# Patient Record
Sex: FEMALE | Race: WHITE | NOT HISPANIC OR LATINO | Employment: FULL TIME | URBAN - METROPOLITAN AREA
[De-identification: names, ages, dates, MRNs, and addresses within clinical notes are randomized per-mention and may not be internally consistent; named-entity substitution may affect disease eponyms.]

---

## 2017-01-29 ENCOUNTER — COMMUNICATION - HEALTHEAST (OUTPATIENT)
Dept: SCHEDULING | Facility: CLINIC | Age: 32
End: 2017-01-29

## 2017-03-17 ENCOUNTER — COMMUNICATION - HEALTHEAST (OUTPATIENT)
Dept: SCHEDULING | Facility: CLINIC | Age: 32
End: 2017-03-17

## 2017-04-27 ENCOUNTER — RECORDS - HEALTHEAST (OUTPATIENT)
Dept: ADMINISTRATIVE | Facility: OTHER | Age: 32
End: 2017-04-27

## 2018-05-13 ENCOUNTER — COMMUNICATION - HEALTHEAST (OUTPATIENT)
Dept: SCHEDULING | Facility: CLINIC | Age: 33
End: 2018-05-13

## 2018-05-13 ENCOUNTER — RECORDS - HEALTHEAST (OUTPATIENT)
Dept: ADMINISTRATIVE | Facility: OTHER | Age: 33
End: 2018-05-13

## 2018-12-04 ENCOUNTER — RECORDS - HEALTHEAST (OUTPATIENT)
Dept: ADMINISTRATIVE | Facility: OTHER | Age: 33
End: 2018-12-04

## 2020-02-26 ENCOUNTER — RECORDS - HEALTHEAST (OUTPATIENT)
Dept: ADMINISTRATIVE | Facility: OTHER | Age: 35
End: 2020-02-26

## 2020-09-11 ENCOUNTER — COMMUNICATION - HEALTHEAST (OUTPATIENT)
Dept: SCHEDULING | Facility: CLINIC | Age: 35
End: 2020-09-11

## 2020-12-14 ENCOUNTER — COMMUNICATION - HEALTHEAST (OUTPATIENT)
Dept: SCHEDULING | Facility: CLINIC | Age: 35
End: 2020-12-14

## 2021-05-02 ENCOUNTER — HOSPITAL ENCOUNTER (EMERGENCY)
Dept: EMERGENCY MEDICINE | Facility: CLINIC | Age: 36
Discharge: HOME | End: 2021-05-02
Attending: EMERGENCY MEDICINE
Payer: COMMERCIAL

## 2021-05-02 DIAGNOSIS — N39.0 URINARY TRACT INFECTION WITH HEMATURIA, SITE UNSPECIFIED: ICD-10-CM

## 2021-05-02 DIAGNOSIS — R10.9 FLANK PAIN: ICD-10-CM

## 2021-05-02 DIAGNOSIS — R31.9 URINARY TRACT INFECTION WITH HEMATURIA, SITE UNSPECIFIED: ICD-10-CM

## 2021-05-02 LAB
ANION GAP SERPL CALCULATED.3IONS-SCNC: 12 MMOL/L (ref 5–18)
BASOPHILS # BLD AUTO: 0.1 THOU/UL (ref 0–0.2)
BASOPHILS NFR BLD AUTO: 1 % (ref 0–2)
BUN SERPL-MCNC: 11 MG/DL (ref 8–22)
C REACTIVE PROTEIN LHE: 0.3 MG/DL (ref 0–0.8)
CALCIUM SERPL-MCNC: 10.8 MG/DL (ref 8.5–10.5)
CHLORIDE BLD-SCNC: 106 MMOL/L (ref 98–107)
CO2 SERPL-SCNC: 24 MMOL/L (ref 22–31)
CREAT SERPL-MCNC: 0.88 MG/DL (ref 0.6–1.1)
EOSINOPHIL # BLD AUTO: 0.1 THOU/UL (ref 0–0.4)
EOSINOPHIL NFR BLD AUTO: 1 % (ref 0–6)
ERYTHROCYTE [DISTWIDTH] IN BLOOD BY AUTOMATED COUNT: 14.1 % (ref 11–14.5)
GFR SERPL CREATININE-BSD FRML MDRD: >60 ML/MIN/1.73M2
GLUCOSE BLD-MCNC: 101 MG/DL (ref 70–125)
HCG UR QL: NEGATIVE
HCT VFR BLD AUTO: 43.5 % (ref 35–47)
HGB BLD-MCNC: 14.5 G/DL (ref 12–16)
IMM GRANULOCYTES # BLD: 0 THOU/UL
IMM GRANULOCYTES NFR BLD: 0 %
LYMPHOCYTES # BLD AUTO: 2.2 THOU/UL (ref 0.8–4.4)
LYMPHOCYTES NFR BLD AUTO: 24 % (ref 20–40)
MCH RBC QN AUTO: 30.7 PG (ref 27–34)
MCHC RBC AUTO-ENTMCNC: 33.3 G/DL (ref 32–36)
MCV RBC AUTO: 92 FL (ref 80–100)
MONOCYTES # BLD AUTO: 0.6 THOU/UL (ref 0–0.9)
MONOCYTES NFR BLD AUTO: 7 % (ref 2–10)
NEUTROPHILS # BLD AUTO: 6.1 THOU/UL (ref 2–7.7)
NEUTROPHILS NFR BLD AUTO: 67 % (ref 50–70)
PLATELET # BLD AUTO: 184 THOU/UL (ref 140–440)
PMV BLD AUTO: 10.1 FL (ref 8.5–12.5)
POTASSIUM BLD-SCNC: 3.7 MMOL/L (ref 3.5–5)
RBC # BLD AUTO: 4.73 MILL/UL (ref 3.8–5.4)
SODIUM SERPL-SCNC: 142 MMOL/L (ref 136–145)
WBC: 9.1 THOU/UL (ref 4–11)

## 2021-05-02 ASSESSMENT — MIFFLIN-ST. JEOR: SCORE: 1468.59

## 2021-05-05 LAB — BACTERIA SPEC CULT: ABNORMAL

## 2021-05-30 ENCOUNTER — HEALTH MAINTENANCE LETTER (OUTPATIENT)
Age: 36
End: 2021-05-30

## 2021-05-30 VITALS — BODY MASS INDEX: 30.73 KG/M2 | BODY MASS INDEX: 31.25 KG/M2 | HEIGHT: 60 IN | WEIGHT: 160 LBS

## 2021-06-02 ENCOUNTER — RECORDS - HEALTHEAST (OUTPATIENT)
Dept: ADMINISTRATIVE | Facility: CLINIC | Age: 36
End: 2021-06-02

## 2021-06-02 VITALS — BODY MASS INDEX: 35.15 KG/M2 | WEIGHT: 180 LBS

## 2021-06-05 ENCOUNTER — HOSPITAL ENCOUNTER (EMERGENCY)
Dept: EMERGENCY MEDICINE | Facility: CLINIC | Age: 36
Discharge: HOME | End: 2021-06-05
Attending: FAMILY MEDICINE
Payer: COMMERCIAL

## 2021-06-05 VITALS — WEIGHT: 185 LBS | BODY MASS INDEX: 34.93 KG/M2 | HEIGHT: 61 IN

## 2021-06-05 DIAGNOSIS — N23 RENAL COLIC ON RIGHT SIDE: ICD-10-CM

## 2021-06-05 DIAGNOSIS — N12 PYELONEPHRITIS: ICD-10-CM

## 2021-06-05 DIAGNOSIS — Z93.6 NEPHROSTOMY STATUS (H): ICD-10-CM

## 2021-06-05 LAB
ALBUMIN UR-MCNC: ABNORMAL G/DL
ALBUMIN UR-MCNC: ABNORMAL G/DL
AMORPH CRY #/AREA URNS HPF: ABNORMAL /[HPF]
ANION GAP SERPL CALCULATED.3IONS-SCNC: 9 MMOL/L (ref 5–18)
APPEARANCE UR: ABNORMAL
APPEARANCE UR: ABNORMAL
BACTERIA #/AREA URNS HPF: ABNORMAL /[HPF]
BACTERIA #/AREA URNS HPF: ABNORMAL /[HPF]
BILIRUB UR QL STRIP: NEGATIVE
BILIRUB UR QL STRIP: NEGATIVE
BUN SERPL-MCNC: 14 MG/DL (ref 8–22)
C REACTIVE PROTEIN LHE: 0.3 MG/DL (ref 0–0.8)
CALCIUM SERPL-MCNC: 10 MG/DL (ref 8.5–10.5)
CHLORIDE BLD-SCNC: 106 MMOL/L (ref 98–107)
CO2 SERPL-SCNC: 20 MMOL/L (ref 22–31)
COLOR UR AUTO: ABNORMAL
COLOR UR AUTO: ABNORMAL
CREAT SERPL-MCNC: 1.03 MG/DL (ref 0.6–1.1)
ERYTHROCYTE [DISTWIDTH] IN BLOOD BY AUTOMATED COUNT: 13.7 % (ref 11–14.5)
GFR SERPL CREATININE-BSD FRML MDRD: >60 ML/MIN/1.73M2
GLUCOSE BLD-MCNC: 94 MG/DL (ref 70–125)
GLUCOSE UR STRIP-MCNC: NEGATIVE MG/DL
GLUCOSE UR STRIP-MCNC: NEGATIVE MG/DL
HCG UR QL: NEGATIVE
HCT VFR BLD AUTO: 40.1 % (ref 35–47)
HGB BLD-MCNC: 13.2 G/DL (ref 12–16)
HGB UR QL STRIP: ABNORMAL
HGB UR QL STRIP: ABNORMAL
HYALINE CASTS: 4 LPF
HYALINE CASTS: 4 LPF
KETONES UR STRIP-MCNC: NEGATIVE MG/DL
KETONES UR STRIP-MCNC: NEGATIVE MG/DL
LEUKOCYTE ESTERASE UR QL STRIP: ABNORMAL
LEUKOCYTE ESTERASE UR QL STRIP: ABNORMAL
MCH RBC QN AUTO: 30.5 PG (ref 27–34)
MCHC RBC AUTO-ENTMCNC: 32.9 G/DL (ref 32–36)
MCV RBC AUTO: 93 FL (ref 80–100)
MUCOUS THREADS #/AREA URNS LPF: PRESENT LPF
MUCOUS THREADS #/AREA URNS LPF: PRESENT LPF
NITRATE UR QL: POSITIVE
NITRATE UR QL: POSITIVE
PH UR STRIP: 7 [PH] (ref 5–8)
PH UR STRIP: 7 [PH] (ref 5–8)
PLATELET # BLD AUTO: 190 THOU/UL (ref 140–440)
PMV BLD AUTO: 10.3 FL (ref 8.5–12.5)
POTASSIUM BLD-SCNC: 4 MMOL/L (ref 3.5–5)
RBC # BLD AUTO: 4.33 MILL/UL (ref 3.8–5.4)
RBC URINE: 11 HPF
RBC URINE: 23 HPF
SODIUM SERPL-SCNC: 135 MMOL/L (ref 136–145)
SP GR UR STRIP: 1 (ref 1–1.03)
SP GR UR STRIP: 1.01 (ref 1–1.03)
SQUAMOUS EPITHELIAL: <1 /HPF
SQUAMOUS EPITHELIAL: <1 /HPF
UROBILINOGEN UR STRIP-ACNC: ABNORMAL
UROBILINOGEN UR STRIP-ACNC: ABNORMAL
WBC CLUMPS #/AREA URNS HPF: PRESENT /[HPF]
WBC URINE: 163 HPF
WBC URINE: 27 HPF
WBC: 9.1 THOU/UL (ref 4–11)
YEAST #/AREA URNS HPF: ABNORMAL HPF

## 2021-06-05 ASSESSMENT — MIFFLIN-ST. JEOR: SCORE: 1437.97

## 2021-06-07 LAB
BACTERIA SPEC CULT: ABNORMAL

## 2021-06-09 ENCOUNTER — HOSPITAL ENCOUNTER (EMERGENCY)
Dept: EMERGENCY MEDICINE | Facility: HOSPITAL | Age: 36
Discharge: HOME | End: 2021-06-10
Attending: FAMILY MEDICINE
Payer: COMMERCIAL

## 2021-06-09 DIAGNOSIS — N12 PYELONEPHRITIS: ICD-10-CM

## 2021-06-09 LAB
ALBUMIN UR-MCNC: ABNORMAL G/DL
APPEARANCE UR: CLEAR
BACTERIA #/AREA URNS HPF: ABNORMAL /[HPF]
BASOPHILS # BLD AUTO: 0.1 THOU/UL (ref 0–0.2)
BASOPHILS NFR BLD AUTO: 1 % (ref 0–2)
BILIRUB UR QL STRIP: NEGATIVE
COLOR UR AUTO: ABNORMAL
EOSINOPHIL # BLD AUTO: 0.1 THOU/UL (ref 0–0.4)
EOSINOPHIL NFR BLD AUTO: 1 % (ref 0–6)
ERYTHROCYTE [DISTWIDTH] IN BLOOD BY AUTOMATED COUNT: 13.7 % (ref 11–14.5)
GLUCOSE UR STRIP-MCNC: NEGATIVE MG/DL
HCT VFR BLD AUTO: 42.5 % (ref 35–47)
HGB BLD-MCNC: 14.4 G/DL (ref 12–16)
HGB UR QL STRIP: ABNORMAL
IMM GRANULOCYTES # BLD: 0.1 THOU/UL
IMM GRANULOCYTES NFR BLD: 1 %
KETONES UR STRIP-MCNC: NEGATIVE MG/DL
LEUKOCYTE ESTERASE UR QL STRIP: ABNORMAL
LYMPHOCYTES # BLD AUTO: 2.5 THOU/UL (ref 0.8–4.4)
LYMPHOCYTES NFR BLD AUTO: 26 % (ref 20–40)
MCH RBC QN AUTO: 31.1 PG (ref 27–34)
MCHC RBC AUTO-ENTMCNC: 33.9 G/DL (ref 32–36)
MCV RBC AUTO: 92 FL (ref 80–100)
MONOCYTES # BLD AUTO: 0.6 THOU/UL (ref 0–0.9)
MONOCYTES NFR BLD AUTO: 6 % (ref 2–10)
NEUTROPHILS # BLD AUTO: 6.4 THOU/UL (ref 2–7.7)
NEUTROPHILS NFR BLD AUTO: 66 % (ref 50–70)
NITRATE UR QL: NEGATIVE
PH UR STRIP: 7 [PH] (ref 5–8)
PLATELET # BLD AUTO: 179 THOU/UL (ref 140–440)
PMV BLD AUTO: 10.4 FL (ref 8.5–12.5)
RBC # BLD AUTO: 4.63 MILL/UL (ref 3.8–5.4)
RBC URINE: 4 HPF
SP GR UR STRIP: 1.01 (ref 1–1.03)
SQUAMOUS EPITHELIAL: <1 /HPF
UROBILINOGEN UR STRIP-ACNC: ABNORMAL
WBC URINE: 8 HPF
WBC: 9.7 THOU/UL (ref 4–11)

## 2021-06-09 ASSESSMENT — MIFFLIN-ST. JEOR: SCORE: 1461.44

## 2021-06-10 LAB
ALBUMIN UR-MCNC: ABNORMAL G/DL
ANION GAP SERPL CALCULATED.3IONS-SCNC: 10 MMOL/L (ref 5–18)
APPEARANCE UR: ABNORMAL
BACTERIA #/AREA URNS HPF: ABNORMAL /[HPF]
BILIRUB UR QL STRIP: NEGATIVE
BUN SERPL-MCNC: 13 MG/DL (ref 8–22)
C REACTIVE PROTEIN LHE: 0.3 MG/DL (ref 0–0.8)
CALCIUM SERPL-MCNC: 9.8 MG/DL (ref 8.5–10.5)
CHLORIDE BLD-SCNC: 107 MMOL/L (ref 98–107)
CO2 SERPL-SCNC: 20 MMOL/L (ref 22–31)
COLOR UR AUTO: ABNORMAL
CREAT SERPL-MCNC: 0.91 MG/DL (ref 0.6–1.1)
GFR SERPL CREATININE-BSD FRML MDRD: >60 ML/MIN/1.73M2
GLUCOSE BLD-MCNC: 97 MG/DL (ref 70–125)
GLUCOSE UR STRIP-MCNC: NEGATIVE MG/DL
HGB UR QL STRIP: ABNORMAL
KETONES UR STRIP-MCNC: NEGATIVE MG/DL
LEUKOCYTE ESTERASE UR QL STRIP: ABNORMAL
MUCOUS THREADS #/AREA URNS LPF: PRESENT LPF
NITRATE UR QL: NEGATIVE
PH UR STRIP: 7 [PH] (ref 5–8)
POTASSIUM BLD-SCNC: 3.6 MMOL/L (ref 3.5–5)
RBC URINE: 35 HPF
SODIUM SERPL-SCNC: 137 MMOL/L (ref 136–145)
SP GR UR STRIP: 1.01 (ref 1–1.03)
SQUAMOUS EPITHELIAL: 1 /HPF
UROBILINOGEN UR STRIP-ACNC: ABNORMAL
WBC URINE: 20 HPF

## 2021-06-13 LAB
BACTERIA SPEC CULT: ABNORMAL

## 2021-06-13 NOTE — TELEPHONE ENCOUNTER
Coronavirus (COVID-19) Notification    Reason for call  Notify of POSITIVE  COVID-19 lab result, assess symptoms,  review [facility] recommendations    Lab Result   Lab test for 2019-nCoV rRt-PCR or SARS-COV-2 PCR  Oropharyngeal AND/OR nasopharyngeal swabs were POSITIVE for 2019-nCoV RNA [OR] SARS-COV-2 RNA (COVID-19) RNA     We have been unable to reach Patient by phone at this time to notify of their Positive COVID-19 result.  Left voicemail message requesting a call back to [phone] [facility] for results.        POSITIVE COVID-19 Letter sent.    Lizeth St RN

## 2021-06-13 NOTE — TELEPHONE ENCOUNTER
"Coronavirus (COVID-19) Notification    Caller Name (Patient, parent, daughter/son, grandparent, etc)  Patient: Jacqueline Pappas     Reason for call  Notify of Positive Coronavirus (COVID-19) lab results, assess symptoms,  review  [facility] recommendations    Lab Result    Lab test:  2019-nCoV rRt-PCR or SARS-CoV-2 PCR    Oropharyngeal AND/OR nasopharyngeal swabs is POSITIVE for 2019-nCoV RNA/SARS-COV-2 PCR (COVID-19 virus)    RN Recommendations/Instructions per [facility] Coronavirus COVID-19 recommendations    Brief introduction script  Introduce self and then review script:  \"I am calling on behalf of [employer].  We were notified that your Coronavirus test (COVID-19) for was POSITIVE for the virus.  I have some information to relay to you but first I wanted to mention that the [place] Dept of Health will be contacting you shortly [it's possible MD already called Patient] to talk to you more about how you are feeling and other people you have had contact with who might now also have the virus.  Also,  [employer] [facility] is Partnering with the [facility] for Covid-19 research, you may be contacted directly by research staff.\"    Assessment (Inquire about Patient's current symptoms)   Assessment   Current Symptoms at time of phone call: (if no symptoms, document No symptoms] None   Symptom onset (if applicable) N/A     If at time of call, Patients symptoms hare worsened, the Patient should contact 911 or have someone drive them to Emergency Dept promptly:      If Patient calling 911, inform 911 personal that you have tested positive for the Coronavirus (COVID-19).  Place mask on and await 911 to arrive.    If Emergency Dept, If possible, please have another adult drive you to the Emergency Dept but you need to wear mask when in contact with other people.        Monoclonal Antibody Administration    You may be eligible to receive a new treatment with a monoclonal antibody for preventing " "hospitalization in patients at high risk for complications from COVID-19.   This medication is still experimental and available on a limited basis; it is given through an IV and must be given at an infusion center. Please note that not all people who are eligible will receive the medication since it is in limited supply.     Are you interested in being considered for this medication?  No.  Does the patient fit the criteria: No    If patient qualifies based on above criteria:  \"We will contact you if you are selected to receive the medication in the next 1-2 days.   This is time sensitive and if you are not selected in the next 1-2 days, you will not receive the medication.  If you do not receive a call to schedule, you have not been selected.\"    Review information with Patient    Your result was positive. This means you have COVID-19 (coronavirus).  We have sent you a letter that reviews the information that I'll be reviewing with you now.    How can I protect others?    If you have symptoms: stay home and away from others (self-isolate) until:    You've had no fever--and no medicine that reduces fever--for 1 full day (24 hours). And      Your other symptoms have gotten better. For example, your cough or breathing has improved. And     At least 10 days have passed since your symptoms started. (If you ve been told by a doctor that you have a weak immune system, wait 20 days.)     If you don't have symptoms: Stay home and away from others (self-isolate) until at least 10 days have passed since your first positive COVID-19 test. (Date test collected).    During this time:    Stay in your own room, including for meals. Use your own bathroom if you can.    Stay away from others in your home. No hugging, kissing or shaking hands. No visitors.     Don't go to work, school or anywhere else.     Clean  high touch  surfaces often (doorknobs, counters, handles, etc.). Use a household cleaning spray or wipes. You'll find a " full list on the EPA website at www.epa.gov/pesticide-registration/list-n-disinfectants-use-against-sars-cov-2.     Cover your mouth and nose with a mask, tissue or other face covering to avoid spreading germs.    Wash your hands and face often with soap and water.    Caregivers in these groups are at risk for severe illness due to COVID-19:  o People 65 years and older  o People who live in a nursing home or long-term care facility  o People with chronic disease (lung, heart, cancer, diabetes, kidney, liver, immunologic)  o People who have a weakened immune system, including those who:  - Are in cancer treatment  - Take medicine that weakens the immune system, such as corticosteroids  - Had a bone marrow or organ transplant  - Have an immune deficiency  - Have poorly controlled HIV or AIDS  - Are obese (body mass index of 40 or higher)  - Smoke regularly    Caregivers should wear gloves while washing dishes, handling laundry and cleaning bedrooms and bathrooms.    Wash and dry laundry with special caution. Don't shake dirty laundry, and use the warmest water setting you can.    If you have a weakened immune system, ask your doctor about other actions you should take.    For more tips, go to www.cdc.gov/coronavirus/2019-ncov/downloads/10Things.pdf.    You should not go back to work until you meet the guidelines above for ending your home isolation. You don't need to be retested for COVID-19 before going back to work--studies show that you won't spread the virus if it's been at least 10 days since your symptoms started (or 20 days, if you have a weak immune system).    Employers: This document serves as formal notice of your employee's medical guidelines for going back to work. They must meet the above guidelines before going back to work in person.    How can I take care of myself?    1. Get lots of rest. Drink extra fluids (unless a doctor has told you not to).    2. Take Tylenol (acetaminophen) for fever or pain.  If you have liver or kidney problems, ask your family doctor if it's okay to take Tylenol.     Take either:     650 mg (two 325 mg pills) every 4 to 6 hours, or     1,000 mg (two 500 mg pills) every 8 hours as needed.     Note: Don't take more than 3,000 mg in one day. Acetaminophen is found in many medicines (both prescribed and over-the-counter medicines). Read all labels to be sure you don't take too much.    For children, check the Tylenol bottle for the right dose (based on their age or weight).    3. If you have other health problems (like cancer, heart failure, an organ transplant or severe kidney disease): Call your specialty clinic if you don't feel better in the next 2 days.    4. Know when to call 911: Emergency warning signs include:    Trouble breathing or shortness of breath    Pain or pressure in the chest that doesn't go away    Feeling confused like you haven't felt before, or not being able to wake up    Bluish-colored lips or face    5. Sign up for [employer]. We know it's scary to hear that you have COVID-19. We want to track your symptoms to make sure you're okay over the next 2 weeks. Please look for an email from [employer]--this is a free, online program that we'll use to keep in touch. To sign up, follow the link in the email. Learn more at [email or web address].    Where can I get more information?    [facility]: [email or web address]    Coronavirus Basics: www.health.[facility].[place]/diseases/coronavirus/basics.html    What to Do If You're Sick: www.cdc.gov/coronavirus/2019-ncov/about/steps-when-sick.html    Ending Home Isolation: www.cdc.gov/coronavirus/2019-ncov/hcp/disposition-in-home-patients.html     Caring for Someone with COVID-19: www.cdc.gov/coronavirus/2019-ncov/if-you-are-sick/care-for-someone.html     [facility] clinical trials (COVID-19 research studies): [email or web address]     A Positive COVID-19 letter will be sent  via [employer] or the Mail.  (Exception, no letters sent to Presurgerical/Preprocedure Patients)    Porfirio Morales RN

## 2021-06-14 ENCOUNTER — HOSPITAL ENCOUNTER (EMERGENCY)
Dept: EMERGENCY MEDICINE | Facility: HOSPITAL | Age: 36
Discharge: HOME | End: 2021-06-14
Attending: EMERGENCY MEDICINE
Payer: COMMERCIAL

## 2021-06-14 DIAGNOSIS — J00 ACUTE NASOPHARYNGITIS: ICD-10-CM

## 2021-06-14 ASSESSMENT — MIFFLIN-ST. JEOR: SCORE: 1460.65

## 2021-06-16 ENCOUNTER — HOSPITAL ENCOUNTER (EMERGENCY)
Dept: EMERGENCY MEDICINE | Facility: CLINIC | Age: 36
Discharge: HOME | End: 2021-06-16
Payer: COMMERCIAL

## 2021-06-16 DIAGNOSIS — Z93.6 NEPHROSTOMY STATUS (H): ICD-10-CM

## 2021-06-16 DIAGNOSIS — J06.9 VIRAL URI WITH COUGH: ICD-10-CM

## 2021-06-16 DIAGNOSIS — N12 PYELONEPHRITIS: ICD-10-CM

## 2021-06-16 LAB
ALBUMIN UR-MCNC: ABNORMAL G/DL
ALBUMIN UR-MCNC: NEGATIVE G/DL
ANION GAP SERPL CALCULATED.3IONS-SCNC: 9 MMOL/L (ref 5–18)
APPEARANCE UR: ABNORMAL
APPEARANCE UR: CLEAR
BACTERIA #/AREA URNS HPF: ABNORMAL /[HPF]
BACTERIA #/AREA URNS HPF: ABNORMAL /[HPF]
BASOPHILS # BLD AUTO: 0 THOU/UL (ref 0–0.2)
BASOPHILS NFR BLD AUTO: 0 % (ref 0–2)
BILIRUB UR QL STRIP: NEGATIVE
BILIRUB UR QL STRIP: NEGATIVE
BUN SERPL-MCNC: 8 MG/DL (ref 8–22)
CALCIUM SERPL-MCNC: 9.5 MG/DL (ref 8.5–10.5)
CHLORIDE BLD-SCNC: 105 MMOL/L (ref 98–107)
CO2 SERPL-SCNC: 22 MMOL/L (ref 22–31)
COLOR UR AUTO: ABNORMAL
COLOR UR AUTO: ABNORMAL
CREAT SERPL-MCNC: 0.84 MG/DL (ref 0.6–1.1)
EOSINOPHIL # BLD AUTO: 0.1 THOU/UL (ref 0–0.4)
EOSINOPHIL NFR BLD AUTO: 1 % (ref 0–6)
ERYTHROCYTE [DISTWIDTH] IN BLOOD BY AUTOMATED COUNT: 13.5 % (ref 11–14.5)
GFR SERPL CREATININE-BSD FRML MDRD: >60 ML/MIN/1.73M2
GLUCOSE BLD-MCNC: 113 MG/DL (ref 70–125)
GLUCOSE UR STRIP-MCNC: NEGATIVE MG/DL
GLUCOSE UR STRIP-MCNC: NEGATIVE MG/DL
HCT VFR BLD AUTO: 42.7 % (ref 35–47)
HGB BLD-MCNC: 14.2 G/DL (ref 12–16)
HGB UR QL STRIP: ABNORMAL
HGB UR QL STRIP: ABNORMAL
IMM GRANULOCYTES # BLD: 0 THOU/UL
IMM GRANULOCYTES NFR BLD: 0 %
KETONES UR STRIP-MCNC: NEGATIVE MG/DL
KETONES UR STRIP-MCNC: NEGATIVE MG/DL
LACTATE SERPL-SCNC: 1.1 MMOL/L (ref 0.7–2)
LEUKOCYTE ESTERASE UR QL STRIP: ABNORMAL
LEUKOCYTE ESTERASE UR QL STRIP: NEGATIVE
LYMPHOCYTES # BLD AUTO: 1.4 THOU/UL (ref 0.8–4.4)
LYMPHOCYTES NFR BLD AUTO: 15 % (ref 20–40)
MCH RBC QN AUTO: 30.8 PG (ref 27–34)
MCHC RBC AUTO-ENTMCNC: 33.3 G/DL (ref 32–36)
MCV RBC AUTO: 93 FL (ref 80–100)
MONOCYTES # BLD AUTO: 0.7 THOU/UL (ref 0–0.9)
MONOCYTES NFR BLD AUTO: 7 % (ref 2–10)
MUCOUS THREADS #/AREA URNS LPF: PRESENT LPF
NEUTROPHILS # BLD AUTO: 7.6 THOU/UL (ref 2–7.7)
NEUTROPHILS NFR BLD AUTO: 76 % (ref 50–70)
NITRATE UR QL: NEGATIVE
NITRATE UR QL: POSITIVE
PH UR STRIP: 7 [PH] (ref 5–8)
PH UR STRIP: 7.5 [PH] (ref 5–8)
PLATELET # BLD AUTO: 168 THOU/UL (ref 140–440)
PMV BLD AUTO: 10.5 FL (ref 8.5–12.5)
POC PREG URINE (HCG) HE - HISTORICAL: NEGATIVE
POCT KIT EXPIRATION DATE HE - HISTORICAL: NORMAL
POCT KIT LOT NUMBER HE - HISTORICAL: NORMAL
POCT NEGATIVE CONTROL HE - HISTORICAL: NORMAL
POCT POSITIVE CONTROL HE - HISTORICAL: NORMAL
POTASSIUM BLD-SCNC: 4.1 MMOL/L (ref 3.5–5)
RBC # BLD AUTO: 4.61 MILL/UL (ref 3.8–5.4)
RBC URINE: 2 HPF
RBC URINE: 20 HPF
SARS-COV-2 PCR RESULT-HE - HISTORICAL: NEGATIVE
SODIUM SERPL-SCNC: 136 MMOL/L (ref 136–145)
SP GR UR STRIP: 1.01 (ref 1–1.03)
SP GR UR STRIP: 1.01 (ref 1–1.03)
SQUAMOUS EPITHELIAL: 1 /HPF
SQUAMOUS EPITHELIAL: 1 /HPF
UROBILINOGEN UR STRIP-ACNC: ABNORMAL
UROBILINOGEN UR STRIP-ACNC: ABNORMAL
WBC URINE: 1 HPF
WBC URINE: 22 HPF
WBC: 9.9 THOU/UL (ref 4–11)

## 2021-06-16 ASSESSMENT — MIFFLIN-ST. JEOR: SCORE: 1460.65

## 2021-06-17 ENCOUNTER — COMMUNICATION - HEALTHEAST (OUTPATIENT)
Dept: SCHEDULING | Facility: CLINIC | Age: 36
End: 2021-06-17

## 2021-06-17 LAB — BACTERIA SPEC CULT: NORMAL

## 2021-06-17 NOTE — ED PROVIDER NOTES
EMERGENCY DEPARTMENT ENCOUNTER      NAME: Jacqueline Pappas  AGE: 36 y.o. female  YOB: 1985  MRN: [id number]  EVALUATION DATE & TIME: 2021  3:10 AM    PCP: Pao Montague MD    ED PROVIDER: Jolene Pastrana M.D.      Chief Complaint   Patient presents with     Flank Pain     rt     Nausea         FINAL IMPRESSION:  1. Urinary tract infection with hematuria, site unspecified    2. Flank pain          ED COURSE & MEDICAL DECISION MAKIN y.o. female presents to the Emergency Department for evaluation of right sided flank pain concerning for pyelonephritis in the setting of uti.  She has no evidence of urolithiasis or blockage of her nephrostomy tube.    4:10 AM I met with the patient, obtained history, performed an initial exam, and discussed options and plan for diagnostics and treatment here in the ED.  I wore the following PPE during patient encounter: N95 mask and eye protection. Morphine 4 mg IV x 2 administered for pain.  Levaquin 500 mg po administered for initiation of antibiotic therapy for treatment of uti.  5:08 AM I rechecked on the patient and her pain is down to a 4/10.  We talked about a CT scan which patient is comfortable with given her history.     Pertinent Labs & Imaging studies reviewed. (See chart for details)        At the conclusion of the encounter I discussed the results of all of the tests and the disposition. The questions were answered. The patient or family acknowledged understanding and was agreeable with the care plan.   MEDICATIONS GIVEN IN THE EMERGENCY:  Medications   morphine injection 4 mg (4 mg Intravenous Given 21 0438)   levoFLOXacin tablet 500 mg (LEVAQUIN) (500 mg Oral Given 21 0438)   morphine injection 4 mg (4 mg Intravenous Given 21 0522)       NEW PRESCRIPTIONS STARTED AT TODAY'S ER VISIT  Discharge Medication List as of 2021  6:14 AM      START taking these medications    Details   ciprofloxacin HCl (CIPRO) 500 MG tablet  Take 1 tablet (500 mg total) by mouth 2 (two) times a day for 7 days., Starting Sun 5/2/2021, Until Sun 5/9/2021, Print         CONTINUE these medications which have NOT CHANGED    Details   acetaminophen (TYLENOL) 500 MG tablet Take 1,000 mg by mouth every 6 (six) hours as needed for pain. , Until Discontinued, Historical Med      albuterol (PROAIR HFA;PROVENTIL HFA;VENTOLIN HFA) 90 mcg/actuation inhaler Inhale 2 puffs every 6 (six) hours as needed for wheezing or shortness of breath., Historical Med      dicyclomine (BENTYL) 20 mg tablet Take 1 tablet (20 mg total) by mouth 2 (two) times a day., Starting Wed 11/18/2020, Print      !! HYDROcodone-acetaminophen 5-325 mg per tablet Take 1 tablet by mouth every 6 (six) hours as needed for pain., Starting Sun 8/23/2020, Print      !! HYDROcodone-acetaminophen 5-325 mg per tablet Take 1-2 tablets by mouth every 4 (four) hours as needed for pain., Starting Thu 11/12/2020, Print      ondansetron (ZOFRAN ODT) 8 MG disintegrating tablet Take 1 tablet (8 mg total) by mouth every 8 (eight) hours as needed., Starting Thu 9/10/2020, Print      !! oxyCODONE (ROXICODONE) 5 MG immediate release tablet Take 1 tablet (5 mg total) by mouth every 6 (six) hours as needed for pain., Starting Thu 9/24/2020, Print      !! oxyCODONE (ROXICODONE) 5 MG immediate release tablet Take 1 tablet (5 mg total) by mouth every 6 (six) hours as needed for pain., Starting Fri 10/16/2020, Print      !! oxyCODONE (ROXICODONE) 5 MG immediate release tablet Take 1 tablet (5 mg total) by mouth every 6 (six) hours as needed for pain., Starting Wed 11/18/2020, Print      sertraline (ZOLOFT) 100 MG tablet Take 150 mg by mouth bedtime., Until Discontinued, Historical Med       !! - Potential duplicate medications found. Please discuss with provider.             =================================================================    [facility]    Patient information was obtained from: The Patient    Use of Intrepreter:  N/A     Jacqueline Pappas is a 36 y.o. female with a medical history of kidney stones, UTI's, congenital absence of left kidney, pyelonephritis, and depression, who presents to the ED via walk in for evaluation of flank pain and nausea.    The patient reports right flank pain for the past eleven hours.  She reports thi sis 8/10 in severity and consistent to her chronic flank pain. She reports a nephrostomy in her right kidney due to renal failure.  She reports associated nausea without emesis.  She has a history of similar symptoms frequently which she follows with her PCP at [facility] and [facility].  She normally takes Tylenol for pain relief.  She denies any chance at pregnancy.  No diarrhea, constipation, or other complaints at this time.  She is not currently on her menstrual cycle.  No current antibiotic use.     Social Hx: The patient is a smoker. No alcohol use.    Per chart review, the patient is frequently seen in the ED for flank pain and pyelonephritis.  She was last seen in this ED for flank pain and pyelonephritis on 3/25/21.  She was seen at the [facility] on 21 for vaginal pain and diagnosed with yeast vaginitis.  She was started on Fluconazole.       REVIEW OF SYSTEMS   Review of Systems   Gastrointestinal: Positive for nausea. Negative for constipation, diarrhea and vomiting.   Genitourinary: Positive for flank pain (Right).   All other systems reviewed and are negative.     PAST MEDICAL HISTORY:  Past Medical History:   Diagnosis Date     Acute renal failure (H)      Anemia      Congenital absence of left kidney      Depression      Hydronephrosis      Kidney stone     at age 29     Pyelonephritis      Smoker      Ureteral stricture      UTI (urinary tract infection)      Wrist fracture     Left       PAST SURGICAL HISTORY:  Past Surgical History:   Procedure Laterality Date      SECTION      x1     Cystoscopy, ureteroscopy, laser Right     11/02/2014 x2 with ureteral stent insertion      IR NEPHROSTOMY TUBE CHANGE RIGHT  12/12/2018     IR NEPHROSTOMY TUBE CHANGE RIGHT  6/11/2020     KIDNEY STONE SURGERY Right 05/2016     Mole removed      nasal     NEPHROSTOMY W/ INTRODUCTION OF CATHETER Right      WISDOM TOOTH EXTRACTION         CURRENT MEDICATIONS:    No current facility-administered medications on file prior to encounter.      Current Outpatient Medications on File Prior to Encounter   Medication Sig     acetaminophen (TYLENOL) 500 MG tablet Take 1,000 mg by mouth every 6 (six) hours as needed for pain.      albuterol (PROAIR HFA;PROVENTIL HFA;VENTOLIN HFA) 90 mcg/actuation inhaler Inhale 2 puffs every 6 (six) hours as needed for wheezing or shortness of breath.     dicyclomine (BENTYL) 20 mg tablet Take 1 tablet (20 mg total) by mouth 2 (two) times a day.     HYDROcodone-acetaminophen 5-325 mg per tablet Take 1 tablet by mouth every 6 (six) hours as needed for pain.     HYDROcodone-acetaminophen 5-325 mg per tablet Take 1-2 tablets by mouth every 4 (four) hours as needed for pain.     ondansetron (ZOFRAN ODT) 8 MG disintegrating tablet Take 1 tablet (8 mg total) by mouth every 8 (eight) hours as needed.     oxyCODONE (ROXICODONE) 5 MG immediate release tablet Take 1 tablet (5 mg total) by mouth every 6 (six) hours as needed for pain.     oxyCODONE (ROXICODONE) 5 MG immediate release tablet Take 1 tablet (5 mg total) by mouth every 6 (six) hours as needed for pain.     oxyCODONE (ROXICODONE) 5 MG immediate release tablet Take 1 tablet (5 mg total) by mouth every 6 (six) hours as needed for pain.     sertraline (ZOLOFT) 100 MG tablet Take 150 mg by mouth bedtime.       ALLERGIES:  Allergies   Allergen Reactions     Vancomycin Hives and Rash     Zosyn [Piperacillin-Tazobactam] Itching and Rash     Tolerated 12/11/18     Penicillins Rash     Tolerating augmentin 5/8  Tolerated ampicillin and amoxicillin August 2018  Occurred as a small child  Tolerating augmentin 5/8  Tolerated ampicillin and  amoxicillin 2018  As a child per mother; mother unsure if has tolerated another PCN since. Tolerated ampicillin 18  Tolerating augmentin 5/8  Tolerated ampicillin and amoxicillin 2018  Occurred as a small child  Tolerating augmentin 5/8  Tolerated ampicillin and amoxicillin 2018       Desogestrel-Ethinyl Estradiol Swelling and Angioedema     Swelling of hands and feet per pt.  Swelling of hands and feet per pt.  Swelling of hands and feet per pt.       Sulfa (Sulfonamide Antibiotics) Rash       FAMILY HISTORY:  Family History   Problem Relation Age of Onset     Heart disease Maternal Uncle         heart failure     Coronary artery disease Unknown      Heart disease Maternal Grandmother         pacemaker     Gout Paternal Grandfather      Prostate cancer Maternal Aunt         breast     Heart disease Maternal Aunt         pacemaker     Heart disease Maternal Aunt         pacemaker     Heart disease Maternal Aunt         pacemaker       SOCIAL HISTORY:   Social History     Socioeconomic History     Marital status: Single     Spouse name: Not on file     Number of children: Not on file     Years of education: Not on file     Highest education level: Not on file   Occupational History     Occupation: PCA     Employer: [employer]   Social Needs     Financial resource strain: Not on file     Food insecurity     Worry: Not on file     Inability: Not on file     Transportation needs     Medical: Not on file     Non-medical: Not on file   Tobacco Use     Smoking status: Current Every Day Smoker     Packs/day: 0.50     Years: 10.00     Pack years: 5.00     Last attempt to quit: 2016     Years since quittin.6     Smokeless tobacco: Former User     Quit date: 2016     Tobacco comment: has information from last admission.   Substance and Sexual Activity     Alcohol use: Yes     Comment: occ     Drug use: No     Sexual activity: Not on file   Lifestyle     Physical activity     Days  "per week: Not on file     Minutes per session: Not on file     Stress: Not on file   Relationships     Social connections     Talks on phone: Not on file     Gets together: Not on file     Attends Advent service: Not on file     Active member of club or organization: Not on file     Attends meetings of clubs or organizations: Not on file     Relationship status: Not on file     Intimate partner violence     Fear of current or ex partner: Not on file     Emotionally abused: Not on file     Physically abused: Not on file     Forced sexual activity: Not on file   Other Topics Concern     Not on file   Social History Narrative     Not on file       VITALS:  Patient Vitals for the past 24 hrs:   BP Temp Temp src Pulse Resp SpO2 Height Weight   05/02/21 0613 124/78 -- -- 71 16 98 % -- --   05/02/21 0315 -- -- -- 82 -- 98 % -- --   05/02/21 0312 131/80 97.7  F (36.5  C) Oral 79 20 97 % 5' 0.5\" (1.537 m) 185 lb (83.9 kg)       PHYSICAL EXAM    Constitutional: Well developed, Well nourished, mild distress   HEENT: Normocephalic, Atraumatic, Bilateral external ears normal, Oropharynx normal, mucous membranes moist, Nose normal. Neck-  Normal range of motion, No tenderness, Supple, No stridor.   Eyes: PERRL, EOMI, Conjunctiva normal, No discharge.   Respiratory: Normal breath sounds, No respiratory distress, No wheezing, Speaks full sentences easily. No cough.  Cardiovascular: Normal heart rate, Regular rhythm,No murmurs, No rubs, No gallops. Chest wall nontender.  GI: No excessive obesity. Bowel sounds normal, Soft, No tenderness, No masses, No flank tenderness. No rebound or guarding.   Musculoskeletal: 2+ DP pulses. No edema.  Good range of motion in all major joints. No tenderness to palpation or major deformities noted.   Integument: Warm, Dry, No erythema, No rash. No petechiae.  Neurologic: Alert , Normal motor function,  No focal deficits noted. Normal gait.  Psychiatric: Affect normal, Judgment normal, Mood normal. " Cooperative.      LAB:  All pertinent labs reviewed and interpreted.  Results for orders placed or performed during the hospital encounter of 05/02/21   Urinalysis-UC if Indicated   Result Value Ref Range    Color, UA Light Yellow Light Yellow, Yellow    Clarity, UA Slightly Cloudy (!) Clear    Glucose, UA Negative Negative    Protein,  mg/dL (!) Negative    Bilirubin, UA Negative Negative    Urobilinogen, UA <2.0 mg/dL <2.0 mg/dL    pH, UA 7.5 5.0 - 8.0    Blood, UA 0.2 mg/dL (!) Negative    Ketones, UA Negative Negative    Nitrite, UA Positive (!) Negative    Leukocytes,  Duglas/uL (!) Negative    Specific Gravity, UA 1.009 1.001 - 1.030    RBC, UA 22 (H) <=2 hpf    WBC UA 19 (H) <=5 hpf    Bacteria, UA Few (!) None Seen    Squamous Epithel, UA 2 <=5 /HPF    Mucus, UA Present (!) None Seen lpf    Triplephos Bryanna, UA Few (!) None Seen    Hyaline Casts, UA 3 <=5 lpf   Pregnancy (Beta-hCG, Qual), Urine   Result Value Ref Range    Pregnancy Test, Urine Negative Negative   Basic metabolic panel   Result Value Ref Range    Sodium 142 136 - 145 mmol/L    Potassium 3.7 3.5 - 5.0 mmol/L    Chloride 106 98 - 107 mmol/L    CO2 24 22 - 31 mmol/L    Anion Gap, Calculation 12 5 - 18 mmol/L    Glucose 101 70 - 125 mg/dL    Calcium 10.8 (H) 8.5 - 10.5 mg/dL    BUN 11 8 - 22 mg/dL    Creatinine 0.88 0.60 - 1.10 mg/dL    GFR MDRD Af Amer >60 >60 mL/min/1.73m2    GFR MDRD Non Af Amer >60 >60 mL/min/1.73m2   C-Reactive Protein   Result Value Ref Range    CRP 0.3 0.0 - 0.8 mg/dL   HM1 (CBC with Diff)   Result Value Ref Range    WBC 9.1 4.0 - 11.0 thou/uL    RBC 4.73 3.80 - 5.40 mill/uL    Hemoglobin 14.5 12.0 - 16.0 g/dL    Hematocrit 43.5 35.0 - 47.0 %    MCV 92 80 - 100 fL    MCH 30.7 27.0 - 34.0 pg    MCHC 33.3 32.0 - 36.0 g/dL    RDW 14.1 11.0 - 14.5 %    Platelets 184 140 - 440 thou/uL    MPV 10.1 8.5 - 12.5 fL    Neutrophils % 67 50 - 70 %    Lymphocytes % 24 20 - 40 %    Monocytes % 7 2 - 10 %    Eosinophils % 1 0 - 6  %    Basophils % 1 0 - 2 %    Immature Granulocyte % 0 <=0 %    Neutrophils Absolute 6.1 2.0 - 7.7 thou/uL    Lymphocytes Absolute 2.2 0.8 - 4.4 thou/uL    Monocytes Absolute 0.6 0.0 - 0.9 thou/uL    Eosinophils Absolute 0.1 0.0 - 0.4 thou/uL    Basophils Absolute 0.1 0.0 - 0.2 thou/uL    Immature Granulocyte Absolute 0.0 <=0.0 thou/uL       RADIOLOGY:  Reviewed all pertinent imaging. Please see official radiology report.  Ct Abdomen Pelvis Without Oral Without Iv Contrast    Result Date: 5/2/2021  EXAM: CT ABDOMEN AND PELVIS WITHOUT CONTRAST LOCATION: [facility] DATE/TIME: 05/02/2021, 5:39 AM INDICATION: Right flank pain with UTI. Congenital absence of the left kidney. History of a right-sided nephrostomy tube due to right renal failure. COMPARISON: Multiple prior studies with most recent from 03/25/2021. TECHNIQUE: CT scan of the abdomen and pelvis was performed without oral or IV contrast. Multiplanar reformats were obtained. Dose reduction techniques were used. CONTRAST: None. FINDINGS: LOWER CHEST: Mild interlobular septal thickening in both lower lungs with scattered areas of mosaic attenuation which can be seen with air trapping or edema. No pleural fluid. Calcified granuloma left lung base. HEPATOBILIARY: Normal. PANCREAS: Normal. SPLEEN: Unchanged splenomegaly. ADRENAL GLANDS: Normal. KIDNEY/BLADDER: Stable positioning of a percutaneous a nephrostomy tube within the right lower renal pole. Stable appearance to the right kidney which contains scattered well-circumscribed right renal cysts. Nonobstructing 3 mm calculus right upper renal  pole nonobstructing 4 mm and 5 mm calculus in the right lower renal pole and right mid kidney. No significant hydronephrosis up. No ureteral dilatation or calculi. Bladder unremarkable. Severe left native renal atrophy. BOWEL: Large amount stool in the colon. Appendix unremarkable. No small bowel dilatation or inflammatory change allowing for the  noncontrast study. LYMPH NODES: No lymphadenopathy. VASCULATURE: Unremarkable. PELVIC ORGANS: Unremarkable. No free fluid. MUSCULOSKELETAL: Normal.     1.  No significant change in the appearance and position of the right percutaneous nephrostomy tube. Nonobstructing intrarenal calculi right kidney with cystic region in the right kidney remaining stable. 2.  Stable splenomegaly. 3.  Mild interlobular septal thickening in both lower lungs with scattered areas of mosaic attenuation. These findings can be seen with edema or air-trapping.       I,Cj Hyde, am serving as a scribe to document services personally performed by Dr. Pastrana based on my observation and the provider's statements to me. I, Jolene Pastrana MD attest that Cj Hyde is acting in a scribe capacity, has observed my performance of the services and has documented them in accordance with my direction.    Jolene Pastrana M.D.  Emergency Medicine  [facility] EMERGENCY ROOM  [address] [address]  Dept: [phone]  Loc: [phone]            Jolene Pastrana MD  05/02/21 3761

## 2021-06-17 NOTE — ED TRIAGE NOTES
Pt reports rt side flank pain with nausea that started at 1700 5/1. Hx of CKD d/t having right kidney only. Pt states nephrostomy tube in place. Took 1000mg tylenol last at 2300.  Pt states she always has stones in (rt) kidney, flare ups ~ 1 month.

## 2021-06-25 NOTE — ED TRIAGE NOTES
Pt reports strong smelling urine and pain/pressure with urination x 4 days. Today right kidney started hurting. Pt with chronic kidney failure, born with only right kidney and has a nephrostomy tube.

## 2021-06-25 NOTE — ED NOTES
Pt states she does not want to be admitted unless she can stay at [facility]. Dr Olson notified.

## 2021-06-25 NOTE — ED TRIAGE NOTES
Arrives to ED with c/o cough, fever, chest congestion and nasal congestion that began Sunday. Reports yellow sputum. +fever, 100.1F at home. Pt also reports R flank pain x1 week. Is currently on antibiotics for kidney infection. Pt has nephrostomy.

## 2021-06-25 NOTE — ED TRIAGE NOTES
Pt with right sided flank pain, pt states has hx of kidney stones and has only one kidney.  Some nausea - pt states was started on abx on Saturday and today threw up within 10 minutes of taking it.

## 2021-06-25 NOTE — ED NOTES
Patient discharging home with AVS. Will continue to take her antibiotic as prescribed and will follow up with her urologist tomorrow in regards to her nephrostomy tube. All questions answered. Friend picked up from the ER lobby.

## 2021-06-26 NOTE — CONSULTS
Care Management Initial Consult    General Information:  Patient's communication limitations: none  Level of Orientation: A & O x 3     Advance Care Planning: Patient does not have advance directive        Living Environment:   People in home/Living arrangements: Children  Current residence:  Private residence      Family/Social Support:  Care provided by/ Primary Caregiver: Self immediate family  Provides care for someone: Yes(8 yr old son)  Description of Support System: Family members, Friends/neighbors     Lifestyle & Psychosocial Needs:        Socioeconomic History     Marital status: Single     Spouse name: Not on file     Number of children: Not on file     Years of education: Not on file     Highest education level: Not on file   Occupational History     Occupation: PCA     Employer: [employer]     Tobacco Use     Smoking status: Current Every Day Smoker     Packs/day: 0.50     Years: 10.00     Pack years: 5.00     Last attempt to quit: 2016     Years since quittin.7     Smokeless tobacco: Former User     Quit date: 2016     Tobacco comment: has information from last admission.   Substance and Sexual Activity     Alcohol use: Yes     Comment: occ     Drug use: No       Functional Status:  Prior to admission ADL limits: No      Current Resources:   Skilled Home Care Services:    Community Resources: None  Equipment currently used at home: none  Supplies currently used at home: None    Employment:  Employment Status:  No No    Financial/Environmental Concerns/Barriers to Discharge:        Values/Beliefs:  Spiritual, Cultural Beliefs, Moravian Practices, Values that affect care:                Additional Information:  AIDET completed. Pt lives in a private residence with her young son. She is independent with all ADL's, has no services and no DME used. Anticipate pt will DC back home without needs.  Family will transport upon DC. Informed that CM will follow progression of care.   Anastasia  MEI Gómez RNCM      Abbreviation Code:  [facility] home care (HEHC), Home care (HC), Patient (Pt), Transitional Care Unit (TCU), Skilled Nursing Facility (SNF), Assisted Living (AL), Independent Living (IL), Physical Therapy (PT), Occupational Therapy (OT)          Anastasia Gómez RN

## 2021-06-26 NOTE — ED PROVIDER NOTES
EMERGENCY DEPARTMENT ENCOUNTER      NAME: Jacqueline Pappas  AGE: 36 y.o. female  YOB: 1985  MRN: [id number]  EVALUATION DATE & TIME: 6/16/2021  7:12 PM    PCP: Pao Montague MD    ED PROVIDER: Jacqueline Reddy PA-C      Chief Complaint   Patient presents with     Cough     Fever     Nasal Congestion     Flank Pain         FINAL IMPRESSION:  1. Nephrostomy status (H)    2. Solitary kidney    3. Pyelonephritis    4. Viral URI with cough          MEDICAL DECISION MAKING:    Pertinent Labs & Imaging studies reviewed. (See chart for details)  36 y.o. female with a pertinent history of congential solitary kidney, nephrolithiasis, chronic nephrostomy tube and recurrent UTIs presents to the Emergency Department for evaluation of cough x 4 days. Initially was improving however today woke with fever, body aches, sore throat, worsening cough, headache. She reports coughing so hard she vomits. She is currently taking levaquin and doxycycline for UTI. Reports right flank pain however is unsure if secondary to UTI or being sore from coughing.     Vitals reviewed and notable for borderline tachycardia on arrival, resolved with IVF. Temp 100.4 F. She is non-toxic appearing and in no acute distress. On exam, she does have frequent cough though lungs are CTAB. Posterior pharynx mildly erythematous, no tonsillar edema or exudates. TMs normal bilaterally. Abdomen is soft and non-tender. She does have mild right flank tenderness. Nephrostomy tube in place with straw colored urine. Differential diagnosis includes but not limited to COVID-19, influenza or other viral URI, bronchitis, pneumonia, strep throat.    Tylenol, zofran and IVF given. CXR is unremarkable. COVID-19 negative. No leukocytosis or lactic acidosis. Renal function is WNL. No electrolyte derangements. Interestingly, clean catch urine sample is without evidence of infection however urine sample off of nephrostomy tube with positive nitrites and leukocytes  present. She had recent CT imaging 10 days ago with non-obstructing renal calculi, I see no indication to repeat imaging today. She is not septic and work up is overall very reassuring. Given solitary kidney I did offer her admission as she likely needs to have nephrostomy tube exchanged however patient prefers to discharge home. She reports her urologist at [facility] is very responsive and would likely be able to see her tomorrow and arrange to have nephrostomy tube replaced. She is on levaquin and doxycycline currently which would also treat for respiratory infection however suspect likely viral URI. Vitals have improved and she feels discharge is most appropriate which I think is reasonable. We discussed return precautions and patient was discharged home in stable condition.     0 minutes of critical care time     ED COURSE  7:27 PM I met with the patient, obtained history, performed an initial exam, and discussed options and plan for diagnostics and treatment here in the ED.  9:47 PM Rechecked and updated patient. Discussed admission. Patient requests to be discharged to home. I discussed the plan for discharge with the patient, and patient is agreeable. We discussed supportive cares at home and reasons for return to the ER including new or worsening symptoms - all questions and concerns addressed. Patient to be discharged by RN.    At the conclusion of the encounter I discussed the results of all of the tests and the disposition. The questions were answered. The patient acknowledged understanding and was agreeable with the care plan.     MEDICATIONS GIVEN IN THE EMERGENCY:  Medications   acetaminophen tablet 1,000 mg (TYLENOL) (1,000 mg Oral Given 6/16/21 2012)   sodium chloride 0.9% 1,000 mL (0 mL Intravenous Stopped 6/16/21 2210)   ondansetron injection 4 mg (ZOFRAN) (4 mg Intravenous Given 6/16/21 2028)       NEW PRESCRIPTIONS STARTED AT TODAY'S ER VISIT  Discharge Medication List as of 6/16/2021 10:11 PM       CONTINUE these medications which have NOT CHANGED    Details   acetaminophen (TYLENOL) 500 MG tablet Take 1,000 mg by mouth every 6 (six) hours as needed for pain. , Until Discontinued, Historical Med      doxycycline (VIBRAMYCIN) 100 MG capsule Take 1 capsule (100 mg total) by mouth 2 (two) times a day for 10 days., Starting Sun 6/13/2021, Until Wed 6/23/2021, Normal      levoFLOXacin (LEVAQUIN) 750 MG tablet Take 1 tablet (750 mg total) by mouth daily for 14 days., Starting Sat 6/5/2021, Until Sat 6/19/2021, Normal      sertraline (ZOLOFT) 100 MG tablet Take 150 mg by mouth bedtime., Until Discontinued, Historical Med                =================================================================    HPI    Patient information was obtained from: Patient     Use of Interpretor: N/A       Jacqueline Pappas is a 36 y.o. female with a pertinent history of congential solitary kidney, nephrolithiasis, chronic nephrostomy tube and recurrent UTIs who presents to this ED by walk in for evaluation of cough.     Per chart review, the patient presented to [facility] ED on 06/14/2021 for acute nasopharyngitis. At that time patient was on Levaquin for a chronic UTI (diagnosed on 6/9/2021). Patient was recommended over-the-counter medications, and instruction to return to the emergency department with worsening or persistent symptoms.    Seen in ED on 06/05 for right flank pain. CT with nonobstructing renal stones. No hydroureteronephrosis. UA with positive nitrites. Discussed with her urologist at [facility] who recommended admission however patient refused. Recommended nephrostomy tube be changed after 2 weeks of antibiotics. Patient was discharged on levaquin. Urine culture grew out staphylococcus and diphtheroids. Was switched to doxycycline based on sensitivities.   Returned to ED on 06/09 with flank pain. UA appeared improved. WBC normal.     On Sunday (4 days ago) patient began to experience a productive cough. Symptoms seemed  improved on Monday and Tuesday. Today, patient woke up with fever and myalgias. She states that she coughs so hard, she vomits. She additionally complains of right flank pain, but is unsure if it's due to her kidney infection or from coughing. She notes suprapubic pressure, but denies abdominal pain. Patient reports normal output from nephrostomy tube. Reports she is taking both levaquin and doxycycline for most recent UTI. She follows with urology at [place]. Patient is currently on her menstrual cycle. Patient reports diarrhea, sore throat, and headache. Patient denies hematuria, chest pain, shortness of breath or any additional complaints at this time. She reports she had COVID in Dec 2020.    REVIEW OF SYSTEMS   Review of Systems   Constitutional: Positive for fever.   HENT: Positive for sore throat.    Respiratory: Positive for cough. Negative for shortness of breath.    Cardiovascular: Negative for chest pain.   Gastrointestinal: Positive for diarrhea and vomiting. Negative for abdominal pain.   Genitourinary: Positive for flank pain (right). Negative for dysuria, frequency and hematuria.   Musculoskeletal: Positive for myalgias.   Neurological: Positive for headaches.   All other systems reviewed and are negative.       PAST MEDICAL HISTORY:  Past Medical History:   Diagnosis Date     Acute renal failure (H)      Anemia      Congenital absence of left kidney      Depression      Hydronephrosis      Kidney stone     at age 29     Pyelonephritis      Smoker      Ureteral stricture      UTI (urinary tract infection)      Wrist fracture     Left       PAST SURGICAL HISTORY:  Past Surgical History:   Procedure Laterality Date      SECTION      x1     Cystoscopy, ureteroscopy, laser Right     11/02/2014 x2 with ureteral stent insertion     IR NEPHROSTOMY TUBE CHANGE RIGHT  2018     IR NEPHROSTOMY TUBE CHANGE RIGHT  2020     KIDNEY STONE SURGERY Right 2016     Mole removed      nasal      NEPHROSTOMY W/ INTRODUCTION OF CATHETER Right      WISDOM TOOTH EXTRACTION             CURRENT MEDICATIONS:    No current facility-administered medications on file prior to encounter.      Current Outpatient Medications on File Prior to Encounter   Medication Sig     acetaminophen (TYLENOL) 500 MG tablet Take 1,000 mg by mouth every 6 (six) hours as needed for pain.      doxycycline (VIBRAMYCIN) 100 MG capsule Take 1 capsule (100 mg total) by mouth 2 (two) times a day for 10 days.     levoFLOXacin (LEVAQUIN) 750 MG tablet Take 1 tablet (750 mg total) by mouth daily for 14 days.     sertraline (ZOLOFT) 100 MG tablet Take 150 mg by mouth bedtime.     [DISCONTINUED] albuterol (PROAIR HFA;PROVENTIL HFA;VENTOLIN HFA) 90 mcg/actuation inhaler Inhale 2 puffs every 6 (six) hours as needed for wheezing or shortness of breath.     [DISCONTINUED] dicyclomine (BENTYL) 20 mg tablet Take 1 tablet (20 mg total) by mouth 2 (two) times a day.     [DISCONTINUED] HYDROcodone-acetaminophen 5-325 mg per tablet Take 1 tablet by mouth every 6 (six) hours as needed for pain.     [DISCONTINUED] ondansetron (ZOFRAN-ODT) 4 MG disintegrating tablet Take 1 tablet (4 mg total) by mouth every 6 (six) hours as needed for nausea.       ALLERGIES:  Allergies   Allergen Reactions     Vancomycin Hives and Rash     Zosyn [Piperacillin-Tazobactam] Itching and Rash     Tolerated 12/11/18     Penicillins Rash     Tolerating augmentin 5/8  Tolerated ampicillin and amoxicillin August 2018  Occurred as a small child  Tolerating augmentin 5/8  Tolerated ampicillin and amoxicillin August 2018  As a child per mother; mother unsure if has tolerated another PCN since. Tolerated ampicillin 9/20/16  Tolerating augmentin 5/8  Tolerated ampicillin and amoxicillin August 2018  Occurred as a small child  Tolerating augmentin 5/8  Tolerated ampicillin and amoxicillin August 2018       Desogestrel-Ethinyl Estradiol Swelling and Angioedema     Swelling of hands and feet  per pt.  Swelling of hands and feet per pt.  Swelling of hands and feet per pt.       Sulfa (Sulfonamide Antibiotics) Rash       FAMILY HISTORY:  Family History   Problem Relation Age of Onset     Heart disease Maternal Uncle         heart failure     Coronary artery disease Unknown      Heart disease Maternal Grandmother         pacemaker     Gout Paternal Grandfather      Prostate cancer Maternal Aunt         breast     Heart disease Maternal Aunt         pacemaker     Heart disease Maternal Aunt         pacemaker     Heart disease Maternal Aunt         pacemaker       SOCIAL HISTORY:   Social History     Socioeconomic History     Marital status: Single     Spouse name: None     Number of children: None     Years of education: None     Highest education level: None   Occupational History     Occupation: PCA     Employer: [employer]   Social Needs     Financial resource strain: None     Food insecurity     Worry: None     Inability: None     Transportation needs     Medical: None     Non-medical: None   Tobacco Use     Smoking status: Current Every Day Smoker     Packs/day: 0.50     Years: 10.00     Pack years: 5.00     Last attempt to quit: 2016     Years since quittin.7     Smokeless tobacco: Former User     Quit date: 2016     Tobacco comment: has information from last admission.   Substance and Sexual Activity     Alcohol use: Yes     Comment: occ     Drug use: No     Sexual activity: None   Lifestyle     Physical activity     Days per week: None     Minutes per session: None     Stress: None   Relationships     Social connections     Talks on phone: None     Gets together: None     Attends [employer] service: None     Active member of club or organization: None     Attends meetings of clubs or organizations: None     Relationship status: None     Intimate partner violence     Fear of current or ex partner: None     Emotionally abused: None     Physically abused: None     Forced sexual  activity: None   Other Topics Concern     None   Social History Narrative     None       VITALS:  Patient Vitals for the past 24 hrs:   BP Temp Temp src Pulse Resp SpO2 Height Weight   06/16/21 2200 105/53 98.9  F (37.2  C) Oral 87 19 93 % -- --   06/16/21 2153 -- 99  F (37.2  C) -- -- -- -- -- --   06/16/21 2145 116/58 -- -- 87 -- 93 % -- --   06/16/21 2130 109/57 -- -- 88 18 92 % -- --   06/16/21 2115 111/61 -- -- 93 -- 94 % -- --   06/16/21 2045 97/58 -- -- 99 -- 91 % -- --   06/16/21 2030 91/52 -- -- 100 18 92 % -- --   06/16/21 2015 119/80 -- -- 100 -- 97 % -- --   06/16/21 1909 120/82 100.4  F (38  C) Oral (!) 102 16 97 % 5' (1.524 m) 185 lb (83.9 kg)       PHYSICAL EXAM    Constitutional: Well developed, Well nourished, NAD, non-toxic appearing.  HENT: Normocephalic, Atraumatic, Bilateral external ears normal, TMs normal bilaterally. posterior pharynx slightly erythematous. No tonsillar edema or exudates. Uvula midline, no dysphonia. mucous membranes moist, Nasal congestion noted.  Neck- Normal range of motion, No tenderness, Supple, No stridor. No nuchal rigidity.  Eyes: Conjunctiva normal, No discharge.   Respiratory: Normal breath sounds, No respiratory distress, No wheezing, Speaks full sentences easily. Frequent wet sounding cough during exam.  Cardiovascular: borderline tachycardic. Regular rhythm, No murmurs, No rubs, No gallops. Chest wall nontender.  GI: Soft, No tenderness, No masses. Mild right flank tenderness. Nephrostomy tube in place with straw colored urine. No rebound or guarding.  Musculoskeletal: 2+ DP pulses. No edema. No cyanosis, No clubbing. Good range of motion in all major joints. No tenderness to palpation or major deformities noted. No tenderness of the CTLS spine.  Integument: Warm, Dry, No erythema, No rash. No petechiae.  Neurologic: Alert & oriented x 3, Normal motor function, Normal sensory function, No focal deficits noted. Normal gait.  Psychiatric: Affect normal, Judgment  normal, Mood normal. Cooperative.    LAB:  All pertinent labs reviewed and interpreted.  Results for orders placed or performed during the hospital encounter of 06/16/21   Symptomatic SARS-CoV-2 (COVID-19)-PCR    Specimen: Respiratory   Result Value Ref Range    SARS-CoV-2 PCR Result Negative Negative, Invalid   Basic Metabolic Panel   Result Value Ref Range    Sodium 136 136 - 145 mmol/L    Potassium 4.1 3.5 - 5.0 mmol/L    Chloride 105 98 - 107 mmol/L    CO2 22 22 - 31 mmol/L    Anion Gap, Calculation 9 5 - 18 mmol/L    Glucose 113 70 - 125 mg/dL    Calcium 9.5 8.5 - 10.5 mg/dL    BUN 8 8 - 22 mg/dL    Creatinine 0.84 0.60 - 1.10 mg/dL    GFR MDRD Af Amer >60 >60 mL/min/1.73m2    GFR MDRD Non Af Amer >60 >60 mL/min/1.73m2   Urinalysis-UC if Indicated   Result Value Ref Range    Color, UA Light Yellow Light Yellow, Yellow    Clarity, UA Slightly Cloudy (!) Clear    Glucose, UA Negative Negative    Protein,  mg/dL (!) Negative    Bilirubin, UA Negative Negative    Urobilinogen, UA <2.0 mg/dL <2.0 mg/dL    pH, UA 7.5 5.0 - 8.0    Blood, UA 0.1 mg/dL (!) Negative    Ketones, UA Negative Negative    Nitrite, UA Positive (!) Negative    Leukocytes,  Duglas/uL (!) Negative    Specific Gravity, UA 1.006 1.001 - 1.030    RBC, UA 20 (H) <=2 hpf    WBC UA 22 (H) <=5 hpf    Bacteria, UA Few (!) None Seen    Squamous Epithel, UA 1 <=5 /HPF    Mucus, UA Present (!) None Seen lpf   Lactic Acid   Result Value Ref Range    Lactic Acid 1.1 0.7 - 2.0 mmol/L   HM1 (CBC with Diff)   Result Value Ref Range    WBC 9.9 4.0 - 11.0 thou/uL    RBC 4.61 3.80 - 5.40 mill/uL    Hemoglobin 14.2 12.0 - 16.0 g/dL    Hematocrit 42.7 35.0 - 47.0 %    MCV 93 80 - 100 fL    MCH 30.8 27.0 - 34.0 pg    MCHC 33.3 32.0 - 36.0 g/dL    RDW 13.5 11.0 - 14.5 %    Platelets 168 140 - 440 thou/uL    MPV 10.5 8.5 - 12.5 fL    Neutrophils % 76 (H) 50 - 70 %    Lymphocytes % 15 (L) 20 - 40 %    Monocytes % 7 2 - 10 %    Eosinophils % 1 0 - 6 %     Basophils % 0 0 - 2 %    Immature Granulocyte % 0 <=0 %    Neutrophils Absolute 7.6 2.0 - 7.7 thou/uL    Lymphocytes Absolute 1.4 0.8 - 4.4 thou/uL    Monocytes Absolute 0.7 0.0 - 0.9 thou/uL    Eosinophils Absolute 0.1 0.0 - 0.4 thou/uL    Basophils Absolute 0.0 0.0 - 0.2 thou/uL    Immature Granulocyte Absolute 0.0 <=0.0 thou/uL   Urinalysis-UC if Indicated   Result Value Ref Range    Color, UA Light Yellow Light Yellow, Yellow    Clarity, UA Clear Clear    Glucose, UA Negative Negative    Protein, UA Negative Negative    Bilirubin, UA Negative Negative    Urobilinogen, UA <2.0 mg/dL <2.0 mg/dL    pH, UA 7.0 5.0 - 8.0    Blood, UA 0.5 mg/dL (!) Negative    Ketones, UA Negative Negative    Nitrite, UA Negative Negative    Leukocytes, UA Negative Negative    Specific Gravity, UA 1.008 1.001 - 1.030    RBC, UA 2 <=2 hpf    WBC UA 1 <=5 hpf    Bacteria, UA Few (!) None Seen    Squamous Epithel, UA 1 <=5 /HPF   POCT pregnancy, urine   Result Value Ref Range    POC Preg, Urine Negative Negative    POCT Kit Lot Number [id number]     POCT Kit Expiration Date 2022-12-31     Pos Control Valid Control Valid Control    Neg Control Valid Control Valid Control       RADIOLOGY:  Reviewed all pertinent imaging. Please see official radiology report.  Xr Chest 1 View Portable    Result Date: 6/16/2021  EXAM: XR CHEST 1 VIEW PORTABLE LOCATION: [facility] DATE/TIME: 6/16/2021 8:56 PM INDICATION: cough, fever COMPARISON: 12/13/2020     Negative chest with no significant change. Nothing for pneumonia.        I, Haider Aviles , am serving as a scribe to document services personally performed by Jacqueline Reddy PA-C based on my observation and the provider's statements to me. I, Jacqueline Reddy PA-C attest that Haider Aviles is acting in a scribe capacity, has observed my performance of the services and has documented them in accordance with my direction.    Jacqueline Reddy PA-C  Emergency Medicine  [facility]      Barry, Jacqueline Mcintyre PA-C  06/17/21 1906

## 2021-06-26 NOTE — PROGRESS NOTES
Pharmacy Note - Admission Medication History    Pertinent Provider Information: none.     ______________________________________________________________________    Prior To Admission (PTA) med list completed and updated in EMR.       PTA Med List   Medication Sig Note Last Dose     acetaminophen (TYLENOL) 500 MG tablet Take 1,000 mg by mouth every 6 (six) hours as needed for pain.   6/16/2021 at Unknown time     doxycycline (VIBRAMYCIN) 100 MG capsule Take 1 capsule (100 mg total) by mouth 2 (two) times a day for 10 days.  6/16/2021 at x1     levoFLOXacin (LEVAQUIN) 750 MG tablet Take 1 tablet (750 mg total) by mouth daily for 14 days. 6/16/2021: Takes at bedtime 6/15/2021 at Unknown time     sertraline (ZOLOFT) 100 MG tablet Take 150 mg by mouth bedtime.  6/15/2021 at Unknown time       Information source(s): [facility]  Method of interview communication: in-person    Summary of Changes to PTA Med List  New: none  Discontinued: albuterol inhaler, bentyl, norco, zofran  Changed: none    Patient was asked about OTC/herbal products specifically.  PTA med list reflects this.    In the past week, patient estimated taking medication this percent of the time:  greater than 90%.    Allergies were reviewed, assessed, and updated with the patient.      Patient does not use any multi-dose medications prior to admission.    The information provided in this note is only as accurate as the sources available at the time of the update(s).    Thank you for the opportunity to participate in the care of this patient.    Darcie Terry, PharmD  6/16/2021 9:23 PM

## 2021-06-26 NOTE — ED PROVIDER NOTES
EMERGENCY DEPARTMENT ENCOUNTER      NAME: Jacqueline Pappas  AGE: 36 y.o. female  YOB: 1985  MRN: [id number]  EVALUATION DATE & TIME: 2021 11:28 PM    ED PROVIDER: Ramana Olson M.D.    FINAL IMPRESSION:  1. Pyelonephritis        ED COURSE & MEDICAL DECISION MAKIN:34 PM Patient seen and examined.  Prior history and records reviewed.  Patient with unilateral kidney, has obstructing stone as well as nephrostomy tube, and is seen for flank pain.  Was seen flank pain a couple days ago, was diagnosed with urinary tract infection at that time.  Encouraged to be admitted to the hospital but patient declined.  She has been doing well until today when she vomited after taking her antibiotic, also is having continued flank pain.  On exam, appears fairly comfortable, no tachycardia, no fever, no hypotension.  Urinalysis from clean-catch sample improved from prior, nephrostomy sample will be done as well.  Patient is given Zofran and Dilaudid in the emergency department.  If labs are reassuring, patient can be discharged with continued outpatient antibiotics and Zofran.  12:06 AM urinalysis from nephrostomy tube is improved with few bacteria, decreased number white blood cells, nitrite no negative.  Similar improvement from clean-catch urine.  White blood cell count is normal, remaining labs are pending.  Patient will be given her dose of Levaquin intravenously.  12:45 AM Creatinine is stable, patient remains vitally stable.  Continue Levaquin and contact [facility] for nephrostomy tube change after antibiotics are done.    At the conclusion of the encounter I discussed the results of all of the tests and the disposition. The questions were answered. The patient or family acknowledged understanding and was agreeable with the care plan.     PROCEDURES:   Procedures      MEDICATIONS GIVEN IN THE EMERGENCY:  Medications   levoFLOXacin 750 mg/150 mL IVPB 750 mg (LEVAQUIN) (750 mg Intravenous New Bag  6/10/21 0037)   ondansetron disintegrating tablet 4 mg (ZOFRAN-ODT) (4 mg Oral Given 6/9/21 5055)   HYDROmorphone tablet 1 mg (DILAUDID) (1 mg Oral Given 6/10/21 0008)       NEW PRESCRIPTIONS STARTED AT TODAY'S ER VISIT  Current Discharge Medication List      CONTINUE these medications which have NOT CHANGED    Details   acetaminophen (TYLENOL) 500 MG tablet Take 1,000 mg by mouth every 6 (six) hours as needed for pain.       albuterol (PROAIR HFA;PROVENTIL HFA;VENTOLIN HFA) 90 mcg/actuation inhaler Inhale 2 puffs every 6 (six) hours as needed for wheezing or shortness of breath.      dicyclomine (BENTYL) 20 mg tablet Take 1 tablet (20 mg total) by mouth 2 (two) times a day.  Qty: 20 tablet, Refills: 0    Associated Diagnoses: Diarrhea, unspecified type      levoFLOXacin (LEVAQUIN) 750 MG tablet Take 1 tablet (750 mg total) by mouth daily for 14 days.  Qty: 14 tablet, Refills: 0    Associated Diagnoses: Pyelonephritis      sertraline (ZOLOFT) 100 MG tablet Take 150 mg by mouth bedtime.         STOP taking these medications       HYDROcodone-acetaminophen 5-325 mg per tablet Comments:   Reason for Stopping:         HYDROcodone-acetaminophen 5-325 mg per tablet Comments:   Reason for Stopping:         ondansetron (ZOFRAN ODT) 8 MG disintegrating tablet Comments:   Reason for Stopping:         oxyCODONE (ROXICODONE) 5 MG immediate release tablet Comments:   Reason for Stopping:         oxyCODONE (ROXICODONE) 5 MG immediate release tablet Comments:   Reason for Stopping:         oxyCODONE (ROXICODONE) 5 MG immediate release tablet Comments:   Reason for Stopping:                PCP: Pao Montague MD  =================================================================    Chief Complaint   Patient presents with     Flank Pain       HPI      Jacqueline Pappas is a 36 y.o. female with a pertinent medical history of kidney stone, morbid obesity, nephrostomy complication, and UTI who presents for evaluation of right sided  flank pain.    Per chart review, patient was seen on 21 for evaluation of right sided flank pain. Patient was recommended admission but declined. CT of abdomen and pelvis without contrast showed unchanged position of left percutaneous nephrostomy tube, unchanged non obstructing renal stones, no hydroureteronephrosis, stable simple appearing renal cysts, atrophic left kidney, and normal bowel. She was discharged with prescription for levaquin.    Patient reports she is experiencing the same right sided flank pain as last time, and nausea and vomiting with taking her medications. She has been taking the antibiotics for a couple days, but today was unable to keep them down. Her nephrostomy tube has been draining and she has been able to urinate. She denies any diarrhea and has no other complaints at this time.    REVIEW OF SYSTEMS   Review of Systems   Gastrointestinal: Positive for nausea and vomiting (with medication only). Negative for diarrhea.   Genitourinary: Positive for flank pain (right sided). Negative for difficulty urinating.      See HPI.  All other systems reviewed and negative.    PAST MEDICAL HISTORY:  Past Medical History:   Diagnosis Date     Acute renal failure (H)      Anemia      Congenital absence of left kidney      Depression      Hydronephrosis      Kidney stone      Pyelonephritis      Smoker      Ureteral stricture      UTI (urinary tract infection)      Wrist fracture        PAST SURGICAL HISTORY:  Past Surgical History:   Procedure Laterality Date      SECTION      x1     Cystoscopy, ureteroscopy, laser Right     11/02/2014 x2 with ureteral stent insertion     IR NEPHROSTOMY TUBE CHANGE RIGHT  2018     IR NEPHROSTOMY TUBE CHANGE RIGHT  2020     KIDNEY STONE SURGERY Right 2016     Mole removed      nasal     NEPHROSTOMY W/ INTRODUCTION OF CATHETER Right      WISDOM TOOTH EXTRACTION         CURRENT MEDICATIONS:    No current facility-administered medications on file  prior to encounter.      Current Outpatient Medications on File Prior to Encounter   Medication Sig     acetaminophen (TYLENOL) 500 MG tablet Take 1,000 mg by mouth every 6 (six) hours as needed for pain.      albuterol (PROAIR HFA;PROVENTIL HFA;VENTOLIN HFA) 90 mcg/actuation inhaler Inhale 2 puffs every 6 (six) hours as needed for wheezing or shortness of breath.     dicyclomine (BENTYL) 20 mg tablet Take 1 tablet (20 mg total) by mouth 2 (two) times a day.     levoFLOXacin (LEVAQUIN) 750 MG tablet Take 1 tablet (750 mg total) by mouth daily for 14 days.     sertraline (ZOLOFT) 100 MG tablet Take 150 mg by mouth bedtime.     [DISCONTINUED] HYDROcodone-acetaminophen 5-325 mg per tablet Take 1 tablet by mouth every 6 (six) hours as needed for pain.     [DISCONTINUED] HYDROcodone-acetaminophen 5-325 mg per tablet Take 1-2 tablets by mouth every 4 (four) hours as needed for pain.     [DISCONTINUED] ondansetron (ZOFRAN ODT) 8 MG disintegrating tablet Take 1 tablet (8 mg total) by mouth every 8 (eight) hours as needed.     [DISCONTINUED] oxyCODONE (ROXICODONE) 5 MG immediate release tablet Take 1 tablet (5 mg total) by mouth every 6 (six) hours as needed for pain.     [DISCONTINUED] oxyCODONE (ROXICODONE) 5 MG immediate release tablet Take 1 tablet (5 mg total) by mouth every 6 (six) hours as needed for pain.     [DISCONTINUED] oxyCODONE (ROXICODONE) 5 MG immediate release tablet Take 1 tablet (5 mg total) by mouth every 6 (six) hours as needed for pain.       ALLERGIES:  Allergies   Allergen Reactions     Vancomycin Hives and Rash     Zosyn [Piperacillin-Tazobactam] Itching and Rash     Tolerated 12/11/18     Penicillins Rash     Tolerating augmentin 5/8  Tolerated ampicillin and amoxicillin August 2018  Occurred as a small child  Tolerating augmentin 5/8  Tolerated ampicillin and amoxicillin August 2018  As a child per mother; mother unsure if has tolerated another PCN since. Tolerated ampicillin 9/20/16  Tolerating  augmentin 5/8  Tolerated ampicillin and amoxicillin 2018  Occurred as a small child  Tolerating augmentin 5/8  Tolerated ampicillin and amoxicillin 2018       Desogestrel-Ethinyl Estradiol Swelling and Angioedema     Swelling of hands and feet per pt.  Swelling of hands and feet per pt.  Swelling of hands and feet per pt.       Sulfa (Sulfonamide Antibiotics) Rash       FAMILY HISTORY:  Family History   Problem Relation Age of Onset     Heart disease Maternal Uncle         heart failure     Coronary artery disease Unknown      Heart disease Maternal Grandmother         pacemaker     Gout Paternal Grandfather      Prostate cancer Maternal Aunt         breast     Heart disease Maternal Aunt         pacemaker     Heart disease Maternal Aunt         pacemaker     Heart disease Maternal Aunt         pacemaker       SOCIAL HISTORY:   Social History     Socioeconomic History     Marital status: Single     Spouse name: None     Number of children: None     Years of education: None     Highest education level: None   Occupational History     Occupation: PCA     Employer: [employer]   Social Needs     Financial resource strain: None     Food insecurity     Worry: None     Inability: None     Transportation needs     Medical: None     Non-medical: None   Tobacco Use     Smoking status: Current Every Day Smoker     Packs/day: 0.50     Years: 10.00     Pack years: 5.00     Last attempt to quit: 2016     Years since quittin.7     Smokeless tobacco: Former User     Quit date: 2016     Tobacco comment: has information from last admission.   Substance and Sexual Activity     Alcohol use: Yes     Comment: occ     Drug use: No     Sexual activity: None   Lifestyle     Physical activity     Days per week: None     Minutes per session: None     Stress: None   Relationships     Social connections     Talks on phone: None     Gets together: None     Attends [employer] service: None     Active member of  "club or organization: None     Attends meetings of clubs or organizations: None     Relationship status: None     Intimate partner violence     Fear of current or ex partner: None     Emotionally abused: None     Physically abused: None     Forced sexual activity: None   Other Topics Concern     None   Social History Narrative     None       VITALS:  Patient Vitals for the past 24 hrs:   BP Temp Pulse Resp SpO2 Height Weight   06/09/21 2113 132/81 98.3  F (36.8  C) 79 16 99 % 5' 0.05\" (1.525 m) 185 lb (83.9 kg)       PHYSICAL EXAM    Physical Exam   Constitutional: She is oriented to person, place, and time. She appears well-developed and well-nourished.   HENT:   Head: Normocephalic and atraumatic.   Nose: Nose normal.   Mouth/Throat: Oropharynx is clear and moist.   Eyes: Pupils are equal, round, and reactive to light. Conjunctivae and EOM are normal.   Neck: Normal range of motion. Neck supple.   Cardiovascular: Normal rate, regular rhythm and normal heart sounds.   Pulmonary/Chest: Effort normal and breath sounds normal.   Abdominal: Soft. Bowel sounds are normal. There is no abdominal tenderness. There is no rebound and no guarding.   Musculoskeletal: Normal range of motion.   Lymphadenopathy:     She has no cervical adenopathy.   Neurological: She is alert and oriented to person, place, and time. Coordination normal.   No gross focal neurologic deficits.  Cranial nerves III-XII intact.   Skin: Skin is warm and dry.   Psychiatric: She has a normal mood and affect. Her behavior is normal.   Nursing note and vitals reviewed.       LAB:  All pertinent labs reviewed and interpreted.  Results for orders placed or performed during the hospital encounter of 06/09/21   Basic Metabolic Panel   Result Value Ref Range    Sodium 137 136 - 145 mmol/L    Potassium 3.6 3.5 - 5.0 mmol/L    Chloride 107 98 - 107 mmol/L    CO2 20 (L) 22 - 31 mmol/L    Anion Gap, Calculation 10 5 - 18 mmol/L    Glucose 97 70 - 125 mg/dL    " Calcium 9.8 8.5 - 10.5 mg/dL    BUN 13 8 - 22 mg/dL    Creatinine 0.91 0.60 - 1.10 mg/dL    GFR MDRD Af Amer >60 >60 mL/min/1.73m2    GFR MDRD Non Af Amer >60 >60 mL/min/1.73m2   Urinalysis-UC if Indicated   Result Value Ref Range    Color, UA Light Yellow Light Yellow, Yellow    Clarity, UA Clear Clear    Glucose, UA Negative Negative    Protein, UA 70 mg/dL (!) Negative    Bilirubin, UA Negative Negative    Urobilinogen, UA <2.0 mg/dL <2.0 mg/dL    pH, UA 7.0 5.0 - 8.0    Blood, UA 0.03 mg/dL (!) Negative    Ketones, UA Negative Negative    Nitrite, UA Negative Negative    Leukocytes,  Duglas/uL (!) Negative    Specific Gravity, UA 1.011 1.001 - 1.030    RBC, UA 4 (H) <=2 hpf    WBC UA 8 (H) <=5 hpf    Bacteria, UA None Seen None Seen    Squamous Epithel, UA <1 <=5 /HPF   C-Reactive Protein   Result Value Ref Range    CRP 0.3 0.0 - 0.8 mg/dL   HM1 (CBC with Diff)   Result Value Ref Range    WBC 9.7 4.0 - 11.0 thou/uL    RBC 4.63 3.80 - 5.40 mill/uL    Hemoglobin 14.4 12.0 - 16.0 g/dL    Hematocrit 42.5 35.0 - 47.0 %    MCV 92 80 - 100 fL    MCH 31.1 27.0 - 34.0 pg    MCHC 33.9 32.0 - 36.0 g/dL    RDW 13.7 11.0 - 14.5 %    Platelets 179 140 - 440 thou/uL    MPV 10.4 8.5 - 12.5 fL    Neutrophils % 66 50 - 70 %    Lymphocytes % 26 20 - 40 %    Monocytes % 6 2 - 10 %    Eosinophils % 1 0 - 6 %    Basophils % 1 0 - 2 %    Immature Granulocyte % 1 (H) <=0 %    Neutrophils Absolute 6.4 2.0 - 7.7 thou/uL    Lymphocytes Absolute 2.5 0.8 - 4.4 thou/uL    Monocytes Absolute 0.6 0.0 - 0.9 thou/uL    Eosinophils Absolute 0.1 0.0 - 0.4 thou/uL    Basophils Absolute 0.1 0.0 - 0.2 thou/uL    Immature Granulocyte Absolute 0.1 (H) <=0.0 thou/uL   Urinalysis-UC if Indicated   Result Value Ref Range    Color, UA Light Yellow Light Yellow, Yellow    Clarity, UA Slightly Cloudy (!) Clear    Glucose, UA Negative Negative    Protein,  mg/dL (!) Negative    Bilirubin, UA Negative Negative    Urobilinogen, UA <2.0 mg/dL <2.0 mg/dL     pH, UA 7.0 5.0 - 8.0    Blood, UA 0.1 mg/dL (!) Negative    Ketones, UA Negative Negative    Nitrite, UA Negative Negative    Leukocytes,  Duglas/uL (!) Negative    Specific Gravity, UA 1.012 1.001 - 1.030    RBC, UA 35 (H) <=2 hpf    WBC UA 20 (H) <=5 hpf    Bacteria, UA Few (!) None Seen    Squamous Epithel, UA 1 <=5 /HPF    Mucus, UA Present (!) None Seen lpf       I, Clement Vides, am serving as a scribe to document services personally performed by Dr. Ramana Olson based on my observation and the provider's statements to me. I, Ramana Olson MD attest that Clement Vides is acting in a scribe capacity, has observed my performance of the services and has documented them in accordance with my direction.    Ramana Olson M.D.  Emergency Medicine  [facility] EMERGENCY DEPARTMENT  [address]  [facility] [address]  Dept: [phone]  Loc: [phone]     Ramana Olson MD  06/10/21 0046

## 2021-06-26 NOTE — ED PROVIDER NOTES
EMERGENCY DEPARTMENT ENCOUNTER      NAME: Jacqueline Pappas  AGE: 36 y.o. female  YOB: 1985  MRN: [id number]  EVALUATION DATE & TIME: 2021 12:36 AM    ED PROVIDER: Ramana Olson M.D.    FINAL IMPRESSION:  1. Pyelonephritis    2. Renal colic on right side    3. Solitary kidney    4. Nephrostomy status (H)        ED COURSE & MEDICAL DECISION MAKIN:37 AM Patient seen and examined.  Prior history and records reviewed. PPE used includes surgical mask, gloves.  Differential diagnosis includes but not limited to pyelonephritis, ureteral stone, aortic dissection, aortic aneurysm, musculoskeletal pain, disc herniation.  Patient with unilateral kidney, known stones, nephrostomy tube, presents with right flank pain as well as some lower urinary tract symptoms.  On exam, right CVA tenderness, tube is draining appropriately.  Labs and CT scan are ordered.  1:31 AM Per RN, patient looks uncomfortable.  Dilaudid IV is ordered 2:33 AM Patient requesting more pain medications, I am agreeable to this.  3:06 AM I called [facility] neurology for consult. They will call back.  3:37 AM Discussed with [facility] urology.  Recommends admission for IV antibiotics pending cultures.  Nephrostomy tube should be changed out at the end of 2-week course of antibiotics which can be done as an outpatient.  3:55 AM I went to update patient.  4:01 AM Patient does not want to be admitted if she cannot stay at [facility].  4:31 AM Patient now declining admission here too. She would like to go home with pills instead.  Discussed risks of outpatient therapy including ineffective therapy, kidney damage, loss of kidney with need for permanent dialysis or transplant, sepsis.  Patient understands risks and would still like to be discharged with oral antibiotics.  Encouraged to turn to the emergency department if she develops fever, increased pain, or other concerns.    At the conclusion of the encounter I discussed the results  of all of the tests and the disposition. The questions were answered. The patient or family acknowledged understanding and was agreeable with the care plan.     PROCEDURES:   Procedures      MEDICATIONS GIVEN IN THE EMERGENCY:  Medications   levoFLOXacin tablet 750 mg (LEVAQUIN) (750 mg Oral Given 6/5/21 0136)   HYDROmorphone injection 0.5 mg (DILAUDID) (0.5 mg Intravenous Given 6/5/21 0137)   HYDROmorphone injection 0.5 mg (DILAUDID) (0.5 mg Intravenous Given 6/5/21 0241)       NEW PRESCRIPTIONS STARTED AT TODAY'S ER VISIT  Current Discharge Medication List      START taking these medications    Details   levoFLOXacin (LEVAQUIN) 750 MG tablet Take 1 tablet (750 mg total) by mouth daily for 14 days.  Qty: 14 tablet, Refills: 0    Associated Diagnoses: Pyelonephritis         CONTINUE these medications which have NOT CHANGED    Details   acetaminophen (TYLENOL) 500 MG tablet Take 1,000 mg by mouth every 6 (six) hours as needed for pain.       albuterol (PROAIR HFA;PROVENTIL HFA;VENTOLIN HFA) 90 mcg/actuation inhaler Inhale 2 puffs every 6 (six) hours as needed for wheezing or shortness of breath.      dicyclomine (BENTYL) 20 mg tablet Take 1 tablet (20 mg total) by mouth 2 (two) times a day.  Qty: 20 tablet, Refills: 0    Associated Diagnoses: Diarrhea, unspecified type      !! HYDROcodone-acetaminophen 5-325 mg per tablet Take 1 tablet by mouth every 6 (six) hours as needed for pain.  Qty: 10 tablet, Refills: 0    Associated Diagnoses: Back pain      !! HYDROcodone-acetaminophen 5-325 mg per tablet Take 1-2 tablets by mouth every 4 (four) hours as needed for pain.  Qty: 10 tablet, Refills: 0    Associated Diagnoses: Complicated UTI (urinary tract infection)      ondansetron (ZOFRAN ODT) 8 MG disintegrating tablet Take 1 tablet (8 mg total) by mouth every 8 (eight) hours as needed.  Qty: 12 tablet, Refills: 0    Associated Diagnoses: Urinary tract infection      !! oxyCODONE (ROXICODONE) 5 MG immediate release tablet  Take 1 tablet (5 mg total) by mouth every 6 (six) hours as needed for pain.  Qty: 6 tablet, Refills: 0    Associated Diagnoses: Right flank pain      !! oxyCODONE (ROXICODONE) 5 MG immediate release tablet Take 1 tablet (5 mg total) by mouth every 6 (six) hours as needed for pain.  Qty: 12 tablet, Refills: 0    Associated Diagnoses: Flank pain      !! oxyCODONE (ROXICODONE) 5 MG immediate release tablet Take 1 tablet (5 mg total) by mouth every 6 (six) hours as needed for pain.  Qty: 10 tablet, Refills: 0    Associated Diagnoses: Abdominal pain      sertraline (ZOLOFT) 100 MG tablet Take 150 mg by mouth bedtime.       !! - Potential duplicate medications found. Please discuss with provider.           PCP: Pao Montague MD  =================================================================    Chief Complaint   Patient presents with     Flank Pain       HPI  Jacqueline Pappas is a 36 y.o. female with a history of congenital single kidney, congenital renal agenesis and dysgenesis, nephrolithiasis, UTI, ureteral stricture, GT,  who presents to the ED for evaluation of flank pain.     For the past -5 days patient has noted to have an odor to her urine and from her nephrotomy tube output, as well as bladder pressure when urinating. Her tube has been draining cloudy urine too. This morning she developed right sided flank pain, where her only kidney is located. No pain medications taken. States that she has stones in her kidney that are too big for surgery, especially since her kidney is damaged. Other symptoms include couple of days of diarrhea that is normal when she has UTI's and having a dry mouth. Patient denies nausea, vomit, fever, or any other complaints.     Patient mostly follows at the [facility] for her nephrostomy tube and with Dr. Yanez at [facility], though has not seen homicidal ideation recently. She is not on prophylactic antibiotics. Denies chance of pregnancy. Patient regularly takes Zoloft. Denies  history of diabetes mellitus and asthma.     REVIEW OF SYSTEMS   Review of Systems   Constitutional: Negative for fever.   HENT:        Positive for having dry mouth.   Gastrointestinal: Positive for diarrhea. Negative for nausea and vomiting.   Genitourinary: Positive for flank pain.        Positive for odor and cloudiness to urine and tube output, pressure when urinating.      See HPI.  All other systems reviewed and negative.    PAST MEDICAL HISTORY:  Past Medical History:   Diagnosis Date     Acute renal failure (H)      Anemia      Congenital absence of left kidney      Depression      Hydronephrosis      Kidney stone      Pyelonephritis      Smoker      Ureteral stricture      UTI (urinary tract infection)      Wrist fracture        PAST SURGICAL HISTORY:  Past Surgical History:   Procedure Laterality Date      SECTION      x1     Cystoscopy, ureteroscopy, laser Right     11/02/2014 x2 with ureteral stent insertion     IR NEPHROSTOMY TUBE CHANGE RIGHT  2018     IR NEPHROSTOMY TUBE CHANGE RIGHT  2020     KIDNEY STONE SURGERY Right 2016     Mole removed      nasal     NEPHROSTOMY W/ INTRODUCTION OF CATHETER Right      WISDOM TOOTH EXTRACTION         CURRENT MEDICATIONS:    No current facility-administered medications on file prior to encounter.      Current Outpatient Medications on File Prior to Encounter   Medication Sig     acetaminophen (TYLENOL) 500 MG tablet Take 1,000 mg by mouth every 6 (six) hours as needed for pain.      albuterol (PROAIR HFA;PROVENTIL HFA;VENTOLIN HFA) 90 mcg/actuation inhaler Inhale 2 puffs every 6 (six) hours as needed for wheezing or shortness of breath.     dicyclomine (BENTYL) 20 mg tablet Take 1 tablet (20 mg total) by mouth 2 (two) times a day.     HYDROcodone-acetaminophen 5-325 mg per tablet Take 1 tablet by mouth every 6 (six) hours as needed for pain.     HYDROcodone-acetaminophen 5-325 mg per tablet Take 1-2 tablets by mouth every 4 (four) hours as  needed for pain.     ondansetron (ZOFRAN ODT) 8 MG disintegrating tablet Take 1 tablet (8 mg total) by mouth every 8 (eight) hours as needed.     oxyCODONE (ROXICODONE) 5 MG immediate release tablet Take 1 tablet (5 mg total) by mouth every 6 (six) hours as needed for pain.     oxyCODONE (ROXICODONE) 5 MG immediate release tablet Take 1 tablet (5 mg total) by mouth every 6 (six) hours as needed for pain.     oxyCODONE (ROXICODONE) 5 MG immediate release tablet Take 1 tablet (5 mg total) by mouth every 6 (six) hours as needed for pain.     sertraline (ZOLOFT) 100 MG tablet Take 150 mg by mouth bedtime.       ALLERGIES:  Allergies   Allergen Reactions     Vancomycin Hives and Rash     Zosyn [Piperacillin-Tazobactam] Itching and Rash     Tolerated 12/11/18     Penicillins Rash     Tolerating augmentin 5/8  Tolerated ampicillin and amoxicillin August 2018  Occurred as a small child  Tolerating augmentin 5/8  Tolerated ampicillin and amoxicillin August 2018  As a child per mother; mother unsure if has tolerated another PCN since. Tolerated ampicillin 9/20/16  Tolerating augmentin 5/8  Tolerated ampicillin and amoxicillin August 2018  Occurred as a small child  Tolerating augmentin 5/8  Tolerated ampicillin and amoxicillin August 2018       Desogestrel-Ethinyl Estradiol Swelling and Angioedema     Swelling of hands and feet per pt.  Swelling of hands and feet per pt.  Swelling of hands and feet per pt.       Sulfa (Sulfonamide Antibiotics) Rash       FAMILY HISTORY:  Family History   Problem Relation Age of Onset     Heart disease Maternal Uncle         heart failure     Coronary artery disease Unknown      Heart disease Maternal Grandmother         pacemaker     Gout Paternal Grandfather      Prostate cancer Maternal Aunt         breast     Heart disease Maternal Aunt         pacemaker     Heart disease Maternal Aunt         pacemaker     Heart disease Maternal Aunt         pacemaker       SOCIAL HISTORY:   Social  History     Socioeconomic History     Marital status: Single     Spouse name: Not on file     Number of children: Not on file     Years of education: Not on file     Highest education level: Not on file   Occupational History     Occupation: PCA     Employer: [employer]   Social Needs     Financial resource strain: Not on file     Food insecurity     Worry: Not on file     Inability: Not on file     Transportation needs     Medical: Not on file     Non-medical: Not on file   Tobacco Use     Smoking status: Current Every Day Smoker     Packs/day: 0.50     Years: 10.00     Pack years: 5.00     Last attempt to quit: 2016     Years since quittin.7     Smokeless tobacco: Former User     Quit date: 2016     Tobacco comment: has information from last admission.   Substance and Sexual Activity     Alcohol use: Yes     Comment: occ     Drug use: No     Sexual activity: Not on file   Lifestyle     Physical activity     Days per week: Not on file     Minutes per session: Not on file     Stress: Not on file   Relationships     Social connections     Talks on phone: Not on file     Gets together: Not on file     Attends [employer] service: Not on file     Active member of club or organization: Not on file     Attends meetings of clubs or organizations: Not on file     Relationship status: Not on file     Intimate partner violence     Fear of current or ex partner: Not on file     Emotionally abused: Not on file     Physically abused: Not on file     Forced sexual activity: Not on file   Other Topics Concern     Not on file   Social History Narrative     Not on file       VITALS:  Patient Vitals for the past 24 hrs:   BP Temp Temp src Pulse Resp SpO2 Height Weight   21 0239 106/57 -- -- 68 -- 97 % -- --   21 0136 107/53 -- -- 66 -- 98 % -- --   21 0032 119/87 98.3  F (36.8  C) Oral 72 16 98 % 5' (1.524 m) 180 lb (81.6 kg)       PHYSICAL EXAM    Physical Exam   Constitutional: She is oriented  to person, place, and time. She appears well-developed and well-nourished.   HENT:   Head: Normocephalic and atraumatic.   Nose: Nose normal.   Mouth/Throat: Oropharynx is clear and moist.   Eyes: Pupils are equal, round, and reactive to light. Conjunctivae and EOM are normal.   Neck: Normal range of motion. Neck supple.   Cardiovascular: Normal rate, regular rhythm and normal heart sounds.   Pulmonary/Chest: Effort normal and breath sounds normal.   Abdominal: Soft. Bowel sounds are normal. There is no abdominal tenderness. There is CVA tenderness (right). There is no rebound and no guarding.   Nephrostomy tube present on the right   Musculoskeletal: Normal range of motion.   Lymphadenopathy:     She has no cervical adenopathy.   Neurological: She is alert and oriented to person, place, and time. Coordination normal.   No gross focal neurologic deficits.  Cranial nerves III-XII intact.   Skin: Skin is warm and dry.   Psychiatric: She has a normal mood and affect. Her behavior is normal.   Nursing note and vitals reviewed.    LAB:  All pertinent labs reviewed and interpreted.  Results for orders placed or performed during the hospital encounter of 06/05/21   Basic metabolic panel   Result Value Ref Range    Sodium 135 (L) 136 - 145 mmol/L    Potassium 4.0 3.5 - 5.0 mmol/L    Chloride 106 98 - 107 mmol/L    CO2 20 (L) 22 - 31 mmol/L    Anion Gap, Calculation 9 5 - 18 mmol/L    Glucose 94 70 - 125 mg/dL    Calcium 10.0 8.5 - 10.5 mg/dL    BUN 14 8 - 22 mg/dL    Creatinine 1.03 0.60 - 1.10 mg/dL    GFR MDRD Af Amer >60 >60 mL/min/1.73m2    GFR MDRD Non Af Amer >60 >60 mL/min/1.73m2   C-Reactive Protein   Result Value Ref Range    CRP 0.3 0.0 - 0.8 mg/dL   CBC   Result Value Ref Range    WBC 9.1 4.0 - 11.0 thou/uL    RBC 4.33 3.80 - 5.40 mill/uL    Hemoglobin 13.2 12.0 - 16.0 g/dL    Hematocrit 40.1 35.0 - 47.0 %    MCV 93 80 - 100 fL    MCH 30.5 27.0 - 34.0 pg    MCHC 32.9 32.0 - 36.0 g/dL    RDW 13.7 11.0 - 14.5 %     Platelets 190 140 - 440 thou/uL    MPV 10.3 8.5 - 12.5 fL   Urinalysis-UC if Indicated   Result Value Ref Range    Color, UA Light Yellow Light Yellow, Yellow    Clarity, UA Slightly Cloudy (!) Clear    Glucose, UA Negative Negative    Protein,  mg/dL (!) Negative    Bilirubin, UA Negative Negative    Urobilinogen, UA <2.0 mg/dL <2.0 mg/dL    pH, UA 7.0 5.0 - 8.0    Blood, UA 0.2 mg/dL (!) Negative    Ketones, UA Negative Negative    Nitrite, UA Positive (!) Negative    Leukocytes,  Duglas/uL (!) Negative    Specific Gravity, UA 1.004 1.001 - 1.030    RBC, UA 23 (H) <=2 hpf    WBC  (H) <=5 hpf    WBC Clumps Present (!) Negative    Bacteria, UA Many (!) None Seen    Squamous Epithel, UA <1 <=5 /HPF    Mucus, UA Present (!) None Seen lpf    Hyaline Casts, UA 4 <=5 lpf   Urinalysis-UC if Indicated   Result Value Ref Range    Color, UA Light Yellow Light Yellow, Yellow    Clarity, UA Slightly Cloudy (!) Clear    Glucose, UA Negative Negative    Protein,  mg/dL (!) Negative    Bilirubin, UA Negative Negative    Urobilinogen, UA <2.0 mg/dL <2.0 mg/dL    pH, UA 7.0 5.0 - 8.0    Blood, UA 0.2 mg/dL (!) Negative    Ketones, UA Negative Negative    Nitrite, UA Positive (!) Negative    Leukocytes,  Duglas/uL (!) Negative    Specific Gravity, UA 1.006 1.001 - 1.030    RBC, UA 11 (H) <=2 hpf    WBC UA 27 (H) <=5 hpf    Bacteria, UA Many (!) None Seen    Squamous Epithel, UA <1 <=5 /HPF    Mucus, UA Present (!) None Seen lpf    Amorphous, UA Few (!) None Seen    Hyaline Casts, UA 4 <=5 lpf    Yeast, UA Few (!) None Seen hpf   UPT (lab test)   Result Value Ref Range    Pregnancy Test, Urine Negative Negative       RADIOLOGY:  Reviewed all pertinent imaging. Please see official radiology report.  Ct Abdomen Pelvis Without Oral Without Iv Contrast    Result Date: 6/5/2021  EXAM: CT ABDOMEN AND PELVIS WITHOUT CONTRAST LOCATION: [facility] DATE/TIME: 06/05/2021, 2:20 AM INDICATION:  Right flank pain. COMPARISON: 05/02/2021 TECHNIQUE: CT scan of the abdomen and pelvis was performed without oral or IV contrast. Multiplanar reformats were obtained. Dose reduction techniques were used. CONTRAST: None. FINDINGS: LOWER CHEST: Normal. HEPATOBILIARY: Normal. PANCREAS: Normal. SPLEEN: Mildly enlarged. ADRENAL GLANDS: Normal. KIDNEY/BLADDER: Right-sided nephrostomy tube. Right-sided nonobstructing renal stones. Small renal cysts. No ureteral stone. Severely atrophic left kidney. BOWEL: Normal bowel. No obstruction, colitis, or diverticulitis. No free air or fluid. LYMPH NODES: Normal. VASCULATURE: Unremarkable. PELVIC ORGANS: Normal. MUSCULOSKELETAL: Normal.     1.  Unchanged position of left percutaneous nephrostomy tube. Unchanged nonobstructing renal stones. No hydroureteronephrosis. 2.  Stable simple appearing renal cysts. 3.  Atrophic left kidney. 4.  Normal bowel.       I, Sonya Dos Santos, am serving as a scribe to document services personally performed by Dr. Ramana Olson based on my observation and the provider's statements to me. I, Ramana Olson MD attest that Sonya Dos Santos is acting in a scribe capacity, has observed my performance of the services and has documented them in accordance with my direction.    Ramana Olson M.D.  Emergency Medicine  [facility] EMERGENCY ROOM  [address] [address]  Dept: [phone]  Loc: [phone]     Ramana Olson MD  06/05/21 0439

## 2021-06-26 NOTE — ED PROVIDER NOTES
EMERGENCY DEPARTMENT ENCOUNTER      IMPRESSION  Viral upper respiratory infection, nasal congestion      MEDICAL DECISION MAKING    Patient evaluated for upper respiratory symptoms.  She reports nasal congestion cough and mild sore throat.  No symptoms of systemic illness or pneumonia    Exam her vital signs are normal.  She is in no distress.  ENT exam is normal.  Pulmonary exam normal    History and physical consistent with viral upper respiratory infection.  Patient does take antibiotics for her chronic urinary infection.  She is stable for discharge home.  Recommended over-the-counter medications        CHIEF COMPLAINT    Chief Complaint   Patient presents with     Sinusitis       HPI    Jacqueline Pappas is a 36 y.o. female who presents for a possible sinus infection. Patient states that she woke up with sinus congestion, frontal headache, sore throat, facial pressure, and post nasal drainage yesterday morning. She has been taking Dayquil but no other medications. Patient states she had COVID-19 in  (6 months ago) and has not been vaccinated for COVID-19. Denies fever or any other concerns.      Per chart review, patient has presented to various emergency departments for flank pain 8 times and diagnosed with pyelonephritis with subsequent antibiotic treatment. Most recent visit was on 21 (5 days ago) and she was started on Levaquin.     PAST MEDICAL HISTORY    Past Surgical History:   Procedure Laterality Date      SECTION      x1     Cystoscopy, ureteroscopy, laser Right     11/02/2014 x2 with ureteral stent insertion     IR NEPHROSTOMY TUBE CHANGE RIGHT  2018     IR NEPHROSTOMY TUBE CHANGE RIGHT  2020     KIDNEY STONE SURGERY Right 2016     Mole removed      nasal     NEPHROSTOMY W/ INTRODUCTION OF CATHETER Right      WISDOM TOOTH EXTRACTION         Past Medical History:   Diagnosis Date     Acute renal failure (H)      Anemia      Congenital absence of left kidney       Depression      Hydronephrosis      Kidney stone      Pyelonephritis      Smoker      Ureteral stricture      UTI (urinary tract infection)      Wrist fracture        CURRENT MEDICATIONS    Patient's Medications   New Prescriptions    No medications on file   Previous Medications    ACETAMINOPHEN (TYLENOL) 500 MG TABLET    Take 1,000 mg by mouth every 6 (six) hours as needed for pain.     ALBUTEROL (PROAIR HFA;PROVENTIL HFA;VENTOLIN HFA) 90 MCG/ACTUATION INHALER    Inhale 2 puffs every 6 (six) hours as needed for wheezing or shortness of breath.    DICYCLOMINE (BENTYL) 20 MG TABLET    Take 1 tablet (20 mg total) by mouth 2 (two) times a day.    DOXYCYCLINE (VIBRAMYCIN) 100 MG CAPSULE    Take 1 capsule (100 mg total) by mouth 2 (two) times a day for 10 days.    HYDROCODONE-ACETAMINOPHEN 5-325 MG PER TABLET    Take 1 tablet by mouth every 6 (six) hours as needed for pain.    LEVOFLOXACIN (LEVAQUIN) 750 MG TABLET    Take 1 tablet (750 mg total) by mouth daily for 14 days.    ONDANSETRON (ZOFRAN-ODT) 4 MG DISINTEGRATING TABLET    Take 1 tablet (4 mg total) by mouth every 6 (six) hours as needed for nausea.    SERTRALINE (ZOLOFT) 100 MG TABLET    Take 150 mg by mouth bedtime.   Modified Medications    No medications on file   Discontinued Medications    No medications on file       ALLERGIES    Allergies   Allergen Reactions     Vancomycin Hives and Rash     Zosyn [Piperacillin-Tazobactam] Itching and Rash     Tolerated 12/11/18     Penicillins Rash     Tolerating augmentin 5/8  Tolerated ampicillin and amoxicillin August 2018  Occurred as a small child  Tolerating augmentin 5/8  Tolerated ampicillin and amoxicillin August 2018  As a child per mother; mother unsure if has tolerated another PCN since. Tolerated ampicillin 9/20/16  Tolerating augmentin 5/8  Tolerated ampicillin and amoxicillin August 2018  Occurred as a small child  Tolerating augmentin 5/8  Tolerated ampicillin and amoxicillin August 2018        Desogestrel-Ethinyl Estradiol Swelling and Angioedema     Swelling of hands and feet per pt.  Swelling of hands and feet per pt.  Swelling of hands and feet per pt.       Sulfa (Sulfonamide Antibiotics) Rash       FAMILY HISTORY    Family History   Problem Relation Age of Onset     Heart disease Maternal Uncle         heart failure     Coronary artery disease Unknown      Heart disease Maternal Grandmother         pacemaker     Gout Paternal Grandfather      Prostate cancer Maternal Aunt         breast     Heart disease Maternal Aunt         pacemaker     Heart disease Maternal Aunt         pacemaker     Heart disease Maternal Aunt         pacemaker       SOCIAL HISTORY    Social History     Socioeconomic History     Marital status: Single     Spouse name: Not on file     Number of children: Not on file     Years of education: Not on file     Highest education level: Not on file   Occupational History     Occupation: PCA     Employer: [employer]   Social Needs     Financial resource strain: Not on file     Food insecurity     Worry: Not on file     Inability: Not on file     Transportation needs     Medical: Not on file     Non-medical: Not on file   Tobacco Use     Smoking status: Current Every Day Smoker     Packs/day: 0.50     Years: 10.00     Pack years: 5.00     Last attempt to quit: 2016     Years since quittin.7     Smokeless tobacco: Former User     Quit date: 2016     Tobacco comment: has information from last admission.   Substance and Sexual Activity     Alcohol use: Yes     Comment: occ     Drug use: No     Sexual activity: Not on file   Lifestyle     Physical activity     Days per week: Not on file     Minutes per session: Not on file     Stress: Not on file   Relationships     Social connections     Talks on phone: Not on file     Gets together: Not on file     Attends Bahai service: Not on file     Active member of club or organization: Not on file     Attends meetings of  clubs or organizations: Not on file     Relationship status: Not on file     Intimate partner violence     Fear of current or ex partner: Not on file     Emotionally abused: Not on file     Physically abused: Not on file     Forced sexual activity: Not on file   Other Topics Concern     Not on file   Social History Narrative     Not on file       REVIEW OF SYSTEMS    Constitutional:  No reported fever, chills, weight loss, weakness, loss of appetite,   Eyes:  No reported pain, diplopia, vision loss,   HENT:  No reported ear pain, dysphagia. Positive for sinus congestion, facial pressure, sore throat, post nasal drainage  Respiratory: No reported SOB, wheeze, cough,  hemoptysis  Cardiovascular:  No reported CP, RIVERO, palpitations, syncope  GI:  No reported  abdominal pain, nausea, vomiting, dark or bloody stools, diarrhea   Musculoskeletal:  No reported  new muscle/joint pain, swelling or loss of function.   Skin:  No reported rash   Neurologic:  No reported focal weakness,  sensory changes, vertigo, seizure. Positive for headache  Lymphatic:  No reported swollen glands     All other systems negative unless noted in HPI.    PHYSICAL EXAM    VITAL SIGNS: /65 (Patient Position: Sitting)   Pulse 72   Temp 98  F (36.7  C) (Oral)   Resp 18   Ht 5' (1.524 m)   Wt 185 lb (83.9 kg)   LMP 05/22/2021 (Approximate)   SpO2 97%   BMI 36.13 kg/m    General Appearance: Alert, cooperative, normal speech and facial symmetry,  appears stated age, she does not appear ill  Head:  Normocephalic, without obvious abnormality, atraumatic  Eyes:   conjunctiva/corneas clear,   ENT:  Lips, mucosa, and tongue normal; teeth and gums normal, no pharyngeal inflammation, no dysphonia or difficulty swallowing, membranes are moist without pallor  Neck:  Supple, symmetrical, trachea midline, no adenopathy;        thyroid:  No enlargement/tenderness/nodules; no stridor,  no soft tissue swelling or midline CSP tenderness  Cardio:  Regular  rate and rhythm, S1 and S2 normal, no murmur,  2+ pulses symmetric in all extremities  Pulm:  Clear to auscultation bilaterally, respirations unlabored,   Skin:  no rashes or lesions on exposed skin  Neuro:  Awake, alert, responsive to voice, follows         ED COURSE:  4:25 PM The patient was interviewed and examined.  PPE worn. History in the chart was reviewed.  4:37 PM We discussed plans for discharge including supportive cares, symptomatic treatment, outpatient follow up, and reasons to return to the emergency department.      FINAL DIAGNOSIS:   1. Acute nasopharyngitis          At the conclusion of the encounter I discussed  the results of all of the tests and the disposition with the patient .   All questions were answered.  The patient  acknowledged understanding and was involved in the decision making regarding the overall care plan.     I discussed with the patient  the utility, limitations and findings of the exam/interventions/studies done during this visit as well as the list of differential diagnosis and indications to return to the emergency department.  Additional verbal discharge instructions were provided.     I, Myah Bridges, am serving as a scribe to document services personally performed by Ramana Neil MD, based on my observations and the provider's statements to me.  I, Ramana Neil MD, attest that Myah Bridges is acting in a scribe capacity, has observed my performance of the services and has documented them in accordance with my direction.        Ramana Neil M.D.   Emergency Medicine  [facility] EMERGENCY DEPARTMENT  [address]  [facility] [address]  Dept: [phone]  Loc: [phone]         Ramana Neil MD  06/14/21 2550

## 2021-07-06 VITALS — HEIGHT: 60 IN | WEIGHT: 180 LBS | BODY MASS INDEX: 35.34 KG/M2

## 2021-07-06 VITALS — HEIGHT: 60 IN | BODY MASS INDEX: 36.32 KG/M2 | WEIGHT: 185 LBS

## 2021-07-06 VITALS — WEIGHT: 185 LBS | BODY MASS INDEX: 36.32 KG/M2 | HEIGHT: 60 IN

## 2021-07-06 VITALS — WEIGHT: 185 LBS | HEIGHT: 60 IN | BODY MASS INDEX: 36.32 KG/M2

## 2021-09-14 ENCOUNTER — HOSPITAL ENCOUNTER (EMERGENCY)
Facility: CLINIC | Age: 36
Discharge: HOME | End: 2021-09-14
Attending: EMERGENCY MEDICINE | Admitting: EMERGENCY MEDICINE
Payer: COMMERCIAL

## 2021-09-14 VITALS
HEART RATE: 65 BPM | TEMPERATURE: 97.7 F | DIASTOLIC BLOOD PRESSURE: 109 MMHG | RESPIRATION RATE: 18 BRPM | BODY MASS INDEX: 35.15 KG/M2 | WEIGHT: 180 LBS | OXYGEN SATURATION: 100 % | SYSTOLIC BLOOD PRESSURE: 158 MMHG

## 2021-09-14 DIAGNOSIS — N10 ACUTE PYELONEPHRITIS: ICD-10-CM

## 2021-09-14 LAB
ALBUMIN UR-MCNC: 30 MG/DL
AMORPH CRY #/AREA URNS HPF: ABNORMAL /HPF
APPEARANCE UR: ABNORMAL
BILIRUB UR QL STRIP: NEGATIVE
COLOR UR AUTO: ABNORMAL
GLUCOSE UR STRIP-MCNC: NEGATIVE MG/DL
HCG UR QL: NEGATIVE
HGB UR QL STRIP: ABNORMAL
INTERNAL QC OK POCT: NORMAL
KETONES UR STRIP-MCNC: NEGATIVE MG/DL
LEUKOCYTE ESTERASE UR QL STRIP: ABNORMAL
MUCOUS THREADS #/AREA URNS LPF: PRESENT /LPF
NITRATE UR QL: NEGATIVE
PH UR STRIP: 7 [PH] (ref 5–7)
RBC URINE: 26 /HPF
SP GR UR STRIP: 1.01 (ref 1–1.03)
SQUAMOUS EPITHELIAL: <1 /HPF
UROBILINOGEN UR STRIP-MCNC: <2 MG/DL
WBC URINE: 10 /HPF

## 2021-09-14 PROCEDURE — 250N000013 HC RX MED GY IP 250 OP 250 PS 637: Performed by: EMERGENCY MEDICINE

## 2021-09-14 PROCEDURE — 81001 URINALYSIS AUTO W/SCOPE: CPT | Performed by: EMERGENCY MEDICINE

## 2021-09-14 PROCEDURE — 81025 URINE PREGNANCY TEST: CPT | Performed by: EMERGENCY MEDICINE

## 2021-09-14 PROCEDURE — 99284 EMERGENCY DEPT VISIT MOD MDM: CPT

## 2021-09-14 PROCEDURE — 87086 URINE CULTURE/COLONY COUNT: CPT | Performed by: EMERGENCY MEDICINE

## 2021-09-14 RX ORDER — CEPHALEXIN 500 MG/1
500 CAPSULE ORAL 3 TIMES DAILY
Qty: 30 CAPSULE | Refills: 0 | Status: SHIPPED | OUTPATIENT
Start: 2021-09-14 | End: 2021-09-24

## 2021-09-14 RX ORDER — OXYCODONE HYDROCHLORIDE 5 MG/1
5 TABLET ORAL EVERY 6 HOURS PRN
Qty: 6 TABLET | Refills: 0 | Status: SHIPPED | OUTPATIENT
Start: 2021-09-14 | End: 2021-10-07

## 2021-09-14 RX ORDER — OXYCODONE AND ACETAMINOPHEN 5; 325 MG/1; MG/1
1 TABLET ORAL ONCE
Status: COMPLETED | OUTPATIENT
Start: 2021-09-14 | End: 2021-09-14

## 2021-09-14 RX ADMIN — OXYCODONE HYDROCHLORIDE AND ACETAMINOPHEN 1 TABLET: 5; 325 TABLET ORAL at 01:32

## 2021-09-14 ASSESSMENT — ENCOUNTER SYMPTOMS
ABDOMINAL PAIN: 0
FREQUENCY: 1
SHORTNESS OF BREATH: 0
FEVER: 0
FLANK PAIN: 1
LIGHT-HEADEDNESS: 0

## 2021-09-14 NOTE — ED TRIAGE NOTES
R side flank pain began Sunday. Was able to control pain with tylenol, tonight became worse. Last tylenol at 2100. Denies nausea, vomiting. 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 Reports seeing blood in nephrostomy tube. Has had kidney stones. +urinary frequency and odorous urine

## 2021-09-14 NOTE — ED PROVIDER NOTES
EMERGENCY DEPARTMENT ENCOUNTER       ED Course & Medical Decision Making     1:03 PM I evaluated the patient to gather history and perform initial exam. ED course and treatment plan was discussed. PPE worn: surgical mask, gloves.      I did see and examined the patient.  She has a long history of pyelonephritis and she has had kidney stones in the past as well.  I did discuss the risks and benefits of performing any CT imaging with her during this visit.  She elects to forego CT imaging and I do feel that this is reasonable.  We checked a urine that does show signs consistent with infection and plan is to treat with antibiotics and pain medication.  She agrees with this plan.  She is stable.  No signs of sepsis.  Plan is to discharge with some Keflex and Percocet.  She will follow-up with her regular physician and her urologist      At the close of our clinical encounter, we did discuss follow up plans as well as discharge and return instructions. I did answer all questions and we are in agreement.      Prior to making a final disposition on this patient the results of patient's tests and other diagnostic studies were discussed with the patient. All questions were answered. Patient expressed understanding of the plan and was amenable to it.    Medications   oxyCODONE-acetaminophen (PERCOCET) 5-325 MG per tablet 1 tablet (1 tablet Oral Given 9/14/21 0132)       Final Impression     1. Acute pyelonephritis            Chief Complaint     Chief Complaint   Patient presents with     Flank Pain       R side flank pain began Sunday. Was able to control pain with tylenol, tonight became worse. Last tylenol at 2100. Denies nausea, vomiting. 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 Reports seeing blood in nephrostomy tube. Has had kidney stones.       HPI       Jacquelinedori Pappas is a 36 year old female who presents for evaluation of right sided flank pain.    Per chart review:  6/9/21: Patient was seen at [facility] for right sided flank pain. She was given Zofran and Dilaudid with improvement and discharged.    Patient reports developing urine frequency and odor on 9/10 and right sided flank pain on . She had taken Tylenol with improvement but tonight had worsened which prompted her to come to ED. She says it does feel similiar to her pyelonephritis. She does get her nephrostomy tube changed every 3 months and her next appointment at [facility] is on 10/21. She notes she is able to urinate well. She does have a history of kidney stones.     She refused CAT scan at this time but is requesting for pain medications. No other concerns or complaints at this time.       I, Deepti Rodolfo am serving as a scribe to document services personally performed by Twan Miller M.D. based on my observation and the provider's statements to me. ITwan M.D attest that Deepti Reynolds is acting in a scribe capacity, has observed my performance of the services and has documented them in accordance with my direction.    Past Medical History     Past Medical History:   Diagnosis Date     Acute renal failure (H)      Anemia      Anemia      Calculus of kidney      Congenital absence of left kidney      Congenital absence of one kidney      Depression      Depression      Hydronephrosis      Kidney stone      Pyelonephritis      Pyelonephritis      Smoker      Ureteral stricture      UTI (urinary tract infection)      Wrist fracture      Past Surgical History:   Procedure Laterality Date     C REMOVAL OF KIDNEY STONE        SECTION        SECTION      x1     COMBINED CYSTOSCOPY, RETROGRADES, URETEROSCOPY, LASER HOLMIUM LITHOTRIPSY URETER(S), INSERT STENT Right 2016    Procedure: COMBINED CYSTOSCOPY, RETROGRADES, URETEROSCOPY, LASER HOLMIUM LITHOTRIPSY URETER(S), INSERT STENT;  Surgeon: Florentino Awad MD;  Location:  OR     CYSTOSCOPY, URETEROSCOPY, COMBINED Right 2016    Procedure: COMBINED  CYSTOSCOPY, URETEROSCOPY;  Surgeon: Florentino Awad MD;  Location: [facility] OR     IR NEPHROLITHOTOMY  12/11/2014     IR NEPHROSTOGRAM EXISITING ACCESS  10/18/2018     IR NEPHROSTOMY TUBE CHANGE RIGHT  12/12/2018     IR NEPHROSTOMY TUBE CHANGE RIGHT  6/11/2020     IR NEPHROSTOMY TUBE CHANGE RIGHT  12/12/2018     IR NEPHROSTOMY TUBE CHANGE RIGHT  6/11/2020     IR NEPHROSTOMY TUBE PLACEMENT RIGHT  7/24/2016     IR NEPHROSTOMY TUBE PLACEMENT RIGHT  9/14/2017     KIDNEY STONE SURGERY Right 05/2016     LASER HOLMIUM LITHOTRIPSY URETER(S), INSERT STENT, COMBINED Left 11/1/2016    Procedure: COMBINED CYSTOSCOPY, URETEROSCOPY, LASER HOLMIUM LITHOTRIPSY URETER(S), INSERT STENT;  Surgeon: Florentino Awad MD;  Location: [facility] OR     LASER HOLMIUM NEPHROLITHOTOMY VIA PERCUTANEOUS NEPHROSTOMY Right 9/14/2016    Procedure: LASER HOLMIUM NEPHROLITHOTOMY VIA PERCUTANEOUS NEPHROSTOMY;  Surgeon: Florentino Awad MD;  Location: [facility] OR     NEPHROSTOMY W/ INTRODUCTION OF CATHETER Right      OTHER SURGICAL HISTORY      Mole removednasal     OTHER SURGICAL HISTORY Right     Cystoscopy, ureteroscopy, laser11/02/2014 x2 with ureteral stent insertion     PERCUTANEOUS NEPHROSTOMY  11/1/2016    Procedure: PERCUTANEOUS NEPHROSTOMY;  Surgeon: Florentino Awad MD;  Location: [facility] OR     WISDOM TOOTH EXTRACTION       Family History   Problem Relation Age of Onset     Anesthesia Reaction No family hx of      Malignant Hyperthermia No family hx of      Heart Disease Maternal Uncle         heart failure     Coronary Artery Disease Other      Heart Disease Maternal Grandmother         pacemaker     Gout Paternal Grandfather      Prostate Cancer Maternal Aunt         breast     Heart Disease Maternal Aunt         pacemaker     Heart Disease Maternal Aunt         pacemaker     Heart Disease Maternal Aunt         pacemaker      Social History     Tobacco Use     Smoking status: Current Every Day Smoker     Packs/day: 0.50     Years: 10.00      Pack years: 5.00     Last attempt to quit: 2016     Years since quittin.9     Smokeless tobacco: Former User     Quit date: 2016     Tobacco comment: has information from last admission.   Substance Use Topics     Alcohol use: Yes     Alcohol/week: 0.0 standard drinks     Comment: Alcoholic Drinks/day: occ     Drug use: No       Relevant past medical, surgical, family and social history as documented above, has been reviewed and discussed with patient. No changes or additions, unless otherwise noted in the HPI.    Current Medications     cephALEXin (KEFLEX) 500 MG capsule  oxyCODONE (ROXICODONE) 5 MG tablet  amoxicillin-clavulanate (AUGMENTIN) 875-125 MG per tablet  lactobacillus rhamnosus, GG, (CULTURELL) capsule  LORazepam (ATIVAN) 1 MG tablet  oxybutynin (DITROPAN) 5 MG tablet  oxybutynin (DITROPAN) 5 MG tablet  SERTRALINE HCL PO  tamsulosin (FLOMAX) 0.4 MG 24 hr capsule  voriconazole (VFEND) 200 MG tablet        Allergies     Allergies   Allergen Reactions     Vancomycin      Penicillins Rash     As a child per mother; mother unsure if has tolerated another PCN since. Tolerated ampicillin 18     Sulfa Drugs Rash       Review of Systems     Review of Systems   Constitutional: Negative for fever.   Respiratory: Negative for shortness of breath.    Cardiovascular: Negative for chest pain.   Gastrointestinal: Negative for abdominal pain.   Genitourinary: Positive for flank pain (right sided) and frequency.        Positive for urine odor.   Neurological: Negative for light-headedness.   All other systems reviewed and are negative.       Remainder of systems reviewed, unless noted in HPI all others negative.    Physical Exam     BP (!) 158/109   Pulse 65   Temp 97.7  F (36.5  C) (Oral)   Resp 18   Wt 81.6 kg (180 lb)   SpO2 100%   BMI 35.15 kg/m      Physical Exam  Constitutional:       General: She is not in acute distress.     Appearance: She is not diaphoretic.   HENT:      Head: Atraumatic.       Mouth/Throat:      Pharynx: No oropharyngeal exudate.   Eyes:      General: No scleral icterus.     Pupils: Pupils are equal, round, and reactive to light.   Cardiovascular:      Heart sounds: Normal heart sounds.   Pulmonary:      Effort: No respiratory distress.      Breath sounds: Normal breath sounds.   Abdominal:      General: Bowel sounds are normal.      Palpations: Abdomen is soft.      Tenderness: There is no abdominal tenderness.   Genitourinary:     Comments: Right-sided percutaneous nephrostomy tube in place, no signs of infection at the insertion site  Musculoskeletal:         General: No tenderness.   Skin:     General: Skin is warm.      Findings: No rash.             Labs & Imaging         Labs Ordered and Resulted from Time of ED Arrival Up to the Time of Departure from the ED   ROUTINE UA WITH MICROSCOPIC REFLEX TO CULTURE - Abnormal; Notable for the following components:       Result Value    Color Urine Light Orange (*)     Appearance Urine Cloudy (*)     Blood Urine >1.0 mg/dL (*)     Protein Albumin Urine 30  (*)     Leukocyte Esterase Urine 250 Duglas/uL (*)     Mucus Urine Present (*)     Amorphous Crystals Urine Few (*)     RBC Urine 26 (*)     WBC Urine 10 (*)     All other components within normal limits    Narrative:     Urine Culture ordered based on laboratory criteria   HCG QUALITATIVE URINE POCT - Normal   URINE CULTURE         Results for orders placed or performed during the hospital encounter of 09/14/21   UA with Microscopic reflex to Culture    Specimen: Urine, Clean Catch   Result Value Ref Range    Color Urine Light Orange (A) Colorless, Straw, Light Yellow, Yellow    Appearance Urine Cloudy (A) Clear    Glucose Urine Negative Negative mg/dL    Bilirubin Urine Negative Negative    Ketones Urine Negative Negative mg/dL    Specific Gravity Urine 1.006 1.001 - 1.030    Blood Urine >1.0 mg/dL (A) Negative    pH Urine 7.0 5.0 - 7.0    Protein Albumin Urine 30  (A) Negative mg/dL     Urobilinogen Urine <2.0 <2.0 mg/dL    Nitrite Urine Negative Negative    Leukocyte Esterase Urine 250 Duglas/uL (A) Negative    Mucus Urine Present (A) None Seen /LPF    Amorphous Crystals Urine Few (A) None Seen /HPF    RBC Urine 26 (H) <=2 /HPF    WBC Urine 10 (H) <=5 /HPF    Squamous Epithelials Urine <1 <=1 /HPF   HCG qualitative urine POCT   Result Value Ref Range    HCG Qual Urine Negative Negative    Internal QC Check POCT Valid Valid              Twan Miller MD  09/14/21 0216

## 2021-09-16 LAB
BACTERIA UR CULT: ABNORMAL
BACTERIA UR CULT: ABNORMAL

## 2021-09-19 ENCOUNTER — HEALTH MAINTENANCE LETTER (OUTPATIENT)
Age: 36
End: 2021-09-19

## 2021-10-06 ENCOUNTER — HOSPITAL ENCOUNTER (EMERGENCY)
Facility: HOSPITAL | Age: 36
Discharge: HOME | End: 2021-10-07
Attending: EMERGENCY MEDICINE | Admitting: EMERGENCY MEDICINE
Payer: COMMERCIAL

## 2021-10-06 DIAGNOSIS — N12 PYELONEPHRITIS: ICD-10-CM

## 2021-10-06 DIAGNOSIS — R10.9 RIGHT FLANK PAIN: ICD-10-CM

## 2021-10-06 LAB
ALBUMIN UR-MCNC: 200 MG/DL
AMORPH CRY #/AREA URNS HPF: ABNORMAL /HPF
APPEARANCE UR: ABNORMAL
BILIRUB UR QL STRIP: NEGATIVE
COLOR UR AUTO: ABNORMAL
GLUCOSE UR STRIP-MCNC: NEGATIVE MG/DL
HGB UR QL STRIP: ABNORMAL
KETONES UR STRIP-MCNC: NEGATIVE MG/DL
LEUKOCYTE ESTERASE UR QL STRIP: ABNORMAL
MUCOUS THREADS #/AREA URNS LPF: PRESENT /LPF
NITRATE UR QL: NEGATIVE
PH UR STRIP: 7 [PH] (ref 5–7)
RBC URINE: 13 /HPF
SP GR UR STRIP: 1.01 (ref 1–1.03)
UROBILINOGEN UR STRIP-MCNC: <2 MG/DL
WBC CLUMPS #/AREA URNS HPF: PRESENT /HPF
WBC URINE: 13 /HPF

## 2021-10-06 PROCEDURE — 99284 EMERGENCY DEPT VISIT MOD MDM: CPT | Mod: 25

## 2021-10-06 PROCEDURE — 81001 URINALYSIS AUTO W/SCOPE: CPT | Performed by: EMERGENCY MEDICINE

## 2021-10-06 PROCEDURE — 81025 URINE PREGNANCY TEST: CPT | Performed by: EMERGENCY MEDICINE

## 2021-10-06 PROCEDURE — 87086 URINE CULTURE/COLONY COUNT: CPT | Performed by: EMERGENCY MEDICINE

## 2021-10-06 RX ORDER — OXYCODONE AND ACETAMINOPHEN 5; 325 MG/1; MG/1
1 TABLET ORAL ONCE
Status: COMPLETED | OUTPATIENT
Start: 2021-10-07 | End: 2021-10-07

## 2021-10-06 ASSESSMENT — MIFFLIN-ST. JEOR: SCORE: 1437.97

## 2021-10-07 ENCOUNTER — APPOINTMENT (OUTPATIENT)
Dept: CT IMAGING | Facility: HOSPITAL | Age: 36
End: 2021-10-07
Attending: EMERGENCY MEDICINE
Payer: COMMERCIAL

## 2021-10-07 VITALS
DIASTOLIC BLOOD PRESSURE: 68 MMHG | WEIGHT: 180 LBS | HEIGHT: 60 IN | TEMPERATURE: 97.6 F | SYSTOLIC BLOOD PRESSURE: 122 MMHG | RESPIRATION RATE: 22 BRPM | OXYGEN SATURATION: 98 % | BODY MASS INDEX: 35.34 KG/M2 | HEART RATE: 78 BPM

## 2021-10-07 LAB
ANION GAP SERPL CALCULATED.3IONS-SCNC: 11 MMOL/L (ref 5–18)
BASOPHILS # BLD AUTO: 0 10E3/UL (ref 0–0.2)
BASOPHILS NFR BLD AUTO: 0 %
BUN SERPL-MCNC: 15 MG/DL (ref 8–22)
C REACTIVE PROTEIN LHE: 0.3 MG/DL (ref 0–0.8)
CALCIUM SERPL-MCNC: 10.5 MG/DL (ref 8.5–10.5)
CHLORIDE BLD-SCNC: 105 MMOL/L (ref 98–107)
CO2 SERPL-SCNC: 20 MMOL/L (ref 22–31)
CREAT SERPL-MCNC: 0.87 MG/DL (ref 0.6–1.1)
EOSINOPHIL # BLD AUTO: 0.2 10E3/UL (ref 0–0.7)
EOSINOPHIL NFR BLD AUTO: 2 %
ERYTHROCYTE [DISTWIDTH] IN BLOOD BY AUTOMATED COUNT: 13.6 % (ref 10–15)
GFR SERPL CREATININE-BSD FRML MDRD: 87 ML/MIN/1.73M2
GLUCOSE BLD-MCNC: 99 MG/DL (ref 70–125)
HCG UR QL: NEGATIVE
HCT VFR BLD AUTO: 41.6 % (ref 35–47)
HGB BLD-MCNC: 14 G/DL (ref 11.7–15.7)
IMM GRANULOCYTES # BLD: 0 10E3/UL
IMM GRANULOCYTES NFR BLD: 0 %
LYMPHOCYTES # BLD AUTO: 2.7 10E3/UL (ref 0.8–5.3)
LYMPHOCYTES NFR BLD AUTO: 29 %
MCH RBC QN AUTO: 30.8 PG (ref 26.5–33)
MCHC RBC AUTO-ENTMCNC: 33.7 G/DL (ref 31.5–36.5)
MCV RBC AUTO: 92 FL (ref 78–100)
MONOCYTES # BLD AUTO: 0.6 10E3/UL (ref 0–1.3)
MONOCYTES NFR BLD AUTO: 6 %
NEUTROPHILS # BLD AUTO: 5.8 10E3/UL (ref 1.6–8.3)
NEUTROPHILS NFR BLD AUTO: 63 %
NRBC # BLD AUTO: 0 10E3/UL
NRBC BLD AUTO-RTO: 0 /100
PLATELET # BLD AUTO: 180 10E3/UL (ref 150–450)
POTASSIUM BLD-SCNC: 3.7 MMOL/L (ref 3.5–5)
RBC # BLD AUTO: 4.54 10E6/UL (ref 3.8–5.2)
SODIUM SERPL-SCNC: 136 MMOL/L (ref 136–145)
WBC # BLD AUTO: 9.2 10E3/UL (ref 4–11)

## 2021-10-07 PROCEDURE — 36415 COLL VENOUS BLD VENIPUNCTURE: CPT | Performed by: EMERGENCY MEDICINE

## 2021-10-07 PROCEDURE — 74176 CT ABD & PELVIS W/O CONTRAST: CPT

## 2021-10-07 PROCEDURE — 85025 COMPLETE CBC W/AUTO DIFF WBC: CPT | Performed by: EMERGENCY MEDICINE

## 2021-10-07 PROCEDURE — 86141 C-REACTIVE PROTEIN HS: CPT | Performed by: EMERGENCY MEDICINE

## 2021-10-07 PROCEDURE — 82374 ASSAY BLOOD CARBON DIOXIDE: CPT | Performed by: EMERGENCY MEDICINE

## 2021-10-07 PROCEDURE — 250N000013 HC RX MED GY IP 250 OP 250 PS 637: Performed by: EMERGENCY MEDICINE

## 2021-10-07 RX ORDER — CEFDINIR 300 MG/1
300 CAPSULE ORAL 2 TIMES DAILY
Qty: 10 CAPSULE | Refills: 0 | Status: SHIPPED | OUTPATIENT
Start: 2021-10-07 | End: 2021-10-12

## 2021-10-07 RX ORDER — OXYCODONE AND ACETAMINOPHEN 5; 325 MG/1; MG/1
1 TABLET ORAL ONCE
Status: COMPLETED | OUTPATIENT
Start: 2021-10-07 | End: 2021-10-07

## 2021-10-07 RX ORDER — OXYCODONE HYDROCHLORIDE 5 MG/1
5 TABLET ORAL EVERY 6 HOURS PRN
Qty: 6 TABLET | Refills: 0 | Status: SHIPPED | OUTPATIENT
Start: 2021-10-07 | End: 2021-11-13

## 2021-10-07 RX ADMIN — OXYCODONE HYDROCHLORIDE AND ACETAMINOPHEN 1 TABLET: 5; 325 TABLET ORAL at 01:04

## 2021-10-07 RX ADMIN — OXYCODONE HYDROCHLORIDE AND ACETAMINOPHEN 1 TABLET: 5; 325 TABLET ORAL at 02:06

## 2021-10-07 NOTE — DISCHARGE INSTRUCTIONS
You were seen today in the [facility] emergency department for right-sided flank pain.  Your urine testing here is suggestive of an infection.  We are going to start you on a few days of antibiotics to try and clear this up.  We will call you based on your culture results in a couple of days if there is any needed change in your medication regimen.  We agree that following up with urology at this point would be the next best step in your evaluation and we would also like you to notify your primary doctor about this visit.  You can continue Tylenol at home and use oxycodone as needed for breakthrough severe pain but try to avoid this is much as possible and restricted from primarily nighttime use.

## 2021-10-07 NOTE — ED PROVIDER NOTES
EMERGENCY DEPARTMENT ENCOUNTER      NAME: Jacqueline Pappas  AGE: 36 year old female  YOB: 1985  MRN: [id number]  EVALUATION DATE & TIME: 10/6/2021 11:39 PM    PCP: Pao Montague    ED PROVIDER: Sridhar Morley M.D.      Chief Complaint   Patient presents with     Flank Pain       FINAL IMPRESSION:  1. Right flank pain    2. Pyelonephritis        ED COURSE & MEDICAL DECISION MAKIN year old female presents to the Emergency Department for evaluation of right flank pain.  History of solitary kidney, chronic obstruction and nephrostomy tube, pyelonephritis, and kidney stones.  Presents with smoldering right flank pain.  She does have some CVA tenderness on exam.  She is afebrile and vitally stable, well-appearing and nontoxic when she arrives to emergency department.  Labs include normal white blood cell count and CRP.  Renal function is stable at baseline.  Urine analysis with some pyuria and hematuria.  Given continued pain since her visit a few weeks ago, did obtain CT abdomen pelvis which was negative for ureterolithiasis.  Nephrostomy tube appears to be working.  Last few urine cultures have had fairly sensitive organisms or contaminant.  Patient will be started on 7 days of cefdinir pending her urine culture result.  Was given Percocet x2 here in the emergency department and very limited refill of oxycodone provided for breakthrough pain at home.  Patient left the emergency department in stable condition to follow-up as needed with primary care and discuss her symptoms with her [facility] team.    11:51 PM I met with the patient, obtained history, performed an initial exam, and discussed options and plan for diagnostics and treatment here in the ED.   At the conclusion of the encounter I discussed the results of all of the tests and the disposition. The questions were answered. The patient or family acknowledged understanding and was agreeable with the care plan.       MEDICATIONS GIVEN  IN THE EMERGENCY:  Medications   oxyCODONE-acetaminophen (PERCOCET) 5-325 MG per tablet 1 tablet (1 tablet Oral Given 10/7/21 0104)   oxyCODONE-acetaminophen (PERCOCET) 5-325 MG per tablet 1 tablet (1 tablet Oral Given 10/7/21 0206)       NEW PRESCRIPTIONS STARTED AT [facility]'S ER VISIT  Discharge Medication List as of 10/7/2021  2:18 AM      START taking these medications    Details   cefdinir (OMNICEF) 300 MG capsule Take 1 capsule (300 mg) by mouth 2 times daily for 5 days, Disp-10 capsule, R-0, Local Print                =================================================================    HPI    Patient information was obtained from: Patient    Use of : N/A       Jacqueline Pappas is a 36 year old female with a pertinent history of congenital absence of left kidney, kidney stones, pyelonephritis, and s/p right nephrostomy placement, who presents to this ED via walk in for evaluation of right flank pain.    The patient reports right flank pain for the past ~4 days.  She repots the pain is consistent with previous UTI's, and she was treated for a UTI about three weeks ago. She reports her symptoms got better while on the antibiotics, but are now worse.  She has a right nephrostomy tube which she has noticed the urine appears cloudy recently.  She does produce her own urine as well.  She tried Tylenol with minimal pain relief.  She also reports nausea and vomiting.  She denies any urinary symptoms, fevers, abdominal pain, or other complaints at this time. Of note, patient denies any chance at pregnancy as she is not currently having sexual intercourse.     Per chart review, the patient was seen at  ED on 9/14/21 for similar symptoms.  She was found to have acute pyelonephritis for her UA and started on Felex and Percocet. She was seen at Urgent Care on 10/2/21 for vaginal itching.  She did note having a new sexual partner but notes using a condom. She did not want to be tested for GC/CHL. She was diagnosed  with BV and started on Flagyl.        REVIEW OF SYSTEMS   All systems reviewed and negative except as noted in HPI.    PAST MEDICAL HISTORY:  Past Medical History:   Diagnosis Date     Acute renal failure (H)      Anemia      Anemia      Calculus of kidney      Congenital absence of left kidney      Congenital absence of one kidney      Depression      Depression      Hydronephrosis      Kidney stone     at age 29     Pyelonephritis      Pyelonephritis      Smoker      Ureteral stricture      UTI (urinary tract infection)      Wrist fracture     Left       PAST SURGICAL HISTORY:  Past Surgical History:   Procedure Laterality Date     C REMOVAL OF KIDNEY STONE        SECTION        SECTION      x1     COMBINED CYSTOSCOPY, RETROGRADES, URETEROSCOPY, LASER HOLMIUM LITHOTRIPSY URETER(S), INSERT STENT Right 2016    Procedure: COMBINED CYSTOSCOPY, RETROGRADES, URETEROSCOPY, LASER HOLMIUM LITHOTRIPSY URETER(S), INSERT STENT;  Surgeon: Florentino Awad MD;  [facility]: [facility]     CYSTOSCOPY, URETEROSCOPY, COMBINED Right 2016    Procedure: COMBINED CYSTOSCOPY, URETEROSCOPY;  Surgeon: Florentino Awad MD;  Location: [facility] OR     IR NEPHROLITHOTOMY  2014     IR NEPHROSTOGRAM EXISITING ACCESS  10/18/2018     IR NEPHROSTOMY TUBE CHANGE RIGHT  2018     IR NEPHROSTOMY TUBE CHANGE RIGHT  2020     IR NEPHROSTOMY TUBE CHANGE RIGHT  2018     IR NEPHROSTOMY TUBE CHANGE RIGHT  2020     IR NEPHROSTOMY TUBE PLACEMENT RIGHT  2016     IR NEPHROSTOMY TUBE PLACEMENT RIGHT  2017     KIDNEY STONE SURGERY Right 2016     LASER HOLMIUM LITHOTRIPSY URETER(S), INSERT STENT, COMBINED Left 2016    Procedure: COMBINED CYSTOSCOPY, URETEROSCOPY, LASER HOLMIUM LITHOTRIPSY URETER(S), INSERT STENT;  Surgeon: Florentino Awad MD;  Location: [facility] OR     LASER HOLMIUM NEPHROLITHOTOMY VIA PERCUTANEOUS NEPHROSTOMY Right 2016    Procedure: LASER HOLMIUM NEPHROLITHOTOMY VIA  PERCUTANEOUS NEPHROSTOMY;  Surgeon: Florentino Awad MD;  Location: [facility] OR     NEPHROSTOMY W/ INTRODUCTION OF CATHETER Right      OTHER SURGICAL HISTORY      Mole removednasal     OTHER SURGICAL HISTORY Right     Cystoscopy, ureteroscopy, laser11/02/2014 x2 with ureteral stent insertion     PERCUTANEOUS NEPHROSTOMY  11/1/2016    Procedure: PERCUTANEOUS NEPHROSTOMY;  Surgeon: Florentino Awad MD;  Location: [facility] OR     WISDOM TOOTH EXTRACTION             CURRENT MEDICATIONS:    No current facility-administered medications for this encounter.     Current Outpatient Medications   Medication     cefdinir (OMNICEF) 300 MG capsule     oxyCODONE (ROXICODONE) 5 MG tablet     amoxicillin-clavulanate (AUGMENTIN) 875-125 MG per tablet     lactobacillus rhamnosus, GG, (CULTURELL) capsule     LORazepam (ATIVAN) 1 MG tablet     oxybutynin (DITROPAN) 5 MG tablet     oxybutynin (DITROPAN) 5 MG tablet     SERTRALINE HCL PO     tamsulosin (FLOMAX) 0.4 MG 24 hr capsule     voriconazole (VFEND) 200 MG tablet         ALLERGIES:  Allergies   Allergen Reactions     Vancomycin      Desogestrel-Ethinyl Estradiol Angioedema and Swelling     Swelling of hands and feet per pt.  Swelling of hands and feet per pt.  Swelling of hands and feet per pt.  Swelling of hands and feet per pt.  Swelling of hands and feet per pt.  Swelling of hands and feet per pt.  Swelling of hands and feet per pt.  Swelling of hands and feet per pt.       Penicillins Rash     As a child per mother; mother unsure if has tolerated another PCN since. Tolerated ampicillin 9/20/16     Sulfa Drugs Rash       FAMILY HISTORY:  Family History   Problem Relation Age of Onset     Anesthesia Reaction No family hx of      Malignant Hyperthermia No family hx of      Heart Disease Maternal Uncle         heart failure     Coronary Artery Disease Other      Heart Disease Maternal Grandmother         pacemaker     Gout Paternal Grandfather      Prostate Cancer Maternal Aunt          breast     Heart Disease Maternal Aunt         pacemaker     Heart Disease Maternal Aunt         pacemaker     Heart Disease Maternal Aunt         pacemaker       SOCIAL HISTORY:   Social History     Socioeconomic History     Marital status: Single     Spouse name: Not on file     Number of children: Not on file     Years of education: Not on file     Highest education level: Not on file   Occupational History     Not on file   Tobacco Use     Smoking status: Current Every Day Smoker     Packs/day: 0.50     Years: 10.00     Pack years: 5.00     Last attempt to quit: 2016     Years since quittin.0     Smokeless tobacco: Former User     Quit date: 2016     Tobacco comment: has information from last admission.   Substance and Sexual Activity     Alcohol use: Yes     Alcohol/week: 0.0 standard drinks     Comment: Alcoholic Drinks/day: occ     Drug use: No     Sexual activity: Not on file   Other Topics Concern     Not on file   Social History Narrative     Not on file     Social Determinants of Health     Financial Resource Strain:      Difficulty of Paying Living Expenses:    Food Insecurity:      Worried About Running Out of Food in the Last Year:      Ran Out of Food in the Last Year:    Transportation Needs:      Lack of Transportation (Medical):      Lack of Transportation (Non-Medical):    Physical Activity:      Days of Exercise per Week:      Minutes of Exercise per Session:    Stress:      Feeling of Stress :    Social Connections:      Frequency of Communication with Friends and Family:      Frequency of Social Gatherings with Friends and Family:      Attends [employer] Services:      Active Member of Clubs or Organizations:      Attends Club or Organization Meetings:      Marital Status:    Intimate Partner Violence:      Fear of Current or Ex-Partner:      Emotionally Abused:      Physically Abused:      Sexually Abused:        VITALS:  /68   Pulse 78   Temp 97.6  F (36.4  C) (Temporal)    Resp 22   Ht 1.524 m (5')   Wt 81.6 kg (180 lb)   LMP 09/13/2021 (Approximate)   SpO2 98%   BMI 35.15 kg/m      PHYSICAL EXAM    Constitutional: Well developed, Well nourished, NAD.  HENT: Normocephalic, Atraumatic. Neck Supple.  Eyes: EOMI, Conjunctiva normal.  Respiratory: Breathing comfortably on room air. Speaks full sentences easily. Lungs clear to ascultation.  Cardiovascular: Normal heart rate, Regular rhythm. No peripheral edema.  Abdomen: Soft, nontender. R CVA tenderness.  Musculoskeletal: Good range of motion in all major joints. No major deformities noted.  Integument: Warm, Dry.  Neurologic: Alert & awake, Normal motor function, Normal sensory function, No focal deficits noted.   Psychiatric: Cooperative. Affect appropriate.     LAB:  All pertinent labs reviewed and interpreted.  Labs Ordered and Resulted from Time of ED Arrival Up to the Time of Departure from the ED   ROUTINE UA WITH MICROSCOPIC REFLEX TO CULTURE - Abnormal; Notable for the following components:       Result Value    Appearance Urine Turbid (*)     Blood Urine 0.2 mg/dL (*)     Protein Albumin Urine 200  (*)     Leukocyte Esterase Urine 500 Duglas/uL (*)     WBC Clumps Urine Present (*)     Mucus Urine Present (*)     Amorphous Crystals Urine Few (*)     RBC Urine 13 (*)     WBC Urine 13 (*)     All other components within normal limits    Narrative:     Urine Culture ordered based on laboratory criteria   BASIC METABOLIC PANEL - Abnormal; Notable for the following components:    Carbon Dioxide (CO2) 20 (*)     All other components within normal limits   CRP INFLAMMATION - Normal   HCG QUALITATIVE URINE - Normal   CBC WITH PLATELETS AND DIFFERENTIAL   URINE CULTURE   CBC WITH PLATELETS & DIFFERENTIAL    Narrative:     The following orders were created for panel order CBC with platelets + differential.  Procedure                               Abnormality         Status                     ---------                                -----------         ------                     CBC with platelets and d...[[id number]]                      Final result                 Please view results for these tests on the individual orders.       RADIOLOGY:  Reviewed all pertinent imaging. Please see official radiology report.  CT Abdomen Pelvis w/o Contrast   Final Result   IMPRESSION:    1.  Abnormal right kidney with cystic change, presumed dilated calyces, nonobstructive nephrolithiasis, and lower pole scarring unchanged from prior. Nephrostomy catheter unchanged. Severely atrophic left kidney also stable.   2.  Right adnexal hypodense lesion. If further evaluation is warranted, ultrasound recommended.   3.  Evidence for constipation without bowel obstruction.   4.  Remainder stable.             I, Cj Hyde, am serving as a scribe to document services personally performed by Dr. Sridhar Morley, based on my observation and the provider's statements to me. I, Sridhar Morley MD attest that Cj Hyde is acting in a scribe capacity, has observed my performance of the services and has documented them in accordance with my direction.    Sridhar Morely M.D.  Emergency Medicine  [facility] EMERGENCY DEPARTMENT  [facility] [facility] [address]  [phone]  Dept: [phone]      Sridhar Morley MD  10/07/21 3349

## 2021-10-07 NOTE — ED TRIAGE NOTES
Pt reports having flank pain since Sunday.  Pt has hx of kidney stones as well as having only 1 kidney.  Pt reports she is due to have her nephrostomy tube exchanged in 2 weeks.  Pt now nauseated as well.  Pt reports she had one emesis on the way to hospital.

## 2021-10-08 ENCOUNTER — PATIENT OUTREACH (OUTPATIENT)
Dept: CARE COORDINATION | Facility: CLINIC | Age: 36
End: 2021-10-08

## 2021-10-08 DIAGNOSIS — Z71.89 OTHER SPECIFIED COUNSELING: ICD-10-CM

## 2021-10-08 NOTE — PROGRESS NOTES
Clinic Care Coordination Contact  [facility]/Voicemail       Clinical Data: Care Coordinator Outreach  Outreach attempted x 1.  Left message on patient's voicemail with call back information and requested return call.  Plan: Care Coordinator will try to reach patient again in 1-2 business days.    BAHMAN Oliveira  [phone]  [facility]

## 2021-10-09 NOTE — PROGRESS NOTES
Clinic Care Coordination Contact  [facility]/Voicemail       Clinical Data: Care Coordinator Outreach  Outreach attempted x 2.  Left message on patient's voicemail with call back information and requested return call.  Plan: Care Coordinator will do no further outreaches at this time.      BAHMAN Plata  [phone]  [facility]

## 2021-10-10 LAB — BACTERIA UR CULT: NORMAL

## 2021-11-13 ENCOUNTER — APPOINTMENT (OUTPATIENT)
Dept: CT IMAGING | Facility: CLINIC | Age: 36
End: 2021-11-13
Attending: EMERGENCY MEDICINE
Payer: COMMERCIAL

## 2021-11-13 ENCOUNTER — HOSPITAL ENCOUNTER (EMERGENCY)
Facility: CLINIC | Age: 36
Discharge: HOME | End: 2021-11-13
Attending: EMERGENCY MEDICINE | Admitting: EMERGENCY MEDICINE
Payer: COMMERCIAL

## 2021-11-13 VITALS
SYSTOLIC BLOOD PRESSURE: 139 MMHG | RESPIRATION RATE: 18 BRPM | WEIGHT: 185 LBS | TEMPERATURE: 97.8 F | DIASTOLIC BLOOD PRESSURE: 92 MMHG | OXYGEN SATURATION: 99 % | BODY MASS INDEX: 36.13 KG/M2 | HEART RATE: 82 BPM

## 2021-11-13 DIAGNOSIS — N10 ACUTE PYELONEPHRITIS: ICD-10-CM

## 2021-11-13 LAB
ALBUMIN UR-MCNC: 300 MG/DL
AMORPH CRY #/AREA URNS HPF: ABNORMAL /HPF
ANION GAP SERPL CALCULATED.3IONS-SCNC: 11 MMOL/L (ref 5–18)
APPEARANCE UR: ABNORMAL
BACTERIA #/AREA URNS HPF: ABNORMAL /HPF
BASOPHILS # BLD AUTO: 0 10E3/UL (ref 0–0.2)
BASOPHILS NFR BLD AUTO: 0 %
BILIRUB UR QL STRIP: NEGATIVE
BUN SERPL-MCNC: 10 MG/DL (ref 8–22)
C REACTIVE PROTEIN LHE: 0.9 MG/DL (ref 0–0.8)
CALCIUM SERPL-MCNC: 10.3 MG/DL (ref 8.5–10.5)
CHLORIDE BLD-SCNC: 106 MMOL/L (ref 98–107)
CO2 SERPL-SCNC: 21 MMOL/L (ref 22–31)
COLOR UR AUTO: YELLOW
CREAT SERPL-MCNC: 0.82 MG/DL (ref 0.6–1.1)
EOSINOPHIL # BLD AUTO: 0.2 10E3/UL (ref 0–0.7)
EOSINOPHIL NFR BLD AUTO: 2 %
ERYTHROCYTE [DISTWIDTH] IN BLOOD BY AUTOMATED COUNT: 14.2 % (ref 10–15)
GFR SERPL CREATININE-BSD FRML MDRD: >90 ML/MIN/1.73M2
GLUCOSE BLD-MCNC: 69 MG/DL (ref 70–125)
GLUCOSE UR STRIP-MCNC: NEGATIVE MG/DL
HCT VFR BLD AUTO: 37.8 % (ref 35–47)
HGB BLD-MCNC: 12.7 G/DL (ref 11.7–15.7)
HGB UR QL STRIP: ABNORMAL
IMM GRANULOCYTES # BLD: 0.1 10E3/UL
IMM GRANULOCYTES NFR BLD: 1 %
KETONES UR STRIP-MCNC: NEGATIVE MG/DL
LEUKOCYTE ESTERASE UR QL STRIP: ABNORMAL
LYMPHOCYTES # BLD AUTO: 2.3 10E3/UL (ref 0.8–5.3)
LYMPHOCYTES NFR BLD AUTO: 22 %
MCH RBC QN AUTO: 30.5 PG (ref 26.5–33)
MCHC RBC AUTO-ENTMCNC: 33.6 G/DL (ref 31.5–36.5)
MCV RBC AUTO: 91 FL (ref 78–100)
MONOCYTES # BLD AUTO: 0.7 10E3/UL (ref 0–1.3)
MONOCYTES NFR BLD AUTO: 7 %
MUCOUS THREADS #/AREA URNS LPF: PRESENT /LPF
NEUTROPHILS # BLD AUTO: 7.5 10E3/UL (ref 1.6–8.3)
NEUTROPHILS NFR BLD AUTO: 68 %
NITRATE UR QL: NEGATIVE
NRBC # BLD AUTO: 0 10E3/UL
NRBC BLD AUTO-RTO: 0 /100
PH UR STRIP: 6.5 [PH] (ref 5–7)
PLATELET # BLD AUTO: 206 10E3/UL (ref 150–450)
POTASSIUM BLD-SCNC: 3.6 MMOL/L (ref 3.5–5)
RBC # BLD AUTO: 4.17 10E6/UL (ref 3.8–5.2)
RBC URINE: >182 /HPF
SODIUM SERPL-SCNC: 138 MMOL/L (ref 136–145)
SP GR UR STRIP: 1.02 (ref 1–1.03)
SQUAMOUS EPITHELIAL: 13 /HPF
UROBILINOGEN UR STRIP-MCNC: <2 MG/DL
WBC # BLD AUTO: 10.7 10E3/UL (ref 4–11)
WBC CLUMPS #/AREA URNS HPF: PRESENT /HPF
WBC URINE: 57 /HPF

## 2021-11-13 PROCEDURE — 99285 EMERGENCY DEPT VISIT HI MDM: CPT | Mod: 25

## 2021-11-13 PROCEDURE — 96361 HYDRATE IV INFUSION ADD-ON: CPT

## 2021-11-13 PROCEDURE — 250N000011 HC RX IP 250 OP 636: Performed by: EMERGENCY MEDICINE

## 2021-11-13 PROCEDURE — 85025 COMPLETE CBC W/AUTO DIFF WBC: CPT | Performed by: EMERGENCY MEDICINE

## 2021-11-13 PROCEDURE — 86141 C-REACTIVE PROTEIN HS: CPT | Performed by: EMERGENCY MEDICINE

## 2021-11-13 PROCEDURE — 96375 TX/PRO/DX INJ NEW DRUG ADDON: CPT

## 2021-11-13 PROCEDURE — 87086 URINE CULTURE/COLONY COUNT: CPT | Performed by: EMERGENCY MEDICINE

## 2021-11-13 PROCEDURE — 80048 BASIC METABOLIC PNL TOTAL CA: CPT | Performed by: EMERGENCY MEDICINE

## 2021-11-13 PROCEDURE — 96376 TX/PRO/DX INJ SAME DRUG ADON: CPT

## 2021-11-13 PROCEDURE — 96365 THER/PROPH/DIAG IV INF INIT: CPT

## 2021-11-13 PROCEDURE — 258N000003 HC RX IP 258 OP 636: Performed by: EMERGENCY MEDICINE

## 2021-11-13 PROCEDURE — 74176 CT ABD & PELVIS W/O CONTRAST: CPT

## 2021-11-13 PROCEDURE — 81001 URINALYSIS AUTO W/SCOPE: CPT | Performed by: EMERGENCY MEDICINE

## 2021-11-13 PROCEDURE — 36415 COLL VENOUS BLD VENIPUNCTURE: CPT | Performed by: EMERGENCY MEDICINE

## 2021-11-13 RX ORDER — ONDANSETRON 2 MG/ML
4 INJECTION INTRAMUSCULAR; INTRAVENOUS ONCE
Status: COMPLETED | OUTPATIENT
Start: 2021-11-13 | End: 2021-11-13

## 2021-11-13 RX ORDER — ONDANSETRON 4 MG/1
4 TABLET, ORALLY DISINTEGRATING ORAL EVERY 6 HOURS PRN
Qty: 10 TABLET | Refills: 0 | Status: SHIPPED | OUTPATIENT
Start: 2021-11-13 | End: 2021-11-16

## 2021-11-13 RX ORDER — OXYCODONE HYDROCHLORIDE 5 MG/1
5 TABLET ORAL EVERY 6 HOURS PRN
Qty: 12 TABLET | Refills: 0 | Status: SHIPPED | OUTPATIENT
Start: 2021-11-13 | End: 2022-02-27

## 2021-11-13 RX ORDER — CEFTRIAXONE 1 G/1
1 INJECTION, POWDER, FOR SOLUTION INTRAMUSCULAR; INTRAVENOUS ONCE
Status: COMPLETED | OUTPATIENT
Start: 2021-11-13 | End: 2021-11-13

## 2021-11-13 RX ORDER — CEFDINIR 300 MG/1
300 CAPSULE ORAL 2 TIMES DAILY
Qty: 14 CAPSULE | Refills: 0 | Status: SHIPPED | OUTPATIENT
Start: 2021-11-13 | End: 2021-11-20

## 2021-11-13 RX ORDER — SODIUM CHLORIDE 9 MG/ML
INJECTION, SOLUTION INTRAVENOUS CONTINUOUS
Status: DISCONTINUED | OUTPATIENT
Start: 2021-11-13 | End: 2021-11-14 | Stop reason: HOSPADM

## 2021-11-13 RX ADMIN — HYDROMORPHONE HYDROCHLORIDE 1 MG: 1 INJECTION, SOLUTION INTRAMUSCULAR; INTRAVENOUS; SUBCUTANEOUS at 20:05

## 2021-11-13 RX ADMIN — ONDANSETRON 4 MG: 2 INJECTION INTRAMUSCULAR; INTRAVENOUS at 20:05

## 2021-11-13 RX ADMIN — HYDROMORPHONE HYDROCHLORIDE 1 MG: 1 INJECTION, SOLUTION INTRAMUSCULAR; INTRAVENOUS; SUBCUTANEOUS at 21:12

## 2021-11-13 RX ADMIN — SODIUM CHLORIDE 1000 ML: 9 INJECTION, SOLUTION INTRAVENOUS at 20:05

## 2021-11-13 RX ADMIN — CEFTRIAXONE 1 G: 1 INJECTION, POWDER, FOR SOLUTION INTRAMUSCULAR; INTRAVENOUS at 21:47

## 2021-11-13 ASSESSMENT — ENCOUNTER SYMPTOMS
FREQUENCY: 0
FLANK PAIN: 1
DYSURIA: 1

## 2021-11-14 NOTE — ED TRIAGE NOTES
Pt presents to the ED with c/o right flank pain since this morning. 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 Pt hx of kidney stones in past. Pressure with urination.

## 2021-11-14 NOTE — ED PROVIDER NOTES
"Emergency Department Encounter      NAME: Jacqueline Pappas  AGE: 36 year old female  YOB: 1985  MRN: [id number]  EVALUATION DATE & TIME: 2021  7:12 PM    PCP: Pao Montague    ED PROVIDER: Rojelio Obregon M.D.      Chief Complaint   Patient presents with     Flank Pain         FINAL IMPRESSION:  1. Acute pyelonephritis        MEDICAL DECISION MAKIN:18 PM I met with the patient, obtained history, performed an initial exam, and discussed options and plan for diagnostics and treatment here in the ED. PPE worn throughout all patient encounters includes: surgical mask, gloves.   9:27 PM I rechecked and updated the patient. Discussed the lab and imaging results.    Pertinent Labs & Imaging studies reviewed. (See chart for details)       This patient is a 36-year-old female with a solitary functioning right kidney who has a history of kidney stones and a chronic right percutaneous nephrostomy tube.  The patient is followed by a urologist at [place].  She says that she gets the percutaneous nephrostomy tubes changed every 3 months and was just there a month ago to get the tube changed.  Around 5 AM today she woke up with right-sided flank pain.  She has been using Tylenol for this but still has 7 out of 10 right flank pain.  She described the pain as sharp and nonradiating.  It is worse with moving and better with applying pressure to the flank.  Her urine she said is a stronger odor and the urine the percutaneous nephrostomy tube bag appears more cloudy than normal.  She describes having \"pressure where the urine comes out \".  On exam she has right-sided flank pain and CVA tenderness.  The urine did appear cloudy and the percutaneous nephrostomy tube bag as well as having some sediment.  A CT scan was done as well as lab work.  The urine appeared to be infected.  The CT showed nonobstructing kidney stones in the right kidney.  The left one was atrophic.  There were nonobstructing kidney stone " seen in the right kidney.  The percutaneous nephrostomy tube appeared to be in position.  The lab work otherwise was fairly unremarkable.  The patient was given a dose of Rocephin in the ER.  I discussed with her what she typically does when she gets these kidney infections and she says that she typically goes home with oral antibiotic.  I discussed with her the fact that she has a solitary kidney as well as kidney stones and that if she gets any worse at all she needs to return to get admitted.  Otherwise she appeared stable and nontoxic.     The importance of close follow up was discussed. We reviewed warning signs and symptoms, and I instructed Ms. Pappas to return to the emergency department immediately if she develops any new or worsening symptoms. I provided additional verbal discharge instructions. Ms. Pappas expressed understanding and agreement with this plan of care, her questions were answered, and she was discharged in stable condition.     MEDICATIONS GIVEN IN THE EMERGENCY:  Medications   0.9% sodium chloride BOLUS (1,000 mLs Intravenous New Bag 11/13/21 2005)     Followed by   sodium chloride 0.9% infusion (has no administration in time range)   cefTRIAXone (ROCEPHIN) 1 g vial to attach to  mL bag for ADULTS or NS 50 mL bag for PEDS (1 g Intravenous New Bag 11/13/21 2147)   HYDROmorphone (DILAUDID) injection 1 mg (1 mg Intravenous Given 11/13/21 2005)   ondansetron (ZOFRAN) injection 4 mg (4 mg Intravenous Given 11/13/21 2005)   HYDROmorphone (DILAUDID) injection 1 mg (1 mg Intravenous Given 11/13/21 2112)       NEW PRESCRIPTIONS STARTED AT TODAY'S ER VISIT:  New Prescriptions    CEFDINIR (OMNICEF) 300 MG CAPSULE    Take 1 capsule (300 mg) by mouth 2 times daily for 7 days    ONDANSETRON (ZOFRAN ODT) 4 MG ODT TAB    Take 1 tablet (4 mg) by mouth every 6 hours as needed for nausea    OXYCODONE (ROXICODONE) 5 MG TABLET    Take 1 tablet (5 mg) by mouth every 6 hours as needed for pain           =================================================================    HPI    Patient information was obtained from: Patient    Use of : N/A       Jacqueline Pappas is a 36 year old female with a past medical history of solitary right kidney, percutaneous nephrostomy tube in place, kidney stones, depression, who presents for evaluation of flank pain.    Patient reports a sudden onset of right flank pain that began when she woke up this morning at 0500 (~14 hours ago). She notes initally it was tolerable, but the pain gradually increased throughout the day. Patient was taking 1000 mg Tylenol every 4 hours, but notes it only helped for ~1 hour. She describes the pain as sharp at 7/10, it does not radiate anywhere. Her pain is improved when she applies pressure to the area, and worsens with movement. Patient notes increased odor in her urine, and pressure with urination. Her urine appears more cloudy in her nephrostomy bag. She reports a history of kidney stones and notes there are kidney stones in her right kidney, but her doctor does not want to do surgery to remove them due to prior kidney damage. 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 Patient has had a percutaneous nephrostomy tube for 5 years and has it replaced every 3 months at [facility], last change was 1 month ago. Denies increased urinary frequency, change in urine output, leg pain or swelling, or any additional symptoms at this time.       REVIEW OF SYSTEMS   Review of Systems   Cardiovascular: Negative for leg swelling.   Genitourinary: Positive for dysuria (pressure) and flank pain. Negative for decreased urine volume and frequency.        Positive for increased urine odor, urine cloudy.   Musculoskeletal:        Negative for leg pain.   All other systems reviewed and are negative.       PAST MEDICAL HISTORY:  Past Medical History:   Diagnosis  Date     Acute renal failure (H)      Anemia      Anemia      Calculus of kidney      Congenital absence of left kidney      Congenital absence of one kidney      Depression      Depression      Hydronephrosis      Kidney stone     at age 29     Pyelonephritis      Pyelonephritis      Smoker      Ureteral stricture      UTI (urinary tract infection)      Wrist fracture     Left       PAST SURGICAL HISTORY:  Past Surgical History:   Procedure Laterality Date     C REMOVAL OF KIDNEY STONE        SECTION        SECTION      x1     COMBINED CYSTOSCOPY, RETROGRADES, URETEROSCOPY, LASER HOLMIUM LITHOTRIPSY URETER(S), INSERT STENT Right 2016    Procedure: COMBINED CYSTOSCOPY, RETROGRADES, URETEROSCOPY, LASER HOLMIUM LITHOTRIPSY URETER(S), INSERT STENT;  Surgeon: Florentino Awad MD;  Location: [facility] OR     CYSTOSCOPY, URETEROSCOPY, COMBINED Right 2016    Procedure: COMBINED CYSTOSCOPY, URETEROSCOPY;  Surgeon: Florentino Awad MD;  Location: [facility] OR     IR NEPHROLITHOTOMY  2014     IR NEPHROSTOGRAM EXISITING ACCESS  10/18/2018     IR NEPHROSTOMY TUBE CHANGE RIGHT  2018     IR NEPHROSTOMY TUBE CHANGE RIGHT  2020     IR NEPHROSTOMY TUBE CHANGE RIGHT  2018     IR NEPHROSTOMY TUBE CHANGE RIGHT  2020     IR NEPHROSTOMY TUBE PLACEMENT RIGHT  2016     IR NEPHROSTOMY TUBE PLACEMENT RIGHT  2017     KIDNEY STONE SURGERY Right 2016     LASER HOLMIUM LITHOTRIPSY URETER(S), INSERT STENT, COMBINED Left 2016    Procedure: COMBINED CYSTOSCOPY, URETEROSCOPY, LASER HOLMIUM LITHOTRIPSY URETER(S), INSERT STENT;  Surgeon: Florentino Awad MD;  Location: [facility] OR     LASER HOLMIUM NEPHROLITHOTOMY VIA PERCUTANEOUS NEPHROSTOMY Right 2016    Procedure: LASER HOLMIUM NEPHROLITHOTOMY VIA PERCUTANEOUS NEPHROSTOMY;  Surgeon: Florentino Awad MD;  Location: [facility] OR     NEPHROSTOMY W/ INTRODUCTION OF CATHETER Right      OTHER SURGICAL HISTORY      Mole removednasal      OTHER SURGICAL HISTORY Right     Cystoscopy, ureteroscopy, laser11/02/2014 x2 with ureteral stent insertion     PERCUTANEOUS NEPHROSTOMY  11/1/2016    Procedure: PERCUTANEOUS NEPHROSTOMY;  Surgeon: Florentino Awad MD;  Location: [facility] OR     WISDOM TOOTH EXTRACTION         CURRENT MEDICATIONS:      Current Facility-Administered Medications:      cefTRIAXone (ROCEPHIN) 1 g vial to attach to  mL bag for ADULTS or NS 50 mL bag for PEDS, 1 g, Intravenous, Once, Rojelio Obregon MD, 1 g at 11/13/21 2147     [COMPLETED] 0.9% sodium chloride BOLUS, 1,000 mL, Intravenous, Once, Last Rate: 1,000 mL/hr at 11/13/21 2005, 1,000 mL at 11/13/21 2005 **FOLLOWED BY** sodium chloride 0.9% infusion, , Intravenous, Continuous, Rojelio Obregon MD    Current Outpatient Medications:      cefdinir (OMNICEF) 300 MG capsule, Take 1 capsule (300 mg) by mouth 2 times daily for 7 days, Disp: 14 capsule, Rfl: 0     ondansetron (ZOFRAN ODT) 4 MG ODT tab, Take 1 tablet (4 mg) by mouth every 6 hours as needed for nausea, Disp: 10 tablet, Rfl: 0     oxyCODONE (ROXICODONE) 5 MG tablet, Take 1 tablet (5 mg) by mouth every 6 hours as needed for pain, Disp: 12 tablet, Rfl: 0     amoxicillin-clavulanate (AUGMENTIN) 875-125 MG per tablet, Take 1 tablet by mouth 2 times daily Until PNTs have been removed, Disp: 60 tablet, Rfl: 1     lactobacillus rhamnosus, GG, (CULTURELL) capsule, Take 1 capsule by mouth 2 times daily While you are taking antibiotics, Disp: 90 capsule, Rfl: 1     LORazepam (ATIVAN) 1 MG tablet, Take 0.5 tablets (0.5 mg) by mouth At Bedtime & 0.5 tab if you wake in the middle of the night, Disp: 20 tablet, Rfl: 0     oxybutynin (DITROPAN) 5 MG tablet, Take 1 tablet (5 mg) by mouth 3 times daily as needed for bladder spasms, Disp: 45 tablet, Rfl: 1     oxybutynin (DITROPAN) 5 MG tablet, Take 1 tablet (5 mg) by mouth 3 times daily as needed for bladder spasms, Disp: 30 tablet, Rfl: 0     SERTRALINE HCL PO, Take 150 mg by  mouth At Bedtime , Disp: , Rfl:      tamsulosin (FLOMAX) 0.4 MG 24 hr capsule, Take 1 capsule (0.4 mg) by mouth daily, Disp: 30 capsule, Rfl: 1     voriconazole (VFEND) 200 MG tablet, Take 1 tablet (200 mg) by mouth 2 times daily, Disp: 10 tablet, Rfl: 0    ALLERGIES:  Allergies   Allergen Reactions     Vancomycin      Desogestrel-Ethinyl Estradiol Angioedema and Swelling     Swelling of hands and feet per pt.  Swelling of hands and feet per pt.  Swelling of hands and feet per pt.  Swelling of hands and feet per pt.  Swelling of hands and feet per pt.  Swelling of hands and feet per pt.  Swelling of hands and feet per pt.  Swelling of hands and feet per pt.       Penicillins Rash     As a child per mother; mother unsure if has tolerated another PCN since. Tolerated ampicillin 18     Sulfa Drugs Rash       FAMILY HISTORY:  Family History   Problem Relation Age of Onset     Anesthesia Reaction No family hx of      Malignant Hyperthermia No family hx of      Heart Disease Maternal Uncle         heart failure     Coronary Artery Disease Other      Heart Disease Maternal Grandmother         pacemaker     Gout Paternal Grandfather      Prostate Cancer Maternal Aunt         breast     Heart Disease Maternal Aunt         pacemaker     Heart Disease Maternal Aunt         pacemaker     Heart Disease Maternal Aunt         pacemaker       SOCIAL HISTORY:   Social History     Socioeconomic History     Marital status: Single     Spouse name: None     Number of children: None     Years of education: None     Highest education level: None   Occupational History     None   Tobacco Use     Smoking status: Current Every Day Smoker     Packs/day: 0.50     Years: 10.00     Pack years: 5.00     Last attempt to quit: 2016     Years since quittin.1     Smokeless tobacco: Former User     Quit date: 2016     Tobacco comment: has information from last admission.   Substance and Sexual Activity     Alcohol use: Yes      Alcohol/week: 0.0 standard drinks     Comment: Alcoholic Drinks/day: occ     Drug use: No     Sexual activity: None   Other Topics Concern     None   Social History Narrative     None     Social Determinants of Health     Financial Resource Strain: Not on file   Food Insecurity: Not on file   Transportation Needs: Not on file   Physical Activity: Not on file   Stress: Not on file   Social Connections: Not on file   Intimate Partner Violence: Not on file   Housing Stability: Not on file       PHYSICAL EXAM:    Vitals: BP (!) 139/92 (BP Location: Right arm)   Pulse 82   Temp 97.8  F (36.6  C) (Oral)   Resp 18   Wt 83.9 kg (185 lb)   SpO2 99%   BMI 36.13 kg/m     Constitutional: Well developed, well nourished. Comfortable appearing.  HENT: Normocephalic, atraumatic, mucous membranes moist, nose normal. Neck- Supple, gross ROM intact.   Eyes: Pupils mid-range, sclera white, no discharge  Respiratory: Clear to auscultation bilaterally, no respiratory distress, no wheezing, speaks full sentences easily.  Cardiovascular: Normal heart rate, regular rhythm, no murmurs. No lower extremity edema, 2+ DP pulses.   GI: Soft, no tenderness to deep palpation in all quadrants, no masses.  Musculoskeletal: Moving all 4 extremities intentionally and without pain. No obvious deformity.  Back: Percutaneous nephrostomy tube in the right flank without sign of infection, draining cloudy yellow urine with sediment.  Skin: Warm, dry, no rash.  Neurologic: Alert & oriented x 3, speech clear, moving all extremities spontaneously   Psychiatric: Affect normal, cooperative.     LAB:  All pertinent labs reviewed and interpreted.  Labs Ordered and Resulted from Time of ED Arrival to Time of ED Departure   CRP INFLAMMATION - Abnormal       Result Value    CRP 0.9 (*)    BASIC METABOLIC PANEL - Abnormal    Sodium 138      Potassium 3.6      Chloride 106      Carbon Dioxide (CO2) 21 (*)     Anion Gap 11      Urea Nitrogen 10      Creatinine 0.82       Calcium 10.3      Glucose 69 (*)     GFR Estimate >90     ROUTINE UA WITH MICROSCOPIC REFLEX TO CULTURE - Abnormal    Color Urine Yellow      Appearance Urine Turbid (*)     Glucose Urine Negative      Bilirubin Urine Negative      Ketones Urine Negative      Specific Gravity Urine 1.022      Blood Urine 0.5 mg/dL (*)     pH Urine 6.5      Protein Albumin Urine 300  (*)     Urobilinogen Urine <2.0      Nitrite Urine Negative      Leukocyte Esterase Urine 500 Duglas/uL (*)     Bacteria Urine Moderate (*)     WBC Clumps Urine Present (*)     Mucus Urine Present (*)     Amorphous Crystals Urine Few (*)     RBC Urine >182 (*)     WBC Urine 57 (*)     Squamous Epithelials Urine 13 (*)    CBC WITH PLATELETS AND DIFFERENTIAL - Abnormal    WBC Count 10.7      RBC Count 4.17      Hemoglobin 12.7      Hematocrit 37.8      MCV 91      MCH 30.5      MCHC 33.6      RDW 14.2      Platelet Count 206      % Neutrophils 68      % Lymphocytes 22      % Monocytes 7      % Eosinophils 2      % Basophils 0      % Immature Granulocytes 1      NRBCs per 100 WBC 0      Absolute Neutrophils 7.5      Absolute Lymphocytes 2.3      Absolute Monocytes 0.7      Absolute Eosinophils 0.2      Absolute Basophils 0.0      Absolute Immature Granulocytes 0.1 (*)     Absolute NRBCs 0.0     URINE CULTURE       RADIOLOGY:  CT Abdomen Pelvis w/o Contrast   Final Result   IMPRESSION:    1.  No definite change abnormal right kidney with cystic lesions, presumed dilated calyces, and nonobstructive nephrolithiasis incompletely evaluated without IV contrast. Severely atrophic left kidney as on prior.   2.  Similar sized hypodense right adnexal lesion incompletely evaluated. If indicated, ultrasound would be helpful for further evaluation.   3.  Remainder stable.                 IKrista, am serving as a scribe to document services personally performed by Dr. Rojelio Obregon based on my observation and the provider's statements to me. Rojelio LORENZO  ESEQUIEL Obregon. attest that Krista Larsen is acting in a scribe capacity, has observed my performance of the services and has documented them in accordance with my direction.      Rojelio Obregon M.D.  Emergency Medicine  [facility] EMERGENCY ROOM  [address] [address]  [phone]  Dept: [phone]     Rojelio Obregon MD  11/13/21 9261

## 2021-11-15 LAB — BACTERIA UR CULT: NORMAL

## 2022-01-05 ENCOUNTER — APPOINTMENT (OUTPATIENT)
Dept: CT IMAGING | Facility: CLINIC | Age: 37
End: 2022-01-05
Attending: EMERGENCY MEDICINE
Payer: COMMERCIAL

## 2022-01-05 ENCOUNTER — HOSPITAL ENCOUNTER (EMERGENCY)
Facility: CLINIC | Age: 37
Discharge: HOME | End: 2022-01-05
Attending: EMERGENCY MEDICINE | Admitting: EMERGENCY MEDICINE
Payer: COMMERCIAL

## 2022-01-05 VITALS
HEART RATE: 68 BPM | TEMPERATURE: 98.6 F | WEIGHT: 185 LBS | BODY MASS INDEX: 36.13 KG/M2 | OXYGEN SATURATION: 98 % | SYSTOLIC BLOOD PRESSURE: 97 MMHG | RESPIRATION RATE: 14 BRPM | DIASTOLIC BLOOD PRESSURE: 58 MMHG

## 2022-01-05 DIAGNOSIS — N12 PYELONEPHRITIS: ICD-10-CM

## 2022-01-05 DIAGNOSIS — Z93.6 NEPHROSTOMY STATUS (H): ICD-10-CM

## 2022-01-05 LAB
ALBUMIN UR-MCNC: 300 MG/DL
AMORPH CRY #/AREA URNS HPF: ABNORMAL /HPF
ANION GAP SERPL CALCULATED.3IONS-SCNC: 10 MMOL/L (ref 5–18)
APPEARANCE UR: ABNORMAL
BACTERIA #/AREA URNS HPF: ABNORMAL /HPF
BASOPHILS # BLD AUTO: 0 10E3/UL (ref 0–0.2)
BASOPHILS NFR BLD AUTO: 0 %
BILIRUB UR QL STRIP: NEGATIVE
BUN SERPL-MCNC: 13 MG/DL (ref 8–22)
C REACTIVE PROTEIN LHE: 0.8 MG/DL (ref 0–0.8)
CALCIUM SERPL-MCNC: 10.3 MG/DL (ref 8.5–10.5)
CHLORIDE BLD-SCNC: 102 MMOL/L (ref 98–107)
CO2 SERPL-SCNC: 23 MMOL/L (ref 22–31)
COLOR UR AUTO: YELLOW
CREAT SERPL-MCNC: 0.94 MG/DL (ref 0.6–1.1)
EOSINOPHIL # BLD AUTO: 0.1 10E3/UL (ref 0–0.7)
EOSINOPHIL NFR BLD AUTO: 2 %
ERYTHROCYTE [DISTWIDTH] IN BLOOD BY AUTOMATED COUNT: 13.5 % (ref 10–15)
GFR SERPL CREATININE-BSD FRML MDRD: 81 ML/MIN/1.73M2
GLUCOSE BLD-MCNC: 91 MG/DL (ref 70–125)
GLUCOSE UR STRIP-MCNC: NEGATIVE MG/DL
HCG UR QL: NEGATIVE
HCT VFR BLD AUTO: 41.2 % (ref 35–47)
HGB BLD-MCNC: 13.7 G/DL (ref 11.7–15.7)
HGB UR QL STRIP: ABNORMAL
IMM GRANULOCYTES # BLD: 0 10E3/UL
IMM GRANULOCYTES NFR BLD: 0 %
INTERNAL QC OK POCT: NORMAL
KETONES UR STRIP-MCNC: NEGATIVE MG/DL
LEUKOCYTE ESTERASE UR QL STRIP: ABNORMAL
LYMPHOCYTES # BLD AUTO: 1.8 10E3/UL (ref 0.8–5.3)
LYMPHOCYTES NFR BLD AUTO: 22 %
MCH RBC QN AUTO: 30.2 PG (ref 26.5–33)
MCHC RBC AUTO-ENTMCNC: 33.3 G/DL (ref 31.5–36.5)
MCV RBC AUTO: 91 FL (ref 78–100)
MONOCYTES # BLD AUTO: 0.6 10E3/UL (ref 0–1.3)
MONOCYTES NFR BLD AUTO: 8 %
MUCOUS THREADS #/AREA URNS LPF: PRESENT /LPF
NEUTROPHILS # BLD AUTO: 5.5 10E3/UL (ref 1.6–8.3)
NEUTROPHILS NFR BLD AUTO: 68 %
NITRATE UR QL: POSITIVE
NRBC # BLD AUTO: 0 10E3/UL
NRBC BLD AUTO-RTO: 0 /100
PH UR STRIP: 7 [PH] (ref 5–7)
PLATELET # BLD AUTO: 223 10E3/UL (ref 150–450)
POCT KIT EXPIRATION DATE: NORMAL
POCT KIT LOT NUMBER: NORMAL
POTASSIUM BLD-SCNC: 4 MMOL/L (ref 3.5–5)
RBC # BLD AUTO: 4.54 10E6/UL (ref 3.8–5.2)
RBC URINE: 162 /HPF
SODIUM SERPL-SCNC: 135 MMOL/L (ref 136–145)
SP GR UR STRIP: 1.01 (ref 1–1.03)
UROBILINOGEN UR STRIP-MCNC: <2 MG/DL
WBC # BLD AUTO: 8.1 10E3/UL (ref 4–11)
WBC CLUMPS #/AREA URNS HPF: PRESENT /HPF
WBC URINE: >182 /HPF

## 2022-01-05 PROCEDURE — 81001 URINALYSIS AUTO W/SCOPE: CPT | Performed by: EMERGENCY MEDICINE

## 2022-01-05 PROCEDURE — 80048 BASIC METABOLIC PNL TOTAL CA: CPT | Performed by: EMERGENCY MEDICINE

## 2022-01-05 PROCEDURE — 87086 URINE CULTURE/COLONY COUNT: CPT | Performed by: EMERGENCY MEDICINE

## 2022-01-05 PROCEDURE — 250N000011 HC RX IP 250 OP 636: Performed by: EMERGENCY MEDICINE

## 2022-01-05 PROCEDURE — 81025 URINE PREGNANCY TEST: CPT | Performed by: EMERGENCY MEDICINE

## 2022-01-05 PROCEDURE — 74177 CT ABD & PELVIS W/CONTRAST: CPT

## 2022-01-05 PROCEDURE — 258N000003 HC RX IP 258 OP 636: Performed by: EMERGENCY MEDICINE

## 2022-01-05 PROCEDURE — 96361 HYDRATE IV INFUSION ADD-ON: CPT

## 2022-01-05 PROCEDURE — 96365 THER/PROPH/DIAG IV INF INIT: CPT | Mod: 59

## 2022-01-05 PROCEDURE — 36415 COLL VENOUS BLD VENIPUNCTURE: CPT | Performed by: EMERGENCY MEDICINE

## 2022-01-05 PROCEDURE — 99285 EMERGENCY DEPT VISIT HI MDM: CPT | Mod: 25

## 2022-01-05 PROCEDURE — 96375 TX/PRO/DX INJ NEW DRUG ADDON: CPT

## 2022-01-05 PROCEDURE — 85025 COMPLETE CBC W/AUTO DIFF WBC: CPT | Performed by: EMERGENCY MEDICINE

## 2022-01-05 PROCEDURE — 86141 C-REACTIVE PROTEIN HS: CPT | Performed by: EMERGENCY MEDICINE

## 2022-01-05 RX ORDER — CEFTRIAXONE 1 G/1
1 INJECTION, POWDER, FOR SOLUTION INTRAMUSCULAR; INTRAVENOUS ONCE
Status: COMPLETED | OUTPATIENT
Start: 2022-01-05 | End: 2022-01-05

## 2022-01-05 RX ORDER — IOPAMIDOL 755 MG/ML
100 INJECTION, SOLUTION INTRAVASCULAR ONCE
Status: COMPLETED | OUTPATIENT
Start: 2022-01-05 | End: 2022-01-05

## 2022-01-05 RX ORDER — MORPHINE SULFATE 4 MG/ML
4 INJECTION, SOLUTION INTRAMUSCULAR; INTRAVENOUS ONCE
Status: COMPLETED | OUTPATIENT
Start: 2022-01-05 | End: 2022-01-05

## 2022-01-05 RX ORDER — CEPHALEXIN 500 MG/1
500 CAPSULE ORAL 4 TIMES DAILY
Qty: 28 CAPSULE | Refills: 0 | Status: SHIPPED | OUTPATIENT
Start: 2022-01-05 | End: 2022-01-12

## 2022-01-05 RX ADMIN — MORPHINE SULFATE 4 MG: 4 INJECTION, SOLUTION INTRAMUSCULAR; INTRAVENOUS at 07:00

## 2022-01-05 RX ADMIN — CEFTRIAXONE 1 G: 1 INJECTION, POWDER, FOR SOLUTION INTRAMUSCULAR; INTRAVENOUS at 07:00

## 2022-01-05 RX ADMIN — IOPAMIDOL 100 ML: 755 INJECTION, SOLUTION INTRAVENOUS at 07:19

## 2022-01-05 RX ADMIN — SODIUM CHLORIDE 1000 ML: 9 INJECTION, SOLUTION INTRAVENOUS at 07:44

## 2022-01-05 ASSESSMENT — ENCOUNTER SYMPTOMS
DYSURIA: 1
HEMATURIA: 0
ABDOMINAL PAIN: 1
CHILLS: 0
FLANK PAIN: 1
SORE THROAT: 0
FEVER: 0

## 2022-01-05 NOTE — DISCHARGE INSTRUCTIONS
Read and follow the discharge instructions.    Take antibiotics as instructed.  We will give you call if you need to change antibiotics.    Call to see if you have the tube exchange sooner than what is scheduled.    Call your primary care doctor to make a follow-up appointment for recheck this week    Return if you have abdominal pain vomiting diarrhea fever or any other concerns.

## 2022-01-05 NOTE — ED PROVIDER NOTES
EMERGENCY DEPARTMENT ENCOUNTER      NAME: Jacqueline Pappas  AGE: 36 year old female  YOB: 1985  MRN: [id number]  EVALUATION DATE & TIME: 2022  5:36 AM    PCP: Pao Montague    ED PROVIDER: Mami Segovia M.D.      CHIEF COMPLAINT     Chief Complaint   Patient presents with     Flank Pain         FINAL IMPRESSION:     1. Pyelonephritis    2. Nephrostomy status (H)          MEDICAL DECISION MAKING:       Pertinent Labs & Imaging studies reviewed. (See chart for details)    36 year old female presents to the Emergency Department for evaluation of back pain    ED Course as of 22 1017      0622 36-year-old female presents complaining of right-sided flank pain.  Started a few days ago.  She has a nephrostomy tube for the last 5 years to get it changed every 3 months.  Lexiscan shows in 2021.   0622 Denies any systemic symptoms.  No fevers chills chest pain shortness of breath some right-sided abdominal pain previous history of .  Does complain of some dysuria no vaginal discharge no leg swelling or rashes.   0622 Patient is not vaccinated against Covid.   0622 Differential diagnoses include but not limited to pyelonephritis obstructed tube appendicitis lower lobe pneumonia among others.   0623 Urine collected from the 2 shows positive nitrates plus leukocyte esterase many bacteria white blood cell clumps.   0623 Patient is afebrile.  Normal white blood cell count.  Mild elevation of the CRP.   0832 CT reveals no evidence of abscess.  Nephrostomy tube to be in appropriate place.  Patient had remained afebrile no vomiting has been sleeping.  Appears comfortable.  She feels comfortable going home.   0832 Reviewed previous cultures.  On  she has mixed organisms.   0832 On 10/6 she has mixed organisms.   0832 On  she had strep multisensitive.  With patient Dr. Awad who is her urologist.   1015 I spoke to the urologist on call.  He says he we could we  could exchange her to.  I called IR unfortunately do not have any openings for today.  Patient is otherwise is already scheduled to have her nephrostomy tube exchange she has been for the last 5 years.  Recommended she call to see if he could be a change sooner.   1016 I reviewed the previous urine cultures nothing recent we'll do Keflex.  She had tolerated this medication before pending culture.  Patient given strict discharge instructions and return precautions discharged ambulatory in stable condition.   1016 Call impression and decision making 36-year-old female presents complaining of right-sided flank pain.  She had a nephrostomy tube intermittently for the last 5 years.  Urine from the nephrostomy tube very concerning for infection.  The last 3 urine has had revealed the same concern.  Urine cultures for the last 2 times have been negative.  Back in 9/2020 she grew up strep.  Was discharged on Keflex which apparently was sensitive.  Given Rocephin here.  No evidence of clinical bacteremia septicemia very reliable has been here every month for the last 2 months.  She feels comfortable being discharged will discharge with Keflex with her to have her tube exchange sooner if possible return for any concerns.       Vital Signs: reviewe  EKG: none  Imaging: ct no abscess tube in place  Home Meds: reviewed  ED meds/abx: morphine rocephin  Fluids: 1 l ns    Labs  K 4  Cr 0.94  Wbc 8.1  Hgb 13.7  Platelets 223  upt negative  ua +leuk + nitrites +wbc bacteria      Review of Previous Records  11/13/2021 (3 hours)  [facility] Emergency Room      This patient is a 36-year-old female with a solitary functioning right kidney who has a history of kidney stones and a chronic right percutaneous nephrostomy tube.  The patient is followed by a urologist at [facility].  She says that she gets the percutaneous nephrostomy tubes changed every 3 months and was just there a month ago to get the tube changed.   "Around 5 AM today she woke up with right-sided flank pain.  She has been using Tylenol for this but still has 7 out of 10 right flank pain.  She described the pain as sharp and nonradiating.  It is worse with moving and better with applying pressure to the flank.  Her urine she said is a stronger odor and the urine the percutaneous nephrostomy tube bag appears more cloudy than normal.  She describes having \"pressure where the urine comes out \".  On exam she has right-sided flank pain and CVA tenderness.  The urine did appear cloudy and the percutaneous nephrostomy tube bag as well as having some sediment.  A CT scan was done as well as lab work.  The urine appeared to be infected.  The CT showed nonobstructing kidney stones in the right kidney.  The left one was atrophic.  There were nonobstructing kidney stone seen in the right kidney.  The percutaneous nephrostomy tube appeared to be in position.  The lab work otherwise was fairly unremarkable.  The patient was given a dose of Rocephin in the ER.  I discussed with her what she typically does when she gets these kidney infections and she says that she typically goes home with oral antibiotic.  I discussed with her the fact that she has a solitary kidney as well as kidney stones and that if she gets any worse at all she needs to return to get admitted.  Otherwise she appeared stable and nontoxic.      Consults  Dr. Peterson, Urology    ED COURSE     6:16 AM I met with the patient, obtained history, performed an initial exam, and discussed options and plan for diagnostics and treatment here in the ED. PPE worn including N95 mask, surgical gloves, face shield.    8:28 AM I rechecked and updated the patient on results.    8:30 AM I paged Dr. Awad from [facility].    8:57 AM I spoke with Dr. Hein from [facility]. I rechecked and updated the patient.    9:06 AM I paged IR.     9:19 AM I spoke with IR.    9:49 AM Patient has tolerated Keflex in the past.    At " the conclusion of the encounter I discussed the results of all of the tests and the disposition. The questions were answered. The patient acknowledged understanding and was agreeable with the care plan.           MEDICATIONS GIVEN IN THE EMERGENCY:     Medications   morphine (PF) injection 4 mg (4 mg Intravenous Given 1/5/22 0700)   cefTRIAXone (ROCEPHIN) 1 g vial to attach to  mL bag for ADULTS or NS 50 mL bag for PEDS (0 g Intravenous Stopped 1/5/22 0732)   0.9% sodium chloride BOLUS (0 mLs Intravenous Stopped 1/5/22 0947)   iopamidol (ISOVUE-370) solution 100 mL (100 mLs Intravenous Given 1/5/22 0719)       NEW PRESCRIPTIONS STARTED AT TODAY'S ER VISIT     Discharge Medication List as of 1/5/2022  9:50 AM      START taking these medications    Details   cephALEXin (KEFLEX) 500 MG capsule Take 1 capsule (500 mg) by mouth 4 times daily for 7 days, Disp-28 capsule, R-0, Local Print                =================================================================    HPI     Patient information was obtained from: Patient    Use of : N/A        Jacqueline Pappas is a 36 year old female with a pertinent medical history of congenital absence of one kidney, kidney stones, s/p kidney stone removal, pyelonephritis, s/p nephrostomy tube placement who presents by private vehicle for the evaluation of flank pain. Patient reports a sudden onset of right sided flank pain which has persisted since Tuesday afternoon. Patient is still producing urine and reports associated pain with urination, right sided abdominal pain. Denies associated blood in her urine, vaginal discharge. Denies chance of STI.    Patient notes she had a nephrostomy tube which was initially in place 5 years ago and is changed every 3 months. She states the tube was initially placed to prevent renal failure in her only kidney given she was born with only one kidney. Patient's nephrostomy tube was last replaced in October at the [facility]. She  states she did not have any issues with her nephrostomy tube afterwards.    Patient is not vaccinated against COVID-19, but denies any recent sick contacts that she is aware of. She otherwise denies fever, chills, sore throat.      REVIEW OF SYSTEMS   Review of Systems   Constitutional: Negative for chills and fever.   HENT: Negative for sore throat.    Gastrointestinal: Positive for abdominal pain (right sided).   Genitourinary: Positive for dysuria and flank pain (right sided). Negative for hematuria and vaginal discharge.        PAST MEDICAL HISTORY:     Past Medical History:   Diagnosis Date     Acute renal failure (H)      Anemia      Anemia      Calculus of kidney      Congenital absence of left kidney      Congenital absence of one kidney      Depression      Depression      Hydronephrosis      Kidney stone     at age 29     Pyelonephritis      Pyelonephritis      Smoker      Ureteral stricture      UTI (urinary tract infection)      Wrist fracture     Left       PAST SURGICAL HISTORY:     Past Surgical History:   Procedure Laterality Date      SECTION        SECTION      x1     COMBINED CYSTOSCOPY, RETROGRADES, URETEROSCOPY, LASER HOLMIUM LITHOTRIPSY URETER(S), INSERT STENT Right 2016    Procedure: COMBINED CYSTOSCOPY, RETROGRADES, URETEROSCOPY, LASER HOLMIUM LITHOTRIPSY URETER(S), INSERT STENT;  Surgeon: Florentino Awad MD;  Location: [facility] OR     CYSTOSCOPY, URETEROSCOPY, COMBINED Right 2016    Procedure: COMBINED CYSTOSCOPY, URETEROSCOPY;  Surgeon: Florentino Awad MD;  Location: [facility] OR     IR NEPHROLITHOTOMY  2014     IR NEPHROSTOGRAM EXISITING ACCESS  10/18/2018     IR NEPHROSTOMY TUBE CHANGE RIGHT  2018     IR NEPHROSTOMY TUBE CHANGE RIGHT  2020     IR NEPHROSTOMY TUBE CHANGE RIGHT  2018     IR NEPHROSTOMY TUBE CHANGE RIGHT  2020     IR NEPHROSTOMY TUBE PLACEMENT RIGHT  2016     IR NEPHROSTOMY TUBE PLACEMENT RIGHT  2017     KIDNEY  STONE SURGERY Right 05/2016     LASER HOLMIUM LITHOTRIPSY URETER(S), INSERT STENT, COMBINED Left 11/1/2016    Procedure: COMBINED CYSTOSCOPY, URETEROSCOPY, LASER HOLMIUM LITHOTRIPSY URETER(S), INSERT STENT;  Surgeon: Florentino Awad MD;  Location: [facility] OR     LASER HOLMIUM NEPHROLITHOTOMY VIA PERCUTANEOUS NEPHROSTOMY Right 9/14/2016    Procedure: LASER HOLMIUM NEPHROLITHOTOMY VIA PERCUTANEOUS NEPHROSTOMY;  Surgeon: Florentino Awad MD;  Location: [facility] OR     NEPHROSTOMY W/ INTRODUCTION OF CATHETER Right      OTHER SURGICAL HISTORY      Mole removednasal     OTHER SURGICAL HISTORY Right     Cystoscopy, ureteroscopy, laser11/02/2014 x2 with ureteral stent insertion     PERCUTANEOUS NEPHROSTOMY  11/1/2016    Procedure: PERCUTANEOUS NEPHROSTOMY;  Surgeon: Florentino Awad MD;  Location: [facility] OR     WISDOM TOOTH EXTRACTION       ZZC REMOVAL OF KIDNEY STONE           CURRENT MEDICATIONS:   cephALEXin (KEFLEX) 500 MG capsule  amoxicillin-clavulanate (AUGMENTIN) 875-125 MG per tablet  lactobacillus rhamnosus, GG, (CULTURELL) capsule  LORazepam (ATIVAN) 1 MG tablet  oxybutynin (DITROPAN) 5 MG tablet  oxybutynin (DITROPAN) 5 MG tablet  oxyCODONE (ROXICODONE) 5 MG tablet  SERTRALINE HCL PO  tamsulosin (FLOMAX) 0.4 MG 24 hr capsule  voriconazole (VFEND) 200 MG tablet         ALLERGIES:     Allergies   Allergen Reactions     Vancomycin      Desogestrel-Ethinyl Estradiol Angioedema and Swelling     Swelling of hands and feet per pt.  Swelling of hands and feet per pt.  Swelling of hands and feet per pt.  Swelling of hands and feet per pt.  Swelling of hands and feet per pt.  Swelling of hands and feet per pt.  Swelling of hands and feet per pt.  Swelling of hands and feet per pt.       Penicillins Rash     As a child per mother; mother unsure if has tolerated another PCN since. Tolerated ampicillin 9/20/16     Sulfa Drugs Rash       FAMILY HISTORY:     Family History   Problem Relation Age of Onset     Anesthesia  Reaction No family hx of      Malignant Hyperthermia No family hx of      Heart Disease Maternal Uncle         heart failure     Coronary Artery Disease Other      Heart Disease Maternal Grandmother         pacemaker     Gout Paternal Grandfather      Prostate Cancer Maternal Aunt         breast     Heart Disease Maternal Aunt         pacemaker     Heart Disease Maternal Aunt         pacemaker     Heart Disease Maternal Aunt         pacemaker       SOCIAL HISTORY:     Social History     Socioeconomic History     Marital status: Single     Spouse name: Not on file     Number of children: Not on file     Years of education: Not on file     Highest education level: Not on file   Occupational History     Not on file   Tobacco Use     Smoking status: Current Every Day Smoker     Packs/day: 0.50     Years: 10.00     Pack years: 5.00     Last attempt to quit: 2016     Years since quittin.2     Smokeless tobacco: Former User     Quit date: 2016     Tobacco comment: has information from last admission.   Substance and Sexual Activity     Alcohol use: Yes     Alcohol/week: 0.0 standard drinks     Comment: Alcoholic Drinks/day: occ     Drug use: No     Sexual activity: Not on file   Other Topics Concern     Not on file   Social History Narrative     Not on file     Social Determinants of Health     Financial Resource Strain: Not on file   Food Insecurity: Not on file   Transportation Needs: Not on file   Physical Activity: Not on file   Stress: Not on file   Social Connections: Not on file   Intimate Partner Violence: Not on file   Housing Stability: Not on file       VITALS:   BP 97/58   Pulse 68   Temp 98.6  F (37  C) (Oral)   Resp 14   Wt 83.9 kg (185 lb)   SpO2 98%   BMI 36.13 kg/m      PHYSICAL EXAM     Physical Exam  Vitals and nursing note reviewed.   Constitutional:       Appearance: Normal appearance. She is normal weight.   Musculoskeletal:        Arms:    Skin:     General: Skin is warm.       Capillary Refill: Capillary refill takes less than 2 seconds.   Neurological:      General: No focal deficit present.      Mental Status: She is alert and oriented to person, place, and time.         Physical Exam   Constitutional: Nontoxic.  In no distress cooperative and pleasant with exam.    Head: Atraumatic.     Nose: Nose normal.     Mouth/Throat: Oropharynx is clear and moist.     Eyes: EOM are normal. Pupils are equal, round, and reactive to light.     Ears: External ears normal.    Neck: Normal range of motion. Neck supple.     Cardiovascular: Normal rate, regular rhythm and normal heart sounds.      Pulmonary/Chest: Normal effort  and breath sounds normal.     Abdominal: Soft. Bowel sounds are normal.    Musculoskeletal: Normal range of motion.  Right flank tenderness palpation.  This is at the site of the nephrostomy tube there is no erythema no edema.    Neurological: No neurological deficits.    Lymphatics: No edema.    Skin: Skin is warm and dry.     Psychiatric: Normal mood and affect. Behavior is normal.       LAB:     All pertinent labs reviewed and interpreted.  Labs Ordered and Resulted from Time of ED Arrival to Time of ED Departure   ROUTINE UA WITH MICROSCOPIC REFLEX TO CULTURE - Abnormal       Result Value    Color Urine Yellow      Appearance Urine Cloudy (*)     Glucose Urine Negative      Bilirubin Urine Negative      Ketones Urine Negative      Specific Gravity Urine 1.010      Blood Urine 0.5 mg/dL (*)     pH Urine 7.0      Protein Albumin Urine 300  (*)     Urobilinogen Urine <2.0      Nitrite Urine Positive (*)     Leukocyte Esterase Urine 500 Duglas/uL (*)     Bacteria Urine Many (*)     WBC Clumps Urine Present (*)     Mucus Urine Present (*)     Amorphous Crystals Urine Few (*)     RBC Urine 162 (*)     WBC Urine >182 (*)    BASIC METABOLIC PANEL - Abnormal    Sodium 135 (*)     Potassium 4.0      Chloride 102      Carbon Dioxide (CO2) 23      Anion Gap 10      Urea Nitrogen 13       Creatinine 0.94      Calcium 10.3      Glucose 91      GFR Estimate 81     CRP INFLAMMATION - Abnormal    CRP 0.8 (*)    HCG QUALITATIVE URINE POCT - Normal    HCG Qual Urine Negative      Internal QC Check POCT Valid      POCT Kit Lot Number [id number]      POCT Kit Expiration Date 05/31/2023     CBC WITH PLATELETS AND DIFFERENTIAL    WBC Count 8.1      RBC Count 4.54      Hemoglobin 13.7      Hematocrit 41.2      MCV 91      MCH 30.2      MCHC 33.3      RDW 13.5      Platelet Count 223      % Neutrophils 68      % Lymphocytes 22      % Monocytes 8      % Eosinophils 2      % Basophils 0      % Immature Granulocytes 0      NRBCs per 100 WBC 0      Absolute Neutrophils 5.5      Absolute Lymphocytes 1.8      Absolute Monocytes 0.6      Absolute Eosinophils 0.1      Absolute Basophils 0.0      Absolute Immature Granulocytes 0.0      Absolute NRBCs 0.0     URINE CULTURE        RADIOLOGY:     Reviewed all pertinent imaging. Please see official radiology report.  CT Abdomen Pelvis w Contrast   Final Result   IMPRESSION:       1.  Atrophic left kidney with compensatory hypertrophy of the right kidney. Unchanged position of the right percutaneous nephrostomy. Multiple unchanged right renal cysts and nonobstructing calculi. No findings to suggest infection/inflammation of the    right kidney.   2.  Mild sigmoid diverticulosis but no diverticulitis.           EKG:       I have independently reviewed and interpreted the EKG(s) documented above.      PROCEDURES:     Procedures      I, Parker Tolliver, am serving as a scribe to document services personally performed by Dr. Segovia based on my observation and the provider's statements to me. I, Mami Segovia MD attest that Parker Tolliver is acting in a scribe capacity, has observed my performance of the services and has documented them in accordance with my direction.    Mami Segovia M.D.  Emergency Medicine  [facility]  [facility] EMERGENCY ROOM  [facility] [facility] [address]  [phone]  Dept: [phone]     Mami Segovia MD  01/05/22 1018

## 2022-01-05 NOTE — ED TRIAGE NOTES
Pt arrives with right flank pain that started Tuesday morning. Hx of only one kidney with a nephrostomy tube to reduce risk of kidney failure. Also long hx of pyelonephritis.

## 2022-01-07 LAB
BACTERIA UR CULT: ABNORMAL

## 2022-01-14 ENCOUNTER — HOSPITAL ENCOUNTER (EMERGENCY)
Facility: CLINIC | Age: 37
Discharge: HOME | End: 2022-01-14
Attending: EMERGENCY MEDICINE | Admitting: EMERGENCY MEDICINE
Payer: COMMERCIAL

## 2022-01-14 VITALS
RESPIRATION RATE: 16 BRPM | OXYGEN SATURATION: 99 % | WEIGHT: 185 LBS | DIASTOLIC BLOOD PRESSURE: 82 MMHG | HEART RATE: 81 BPM | SYSTOLIC BLOOD PRESSURE: 129 MMHG | HEIGHT: 68 IN | BODY MASS INDEX: 28.04 KG/M2 | TEMPERATURE: 97.3 F

## 2022-01-14 DIAGNOSIS — N12 PYELONEPHRITIS: ICD-10-CM

## 2022-01-14 LAB
ALBUMIN SERPL-MCNC: 3.7 G/DL (ref 3.5–5)
ALBUMIN UR-MCNC: 200 MG/DL
ALP SERPL-CCNC: 84 U/L (ref 45–120)
ALT SERPL W P-5'-P-CCNC: 14 U/L (ref 0–45)
AMORPH CRY #/AREA URNS HPF: ABNORMAL /HPF
ANION GAP SERPL CALCULATED.3IONS-SCNC: 10 MMOL/L (ref 5–18)
APPEARANCE UR: ABNORMAL
AST SERPL W P-5'-P-CCNC: 16 U/L (ref 0–40)
BACTERIA #/AREA URNS HPF: ABNORMAL /HPF
BASOPHILS # BLD AUTO: 0 10E3/UL (ref 0–0.2)
BASOPHILS NFR BLD AUTO: 1 %
BILIRUB SERPL-MCNC: 0.5 MG/DL (ref 0–1)
BILIRUB UR QL STRIP: NEGATIVE
BUN SERPL-MCNC: 13 MG/DL (ref 8–22)
CALCIUM SERPL-MCNC: 10.5 MG/DL (ref 8.5–10.5)
CHLORIDE BLD-SCNC: 106 MMOL/L (ref 98–107)
CO2 SERPL-SCNC: 19 MMOL/L (ref 22–31)
COLOR UR AUTO: ABNORMAL
CREAT SERPL-MCNC: 0.94 MG/DL (ref 0.6–1.1)
EOSINOPHIL # BLD AUTO: 0.2 10E3/UL (ref 0–0.7)
EOSINOPHIL NFR BLD AUTO: 2 %
ERYTHROCYTE [DISTWIDTH] IN BLOOD BY AUTOMATED COUNT: 13.8 % (ref 10–15)
FLUAV RNA SPEC QL NAA+PROBE: NEGATIVE
FLUBV RNA RESP QL NAA+PROBE: NEGATIVE
GFR SERPL CREATININE-BSD FRML MDRD: 81 ML/MIN/1.73M2
GLUCOSE BLD-MCNC: 85 MG/DL (ref 70–125)
GLUCOSE UR STRIP-MCNC: NEGATIVE MG/DL
HCT VFR BLD AUTO: 40.4 % (ref 35–47)
HGB BLD-MCNC: 13.5 G/DL (ref 11.7–15.7)
HGB UR QL STRIP: ABNORMAL
IMM GRANULOCYTES # BLD: 0.1 10E3/UL
IMM GRANULOCYTES NFR BLD: 1 %
KETONES UR STRIP-MCNC: NEGATIVE MG/DL
LEUKOCYTE ESTERASE UR QL STRIP: ABNORMAL
LIPASE SERPL-CCNC: 32 U/L (ref 0–52)
LYMPHOCYTES # BLD AUTO: 2.2 10E3/UL (ref 0.8–5.3)
LYMPHOCYTES NFR BLD AUTO: 25 %
MCH RBC QN AUTO: 31.4 PG (ref 26.5–33)
MCHC RBC AUTO-ENTMCNC: 33.4 G/DL (ref 31.5–36.5)
MCV RBC AUTO: 94 FL (ref 78–100)
MONOCYTES # BLD AUTO: 0.7 10E3/UL (ref 0–1.3)
MONOCYTES NFR BLD AUTO: 8 %
NEUTROPHILS # BLD AUTO: 5.7 10E3/UL (ref 1.6–8.3)
NEUTROPHILS NFR BLD AUTO: 63 %
NITRATE UR QL: POSITIVE
NRBC # BLD AUTO: 0 10E3/UL
NRBC BLD AUTO-RTO: 0 /100
PH UR STRIP: 6 [PH] (ref 5–7)
PLATELET # BLD AUTO: 197 10E3/UL (ref 150–450)
POTASSIUM BLD-SCNC: 4 MMOL/L (ref 3.5–5)
PROT SERPL-MCNC: 7.3 G/DL (ref 6–8)
RBC # BLD AUTO: 4.3 10E6/UL (ref 3.8–5.2)
RBC URINE: 79 /HPF
SARS-COV-2 RNA RESP QL NAA+PROBE: NEGATIVE
SODIUM SERPL-SCNC: 135 MMOL/L (ref 136–145)
SP GR UR STRIP: 1.01 (ref 1–1.03)
SQUAMOUS EPITHELIAL: <1 /HPF
UROBILINOGEN UR STRIP-MCNC: <2 MG/DL
WBC # BLD AUTO: 8.8 10E3/UL (ref 4–11)
WBC CLUMPS #/AREA URNS HPF: PRESENT /HPF
WBC URINE: >182 /HPF

## 2022-01-14 PROCEDURE — 96365 THER/PROPH/DIAG IV INF INIT: CPT

## 2022-01-14 PROCEDURE — 250N000011 HC RX IP 250 OP 636

## 2022-01-14 PROCEDURE — 87086 URINE CULTURE/COLONY COUNT: CPT

## 2022-01-14 PROCEDURE — 99285 EMERGENCY DEPT VISIT HI MDM: CPT | Mod: 25

## 2022-01-14 PROCEDURE — 80053 COMPREHEN METABOLIC PANEL: CPT

## 2022-01-14 PROCEDURE — 96375 TX/PRO/DX INJ NEW DRUG ADDON: CPT

## 2022-01-14 PROCEDURE — 250N000013 HC RX MED GY IP 250 OP 250 PS 637

## 2022-01-14 PROCEDURE — 85025 COMPLETE CBC W/AUTO DIFF WBC: CPT

## 2022-01-14 PROCEDURE — 83690 ASSAY OF LIPASE: CPT

## 2022-01-14 PROCEDURE — C9803 HOPD COVID-19 SPEC COLLECT: HCPCS

## 2022-01-14 PROCEDURE — 36415 COLL VENOUS BLD VENIPUNCTURE: CPT

## 2022-01-14 PROCEDURE — 87636 SARSCOV2 & INF A&B AMP PRB: CPT | Performed by: EMERGENCY MEDICINE

## 2022-01-14 PROCEDURE — 81001 URINALYSIS AUTO W/SCOPE: CPT

## 2022-01-14 RX ORDER — HYDROMORPHONE HYDROCHLORIDE 1 MG/ML
1 INJECTION, SOLUTION INTRAMUSCULAR; INTRAVENOUS; SUBCUTANEOUS ONCE
Status: COMPLETED | OUTPATIENT
Start: 2022-01-14 | End: 2022-01-14

## 2022-01-14 RX ORDER — CEFDINIR 300 MG/1
300 CAPSULE ORAL 2 TIMES DAILY
Qty: 20 CAPSULE | Refills: 0 | Status: SHIPPED | OUTPATIENT
Start: 2022-01-14 | End: 2022-01-24

## 2022-01-14 RX ORDER — CEFTRIAXONE 1 G/1
1 INJECTION, POWDER, FOR SOLUTION INTRAMUSCULAR; INTRAVENOUS ONCE
Status: COMPLETED | OUTPATIENT
Start: 2022-01-14 | End: 2022-01-14

## 2022-01-14 RX ORDER — HYDROCODONE BITARTRATE AND ACETAMINOPHEN 5; 325 MG/1; MG/1
1 TABLET ORAL ONCE
Status: COMPLETED | OUTPATIENT
Start: 2022-01-14 | End: 2022-01-14

## 2022-01-14 RX ADMIN — CEFTRIAXONE 1 G: 1 INJECTION, POWDER, FOR SOLUTION INTRAMUSCULAR; INTRAVENOUS at 18:07

## 2022-01-14 RX ADMIN — HYDROMORPHONE HYDROCHLORIDE 1 MG: 1 INJECTION, SOLUTION INTRAMUSCULAR; INTRAVENOUS; SUBCUTANEOUS at 16:47

## 2022-01-14 RX ADMIN — HYDROCODONE BITARTRATE AND ACETAMINOPHEN 1 TABLET: 5; 325 TABLET ORAL at 15:40

## 2022-01-14 ASSESSMENT — MIFFLIN-ST. JEOR: SCORE: 1587.65

## 2022-01-14 NOTE — ED TRIAGE NOTES
Patient states she was diagnosed with a right kidney infection and put on a couple courses of antibiotics and had an infusion and sent home from ED.  Still have increased right flank radiating into lower right back, nausea.

## 2022-01-14 NOTE — ED PROVIDER NOTES
EMERGENCY DEPARTMENT ENCOUNTER      NAME: Jacqueline Pappas  AGE: 36 year old female  YOB: 1985  MRN: [id number]  EVALUATION DATE & TIME: 2022  2:51 PM    PCP: Pao Montague    ED PROVIDER: Florentino Maldonado D.O.      Chief Complaint   Patient presents with     Flank Pain       FINAL IMPRESSION:  1. Pyelonephritis        ED COURSE & MEDICAL DECISION MAKIN:58 Resident met with the patient to obtain history and perform initial exam.  3:14 PM I met with the patient to gather history and to perform my initial exam. I discussed the plan for care while in the Emergency Department. PPE worn: N95, eye protection, gloves.  5:49 PM Resident rechecked patient and updated on results. Offered admission, patient declined. Discussed plan for discharge - patient agreeable.         Pertinent Labs & Imaging studies reviewed. (See chart for details)  36 year old female presents to the Emergency Department for evaluation of right-sided flank pain.  Patient has known single kidney.  Symptoms are concerning for possible pyelonephritis.  Labs are concerning for urinary infection.  Originally I planned on admitting this patient, however the patient requested that we discharge her.  She understands the risks of this decision, and as she is not showing signs of sepsis, despite our recommendation she be admitted, we will let her discharge with oral antibiotics.  She was given a dose of IV Rocephin prior to discharge.  She will follow-up with her primary care provider.  Return precautions discussed.    At the conclusion of the encounter I discussed the results of all of the tests and the disposition. The questions were answered. The patient or family acknowledged understanding and was agreeable with the care plan.        HPI    Patient information was obtained from: Patient    Use of : N/A      Jacqueline Pappas is a 36 year old female with pertinent history of congenital absence of one kidney, multiple kidney  stones, pyelonephritis, ureteral stricture with nephrostomy tube placement, and multiple nephrolithotomy procedures, who presents for evaluation of right flank pain. Patient reports constant right flank pain that has worsened since she was previously seen here on 1/5/22 (9 days ago). Pain radiates to her lower back, but not down to her groin. She has been taking Tylenol with minimal relief. She reports a history of kidney stones, but states this feels similar to her past kidney infections. She has been taking her antibiotics since her discharge, and states she is scheduled to see her kidney doctor on 1/24/22.    She reports that she was born with one kidney and has a nephrostomy tube in place for a blockage. Mentions she currently has kidney stones that are unable to be removed.    Patient reports she is currently menstruating. She does occasionally have back pain associated with her period. She is unsure if she has been having hematuria due to her menstruation. Denies any difficulty urinating.    Patient endorses a few days of diarrhea with episodes that occur shortly after eating. Denies blood in her stool. Reports nausea, but no vomiting or abdominal pain. Denies fever, chills, shortness of breath, leg swelling, rashes, headache, or vision changes. She reports she does smoke, denies drinking. Denies any other concerns.    Per chart review, patient was seen at [facility] ED on 1/5/22 for evaluation of right flank and back pain. UA showed positive nitrates plus leukocyte esterase, many bacteria, WBC clumps. Mild elevation of CRP. CT revealed no evidence of abscess. Nephrostomy tune in appropriate place. She remained afebrile. She was given Rocephin in the ED. Discharged on Keflex.       REVIEW OF SYSTEMS  Constitutional:  Denies fever, chills, weight loss or weakness  Eyes:  No pain, discharge, redness, vision changes.  HENT:  Denies sore throat, ear pain, congestion  Respiratory: No SOB, wheeze or  cough  Cardiovascular:  No CP, palpitations  GI:  Denies abdominal pain, vomiting. Positive for nausea, diarrhea.  : Denies dysuria, hematuria, difficulty urinating. Positive for right flank pain.  Musculoskeletal:  Denies any new muscle/joint swelling or loss of function. Positive for low back pain.  Skin:  Denies rash, pallor  Neurologic:  Denies headache, focal weakness or sensory changes  Lymph: Denies swollen nodes    All other systems negative unless noted in HPI.    PAST MEDICAL HISTORY:  Past Medical History:   Diagnosis Date     Acute renal failure (H)      Anemia      Anemia      Calculus of kidney      Congenital absence of left kidney      Congenital absence of one kidney      Depression      Depression      Hydronephrosis      Kidney stone     at age 29     Pyelonephritis      Pyelonephritis      Smoker      Ureteral stricture      UTI (urinary tract infection)      Wrist fracture     Left       PAST SURGICAL HISTORY:  Past Surgical History:   Procedure Laterality Date      SECTION        SECTION      x1     COMBINED CYSTOSCOPY, RETROGRADES, URETEROSCOPY, LASER HOLMIUM LITHOTRIPSY URETER(S), INSERT STENT Right 2016    Procedure: COMBINED CYSTOSCOPY, RETROGRADES, URETEROSCOPY, LASER HOLMIUM LITHOTRIPSY URETER(S), INSERT STENT;  Surgeon: Florentino Awad MD;  Location: [facility] OR     CYSTOSCOPY, URETEROSCOPY, COMBINED Right 2016    Procedure: COMBINED CYSTOSCOPY, URETEROSCOPY;  Surgeon: Florentino Awad MD;  Location: [facility] OR     IR NEPHROLITHOTOMY  2014     IR NEPHROSTOGRAM EXISITING ACCESS  10/18/2018     IR NEPHROSTOMY TUBE CHANGE RIGHT  2018     IR NEPHROSTOMY TUBE CHANGE RIGHT  2020     IR NEPHROSTOMY TUBE CHANGE RIGHT  2018     IR NEPHROSTOMY TUBE CHANGE RIGHT  2020     IR NEPHROSTOMY TUBE PLACEMENT RIGHT  2016     IR NEPHROSTOMY TUBE PLACEMENT RIGHT  2017     KIDNEY STONE SURGERY Right 2016     LASER HOLMIUM LITHOTRIPSY  URETER(S), INSERT STENT, COMBINED Left 11/1/2016    Procedure: COMBINED CYSTOSCOPY, URETEROSCOPY, LASER HOLMIUM LITHOTRIPSY URETER(S), INSERT STENT;  Surgeon: Florentino Awad MD;  Location: [facility] OR     LASER HOLMIUM NEPHROLITHOTOMY VIA PERCUTANEOUS NEPHROSTOMY Right 9/14/2016    Procedure: LASER HOLMIUM NEPHROLITHOTOMY VIA PERCUTANEOUS NEPHROSTOMY;  Surgeon: Florentino Awad MD;  Location: [facility] OR     NEPHROSTOMY W/ INTRODUCTION OF CATHETER Right      OTHER SURGICAL HISTORY      Mole removednasal     OTHER SURGICAL HISTORY Right     Cystoscopy, ureteroscopy, laser11/02/2014 x2 with ureteral stent insertion     PERCUTANEOUS NEPHROSTOMY  11/1/2016    Procedure: PERCUTANEOUS NEPHROSTOMY;  Surgeon: Florentino Awad MD;  Location: [facility] OR     WISDOM TOOTH EXTRACTION       ZZC REMOVAL OF KIDNEY STONE           CURRENT MEDICATIONS:    No current facility-administered medications for this encounter.     Current Outpatient Medications   Medication     cefdinir (OMNICEF) 300 MG capsule     amoxicillin-clavulanate (AUGMENTIN) 875-125 MG per tablet     lactobacillus rhamnosus, GG, (CULTURELL) capsule     LORazepam (ATIVAN) 1 MG tablet     oxybutynin (DITROPAN) 5 MG tablet     oxybutynin (DITROPAN) 5 MG tablet     oxyCODONE (ROXICODONE) 5 MG tablet     SERTRALINE HCL PO     tamsulosin (FLOMAX) 0.4 MG 24 hr capsule     voriconazole (VFEND) 200 MG tablet         ALLERGIES:  Allergies   Allergen Reactions     Vancomycin      Desogestrel-Ethinyl Estradiol Angioedema and Swelling     Swelling of hands and feet per pt.  Swelling of hands and feet per pt.  Swelling of hands and feet per pt.  Swelling of hands and feet per pt.  Swelling of hands and feet per pt.  Swelling of hands and feet per pt.  Swelling of hands and feet per pt.  Swelling of hands and feet per pt.       Penicillins Rash     As a child per mother; mother unsure if has tolerated another PCN since. Tolerated ampicillin 9/20/16     Sulfa Drugs Rash  "      FAMILY HISTORY:  Family History   Problem Relation Age of Onset     Anesthesia Reaction No family hx of      Malignant Hyperthermia No family hx of      Heart Disease Maternal Uncle         heart failure     Coronary Artery Disease Other      Heart Disease Maternal Grandmother         pacemaker     Gout Paternal Grandfather      Prostate Cancer Maternal Aunt         breast     Heart Disease Maternal Aunt         pacemaker     Heart Disease Maternal Aunt         pacemaker     Heart Disease Maternal Aunt         pacemaker       SOCIAL HISTORY:  Social History     Socioeconomic History     Marital status: Single     Spouse name: Not on file     Number of children: Not on file     Years of education: Not on file     Highest education level: Not on file   Occupational History     Not on file   Tobacco Use     Smoking status: Current Every Day Smoker     Packs/day: 0.50     Years: 10.00     Pack years: 5.00     Last attempt to quit: 2016     Years since quittin.3     Smokeless tobacco: Former User     Quit date: 2016     Tobacco comment: has information from last admission.   Substance and Sexual Activity     Alcohol use: Yes     Alcohol/week: 0.0 standard drinks     Comment: Alcoholic Drinks/day: occ     Drug use: No     Sexual activity: Not on file   Other Topics Concern     Not on file   Social History Narrative     Not on file     Social Determinants of Health     Financial Resource Strain: Not on file   Food Insecurity: Not on file   Transportation Needs: Not on file   Physical Activity: Not on file   Stress: Not on file   Social Connections: Not on file   Intimate Partner Violence: Not on file   Housing Stability: Not on file       VITALS:  Patient Vitals for the past 24 hrs:   BP Temp Temp src Pulse Resp SpO2 Height Weight   22 1450 129/82 97.3  F (36.3  C) Oral 81 16 99 % 1.727 m (5' 8\") 83.9 kg (185 lb)       PHYSICAL EXAM    VITAL SIGNS: /82   Pulse 81   Temp 97.3  F (36.3  C) " "(Oral)   Resp 16   Ht 1.727 m (5' 8\")   Wt 83.9 kg (185 lb)   SpO2 99%   BMI 28.13 kg/m      General Appearance: Well-appearing, well-nourished, no acute distress   Head:  Normocephalic, without obvious abnormality, atraumatic  Eyes:  PERRL, conjunctiva/corneas clear, EOM's intact,  ENT:  Lips, mucosa, and tongue normal, membranes are moist without pallor  Neck:  Normal ROM, symmetrical, trachea midline    Cardio:  Regular rate and rhythm, no murmur, rub or gallop, 2+ pulses symmetric in all extremities  Pulm:  Clear to auscultation bilaterally, respirations unlabored,  Abdomen:  Soft, non-tender, no rebound or guarding.  Musculoskeletal: Full ROM, no edema, no cyanosis, good ROM of major joints  Integument:  Warm, Dry, No erythema, No rash.    Neurologic:  Alert & oriented.  No focal deficits appreciated.  Ambulatory.  Psychiatric:  Affect normal, Judgment normal, Mood normal.      LABS  Results for orders placed or performed during the hospital encounter of 01/14/22 (from the past 24 hour(s))   Symptomatic; Unknown Influenza A/B & SARS-CoV2 (COVID-19) Virus PCR Multiplex Nasopharyngeal    Specimen: Nasopharyngeal; Swab   Result Value Ref Range    Influenza A PCR Negative Negative    Influenza B PCR Negative Negative    SARS CoV2 PCR Negative Negative    Narrative    Testing was performed using the yobani SARS-CoV-2 & Influenza A/B Assay on the yobani Swathi System. This test should be ordered for the detection of SARS-CoV-2 and influenza viruses in individuals who meet clinical and/or epidemiological criteria. Test performance is unknown in asymptomatic patients. This test is for in vitro diagnostic use under the FDA EUA for laboratories certified under CLIA to perform moderate and/or high complexity testing. This test has not been FDA cleared or approved. A negative result does not rule out the presence of PCR inhibitors in the specimen or target RNA in concentration below the limit of detection for the assay. " If only one viral target is positive but coinfection with multiple targets is suspected, the sample should be re-tested with another FDA cleared, approved or authorized test, if coinfection would change clinical management. [facility] are certified under the Clinical Laboratory Improvement Amendments of 1988 (CLIA-88) as  qualified to perform moderate and/or high complexity laboratory testing.   Comprehensive metabolic panel   Result Value Ref Range    Sodium 135 (L) 136 - 145 mmol/L    Potassium 4.0 3.5 - 5.0 mmol/L    Chloride 106 98 - 107 mmol/L    Carbon Dioxide (CO2) 19 (L) 22 - 31 mmol/L    Anion Gap 10 5 - 18 mmol/L    Urea Nitrogen 13 8 - 22 mg/dL    Creatinine 0.94 0.60 - 1.10 mg/dL    Calcium 10.5 8.5 - 10.5 mg/dL    Glucose 85 70 - 125 mg/dL    Alkaline Phosphatase 84 45 - 120 U/L    AST 16 0 - 40 U/L    ALT 14 0 - 45 U/L    Protein Total 7.3 6.0 - 8.0 g/dL    Albumin 3.7 3.5 - 5.0 g/dL    Bilirubin Total 0.5 0.0 - 1.0 mg/dL    GFR Estimate 81 >60 mL/min/1.73m2   Lipase   Result Value Ref Range    Lipase 32 0 - 52 U/L   CBC with platelets differential    Narrative    The following orders were created for panel order CBC with platelets differential.  Procedure                               Abnormality         Status                     ---------                               -----------         ------                     CBC with platelets and d...[[id number]]                      Final result                 Please view results for these tests on the individual orders.   CBC with platelets and differential   Result Value Ref Range    WBC Count 8.8 4.0 - 11.0 10e3/uL    RBC Count 4.30 3.80 - 5.20 10e6/uL    Hemoglobin 13.5 11.7 - 15.7 g/dL    Hematocrit 40.4 35.0 - 47.0 %    MCV 94 78 - 100 fL    MCH 31.4 26.5 - 33.0 pg    MCHC 33.4 31.5 - 36.5 g/dL    RDW 13.8 10.0 - 15.0 %    Platelet Count 197 150 - 450 10e3/uL    % Neutrophils 63 %    % Lymphocytes 25 %    % Monocytes 8 %    %  Eosinophils 2 %    % Basophils 1 %    % Immature Granulocytes 1 %    NRBCs per 100 WBC 0 <1 /100    Absolute Neutrophils 5.7 1.6 - 8.3 10e3/uL    Absolute Lymphocytes 2.2 0.8 - 5.3 10e3/uL    Absolute Monocytes 0.7 0.0 - 1.3 10e3/uL    Absolute Eosinophils 0.2 0.0 - 0.7 10e3/uL    Absolute Basophils 0.0 0.0 - 0.2 10e3/uL    Absolute Immature Granulocytes 0.1 <=0.4 10e3/uL    Absolute NRBCs 0.0 10e3/uL   UA with Microscopic reflex to Culture    Specimen: Urine, Midstream   Result Value Ref Range    Color Urine Light Yellow Colorless, Straw, Light Yellow, Yellow    Appearance Urine Turbid (A) Clear    Glucose Urine Negative Negative mg/dL    Bilirubin Urine Negative Negative    Ketones Urine Negative Negative mg/dL    Specific Gravity Urine 1.013 1.001 - 1.030    Blood Urine 1.0 mg/dL (A) Negative    pH Urine 6.0 5.0 - 7.0    Protein Albumin Urine 200  (A) Negative mg/dL    Urobilinogen Urine <2.0 <2.0 mg/dL    Nitrite Urine Positive (A) Negative    Leukocyte Esterase Urine 500 Duglas/uL (A) Negative    Bacteria Urine Many (A) None Seen /HPF    WBC Clumps Urine Present (A) None Seen /HPF    Amorphous Crystals Urine Few (A) None Seen /HPF    RBC Urine 79 (H) <=2 /HPF    WBC Urine >182 (H) <=5 /HPF    Squamous Epithelials Urine <1 <=1 /HPF    Narrative    Urine Culture ordered based on laboratory criteria         RADIOLOGY  No orders to display       MEDICATIONS GIVEN IN THE EMERGENCY:  Medications   HYDROcodone-acetaminophen (NORCO) 5-325 MG per tablet 1 tablet (1 tablet Oral Given 1/14/22 1540)   HYDROmorphone (PF) (DILAUDID) injection 1 mg (1 mg Intravenous Given 1/14/22 1647)   cefTRIAXone (ROCEPHIN) 1 g vial to attach to  mL bag for ADULTS or NS 50 mL bag for PEDS (0 g Intravenous Stopped 1/14/22 1843)       NEW PRESCRIPTIONS STARTED AT TODAY'S ER VISIT  Discharge Medication List as of 1/14/2022  7:04 PM      START taking these medications    Details   cefdinir (OMNICEF) 300 MG capsule Take 1 capsule (300 mg) by  mouth 2 times daily for 10 days, Disp-20 capsule, R-0, Local Print              Florentino Maldonado D.O.  Emergency Medicine  [facility] EMERGENCY ROOM  [facility] [facility] [address] [phone]  Dept: [phone]     Florentino Maldonado,   01/14/22 3841

## 2022-01-14 NOTE — ED PROVIDER NOTES
EMERGENCY DEPARTMENT ENCOUNTER      CHIEF COMPLAINT      Chief Complaint   Patient presents with     Flank Pain       HPI    Patient is a 36 year old female with a past medical history of congenital absence of one kidney, multiple kidney stones, pyelonephritis, ureteral stricture with nephrostomy tube placement, and multiple nephrolithotomy procedures, who presents for evaluation of right flank pain.    Per chart review, she was seen in the emergency room on 01/05/2022 for similar complaint. CT at that time showed 1. Atrophic left kidney with compensatory hypertrophy of the right kidney. Unchanged position of the right percutaneous nephrostomy. Multiple unchanged right renal cysts and nonobstructing calculi. No findings to suggest infection/inflammation of the   right kidney. 2.  Mild sigmoid diverticulosis but no diverticulitis.  [facility] has no documentation of COVID vaccination or influenza vaccination.    Patient reports constant right flank pain that has worsened since she was previously seen here on 1/5/22 (9 days ago). Pain radiates to her lower back, but not down to her groin. She has been taking Tylenol with minimal relief. She reports a history of kidney stones, but states this feels similar to her past kidney infections. She has been taking her antibiotics since her discharge, and states she is scheduled to see her kidney doctor on 1/24/22.     She reports that she was born with one kidney and has a nephrostomy tube in place for a blockage. Mentions she currently has kidney stones that are unable to be removed due to not wanting to damage kidney further.     Patient reports she is currently menstruating. She does occasionally have back pain associated with her period. She is unsure if she has been having hematuria due to her menstruation. Denies any difficulty urinating.     Patient endorses a few days of diarrhea with episodes that occur shortly after eating. Denies blood in her stool. Reports nausea, but no  vomiting or abdominal pain. She has been taking Tylenol with some relief. Denies fever, chills, shortness of breath, leg swelling, rashes, headache, or vision changes. She reports she does smoke, denies drinking. Denies any other concerns.      REVIEW OF SYSTEMS      Constitutional: Negative for fever, chills and fatigue.     HENT: Negative for neck pain.      Eyes: Negative for visual disturbance.     Respiratory: Negative for chest tightness and shortness of breath.      Cardiovascular: Negative for chest pain.     Gastrointestinal: Negative for vomiting. Positive for diarrhea, nausea, and abdominal pain.     Genitourinary: Negative for dysuria, difficulty urinating.     Musculoskeletal: Negative for leg swelling. Positive for low back pain.     Skin: Negative for color change and rash.     Neurological: Negative for headache, dizziness, weakness and numbness.     Genitourinary: Positive for menstrual cycle.    All other systems reviewed and are negative.      PAST MEDICAL HISTORY      Past Medical History:   Diagnosis Date     Acute renal failure (H)      Anemia      Anemia      Calculus of kidney      Congenital absence of left kidney      Congenital absence of one kidney      Depression      Depression      Hydronephrosis      Kidney stone      Pyelonephritis      Pyelonephritis      Smoker      Ureteral stricture      UTI (urinary tract infection)      Wrist fracture     and   Patient Active Problem List   Diagnosis     Anemia     Congenital absence of one kidney     Depression     Calculus of kidney     Pyelonephritis     Solitary kidney, congenital     Hypotension     Stricture or kinking of ureter     Ureteral stricture         SURGICAL HISTORY     section   REMOVAL OF KIDNEY STONE   Combined Cystoscopy, Retrogrades, Ureteroscopy, Laser Holmium Lithotripsy Ureter(S), Insert Stent (Right, 2016)   Cystoscopy, ureteroscopy, combined (Right, 2016)   Laser holmium nephrolithotomy via  percutaneous nephrostomy (Right, 2016)   Laser holmium lithotripsy ureter(s), insert stent, combined (Left, 2016)   Percutaneous nephrostomy (2016)   IR Nephrolithotomy (2014)   IR Nephrostomy Tube Placement Right (2016)   IR Nephrostomy Tube Placement Right (2017)   IR Nephrostogram Exisiting Access (10/18/2018)   IR Nephrostomy Tube Change Right (2018)   IR Nephrostomy Tube Change Right (2020)    Section      Sultan Tooth Extraction   (Right)   Kidney Stone Surgery (Right, 2016)   Nephrostomy W/ Introduction Of Catheter (Right)   Ir Nephrostomy Tube Change Right (2018)   Ir Nephrostomy Tube Change Right (2020)       CURRENT MEDICATIONS      Patient's Medications   New Prescriptions    No medications on file   Previous Medications    AMOXICILLIN-CLAVULANATE (AUGMENTIN) 875-125 MG PER TABLET    Take 1 tablet by mouth 2 times daily Until PNTs have been removed    LACTOBACILLUS RHAMNOSUS, GG, (CULTURELL) CAPSULE    Take 1 capsule by mouth 2 times daily While you are taking antibiotics    LORAZEPAM (ATIVAN) 1 MG TABLET    Take 0.5 tablets (0.5 mg) by mouth At Bedtime & 0.5 tab if you wake in the middle of the night    OXYBUTYNIN (DITROPAN) 5 MG TABLET    Take 1 tablet (5 mg) by mouth 3 times daily as needed for bladder spasms    OXYBUTYNIN (DITROPAN) 5 MG TABLET    Take 1 tablet (5 mg) by mouth 3 times daily as needed for bladder spasms    OXYCODONE (ROXICODONE) 5 MG TABLET    Take 1 tablet (5 mg) by mouth every 6 hours as needed for pain    SERTRALINE HCL PO    Take 150 mg by mouth At Bedtime     TAMSULOSIN (FLOMAX) 0.4 MG 24 HR CAPSULE    Take 1 capsule (0.4 mg) by mouth daily    VORICONAZOLE (VFEND) 200 MG TABLET    Take 1 tablet (200 mg) by mouth 2 times daily   Modified Medications    No medications on file   Discontinued Medications    No medications on file       ALLERGIES      Allergies   Allergen Reactions     Vancomycin      Desogestrel-Ethinyl  Estradiol Angioedema and Swelling     Swelling of hands and feet per pt.     Penicillins Rash     As a child per mother; mother unsure if has tolerated another PCN since. Tolerated ampicillin 16     Sulfa Drugs Rash       FAMILY HISTORY      Family History   Problem Relation Age of Onset     Anesthesia Reaction No family hx of      Malignant Hyperthermia No family hx of      Heart Disease Maternal Uncle         heart failure     Coronary Artery Disease Other      Heart Disease Maternal Grandmother         pacemaker     Gout Paternal Grandfather      Prostate Cancer Maternal Aunt         breast     Heart Disease Maternal Aunt         pacemaker     Heart Disease Maternal Aunt         pacemaker     Heart Disease Maternal Aunt         pacemaker       SOCIAL HISTORY      Social History     Socioeconomic History     Marital status: Single     Spouse name: Not on file     Number of children: Not on file     Years of education: Not on file     Highest education level: Not on file   Occupational History     Not on file   Tobacco Use     Smoking status: Current Every Day Smoker     Packs/day: 0.50     Years: 10.00     Pack years: 5.00     Last attempt to quit: 2016     Years since quittin.3     Smokeless tobacco: Former User     Quit date: 2016     Tobacco comment: has information from last admission.   Substance and Sexual Activity     Alcohol use: Yes     Alcohol/week: 0.0 standard drinks     Comment: Alcoholic Drinks/day: occ     Drug use: No     Sexual activity: Not on file   Other Topics Concern     Not on file   Social History Narrative     Not on file     Social Determinants of Health     Financial Resource Strain: Not on file   Food Insecurity: Not on file   Transportation Needs: Not on file   Physical Activity: Not on file   Stress: Not on file   Social Connections: Not on file   Intimate Partner Violence: Not on file   Housing Stability: Not on file         PHYSICAL EXAM      VITAL SIGNS: /82  "  Pulse 81   Temp 97.3  F (36.3  C) (Oral)   Resp 16   Ht 1.727 m (5' 8\")   Wt 83.9 kg (185 lb)   SpO2 99%   BMI 28.13 kg/m         Constitutional:  Well developed, well nourished, no acute distress     EYES: Conjunctivae clear, EOMI    HENT:  Atraumatic, external ears normal    Respiratory:  No respiratory distress, normal breath sounds, no rales, no wheezing     Cardiovascular:  Normal rate, normal rhythm, no murmurs.  No chest tenderness    GI:  Soft, nondistended, nontender, no palpable masses, no rebound, no guarding     : CVA tenderness on the right    Musculoskeletal:  No edema.  Range of motion major extremities intact. No tenderness to palpation or major deformities noted.      Integument: Warm, Dry, No erythema, No rash.     Neurologic:  Alert & oriented, no focal deficits noted, ambulatory    Psych: Affect normal, Mood normal.    LABS:  UA: dirty - positive nitrite, positive bacteria, positive leukocyte esterase  COVID: Negative  Influenza: Negative  Lipase: within normal limits  CMP: notable for Na 135 and CO2 19  CBC: within normal limits (normal WBC count)      ED COURSE & MEDICAL DECISION MAKING    2:45 PM I met with the patient to gather history. Given Norco for pain and labs will be obtained.  4:40 PM Nursing staff update: patient is still having pain. Patient states she had episode of emesis after taking the pill. Ordered 1 mg dilaudid for pain.  5:35 PM Talked with patient about being admitted for pyelonephritis. She would like to decline due to having to work tomorrow and not wanting to be around COVID more than she needs to be. She voices understanding about the risks and possible worsening symptoms. She states she will follow up with her primary care physician early next week.      Polly Wood MD PGY1  [facility] Resident       Polly Wood MD  Resident  01/14/22 7854    "

## 2022-01-14 NOTE — DISCHARGE INSTRUCTIONS
Please finish the course for pyelonephritis. Continue Tylenol for pain.    Follow up with your primary early next week.

## 2022-01-16 LAB — BACTERIA UR CULT: NORMAL

## 2022-02-27 ENCOUNTER — APPOINTMENT (OUTPATIENT)
Dept: CT IMAGING | Facility: CLINIC | Age: 37
End: 2022-02-27
Attending: STUDENT IN AN ORGANIZED HEALTH CARE EDUCATION/TRAINING PROGRAM
Payer: COMMERCIAL

## 2022-02-27 ENCOUNTER — HOSPITAL ENCOUNTER (EMERGENCY)
Facility: CLINIC | Age: 37
Discharge: HOME | End: 2022-02-27
Attending: STUDENT IN AN ORGANIZED HEALTH CARE EDUCATION/TRAINING PROGRAM | Admitting: STUDENT IN AN ORGANIZED HEALTH CARE EDUCATION/TRAINING PROGRAM
Payer: COMMERCIAL

## 2022-02-27 VITALS
RESPIRATION RATE: 18 BRPM | HEART RATE: 88 BPM | BODY MASS INDEX: 28.13 KG/M2 | SYSTOLIC BLOOD PRESSURE: 119 MMHG | DIASTOLIC BLOOD PRESSURE: 86 MMHG | WEIGHT: 185 LBS | TEMPERATURE: 98.2 F | OXYGEN SATURATION: 98 %

## 2022-02-27 DIAGNOSIS — R10.9 FLANK PAIN: ICD-10-CM

## 2022-02-27 LAB
ALBUMIN SERPL-MCNC: 3.4 G/DL (ref 3.5–5)
ALBUMIN UR-MCNC: 200 MG/DL
ALP SERPL-CCNC: 59 U/L (ref 45–120)
ALT SERPL W P-5'-P-CCNC: 16 U/L (ref 0–45)
AMORPH CRY #/AREA URNS HPF: ABNORMAL /HPF
ANION GAP SERPL CALCULATED.3IONS-SCNC: 12 MMOL/L (ref 5–18)
APPEARANCE UR: ABNORMAL
AST SERPL W P-5'-P-CCNC: 18 U/L (ref 0–40)
BACTERIA #/AREA URNS HPF: ABNORMAL /HPF
BASOPHILS # BLD AUTO: 0 10E3/UL (ref 0–0.2)
BASOPHILS NFR BLD AUTO: 0 %
BILIRUB SERPL-MCNC: 0.4 MG/DL (ref 0–1)
BILIRUB UR QL STRIP: NEGATIVE
BUN SERPL-MCNC: 10 MG/DL (ref 8–22)
CALCIUM SERPL-MCNC: 9.7 MG/DL (ref 8.5–10.5)
CHLORIDE BLD-SCNC: 106 MMOL/L (ref 98–107)
CO2 SERPL-SCNC: 17 MMOL/L (ref 22–31)
COLOR UR AUTO: ABNORMAL
CREAT SERPL-MCNC: 0.89 MG/DL (ref 0.6–1.1)
EOSINOPHIL # BLD AUTO: 0.1 10E3/UL (ref 0–0.7)
EOSINOPHIL NFR BLD AUTO: 2 %
ERYTHROCYTE [DISTWIDTH] IN BLOOD BY AUTOMATED COUNT: 13.7 % (ref 10–15)
GFR SERPL CREATININE-BSD FRML MDRD: 87 ML/MIN/1.73M2
GLUCOSE BLD-MCNC: 158 MG/DL (ref 70–125)
GLUCOSE UR STRIP-MCNC: NEGATIVE MG/DL
HCT VFR BLD AUTO: 38.9 % (ref 35–47)
HGB BLD-MCNC: 12.8 G/DL (ref 11.7–15.7)
HGB UR QL STRIP: ABNORMAL
IMM GRANULOCYTES # BLD: 0 10E3/UL
IMM GRANULOCYTES NFR BLD: 0 %
KETONES UR STRIP-MCNC: NEGATIVE MG/DL
LEUKOCYTE ESTERASE UR QL STRIP: ABNORMAL
LYMPHOCYTES # BLD AUTO: 1.9 10E3/UL (ref 0.8–5.3)
LYMPHOCYTES NFR BLD AUTO: 28 %
MCH RBC QN AUTO: 30.5 PG (ref 26.5–33)
MCHC RBC AUTO-ENTMCNC: 32.9 G/DL (ref 31.5–36.5)
MCV RBC AUTO: 93 FL (ref 78–100)
MONOCYTES # BLD AUTO: 0.4 10E3/UL (ref 0–1.3)
MONOCYTES NFR BLD AUTO: 5 %
MUCOUS THREADS #/AREA URNS LPF: PRESENT /LPF
NEUTROPHILS # BLD AUTO: 4.3 10E3/UL (ref 1.6–8.3)
NEUTROPHILS NFR BLD AUTO: 65 %
NITRATE UR QL: NEGATIVE
NRBC # BLD AUTO: 0 10E3/UL
NRBC BLD AUTO-RTO: 0 /100
PH UR STRIP: 7.5 [PH] (ref 5–7)
PLATELET # BLD AUTO: 180 10E3/UL (ref 150–450)
POTASSIUM BLD-SCNC: 3.7 MMOL/L (ref 3.5–5)
PROT SERPL-MCNC: 6.7 G/DL (ref 6–8)
RBC # BLD AUTO: 4.2 10E6/UL (ref 3.8–5.2)
RBC URINE: 82 /HPF
SODIUM SERPL-SCNC: 135 MMOL/L (ref 136–145)
SP GR UR STRIP: 1.01 (ref 1–1.03)
UROBILINOGEN UR STRIP-MCNC: <2 MG/DL
WBC # BLD AUTO: 6.8 10E3/UL (ref 4–11)
WBC URINE: 39 /HPF

## 2022-02-27 PROCEDURE — 250N000011 HC RX IP 250 OP 636: Performed by: STUDENT IN AN ORGANIZED HEALTH CARE EDUCATION/TRAINING PROGRAM

## 2022-02-27 PROCEDURE — 36415 COLL VENOUS BLD VENIPUNCTURE: CPT | Performed by: STUDENT IN AN ORGANIZED HEALTH CARE EDUCATION/TRAINING PROGRAM

## 2022-02-27 PROCEDURE — 99285 EMERGENCY DEPT VISIT HI MDM: CPT | Mod: 25

## 2022-02-27 PROCEDURE — 87086 URINE CULTURE/COLONY COUNT: CPT | Performed by: STUDENT IN AN ORGANIZED HEALTH CARE EDUCATION/TRAINING PROGRAM

## 2022-02-27 PROCEDURE — 85025 COMPLETE CBC W/AUTO DIFF WBC: CPT | Performed by: STUDENT IN AN ORGANIZED HEALTH CARE EDUCATION/TRAINING PROGRAM

## 2022-02-27 PROCEDURE — 96375 TX/PRO/DX INJ NEW DRUG ADDON: CPT

## 2022-02-27 PROCEDURE — 96376 TX/PRO/DX INJ SAME DRUG ADON: CPT

## 2022-02-27 PROCEDURE — 96361 HYDRATE IV INFUSION ADD-ON: CPT

## 2022-02-27 PROCEDURE — 96374 THER/PROPH/DIAG INJ IV PUSH: CPT | Mod: 59

## 2022-02-27 PROCEDURE — 74177 CT ABD & PELVIS W/CONTRAST: CPT

## 2022-02-27 PROCEDURE — 80053 COMPREHEN METABOLIC PANEL: CPT | Performed by: STUDENT IN AN ORGANIZED HEALTH CARE EDUCATION/TRAINING PROGRAM

## 2022-02-27 PROCEDURE — 258N000003 HC RX IP 258 OP 636: Performed by: STUDENT IN AN ORGANIZED HEALTH CARE EDUCATION/TRAINING PROGRAM

## 2022-02-27 PROCEDURE — 81001 URINALYSIS AUTO W/SCOPE: CPT | Performed by: STUDENT IN AN ORGANIZED HEALTH CARE EDUCATION/TRAINING PROGRAM

## 2022-02-27 PROCEDURE — 82040 ASSAY OF SERUM ALBUMIN: CPT | Performed by: STUDENT IN AN ORGANIZED HEALTH CARE EDUCATION/TRAINING PROGRAM

## 2022-02-27 RX ORDER — ONDANSETRON 2 MG/ML
4 INJECTION INTRAMUSCULAR; INTRAVENOUS ONCE
Status: COMPLETED | OUTPATIENT
Start: 2022-02-27 | End: 2022-02-27

## 2022-02-27 RX ORDER — NORGESTIMATE AND ETHINYL ESTRADIOL 0.25-0.035
1 KIT ORAL DAILY
COMMUNITY
Start: 2022-02-14 | End: 2023-05-12

## 2022-02-27 RX ORDER — CEPHALEXIN 500 MG/1
500 CAPSULE ORAL 4 TIMES DAILY
Qty: 28 CAPSULE | Refills: 0 | Status: SHIPPED | OUTPATIENT
Start: 2022-02-27 | End: 2022-03-06

## 2022-02-27 RX ORDER — HYDROMORPHONE HYDROCHLORIDE 1 MG/ML
0.5 INJECTION, SOLUTION INTRAMUSCULAR; INTRAVENOUS; SUBCUTANEOUS ONCE
Status: COMPLETED | OUTPATIENT
Start: 2022-02-27 | End: 2022-02-27

## 2022-02-27 RX ORDER — SERTRALINE HYDROCHLORIDE 100 MG/1
100 TABLET, FILM COATED ORAL AT BEDTIME
COMMUNITY
Start: 2021-08-18

## 2022-02-27 RX ORDER — IOPAMIDOL 755 MG/ML
100 INJECTION, SOLUTION INTRAVASCULAR ONCE
Status: COMPLETED | OUTPATIENT
Start: 2022-02-27 | End: 2022-02-27

## 2022-02-27 RX ORDER — ALBUTEROL SULFATE 90 UG/1
2 AEROSOL, METERED RESPIRATORY (INHALATION) EVERY 4 HOURS PRN
COMMUNITY
Start: 2022-01-17 | End: 2022-07-22

## 2022-02-27 RX ADMIN — ONDANSETRON 4 MG: 2 INJECTION INTRAMUSCULAR; INTRAVENOUS at 19:48

## 2022-02-27 RX ADMIN — HYDROMORPHONE HYDROCHLORIDE 0.5 MG: 1 INJECTION, SOLUTION INTRAMUSCULAR; INTRAVENOUS; SUBCUTANEOUS at 19:48

## 2022-02-27 RX ADMIN — IOPAMIDOL 100 ML: 755 INJECTION, SOLUTION INTRAVENOUS at 20:55

## 2022-02-27 RX ADMIN — SODIUM CHLORIDE, POTASSIUM CHLORIDE, SODIUM LACTATE AND CALCIUM CHLORIDE 1000 ML: 600; 310; 30; 20 INJECTION, SOLUTION INTRAVENOUS at 19:48

## 2022-02-27 RX ADMIN — HYDROMORPHONE HYDROCHLORIDE 0.5 MG: 1 INJECTION, SOLUTION INTRAMUSCULAR; INTRAVENOUS; SUBCUTANEOUS at 20:36

## 2022-02-28 NOTE — ED TRIAGE NOTES
Patient with only R kidney here with R flank pain that began on Thursday, noticed blood in urine today in nephrostomy bag and feeling weak and nauseous today, as well.

## 2022-02-28 NOTE — ED PROVIDER NOTES
Emergency Department Encounter         FINAL IMPRESSION:  Flank pain, UTI        ED COURSE AND MEDICAL DECISION MAKING       ED Course as of 02/27/22 2131   [place] Feb 27, 2022 2018 Patient is a 36-year-old female with a history of longstanding right-sided flank pain due to congenital single kidney, history of ureteral stenosis, urostomy, Oswaldo and ureteral stents as well as ureteral stones, here from home with 4 days of right flank pain as well as some tenting of blood in her urostomy bag.  Mild nausea.  No fevers chills chest pain or trouble breathing.  No vaginal bleeding or discharge.  No bowel movement changes.  On arrival her vitals are stable.  She looks well clinically.  Heart and lungs normal.  Abdomen generally benign.  Right-sided urostomy tube is in place.  No surrounding erythema or signs of infection or induration.   2122 Urine Culture   -We will start patient on Keflex here.  Urinalysis suggest infection.  Culture pending.  CT normal.  Patient pain controlled here.  No signs of sepsis on vitals here with no tachycardia or fever.  Plan for close outpatient follow-up.  Patient has tolerated Keflex in the past.    7:07 PM The medical student evaluated the patient and provided initial report.  7:29 PM I met with the patient, obtained history, performed an initial exam, and discussed options and plan for diagnostics and treatment here in the ED. PPE worn including surgical mask, surgical gloves.  9:30 PM I rechecked and updated the patient on results. Discussed plans for discharge.            At the conclusion of the encounter I discussed the results of all the tests and the disposition. The questions were answered. The patient or family acknowledged understanding and was agreeable with the care plan.                  MEDICATIONS GIVEN IN THE EMERGENCY DEPARTMENT:  Medications   lactated ringers BOLUS 1,000 mL (0 mLs Intravenous Stopped 2/27/22 2102)   HYDROmorphone (PF) (DILAUDID) injection 0.5 mg (0.5 mg  Intravenous Given 2/27/22 1948)   ondansetron (ZOFRAN) injection 4 mg (4 mg Intravenous Given 2/27/22 1948)   HYDROmorphone (PF) (DILAUDID) injection 0.5 mg (0.5 mg Intravenous Given 2/27/22 2036)   iopamidol (ISOVUE-370) solution 100 mL (100 mLs Intravenous Given 2/27/22 2055)       NEW PRESCRIPTIONS STARTED AT TODAY'S ED VISIT:  New Prescriptions    CEPHALEXIN (KEFLEX) 500 MG CAPSULE    Take 1 capsule (500 mg) by mouth 4 times daily for 7 days       HPI     Patient information obtained from: Patient    Use of Interpretor: N/A    Jacqueline Pappas is a 36 year old female with a pertinent history of congential abscessed of one kidney (left), kidney stones, pyelonephritis, s/p nephrostomy tube placement who presents to this ED by walk in for evaluation of flank pain, hematuria. Patient reports she has been experiencing intermittent right sided flank pain since 02/24 (3 days ago). She states her pain feels similar to her previous kidney stones and kidney infections. She has been taking Tylenol for pain management which works for the first hour then her pain returns. This morning she reports waking up and noticing blood in her nephrostomy bag, but states she has not urinated blood. She reports morphine gives her headaches.    Denies fever, chills, shortness of breath, changes in bowel habits, rashes, headaches, recent illnesses or other symptoms at this time.      REVIEW OF SYSTEMS:  Review of Systems   Constitutional: Negative for fever, malaise  HEENT: Negative runny nose, sore throat, ear pain, neck pain  Respiratory: Negative for shortness of breath, cough, congestion  Cardiovascular: Negative for chest pain, leg edema  Gastrointestinal: Negative for abdominal distention, abdominal pain, constipation, vomiting, nausea, diarrhea  Genitourinary: Positive for flank pain (right sided, intermittent), hematuria. Negative for dysuria.  Integument: Negative for rash, skin breakdown  Neurological: Negative for paresthesias,  weakness, headache.  Musculoskeletal: Negative for joint pain, joint swelling      All other systems reviewed and are negative.          MEDICAL HISTORY     Past Medical History:   Diagnosis Date     Acute renal failure (H)      Anemia      Anemia      Calculus of kidney      Congenital absence of left kidney      Congenital absence of one kidney      Depression      Depression      Hydronephrosis      Kidney stone      Pyelonephritis      Pyelonephritis      Smoker      Ureteral stricture      UTI (urinary tract infection)      Wrist fracture        Past Surgical History:   Procedure Laterality Date      SECTION        SECTION      x1     COMBINED CYSTOSCOPY, RETROGRADES, URETEROSCOPY, LASER HOLMIUM LITHOTRIPSY URETER(S), INSERT STENT Right 2016    Procedure: COMBINED CYSTOSCOPY, RETROGRADES, URETEROSCOPY, LASER HOLMIUM LITHOTRIPSY URETER(S), INSERT STENT;  Surgeon: Florentino Awad MD;  Location: [facility] OR     CYSTOSCOPY, URETEROSCOPY, COMBINED Right 2016    Procedure: COMBINED CYSTOSCOPY, URETEROSCOPY;  Surgeon: Florentino Awad MD;  Location: [facility] OR     IR NEPHROLITHOTOMY  2014     IR NEPHROSTOGRAM EXISITING ACCESS  10/18/2018     IR NEPHROSTOMY TUBE CHANGE RIGHT  2018     IR NEPHROSTOMY TUBE CHANGE RIGHT  2020     IR NEPHROSTOMY TUBE CHANGE RIGHT  2018     IR NEPHROSTOMY TUBE CHANGE RIGHT  2020     IR NEPHROSTOMY TUBE PLACEMENT RIGHT  2016     IR NEPHROSTOMY TUBE PLACEMENT RIGHT  2017     KIDNEY STONE SURGERY Right 2016     LASER HOLMIUM LITHOTRIPSY URETER(S), INSERT STENT, COMBINED Left 2016    Procedure: COMBINED CYSTOSCOPY, URETEROSCOPY, LASER HOLMIUM LITHOTRIPSY URETER(S), INSERT STENT;  Surgeon: Florentino Awad MD;  Location: [facility] OR     LASER HOLMIUM NEPHROLITHOTOMY VIA PERCUTANEOUS NEPHROSTOMY Right 2016    Procedure: LASER HOLMIUM NEPHROLITHOTOMY VIA PERCUTANEOUS NEPHROSTOMY;  Surgeon: Florentino Awad MD;   Location: [facility] OR     NEPHROSTOMY W/ INTRODUCTION OF CATHETER Right      OTHER SURGICAL HISTORY      Mole removednasal     OTHER SURGICAL HISTORY Right     Cystoscopy, ureteroscopy, laser11/02/2014 x2 with ureteral stent insertion     PERCUTANEOUS NEPHROSTOMY  2016    Procedure: PERCUTANEOUS NEPHROSTOMY;  Surgeon: Florentino Awad MD;  Location: [facility] OR     WISDOM TOOTH EXTRACTION       ZZC REMOVAL OF KIDNEY STONE         Social History     Tobacco Use     Smoking status: Current Every Day Smoker     Packs/day: 0.50     Years: 10.00     Pack years: 5.00     Last attempt to quit: 2016     Years since quittin.4     Smokeless tobacco: Former User     Quit date: 2016     Tobacco comment: has information from last admission.   Substance Use Topics     Alcohol use: Yes     Alcohol/week: 0.0 standard drinks     Comment: Alcoholic Drinks/day: occ     Drug use: No       albuterol (PROAIR HFA/PROVENTIL HFA/VENTOLIN HFA) 108 (90 Base) MCG/ACT inhaler  cephALEXin (KEFLEX) 500 MG capsule  sertraline (ZOLOFT) 100 MG tablet  SPRINTEC 28 0.25-35 MG-MCG tablet            PHYSICAL EXAM     /88   Pulse 86   Temp 98.2  F (36.8  C) (Oral)   Resp 18   Wt 83.9 kg (185 lb)   SpO2 99%   BMI 28.13 kg/m        PHYSICAL EXAM:     General: Patient appears well, nontoxic, comfortable  HEENT: Moist mucous membranes, no tongue swelling.  No head trauma.  No midline neck pain.  Cardiovascular: Normal rate, normal rhythm, no extremity edema.  No appreciable murmur.  Respiratory: No signs of respiratory distress, lungs are clear to auscultation bilaterally with no wheezes rhonchi or rales.  Abdominal: Soft, nontender, nondistended, no palpable masses, no guarding, no rebound  Musculoskeletal: Right sided urostomy tube in place.  No redness or erythema surrounding the urostomy.  Full range of motion of joints, no deformities appreciated.  Neurological: Alert and oriented, grossly neurologically intact.  Psychological:  Normal affect and mood.  Integument: No rashes appreciated          RESULTS       Labs Ordered and Resulted from Time of ED Arrival to Time of ED Departure   COMPREHENSIVE METABOLIC PANEL - Abnormal       Result Value    Sodium 135 (*)     Potassium 3.7      Chloride 106      Carbon Dioxide (CO2) 17 (*)     Anion Gap 12      Urea Nitrogen 10      Creatinine 0.89      Calcium 9.7      Glucose 158 (*)     Alkaline Phosphatase 59      AST 18      ALT 16      Protein Total 6.7      Albumin 3.4 (*)     Bilirubin Total 0.4      GFR Estimate 87     ROUTINE UA WITH MICROSCOPIC REFLEX TO CULTURE - Abnormal    Color Urine Light Yellow      Appearance Urine Turbid (*)     Glucose Urine Negative      Bilirubin Urine Negative      Ketones Urine Negative      Specific Gravity Urine 1.009      Blood Urine 1.0 mg/dL (*)     pH Urine 7.5 (*)     Protein Albumin Urine 200  (*)     Urobilinogen Urine <2.0      Nitrite Urine Negative      Leukocyte Esterase Urine 500 Duglas/uL (*)     Bacteria Urine Few (*)     Mucus Urine Present (*)     Amorphous Crystals Urine Few (*)     RBC Urine 82 (*)     WBC Urine 39 (*)    CBC WITH PLATELETS AND DIFFERENTIAL    WBC Count 6.8      RBC Count 4.20      Hemoglobin 12.8      Hematocrit 38.9      MCV 93      MCH 30.5      MCHC 32.9      RDW 13.7      Platelet Count 180      % Neutrophils 65      % Lymphocytes 28      % Monocytes 5      % Eosinophils 2      % Basophils 0      % Immature Granulocytes 0      NRBCs per 100 WBC 0      Absolute Neutrophils 4.3      Absolute Lymphocytes 1.9      Absolute Monocytes 0.4      Absolute Eosinophils 0.1      Absolute Basophils 0.0      Absolute Immature Granulocytes 0.0      Absolute NRBCs 0.0     URINE CULTURE       CT Abdomen Pelvis w Contrast   Final Result   IMPRESSION:    1.  Significant atrophy left kidney with compensatory hypertrophy of the right kidney. Unchanged position of the percutaneous right nephrostomy within the lower pole. Multiple nonobstructing  stones in the right kidney. No ureteric stone.                        PROCEDURES:  Procedures:  Procedures       I, Parker Tolliver am serving as a scribe to document services personally performed by Aroldo Mendoza DO, based on my observations and the provider's statements to me.  I, Aroldo Mendoza DO, attest that Parker Tolliver is acting in a scribe capacity, has observed my performance of the services and has documented them in accordance with my direction.    Aroldo Mendoza DO  Emergency Medicine  [facility] EMERGENCY ROOM     Aroldo Mendoza DO  02/27/22 [id number]

## 2022-02-28 NOTE — ED TRIAGE NOTES
Patient with only R kidney here with R flank pain that began on Thursday, noticed blood in urine today in nephrostomy bag and feeling weak and nauseous today, as well.        No

## 2022-02-28 NOTE — DISCHARGE INSTRUCTIONS
Due to the amount of bacteria and white blood cells in her urine, we will start you on it basic antibiotic called Keflex.  Please return for any fevers chills nausea vomiting.  You also received a call from us in the next 1 to 2 days if your urine culture grows out any bacteria.

## 2022-03-01 LAB — BACTERIA UR CULT: NORMAL

## 2022-03-16 ENCOUNTER — APPOINTMENT (OUTPATIENT)
Dept: CT IMAGING | Facility: CLINIC | Age: 37
End: 2022-03-16
Attending: EMERGENCY MEDICINE
Payer: COMMERCIAL

## 2022-03-16 ENCOUNTER — HOSPITAL ENCOUNTER (EMERGENCY)
Facility: CLINIC | Age: 37
Discharge: HOME | End: 2022-03-16
Attending: EMERGENCY MEDICINE | Admitting: EMERGENCY MEDICINE
Payer: COMMERCIAL

## 2022-03-16 VITALS
SYSTOLIC BLOOD PRESSURE: 113 MMHG | HEART RATE: 66 BPM | DIASTOLIC BLOOD PRESSURE: 65 MMHG | TEMPERATURE: 98.6 F | HEIGHT: 60 IN | WEIGHT: 185 LBS | RESPIRATION RATE: 20 BRPM | OXYGEN SATURATION: 96 % | BODY MASS INDEX: 36.32 KG/M2

## 2022-03-16 DIAGNOSIS — N10 ACUTE PYELONEPHRITIS: ICD-10-CM

## 2022-03-16 LAB
ALBUMIN UR-MCNC: 70 MG/DL
ANION GAP SERPL CALCULATED.3IONS-SCNC: 8 MMOL/L (ref 5–18)
APPEARANCE UR: ABNORMAL
BACTERIA #/AREA URNS HPF: ABNORMAL /HPF
BASOPHILS # BLD AUTO: 0.1 10E3/UL (ref 0–0.2)
BASOPHILS NFR BLD AUTO: 1 %
BILIRUB UR QL STRIP: NEGATIVE
BUN SERPL-MCNC: 15 MG/DL (ref 8–22)
CALCIUM SERPL-MCNC: 9.9 MG/DL (ref 8.5–10.5)
CHLORIDE BLD-SCNC: 105 MMOL/L (ref 98–107)
CO2 SERPL-SCNC: 23 MMOL/L (ref 22–31)
COLOR UR AUTO: ABNORMAL
CREAT SERPL-MCNC: 1.08 MG/DL (ref 0.6–1.1)
EOSINOPHIL # BLD AUTO: 0.2 10E3/UL (ref 0–0.7)
EOSINOPHIL NFR BLD AUTO: 2 %
ERYTHROCYTE [DISTWIDTH] IN BLOOD BY AUTOMATED COUNT: 13.2 % (ref 10–15)
GFR SERPL CREATININE-BSD FRML MDRD: 69 ML/MIN/1.73M2
GLUCOSE BLD-MCNC: 89 MG/DL (ref 70–125)
GLUCOSE UR STRIP-MCNC: NEGATIVE MG/DL
HCT VFR BLD AUTO: 42.1 % (ref 35–47)
HGB BLD-MCNC: 13.8 G/DL (ref 11.7–15.7)
HGB UR QL STRIP: ABNORMAL
IMM GRANULOCYTES # BLD: 0 10E3/UL
IMM GRANULOCYTES NFR BLD: 0 %
KETONES UR STRIP-MCNC: NEGATIVE MG/DL
LACTATE SERPL-SCNC: 0.8 MMOL/L (ref 0.7–2)
LEUKOCYTE ESTERASE UR QL STRIP: ABNORMAL
LYMPHOCYTES # BLD AUTO: 2.4 10E3/UL (ref 0.8–5.3)
LYMPHOCYTES NFR BLD AUTO: 29 %
MCH RBC QN AUTO: 30.3 PG (ref 26.5–33)
MCHC RBC AUTO-ENTMCNC: 32.8 G/DL (ref 31.5–36.5)
MCV RBC AUTO: 93 FL (ref 78–100)
MONOCYTES # BLD AUTO: 0.6 10E3/UL (ref 0–1.3)
MONOCYTES NFR BLD AUTO: 7 %
NEUTROPHILS # BLD AUTO: 5.2 10E3/UL (ref 1.6–8.3)
NEUTROPHILS NFR BLD AUTO: 61 %
NITRATE UR QL: POSITIVE
NRBC # BLD AUTO: 0 10E3/UL
NRBC BLD AUTO-RTO: 0 /100
PH UR STRIP: 7 [PH] (ref 5–7)
PLATELET # BLD AUTO: 210 10E3/UL (ref 150–450)
POTASSIUM BLD-SCNC: 3.7 MMOL/L (ref 3.5–5)
RBC # BLD AUTO: 4.55 10E6/UL (ref 3.8–5.2)
RBC URINE: 20 /HPF
SODIUM SERPL-SCNC: 136 MMOL/L (ref 136–145)
SP GR UR STRIP: 1.01 (ref 1–1.03)
SQUAMOUS EPITHELIAL: 2 /HPF
UROBILINOGEN UR STRIP-MCNC: <2 MG/DL
WBC # BLD AUTO: 8.4 10E3/UL (ref 4–11)
WBC CLUMPS #/AREA URNS HPF: PRESENT /HPF
WBC URINE: >182 /HPF

## 2022-03-16 PROCEDURE — 96376 TX/PRO/DX INJ SAME DRUG ADON: CPT

## 2022-03-16 PROCEDURE — 85025 COMPLETE CBC W/AUTO DIFF WBC: CPT | Performed by: EMERGENCY MEDICINE

## 2022-03-16 PROCEDURE — 36415 COLL VENOUS BLD VENIPUNCTURE: CPT | Performed by: EMERGENCY MEDICINE

## 2022-03-16 PROCEDURE — 250N000011 HC RX IP 250 OP 636: Performed by: EMERGENCY MEDICINE

## 2022-03-16 PROCEDURE — 74176 CT ABD & PELVIS W/O CONTRAST: CPT

## 2022-03-16 PROCEDURE — 99285 EMERGENCY DEPT VISIT HI MDM: CPT | Mod: 25

## 2022-03-16 PROCEDURE — 83605 ASSAY OF LACTIC ACID: CPT | Performed by: EMERGENCY MEDICINE

## 2022-03-16 PROCEDURE — 80048 BASIC METABOLIC PNL TOTAL CA: CPT | Performed by: EMERGENCY MEDICINE

## 2022-03-16 PROCEDURE — 96361 HYDRATE IV INFUSION ADD-ON: CPT

## 2022-03-16 PROCEDURE — 87086 URINE CULTURE/COLONY COUNT: CPT | Performed by: EMERGENCY MEDICINE

## 2022-03-16 PROCEDURE — 87040 BLOOD CULTURE FOR BACTERIA: CPT | Performed by: EMERGENCY MEDICINE

## 2022-03-16 PROCEDURE — 258N000003 HC RX IP 258 OP 636: Performed by: EMERGENCY MEDICINE

## 2022-03-16 PROCEDURE — 96375 TX/PRO/DX INJ NEW DRUG ADDON: CPT

## 2022-03-16 PROCEDURE — 96365 THER/PROPH/DIAG IV INF INIT: CPT

## 2022-03-16 PROCEDURE — 81001 URINALYSIS AUTO W/SCOPE: CPT | Performed by: EMERGENCY MEDICINE

## 2022-03-16 RX ORDER — CEFDINIR 300 MG/1
600 CAPSULE ORAL DAILY
Qty: 28 CAPSULE | Refills: 0 | Status: SHIPPED | OUTPATIENT
Start: 2022-03-16 | End: 2022-03-30

## 2022-03-16 RX ORDER — SODIUM CHLORIDE 9 MG/ML
INJECTION, SOLUTION INTRAVENOUS CONTINUOUS
Status: DISCONTINUED | OUTPATIENT
Start: 2022-03-16 | End: 2022-03-16 | Stop reason: HOSPADM

## 2022-03-16 RX ORDER — HYDROMORPHONE HYDROCHLORIDE 1 MG/ML
0.5 INJECTION, SOLUTION INTRAMUSCULAR; INTRAVENOUS; SUBCUTANEOUS
Status: DISCONTINUED | OUTPATIENT
Start: 2022-03-16 | End: 2022-03-16 | Stop reason: HOSPADM

## 2022-03-16 RX ORDER — ONDANSETRON 2 MG/ML
4 INJECTION INTRAMUSCULAR; INTRAVENOUS EVERY 30 MIN PRN
Status: DISCONTINUED | OUTPATIENT
Start: 2022-03-16 | End: 2022-03-16 | Stop reason: HOSPADM

## 2022-03-16 RX ORDER — CEFTRIAXONE 1 G/1
1 INJECTION, POWDER, FOR SOLUTION INTRAMUSCULAR; INTRAVENOUS ONCE
Status: COMPLETED | OUTPATIENT
Start: 2022-03-16 | End: 2022-03-16

## 2022-03-16 RX ADMIN — HYDROMORPHONE HYDROCHLORIDE 0.5 MG: 1 INJECTION, SOLUTION INTRAMUSCULAR; INTRAVENOUS; SUBCUTANEOUS at 02:33

## 2022-03-16 RX ADMIN — HYDROMORPHONE HYDROCHLORIDE 0.5 MG: 1 INJECTION, SOLUTION INTRAMUSCULAR; INTRAVENOUS; SUBCUTANEOUS at 01:33

## 2022-03-16 RX ADMIN — CEFTRIAXONE 1 G: 1 INJECTION, POWDER, FOR SOLUTION INTRAMUSCULAR; INTRAVENOUS at 02:03

## 2022-03-16 RX ADMIN — SODIUM CHLORIDE 1000 ML: 9 INJECTION, SOLUTION INTRAVENOUS at 01:20

## 2022-03-16 RX ADMIN — ONDANSETRON 4 MG: 2 INJECTION INTRAMUSCULAR; INTRAVENOUS at 01:35

## 2022-03-16 ASSESSMENT — ENCOUNTER SYMPTOMS
FEVER: 0
FREQUENCY: 1
HEMATURIA: 0
DYSURIA: 1
VOMITING: 0
NAUSEA: 1

## 2022-03-16 NOTE — ED PROVIDER NOTES
EMERGENCY DEPARTMENT ENCOUNTER      NAME: Jacqueline Pappas  AGE: 36 year old female  YOB: 1985  MRN: [id number]  EVALUATION DATE & TIME: 3/16/2022 12:45 AM    PCP: Pao Montague    ED PROVIDER: Ranjit Rojas M.D.      Chief Complaint   Patient presents with     Flank Pain         FINAL IMPRESSION:  1. Acute pyelonephritis          ED COURSE & MEDICAL DECISION MAKING:    Pertinent Labs & Imaging studies reviewed. (See chart for details)  36 year old female presents to the Emergency Department for evaluation of right flank pain and difficulty urinating.  Patient has a complex urinary history including chronic neph tube.  Urinalysis does show signs of infection.  White count is normal.  CT scan does not show any acute changes.  Given IV ceftriaxone here.  Will discharge home with Omnicef.  She will follow up with your urologist at [facility] by telephone tomorrow.  She will return for any worsening symptoms.  No signs of obstruction.  No signs of kidney stones.  Patient    12:58 AM I met with the patient to gather history and to perform my initial exam. I discussed the plan for care while in the Emergency Department. PPE:  Surgical mask, eye shield, and gloves.   2:12 AM Rechecked and updated patient on her results. I discussed the plan for discharge with the patient, and patient is agreeable. We discussed supportive cares at home and reasons for return to the ER including new or worsening symptoms - all questions and concerns addressed. Patient to be discharged by RN.      At the conclusion of the encounter I discussed the results of all of the tests and the disposition. The questions were answered. The patient or family acknowledged understanding and was agreeable with the care plan.            MEDICATIONS GIVEN IN THE EMERGENCY:  Medications   0.9% sodium chloride BOLUS (1,000 mLs Intravenous New Bag 3/16/22 0120)     Followed by   sodium chloride 0.9% infusion (has no administration in time range)    ondansetron (ZOFRAN) injection 4 mg (4 mg Intravenous Given 3/16/22 0135)   HYDROmorphone (PF) (DILAUDID) injection 0.5 mg (0.5 mg Intravenous Given 3/16/22 0233)   cefTRIAXone (ROCEPHIN) 1 g vial to attach to  mL bag for ADULTS or NS 50 mL bag for PEDS (0 g Intravenous Stopped 3/16/22 0237)       NEW PRESCRIPTIONS STARTED AT TODAY'S ER VISIT  New Prescriptions    CEFDINIR (OMNICEF) 300 MG CAPSULE    Take 2 capsules (600 mg) by mouth daily for 14 days          =================================================================    HPI    Patient information was obtained from: Patient    Use of : N/A       Jacqueline Pappas is a 36 year old female with a pertinent history of congential absence of one kidney (left), calculus of kidney, pyelonephritis, acute renal failure, hydronephrosis, UTI, ureteral stricture, s/p nephrostomy tube who presents to this ED by walk in for evaluation of flank pain. Patient reports right flank pain onset last night. She notes associated increased frequency, dysuria, and slight odorous urine. Denies hematuria. She additionally endorses nausea. Denies vomiting or fever. She notes history of 1 kidney, nephrostomy tube in place (since 2007), and depression. Denies pregnancy, vaginal bleeding, or vaginal discharge. Patient reports allergies to vancomycin, sulfa, and penicillin. Patient denies any additional complaints at this time.     Patient was dropped off by her father.     REVIEW OF SYSTEMS   Review of Systems   Constitutional: Negative for fever.   Gastrointestinal: Positive for nausea. Negative for vomiting.   Genitourinary: Positive for dysuria and frequency. Negative for hematuria, vaginal bleeding and vaginal discharge.        Positive for odorous urine.    All other systems reviewed and are negative.       PAST MEDICAL HISTORY:  Past Medical History:   Diagnosis Date     Acute renal failure (H)      Anemia      Anemia      Calculus of kidney      Congenital absence of  left kidney      Congenital absence of one kidney      Depression      Depression      Hydronephrosis      Kidney stone     at age 29     Pyelonephritis      Pyelonephritis      Smoker      Ureteral stricture      UTI (urinary tract infection)      Wrist fracture     Left       PAST SURGICAL HISTORY:  Past Surgical History:   Procedure Laterality Date      SECTION        SECTION      x1     COMBINED CYSTOSCOPY, RETROGRADES, URETEROSCOPY, LASER HOLMIUM LITHOTRIPSY URETER(S), INSERT STENT Right 2016    Procedure: COMBINED CYSTOSCOPY, RETROGRADES, URETEROSCOPY, LASER HOLMIUM LITHOTRIPSY URETER(S), INSERT STENT;  Surgeon: Florentino Awad MD;  Location: [facility] OR     CYSTOSCOPY, URETEROSCOPY, COMBINED Right 2016    Procedure: COMBINED CYSTOSCOPY, URETEROSCOPY;  Surgeon: Florentino Awad MD;  Location: [facility] OR     IR NEPHROLITHOTOMY  2014     IR NEPHROSTOGRAM EXISITING ACCESS  10/18/2018     IR NEPHROSTOMY TUBE CHANGE RIGHT  2018     IR NEPHROSTOMY TUBE CHANGE RIGHT  2020     IR NEPHROSTOMY TUBE CHANGE RIGHT  2018     IR NEPHROSTOMY TUBE CHANGE RIGHT  2020     IR NEPHROSTOMY TUBE PLACEMENT RIGHT  2016     IR NEPHROSTOMY TUBE PLACEMENT RIGHT  2017     KIDNEY STONE SURGERY Right 2016     LASER HOLMIUM LITHOTRIPSY URETER(S), INSERT STENT, COMBINED Left 2016    Procedure: COMBINED CYSTOSCOPY, URETEROSCOPY, LASER HOLMIUM LITHOTRIPSY URETER(S), INSERT STENT;  Surgeon: Florentino Awad MD;  Location: [facility] OR     LASER HOLMIUM NEPHROLITHOTOMY VIA PERCUTANEOUS NEPHROSTOMY Right 2016    Procedure: LASER HOLMIUM NEPHROLITHOTOMY VIA PERCUTANEOUS NEPHROSTOMY;  Surgeon: Florentino Awad MD;  Location: [facility] OR     NEPHROSTOMY W/ INTRODUCTION OF CATHETER Right      OTHER SURGICAL HISTORY      Mole removednasal     OTHER SURGICAL HISTORY Right     Cystoscopy, ureteroscopy, laser11/02/2014 x2 with ureteral stent insertion     PERCUTANEOUS NEPHROSTOMY   11/1/2016    Procedure: PERCUTANEOUS NEPHROSTOMY;  Surgeon: Florentino Awad MD;  Location: [facility] OR     WISDOM TOOTH EXTRACTION       ZZC REMOVAL OF KIDNEY STONE             CURRENT MEDICATIONS:    Current Facility-Administered Medications   Medication     HYDROmorphone (PF) (DILAUDID) injection 0.5 mg     ondansetron (ZOFRAN) injection 4 mg     sodium chloride 0.9% infusion     Current Outpatient Medications   Medication     cefdinir (OMNICEF) 300 MG capsule     albuterol (PROAIR HFA/PROVENTIL HFA/VENTOLIN HFA) 108 (90 Base) MCG/ACT inhaler     sertraline (ZOLOFT) 100 MG tablet     SPRINTEC 28 0.25-35 MG-MCG tablet         ALLERGIES:  Allergies   Allergen Reactions     Vancomycin      Desogestrel-Ethinyl Estradiol Angioedema and Swelling     Swelling of hands and feet per pt.  Swelling of hands and feet per pt.  Swelling of hands and feet per pt.  Swelling of hands and feet per pt.  Swelling of hands and feet per pt.  Swelling of hands and feet per pt.  Swelling of hands and feet per pt.  Swelling of hands and feet per pt.       Penicillins Rash     As a child per mother; mother unsure if has tolerated another PCN since. Tolerated ampicillin 9/20/16     Sulfa Drugs Rash       FAMILY HISTORY:  Family History   Problem Relation Age of Onset     Anesthesia Reaction No family hx of      Malignant Hyperthermia No family hx of      Heart Disease Maternal Uncle         heart failure     Coronary Artery Disease Other      Heart Disease Maternal Grandmother         pacemaker     Gout Paternal Grandfather      Prostate Cancer Maternal Aunt         breast     Heart Disease Maternal Aunt         pacemaker     Heart Disease Maternal Aunt         pacemaker     Heart Disease Maternal Aunt         pacemaker       SOCIAL HISTORY:   Social History     Socioeconomic History     Marital status: Single     Spouse name: Not on file     Number of children: Not on file     Years of education: Not on file     Highest education level:  Not on file   Occupational History     Not on file   Tobacco Use     Smoking status: Current Every Day Smoker     Packs/day: 0.50     Years: 10.00     Pack years: 5.00     Last attempt to quit: 2016     Years since quittin.4     Smokeless tobacco: Former User     Quit date: 2016     Tobacco comment: has information from last admission.   Substance and Sexual Activity     Alcohol use: Yes     Alcohol/week: 0.0 standard drinks     Comment: Alcoholic Drinks/day: occ     Drug use: No     Sexual activity: Not on file   Other Topics Concern     Not on file   Social History Narrative     Not on file     Social Determinants of Health     Financial Resource Strain: Not on file   Food Insecurity: Not on file   Transportation Needs: Not on file   Physical Activity: Not on file   Stress: Not on file   Social Connections: Not on file   Intimate Partner Violence: Not on file   Housing Stability: Not on file       VITALS:  /70   Pulse 66   Temp 98.6  F (37  C) (Oral)   Resp 20   Ht 1.524 m (5')   Wt 83.9 kg (185 lb)   SpO2 99%   BMI 36.13 kg/m      PHYSICAL EXAM    Physical Exam  Constitutional:       General: She is not in acute distress.     Appearance: She is not diaphoretic.   HENT:      Head: Atraumatic.      Mouth/Throat:      Pharynx: No oropharyngeal exudate.   Eyes:      General: No scleral icterus.     Pupils: Pupils are equal, round, and reactive to light.   Cardiovascular:      Heart sounds: Normal heart sounds.   Pulmonary:      Effort: No respiratory distress.      Breath sounds: Normal breath sounds.   Abdominal:      Palpations: Abdomen is soft.      Tenderness: There is no abdominal tenderness. There is no guarding or rebound.   Genitourinary:     Comments: Right-sided neph tube intact  Musculoskeletal:         General: No tenderness.   Skin:     General: Skin is warm.      Findings: No rash.   Neurological:      General: No focal deficit present.      Mental Status: She is alert.            LAB:  All pertinent labs reviewed and interpreted.  Labs Ordered and Resulted from Time of ED Arrival to Time of ED Departure   ROUTINE UA WITH MICROSCOPIC REFLEX TO CULTURE - Abnormal       Result Value    Color Urine Light Yellow      Appearance Urine Turbid (*)     Glucose Urine Negative      Bilirubin Urine Negative      Ketones Urine Negative      Specific Gravity Urine 1.007      Blood Urine 0.2 mg/dL (*)     pH Urine 7.0      Protein Albumin Urine 70  (*)     Urobilinogen Urine <2.0      Nitrite Urine Positive (*)     Leukocyte Esterase Urine 500 Duglas/uL (*)     Bacteria Urine Few (*)     WBC Clumps Urine Present (*)     RBC Urine 20 (*)     WBC Urine >182 (*)     Squamous Epithelials Urine 2 (*)    BASIC METABOLIC PANEL - Normal    Sodium 136      Potassium 3.7      Chloride 105      Carbon Dioxide (CO2) 23      Anion Gap 8      Urea Nitrogen 15      Creatinine 1.08      Calcium 9.9      Glucose 89      GFR Estimate 69     LACTIC ACID WHOLE BLOOD - Normal    Lactic Acid 0.8     CBC WITH PLATELETS AND DIFFERENTIAL    WBC Count 8.4      RBC Count 4.55      Hemoglobin 13.8      Hematocrit 42.1      MCV 93      MCH 30.3      MCHC 32.8      RDW 13.2      Platelet Count 210      % Neutrophils 61      % Lymphocytes 29      % Monocytes 7      % Eosinophils 2      % Basophils 1      % Immature Granulocytes 0      NRBCs per 100 WBC 0      Absolute Neutrophils 5.2      Absolute Lymphocytes 2.4      Absolute Monocytes 0.6      Absolute Eosinophils 0.2      Absolute Basophils 0.1      Absolute Immature Granulocytes 0.0      Absolute NRBCs 0.0     URINE CULTURE   BLOOD CULTURE       RADIOLOGY:  Reviewed all pertinent imaging. Please see official radiology report.  CT Abdomen Pelvis w/o Contrast   Final Result   IMPRESSION:    1.  Right percutaneous nephrostomy tube remains in place in the lower pole of the right kidney. No hydronephrosis.   2.  There are again several right intrarenal stones. No ureteral stone.   3.   Multiple right renal cysts.   4.  Marked left renal atrophy.                   I, Haider Aviles, am serving as a scribe to document services personally performed by Dr. Ranjit Rojas, based on my observation and the provider's statements to me. I, Ranjit Rojas MD attest that Haider Aviles is acting in a scribe capacity, has observed my performance of the services and has documented them in accordance with my direction.    Ranjit Rojas M.D.  Emergency Medicine  [facility] EMERGENCY ROOM  [address] [id number] [phone]  Dept: [phone]     Ranjit Rojas MD  03/16/22 0333

## 2022-03-16 NOTE — ED TRIAGE NOTES
Right flank pain started last night. Nausea without emesis. Hx of kidney stones. Currently has nephrostomy tube in place due to patient being born with only one kidney. Frequency with urination.

## 2022-03-17 LAB — BACTERIA UR CULT: NORMAL

## 2022-03-21 LAB — BACTERIA BLD CULT: NO GROWTH

## 2022-04-03 ENCOUNTER — HOSPITAL ENCOUNTER (EMERGENCY)
Facility: CLINIC | Age: 37
Discharge: HOME | End: 2022-04-03
Attending: STUDENT IN AN ORGANIZED HEALTH CARE EDUCATION/TRAINING PROGRAM | Admitting: STUDENT IN AN ORGANIZED HEALTH CARE EDUCATION/TRAINING PROGRAM
Payer: COMMERCIAL

## 2022-04-03 VITALS
BODY MASS INDEX: 36.32 KG/M2 | TEMPERATURE: 98.7 F | DIASTOLIC BLOOD PRESSURE: 72 MMHG | WEIGHT: 185 LBS | HEART RATE: 62 BPM | RESPIRATION RATE: 16 BRPM | HEIGHT: 60 IN | SYSTOLIC BLOOD PRESSURE: 135 MMHG | OXYGEN SATURATION: 100 %

## 2022-04-03 DIAGNOSIS — R10.9 RIGHT FLANK PAIN: ICD-10-CM

## 2022-04-03 DIAGNOSIS — R11.0 NAUSEA: ICD-10-CM

## 2022-04-03 DIAGNOSIS — N39.0 URINARY TRACT INFECTION WITHOUT HEMATURIA, SITE UNSPECIFIED: ICD-10-CM

## 2022-04-03 PROBLEM — N99.528 NEPHROSTOMY COMPLICATION (H): Status: ACTIVE | Noted: 2022-04-03

## 2022-04-03 PROBLEM — R65.10 SIRS (SYSTEMIC INFLAMMATORY RESPONSE SYNDROME) (H): Status: ACTIVE | Noted: 2017-03-24

## 2022-04-03 PROBLEM — N23 RENAL COLIC ON RIGHT SIDE: Status: ACTIVE | Noted: 2022-04-03

## 2022-04-03 PROBLEM — R19.7 DIARRHEA, UNSPECIFIED TYPE: Status: ACTIVE | Noted: 2022-04-03

## 2022-04-03 PROBLEM — R11.14 BILIOUS VOMITING WITH NAUSEA: Status: ACTIVE | Noted: 2017-01-31

## 2022-04-03 PROBLEM — E66.01 MORBID OBESITY WITH BMI OF 40.0-44.9, ADULT (H): Status: ACTIVE | Noted: 2018-05-15

## 2022-04-03 PROBLEM — E87.6 HYPOKALEMIA: Status: ACTIVE | Noted: 2022-04-03

## 2022-04-03 PROBLEM — D72.829 LEUKOCYTOSIS, UNSPECIFIED TYPE: Status: ACTIVE | Noted: 2017-03-24

## 2022-04-03 PROBLEM — Z43.6: Status: ACTIVE | Noted: 2022-04-03

## 2022-04-03 PROBLEM — Z93.6 OTHER ARTIFICIAL OPENINGS OF URINARY TRACT STATUS (H): Status: ACTIVE | Noted: 2017-12-19

## 2022-04-03 PROBLEM — D69.6 THROMBOCYTOPENIA (H): Status: ACTIVE | Noted: 2022-04-03

## 2022-04-03 PROBLEM — E83.42 HYPOMAGNESEMIA: Status: ACTIVE | Noted: 2022-04-03

## 2022-04-03 PROBLEM — F17.200 SMOKER: Status: ACTIVE | Noted: 2022-01-17

## 2022-04-03 LAB
ALBUMIN UR-MCNC: 100 MG/DL
AMORPH CRY #/AREA URNS HPF: ABNORMAL /HPF
ANION GAP SERPL CALCULATED.3IONS-SCNC: 11 MMOL/L (ref 5–18)
APPEARANCE UR: ABNORMAL
BACTERIA #/AREA URNS HPF: ABNORMAL /HPF
BASOPHILS # BLD AUTO: 0 10E3/UL (ref 0–0.2)
BASOPHILS NFR BLD AUTO: 0 %
BILIRUB UR QL STRIP: NEGATIVE
BUN SERPL-MCNC: 10 MG/DL (ref 8–22)
C REACTIVE PROTEIN LHE: 0.8 MG/DL (ref 0–0.8)
CALCIUM SERPL-MCNC: 10 MG/DL (ref 8.5–10.5)
CHLORIDE BLD-SCNC: 108 MMOL/L (ref 98–107)
CO2 SERPL-SCNC: 21 MMOL/L (ref 22–31)
COLOR UR AUTO: YELLOW
CREAT SERPL-MCNC: 0.89 MG/DL (ref 0.6–1.1)
EOSINOPHIL # BLD AUTO: 0.1 10E3/UL (ref 0–0.7)
EOSINOPHIL NFR BLD AUTO: 2 %
ERYTHROCYTE [DISTWIDTH] IN BLOOD BY AUTOMATED COUNT: 13 % (ref 10–15)
GFR SERPL CREATININE-BSD FRML MDRD: 86 ML/MIN/1.73M2
GLUCOSE BLD-MCNC: 74 MG/DL (ref 70–125)
GLUCOSE UR STRIP-MCNC: NEGATIVE MG/DL
HCT VFR BLD AUTO: 39.5 % (ref 35–47)
HGB BLD-MCNC: 13.3 G/DL (ref 11.7–15.7)
HGB UR QL STRIP: ABNORMAL
IMM GRANULOCYTES # BLD: 0 10E3/UL
IMM GRANULOCYTES NFR BLD: 0 %
KETONES UR STRIP-MCNC: NEGATIVE MG/DL
LACTATE SERPL-SCNC: 1 MMOL/L (ref 0.7–2)
LEUKOCYTE ESTERASE UR QL STRIP: ABNORMAL
LYMPHOCYTES # BLD AUTO: 2.5 10E3/UL (ref 0.8–5.3)
LYMPHOCYTES NFR BLD AUTO: 32 %
MCH RBC QN AUTO: 30 PG (ref 26.5–33)
MCHC RBC AUTO-ENTMCNC: 33.7 G/DL (ref 31.5–36.5)
MCV RBC AUTO: 89 FL (ref 78–100)
MONOCYTES # BLD AUTO: 0.6 10E3/UL (ref 0–1.3)
MONOCYTES NFR BLD AUTO: 7 %
MUCOUS THREADS #/AREA URNS LPF: PRESENT /LPF
NEUTROPHILS # BLD AUTO: 4.7 10E3/UL (ref 1.6–8.3)
NEUTROPHILS NFR BLD AUTO: 59 %
NITRATE UR QL: POSITIVE
NRBC # BLD AUTO: 0 10E3/UL
NRBC BLD AUTO-RTO: 0 /100
PH UR STRIP: 7 [PH] (ref 5–7)
PLATELET # BLD AUTO: 215 10E3/UL (ref 150–450)
POTASSIUM BLD-SCNC: 3.5 MMOL/L (ref 3.5–5)
RBC # BLD AUTO: 4.43 10E6/UL (ref 3.8–5.2)
RBC URINE: 69 /HPF
SODIUM SERPL-SCNC: 140 MMOL/L (ref 136–145)
SP GR UR STRIP: 1.01 (ref 1–1.03)
SQUAMOUS EPITHELIAL: <1 /HPF
UROBILINOGEN UR STRIP-MCNC: <2 MG/DL
WBC # BLD AUTO: 8 10E3/UL (ref 4–11)
WBC URINE: >182 /HPF

## 2022-04-03 PROCEDURE — 250N000013 HC RX MED GY IP 250 OP 250 PS 637: Performed by: STUDENT IN AN ORGANIZED HEALTH CARE EDUCATION/TRAINING PROGRAM

## 2022-04-03 PROCEDURE — 81003 URINALYSIS AUTO W/O SCOPE: CPT | Performed by: STUDENT IN AN ORGANIZED HEALTH CARE EDUCATION/TRAINING PROGRAM

## 2022-04-03 PROCEDURE — 87086 URINE CULTURE/COLONY COUNT: CPT | Performed by: STUDENT IN AN ORGANIZED HEALTH CARE EDUCATION/TRAINING PROGRAM

## 2022-04-03 PROCEDURE — 83605 ASSAY OF LACTIC ACID: CPT | Performed by: STUDENT IN AN ORGANIZED HEALTH CARE EDUCATION/TRAINING PROGRAM

## 2022-04-03 PROCEDURE — 99284 EMERGENCY DEPT VISIT MOD MDM: CPT | Mod: 25

## 2022-04-03 PROCEDURE — 96365 THER/PROPH/DIAG IV INF INIT: CPT

## 2022-04-03 PROCEDURE — 86140 C-REACTIVE PROTEIN: CPT | Performed by: STUDENT IN AN ORGANIZED HEALTH CARE EDUCATION/TRAINING PROGRAM

## 2022-04-03 PROCEDURE — 85025 COMPLETE CBC W/AUTO DIFF WBC: CPT | Performed by: STUDENT IN AN ORGANIZED HEALTH CARE EDUCATION/TRAINING PROGRAM

## 2022-04-03 PROCEDURE — 250N000011 HC RX IP 250 OP 636: Performed by: STUDENT IN AN ORGANIZED HEALTH CARE EDUCATION/TRAINING PROGRAM

## 2022-04-03 PROCEDURE — 96375 TX/PRO/DX INJ NEW DRUG ADDON: CPT

## 2022-04-03 PROCEDURE — 36415 COLL VENOUS BLD VENIPUNCTURE: CPT | Performed by: STUDENT IN AN ORGANIZED HEALTH CARE EDUCATION/TRAINING PROGRAM

## 2022-04-03 PROCEDURE — 80048 BASIC METABOLIC PNL TOTAL CA: CPT | Performed by: STUDENT IN AN ORGANIZED HEALTH CARE EDUCATION/TRAINING PROGRAM

## 2022-04-03 RX ORDER — CIPROFLOXACIN 500 MG/1
500 TABLET, FILM COATED ORAL 2 TIMES DAILY
Qty: 10 TABLET | Refills: 0 | Status: SHIPPED | OUTPATIENT
Start: 2022-04-03 | End: 2022-04-08

## 2022-04-03 RX ORDER — ACETAMINOPHEN 325 MG/1
975 TABLET ORAL ONCE
Status: DISCONTINUED | OUTPATIENT
Start: 2022-04-03 | End: 2022-04-04 | Stop reason: HOSPADM

## 2022-04-03 RX ORDER — KETOROLAC TROMETHAMINE 15 MG/ML
15 INJECTION, SOLUTION INTRAMUSCULAR; INTRAVENOUS ONCE
Status: COMPLETED | OUTPATIENT
Start: 2022-04-03 | End: 2022-04-03

## 2022-04-03 RX ORDER — CEFTRIAXONE 1 G/1
1 INJECTION, POWDER, FOR SOLUTION INTRAMUSCULAR; INTRAVENOUS ONCE
Status: COMPLETED | OUTPATIENT
Start: 2022-04-03 | End: 2022-04-03

## 2022-04-03 RX ORDER — ONDANSETRON 2 MG/ML
4 INJECTION INTRAMUSCULAR; INTRAVENOUS ONCE
Status: COMPLETED | OUTPATIENT
Start: 2022-04-03 | End: 2022-04-03

## 2022-04-03 RX ORDER — MORPHINE SULFATE 4 MG/ML
4 INJECTION, SOLUTION INTRAMUSCULAR; INTRAVENOUS ONCE
Status: COMPLETED | OUTPATIENT
Start: 2022-04-03 | End: 2022-04-03

## 2022-04-03 RX ORDER — OXYCODONE HYDROCHLORIDE 5 MG/1
5 TABLET ORAL ONCE
Status: COMPLETED | OUTPATIENT
Start: 2022-04-03 | End: 2022-04-03

## 2022-04-03 RX ADMIN — KETOROLAC TROMETHAMINE 15 MG: 15 INJECTION, SOLUTION INTRAMUSCULAR; INTRAVENOUS at 22:49

## 2022-04-03 RX ADMIN — MORPHINE SULFATE 4 MG: 4 INJECTION, SOLUTION INTRAMUSCULAR; INTRAVENOUS at 21:44

## 2022-04-03 RX ADMIN — OXYCODONE HYDROCHLORIDE 5 MG: 5 TABLET ORAL at 21:25

## 2022-04-03 RX ADMIN — ONDANSETRON 4 MG: 2 INJECTION INTRAMUSCULAR; INTRAVENOUS at 21:44

## 2022-04-03 RX ADMIN — CEFTRIAXONE 1 G: 1 INJECTION, POWDER, FOR SOLUTION INTRAMUSCULAR; INTRAVENOUS at 22:43

## 2022-04-04 NOTE — ED TRIAGE NOTES
"Arrives to ED with c/o R flank pain beginning 2 days ago. Has nephrostomy tube d/t 1 kidney at birth. Pain radiates to low back. Denies dysuria. Reports \"it feels weird at the end when I pee\". Afebrile. +nausea. Emesis x1.   "

## 2022-04-04 NOTE — ED PROVIDER NOTES
EMERGENCY DEPARTMENT ENCOUNTER       ED Course & Medical Decision Making     8:24 PM I met with the patient, obtained history, performed an initial exam, and discussed options and plan for diagnostics and treatment here in the ED.   9:38 PM Informed by nurse that patient got oxycodone and started vomiting. Will put in orders for morphine and zofran.   10:34 PM Informed by nurse that patient is getting a headache from the morphine and is wondering if she can get another pain medication. Will order Tylenol.   10:44 PM Checked in on and updated patient. I discussed the plan for discharge with the patient, and patient is agreeable.  We discussed supportive cares at home and reasons for return to the ER including new or worsening symptoms - all questions and concerns addressed.  Patient to be discharged by RN.     Final Impression  37 year old female presents for evaluation of right flank pain ongoing the last 2 days, also had an episode of nausea and vomiting x1 earlier today, does have some low back pain.  History of congenital absence of the left kidney, right kidney has some type of chronic obstruction for which she has a nephrostomy tube in place and has for about 5 years, last was replaced about 2 months ago, due to have it replaced in about 2-3 weeks.  Denies any change in urinary output in terms of volume or discoloration, though thinks it may be a little bit of an odor, as well as some discomfort at the end of urination.  Passes urine through her urethra as well as through the nephrostomy tube.  Does have some right CVA tenderness on exam, though is otherwise nontoxic-appearing.  Fairly clear appearing urine in her nephrostomy tube and bag.  Oddly, on exam patient's nephrostomy tube does not appear to be secured by anything (no stitch, Tegaderm, StatLock, etc.) though appears to be in good functioning position as there is plenty of clear urine in the tubing and drainage bag.  Patient states that she just took a  "shower and did not put a dressing on yet, states that the stitches normally fall out so she told him just to stop putting stitches in.  No leukocytosis, WBC 8.0.  Lactic normal at 1.0.  CRP borderline elevated at 0.8.  Urinalysis positive for nitrates, leukocyte esterase, some bacteria and many WBCs, would clinically consider this UTI given patient's flank pain.  Given empiric dose of Rocephin while in the ED.  Will send home with course of ciprofloxacin and await culture/sensitivities.  Patient agreeable to this plan.  Was given some oxycodone, morphine and eventually Toradol for her flank pain.    Prior to making a final disposition on this patient the results of patient's tests and other diagnostic studies were discussed with the patient. All questions were answered. Patient expressed understanding of the plan and was amenable to it.    Medications   cefTRIAXone (ROCEPHIN) 1 g vial to attach to  mL bag for ADULTS or NS 50 mL bag for PEDS (1 g Intravenous New Bag 4/3/22 2243)   acetaminophen (TYLENOL) tablet 975 mg (975 mg Oral Not Given 4/3/22 2256)   oxyCODONE (ROXICODONE) tablet 5 mg (5 mg Oral Given 4/3/22 2125)   morphine (PF) injection 4 mg (4 mg Intravenous Given 4/3/22 2144)   ondansetron (ZOFRAN) injection 4 mg (4 mg Intravenous Given 4/3/22 2144)   ketorolac (TORADOL) injection 15 mg (15 mg Intravenous Given 4/3/22 2249)       Final Impression     1. Right flank pain    2. Nausea        Chief Complaint     Chief Complaint   Patient presents with     Flank Pain     Arrives to ED with c/o R flank pain beginning 2 days ago. Has nephrostomy tube d/t 1 kidney at birth. Pain radiates to low back. Denies dysuria. Reports \"it feels weird at the end when I pee\". Afebrile. +nausea. Emesis x1.     HPI     Jacqueline Pappas is a 37 year old female who presents for evaluation of right flank pain.     Patient has a pertinent medical history of congenital absence of left kidney, kidney stone, acute renal failure, " hydronephrosis, pyelonephrosis, ureteral stricture, anemia, and recurrent UTI. 5 years ago, patient had a nephrostomy tube placed in her right kidney since there is a ureteral stricture. Patient has the nephrostomy tube replaced every 3 months and has her next replacement scheduled on 2022 (in 18 days). Patient is still able to make urine and endorses that nephrostomy output has been normal. For the past couple days, patient has experienced right flank pain. She has also noticed that at the end of her urine stream, she will feel urethral pressure. Also endorses that urine is more odorous. She did vomit once.     Patient usual puts a bandage over the site of her nephrostomy tube but she did not today because she just took a shower before presenting to the ED. Patient used to have a stitch in the site, but this kept falling out, so she opted to not have a stitch at the site of her nephrostomy tube. Endorses that her last UTI was a month ago and that she gets UTIs monthly. Patient denies fever or any other complaints at this time.     I, Nellie Barron, am serving as a scribe to document services personally performed by Dr. Krzysztof Shoemaker MD, based on my observation and the provider's statements to me. I, Dr. Krzysztof Shoemaker MD attest that Nellie Barron is acting in a scribe capacity, has observed my performance of the services and has documented them in accordance with my direction.    Past Medical History     Past Medical History:   Diagnosis Date     Acute renal failure (H)      Anemia      Anemia      Calculus of kidney      Congenital absence of left kidney      Congenital absence of one kidney      Depression      Depression      Hydronephrosis      Kidney stone      Pyelonephritis      Pyelonephritis      Smoker      Ureteral stricture      UTI (urinary tract infection)      Wrist fracture      Past Surgical History:   Procedure Laterality Date      SECTION        SECTION      x1      COMBINED CYSTOSCOPY, RETROGRADES, URETEROSCOPY, LASER HOLMIUM LITHOTRIPSY URETER(S), INSERT STENT Right 8/30/2016    Procedure: COMBINED CYSTOSCOPY, RETROGRADES, URETEROSCOPY, LASER HOLMIUM LITHOTRIPSY URETER(S), INSERT STENT;  Surgeon: Florentino Awad MD;  Location: [facility] OR     CYSTOSCOPY, URETEROSCOPY, COMBINED Right 9/14/2016    Procedure: COMBINED CYSTOSCOPY, URETEROSCOPY;  Surgeon: Florentino Awad MD;  Location: [facility] OR     IR NEPHROLITHOTOMY  12/11/2014     IR NEPHROSTOGRAM EXISITING ACCESS  10/18/2018     IR NEPHROSTOMY TUBE CHANGE RIGHT  12/12/2018     IR NEPHROSTOMY TUBE CHANGE RIGHT  6/11/2020     IR NEPHROSTOMY TUBE CHANGE RIGHT  12/12/2018     IR NEPHROSTOMY TUBE CHANGE RIGHT  6/11/2020     IR NEPHROSTOMY TUBE PLACEMENT RIGHT  7/24/2016     IR NEPHROSTOMY TUBE PLACEMENT RIGHT  9/14/2017     KIDNEY STONE SURGERY Right 05/2016     LASER HOLMIUM LITHOTRIPSY URETER(S), INSERT STENT, COMBINED Left 11/1/2016    Procedure: COMBINED CYSTOSCOPY, URETEROSCOPY, LASER HOLMIUM LITHOTRIPSY URETER(S), INSERT STENT;  Surgeon: Florentino Awad MD;  Location: [facility] OR     LASER HOLMIUM NEPHROLITHOTOMY VIA PERCUTANEOUS NEPHROSTOMY Right 9/14/2016    Procedure: LASER HOLMIUM NEPHROLITHOTOMY VIA PERCUTANEOUS NEPHROSTOMY;  Surgeon: Florentino Awad MD;  Location: [facility] OR     NEPHROSTOMY W/ INTRODUCTION OF CATHETER Right      OTHER SURGICAL HISTORY      Mole removednasal     OTHER SURGICAL HISTORY Right     Cystoscopy, ureteroscopy, laser11/02/2014 x2 with ureteral stent insertion     PERCUTANEOUS NEPHROSTOMY  11/1/2016    Procedure: PERCUTANEOUS NEPHROSTOMY;  Surgeon: Florentino Awad MD;  Location: [facility] OR     WISDOM TOOTH EXTRACTION       ZZC REMOVAL OF KIDNEY STONE       Family History   Problem Relation Age of Onset     Anesthesia Reaction No family hx of      Malignant Hyperthermia No family hx of      Heart Disease Maternal Uncle         heart failure     Coronary Artery Disease Other      Heart Disease  Maternal Grandmother         pacemaker     Gout Paternal Grandfather      Prostate Cancer Maternal Aunt         breast     Heart Disease Maternal Aunt         pacemaker     Heart Disease Maternal Aunt         pacemaker     Heart Disease Maternal Aunt         pacemaker      Social History     Tobacco Use     Smoking status: Current Every Day Smoker     Packs/day: 0.50     Years: 10.00     Pack years: 5.00     Last attempt to quit: 2016     Years since quittin.5     Smokeless tobacco: Former User     Quit date: 2016     Tobacco comment: has information from last admission.   Substance Use Topics     Alcohol use: Yes     Alcohol/week: 0.0 standard drinks     Comment: Alcoholic Drinks/day: occ     Drug use: No     Allergies   Allergen Reactions     Vancomycin      Desogestrel-Ethinyl Estradiol Angioedema and Swelling     Swelling of hands and feet per pt.  Swelling of hands and feet per pt.  Swelling of hands and feet per pt.  Swelling of hands and feet per pt.  Swelling of hands and feet per pt.  Swelling of hands and feet per pt.  Swelling of hands and feet per pt.  Swelling of hands and feet per pt.       Penicillins Rash     As a child per mother; mother unsure if has tolerated another PCN since. Tolerated ampicillin 18     Sulfa Drugs Rash       Relevant past medical, surgical, family and social history as documented above, has been reviewed and discussed with patient. No changes or additions, unless otherwise noted in the HPI.    Current Medications     albuterol (PROAIR HFA/PROVENTIL HFA/VENTOLIN HFA) 108 (90 Base) MCG/ACT inhaler  sertraline (ZOLOFT) 100 MG tablet  SPRINTEC 28 0.25-35 MG-MCG tablet        Review of Systems     Constitutional: Denies fever, chills  Eyes: Denies visual changes or discharge  HENT: Denies sore throat, ear pain or neck pain  Respiratory: Denies cough or shortness of breath    Cardiovascular: Denies chest pain, palpitations or leg swelling  GI: Positive for vomiting.  Denies abdominal pain, nausea, or dark, bloody stools  : Positive for urethral pressure, right flank pain, and odorous urine. Denies hematuria or dysuria  Musculoskeletal: Denies any new back pain or new muscle/joint pains  Skin: Denies rashes or wound    Remainder of systems reviewed, unless noted in HPI all others negative.    Physical Exam     /74   Pulse 91   Temp 98.7  F (37.1  C) (Oral)   Resp 16   Ht 1.524 m (5')   Wt 83.9 kg (185 lb)   LMP 03/31/2022   SpO2 99%   BMI 36.13 kg/m    Constitutional: Awake, alert, in no acute distress  Head: Normocephalic, atraumatic.  ENT: Mucous membranes moist.   Eyes: PERRL, Conjunctiva normal  Respiratory: Respirations even, unlabored, in no acute respiratory distress.  Cardiovascular: Regular rate and rhythm.  GI: Abdomen soft, non-tender  : Mild right-sided CVA tenderness.  Nephrostomy tube on right side, no dressing, no securing devices present (no stitch, no Tegaderm, no StatLock, etc.)   Musculoskeletal: Moves all 4 extremities equally, strength symmetrical on bilateral uppers and lowers.  Integument: Warm, dry.   Neurologic: Alert & oriented x 3. Normal speech. Grossly normal motor and sensory function. No focal deficits noted.  Psychiatric: Normal mood and affect. Normal judgement.    Labs & Imaging     Results for orders placed or performed during the hospital encounter of 04/03/22   Basic metabolic panel   Result Value Ref Range    Sodium 140 136 - 145 mmol/L    Potassium 3.5 3.5 - 5.0 mmol/L    Chloride 108 (H) 98 - 107 mmol/L    Carbon Dioxide (CO2) 21 (L) 22 - 31 mmol/L    Anion Gap 11 5 - 18 mmol/L    Urea Nitrogen 10 8 - 22 mg/dL    Creatinine 0.89 0.60 - 1.10 mg/dL    Calcium 10.0 8.5 - 10.5 mg/dL    Glucose 74 70 - 125 mg/dL    GFR Estimate 86 >60 mL/min/1.73m2   CRP inflammation   Result Value Ref Range    CRP 0.8 (H) 0.0-<0.8 mg/dL   Lactic acid whole blood   Result Value Ref Range    Lactic Acid 1.0 0.7 - 2.0 mmol/L   CBC with platelets  and differential   Result Value Ref Range    WBC Count 8.0 4.0 - 11.0 10e3/uL    RBC Count 4.43 3.80 - 5.20 10e6/uL    Hemoglobin 13.3 11.7 - 15.7 g/dL    Hematocrit 39.5 35.0 - 47.0 %    MCV 89 78 - 100 fL    MCH 30.0 26.5 - 33.0 pg    MCHC 33.7 31.5 - 36.5 g/dL    RDW 13.0 10.0 - 15.0 %    Platelet Count 215 150 - 450 10e3/uL    % Neutrophils 59 %    % Lymphocytes 32 %    % Monocytes 7 %    % Eosinophils 2 %    % Basophils 0 %    % Immature Granulocytes 0 %    NRBCs per 100 WBC 0 <1 /100    Absolute Neutrophils 4.7 1.6 - 8.3 10e3/uL    Absolute Lymphocytes 2.5 0.8 - 5.3 10e3/uL    Absolute Monocytes 0.6 0.0 - 1.3 10e3/uL    Absolute Eosinophils 0.1 0.0 - 0.7 10e3/uL    Absolute Basophils 0.0 0.0 - 0.2 10e3/uL    Absolute Immature Granulocytes 0.0 <=0.4 10e3/uL    Absolute NRBCs 0.0 10e3/uL        Krzysztof Shoemaker MD  04/03/22 2390

## 2022-04-05 LAB — BACTERIA UR CULT: NORMAL

## 2022-04-10 ENCOUNTER — HOSPITAL ENCOUNTER (EMERGENCY)
Facility: CLINIC | Age: 37
Discharge: HOME | End: 2022-04-10
Admitting: EMERGENCY MEDICINE
Payer: COMMERCIAL

## 2022-04-10 ENCOUNTER — APPOINTMENT (OUTPATIENT)
Dept: RADIOLOGY | Facility: CLINIC | Age: 37
End: 2022-04-10
Payer: COMMERCIAL

## 2022-04-10 VITALS
WEIGHT: 185 LBS | OXYGEN SATURATION: 97 % | HEART RATE: 69 BPM | RESPIRATION RATE: 16 BRPM | TEMPERATURE: 97.9 F | BODY MASS INDEX: 36.13 KG/M2 | SYSTOLIC BLOOD PRESSURE: 105 MMHG | DIASTOLIC BLOOD PRESSURE: 70 MMHG

## 2022-04-10 DIAGNOSIS — R10.9 FLANK PAIN: ICD-10-CM

## 2022-04-10 DIAGNOSIS — J06.9 URI (UPPER RESPIRATORY INFECTION): ICD-10-CM

## 2022-04-10 LAB
ALBUMIN UR-MCNC: 30 MG/DL
ANION GAP SERPL CALCULATED.3IONS-SCNC: 16 MMOL/L (ref 5–18)
APPEARANCE UR: ABNORMAL
BASOPHILS # BLD AUTO: 0 10E3/UL (ref 0–0.2)
BASOPHILS NFR BLD AUTO: 0 %
BILIRUB UR QL STRIP: NEGATIVE
BUN SERPL-MCNC: 12 MG/DL (ref 8–22)
C REACTIVE PROTEIN LHE: 2 MG/DL (ref 0–0.8)
CALCIUM SERPL-MCNC: 10.2 MG/DL (ref 8.5–10.5)
CHLORIDE BLD-SCNC: 102 MMOL/L (ref 98–107)
CO2 SERPL-SCNC: 19 MMOL/L (ref 22–31)
COLOR UR AUTO: ABNORMAL
CREAT SERPL-MCNC: 1.04 MG/DL (ref 0.6–1.1)
EOSINOPHIL # BLD AUTO: 0.1 10E3/UL (ref 0–0.7)
EOSINOPHIL NFR BLD AUTO: 1 %
ERYTHROCYTE [DISTWIDTH] IN BLOOD BY AUTOMATED COUNT: 13.3 % (ref 10–15)
FLUAV RNA SPEC QL NAA+PROBE: NEGATIVE
FLUBV RNA RESP QL NAA+PROBE: NEGATIVE
GFR SERPL CREATININE-BSD FRML MDRD: 72 ML/MIN/1.73M2
GLUCOSE BLD-MCNC: 162 MG/DL (ref 70–125)
GLUCOSE UR STRIP-MCNC: NEGATIVE MG/DL
HCT VFR BLD AUTO: 42 % (ref 35–47)
HGB BLD-MCNC: 13.9 G/DL (ref 11.7–15.7)
HGB UR QL STRIP: ABNORMAL
IMM GRANULOCYTES # BLD: 0 10E3/UL
IMM GRANULOCYTES NFR BLD: 0 %
KETONES UR STRIP-MCNC: NEGATIVE MG/DL
LEUKOCYTE ESTERASE UR QL STRIP: ABNORMAL
LYMPHOCYTES # BLD AUTO: 1 10E3/UL (ref 0.8–5.3)
LYMPHOCYTES NFR BLD AUTO: 14 %
MCH RBC QN AUTO: 29.6 PG (ref 26.5–33)
MCHC RBC AUTO-ENTMCNC: 33.1 G/DL (ref 31.5–36.5)
MCV RBC AUTO: 90 FL (ref 78–100)
MONOCYTES # BLD AUTO: 0.5 10E3/UL (ref 0–1.3)
MONOCYTES NFR BLD AUTO: 8 %
NEUTROPHILS # BLD AUTO: 5.4 10E3/UL (ref 1.6–8.3)
NEUTROPHILS NFR BLD AUTO: 77 %
NITRATE UR QL: NEGATIVE
NRBC # BLD AUTO: 0 10E3/UL
NRBC BLD AUTO-RTO: 0 /100
PH UR STRIP: 6.5 [PH] (ref 5–7)
PLATELET # BLD AUTO: 167 10E3/UL (ref 150–450)
POTASSIUM BLD-SCNC: 3.8 MMOL/L (ref 3.5–5)
RBC # BLD AUTO: 4.69 10E6/UL (ref 3.8–5.2)
RBC URINE: 8 /HPF
SARS-COV-2 RNA RESP QL NAA+PROBE: NEGATIVE
SODIUM SERPL-SCNC: 137 MMOL/L (ref 136–145)
SP GR UR STRIP: 1.01 (ref 1–1.03)
SQUAMOUS EPITHELIAL: 4 /HPF
UROBILINOGEN UR STRIP-MCNC: <2 MG/DL
WBC # BLD AUTO: 6.9 10E3/UL (ref 4–11)
WBC CLUMPS #/AREA URNS HPF: PRESENT /HPF
WBC URINE: >182 /HPF

## 2022-04-10 PROCEDURE — 96375 TX/PRO/DX INJ NEW DRUG ADDON: CPT

## 2022-04-10 PROCEDURE — 96374 THER/PROPH/DIAG INJ IV PUSH: CPT

## 2022-04-10 PROCEDURE — 87636 SARSCOV2 & INF A&B AMP PRB: CPT | Performed by: EMERGENCY MEDICINE

## 2022-04-10 PROCEDURE — 36415 COLL VENOUS BLD VENIPUNCTURE: CPT | Performed by: EMERGENCY MEDICINE

## 2022-04-10 PROCEDURE — 85004 AUTOMATED DIFF WBC COUNT: CPT | Performed by: EMERGENCY MEDICINE

## 2022-04-10 PROCEDURE — 250N000013 HC RX MED GY IP 250 OP 250 PS 637: Performed by: EMERGENCY MEDICINE

## 2022-04-10 PROCEDURE — C9803 HOPD COVID-19 SPEC COLLECT: HCPCS

## 2022-04-10 PROCEDURE — 80048 BASIC METABOLIC PNL TOTAL CA: CPT | Performed by: EMERGENCY MEDICINE

## 2022-04-10 PROCEDURE — 250N000011 HC RX IP 250 OP 636: Performed by: EMERGENCY MEDICINE

## 2022-04-10 PROCEDURE — 71045 X-RAY EXAM CHEST 1 VIEW: CPT

## 2022-04-10 PROCEDURE — 81001 URINALYSIS AUTO W/SCOPE: CPT | Performed by: EMERGENCY MEDICINE

## 2022-04-10 PROCEDURE — 87088 URINE BACTERIA CULTURE: CPT | Performed by: EMERGENCY MEDICINE

## 2022-04-10 PROCEDURE — 99284 EMERGENCY DEPT VISIT MOD MDM: CPT | Mod: 25

## 2022-04-10 PROCEDURE — 86140 C-REACTIVE PROTEIN: CPT | Performed by: EMERGENCY MEDICINE

## 2022-04-10 RX ORDER — ONDANSETRON 2 MG/ML
4 INJECTION INTRAMUSCULAR; INTRAVENOUS ONCE
Status: COMPLETED | OUTPATIENT
Start: 2022-04-10 | End: 2022-04-10

## 2022-04-10 RX ORDER — KETOROLAC TROMETHAMINE 15 MG/ML
15 INJECTION, SOLUTION INTRAMUSCULAR; INTRAVENOUS ONCE
Status: COMPLETED | OUTPATIENT
Start: 2022-04-10 | End: 2022-04-10

## 2022-04-10 RX ORDER — ACETAMINOPHEN 325 MG/1
975 TABLET ORAL ONCE
Status: COMPLETED | OUTPATIENT
Start: 2022-04-10 | End: 2022-04-10

## 2022-04-10 RX ADMIN — KETOROLAC TROMETHAMINE 15 MG: 15 INJECTION, SOLUTION INTRAMUSCULAR; INTRAVENOUS at 17:45

## 2022-04-10 RX ADMIN — ONDANSETRON 4 MG: 2 INJECTION INTRAMUSCULAR; INTRAVENOUS at 17:45

## 2022-04-10 RX ADMIN — ACETAMINOPHEN 975 MG: 325 TABLET ORAL at 18:41

## 2022-04-10 ASSESSMENT — ENCOUNTER SYMPTOMS
FLANK PAIN: 1
DYSURIA: 0
SORE THROAT: 1
NAUSEA: 1
ABDOMINAL PAIN: 0
VOMITING: 1
HEMATURIA: 0
DIARRHEA: 1
FEVER: 0
SHORTNESS OF BREATH: 0
CHILLS: 1
COUGH: 1

## 2022-04-10 NOTE — ED TRIAGE NOTES
"Pt presents to the ED with c/o right flank/back pain and recent UTI (dx on 4/2/22 and being treated) and body aches. Pt has one kidney. Yesterday morning pt started feeling \"worse\"  "

## 2022-04-10 NOTE — ED PROVIDER NOTES
EMERGENCY DEPARTMENT ENCOUNTER      NAME: Jacqueline Pappas  AGE: 37 year old female  YOB: 1985  MRN: [id number]  EVALUATION DATE & TIME: 4/10/2022  5:10 PM    PCP: Pao Montague    ED PROVIDER: Polly Larson PA-C      Chief Complaint   Patient presents with     Generalized Body Aches     UTI         FINAL IMPRESSION:  1. Flank pain    2. URI (upper respiratory infection)          ED COURSE & MEDICAL DECISION MAKING:    Pertinent Labs & Imaging studies reviewed. (See chart for details)    37 year old female presents to the Emergency Department for evaluation of flank pain.    Physical exam is remarkable for a generally well appearing female who is in no acute distress. Heart and lung sounds are clear diffusely throughout. Abdomen is soft and non-tender. Right nephrostomy tube appears to be intact and correctly positioned, no surrounding erythema, warmth, or tenderness. Urine in the nephrostomy bag appears light yellow with no sediment. Nasal congestion noted. Vital signs are stable and she is afebrile.     CBC is unremarkable with no leukocytosis or anemia.  BMP is unremarkable with no significant electrolyte derangements, normal kidney function. CRP mildly elevated at 2.0, increased from 0.8 in her previous visit. Urinalysis appears somewhat improved with negative nitrites, WBCs and blood still present. CXR negative. COVID/flu swab also negative.     She was given Toradol, Zofran, and Tylenol with improvement in her symptoms.  I do not think any further emergent labs or imaging are indicated at this time.  Discussed proceeding with CT scan of the abdomen while she is here given that her primary concern is flank pain, with shared decision making we decided that we can forego CT today given that her lab work is unchanged from previous and she is already on antibiotics for UTI-her previous culture is not concerning for significant infection.  She has new upper respiratory symptoms which I suspect  may be causing her to feel worse than she had been.  Recommend follow-up with her PCP for recheck, advised return here for any new or worsening symptoms.  The patient is agreeable with this treatment plan and verbalized her understanding.    ED Course   5:14 PM Performed my initial history and physical exam. Discussed workup in the emergency department, management of symptoms, and likely disposition.   6:52 PM I discussed the plan for discharge with the patient, and patient is agreeable. We discussed supportive cares at home and reasons for return to the ER including new or worsening symptoms - all questions and concerns addressed. Patient to be discharged by RN.    At the conclusion of the encounter I discussed the results of all of the tests and the disposition. The questions were answered. The patient or family acknowledged understanding and was agreeable with the care plan.     Voice recognition software was used in the creation of this note. Any grammatical or nonsensical errors are due to inherent errors with the software and are not the intention of the writer.     MEDICATIONS GIVEN IN THE EMERGENCY:  Medications   ketorolac (TORADOL) injection 15 mg (15 mg Intravenous Given 4/10/22 1745)   ondansetron (ZOFRAN) injection 4 mg (4 mg Intravenous Given 4/10/22 1745)   acetaminophen (TYLENOL) tablet 975 mg (975 mg Oral Given 4/10/22 1841)       NEW PRESCRIPTIONS STARTED AT TODAY'S ER VISIT  New Prescriptions    No medications on file            =================================================================    HPI    Patient information was obtained from: Patient    Use of : N/A         Jacqueline Pappas is a 37 year old female with PMH of congenital absence of left kidney, pyelonephritis, chronic nephrostomy tube in right kidney, thrombocytopenia who presents to the ED via walk-in for evaluation of flank pain.     Per chart review, the patient was seen in this ER on 04/03/22 for evaluation of flank  pain and malodorous urine. Lab work was unremarkable. Urinalysis with nitrite positive so presumed to be infectious, urine culture returned with >100,000 urogenital christie but no predominating organism. She was discharged on ciprofloxacin.    The patient reports that she has been taking her antibiotics as prescribed but yesterday morning, started to feel worse. She developed chills, nasal congestion, and a mild cough. She also notes increased pain in the right flank. She had one episode of emesis this morning but endorses persistent nausea. She has also been having some diarrhea but notes this happens when she takes antibiotics sometimes. She has been taking tylenol without much improvement. She denies any abdominal pain, chest pain, shortness of breath, fever, changes to urine odor/color, abnormal vaginal discharge or odor. No known sick contacts. She has not had a COVID19 vaccine.       REVIEW OF SYSTEMS   Review of Systems   Constitutional: Positive for chills. Negative for fever.   HENT: Positive for congestion and sore throat.    Respiratory: Positive for cough. Negative for shortness of breath.    Cardiovascular: Negative for chest pain.   Gastrointestinal: Positive for diarrhea, nausea and vomiting. Negative for abdominal pain.   Genitourinary: Positive for flank pain. Negative for decreased urine volume, dysuria, hematuria and vaginal discharge.         All other systems reviewed and are negative unless noted in HPI.      PAST MEDICAL HISTORY:  Past Medical History:   Diagnosis Date     Acute renal failure (H)      Anemia      Anemia      Calculus of kidney      Congenital absence of left kidney      Congenital absence of one kidney      Depression      Depression      Hydronephrosis      Kidney stone     at age 29     Pyelonephritis      Pyelonephritis      Smoker      Ureteral stricture      UTI (urinary tract infection)      Wrist fracture     Left       PAST SURGICAL HISTORY:  Past Surgical History:    Procedure Laterality Date      SECTION        SECTION      x1     COMBINED CYSTOSCOPY, RETROGRADES, URETEROSCOPY, LASER HOLMIUM LITHOTRIPSY URETER(S), INSERT STENT Right 2016    Procedure: COMBINED CYSTOSCOPY, RETROGRADES, URETEROSCOPY, LASER HOLMIUM LITHOTRIPSY URETER(S), INSERT STENT;  Surgeon: Florentino Awad MD;  Location: [facility] OR     CYSTOSCOPY, URETEROSCOPY, COMBINED Right 2016    Procedure: COMBINED CYSTOSCOPY, URETEROSCOPY;  Surgeon: Florentino Awad MD;  Location: [facility] OR     IR NEPHROLITHOTOMY  2014     IR NEPHROSTOGRAM EXISITING ACCESS  10/18/2018     IR NEPHROSTOMY TUBE CHANGE RIGHT  2018     IR NEPHROSTOMY TUBE CHANGE RIGHT  2020     IR NEPHROSTOMY TUBE CHANGE RIGHT  2018     IR NEPHROSTOMY TUBE CHANGE RIGHT  2020     IR NEPHROSTOMY TUBE PLACEMENT RIGHT  2016     IR NEPHROSTOMY TUBE PLACEMENT RIGHT  2017     KIDNEY STONE SURGERY Right 2016     LASER HOLMIUM LITHOTRIPSY URETER(S), INSERT STENT, COMBINED Left 2016    Procedure: COMBINED CYSTOSCOPY, URETEROSCOPY, LASER HOLMIUM LITHOTRIPSY URETER(S), INSERT STENT;  Surgeon: Florentino Awad MD;  Location: [facility] OR     LASER HOLMIUM NEPHROLITHOTOMY VIA PERCUTANEOUS NEPHROSTOMY Right 2016    Procedure: LASER HOLMIUM NEPHROLITHOTOMY VIA PERCUTANEOUS NEPHROSTOMY;  Surgeon: Florentino Awad MD;  Location: [facility] OR     NEPHROSTOMY W/ INTRODUCTION OF CATHETER Right      OTHER SURGICAL HISTORY      Mole removednasal     OTHER SURGICAL HISTORY Right     Cystoscopy, ureteroscopy, laser11/02/2014 x2 with ureteral stent insertion     PERCUTANEOUS NEPHROSTOMY  2016    Procedure: PERCUTANEOUS NEPHROSTOMY;  Surgeon: Florentino Awad MD;  Location: [facility] OR     WISDOM TOOTH EXTRACTION       ZZC REMOVAL OF KIDNEY STONE         CURRENT MEDICATIONS:    albuterol (PROAIR HFA/PROVENTIL HFA/VENTOLIN HFA) 108 (90 Base) MCG/ACT inhaler  sertraline (ZOLOFT) 100 MG tablet  SPRINTEC 28  0.25-35 MG-MCG tablet        ALLERGIES:  Allergies   Allergen Reactions     Vancomycin      Desogestrel-Ethinyl Estradiol Angioedema and Swelling     Swelling of hands and feet per pt.  Swelling of hands and feet per pt.  Swelling of hands and feet per pt.  Swelling of hands and feet per pt.  Swelling of hands and feet per pt.  Swelling of hands and feet per pt.  Swelling of hands and feet per pt.  Swelling of hands and feet per pt.       Penicillins Rash     As a child per mother; mother unsure if has tolerated another PCN since. Tolerated ampicillin 18     Sulfa Drugs Rash       FAMILY HISTORY:  Family History   Problem Relation Age of Onset     Anesthesia Reaction No family hx of      Malignant Hyperthermia No family hx of      Heart Disease Maternal Uncle         heart failure     Coronary Artery Disease Other      Heart Disease Maternal Grandmother         pacemaker     Gout Paternal Grandfather      Prostate Cancer Maternal Aunt         breast     Heart Disease Maternal Aunt         pacemaker     Heart Disease Maternal Aunt         pacemaker     Heart Disease Maternal Aunt         pacemaker       SOCIAL HISTORY:   Social History     Socioeconomic History     Marital status: Single   Tobacco Use     Smoking status: Current Every Day Smoker     Packs/day: 0.50     Years: 10.00     Pack years: 5.00     Last attempt to quit: 2016     Years since quittin.5     Smokeless tobacco: Former User     Quit date: 2016     Tobacco comment: has information from last admission.   Substance and Sexual Activity     Alcohol use: Yes     Alcohol/week: 0.0 standard drinks     Comment: Alcoholic Drinks/day: occ     Drug use: No       VITALS:  Patient Vitals for the past 24 hrs:   BP Temp Temp src Pulse Resp SpO2 Weight   04/10/22 1815 103/61 -- -- 69 -- 98 % --   04/10/22 1800 113/74 -- -- 83 -- 98 % --   04/10/22 1745 114/71 -- -- -- -- -- --   04/10/22 1708 (!) 113/91 98.6  F (37  C) Oral 97 18 99 % 83.9 kg (185  lb)       PHYSICAL EXAM    VITAL SIGNS: /61   Pulse 69   Temp 98.6  F (37  C) (Oral)   Resp 18   Wt 83.9 kg (185 lb)   LMP 03/31/2022   SpO2 98%   BMI 36.13 kg/m    General Appearance: Alert, cooperative, normal speech and facial symmetry, appears stated age, the patient does not appear in distress  Head:  Normocephalic, without obvious abnormality, atraumatic  Eyes: Conjunctiva/corneas clear, EOM's intact, no nystagmus, PERRL  ENT:  Lips, mucosa, and tongue normal; teeth and gums normal, no pharyngeal inflammation, no dysphonia or difficulty swallowing, membranes are moist without pallor  Neck:  Supple, symmetrical, no nuchal rigidity  Cardio:  Regular rate and rhythm, S1 and S2 normal, no murmur, rub    or gallop, 2+ pulses symmetric in all extremities  Pulm:  Clear to auscultation bilaterally, respirations unlabored with no accessory muscle use  Back:  Symmetric, no curvature, ROM normal, no CVA tenderness, no spinal tenderness; nephrostomy tube in place on right flank with no surrounding erythema, tenderness to palpation, or discharge  Abdomen:  Abdomen is soft, non-distended with no tenderness to palpation, rebound tenderness, or guarding.   Extremities: Moves all extremities  Neuro: Patient is awake, alert, and responsive to voice. No gross motor weaknesses or sensory loss; moves all extremities.     LAB:  All pertinent labs reviewed and interpreted.  Labs Ordered and Resulted from Time of ED Arrival to Time of ED Departure   BASIC METABOLIC PANEL - Abnormal       Result Value    Sodium 137      Potassium 3.8      Chloride 102      Carbon Dioxide (CO2) 19 (*)     Anion Gap 16      Urea Nitrogen 12      Creatinine 1.04      Calcium 10.2      Glucose 162 (*)     GFR Estimate 72     CRP INFLAMMATION - Abnormal    CRP 2.0 (*)    ROUTINE UA WITH MICROSCOPIC REFLEX TO CULTURE - Abnormal    Color Urine Light Yellow      Appearance Urine Turbid (*)     Glucose Urine Negative      Bilirubin Urine Negative       Ketones Urine Negative      Specific Gravity Urine 1.014      Blood Urine 0.03 mg/dL (*)     pH Urine 6.5      Protein Albumin Urine 30  (*)     Urobilinogen Urine <2.0      Nitrite Urine Negative      Leukocyte Esterase Urine 500 Duglas/uL (*)     WBC Clumps Urine Present (*)     RBC Urine 8 (*)     WBC Urine >182 (*)     Squamous Epithelials Urine 4 (*)    INFLUENZA A/B & SARS-COV2 PCR MULTIPLEX - Normal    Influenza A PCR Negative      Influenza B PCR Negative      SARS CoV2 PCR Negative     CBC WITH PLATELETS AND DIFFERENTIAL    WBC Count 6.9      RBC Count 4.69      Hemoglobin 13.9      Hematocrit 42.0      MCV 90      MCH 29.6      MCHC 33.1      RDW 13.3      Platelet Count 167      % Neutrophils 77      % Lymphocytes 14      % Monocytes 8      % Eosinophils 1      % Basophils 0      % Immature Granulocytes 0      NRBCs per 100 WBC 0      Absolute Neutrophils 5.4      Absolute Lymphocytes 1.0      Absolute Monocytes 0.5      Absolute Eosinophils 0.1      Absolute Basophils 0.0      Absolute Immature Granulocytes 0.0      Absolute NRBCs 0.0     URINE CULTURE       RADIOLOGY:  Reviewed all pertinent imaging. Please see official radiology report.  XR Chest Port 1 View   Final Result   IMPRESSION: Negative chest.            Polly Larson PA-C  Emergency Medicine  [facility] EMERGENCY ROOM  [address] [facility] [address] [phone]  Dept: [phone]       Polly Larson PA-C  04/10/22 1948

## 2022-04-11 NOTE — DISCHARGE INSTRUCTIONS
You were seen in the emergency department today for evaluation of flank pain and URI symptoms.  Your lab work today is reassuring with no elevation in your white blood cell count, new kidney dysfunction, or electrolyte problems.  Your urinalysis appears improved when compared to previous so continue taking the ciprofloxacin.  Your chest x-ray was negative.  Your Covid/flu swab was negative but I would recommend retesting your self in 48 hours as it may be too soon for Covid test to come back positive.    Follow-up with your primary care provider within 48 hours.  Return here for any new or worsening symptoms like severe pain, persistent vomiting, fever over 100.4  F, difficulty breathing, no urine output, or any other symptoms that concern you.

## 2022-04-13 LAB — BACTERIA UR CULT: ABNORMAL

## 2022-04-14 RX ORDER — DOXYCYCLINE 100 MG/1
100 CAPSULE ORAL 2 TIMES DAILY
Qty: 20 CAPSULE | Refills: 0 | Status: SHIPPED | OUTPATIENT
Start: 2022-04-14 | End: 2022-04-24

## 2022-04-24 ENCOUNTER — APPOINTMENT (OUTPATIENT)
Dept: CT IMAGING | Facility: CLINIC | Age: 37
End: 2022-04-24
Attending: EMERGENCY MEDICINE
Payer: COMMERCIAL

## 2022-04-24 ENCOUNTER — HOSPITAL ENCOUNTER (EMERGENCY)
Facility: CLINIC | Age: 37
Discharge: HOME | End: 2022-04-24
Attending: EMERGENCY MEDICINE | Admitting: EMERGENCY MEDICINE
Payer: COMMERCIAL

## 2022-04-24 ENCOUNTER — APPOINTMENT (OUTPATIENT)
Dept: ULTRASOUND IMAGING | Facility: CLINIC | Age: 37
End: 2022-04-24
Attending: EMERGENCY MEDICINE
Payer: COMMERCIAL

## 2022-04-24 VITALS
BODY MASS INDEX: 36.32 KG/M2 | DIASTOLIC BLOOD PRESSURE: 63 MMHG | WEIGHT: 185 LBS | HEART RATE: 60 BPM | OXYGEN SATURATION: 98 % | HEIGHT: 60 IN | SYSTOLIC BLOOD PRESSURE: 121 MMHG | RESPIRATION RATE: 16 BRPM | TEMPERATURE: 98.5 F

## 2022-04-24 DIAGNOSIS — N12 PYELONEPHRITIS OF RIGHT KIDNEY: ICD-10-CM

## 2022-04-24 DIAGNOSIS — N83.201 RIGHT OVARIAN CYST: ICD-10-CM

## 2022-04-24 LAB
ALBUMIN SERPL-MCNC: 3.6 G/DL (ref 3.5–5)
ALBUMIN UR-MCNC: 100 MG/DL
ALP SERPL-CCNC: 88 U/L (ref 45–120)
ALT SERPL W P-5'-P-CCNC: 21 U/L (ref 0–45)
ANION GAP SERPL CALCULATED.3IONS-SCNC: 14 MMOL/L (ref 5–18)
APPEARANCE UR: ABNORMAL
AST SERPL W P-5'-P-CCNC: 20 U/L (ref 0–40)
BACTERIA #/AREA URNS HPF: ABNORMAL /HPF
BASOPHILS # BLD AUTO: 0 10E3/UL (ref 0–0.2)
BASOPHILS NFR BLD AUTO: 0 %
BILIRUB SERPL-MCNC: 0.3 MG/DL (ref 0–1)
BILIRUB UR QL STRIP: NEGATIVE
BUN SERPL-MCNC: 11 MG/DL (ref 8–22)
C REACTIVE PROTEIN LHE: 0.5 MG/DL (ref 0–0.8)
CALCIUM SERPL-MCNC: 10.5 MG/DL (ref 8.5–10.5)
CHLORIDE BLD-SCNC: 106 MMOL/L (ref 98–107)
CO2 SERPL-SCNC: 19 MMOL/L (ref 22–31)
COLOR UR AUTO: ABNORMAL
CREAT SERPL-MCNC: 0.85 MG/DL (ref 0.6–1.1)
EOSINOPHIL # BLD AUTO: 0.1 10E3/UL (ref 0–0.7)
EOSINOPHIL NFR BLD AUTO: 2 %
ERYTHROCYTE [DISTWIDTH] IN BLOOD BY AUTOMATED COUNT: 13.4 % (ref 10–15)
GFR SERPL CREATININE-BSD FRML MDRD: >90 ML/MIN/1.73M2
GLUCOSE BLD-MCNC: 105 MG/DL (ref 70–125)
GLUCOSE UR STRIP-MCNC: NEGATIVE MG/DL
HCG UR QL: NEGATIVE
HCT VFR BLD AUTO: 40.7 % (ref 35–47)
HGB BLD-MCNC: 13.5 G/DL (ref 11.7–15.7)
HGB UR QL STRIP: ABNORMAL
IMM GRANULOCYTES # BLD: 0 10E3/UL
IMM GRANULOCYTES NFR BLD: 1 %
INTERNAL QC OK POCT: NORMAL
KETONES UR STRIP-MCNC: NEGATIVE MG/DL
LEUKOCYTE ESTERASE UR QL STRIP: ABNORMAL
LIPASE SERPL-CCNC: 21 U/L (ref 0–52)
LYMPHOCYTES # BLD AUTO: 2.4 10E3/UL (ref 0.8–5.3)
LYMPHOCYTES NFR BLD AUTO: 30 %
MCH RBC QN AUTO: 29.9 PG (ref 26.5–33)
MCHC RBC AUTO-ENTMCNC: 33.2 G/DL (ref 31.5–36.5)
MCV RBC AUTO: 90 FL (ref 78–100)
MONOCYTES # BLD AUTO: 0.5 10E3/UL (ref 0–1.3)
MONOCYTES NFR BLD AUTO: 6 %
MUCOUS THREADS #/AREA URNS LPF: PRESENT /LPF
NEUTROPHILS # BLD AUTO: 5.1 10E3/UL (ref 1.6–8.3)
NEUTROPHILS NFR BLD AUTO: 61 %
NITRATE UR QL: NEGATIVE
NRBC # BLD AUTO: 0 10E3/UL
NRBC BLD AUTO-RTO: 0 /100
PH UR STRIP: 6 [PH] (ref 5–7)
PLATELET # BLD AUTO: 173 10E3/UL (ref 150–450)
POCT KIT EXPIRATION DATE: NORMAL
POCT KIT LOT NUMBER: NORMAL
POTASSIUM BLD-SCNC: 3.4 MMOL/L (ref 3.5–5)
PROT SERPL-MCNC: 7 G/DL (ref 6–8)
RBC # BLD AUTO: 4.52 10E6/UL (ref 3.8–5.2)
RBC URINE: 49 /HPF
SODIUM SERPL-SCNC: 139 MMOL/L (ref 136–145)
SP GR UR STRIP: 1.01 (ref 1–1.03)
SQUAMOUS EPITHELIAL: 5 /HPF
UROBILINOGEN UR STRIP-MCNC: <2 MG/DL
WBC # BLD AUTO: 8.2 10E3/UL (ref 4–11)
WBC URINE: 16 /HPF

## 2022-04-24 PROCEDURE — 99285 EMERGENCY DEPT VISIT HI MDM: CPT | Mod: 25

## 2022-04-24 PROCEDURE — 96375 TX/PRO/DX INJ NEW DRUG ADDON: CPT

## 2022-04-24 PROCEDURE — 87086 URINE CULTURE/COLONY COUNT: CPT | Performed by: EMERGENCY MEDICINE

## 2022-04-24 PROCEDURE — 250N000011 HC RX IP 250 OP 636: Performed by: EMERGENCY MEDICINE

## 2022-04-24 PROCEDURE — 36415 COLL VENOUS BLD VENIPUNCTURE: CPT | Performed by: EMERGENCY MEDICINE

## 2022-04-24 PROCEDURE — 258N000003 HC RX IP 258 OP 636: Performed by: EMERGENCY MEDICINE

## 2022-04-24 PROCEDURE — 85025 COMPLETE CBC W/AUTO DIFF WBC: CPT | Performed by: EMERGENCY MEDICINE

## 2022-04-24 PROCEDURE — 96374 THER/PROPH/DIAG INJ IV PUSH: CPT | Mod: 59

## 2022-04-24 PROCEDURE — 83690 ASSAY OF LIPASE: CPT | Performed by: EMERGENCY MEDICINE

## 2022-04-24 PROCEDURE — 93976 VASCULAR STUDY: CPT | Mod: XS

## 2022-04-24 PROCEDURE — 96376 TX/PRO/DX INJ SAME DRUG ADON: CPT

## 2022-04-24 PROCEDURE — 96361 HYDRATE IV INFUSION ADD-ON: CPT

## 2022-04-24 PROCEDURE — 86140 C-REACTIVE PROTEIN: CPT | Performed by: EMERGENCY MEDICINE

## 2022-04-24 PROCEDURE — 81001 URINALYSIS AUTO W/SCOPE: CPT | Performed by: EMERGENCY MEDICINE

## 2022-04-24 PROCEDURE — 81025 URINE PREGNANCY TEST: CPT | Performed by: EMERGENCY MEDICINE

## 2022-04-24 PROCEDURE — 74177 CT ABD & PELVIS W/CONTRAST: CPT

## 2022-04-24 PROCEDURE — 80053 COMPREHEN METABOLIC PANEL: CPT | Performed by: EMERGENCY MEDICINE

## 2022-04-24 RX ORDER — MORPHINE SULFATE 4 MG/ML
4 INJECTION, SOLUTION INTRAMUSCULAR; INTRAVENOUS
Status: COMPLETED | OUTPATIENT
Start: 2022-04-24 | End: 2022-04-24

## 2022-04-24 RX ORDER — ONDANSETRON 2 MG/ML
4 INJECTION INTRAMUSCULAR; INTRAVENOUS EVERY 30 MIN PRN
Status: DISCONTINUED | OUTPATIENT
Start: 2022-04-24 | End: 2022-04-25 | Stop reason: HOSPADM

## 2022-04-24 RX ORDER — SODIUM CHLORIDE 9 MG/ML
INJECTION, SOLUTION INTRAVENOUS CONTINUOUS
Status: DISCONTINUED | OUTPATIENT
Start: 2022-04-24 | End: 2022-04-25 | Stop reason: HOSPADM

## 2022-04-24 RX ORDER — CEPHALEXIN 500 MG/1
500 CAPSULE ORAL 2 TIMES DAILY
Qty: 10 CAPSULE | Refills: 0 | Status: SHIPPED | OUTPATIENT
Start: 2022-04-24 | End: 2022-04-29

## 2022-04-24 RX ORDER — HYDROCODONE BITARTRATE AND ACETAMINOPHEN 5; 325 MG/1; MG/1
1 TABLET ORAL EVERY 6 HOURS PRN
Qty: 6 TABLET | Refills: 0 | Status: SHIPPED | OUTPATIENT
Start: 2022-04-24 | End: 2022-07-22

## 2022-04-24 RX ORDER — IOPAMIDOL 755 MG/ML
100 INJECTION, SOLUTION INTRAVASCULAR ONCE
Status: COMPLETED | OUTPATIENT
Start: 2022-04-24 | End: 2022-04-24

## 2022-04-24 RX ADMIN — MORPHINE SULFATE 4 MG: 4 INJECTION, SOLUTION INTRAMUSCULAR; INTRAVENOUS at 19:27

## 2022-04-24 RX ADMIN — HYDROMORPHONE HYDROCHLORIDE 1 MG: 1 INJECTION, SOLUTION INTRAMUSCULAR; INTRAVENOUS; SUBCUTANEOUS at 21:54

## 2022-04-24 RX ADMIN — ONDANSETRON 4 MG: 2 INJECTION INTRAMUSCULAR; INTRAVENOUS at 19:26

## 2022-04-24 RX ADMIN — MORPHINE SULFATE 4 MG: 4 INJECTION, SOLUTION INTRAMUSCULAR; INTRAVENOUS at 20:51

## 2022-04-24 RX ADMIN — ONDANSETRON 4 MG: 2 INJECTION INTRAMUSCULAR; INTRAVENOUS at 20:50

## 2022-04-24 RX ADMIN — SODIUM CHLORIDE 1000 ML: 9 INJECTION, SOLUTION INTRAVENOUS at 19:25

## 2022-04-24 RX ADMIN — IOPAMIDOL 100 ML: 755 INJECTION, SOLUTION INTRAVENOUS at 20:13

## 2022-04-24 RX ADMIN — SODIUM CHLORIDE: 9 INJECTION, SOLUTION INTRAVENOUS at 21:53

## 2022-04-24 ASSESSMENT — ENCOUNTER SYMPTOMS
VOMITING: 1
FLANK PAIN: 1
HEMATURIA: 1
FEVER: 0

## 2022-04-24 NOTE — ED PROVIDER NOTES
EMERGENCY DEPARTMENT ENCOUNTER       ED Course & Medical Decision Making     6:42 PM I met with the patient, obtained history, performed an initial exam, and discussed options and plan for diagnostics and treatment here in the ED. PPE worn: surgical mask, gloves      I saw and examined the patient.  IV was established and she is given a fluid bolus as well as some morphine and Zofran.  Diagnostics ordered.  No hydronephrosis on her CT scan.  CT scan does show 3.3 cm ovarian cyst.  Urine does have some compelling signs for infection.  Plan will be to treat her for pyelonephritis as long as there is nothing else going on but given her ovarian cyst I sent her for an ultrasound to rule out torsion.  Ultrasound is pending and she is signed out to the oncoming physician at shift change    Prior to making a final disposition on this patient the results of patient's tests and other diagnostic studies were discussed with the patient. All questions were answered. Patient expressed understanding of the plan and was amenable to it.    Medications   0.9% sodium chloride BOLUS (0 mLs Intravenous Stopped 4/24/22 2144)     Followed by   sodium chloride 0.9% infusion ( Intravenous New Bag 4/24/22 2153)   ondansetron (ZOFRAN) injection 4 mg (4 mg Intravenous Given 4/24/22 2050)   morphine (PF) injection 4 mg (4 mg Intravenous Given 4/24/22 1927)   iopamidol (ISOVUE-370) solution 100 mL (100 mLs Intravenous Given 4/24/22 2013)   morphine (PF) injection 4 mg (4 mg Intravenous Given 4/24/22 2051)   HYDROmorphone (DILAUDID) injection 1 mg (1 mg Intravenous Given 4/24/22 2154)       Final Impression     1. Pyelonephritis of right kidney    2. Right ovarian cyst            Chief Complaint     Chief Complaint   Patient presents with     Flank Pain       Arrives to ED with c/o R flank pain beginning Thursday. Had nephrostomy tube exchanged on Thursday. Has been having pain since. +nausea. Denies emesis. +chills. Reports blood in nephrostomy  "bag. Took tylenol at noon without relief.       HPI       Jacqueline Pappas is a 37 year old female who presents for evaluation of flank pain.     The patient reports having one kidney left with a nephrostomy tube in place. She had her other kidney removed due to its underdevelopment and inability to function. She gets her nephrostomy tube changed every 3 months at [facility]; she recently got her it changed on Thursday (3 days ago) and has had flank pain since then. The patient also endorses abnormal urine output (less than usual). She states that her pain feels similar to an infection in the past and there is some blood in her urine. No fever. She does note having a couple of emesis yesterday. The patient also reports having some kidney stones, but adds that her providers will not remove them as they are \"too big\" and her kidney is already \"too damaged.\" No chest pain. No other symptoms or complaints at this time.       I, Ravinder Reynolds am serving as a scribe to document services personally performed by Twan Milelr M.D. based on my observation and the provider's statements to me. I, Twan Miller M.D attest that Ravinder Reynolds is acting in a scribe capacity, has observed my performance of the services and has documented them in accordance with my direction.    Past Medical History     Past Medical History:   Diagnosis Date     Acute renal failure (H)      Anemia      Anemia      Calculus of kidney      Congenital absence of left kidney      Congenital absence of one kidney      Depression      Depression      Hydronephrosis      Kidney stone      Pyelonephritis      Pyelonephritis      Smoker      Ureteral stricture      UTI (urinary tract infection)      Wrist fracture      Past Surgical History:   Procedure Laterality Date      SECTION        SECTION      x1     COMBINED CYSTOSCOPY, RETROGRADES, URETEROSCOPY, LASER HOLMIUM LITHOTRIPSY URETER(S), INSERT STENT Right 2016    Procedure: COMBINED " CYSTOSCOPY, RETROGRADES, URETEROSCOPY, LASER HOLMIUM LITHOTRIPSY URETER(S), INSERT STENT;  Surgeon: Florentino Awad MD;  Location: [facility]     CYSTOSCOPY, URETEROSCOPY, COMBINED Right 9/14/2016    Procedure: COMBINED CYSTOSCOPY, URETEROSCOPY;  Surgeon: Florentino Awad MD;  Location: [facility] OR     IR NEPHROLITHOTOMY  12/11/2014     IR NEPHROSTOGRAM EXISITING ACCESS  10/18/2018     IR NEPHROSTOMY TUBE CHANGE RIGHT  12/12/2018     IR NEPHROSTOMY TUBE CHANGE RIGHT  6/11/2020     IR NEPHROSTOMY TUBE CHANGE RIGHT  12/12/2018     IR NEPHROSTOMY TUBE CHANGE RIGHT  6/11/2020     IR NEPHROSTOMY TUBE PLACEMENT RIGHT  7/24/2016     IR NEPHROSTOMY TUBE PLACEMENT RIGHT  9/14/2017     KIDNEY STONE SURGERY Right 05/2016     LASER HOLMIUM LITHOTRIPSY URETER(S), INSERT STENT, COMBINED Left 11/1/2016    Procedure: COMBINED CYSTOSCOPY, URETEROSCOPY, LASER HOLMIUM LITHOTRIPSY URETER(S), INSERT STENT;  Surgeon: Florentino Awad MD;  Location: [facility] OR     LASER HOLMIUM NEPHROLITHOTOMY VIA PERCUTANEOUS NEPHROSTOMY Right 9/14/2016    Procedure: LASER HOLMIUM NEPHROLITHOTOMY VIA PERCUTANEOUS NEPHROSTOMY;  Surgeon: Florentino Awad MD;  Location: [facility] OR     NEPHROSTOMY W/ INTRODUCTION OF CATHETER Right      OTHER SURGICAL HISTORY      Mole removednasal     OTHER SURGICAL HISTORY Right     Cystoscopy, ureteroscopy, laser11/02/2014 x2 with ureteral stent insertion     PERCUTANEOUS NEPHROSTOMY  11/1/2016    Procedure: PERCUTANEOUS NEPHROSTOMY;  Surgeon: Florentino Awad MD;  Location: [facility] OR     WISDOM TOOTH EXTRACTION       ZZC REMOVAL OF KIDNEY STONE       Family History   Problem Relation Age of Onset     Anesthesia Reaction No family hx of      Malignant Hyperthermia No family hx of      Heart Disease Maternal Uncle         heart failure     Coronary Artery Disease Other      Heart Disease Maternal Grandmother         pacemaker     Gout Paternal Grandfather      Prostate Cancer Maternal Aunt         breast     Heart Disease  Maternal Aunt         pacemaker     Heart Disease Maternal Aunt         pacemaker     Heart Disease Maternal Aunt         pacemaker      Social History     Tobacco Use     Smoking status: Current Every Day Smoker     Packs/day: 0.50     Years: 10.00     Pack years: 5.00     Last attempt to quit: 2016     Years since quittin.5     Smokeless tobacco: Former User     Quit date: 2016     Tobacco comment: has information from last admission.   Substance Use Topics     Alcohol use: Yes     Alcohol/week: 0.0 standard drinks     Comment: Alcoholic Drinks/day: occ     Drug use: No       Relevant past medical, surgical, family and social history as documented above, has been reviewed and discussed with patient. No changes or additions, unless otherwise noted in the HPI.    Current Medications     cephALEXin (KEFLEX) 500 MG capsule  HYDROcodone-acetaminophen (NORCO) 5-325 MG tablet  albuterol (PROAIR HFA/PROVENTIL HFA/VENTOLIN HFA) 108 (90 Base) MCG/ACT inhaler  doxycycline hyclate (VIBRAMYCIN) 100 MG capsule  sertraline (ZOLOFT) 100 MG tablet  SPRINTEC 28 0.25-35 MG-MCG tablet        Allergies     Allergies   Allergen Reactions     Vancomycin      Desogestrel-Ethinyl Estradiol Angioedema and Swelling     Swelling of hands and feet per pt.  Swelling of hands and feet per pt.  Swelling of hands and feet per pt.  Swelling of hands and feet per pt.  Swelling of hands and feet per pt.  Swelling of hands and feet per pt.  Swelling of hands and feet per pt.  Swelling of hands and feet per pt.       Penicillins Rash     As a child per mother; mother unsure if has tolerated another PCN since. Tolerated ampicillin 16     Sulfa Drugs Rash       Review of Systems     Review of Systems   Constitutional: Negative for fever.   Cardiovascular: Negative for chest pain.   Gastrointestinal: Positive for vomiting.   Genitourinary: Positive for decreased urine volume, flank pain and hematuria.   All other systems reviewed and  are negative.       Remainder of systems reviewed, unless noted in HPI all others negative.    Physical Exam     /63   Pulse 71   Temp 98.5  F (36.9  C) (Oral)   Resp 16   Ht 1.524 m (5')   Wt 83.9 kg (185 lb)   LMP 03/31/2022   SpO2 97%   BMI 36.13 kg/m      Physical Exam  Vitals and nursing note reviewed.   HENT:      Head: Normocephalic.      Nose: Nose normal.   Cardiovascular:      Rate and Rhythm: Normal rate.   Pulmonary:      Effort: Pulmonary effort is normal.   Abdominal:      Comments: Right flank pain   Neurological:      Mental Status: She is alert. Mental status is at baseline.   Psychiatric:         Mood and Affect: Mood normal.             Labs & Imaging         Labs Ordered and Resulted from Time of ED Arrival to Time of ED Departure   COMPREHENSIVE METABOLIC PANEL - Abnormal       Result Value    Sodium 139      Potassium 3.4 (*)     Chloride 106      Carbon Dioxide (CO2) 19 (*)     Anion Gap 14      Urea Nitrogen 11      Creatinine 0.85      Calcium 10.5      Glucose 105      Alkaline Phosphatase 88      AST 20      ALT 21      Protein Total 7.0      Albumin 3.6      Bilirubin Total 0.3      GFR Estimate >90     ROUTINE UA WITH MICROSCOPIC REFLEX TO CULTURE - Abnormal    Color Urine Light Yellow      Appearance Urine Turbid (*)     Glucose Urine Negative      Bilirubin Urine Negative      Ketones Urine Negative      Specific Gravity Urine 1.013      Blood Urine 0.5 mg/dL (*)     pH Urine 6.0      Protein Albumin Urine 100  (*)     Urobilinogen Urine <2.0      Nitrite Urine Negative      Leukocyte Esterase Urine 500 Duglas/uL (*)     Bacteria Urine Few (*)     Mucus Urine Present (*)     RBC Urine 49 (*)     WBC Urine 16 (*)     Squamous Epithelials Urine 5 (*)    LIPASE - Normal    Lipase 21     CRP INFLAMMATION - Normal    CRP 0.5     HCG QUALITATIVE URINE POCT - Normal    HCG Qual Urine Negative      Internal QC Check POCT Valid      POCT Kit Lot Number [id number]      POCT Kit  Expiration Date 2023-07-31     CBC WITH PLATELETS AND DIFFERENTIAL    WBC Count 8.2      RBC Count 4.52      Hemoglobin 13.5      Hematocrit 40.7      MCV 90      MCH 29.9      MCHC 33.2      RDW 13.4      Platelet Count 173      % Neutrophils 61      % Lymphocytes 30      % Monocytes 6      % Eosinophils 2      % Basophils 0      % Immature Granulocytes 1      NRBCs per 100 WBC 0      Absolute Neutrophils 5.1      Absolute Lymphocytes 2.4      Absolute Monocytes 0.5      Absolute Eosinophils 0.1      Absolute Basophils 0.0      Absolute Immature Granulocytes 0.0      Absolute NRBCs 0.0     URINE CULTURE         Results for orders placed or performed during the hospital encounter of 04/24/22   CT Abdomen Pelvis w Contrast    Impression    IMPRESSION:   1.  Redemonstrated right percutaneous nephrostomy tube in the right lower renal pelvis. No hydronephrosis. Nonobstructing renal calculi.    2.  Right ovarian simple cysts, measuring up to 3.3 cm.   Comprehensive metabolic panel   Result Value Ref Range    Sodium 139 136 - 145 mmol/L    Potassium 3.4 (L) 3.5 - 5.0 mmol/L    Chloride 106 98 - 107 mmol/L    Carbon Dioxide (CO2) 19 (L) 22 - 31 mmol/L    Anion Gap 14 5 - 18 mmol/L    Urea Nitrogen 11 8 - 22 mg/dL    Creatinine 0.85 0.60 - 1.10 mg/dL    Calcium 10.5 8.5 - 10.5 mg/dL    Glucose 105 70 - 125 mg/dL    Alkaline Phosphatase 88 45 - 120 U/L    AST 20 0 - 40 U/L    ALT 21 0 - 45 U/L    Protein Total 7.0 6.0 - 8.0 g/dL    Albumin 3.6 3.5 - 5.0 g/dL    Bilirubin Total 0.3 0.0 - 1.0 mg/dL    GFR Estimate >90 >60 mL/min/1.73m2   Result Value Ref Range    Lipase 21 0 - 52 U/L   CRP inflammation   Result Value Ref Range    CRP 0.5 0.0 - 0.8 mg/dL   UA with Microscopic reflex to Culture    Specimen: Urine, Midstream   Result Value Ref Range    Color Urine Light Yellow Colorless, Straw, Light Yellow, Yellow    Appearance Urine Turbid (A) Clear    Glucose Urine Negative Negative mg/dL    Bilirubin Urine Negative Negative     Ketones Urine Negative Negative mg/dL    Specific Gravity Urine 1.013 1.001 - 1.030    Blood Urine 0.5 mg/dL (A) Negative    pH Urine 6.0 5.0 - 7.0    Protein Albumin Urine 100  (A) Negative mg/dL    Urobilinogen Urine <2.0 <2.0 mg/dL    Nitrite Urine Negative Negative    Leukocyte Esterase Urine 500 Duglas/uL (A) Negative    Bacteria Urine Few (A) None Seen /HPF    Mucus Urine Present (A) None Seen /LPF    RBC Urine 49 (H) <=2 /HPF    WBC Urine 16 (H) <=5 /HPF    Squamous Epithelials Urine 5 (H) <=1 /HPF   CBC with platelets and differential   Result Value Ref Range    WBC Count 8.2 4.0 - 11.0 10e3/uL    RBC Count 4.52 3.80 - 5.20 10e6/uL    Hemoglobin 13.5 11.7 - 15.7 g/dL    Hematocrit 40.7 35.0 - 47.0 %    MCV 90 78 - 100 fL    MCH 29.9 26.5 - 33.0 pg    MCHC 33.2 31.5 - 36.5 g/dL    RDW 13.4 10.0 - 15.0 %    Platelet Count 173 150 - 450 10e3/uL    % Neutrophils 61 %    % Lymphocytes 30 %    % Monocytes 6 %    % Eosinophils 2 %    % Basophils 0 %    % Immature Granulocytes 1 %    NRBCs per 100 WBC 0 <1 /100    Absolute Neutrophils 5.1 1.6 - 8.3 10e3/uL    Absolute Lymphocytes 2.4 0.8 - 5.3 10e3/uL    Absolute Monocytes 0.5 0.0 - 1.3 10e3/uL    Absolute Eosinophils 0.1 0.0 - 0.7 10e3/uL    Absolute Basophils 0.0 0.0 - 0.2 10e3/uL    Absolute Immature Granulocytes 0.0 <=0.4 10e3/uL    Absolute NRBCs 0.0 10e3/uL   HCG qualitative urine POCT   Result Value Ref Range    HCG Qual Urine Negative Negative    Internal QC Check POCT Valid Valid    POCT Kit Lot Number [id number]     POCT Kit Expiration Date 2023-07-31        Twan Miller MD  Emergency Medicine  [facility] EMERGENCY ROOM  [facility] [facility] [address] [phone]  4/24/2022       Twan Miller MD  04/24/22 7571

## 2022-04-24 NOTE — ED TRIAGE NOTES
Arrives to ED with c/o R flank pain beginning Thursday. Had nephrostomy tube exchanged on Thursday. Has been having pain since. +nausea. Denies emesis. +chills. Reports blood in nephrostomy bag. Took tylenol at noon without relief.

## 2022-04-25 NOTE — ED PROVIDER NOTES
"Received patient on sign out.    HPI    Patient presents with right flank pain since having her nephrostomy tube replaced. The patient reports she only has one kidney after having her other kidney removed due to underdevelopment and inability to function. She has a nephrostomy tube in place that is changed every 3 months at [facility]. Her nephrostomy tube was changed 3 days ago and she has had ongoing right flank pain since. She also endorses  urine output and hematuria. The pain feels similar to a previus infection. She had a couple episodes of emesis yesterday, but has not had any fever. The patient also notes having known kidney stones that her providers will not remove as they ar \"too big\" and her kidney is already \"too damaged.\"         Vitals:    22 1930 22   BP: 132/79 129/73  121/63   Pulse: 69 65 70 71   Resp: 18 18  16   Temp:       TempSrc:       SpO2: 99% 99% 97% 97%   Weight:       Height:                  ED COURSE AND MEDICAL DECISION MAKING  9:55 PM Patient signed out to me pending pelvic ultrasound.      Ultrasound showing hemorrhagic cyst.  CT otherwise normal.  Labs otherwise normal.  Sent home with Keflex and hydrocodone.      FINAL DIAGNOSIS    Uti, abd pain      I, Jan Larsen, am serving as a scribe to document services personally performed by Aroldo Mendoza DO based on my observations and the provider's statements to me. I, Aroldo Mendoza DO, attest that Jan Larsen is acting in a scribe capacity, has observed my performance of the services and has documented them in accordance with my direction.      Aroldo Mendoza DO  Emergency Medicine  [facility] Emergency Department     Aroldo Mendoza DO  [phone]    "

## 2022-04-25 NOTE — ED NOTES
AIDET performed, white board updated for rounding. Patient updated on plan of care. Patient's pain assessed. Call light within reach, bed in low position, side rails up.

## 2022-04-26 LAB — BACTERIA UR CULT: NORMAL

## 2022-06-26 ENCOUNTER — HEALTH MAINTENANCE LETTER (OUTPATIENT)
Age: 37
End: 2022-06-26

## 2022-07-09 ENCOUNTER — HOSPITAL ENCOUNTER (EMERGENCY)
Facility: CLINIC | Age: 37
Discharge: HOME | End: 2022-07-10
Attending: EMERGENCY MEDICINE | Admitting: EMERGENCY MEDICINE
Payer: COMMERCIAL

## 2022-07-09 ENCOUNTER — APPOINTMENT (OUTPATIENT)
Dept: CT IMAGING | Facility: CLINIC | Age: 37
End: 2022-07-09
Attending: EMERGENCY MEDICINE
Payer: COMMERCIAL

## 2022-07-09 DIAGNOSIS — N10 PYELONEPHRITIS, ACUTE: ICD-10-CM

## 2022-07-09 DIAGNOSIS — N83.209 HEMORRHAGIC OVARIAN CYST: ICD-10-CM

## 2022-07-09 LAB
ALBUMIN UR-MCNC: 300 MG/DL
ANION GAP SERPL CALCULATED.3IONS-SCNC: 11 MMOL/L (ref 5–18)
APPEARANCE UR: ABNORMAL
BACTERIA #/AREA URNS HPF: ABNORMAL /HPF
BILIRUB UR QL STRIP: NEGATIVE
BUN SERPL-MCNC: 16 MG/DL (ref 8–22)
C REACTIVE PROTEIN LHE: 0.8 MG/DL (ref 0–?)
CALCIUM SERPL-MCNC: 10.5 MG/DL (ref 8.5–10.5)
CHLORIDE BLD-SCNC: 104 MMOL/L (ref 98–107)
CO2 SERPL-SCNC: 21 MMOL/L (ref 22–31)
COLOR UR AUTO: YELLOW
CREAT SERPL-MCNC: 0.9 MG/DL (ref 0.6–1.1)
ERYTHROCYTE [DISTWIDTH] IN BLOOD BY AUTOMATED COUNT: 13.3 % (ref 10–15)
GFR SERPL CREATININE-BSD FRML MDRD: 85 ML/MIN/1.73M2
GLUCOSE BLD-MCNC: 79 MG/DL (ref 70–125)
GLUCOSE UR STRIP-MCNC: NEGATIVE MG/DL
HCT VFR BLD AUTO: 41.7 % (ref 35–47)
HGB BLD-MCNC: 14.1 G/DL (ref 11.7–15.7)
HGB UR QL STRIP: ABNORMAL
KETONES UR STRIP-MCNC: NEGATIVE MG/DL
LACTATE SERPL-SCNC: 0.7 MMOL/L (ref 0.7–2)
LEUKOCYTE ESTERASE UR QL STRIP: ABNORMAL
MCH RBC QN AUTO: 30.4 PG (ref 26.5–33)
MCHC RBC AUTO-ENTMCNC: 33.8 G/DL (ref 31.5–36.5)
MCV RBC AUTO: 90 FL (ref 78–100)
MUCOUS THREADS #/AREA URNS LPF: PRESENT /LPF
NITRATE UR QL: NEGATIVE
PH UR STRIP: 6.5 [PH] (ref 5–7)
PLATELET # BLD AUTO: 180 10E3/UL (ref 150–450)
POTASSIUM BLD-SCNC: 3.6 MMOL/L (ref 3.5–5)
RBC # BLD AUTO: 4.64 10E6/UL (ref 3.8–5.2)
RBC URINE: 158 /HPF
SODIUM SERPL-SCNC: 136 MMOL/L (ref 136–145)
SP GR UR STRIP: 1.01 (ref 1–1.03)
SQUAMOUS EPITHELIAL: 1 /HPF
UROBILINOGEN UR STRIP-MCNC: <2 MG/DL
WBC # BLD AUTO: 9.5 10E3/UL (ref 4–11)
WBC CLUMPS #/AREA URNS HPF: PRESENT /HPF
WBC URINE: 40 /HPF

## 2022-07-09 PROCEDURE — 36415 COLL VENOUS BLD VENIPUNCTURE: CPT | Performed by: EMERGENCY MEDICINE

## 2022-07-09 PROCEDURE — 87088 URINE BACTERIA CULTURE: CPT | Performed by: EMERGENCY MEDICINE

## 2022-07-09 PROCEDURE — 258N000003 HC RX IP 258 OP 636: Performed by: EMERGENCY MEDICINE

## 2022-07-09 PROCEDURE — 86140 C-REACTIVE PROTEIN: CPT | Performed by: EMERGENCY MEDICINE

## 2022-07-09 PROCEDURE — 81001 URINALYSIS AUTO W/SCOPE: CPT | Performed by: EMERGENCY MEDICINE

## 2022-07-09 PROCEDURE — 80048 BASIC METABOLIC PNL TOTAL CA: CPT | Performed by: EMERGENCY MEDICINE

## 2022-07-09 PROCEDURE — 85027 COMPLETE CBC AUTOMATED: CPT | Performed by: EMERGENCY MEDICINE

## 2022-07-09 PROCEDURE — 74176 CT ABD & PELVIS W/O CONTRAST: CPT

## 2022-07-09 PROCEDURE — 96361 HYDRATE IV INFUSION ADD-ON: CPT

## 2022-07-09 PROCEDURE — 250N000011 HC RX IP 250 OP 636: Performed by: EMERGENCY MEDICINE

## 2022-07-09 PROCEDURE — 99285 EMERGENCY DEPT VISIT HI MDM: CPT | Mod: 25

## 2022-07-09 PROCEDURE — 96366 THER/PROPH/DIAG IV INF ADDON: CPT

## 2022-07-09 PROCEDURE — 83605 ASSAY OF LACTIC ACID: CPT | Performed by: EMERGENCY MEDICINE

## 2022-07-09 PROCEDURE — 96375 TX/PRO/DX INJ NEW DRUG ADDON: CPT

## 2022-07-09 PROCEDURE — 96365 THER/PROPH/DIAG IV INF INIT: CPT

## 2022-07-09 RX ORDER — OXYCODONE AND ACETAMINOPHEN 5; 325 MG/1; MG/1
1 TABLET ORAL EVERY 6 HOURS PRN
Qty: 12 TABLET | Refills: 0 | Status: SHIPPED | OUTPATIENT
Start: 2022-07-09 | End: 2022-07-12

## 2022-07-09 RX ORDER — CEFTRIAXONE 1 G/1
1 INJECTION, POWDER, FOR SOLUTION INTRAMUSCULAR; INTRAVENOUS ONCE
Status: COMPLETED | OUTPATIENT
Start: 2022-07-09 | End: 2022-07-10

## 2022-07-09 RX ORDER — ONDANSETRON 4 MG/1
4 TABLET, ORALLY DISINTEGRATING ORAL EVERY 8 HOURS PRN
Qty: 10 TABLET | Refills: 0 | Status: SHIPPED | OUTPATIENT
Start: 2022-07-09 | End: 2022-07-12

## 2022-07-09 RX ORDER — CEPHALEXIN 500 MG/1
500 CAPSULE ORAL 4 TIMES DAILY
Qty: 28 CAPSULE | Refills: 0 | Status: SHIPPED | OUTPATIENT
Start: 2022-07-09 | End: 2022-07-16

## 2022-07-09 RX ORDER — MORPHINE SULFATE 4 MG/ML
4 INJECTION, SOLUTION INTRAMUSCULAR; INTRAVENOUS ONCE
Status: COMPLETED | OUTPATIENT
Start: 2022-07-09 | End: 2022-07-09

## 2022-07-09 RX ORDER — ONDANSETRON 2 MG/ML
4 INJECTION INTRAMUSCULAR; INTRAVENOUS ONCE
Status: COMPLETED | OUTPATIENT
Start: 2022-07-09 | End: 2022-07-09

## 2022-07-09 RX ADMIN — HYDROMORPHONE HYDROCHLORIDE 1 MG: 1 INJECTION, SOLUTION INTRAMUSCULAR; INTRAVENOUS; SUBCUTANEOUS at 23:43

## 2022-07-09 RX ADMIN — SODIUM CHLORIDE 1000 ML: 9 INJECTION, SOLUTION INTRAVENOUS at 22:02

## 2022-07-09 RX ADMIN — SODIUM CHLORIDE 1000 ML: 9 INJECTION, SOLUTION INTRAVENOUS at 23:48

## 2022-07-09 RX ADMIN — ONDANSETRON 4 MG: 2 INJECTION INTRAMUSCULAR; INTRAVENOUS at 22:02

## 2022-07-09 RX ADMIN — MORPHINE SULFATE 4 MG: 4 INJECTION, SOLUTION INTRAMUSCULAR; INTRAVENOUS at 22:06

## 2022-07-09 RX ADMIN — CEFTRIAXONE 1 G: 1 INJECTION, POWDER, FOR SOLUTION INTRAMUSCULAR; INTRAVENOUS at 23:49

## 2022-07-09 ASSESSMENT — ENCOUNTER SYMPTOMS
FEVER: 0
VOMITING: 0
HEMATURIA: 1
FREQUENCY: 1
ACTIVITY CHANGE: 1

## 2022-07-10 VITALS
WEIGHT: 180 LBS | RESPIRATION RATE: 16 BRPM | HEART RATE: 79 BPM | HEIGHT: 61 IN | TEMPERATURE: 98.8 F | OXYGEN SATURATION: 98 % | DIASTOLIC BLOOD PRESSURE: 57 MMHG | SYSTOLIC BLOOD PRESSURE: 91 MMHG | BODY MASS INDEX: 33.99 KG/M2

## 2022-07-10 PROCEDURE — 250N000011 HC RX IP 250 OP 636: Performed by: EMERGENCY MEDICINE

## 2022-07-10 PROCEDURE — 96376 TX/PRO/DX INJ SAME DRUG ADON: CPT

## 2022-07-10 RX ADMIN — HYDROMORPHONE HYDROCHLORIDE 1 MG: 1 INJECTION, SOLUTION INTRAMUSCULAR; INTRAVENOUS; SUBCUTANEOUS at 00:56

## 2022-07-10 NOTE — DISCHARGE INSTRUCTIONS
[facility] Nephrology Progress Note               Author: Josefa Pascal Date & Time created: 1/27/2017  10:16 AM     Interval History:  Mr. Benites is a 62 y/o male with PMHx of congenital solitary kidney, HTN, CKD, anxiety, chronic knee pain, and gout who was sent to the ER by his PCP (Dr. Jane) after routine lab work revealed hyperkalemia and acute kidney injury.      1/25 - initial consultation  1/26 - kidney function improving.  Urine output good.  CO2 trending down, so will change fluids to 1/2 NS with 1.5 amp bicarb to avoid acidosis from saline.  K trending up so will administer 1 dose lasix today.  Start Vit D supplementation.  1/27 - kidney function improving.  Good urine output.  Electrolytes wnl.  CO2 trending up, supplement 650 mg BID PO on discharge.  Ok for discharge from nephrology standpoint.  Patient has f/u appt scheduled for 2/8/17 at 3:45 PM.    Review of Systems:  Review of Systems   Constitutional: Negative for fever and chills.   Respiratory: Negative for shortness of breath.    Cardiovascular: Negative for chest pain.   Gastrointestinal: Negative for nausea, vomiting and abdominal pain.   Genitourinary: Negative for dysuria, urgency, frequency, hematuria and flank pain.   Neurological: Negative for focal weakness.   All other systems reviewed and are negative.      Physical Exam:  Physical Exam   Constitutional: He is oriented to person, place, and time. He appears well-developed and well-nourished. No distress.   HENT:   Head: Normocephalic and atraumatic.   Eyes: EOM are normal. Pupils are equal, round, and reactive to light.   Neck: Neck supple. No JVD present.   Cardiovascular: Normal rate, regular rhythm, normal heart sounds and intact distal pulses.  Exam reveals no friction rub.    No murmur heard.  Pulmonary/Chest: Breath sounds normal. No respiratory distress. He has no wheezes. He has no rales.   Abdominal: Soft. Bowel sounds are normal. He exhibits no distension. There  Overall your blood tests were reassuring.  I do think this is consistent with your previous issues with pyelonephritis associated with her chronic medical conditions.  You have received a dose of IV antibiotics.  Expect improvement to your symptoms over the next 24 to 36 hours.  Please take antibiotics as prescribed.  Pain medications and nausea medications to assist with your symptomatology.  Follow-up with your primary care physician and with your [facility] urology team on Monday.  If you have escalation your symptoms develop distal concern please return to the emergency department for repeat assessment.   is no tenderness.   Musculoskeletal: He exhibits no edema.   Neurological: He is alert and oriented to person, place, and time.   Skin: No erythema.   Psychiatric: He has a normal mood and affect.   Nursing note and vitals reviewed.      Labs:        Invalid input(s): [id number]  Recent Labs      17   TROPONINI  <0.01     Recent Labs      17   0905  17   1744  17   0333  17   0408   SODIUM  137   --   137  136   POTASSIUM  4.8   --   5.8*  4.9   CHLORIDE  107   --   113*  108   CO2  21   --   18*  20   BUN  76*   --   70*  67*   CREATININE  4.47*   --   4.19*  4.07*   MAGNESIUM   --   2.5   --    --    PHOSPHORUS   --   3.9   --    --    CALCIUM  9.2  9.3  8.7  9.0     Recent Labs      17   1354  17   0905  17   0333  17   0408   ALTSGPT  23   --   16   --    ASTSGOT  20   --   15   --    ALKPHOSPHAT  86   --   75   --    TBILIRUBIN  0.3   --   0.3   --    GLUCOSE  85  77  90  84     Recent Labs      17   0917   0333   RBC  4.67*  4.40*   HEMOGLOBIN  13.6*  12.8*   HEMATOCRIT  43.6  40.3*   PLATELETCT  216  201   PROTHROMBTM  14.5   --    APTT  24.6*   --    INR  1.09   --      Recent Labs      17   1354  17   0917   0333   WBC   --   6.4  5.8   NEUTSPOLYS   --   61.40   --    LYMPHOCYTES   --   23.60   --    MONOCYTES   --   8.50   --    EOSINOPHILS   --   4.60   --    BASOPHILS   --   0.60   --    ASTSGOT  20   --   15   ALTSGPT  23   --   16   ALKPHOSPHAT  86   --   75   TBILIRUBIN  0.3   --   0.3           Hemodynamics:  Temp (24hrs), Av.6 °C (97.9 °F), Min:36.2 °C (97.2 °F), Max:36.8 °C (98.2 °F)  Temperature: 36.8 °C (98.2 °F)  Pulse  Av.7  Min: 62  Max: 82   Blood Pressure: 133/96 mmHg     Respiratory:    Respiration: 15, Pulse Oximetry: 96 %           Fluids:    Intake/Output Summary (Last 24 hours) at 17 1016  Last data filed at 17 0600   Gross per 24 hour   Intake   1320 ml   Output    2850 ml   Net  -1530 ml        GI/Nutrition:  Orders Placed This Encounter   Procedures   • Diet Order     Standing Status: Standing      Number of Occurrences: 1      Standing Expiration Date:      Order Specific Question:  Diet:     Answer:  Renal [8]     Medical Decision Making, by Problem:  Active Hospital Problems    Diagnosis   • Acute on chronic renal failure (CMS-HCC) [N17.9, N18.9]   • Depression with anxiety [F41.8]   • Gout [M10.9]   • Solitary kidney [Q60.0]   • Essential hypertension [I10]     ASSESSMENT AND PLAN:     # acute kidney injury    - FeNa 3.3% suggesting intrinsic kidney injury  - kidney function improving with IVF  - good urine ouptut  - renal US shows atrophy of right kidney without hydronephrosis  - resolving; f/u with nephrology o/p on 2/8/16    # hyperkalemia  - resolved    # metabolic acidosis  - resolving  - discharge patient on sodium bicarb 650 mg BID     # chronic kidney disease  - unsure of patient's baseline; not anuric or oliguric per patient  - UA shows 100 mg/dL protein with prot/creat ratio 2657  - renal diet  - patient to f/u on o/p basis    # bone mineral disease  - Vit D 21,   - continue vit D supplementation  - Ca/PO4 wnl    # glycosuria  - patient is euglycemic with mild anemia (Hb 13.6)  - SPEP and UPEP to r/o Fanconi's syndrome pending    # hypertension  -  BP stable    # volume status  - patient appears euvolemic    # gout  - continue allopurinol  - advise less frequent colchicine use      Core Measures

## 2022-07-10 NOTE — ED PROVIDER NOTES
EMERGENCY DEPARTMENT ENCOUNTER      NAME: Jacqueline Pappas  AGE: 37 year old female  YOB: 1985  MRN: [id number]  EVALUATION DATE & TIME: 7/9/2022  9:37 PM    PCP: Pao Montague    ED PROVIDER: Payam Andrade MD     Chief Complaint   Patient presents with     Flank Pain     FINAL IMPRERESSION:  1. Pyelonephritis, acute      ED COURSE & MEDICAL DECISION MAKING:    Pertinent Labs & Imaging studies reviewed. (See chart for details)  37 year old female presents to the Emergency Department for evaluation of right-sided flank pain.  Patient is concerned that she has pyelonephritis.  Patient has a past medical history of recurrent pyelonephritis due to her underlying medical issues.  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  She still does produce urine.  She has a history of frequent urinary tract infections and frequent pyelonephritis.  Presents with escalating right-sided flank pain.  Denies fever.  She does have some mild general malaise.  No nausea no vomiting.  Denies other symptomatology.  On examination she had focal pain to the right flank her nephrostomy tube insertion site appeared appropriate.  It was draining clear urine.  Urinary sample was concerning for infection.  Infectious work-up overall was otherwise reassuring with a white blood cell count that was normal a CRP that was minimally elevated negative lactic acid normal BMP.  CT scan performed and appearing stable compared to previous exception being there was also what appeared to be a 3.9 cm hemorrhagic cyst.  Patient has no right lower quadrant pain no pelvic pain and in discussion with the patient this is a known cyst that she has been told about previously.  At this point I think it is an incidental finding and additional imaging is not indicated as there is no pain to  that area.  I did discuss with her that this would require follow-up ultrasound on outpatient basis.  I had a discussion with the patient and she reports typically she is successful with a dose of intravenous antibiotics and oral antibiotics and management of her infections on outpatient basis.  At this time we are proceeding with that plan of care working to get her symptoms under control as long she is tolerating oral intake and pain is controlled and anticipate discharge home on a course of antibiotics antiemetics and pain control medications.    2:49 AM  Patient received her antibiotics.  Ultimately was feeling improved and comfortable discharge home and close monitoring of symptoms.  Return precautions discussed prior to discharge.        9:39 PM I met with the patient, obtained history, performed an initial exam, and discussed options and plan for diagnostics and treatment here in the ED. PPE worn: surgical mask, gloves   10:41 PM We discussed the plan for discharge and the patient is agreeable. Reviewed supportive cares, symptomatic treatment, outpatient follow up, and reasons to return to the Emergency Department. Patient to be discharged by ED RN.     At the conclusion of the encounter I discussed the results of all of the tests and the disposition. The questions were answered. The patient or family acknowledged understanding and was agreeable with the care plan.       MEDICATIONS GIVEN IN THE EMERGENCY:  Medications   0.9% sodium chloride BOLUS (has no administration in time range)   0.9% sodium chloride BOLUS (1,000 mLs Intravenous New Bag 7/9/22 2202)   cefTRIAXone (ROCEPHIN) 1 g vial to attach to  mL bag for ADULTS or NS 50 mL bag for PEDS (has no administration in time range)   HYDROmorphone (DILAUDID) injection 1 mg (has no administration in time range)   morphine (PF) injection 4 mg (4 mg Intravenous Given 7/9/22 2206)   ondansetron (ZOFRAN) injection 4 mg (4 mg Intravenous Given 7/9/22 2202)  "      NEW PRESCRIPTIONS STARTED AT TODAY'S ER VISIT  New Prescriptions    CEPHALEXIN (KEFLEX) 500 MG CAPSULE    Take 1 capsule (500 mg) by mouth 4 times daily for 7 days    ONDANSETRON (ZOFRAN ODT) 4 MG ODT TAB    Take 1 tablet (4 mg) by mouth every 8 hours as needed    OXYCODONE-ACETAMINOPHEN (PERCOCET) 5-325 MG TABLET    Take 1 tablet by mouth every 6 hours as needed for pain          =================================================================    HPI    Patient information was obtained from: Patient     Use of : N/A      Jacqueline Pappas is a 37 year old female with a pertinent history of congenital absence of one kidney, calculus of kidney, kidney stone, hydronephrosis, UTI, uretal stricture, anemia, s/p nephrostomy tube placement right who presents to this ED by walk in for evaluation of flank pain.     Patient reports of worsening right flank pain for the past couple of days accompanied by urination frequency. The patient states that her symptoms feel similar to her kidney infections in the past. The patient adds that she feels \"sick to her stomach\" and \"not herself.\" No fevers or vomiting. The patient states that she has noticed \"a little blood\" in her nephrostomy bag. She is unable to make urination output. The patient reports that the last time she was in the ED, she was given IV antibiotics and started on an antibiotic course at home. She has had to change her nephrostomy tube a couple times in the past due to the infections, but \"it is dependent on the blood work.\" She also notes having multiple visits in the past, almost every month for a kidney infection, but this is the first time she has presented to the ED \"in a while.\" She otherwise denies past medical issues and other symptoms or complaints at this time.     Of note, the patient states that she has \"a lot of allergies\" to antibiotics.     REVIEW OF SYSTEMS   Review of Systems   Constitutional: Positive for activity change. Negative " for fever.   Gastrointestinal: Negative for vomiting.   Genitourinary: Positive for frequency and hematuria (blood in nephrostomy bag).   Musculoskeletal:        Positive for flank pain.   All other systems reviewed and are negative.      PAST MEDICAL HISTORY:  Past Medical History:   Diagnosis Date     Acute renal failure (H)      Anemia      Anemia      Calculus of kidney      Congenital absence of left kidney      Congenital absence of one kidney      Depression      Depression      Hydronephrosis      Kidney stone     at age 29     Pyelonephritis      Pyelonephritis      Smoker      Ureteral stricture      UTI (urinary tract infection)      Wrist fracture     Left       PAST SURGICAL HISTORY:  Past Surgical History:   Procedure Laterality Date      SECTION        SECTION      x1     COMBINED CYSTOSCOPY, RETROGRADES, URETEROSCOPY, LASER HOLMIUM LITHOTRIPSY URETER(S), INSERT STENT Right 2016    Procedure: COMBINED CYSTOSCOPY, RETROGRADES, URETEROSCOPY, LASER HOLMIUM LITHOTRIPSY URETER(S), INSERT STENT;  Surgeon: Florentino Awad MD;  Location: [facility] OR     CYSTOSCOPY, URETEROSCOPY, COMBINED Right 2016    Procedure: COMBINED CYSTOSCOPY, URETEROSCOPY;  Surgeon: Florentino Awad MD;  Location: [facility] OR     IR NEPHROLITHOTOMY  2014     IR NEPHROSTOGRAM EXISITING ACCESS  10/18/2018     IR NEPHROSTOMY TUBE CHANGE RIGHT  2018     IR NEPHROSTOMY TUBE CHANGE RIGHT  2020     IR NEPHROSTOMY TUBE CHANGE RIGHT  2018     IR NEPHROSTOMY TUBE CHANGE RIGHT  2020     IR NEPHROSTOMY TUBE PLACEMENT RIGHT  2016     IR NEPHROSTOMY TUBE PLACEMENT RIGHT  2017     KIDNEY STONE SURGERY Right 2016     LASER HOLMIUM LITHOTRIPSY URETER(S), INSERT STENT, COMBINED Left 2016    Procedure: COMBINED CYSTOSCOPY, URETEROSCOPY, LASER HOLMIUM LITHOTRIPSY URETER(S), INSERT STENT;  Surgeon: Florentino Awad MD;  Location: [facility] OR     LASER HOLMIUM NEPHROLITHOTOMY VIA  PERCUTANEOUS NEPHROSTOMY Right 9/14/2016    Procedure: LASER HOLMIUM NEPHROLITHOTOMY VIA PERCUTANEOUS NEPHROSTOMY;  Surgeon: Florentino Awad MD;  Location: [facility] OR     NEPHROSTOMY W/ INTRODUCTION OF CATHETER Right      OTHER SURGICAL HISTORY      Mole removednasal     OTHER SURGICAL HISTORY Right     Cystoscopy, ureteroscopy, laser11/02/2014 x2 with ureteral stent insertion     PERCUTANEOUS NEPHROSTOMY  11/1/2016    Procedure: PERCUTANEOUS NEPHROSTOMY;  Surgeon: Florentino Awad MD;  Location: [facility] OR     WISDOM TOOTH EXTRACTION       ZZC REMOVAL OF KIDNEY STONE             CURRENT MEDICATIONS:    cephALEXin (KEFLEX) 500 MG capsule  ondansetron (ZOFRAN ODT) 4 MG ODT tab  oxyCODONE-acetaminophen (PERCOCET) 5-325 MG tablet  albuterol (PROAIR HFA/PROVENTIL HFA/VENTOLIN HFA) 108 (90 Base) MCG/ACT inhaler  HYDROcodone-acetaminophen (NORCO) 5-325 MG tablet  sertraline (ZOLOFT) 100 MG tablet  SPRINTEC 28 0.25-35 MG-MCG tablet        ALLERGIES:  Allergies   Allergen Reactions     Vancomycin      Desogestrel-Ethinyl Estradiol Angioedema and Swelling     Swelling of hands and feet per pt.  Swelling of hands and feet per pt.  Swelling of hands and feet per pt.  Swelling of hands and feet per pt.  Swelling of hands and feet per pt.  Swelling of hands and feet per pt.  Swelling of hands and feet per pt.  Swelling of hands and feet per pt.       Penicillins Rash     As a child per mother; mother unsure if has tolerated another PCN since. Tolerated ampicillin 9/20/16     Sulfa Drugs Rash       FAMILY HISTORY:  Family History   Problem Relation Age of Onset     Anesthesia Reaction No family hx of      Malignant Hyperthermia No family hx of      Heart Disease Maternal Uncle         heart failure     Coronary Artery Disease Other      Heart Disease Maternal Grandmother         pacemaker     Gout Paternal Grandfather      Prostate Cancer Maternal Aunt         breast     Heart Disease Maternal Aunt         pacemaker     Heart  "Disease Maternal Aunt         pacemaker     Heart Disease Maternal Aunt         pacemaker       SOCIAL HISTORY:   Social History     Socioeconomic History     Marital status: Single   Tobacco Use     Smoking status: Current Every Day Smoker     Packs/day: 0.50     Years: 10.00     Pack years: 5.00     Last attempt to quit: 2016     Years since quittin.7     Smokeless tobacco: Former User     Quit date: 2016     Tobacco comment: has information from last admission.   Substance and Sexual Activity     Alcohol use: Yes     Alcohol/week: 0.0 standard drinks     Comment: Alcoholic Drinks/day: occ     Drug use: No       VITALS:  /75   Pulse 85   Temp 98.8  F (37.1  C) (Oral)   Resp 18   Ht 1.537 m (5' 0.5\")   Wt 81.6 kg (180 lb)   SpO2 96%   BMI 34.58 kg/m      PHYSICAL EXAM    PHYSICAL EXAM    VITAL SIGNS: /59   Pulse 74   Temp 98.8  F (37.1  C) (Oral)   Resp 16   Ht 1.537 m (5' 0.5\")   Wt 81.6 kg (180 lb)   SpO2 97%   BMI 34.58 kg/m    Constitutional:  Well developed, well nourished  EYES: Conjunctivae clear, no discharge  HENT: Atraumatic, normocephalic, bilateral external ears normal.  Oropharynx moist. Nose normal.   Neck: Normal ROM , Supple   Respiratory:  No respiratory distress, normal nonlabored respirations.   Abdomen/flank examination: Nephrostomy tube insertion site appears appropriate.  Patient with right-sided flank pain.  Otherwise unremarkable.  Cardiovascular:  Distal perfusion appears intact  Musculoskeletal:  No edema appreciated, Good range of motion in all major joints.   Integument:  Warm, Dry, No erythema, No rash.   Neurologic:  Alert and oriented. No focal deficits noted.  Ambulatory  Psychiatric:  Affect normal, Judgment normal, Mood normal.       LAB:  All pertinent labs reviewed and interpreted.  Results for orders placed or performed during the hospital encounter of 22   CT Abdomen Pelvis w/o Contrast    Impression    IMPRESSION:   1.  " Redemonstrated right percutaneous nephrostomy tube. No hydronephrosis. Nonobstructing right nephrolithiasis.    2.  Right ovarian 3.9 cm cystic structure, most likely hemorrhagic cyst.     UA with Microscopic reflex to Culture    Specimen: Urine, Midstream   Result Value Ref Range    Color Urine Yellow Colorless, Straw, Light Yellow, Yellow    Appearance Urine Turbid (A) Clear    Glucose Urine Negative Negative mg/dL    Bilirubin Urine Negative Negative    Ketones Urine Negative Negative mg/dL    Specific Gravity Urine 1.014 1.001 - 1.030    Blood Urine 0.5 mg/dL (A) Negative    pH Urine 6.5 5.0 - 7.0    Protein Albumin Urine 300  (A) Negative mg/dL    Urobilinogen Urine <2.0 <2.0 mg/dL    Nitrite Urine Negative Negative    Leukocyte Esterase Urine 500 Duglas/uL (A) Negative    Bacteria Urine Few (A) None Seen /HPF    WBC Clumps Urine Present (A) None Seen /HPF    Mucus Urine Present (A) None Seen /LPF    RBC Urine 158 (H) <=2 /HPF    WBC Urine 40 (H) <=5 /HPF    Squamous Epithelials Urine 1 <=1 /HPF   CBC (+ platelets, no diff)   Result Value Ref Range    WBC Count 9.5 4.0 - 11.0 10e3/uL    RBC Count 4.64 3.80 - 5.20 10e6/uL    Hemoglobin 14.1 11.7 - 15.7 g/dL    Hematocrit 41.7 35.0 - 47.0 %    MCV 90 78 - 100 fL    MCH 30.4 26.5 - 33.0 pg    MCHC 33.8 31.5 - 36.5 g/dL    RDW 13.3 10.0 - 15.0 %    Platelet Count 180 150 - 450 10e3/uL   Basic metabolic panel   Result Value Ref Range    Sodium 136 136 - 145 mmol/L    Potassium 3.6 3.5 - 5.0 mmol/L    Chloride 104 98 - 107 mmol/L    Carbon Dioxide (CO2) 21 (L) 22 - 31 mmol/L    Anion Gap 11 5 - 18 mmol/L    Urea Nitrogen 16 8 - 22 mg/dL    Creatinine 0.90 0.60 - 1.10 mg/dL    Calcium 10.5 8.5 - 10.5 mg/dL    Glucose 79 70 - 125 mg/dL    GFR Estimate 85 >60 mL/min/1.73m2   Lactic acid whole blood   Result Value Ref Range    Lactic Acid 0.7 0.7 - 2.0 mmol/L   CRP inflammation   Result Value Ref Range    CRP 0.8 (H) 0.0 - <0.8 mg/dL       RADIOLOGY:  Reviewed all pertinent  imaging. Please see official radiology report.  CT Abdomen Pelvis w/o Contrast   Final Result   IMPRESSION:    1.  Redemonstrated right percutaneous nephrostomy tube. No hydronephrosis. Nonobstructing right nephrolithiasis.      2.  Right ovarian 3.9 cm cystic structure, most likely hemorrhagic cyst.               I, Ravinder Reynolds, am serving as a scribe to document services personally performed by Payam Andrade MD, based on my observations and the provider's statements to me.   I, Payam Andrade MD, attest that Ravinder Bartlett was acting in a scribe capacity, has observed my performance of the services and has documented them in accordance with my direction.    Payam Andrade MD   [facility] EMERGENCY ROOM  [address] [facility] [id number] [phone]     Payam Andrade MD  07/10/22 0249

## 2022-07-12 LAB — BACTERIA UR CULT: ABNORMAL

## 2022-07-22 ENCOUNTER — HOSPITAL ENCOUNTER (EMERGENCY)
Facility: CLINIC | Age: 37
Discharge: HOME | End: 2022-07-22
Attending: EMERGENCY MEDICINE | Admitting: EMERGENCY MEDICINE
Payer: COMMERCIAL

## 2022-07-22 VITALS
HEART RATE: 86 BPM | BODY MASS INDEX: 30.82 KG/M2 | HEIGHT: 65 IN | SYSTOLIC BLOOD PRESSURE: 128 MMHG | DIASTOLIC BLOOD PRESSURE: 87 MMHG | WEIGHT: 185 LBS | TEMPERATURE: 98.9 F | RESPIRATION RATE: 18 BRPM | OXYGEN SATURATION: 98 %

## 2022-07-22 DIAGNOSIS — N12 PYELONEPHRITIS: ICD-10-CM

## 2022-07-22 LAB
ALBUMIN UR-MCNC: 100 MG/DL
ANION GAP SERPL CALCULATED.3IONS-SCNC: 8 MMOL/L (ref 5–18)
APPEARANCE UR: CLEAR
BACTERIA #/AREA URNS HPF: ABNORMAL /HPF
BASOPHILS # BLD AUTO: 0 10E3/UL (ref 0–0.2)
BASOPHILS NFR BLD AUTO: 0 %
BILIRUB UR QL STRIP: NEGATIVE
BUN SERPL-MCNC: 9 MG/DL (ref 8–22)
C REACTIVE PROTEIN LHE: 6 MG/DL (ref 0–?)
CALCIUM SERPL-MCNC: 10.2 MG/DL (ref 8.5–10.5)
CHLORIDE BLD-SCNC: 107 MMOL/L (ref 98–107)
CO2 SERPL-SCNC: 21 MMOL/L (ref 22–31)
COLOR UR AUTO: ABNORMAL
CREAT SERPL-MCNC: 0.83 MG/DL (ref 0.6–1.1)
EOSINOPHIL # BLD AUTO: 0.1 10E3/UL (ref 0–0.7)
EOSINOPHIL NFR BLD AUTO: 1 %
ERYTHROCYTE [DISTWIDTH] IN BLOOD BY AUTOMATED COUNT: 13.2 % (ref 10–15)
FLUAV RNA SPEC QL NAA+PROBE: NEGATIVE
FLUBV RNA RESP QL NAA+PROBE: NEGATIVE
GFR SERPL CREATININE-BSD FRML MDRD: >90 ML/MIN/1.73M2
GLUCOSE BLD-MCNC: 90 MG/DL (ref 70–125)
GLUCOSE UR STRIP-MCNC: NEGATIVE MG/DL
HCT VFR BLD AUTO: 39.6 % (ref 35–47)
HGB BLD-MCNC: 13.1 G/DL (ref 11.7–15.7)
HGB UR QL STRIP: ABNORMAL
HOLD SPECIMEN: NORMAL
HOLD SPECIMEN: NORMAL
HYALINE CASTS: 1 /LPF
IMM GRANULOCYTES # BLD: 0 10E3/UL
IMM GRANULOCYTES NFR BLD: 0 %
KETONES UR STRIP-MCNC: NEGATIVE MG/DL
LEUKOCYTE ESTERASE UR QL STRIP: ABNORMAL
LYMPHOCYTES # BLD AUTO: 1.5 10E3/UL (ref 0.8–5.3)
LYMPHOCYTES NFR BLD AUTO: 16 %
MCH RBC QN AUTO: 29.9 PG (ref 26.5–33)
MCHC RBC AUTO-ENTMCNC: 33.1 G/DL (ref 31.5–36.5)
MCV RBC AUTO: 90 FL (ref 78–100)
MONOCYTES # BLD AUTO: 0.7 10E3/UL (ref 0–1.3)
MONOCYTES NFR BLD AUTO: 7 %
MUCOUS THREADS #/AREA URNS LPF: PRESENT /LPF
NEUTROPHILS # BLD AUTO: 7.5 10E3/UL (ref 1.6–8.3)
NEUTROPHILS NFR BLD AUTO: 76 %
NITRATE UR QL: NEGATIVE
NRBC # BLD AUTO: 0 10E3/UL
NRBC BLD AUTO-RTO: 0 /100
PH UR STRIP: 6.5 [PH] (ref 5–7)
PLATELET # BLD AUTO: 172 10E3/UL (ref 150–450)
POTASSIUM BLD-SCNC: 3.5 MMOL/L (ref 3.5–5)
RBC # BLD AUTO: 4.38 10E6/UL (ref 3.8–5.2)
RBC URINE: 12 /HPF
RSV RNA SPEC NAA+PROBE: NEGATIVE
SARS-COV-2 RNA RESP QL NAA+PROBE: NEGATIVE
SODIUM SERPL-SCNC: 136 MMOL/L (ref 136–145)
SP GR UR STRIP: 1.01 (ref 1–1.03)
SQUAMOUS EPITHELIAL: 2 /HPF
UROBILINOGEN UR STRIP-MCNC: <2 MG/DL
WBC # BLD AUTO: 9.9 10E3/UL (ref 4–11)
WBC URINE: 16 /HPF

## 2022-07-22 PROCEDURE — 36415 COLL VENOUS BLD VENIPUNCTURE: CPT | Performed by: EMERGENCY MEDICINE

## 2022-07-22 PROCEDURE — 81001 URINALYSIS AUTO W/SCOPE: CPT | Performed by: EMERGENCY MEDICINE

## 2022-07-22 PROCEDURE — 96375 TX/PRO/DX INJ NEW DRUG ADDON: CPT

## 2022-07-22 PROCEDURE — 99285 EMERGENCY DEPT VISIT HI MDM: CPT | Mod: 25

## 2022-07-22 PROCEDURE — 96361 HYDRATE IV INFUSION ADD-ON: CPT

## 2022-07-22 PROCEDURE — 250N000011 HC RX IP 250 OP 636: Performed by: EMERGENCY MEDICINE

## 2022-07-22 PROCEDURE — C9803 HOPD COVID-19 SPEC COLLECT: HCPCS

## 2022-07-22 PROCEDURE — 250N000013 HC RX MED GY IP 250 OP 250 PS 637: Performed by: EMERGENCY MEDICINE

## 2022-07-22 PROCEDURE — 96365 THER/PROPH/DIAG IV INF INIT: CPT

## 2022-07-22 PROCEDURE — 80048 BASIC METABOLIC PNL TOTAL CA: CPT | Performed by: EMERGENCY MEDICINE

## 2022-07-22 PROCEDURE — 258N000003 HC RX IP 258 OP 636: Performed by: EMERGENCY MEDICINE

## 2022-07-22 PROCEDURE — 87637 SARSCOV2&INF A&B&RSV AMP PRB: CPT | Performed by: EMERGENCY MEDICINE

## 2022-07-22 PROCEDURE — 85025 COMPLETE CBC W/AUTO DIFF WBC: CPT | Performed by: EMERGENCY MEDICINE

## 2022-07-22 PROCEDURE — 86140 C-REACTIVE PROTEIN: CPT | Performed by: EMERGENCY MEDICINE

## 2022-07-22 PROCEDURE — 87086 URINE CULTURE/COLONY COUNT: CPT | Performed by: EMERGENCY MEDICINE

## 2022-07-22 RX ORDER — CEFTRIAXONE 1 G/1
1 INJECTION, POWDER, FOR SOLUTION INTRAMUSCULAR; INTRAVENOUS ONCE
Status: COMPLETED | OUTPATIENT
Start: 2022-07-22 | End: 2022-07-22

## 2022-07-22 RX ORDER — SULFAMETHOXAZOLE/TRIMETHOPRIM 800-160 MG
1 TABLET ORAL 2 TIMES DAILY
Qty: 20 TABLET | Refills: 0 | Status: SHIPPED | OUTPATIENT
Start: 2022-07-22 | End: 2022-08-01

## 2022-07-22 RX ORDER — OXYCODONE HYDROCHLORIDE 5 MG/1
5 TABLET ORAL ONCE
Status: COMPLETED | OUTPATIENT
Start: 2022-07-22 | End: 2022-07-22

## 2022-07-22 RX ORDER — MORPHINE SULFATE 4 MG/ML
4 INJECTION, SOLUTION INTRAMUSCULAR; INTRAVENOUS ONCE
Status: COMPLETED | OUTPATIENT
Start: 2022-07-22 | End: 2022-07-22

## 2022-07-22 RX ORDER — ALBUTEROL SULFATE 1.25 MG/3ML
1.25 SOLUTION RESPIRATORY (INHALATION) EVERY 6 HOURS PRN
COMMUNITY

## 2022-07-22 RX ORDER — ONDANSETRON 2 MG/ML
8 INJECTION INTRAMUSCULAR; INTRAVENOUS ONCE
Status: COMPLETED | OUTPATIENT
Start: 2022-07-22 | End: 2022-07-22

## 2022-07-22 RX ADMIN — OXYCODONE HYDROCHLORIDE 5 MG: 5 TABLET ORAL at 22:17

## 2022-07-22 RX ADMIN — CEFTRIAXONE 1 G: 1 INJECTION, POWDER, FOR SOLUTION INTRAMUSCULAR; INTRAVENOUS at 22:48

## 2022-07-22 RX ADMIN — SODIUM CHLORIDE 1000 ML: 9 INJECTION, SOLUTION INTRAVENOUS at 21:34

## 2022-07-22 RX ADMIN — ONDANSETRON 8 MG: 2 INJECTION INTRAMUSCULAR; INTRAVENOUS at 21:34

## 2022-07-22 RX ADMIN — MORPHINE SULFATE 4 MG: 4 INJECTION, SOLUTION INTRAMUSCULAR; INTRAVENOUS at 21:43

## 2022-07-22 ASSESSMENT — ENCOUNTER SYMPTOMS
FLANK PAIN: 1
SHORTNESS OF BREATH: 0
NECK STIFFNESS: 0
VOMITING: 1
DIFFICULTY URINATING: 0
CONFUSION: 0
EYE REDNESS: 0
HEADACHES: 0
FEVER: 0
ABDOMINAL PAIN: 0
COLOR CHANGE: 0
ARTHRALGIAS: 0
NAUSEA: 1

## 2022-07-23 NOTE — ED TRIAGE NOTES
Here with right sided flank pain that started yesterday   Positive for nausea, vomiting, urinary frequency, and body aches that started today   Reports having 1 kidney and a nephrostomy tube   Hx of kidney stones  Took tylenol about 4 hours ago      Triage Assessment     Row Name 07/22/22 [id number]       Triage Assessment (Adult)    Airway WDL WDL       Respiratory WDL    Respiratory WDL WDL       Skin Circulation/Temperature WDL    Skin Circulation/Temperature WDL WDL       Cardiac WDL    Cardiac WDL WDL       Peripheral/Neurovascular WDL    Peripheral Neurovascular WDL WDL       Cognitive/Neuro/Behavioral WDL    Cognitive/Neuro/Behavioral WDL WDL

## 2022-07-23 NOTE — ED PROVIDER NOTES
EMERGENCY DEPARTMENT ENCOUNTER     NAME: Jacqueline Pappas   AGE: 37 year old female   YOB: 1985   MRN: [id number]   EVALUATION DATE & TIME: 7/22/2022  9:13 PM   PCP: Pao Montague     Chief Complaint   Patient presents with     Flank Pain   :    FINAL IMPRESSION       1. Pyelonephritis           ED COURSE & MEDICAL DECISION MAKING      Pertinent Labs & Imaging studies reviewed. (See chart for details)   37 year old female  presents to the Emergency Department for evaluation of right-sided flank pain.  Patient has a complicated history with a nephrostomy tube, a urethral stricture, a solitary kidney, and is followed by nephrology and urology at [facility].  She also has been seen frequently in this ED with flank pain and notes that she just had her nephrostomy tube changed yesterday when the pain started. Initial Vitals Reviewed. Initial exam notable for generally well-appearing patient who is afebrile, has no redness around nephrostomy tube site and it has what appears to be clear yellow urine draining from the nephrostomy tube.  On chart review, her last visit here was on July 9 so only about 2 weeks ago and she just completed a course of Keflex for presumed pyelonephritis, with a urine culture that grew staph aureus that was pansensitive to everything tested.  She also had CT imaging at that time, and has had 5 CT scans this year alone in our system so I did discuss with her avoiding radiation today and she is in agreement with this.  All of her recent imaging has been unrevealing for any new findings and I think it is unlikely to show a new infected kidney stone.  Her urinalysis is obviously still somewhat difficult to interpret due to her nephrostomy tube, it is nitrite negative, and actually has a less red and white blood cells than previous.  We will send for culture.  The only confounding factor is that her CRP is more elevated than 2 weeks ago, but she continues to be afebrile with no  leukocytosis, no left shift, creatinine at baseline.  I discussed options with her and she is comfortable with an outpatient plan.  Because the last culture was specifically sensitive to Bactrim, I will try using a course of this to treat for 10 days instead while we await the urine culture results.  She was treated with some IV fluids, antiemetics, and initial morphine followed by oral meds for pain control here in the ED and is sleeping comfortably.  I did give a dose of Rocephin prior to discharge and she is comfortable with this plan and return precautions.        9:21 PM I met with patient for initial encounter.  10:27 PM I updated patient with plan for discharge. Patient was agreeable.     At the conclusion of the encounter I discussed the results of all of the tests and the disposition. The questions were answered. The patient or family acknowledged understanding and was agreeable with the care plan.         MEDICATIONS GIVEN IN THE EMERGENCY:   Medications - No data to display   NEW PRESCRIPTIONS STARTED AT TODAY'S ER VISIT   New Prescriptions    No medications on file     ================================================================   HISTORY OF PRESENT ILLNESS       Patient information was obtained from: Patient   Use of Intrepreter: N/A   Jacqueline Pappas is a 37 year old female with history of acute renal failure, absence of left kidney, nephrostomy tube in place, who presents via walk-in for evaluation of flank pain.    Patient endorses right sided flank pain that began yesterday after she had her nephrostomy tube replaced, says she always feels a little sore afterwards but this is worse. She describes the pain as throbbing and stabbing and also reports related nausea, vomiting and generalized body aches.    ================================================================    REVIEW OF SYSTEMS       Review of Systems   Constitutional: Negative for fever.        Positive for generalized body aches.    HENT: Negative for congestion.    Eyes: Negative for redness.   Respiratory: Negative for shortness of breath.    Cardiovascular: Negative for chest pain.   Gastrointestinal: Positive for nausea and vomiting. Negative for abdominal pain.   Genitourinary: Positive for flank pain (Right). Negative for difficulty urinating.   Musculoskeletal: Negative for arthralgias and neck stiffness.   Skin: Negative for color change.   Neurological: Negative for headaches.   Psychiatric/Behavioral: Negative for confusion.   All other systems reviewed and are negative.      PAST HISTORY     PAST MEDICAL HISTORY:   Past Medical History:   Diagnosis Date     Acute renal failure (H)      Anemia      Anemia      Calculus of kidney      Congenital absence of left kidney      Congenital absence of one kidney      Depression      Depression      Hydronephrosis      Kidney stone     at age 29     Pyelonephritis      Pyelonephritis      Smoker      Ureteral stricture      UTI (urinary tract infection)      Wrist fracture     Left      PAST SURGICAL HISTORY:   Past Surgical History:   Procedure Laterality Date      SECTION        SECTION      x1     COMBINED CYSTOSCOPY, RETROGRADES, URETEROSCOPY, LASER HOLMIUM LITHOTRIPSY URETER(S), INSERT STENT Right 2016    Procedure: COMBINED CYSTOSCOPY, RETROGRADES, URETEROSCOPY, LASER HOLMIUM LITHOTRIPSY URETER(S), INSERT STENT;  Surgeon: Florentino Awad MD;  Location: [facility] OR     CYSTOSCOPY, URETEROSCOPY, COMBINED Right 2016    Procedure: COMBINED CYSTOSCOPY, URETEROSCOPY;  Surgeon: Florentino Awad MD;  Location: [facility] OR     IR NEPHROLITHOTOMY  2014     IR NEPHROSTOGRAM EXISITING ACCESS  10/18/2018     IR NEPHROSTOMY TUBE CHANGE RIGHT  2018     IR NEPHROSTOMY TUBE CHANGE RIGHT  2020     IR NEPHROSTOMY TUBE CHANGE RIGHT  2018     IR NEPHROSTOMY TUBE CHANGE RIGHT  2020     IR NEPHROSTOMY TUBE PLACEMENT RIGHT  2016     IR NEPHROSTOMY TUBE  PLACEMENT RIGHT  9/14/2017     KIDNEY STONE SURGERY Right 05/2016     LASER HOLMIUM LITHOTRIPSY URETER(S), INSERT STENT, COMBINED Left 11/1/2016    Procedure: COMBINED CYSTOSCOPY, URETEROSCOPY, LASER HOLMIUM LITHOTRIPSY URETER(S), INSERT STENT;  Surgeon: Florentino Awad MD;  Location: [facility] OR     LASER HOLMIUM NEPHROLITHOTOMY VIA PERCUTANEOUS NEPHROSTOMY Right 9/14/2016    Procedure: LASER HOLMIUM NEPHROLITHOTOMY VIA PERCUTANEOUS NEPHROSTOMY;  Surgeon: Florentino Awad MD;  Location: [facility] OR     NEPHROSTOMY W/ INTRODUCTION OF CATHETER Right      OTHER SURGICAL HISTORY      Mole removednasal     OTHER SURGICAL HISTORY Right     Cystoscopy, ureteroscopy, laser11/02/2014 x2 with ureteral stent insertion     PERCUTANEOUS NEPHROSTOMY  11/1/2016    Procedure: PERCUTANEOUS NEPHROSTOMY;  Surgeon: Florentino Awad MD;  Location: [facility] OR     WISDOM TOOTH EXTRACTION       ZZC REMOVAL OF KIDNEY STONE        CURRENT MEDICATIONS:   albuterol (ACCUNEB) 1.25 MG/3ML neb solution  sertraline (ZOLOFT) 100 MG tablet  SPRINTEC 28 0.25-35 MG-MCG tablet      ALLERGIES:   Allergies   Allergen Reactions     Vancomycin      Desogestrel-Ethinyl Estradiol Angioedema and Swelling     Swelling of hands and feet per pt.  Swelling of hands and feet per pt.  Swelling of hands and feet per pt.  Swelling of hands and feet per pt.  Swelling of hands and feet per pt.  Swelling of hands and feet per pt.  Swelling of hands and feet per pt.  Swelling of hands and feet per pt.       Penicillins Rash     As a child per mother; mother unsure if has tolerated another PCN since. Tolerated ampicillin 9/20/16     Sulfa Drugs Rash      FAMILY HISTORY:   Family History   Problem Relation Age of Onset     Anesthesia Reaction No family hx of      Malignant Hyperthermia No family hx of      Heart Disease Maternal Uncle         heart failure     Coronary Artery Disease Other      Heart Disease Maternal Grandmother         pacemaker     Gout Paternal  "Grandfather      Prostate Cancer Maternal Aunt         breast     Heart Disease Maternal Aunt         pacemaker     Heart Disease Maternal Aunt         pacemaker     Heart Disease Maternal Aunt         pacemaker      SOCIAL HISTORY:   Social History     Socioeconomic History     Marital status: Single   Tobacco Use     Smoking status: Current Every Day Smoker     Packs/day: 0.50     Years: 10.00     Pack years: 5.00     Last attempt to quit: 2016     Years since quittin.8     Smokeless tobacco: Former User     Quit date: 2016     Tobacco comment: has information from last admission.   Substance and Sexual Activity     Alcohol use: Yes     Alcohol/week: 0.0 standard drinks     Comment: Alcoholic Drinks/day: occ     Drug use: No        VITALS  Patient Vitals for the past 24 hrs:   BP Temp Temp src Pulse Resp SpO2 Height Weight   22 1907 128/87 98.9  F (37.2  C) Oral 86 18 98 % 1.651 m (5' 5\") 83.9 kg (185 lb)        ================================================================    PHYSICAL EXAM     VITAL SIGNS: /87   Pulse 86   Temp 98.9  F (37.2  C) (Oral)   Resp 18   Ht 1.651 m (5' 5\")   Wt 83.9 kg (185 lb)   LMP 2022   SpO2 98%   BMI 30.79 kg/m     Constitutional:  Awake, no acute distress   HENT:  Atraumatic, oropharynx without exudate or erythema, membranes moist  Lymph:  No adenopathy  Eyes: EOM intact, PERRL, no injection  Neck: Supple  Respiratory:  Clear to auscultation bilaterally, no wheezes or crackles   Cardiovascular:  Regular rate and rhythm, single S1 and S2   GI:  Soft, nontender, nondistended, no rebound or guarding   : Right nephrostomy tube in place draining clear appearing  Musculoskeletal:  Moves all extremities, no lower extremity edema, no deformities    Skin:  Warm, dry  Neurologic:  Alert and oriented x3, no focal deficits noted       ================================================================  LAB       All pertinent labs reviewed and " interpreted.   Labs Ordered and Resulted from Time of ED Arrival to Time of ED Departure   ROUTINE UA WITH MICROSCOPIC REFLEX TO CULTURE - Abnormal       Result Value    Color Urine Light Yellow      Appearance Urine Clear      Glucose Urine Negative      Bilirubin Urine Negative      Ketones Urine Negative      Specific Gravity Urine 1.007      Blood Urine 0.2 mg/dL (*)     pH Urine 6.5      Protein Albumin Urine 100  (*)     Urobilinogen Urine <2.0      Nitrite Urine Negative      Leukocyte Esterase Urine 500 Duglas/uL (*)     Bacteria Urine Few (*)     Mucus Urine Present (*)     RBC Urine 12 (*)     WBC Urine 16 (*)     Squamous Epithelials Urine 2 (*)     Hyaline Casts Urine 1     BASIC METABOLIC PANEL - Abnormal    Sodium 136      Potassium 3.5      Chloride 107      Carbon Dioxide (CO2) 21 (*)     Anion Gap 8      Urea Nitrogen 9      Creatinine 0.83      Calcium 10.2      Glucose 90      GFR Estimate >90     CRP INFLAMMATION - Abnormal    CRP 6.0 (*)    INFLUENZA A/B & SARS-COV2 PCR MULTIPLEX - Normal    Influenza A PCR Negative      Influenza B PCR Negative      RSV PCR Negative      SARS CoV2 PCR Negative     CBC WITH PLATELETS AND DIFFERENTIAL    WBC Count 9.9      RBC Count 4.38      Hemoglobin 13.1      Hematocrit 39.6      MCV 90      MCH 29.9      MCHC 33.1      RDW 13.2      Platelet Count 172      % Neutrophils 76      % Lymphocytes 16      % Monocytes 7      % Eosinophils 1      % Basophils 0      % Immature Granulocytes 0      NRBCs per 100 WBC 0      Absolute Neutrophils 7.5      Absolute Lymphocytes 1.5      Absolute Monocytes 0.7      Absolute Eosinophils 0.1      Absolute Basophils 0.0      Absolute Immature Granulocytes 0.0      Absolute NRBCs 0.0     URINE CULTURE        ===============================================================  RADIOLOGY       Reviewed all pertinent imaging. Please see official radiology report.   No orders to display          ================================================================  EKG         I have independently reviewed and interpreted the EKG(s) documented above.     ================================================================  PROCEDURES         I, Eh Almeida, am serving as a scribe to document services personally performed by Dr. Murphy based on my observation and the provider's statements to me. I, Jennifer Murphy MD attest that Eh Almeida is acting in a scribe capacity, has observed my performance of the services and has documented them in accordance with my direction.   Jennifer Murphy M.D.   Emergency Medicine   [facility] EMERGENCY ROOM  [address] [address]  [phone]  Dept: [phone]        Jennifer Murphy MD  07/22/22 [id number]

## 2022-07-24 LAB — BACTERIA UR CULT: NORMAL

## 2022-07-27 ENCOUNTER — APPOINTMENT (OUTPATIENT)
Dept: ULTRASOUND IMAGING | Facility: CLINIC | Age: 37
End: 2022-07-27
Attending: EMERGENCY MEDICINE
Payer: COMMERCIAL

## 2022-07-27 ENCOUNTER — HOSPITAL ENCOUNTER (EMERGENCY)
Facility: CLINIC | Age: 37
Discharge: HOME | End: 2022-07-28
Attending: EMERGENCY MEDICINE | Admitting: EMERGENCY MEDICINE
Payer: COMMERCIAL

## 2022-07-27 DIAGNOSIS — N12 PYELONEPHRITIS OF RIGHT KIDNEY: ICD-10-CM

## 2022-07-27 LAB
ALBUMIN UR-MCNC: 200 MG/DL
ANION GAP SERPL CALCULATED.3IONS-SCNC: 8 MMOL/L (ref 5–18)
APPEARANCE UR: CLEAR
BACTERIA #/AREA URNS HPF: ABNORMAL /HPF
BASOPHILS # BLD AUTO: 0 10E3/UL (ref 0–0.2)
BASOPHILS NFR BLD AUTO: 0 %
BILIRUB UR QL STRIP: NEGATIVE
BUN SERPL-MCNC: 17 MG/DL (ref 8–22)
CALCIUM SERPL-MCNC: 10.3 MG/DL (ref 8.5–10.5)
CHLORIDE BLD-SCNC: 108 MMOL/L (ref 98–107)
CO2 SERPL-SCNC: 22 MMOL/L (ref 22–31)
COLOR UR AUTO: ABNORMAL
CREAT SERPL-MCNC: 0.9 MG/DL (ref 0.6–1.1)
EOSINOPHIL # BLD AUTO: 0.1 10E3/UL (ref 0–0.7)
EOSINOPHIL NFR BLD AUTO: 1 %
ERYTHROCYTE [DISTWIDTH] IN BLOOD BY AUTOMATED COUNT: 12.9 % (ref 10–15)
GFR SERPL CREATININE-BSD FRML MDRD: 85 ML/MIN/1.73M2
GLUCOSE BLD-MCNC: 95 MG/DL (ref 70–125)
GLUCOSE UR STRIP-MCNC: NEGATIVE MG/DL
HCG UR QL: NEGATIVE
HCT VFR BLD AUTO: 38.9 % (ref 35–47)
HGB BLD-MCNC: 13.2 G/DL (ref 11.7–15.7)
HGB UR QL STRIP: ABNORMAL
IMM GRANULOCYTES # BLD: 0.1 10E3/UL
IMM GRANULOCYTES NFR BLD: 1 %
KETONES UR STRIP-MCNC: NEGATIVE MG/DL
LEUKOCYTE ESTERASE UR QL STRIP: ABNORMAL
LYMPHOCYTES # BLD AUTO: 2.3 10E3/UL (ref 0.8–5.3)
LYMPHOCYTES NFR BLD AUTO: 23 %
MCH RBC QN AUTO: 30 PG (ref 26.5–33)
MCHC RBC AUTO-ENTMCNC: 33.9 G/DL (ref 31.5–36.5)
MCV RBC AUTO: 88 FL (ref 78–100)
MONOCYTES # BLD AUTO: 0.7 10E3/UL (ref 0–1.3)
MONOCYTES NFR BLD AUTO: 7 %
MUCOUS THREADS #/AREA URNS LPF: PRESENT /LPF
NEUTROPHILS # BLD AUTO: 6.7 10E3/UL (ref 1.6–8.3)
NEUTROPHILS NFR BLD AUTO: 68 %
NITRATE UR QL: NEGATIVE
NRBC # BLD AUTO: 0 10E3/UL
NRBC BLD AUTO-RTO: 0 /100
PH UR STRIP: 6.5 [PH] (ref 5–7)
PLATELET # BLD AUTO: 212 10E3/UL (ref 150–450)
POTASSIUM BLD-SCNC: 3.7 MMOL/L (ref 3.5–5)
RBC # BLD AUTO: 4.4 10E6/UL (ref 3.8–5.2)
RBC URINE: 24 /HPF
SODIUM SERPL-SCNC: 138 MMOL/L (ref 136–145)
SP GR UR STRIP: 1.02 (ref 1–1.03)
SQUAMOUS EPITHELIAL: 2 /HPF
UROBILINOGEN UR STRIP-MCNC: <2 MG/DL
WBC # BLD AUTO: 9.9 10E3/UL (ref 4–11)
WBC URINE: 26 /HPF

## 2022-07-27 PROCEDURE — 76830 TRANSVAGINAL US NON-OB: CPT

## 2022-07-27 PROCEDURE — 96376 TX/PRO/DX INJ SAME DRUG ADON: CPT

## 2022-07-27 PROCEDURE — 82374 ASSAY BLOOD CARBON DIOXIDE: CPT | Performed by: EMERGENCY MEDICINE

## 2022-07-27 PROCEDURE — 96375 TX/PRO/DX INJ NEW DRUG ADDON: CPT

## 2022-07-27 PROCEDURE — 87086 URINE CULTURE/COLONY COUNT: CPT | Performed by: EMERGENCY MEDICINE

## 2022-07-27 PROCEDURE — 250N000011 HC RX IP 250 OP 636: Performed by: EMERGENCY MEDICINE

## 2022-07-27 PROCEDURE — 85025 COMPLETE CBC W/AUTO DIFF WBC: CPT | Performed by: EMERGENCY MEDICINE

## 2022-07-27 PROCEDURE — 36415 COLL VENOUS BLD VENIPUNCTURE: CPT | Performed by: EMERGENCY MEDICINE

## 2022-07-27 PROCEDURE — 99285 EMERGENCY DEPT VISIT HI MDM: CPT | Mod: 25

## 2022-07-27 PROCEDURE — 81025 URINE PREGNANCY TEST: CPT | Performed by: EMERGENCY MEDICINE

## 2022-07-27 PROCEDURE — 96366 THER/PROPH/DIAG IV INF ADDON: CPT

## 2022-07-27 PROCEDURE — 81001 URINALYSIS AUTO W/SCOPE: CPT | Performed by: EMERGENCY MEDICINE

## 2022-07-27 PROCEDURE — 96365 THER/PROPH/DIAG IV INF INIT: CPT

## 2022-07-27 RX ORDER — ONDANSETRON 2 MG/ML
4 INJECTION INTRAMUSCULAR; INTRAVENOUS ONCE
Status: COMPLETED | OUTPATIENT
Start: 2022-07-27 | End: 2022-07-27

## 2022-07-27 RX ORDER — LEVOFLOXACIN 5 MG/ML
750 INJECTION, SOLUTION INTRAVENOUS ONCE
Status: COMPLETED | OUTPATIENT
Start: 2022-07-27 | End: 2022-07-28

## 2022-07-27 RX ORDER — LEVOFLOXACIN 500 MG/1
500 TABLET, FILM COATED ORAL DAILY
Qty: 9 TABLET | Refills: 0 | Status: SHIPPED | OUTPATIENT
Start: 2022-07-27 | End: 2022-08-05

## 2022-07-27 RX ORDER — MORPHINE SULFATE 4 MG/ML
4 INJECTION, SOLUTION INTRAMUSCULAR; INTRAVENOUS EVERY 30 MIN PRN
Status: COMPLETED | OUTPATIENT
Start: 2022-07-27 | End: 2022-07-28

## 2022-07-27 RX ADMIN — ONDANSETRON 4 MG: 2 INJECTION INTRAMUSCULAR; INTRAVENOUS at 22:03

## 2022-07-27 RX ADMIN — MORPHINE SULFATE 4 MG: 4 INJECTION, SOLUTION INTRAMUSCULAR; INTRAVENOUS at 22:03

## 2022-07-27 RX ADMIN — MORPHINE SULFATE 4 MG: 4 INJECTION, SOLUTION INTRAMUSCULAR; INTRAVENOUS at 23:04

## 2022-07-27 RX ADMIN — LEVOFLOXACIN 750 MG: 750 INJECTION, SOLUTION INTRAVENOUS at 23:03

## 2022-07-27 ASSESSMENT — ENCOUNTER SYMPTOMS
SHORTNESS OF BREATH: 0
FLANK PAIN: 1
VOMITING: 0
NAUSEA: 0
FEVER: 0
DIARRHEA: 1

## 2022-07-27 NOTE — LETTER
August 1, 2022      Jacqueline Pappas  [address]  [place] [address]        Dear ,    We are writing to inform you of your test results. We have attempted to call and have left 2 voicemails with no response.  The antibiotics that you are currently on for your urinary tract infection, will not completely clear the infection.  We need to switch your antibiotics.  I would recommend the antibiotic Bactrim.  Please call our emergency department back so that we can send this to the appropriate pharmacy.  Our phone number is [phone].       Resulted Orders   Urine Culture   Result Value Ref Range    Culture >100,000 CFU/mL Staphylococcus lugdunensis (A)     Culture 10,000-50,000 CFU/mL Staphylococcus lugdunensis (A)        If you have any questions or concerns, please call the clinic at the number listed above.       Sincerely,      Shilpi Morgan PA-C

## 2022-07-27 NOTE — ED TRIAGE NOTES
The patient presents to the ED with right sided pelvic pain that has been present intermittently for a couple of months but is now constant and more severe today. The patient has a known ovarian cyst on that side. Denies vaginal bleeding. The patient reports diarrhea as well today. Denies fever. Denies any chance of pregnancy.  Took Tylenol at 1600 without relief.

## 2022-07-28 VITALS
RESPIRATION RATE: 12 BRPM | TEMPERATURE: 98.3 F | DIASTOLIC BLOOD PRESSURE: 63 MMHG | BODY MASS INDEX: 35.34 KG/M2 | HEIGHT: 60 IN | SYSTOLIC BLOOD PRESSURE: 148 MMHG | WEIGHT: 180 LBS | HEART RATE: 99 BPM | OXYGEN SATURATION: 99 %

## 2022-07-28 PROCEDURE — 250N000011 HC RX IP 250 OP 636: Performed by: EMERGENCY MEDICINE

## 2022-07-28 RX ADMIN — MORPHINE SULFATE 4 MG: 4 INJECTION, SOLUTION INTRAMUSCULAR; INTRAVENOUS at 00:07

## 2022-07-28 NOTE — ED PROVIDER NOTES
EMERGENCY DEPARTMENT ENCOUNTER      NAME: Jacqueline Pappas  AGE: 37 year old female  YOB: 1985  MRN: [id number]  EVALUATION DATE & TIME: 2022  9:30 PM    PCP: Pao Montague    ED PROVIDER: Jose D Luong M.D.      Chief Complaint   Patient presents with     Pelvic Pain     right         FINAL IMPRESSION:  1.  Right pyelonephritis.  2.  Failure of outpatient antibiotics.      ED COURSE & MEDICAL DECISION MAKIN:00 PM I met with the patient to gather history and to perform my initial exam. We discussed plans for the ED course, including diagnostic testing and treatment. PPE worn: cloth mask.  Patient was right pelvic pain off-and-on for a number of months.  Has not seen their physician.  Seen here  and diagnosed with pyelonephritis.  Receiving course of antibiotics first with Keflex, then later with Bactrim.  She notes pain is constant and increased today.  She has a known right ovarian cyst.  Denies vaginal bleeding.  She notes diarrhea today.  Took Tylenol at 4 PM without help.  Patient not driving, and arrived by EMS.  11:34 PM I rechecked and updated the patient on test results.  Urinalysis is still quite infected.  Pelvic ultrasound unremarkable.  No ovarian pathology or torsion was noted.  Pregnancy test was negative.  CBC negative.  Chemistries negative.  Patient received Levaquin IV as well as fluids, pain medications.  Given the fact patient seems to have failed 2 outpatient course of antibiotics: Keflex and Bactrim, it was felt patient should be admitted to the hospital for pyelonephritis and IV antibiotics.  Patient given first dose of IV Levaquin here.  Patient refusing to be admitted to the hospital at this time and would like to go home on the same antibiotics.  Prescription for Levaquin written.  Patient discharged home.  She must see her clinic within the week for recheck of urine.  Patient expressed agreement.      Pertinent Labs & Imaging studies reviewed.  (See chart for details)    37 year old female presents to the Emergency Department for evaluation of right pelvic pain.    At the conclusion of the encounter I discussed the results of all of the tests and the disposition. The questions were answered. The patient or family acknowledged understanding and was agreeable with the care plan.              MEDICATIONS GIVEN IN THE EMERGENCY:  Medications   morphine (PF) injection 4 mg (4 mg Intravenous Given 7/27/22 2304)   levofloxacin (LEVAQUIN) infusion 750 mg (750 mg Intravenous New Bag 7/27/22 2303)   ondansetron (ZOFRAN) injection 4 mg (4 mg Intravenous Given 7/27/22 2203)       NEW PRESCRIPTIONS STARTED AT TODAY'S ER VISIT  New Prescriptions    No medications on file          =================================================================    HPI    Patient information was obtained from: The patient.    Use of : N/A         Jacqueline Pappas is a 37 year old female with a pertinent history of congenital absence of one kidney, ureteral stricture with chronic right nephrostomy tube, kidney stones s/p stenting, recurrent pyelonephritis, recurrent UTIs, and intermittent right pelvic pain for months, who presents to this ED today for evaluation of her right pelvic pain.      Per chart review, patient with a history of recurrent pyelonephritis who initially presented to [facility] ED on 7/9 with a two day history of progressively worsening right sided flank pain and nausea. UA was concerning for infection. CT appeared stable compared to previous aside from a 3.9 hemorrhagic cyst. She was discharged on a 7 day course of Keflex 500 mg QID for presumed pyelonephritis. She returned to the ED on 7/22 with recurrent right sided flank pain and was again diagnosed with pyelonephritis. She was discharged on a 10 day course of Bactrim 800-160 mg BID pending urine culture sensitivities.     Patient returns with pain in a similar location to her previous ED visits but now  slightly more towards inguinal ligament area. She currently rates her pain 8/10. Additionally endorses diarrhea today. She does not identify any waxing or waning symptoms otherwise, exacerbating or alleviating features, associated symptoms except as mentioned. Otherwise denies nausea, vomiting, fever, chest pain, or dyspnea.       REVIEW OF SYSTEMS   Review of Systems   Constitutional: Negative for fever.   Respiratory: Negative for shortness of breath.    Cardiovascular: Negative for chest pain.   Gastrointestinal: Positive for diarrhea. Negative for nausea and vomiting.   Genitourinary: Positive for flank pain (right sided).        Positive for right sided groin pain.   All other systems reviewed and are negative.      PAST MEDICAL HISTORY:  Past Medical History:   Diagnosis Date     Acute renal failure (H)      Anemia      Anemia      Calculus of kidney      Congenital absence of left kidney      Congenital absence of one kidney      Depression      Depression      Hydronephrosis      Kidney stone     at age 29     Pyelonephritis      Pyelonephritis      Smoker      Ureteral stricture      UTI (urinary tract infection)      Wrist fracture     Left       PAST SURGICAL HISTORY:  Past Surgical History:   Procedure Laterality Date      SECTION        SECTION      x1     COMBINED CYSTOSCOPY, RETROGRADES, URETEROSCOPY, LASER HOLMIUM LITHOTRIPSY URETER(S), INSERT STENT Right 2016    Procedure: COMBINED CYSTOSCOPY, RETROGRADES, URETEROSCOPY, LASER HOLMIUM LITHOTRIPSY URETER(S), INSERT STENT;  Surgeon: Florentino Awad MD;  Location: [facility] OR     CYSTOSCOPY, URETEROSCOPY, COMBINED Right 2016    Procedure: COMBINED CYSTOSCOPY, URETEROSCOPY;  Surgeon: Florentino Awad MD;  Location: [facility] OR     IR NEPHROLITHOTOMY  2014     IR NEPHROSTOGRAM EXISITING ACCESS  10/18/2018     IR NEPHROSTOMY TUBE CHANGE RIGHT  2018     IR NEPHROSTOMY TUBE CHANGE RIGHT  2020     IR NEPHROSTOMY TUBE  CHANGE RIGHT  12/12/2018     IR NEPHROSTOMY TUBE CHANGE RIGHT  6/11/2020     IR NEPHROSTOMY TUBE PLACEMENT RIGHT  7/24/2016     IR NEPHROSTOMY TUBE PLACEMENT RIGHT  9/14/2017     KIDNEY STONE SURGERY Right 05/2016     LASER HOLMIUM LITHOTRIPSY URETER(S), INSERT STENT, COMBINED Left 11/1/2016    Procedure: COMBINED CYSTOSCOPY, URETEROSCOPY, LASER HOLMIUM LITHOTRIPSY URETER(S), INSERT STENT;  Surgeon: Florentino Awad MD;  Location: [facility] OR     LASER HOLMIUM NEPHROLITHOTOMY VIA PERCUTANEOUS NEPHROSTOMY Right 9/14/2016    Procedure: LASER HOLMIUM NEPHROLITHOTOMY VIA PERCUTANEOUS NEPHROSTOMY;  Surgeon: Florentino Awad MD;  Location: [facility] OR     NEPHROSTOMY W/ INTRODUCTION OF CATHETER Right      OTHER SURGICAL HISTORY      Mole removednasal     OTHER SURGICAL HISTORY Right     Cystoscopy, ureteroscopy, laser11/02/2014 x2 with ureteral stent insertion     PERCUTANEOUS NEPHROSTOMY  11/1/2016    Procedure: PERCUTANEOUS NEPHROSTOMY;  Surgeon: Florentino Awad MD;  Location: [facility] OR     WISDOM TOOTH EXTRACTION       ZZC REMOVAL OF KIDNEY STONE             CURRENT MEDICATIONS:    albuterol (ACCUNEB) 1.25 MG/3ML neb solution  sertraline (ZOLOFT) 100 MG tablet  SPRINTEC 28 0.25-35 MG-MCG tablet  sulfamethoxazole-trimethoprim (BACTRIM DS) 800-160 MG tablet        ALLERGIES:  Allergies   Allergen Reactions     Vancomycin      Desogestrel-Ethinyl Estradiol Angioedema and Swelling     Swelling of hands and feet per pt.  Swelling of hands and feet per pt.  Swelling of hands and feet per pt.  Swelling of hands and feet per pt.  Swelling of hands and feet per pt.  Swelling of hands and feet per pt.  Swelling of hands and feet per pt.  Swelling of hands and feet per pt.       Penicillins Rash     As a child per mother; mother unsure if has tolerated another PCN since. Tolerated ampicillin 9/20/16     Sulfa Drugs Rash       FAMILY HISTORY:  Family History   Problem Relation Age of Onset     Anesthesia Reaction No family hx of       Malignant Hyperthermia No family hx of      Heart Disease Maternal Uncle         heart failure     Coronary Artery Disease Other      Heart Disease Maternal Grandmother         pacemaker     Gout Paternal Grandfather      Prostate Cancer Maternal Aunt         breast     Heart Disease Maternal Aunt         pacemaker     Heart Disease Maternal Aunt         pacemaker     Heart Disease Maternal Aunt         pacemaker       SOCIAL HISTORY:   Social History     Socioeconomic History     Marital status: Single   Tobacco Use     Smoking status: Current Every Day Smoker     Packs/day: 0.50     Years: 10.00     Pack years: 5.00     Last attempt to quit: 2016     Years since quittin.8     Smokeless tobacco: Former User     Quit date: 2016     Tobacco comment: has information from last admission.   Substance and Sexual Activity     Alcohol use: Yes     Alcohol/week: 0.0 standard drinks     Comment: Alcoholic Drinks/day: occ     Drug use: No   Smokes daily.  Occasional alcohol use.  No drugs.    VITALS:  /71   Pulse 99   Temp 98.3  F (36.8  C) (Temporal)   Resp 16   Ht 1.524 m (5')   Wt 81.6 kg (180 lb)   LMP 2022   SpO2 98%   BMI 35.15 kg/m      PHYSICAL EXAM    Vital Signs:  /71   Pulse 99   Temp 98.3  F (36.8  C) (Temporal)   Resp 16   Ht 1.524 m (5')   Wt 81.6 kg (180 lb)   LMP 2022   SpO2 98%   BMI 35.15 kg/m    General:  On entering the room she is in no apparent distress.    Neck:  Neck supple with full range of motion and nontender.    Back:  Back and spine are nontender.  No costovertebral angle tenderness.    HEENT:  Oropharynx clear with moist mucous membranes.  HEENT unremarkable.    Pulmonary:  Chest clear to auscultation without rhonchi rales or wheezing.    Cardiovascular:  Cardiac regular rate and rhythm without murmurs rubs or gallops.    Abdomen:  Abdomen soft nontender except for tenderness in the right adnexal region..  There is no rebound or guarding.     Muskuloskeletal:  she moves all 4 without any difficulty and has normal neurovascular exams.  Extremities without clubbing, cyanosis, or edema.  Legs and calves are nontender.    Neuro:  she is alert and oriented ×3 and moves all extremities symmetrically.    Psych:  Normal affect.    Skin:  Unremarkable and warm and dry.       LAB:  All pertinent labs reviewed and interpreted.  Labs Ordered and Resulted from Time of ED Arrival to Time of ED Departure   ROUTINE UA WITH MICROSCOPIC REFLEX TO CULTURE - Abnormal       Result Value    Color Urine Light Yellow      Appearance Urine Clear      Glucose Urine Negative      Bilirubin Urine Negative      Ketones Urine Negative      Specific Gravity Urine 1.018      Blood Urine 0.1 mg/dL (*)     pH Urine 6.5      Protein Albumin Urine 200  (*)     Urobilinogen Urine <2.0      Nitrite Urine Negative      Leukocyte Esterase Urine 500 Duglas/uL (*)     Bacteria Urine Few (*)     Mucus Urine Present (*)     RBC Urine 24 (*)     WBC Urine 26 (*)     Squamous Epithelials Urine 2 (*)    BASIC METABOLIC PANEL - Abnormal    Sodium 138      Potassium 3.7      Chloride 108 (*)     Carbon Dioxide (CO2) 22      Anion Gap 8      Urea Nitrogen 17      Creatinine 0.90      Calcium 10.3      Glucose 95      GFR Estimate 85     HCG QUALITATIVE URINE - Normal    hCG Urine Qualitative Negative     CBC WITH PLATELETS AND DIFFERENTIAL    WBC Count 9.9      RBC Count 4.40      Hemoglobin 13.2      Hematocrit 38.9      MCV 88      MCH 30.0      MCHC 33.9      RDW 12.9      Platelet Count 212      % Neutrophils 68      % Lymphocytes 23      % Monocytes 7      % Eosinophils 1      % Basophils 0      % Immature Granulocytes 1      NRBCs per 100 WBC 0      Absolute Neutrophils 6.7      Absolute Lymphocytes 2.3      Absolute Monocytes 0.7      Absolute Eosinophils 0.1      Absolute Basophils 0.0      Absolute Immature Granulocytes 0.1      Absolute NRBCs 0.0     URINE CULTURE       RADIOLOGY:  Reviewed all  pertinent imaging. Please see official radiology report.  US Pelvic Complete with Transvaginal   Final Result   IMPRESSION:      Normal pelvic ultrasound. No sonographic evidence of ovarian torsion.                 EKG:        PROCEDURES:         I, Mary Anderson, am serving as a scribe to document services personally performed by Dr. Luong based on my observation and the provider's statements to me. I, Jose D Luong MD attest that Mary Anderson is acting in a scribe capacity, has observed my performance of the services and has documented them in accordance with my direction.    Jose D Luong M.D.  Emergency Medicine  [facility] EMERGENCY ROOM  Critical access hospital[address] [facility] [id number] [phone]  Dept: [phone]      Jose D Luong MD  07/27/22 1078

## 2022-07-28 NOTE — DISCHARGE INSTRUCTIONS
Findings of a continued right kidney infection.  Levaquin once a day for another 9 days starting tomorrow.  We gave you the first dose here today.  Ice or heat off-and-on to sore areas can help with pain.  Over-the-counter Tylenol or ibuprofen every 6 hours can help with pain.  You must see your clinic for follow-up within the week and recheck urine.  See them sooner if worse or if problems, or return.

## 2022-07-30 LAB
BACTERIA UR CULT: ABNORMAL
BACTERIA UR CULT: ABNORMAL

## 2022-08-29 LAB
ALBUMIN UR-MCNC: 70 MG/DL
APPEARANCE UR: ABNORMAL
BACTERIA #/AREA URNS HPF: ABNORMAL /HPF
BILIRUB UR QL STRIP: NEGATIVE
COLOR UR AUTO: ABNORMAL
GLUCOSE UR STRIP-MCNC: NEGATIVE MG/DL
HGB UR QL STRIP: ABNORMAL
KETONES UR STRIP-MCNC: NEGATIVE MG/DL
LEUKOCYTE ESTERASE UR QL STRIP: ABNORMAL
MUCOUS THREADS #/AREA URNS LPF: PRESENT /LPF
NITRATE UR QL: NEGATIVE
PH UR STRIP: 6.5 [PH] (ref 5–7)
RBC URINE: 15 /HPF
SP GR UR STRIP: 1.01 (ref 1–1.03)
SQUAMOUS EPITHELIAL: 2 /HPF
UROBILINOGEN UR STRIP-MCNC: <2 MG/DL
WBC URINE: 25 /HPF

## 2022-08-29 PROCEDURE — 99285 EMERGENCY DEPT VISIT HI MDM: CPT | Mod: 25

## 2022-08-29 PROCEDURE — 87086 URINE CULTURE/COLONY COUNT: CPT | Performed by: EMERGENCY MEDICINE

## 2022-08-29 PROCEDURE — 96376 TX/PRO/DX INJ SAME DRUG ADON: CPT

## 2022-08-29 PROCEDURE — 81001 URINALYSIS AUTO W/SCOPE: CPT | Performed by: EMERGENCY MEDICINE

## 2022-08-29 PROCEDURE — 96375 TX/PRO/DX INJ NEW DRUG ADDON: CPT

## 2022-08-29 PROCEDURE — 96365 THER/PROPH/DIAG IV INF INIT: CPT

## 2022-08-30 ENCOUNTER — HOSPITAL ENCOUNTER (EMERGENCY)
Facility: CLINIC | Age: 37
Discharge: HOME | End: 2022-08-30
Attending: EMERGENCY MEDICINE | Admitting: EMERGENCY MEDICINE
Payer: COMMERCIAL

## 2022-08-30 ENCOUNTER — APPOINTMENT (OUTPATIENT)
Dept: CT IMAGING | Facility: CLINIC | Age: 37
End: 2022-08-30
Attending: EMERGENCY MEDICINE
Payer: COMMERCIAL

## 2022-08-30 VITALS
HEART RATE: 73 BPM | TEMPERATURE: 98.1 F | BODY MASS INDEX: 35.34 KG/M2 | DIASTOLIC BLOOD PRESSURE: 74 MMHG | HEIGHT: 60 IN | OXYGEN SATURATION: 99 % | RESPIRATION RATE: 16 BRPM | WEIGHT: 180 LBS | SYSTOLIC BLOOD PRESSURE: 117 MMHG

## 2022-08-30 DIAGNOSIS — N39.0 URINARY TRACT INFECTION ASSOCIATED WITH NEPHROSTOMY CATHETER, INITIAL ENCOUNTER (H): ICD-10-CM

## 2022-08-30 DIAGNOSIS — T83.512A URINARY TRACT INFECTION ASSOCIATED WITH NEPHROSTOMY CATHETER, INITIAL ENCOUNTER (H): ICD-10-CM

## 2022-08-30 LAB
ALBUMIN SERPL-MCNC: 3.9 G/DL (ref 3.5–5)
ALP SERPL-CCNC: 83 U/L (ref 45–120)
ALT SERPL W P-5'-P-CCNC: 19 U/L (ref 0–45)
ANION GAP SERPL CALCULATED.3IONS-SCNC: 11 MMOL/L (ref 5–18)
AST SERPL W P-5'-P-CCNC: 27 U/L (ref 0–40)
BASOPHILS # BLD AUTO: 0 10E3/UL (ref 0–0.2)
BASOPHILS NFR BLD AUTO: 0 %
BILIRUB SERPL-MCNC: 0.5 MG/DL (ref 0–1)
BUN SERPL-MCNC: 14 MG/DL (ref 8–22)
C REACTIVE PROTEIN LHE: 0.4 MG/DL (ref 0–?)
CALCIUM SERPL-MCNC: 10.7 MG/DL (ref 8.5–10.5)
CHLORIDE BLD-SCNC: 106 MMOL/L (ref 98–107)
CO2 SERPL-SCNC: 19 MMOL/L (ref 22–31)
CREAT SERPL-MCNC: 0.81 MG/DL (ref 0.6–1.1)
EOSINOPHIL # BLD AUTO: 0.2 10E3/UL (ref 0–0.7)
EOSINOPHIL NFR BLD AUTO: 2 %
ERYTHROCYTE [DISTWIDTH] IN BLOOD BY AUTOMATED COUNT: 13.4 % (ref 10–15)
GFR SERPL CREATININE-BSD FRML MDRD: >90 ML/MIN/1.73M2
GLUCOSE BLD-MCNC: 99 MG/DL (ref 70–125)
HCG SERPL QL: NEGATIVE
HCT VFR BLD AUTO: 45.6 % (ref 35–47)
HGB BLD-MCNC: 15.3 G/DL (ref 11.7–15.7)
IMM GRANULOCYTES # BLD: 0.1 10E3/UL
IMM GRANULOCYTES NFR BLD: 1 %
LYMPHOCYTES # BLD AUTO: 2.6 10E3/UL (ref 0.8–5.3)
LYMPHOCYTES NFR BLD AUTO: 25 %
MCH RBC QN AUTO: 29.8 PG (ref 26.5–33)
MCHC RBC AUTO-ENTMCNC: 33.6 G/DL (ref 31.5–36.5)
MCV RBC AUTO: 89 FL (ref 78–100)
MONOCYTES # BLD AUTO: 0.6 10E3/UL (ref 0–1.3)
MONOCYTES NFR BLD AUTO: 6 %
NEUTROPHILS # BLD AUTO: 6.9 10E3/UL (ref 1.6–8.3)
NEUTROPHILS NFR BLD AUTO: 66 %
NRBC # BLD AUTO: 0 10E3/UL
NRBC BLD AUTO-RTO: 0 /100
PLATELET # BLD AUTO: 196 10E3/UL (ref 150–450)
POTASSIUM BLD-SCNC: 3.9 MMOL/L (ref 3.5–5)
PROT SERPL-MCNC: 7.5 G/DL (ref 6–8)
RBC # BLD AUTO: 5.14 10E6/UL (ref 3.8–5.2)
SODIUM SERPL-SCNC: 136 MMOL/L (ref 136–145)
WBC # BLD AUTO: 10.2 10E3/UL (ref 4–11)

## 2022-08-30 PROCEDURE — 80053 COMPREHEN METABOLIC PANEL: CPT | Performed by: EMERGENCY MEDICINE

## 2022-08-30 PROCEDURE — 84703 CHORIONIC GONADOTROPIN ASSAY: CPT | Performed by: EMERGENCY MEDICINE

## 2022-08-30 PROCEDURE — 250N000011 HC RX IP 250 OP 636: Performed by: EMERGENCY MEDICINE

## 2022-08-30 PROCEDURE — 85025 COMPLETE CBC W/AUTO DIFF WBC: CPT | Performed by: EMERGENCY MEDICINE

## 2022-08-30 PROCEDURE — 74176 CT ABD & PELVIS W/O CONTRAST: CPT

## 2022-08-30 PROCEDURE — 82040 ASSAY OF SERUM ALBUMIN: CPT | Performed by: EMERGENCY MEDICINE

## 2022-08-30 PROCEDURE — 36415 COLL VENOUS BLD VENIPUNCTURE: CPT | Performed by: EMERGENCY MEDICINE

## 2022-08-30 PROCEDURE — 86140 C-REACTIVE PROTEIN: CPT | Performed by: EMERGENCY MEDICINE

## 2022-08-30 RX ORDER — HYDROMORPHONE HYDROCHLORIDE 1 MG/ML
0.5 INJECTION, SOLUTION INTRAMUSCULAR; INTRAVENOUS; SUBCUTANEOUS ONCE
Status: COMPLETED | OUTPATIENT
Start: 2022-08-30 | End: 2022-08-30

## 2022-08-30 RX ORDER — HYDROCODONE BITARTRATE AND ACETAMINOPHEN 5; 325 MG/1; MG/1
1-2 TABLET ORAL EVERY 4 HOURS PRN
Qty: 10 TABLET | Refills: 0 | Status: SHIPPED | OUTPATIENT
Start: 2022-08-30 | End: 2022-09-02 | Stop reason: ALTCHOICE

## 2022-08-30 RX ORDER — DIMENHYDRINATE 50 MG/ML
25 INJECTION, SOLUTION INTRAMUSCULAR; INTRAVENOUS ONCE
Status: COMPLETED | OUTPATIENT
Start: 2022-08-30 | End: 2022-08-30

## 2022-08-30 RX ORDER — CEFTRIAXONE 1 G/1
1 INJECTION, POWDER, FOR SOLUTION INTRAMUSCULAR; INTRAVENOUS ONCE
Status: COMPLETED | OUTPATIENT
Start: 2022-08-30 | End: 2022-08-30

## 2022-08-30 RX ORDER — NITROFURANTOIN 25; 75 MG/1; MG/1
100 CAPSULE ORAL 2 TIMES DAILY
Qty: 14 CAPSULE | Refills: 0 | Status: SHIPPED | OUTPATIENT
Start: 2022-08-30 | End: 2022-09-02 | Stop reason: ALTCHOICE

## 2022-08-30 RX ORDER — ONDANSETRON 4 MG/1
4-8 TABLET, ORALLY DISINTEGRATING ORAL EVERY 8 HOURS PRN
Qty: 10 TABLET | Refills: 0 | Status: SHIPPED | OUTPATIENT
Start: 2022-08-30 | End: 2022-09-02 | Stop reason: ALTCHOICE

## 2022-08-30 RX ADMIN — HYDROMORPHONE HYDROCHLORIDE 0.5 MG: 1 INJECTION, SOLUTION INTRAMUSCULAR; INTRAVENOUS; SUBCUTANEOUS at 01:49

## 2022-08-30 RX ADMIN — HYDROMORPHONE HYDROCHLORIDE 0.5 MG: 1 INJECTION, SOLUTION INTRAMUSCULAR; INTRAVENOUS; SUBCUTANEOUS at 03:44

## 2022-08-30 RX ADMIN — CEFTRIAXONE 1 G: 1 INJECTION, POWDER, FOR SOLUTION INTRAMUSCULAR; INTRAVENOUS at 02:20

## 2022-08-30 RX ADMIN — HYDROMORPHONE HYDROCHLORIDE 0.5 MG: 1 INJECTION, SOLUTION INTRAMUSCULAR; INTRAVENOUS; SUBCUTANEOUS at 02:44

## 2022-08-30 RX ADMIN — DIMENHYDRINATE 25 MG: 50 INJECTION, SOLUTION INTRAMUSCULAR; INTRAVENOUS at 01:48

## 2022-08-30 ASSESSMENT — ACTIVITIES OF DAILY LIVING (ADL): ADLS_ACUITY_SCORE: 35

## 2022-08-30 NOTE — ED PROVIDER NOTES
EMERGENCY DEPARTMENT ENCOUnter      NAME: Jacqueline Pappas  AGE: 37 year old female  YOB: 1985  MRN: [id number]  EVALUATION DATE & TIME: 8/30/2022  1:22 AM    PCP: Pao Montague    ED PROVIDER: Jolene Pastrana MD      Chief Complaint   Patient presents with     Flank Pain         FINAL IMPRESSION:  1. Urinary tract infection associated with nephrostomy catheter, initial encounter (H)          ED COURSE & MEDICAL DECISION MAKING:      In summary, the patient is a 36 yo female that presents to the ED for evaluation of flank pain thought secondary to urinary tract infection.  We will treat patient as an outpatient with oral antibiotics and close follow-up with her primary care and urologist.    1:21 AM I met with the patient to obtain patient history and performed a physical exam. Discussed plan for ED work up including potential diagnostic studies and interventions.  Reviewed previous medical records.  Dilaudid 0.5 mg IV x3 was administered for pain.  Dramamine 25 mg IV was administered for nausea.    2:01 AM Rechecked and updated patient.  Ceftriaxone 1 g IV was administered for initiation of antibiotic therapy for treatment of urinary tract infection.    3:40 AM Consult with [facility], waiting for a return call.    3:46 AM Rechecked and updated patient.    3:59 AM Spoke with [facility].  They agree with plan for outpatient treatment with oral antibiotics    4:14 AM Rechecked and updated patient for discharge.        At the conclusion of the encounter I discussed the results of all of the tests and the disposition. The questions were answered. The patient or family acknowledged understanding and was agreeable with the care plan.         MEDICATIONS GIVEN IN THE EMERGENCY:  Medications   HYDROmorphone (PF) (DILAUDID) injection 0.5 mg (0.5 mg Intravenous Given 8/30/22 0149)   dimenhyDRINATE (DRAMAMINE) injection 25 mg (25 mg Intravenous Given 8/30/22 0148)   cefTRIAXone (ROCEPHIN) 1 g  vial to attach to  mL bag for ADULTS or NS 50 mL bag for PEDS (0 g Intravenous Stopped 8/30/22 0245)   HYDROmorphone (PF) (DILAUDID) injection 0.5 mg (0.5 mg Intravenous Given 8/30/22 0244)   HYDROmorphone (PF) (DILAUDID) injection 0.5 mg (0.5 mg Intravenous Given 8/30/22 0344)       NEW PRESCRIPTIONS STARTED AT TODAY'S ER VISIT  Discharge Medication List as of 8/30/2022  4:19 AM      START taking these medications    Details   HYDROcodone-acetaminophen (NORCO) 5-325 MG tablet Take 1-2 tablets by mouth every 4 hours as needed for moderate to severe pain, Disp-10 tablet, R-0, Local Print      nitroFURantoin macrocrystal-monohydrate (MACROBID) 100 MG capsule Take 1 capsule (100 mg) by mouth 2 times daily, Disp-14 capsule, R-0, Local Print      ondansetron (ZOFRAN ODT) 4 MG ODT tab Take 1-2 tablets (4-8 mg) by mouth every 8 hours as needed for nausea or vomiting, Disp-10 tablet, R-0, Local Print                =================================================================    HPI        Jacqueline Pappas is a 37 year old female with a pertinent history of depression, kidney stone, UTI, and acute renal failure who presents to this ED via walk-in for evaluation of flank pain.    Patient reports right sided flank pain for the past few days. She describes the pain as 8/10, intermittent, and worsens when laying on her side. She also reports of diarrhea (4-5x today), upset stomach, general malaise, dizziness, and urine frequency. She denies fever, chills, blood in urine, dysuria. She says she's had the same symptoms a month ago and had a CT scan for it.    She has a nephrostomy tube on right side and has only 1 kidney. She reports patient has had at least 3 CT's this year. She has yet to meet her urologist at [facility] due to her previous one retiring. She is a smoker but does not use alcohol. She is allergic to birth control, penicillin, and sulfa drugs. She works in production as a .       REVIEW  OF SYSTEMS     Constitutional:  Denies fever or chills. Positive for general malaise  HENT:  Denies sore throat   Respiratory:  Denies cough or shortness of breath   Cardiovascular:  Denies chest pain or palpitations  GI:  Denies abdominal pain, nausea, or vomiting. Positive for upset stomach and diarrhea (4-5x today)  : Positive for flank pain (right sided, intermittent, 8/10) and urine frequency. Negative for dysuria and blood in urine.  Musculoskeletal:  Denies any new extremity pain   Skin:  Denies rash   Neurologic:  Denies headache, focal weakness or sensory changes. Positive for dizziness.    All other systems reviewed and are negative      PAST MEDICAL HISTORY:  Past Medical History:   Diagnosis Date     Acute renal failure (H)      Anemia      Anemia      Calculus of kidney      Congenital absence of left kidney      Congenital absence of one kidney      Depression      Depression      Hydronephrosis      Kidney stone     at age 29     Pyelonephritis      Pyelonephritis      Smoker      Ureteral stricture      UTI (urinary tract infection)      Wrist fracture     Left       PAST SURGICAL HISTORY:  Past Surgical History:   Procedure Laterality Date      SECTION        SECTION      x1     COMBINED CYSTOSCOPY, RETROGRADES, URETEROSCOPY, LASER HOLMIUM LITHOTRIPSY URETER(S), INSERT STENT Right 2016    Procedure: COMBINED CYSTOSCOPY, RETROGRADES, URETEROSCOPY, LASER HOLMIUM LITHOTRIPSY URETER(S), INSERT STENT;  Surgeon: Florentino Awad MD;  Location: [facility] OR     CYSTOSCOPY, URETEROSCOPY, COMBINED Right 2016    Procedure: COMBINED CYSTOSCOPY, URETEROSCOPY;  Surgeon: Florentino Awad MD;  Location: [facility] OR     IR NEPHROLITHOTOMY  2014     IR NEPHROSTOGRAM EXISITING ACCESS  10/18/2018     IR NEPHROSTOMY TUBE CHANGE RIGHT  2018     IR NEPHROSTOMY TUBE CHANGE RIGHT  2020     IR NEPHROSTOMY TUBE CHANGE RIGHT  2018     IR NEPHROSTOMY TUBE CHANGE RIGHT  2020      IR NEPHROSTOMY TUBE PLACEMENT RIGHT  7/24/2016     IR NEPHROSTOMY TUBE PLACEMENT RIGHT  9/14/2017     KIDNEY STONE SURGERY Right 05/2016     LASER HOLMIUM LITHOTRIPSY URETER(S), INSERT STENT, COMBINED Left 11/1/2016    Procedure: COMBINED CYSTOSCOPY, URETEROSCOPY, LASER HOLMIUM LITHOTRIPSY URETER(S), INSERT STENT;  Surgeon: Florentino Awad MD;  Location: [facility] OR     LASER HOLMIUM NEPHROLITHOTOMY VIA PERCUTANEOUS NEPHROSTOMY Right 9/14/2016    Procedure: LASER HOLMIUM NEPHROLITHOTOMY VIA PERCUTANEOUS NEPHROSTOMY;  Surgeon: Florentino Awad MD;  Location: [facility] OR     NEPHROSTOMY W/ INTRODUCTION OF CATHETER Right      OTHER SURGICAL HISTORY      Mole removednasal     OTHER SURGICAL HISTORY Right     Cystoscopy, ureteroscopy, laser11/02/2014 x2 with ureteral stent insertion     PERCUTANEOUS NEPHROSTOMY  11/1/2016    Procedure: PERCUTANEOUS NEPHROSTOMY;  Surgeon: Florentino Awad MD;  Location: [facility] OR     WISDOM TOOTH EXTRACTION       ZZC REMOVAL OF KIDNEY STONE             CURRENT MEDICATIONS:    HYDROcodone-acetaminophen (NORCO) 5-325 MG tablet  nitroFURantoin macrocrystal-monohydrate (MACROBID) 100 MG capsule  ondansetron (ZOFRAN ODT) 4 MG ODT tab  albuterol (ACCUNEB) 1.25 MG/3ML neb solution  sertraline (ZOLOFT) 100 MG tablet  SPRINTEC 28 0.25-35 MG-MCG tablet        ALLERGIES:  Allergies   Allergen Reactions     Vancomycin      Desogestrel-Ethinyl Estradiol Angioedema and Swelling     Swelling of hands and feet per pt.  Swelling of hands and feet per pt.  Swelling of hands and feet per pt.  Swelling of hands and feet per pt.  Swelling of hands and feet per pt.  Swelling of hands and feet per pt.  Swelling of hands and feet per pt.  Swelling of hands and feet per pt.       Penicillins Rash     As a child per mother; mother unsure if has tolerated another PCN since. Tolerated ampicillin 9/20/16     Sulfa Drugs Rash       FAMILY HISTORY:  Family History   Problem Relation Age of Onset     Anesthesia  Reaction No family hx of      Malignant Hyperthermia No family hx of      Heart Disease Maternal Uncle         heart failure     Coronary Artery Disease Other      Heart Disease Maternal Grandmother         pacemaker     Gout Paternal Grandfather      Prostate Cancer Maternal Aunt         breast     Heart Disease Maternal Aunt         pacemaker     Heart Disease Maternal Aunt         pacemaker     Heart Disease Maternal Aunt         pacemaker       SOCIAL HISTORY:   Social History     Socioeconomic History     Marital status: Single   Tobacco Use     Smoking status: Current Every Day Smoker     Packs/day: 0.50     Years: 10.00     Pack years: 5.00     Last attempt to quit: 2016     Years since quittin.9     Smokeless tobacco: Former User     Quit date: 2016     Tobacco comment: has information from last admission.   Substance and Sexual Activity     Alcohol use: Yes     Alcohol/week: 0.0 standard drinks     Comment: Alcoholic Drinks/day: occ     Drug use: No       VITALS:  Patient Vitals for the past 24 hrs:   BP Pulse Resp SpO2   22 0428 117/74 73 16 99 %       PHYSICAL EXAM    Constitutional:  Well developed, Well nourished,  HENT:  Normocephalic, Atraumatic, Bilateral external ears normal, Oropharynx moist, Nose normal.   Neck:  Normal range of motion, No meningismus, No stridor.   Eyes:  EOMI, Conjunctiva normal, No discharge.   Respiratory:  Normal breath sounds, No respiratory distress, No wheezing, No chest tenderness.   Cardiovascular:  Normal heart rate, Normal rhythm, No murmurs  GI:  Soft, No tenderness, No guarding, right CVA tenderness.   Musculoskeletal:  Neurovascularly intact distally, No edema, No tenderness, No cyanosis, Good range of motion in all major joints.   Integument:  Warm, Dry, No erythema, No rash.   Lymphatic:  No lymphadenopathy noted.   Neurologic:  Alert & oriented x 3, Normal motor function, Normal sensory function, No focal deficits noted.   Psychiatric:  Affect  normal, Judgment normal, Mood normal.      LAB:  All pertinent labs reviewed and interpreted.  Results for orders placed or performed during the hospital encounter of 08/30/22   CT Abdomen Pelvis w/o Contrast    Impression    IMPRESSION:   1.  Right percutaneous nephrostomy tube in place. No hydronephrosis.  2.  There are again few right renal stones and cysts.  3.  No acute abnormality.     Comprehensive metabolic panel   Result Value Ref Range    Sodium 136 136 - 145 mmol/L    Potassium 3.9 3.5 - 5.0 mmol/L    Chloride 106 98 - 107 mmol/L    Carbon Dioxide (CO2) 19 (L) 22 - 31 mmol/L    Anion Gap 11 5 - 18 mmol/L    Urea Nitrogen 14 8 - 22 mg/dL    Creatinine 0.81 0.60 - 1.10 mg/dL    Calcium 10.7 (H) 8.5 - 10.5 mg/dL    Glucose 99 70 - 125 mg/dL    Alkaline Phosphatase 83 45 - 120 U/L    AST 27 0 - 40 U/L    ALT 19 0 - 45 U/L    Protein Total 7.5 6.0 - 8.0 g/dL    Albumin 3.9 3.5 - 5.0 g/dL    Bilirubin Total 0.5 0.0 - 1.0 mg/dL    GFR Estimate >90 >60 mL/min/1.73m2   HCG qualitative Blood   Result Value Ref Range    hCG Serum Qualitative Negative Negative   UA with Microscopic reflex to Culture    Specimen: Urine, Midstream   Result Value Ref Range    Color Urine Light Yellow Colorless, Straw, Light Yellow, Yellow    Appearance Urine Turbid (A) Clear    Glucose Urine Negative Negative mg/dL    Bilirubin Urine Negative Negative    Ketones Urine Negative Negative mg/dL    Specific Gravity Urine 1.010 1.001 - 1.030    Blood Urine 0.1 mg/dL (A) Negative    pH Urine 6.5 5.0 - 7.0    Protein Albumin Urine 70  (A) Negative mg/dL    Urobilinogen Urine <2.0 <2.0 mg/dL    Nitrite Urine Negative Negative    Leukocyte Esterase Urine 500 Duglas/uL (A) Negative    Bacteria Urine Few (A) None Seen /HPF    Mucus Urine Present (A) None Seen /LPF    RBC Urine 15 (H) <=2 /HPF    WBC Urine 25 (H) <=5 /HPF    Squamous Epithelials Urine 2 (H) <=1 /HPF   CRP inflammation   Result Value Ref Range    CRP 0.4 0.0 - <0.8 mg/dL   CBC with  platelets and differential   Result Value Ref Range    WBC Count 10.2 4.0 - 11.0 10e3/uL    RBC Count 5.14 3.80 - 5.20 10e6/uL    Hemoglobin 15.3 11.7 - 15.7 g/dL    Hematocrit 45.6 35.0 - 47.0 %    MCV 89 78 - 100 fL    MCH 29.8 26.5 - 33.0 pg    MCHC 33.6 31.5 - 36.5 g/dL    RDW 13.4 10.0 - 15.0 %    Platelet Count 196 150 - 450 10e3/uL    % Neutrophils 66 %    % Lymphocytes 25 %    % Monocytes 6 %    % Eosinophils 2 %    % Basophils 0 %    % Immature Granulocytes 1 %    NRBCs per 100 WBC 0 <1 /100    Absolute Neutrophils 6.9 1.6 - 8.3 10e3/uL    Absolute Lymphocytes 2.6 0.8 - 5.3 10e3/uL    Absolute Monocytes 0.6 0.0 - 1.3 10e3/uL    Absolute Eosinophils 0.2 0.0 - 0.7 10e3/uL    Absolute Basophils 0.0 0.0 - 0.2 10e3/uL    Absolute Immature Granulocytes 0.1 <=0.4 10e3/uL    Absolute NRBCs 0.0 10e3/uL       RADIOLOGY:  I have independently reviewed and interpreted the above imaging, pending the final radiology read.  CT Abdomen Pelvis w/o Contrast   Final Result   IMPRESSION:    1.  Right percutaneous nephrostomy tube in place. No hydronephrosis.   2.  There are again few right renal stones and cysts.   3.  No acute abnormality.                   I, Sol Whittington, am serving as a scribe to document services personally performed by Dr. Pastrana based on my observation and the provider's statements to me. I, Jolene Pastrana MD attest that Sol Whittington is acting in a scribe capacity, has observed my performance of the services and has documented them in accordance with my direction.    Jolene Pastrana MD  Emergency Medicine  [facility] EMERGENCY ROOM  [address] [address] [phone]  Dept: [phone]     Jolene Pastrana MD  08/31/22 8161

## 2022-08-30 NOTE — DISCHARGE INSTRUCTIONS
Drink plenty of water daily  Follow-up with your [facility] urologist within the next week for recheck  If your symptoms worsen then you should go to [facility] emergency department in [facility] since your urologist are at that institution

## 2022-08-30 NOTE — ED TRIAGE NOTES
Pt reports she is having R flank pain for the past few days. Has nephrostomy to that side. Pt reports today she developed upset stomach, general malaise, and dizziness.     Triage Assessment     Row Name 08/29/22 2048       Triage Assessment (Adult)    Airway WDL WDL       Respiratory WDL    Respiratory WDL WDL       Skin Circulation/Temperature WDL    Skin Circulation/Temperature WDL WDL       Cardiac WDL    Cardiac WDL WDL       Peripheral/Neurovascular WDL    Peripheral Neurovascular WDL WDL       Cognitive/Neuro/Behavioral WDL    Cognitive/Neuro/Behavioral WDL WDL

## 2022-08-31 LAB — BACTERIA UR CULT: NORMAL

## 2022-09-02 ENCOUNTER — HOSPITAL ENCOUNTER (EMERGENCY)
Facility: CLINIC | Age: 37
Discharge: HOME | End: 2022-09-02
Attending: EMERGENCY MEDICINE | Admitting: EMERGENCY MEDICINE
Payer: COMMERCIAL

## 2022-09-02 ENCOUNTER — APPOINTMENT (OUTPATIENT)
Dept: CT IMAGING | Facility: CLINIC | Age: 37
End: 2022-09-02
Attending: EMERGENCY MEDICINE
Payer: COMMERCIAL

## 2022-09-02 VITALS
RESPIRATION RATE: 16 BRPM | HEIGHT: 60 IN | OXYGEN SATURATION: 97 % | DIASTOLIC BLOOD PRESSURE: 82 MMHG | TEMPERATURE: 97.8 F | BODY MASS INDEX: 35.34 KG/M2 | HEART RATE: 69 BPM | SYSTOLIC BLOOD PRESSURE: 115 MMHG | WEIGHT: 180 LBS

## 2022-09-02 DIAGNOSIS — N10 ACUTE PYELONEPHRITIS: ICD-10-CM

## 2022-09-02 LAB
ALBUMIN UR-MCNC: 100 MG/DL
ANION GAP SERPL CALCULATED.3IONS-SCNC: 10 MMOL/L (ref 5–18)
APPEARANCE UR: ABNORMAL
BACTERIA #/AREA URNS HPF: ABNORMAL /HPF
BASOPHILS # BLD AUTO: 0 10E3/UL (ref 0–0.2)
BASOPHILS NFR BLD AUTO: 0 %
BILIRUB UR QL STRIP: NEGATIVE
BUN SERPL-MCNC: 12 MG/DL (ref 8–22)
CALCIUM SERPL-MCNC: 10.8 MG/DL (ref 8.5–10.5)
CHLORIDE BLD-SCNC: 106 MMOL/L (ref 98–107)
CO2 SERPL-SCNC: 22 MMOL/L (ref 22–31)
COLOR UR AUTO: YELLOW
CREAT SERPL-MCNC: 0.83 MG/DL (ref 0.6–1.1)
EOSINOPHIL # BLD AUTO: 0.1 10E3/UL (ref 0–0.7)
EOSINOPHIL NFR BLD AUTO: 1 %
ERYTHROCYTE [DISTWIDTH] IN BLOOD BY AUTOMATED COUNT: 13.3 % (ref 10–15)
GFR SERPL CREATININE-BSD FRML MDRD: >90 ML/MIN/1.73M2
GLUCOSE BLD-MCNC: 85 MG/DL (ref 70–125)
GLUCOSE UR STRIP-MCNC: NEGATIVE MG/DL
HCG UR QL: NEGATIVE
HCT VFR BLD AUTO: 40.5 % (ref 35–47)
HGB BLD-MCNC: 13.6 G/DL (ref 11.7–15.7)
HGB UR QL STRIP: ABNORMAL
HOLD SPECIMEN: NORMAL
IMM GRANULOCYTES # BLD: 0.1 10E3/UL
IMM GRANULOCYTES NFR BLD: 1 %
KETONES UR STRIP-MCNC: NEGATIVE MG/DL
LEUKOCYTE ESTERASE UR QL STRIP: ABNORMAL
LYMPHOCYTES # BLD AUTO: 1.7 10E3/UL (ref 0.8–5.3)
LYMPHOCYTES NFR BLD AUTO: 20 %
MCH RBC QN AUTO: 29.8 PG (ref 26.5–33)
MCHC RBC AUTO-ENTMCNC: 33.6 G/DL (ref 31.5–36.5)
MCV RBC AUTO: 89 FL (ref 78–100)
MONOCYTES # BLD AUTO: 0.6 10E3/UL (ref 0–1.3)
MONOCYTES NFR BLD AUTO: 7 %
MUCOUS THREADS #/AREA URNS LPF: PRESENT /LPF
NEUTROPHILS # BLD AUTO: 5.8 10E3/UL (ref 1.6–8.3)
NEUTROPHILS NFR BLD AUTO: 71 %
NITRATE UR QL: NEGATIVE
NRBC # BLD AUTO: 0 10E3/UL
NRBC BLD AUTO-RTO: 0 /100
PH UR STRIP: 6.5 [PH] (ref 5–7)
PLATELET # BLD AUTO: 169 10E3/UL (ref 150–450)
POTASSIUM BLD-SCNC: 3.7 MMOL/L (ref 3.5–5)
RBC # BLD AUTO: 4.56 10E6/UL (ref 3.8–5.2)
RBC URINE: 25 /HPF
SODIUM SERPL-SCNC: 138 MMOL/L (ref 136–145)
SP GR UR STRIP: 1.01 (ref 1–1.03)
SQUAMOUS EPITHELIAL: 3 /HPF
UROBILINOGEN UR STRIP-MCNC: <2 MG/DL
WBC # BLD AUTO: 8.3 10E3/UL (ref 4–11)
WBC URINE: 14 /HPF

## 2022-09-02 PROCEDURE — 96376 TX/PRO/DX INJ SAME DRUG ADON: CPT

## 2022-09-02 PROCEDURE — 81025 URINE PREGNANCY TEST: CPT | Performed by: EMERGENCY MEDICINE

## 2022-09-02 PROCEDURE — 87086 URINE CULTURE/COLONY COUNT: CPT | Performed by: EMERGENCY MEDICINE

## 2022-09-02 PROCEDURE — 74176 CT ABD & PELVIS W/O CONTRAST: CPT

## 2022-09-02 PROCEDURE — 250N000011 HC RX IP 250 OP 636: Performed by: EMERGENCY MEDICINE

## 2022-09-02 PROCEDURE — 36415 COLL VENOUS BLD VENIPUNCTURE: CPT | Performed by: EMERGENCY MEDICINE

## 2022-09-02 PROCEDURE — 99285 EMERGENCY DEPT VISIT HI MDM: CPT | Mod: 25

## 2022-09-02 PROCEDURE — 85025 COMPLETE CBC W/AUTO DIFF WBC: CPT | Performed by: EMERGENCY MEDICINE

## 2022-09-02 PROCEDURE — 80048 BASIC METABOLIC PNL TOTAL CA: CPT | Performed by: EMERGENCY MEDICINE

## 2022-09-02 PROCEDURE — 96374 THER/PROPH/DIAG INJ IV PUSH: CPT

## 2022-09-02 PROCEDURE — 81001 URINALYSIS AUTO W/SCOPE: CPT | Performed by: EMERGENCY MEDICINE

## 2022-09-02 RX ORDER — HYDROCODONE BITARTRATE AND ACETAMINOPHEN 5; 325 MG/1; MG/1
1 TABLET ORAL EVERY 6 HOURS PRN
Qty: 10 TABLET | Refills: 0 | Status: SHIPPED | OUTPATIENT
Start: 2022-09-02 | End: 2022-09-05

## 2022-09-02 RX ORDER — MORPHINE SULFATE 4 MG/ML
4 INJECTION, SOLUTION INTRAMUSCULAR; INTRAVENOUS ONCE
Status: COMPLETED | OUTPATIENT
Start: 2022-09-02 | End: 2022-09-02

## 2022-09-02 RX ORDER — LEVOFLOXACIN 750 MG/1
750 TABLET, FILM COATED ORAL DAILY
Qty: 5 TABLET | Refills: 0 | Status: SHIPPED | OUTPATIENT
Start: 2022-09-02 | End: 2022-09-07

## 2022-09-02 RX ORDER — ONDANSETRON 4 MG/1
4 TABLET, ORALLY DISINTEGRATING ORAL EVERY 8 HOURS PRN
Qty: 10 TABLET | Refills: 0 | Status: SHIPPED | OUTPATIENT
Start: 2022-09-02 | End: 2022-09-05

## 2022-09-02 RX ADMIN — MORPHINE SULFATE 4 MG: 4 INJECTION, SOLUTION INTRAMUSCULAR; INTRAVENOUS at 11:11

## 2022-09-02 RX ADMIN — MORPHINE SULFATE 4 MG: 4 INJECTION, SOLUTION INTRAMUSCULAR; INTRAVENOUS at 09:51

## 2022-09-02 ASSESSMENT — ACTIVITIES OF DAILY LIVING (ADL): ADLS_ACUITY_SCORE: 35

## 2022-09-02 NOTE — ED TRIAGE NOTES
Patient states she was here Monday and diagnosed with a UTI, put on antibiotics which she is taking as directed, now increased right flank pain into the right lower back, pain is not any better since being seen.  Patient has history of only have 1 kidney the right and known kidney stones.     Triage Assessment     Row Name 09/02/22 0904       Triage Assessment (Adult)    Airway WDL WDL       Respiratory WDL    Respiratory WDL WDL       Skin Circulation/Temperature WDL    Skin Circulation/Temperature WDL WDL       Cardiac WDL    Cardiac WDL WDL       Peripheral/Neurovascular WDL    Peripheral Neurovascular WDL WDL       Cognitive/Neuro/Behavioral WDL    Cognitive/Neuro/Behavioral WDL WDL               No

## 2022-09-02 NOTE — ED PROVIDER NOTES
EMERGENCY DEPARTMENT ENCOUNTER      NAME: Jacqueline Pappas  AGE: 37 year old female  YOB: 1985  MRN: [id number]  EVALUATION DATE & TIME: 2022  9:13 AM    PCP: Pao Montague    ED PROVIDER: Florentino Maldonado D.O.      Chief Complaint   Patient presents with     Flank Pain       FINAL IMPRESSION:  1. Acute pyelonephritis        ED COURSE & MEDICAL DECISION MAKIN:30 AM I met with the patient to gather history and to perform my initial exam. I discussed the plan for care while in the Emergency Department.  12:03 PM I rechecked and updated the patient on test results. We discussed the plan for discharge and the patient is agreeable. Reviewed supportive cares, symptomatic treatment, outpatient follow up, and reasons to return to the Emergency Department.          Pertinent Labs & Imaging studies reviewed. (See chart for details)  37 year old female presents to the Emergency Department for evaluation of right-sided flank pain.  Patient is currently on Macrobid for a UTI, but symptoms worsened in the right flank.  She does have CVA tenderness on that side.  Imaging does not show any evidence of stone or other acute process that could easily explain her symptoms.  UA is concerning for UTI, and with her current findings I am concerned for potential pyelonephritis.  Patient is otherwise stable and not showing signs of sepsis.  She is tolerant to oral intake.  At this time we will discharge on Levaquin and have her discontinue the use of the Macrobid.  We will also discharge with symptomatic control.  Patient was agreeable with this plan.  Return precautions discussed.    At the conclusion of the encounter I discussed the results of all of the tests and the disposition. The questions were answered. The patient or family acknowledged understanding and was agreeable with the care plan.        HPI    Patient information was obtained from: patient    Use of : N/A        Jacqueline Pappas is a 37 year  old female with a history of congenital solitary right kidney, urethral stricture with chronic nephrostomy tube on right, kidney stones s/p multiple procedures and stenting, recurrent pyelonephritis, and morbid obesity, who presents via private vehicle for evaluation of abdominal pain.     Per chart review, patient was recently seen at [facility] ED on 8/30/2022 (3 days ago) with a several day history of right flank pain. CT A/P showed right renal stones and cysts, otherwise without acute abnormality. UA with evidence of infection and she was treated with IV Ceftriaxone. [facility] was consulted and patient discharged on a one week course of Macrobid 100 mg BID.     Since discharge, patient endorses persistent urinary frequency and chills along with onset of right flank pain and right sided back pain. She notes this feels similar to her previous episodes of pyelonephritis. Additionally endorses diarrhea and malaise. Denies increased drainage from her nephrostomy tube. Otherwise denies fevers or any other symptoms or concerns at this time.        REVIEW OF SYSTEMS  Constitutional: Endorses malaise and chills. Denies fever, weight loss or weakness  Eyes:  No pain, discharge, redness  HENT:  Denies sore throat, ear pain, congestion  Respiratory: No SOB, wheeze or cough  Cardiovascular:  No CP, palpitations  GI: Endorses diarrhea. Denies abdominal pain, nausea, vomiting  : Endorses frequency and right flank pain. Denies dysuria, hematuria  Musculoskeletal: Endorses right sided back pain.  Denies any new joint pain, swelling or loss of function.  Skin:  Denies rash, pallor  Neurologic:  Denies headache, focal weakness or sensory changes  Lymph: Denies swollen nodes    All other systems negative unless noted in HPI.    PAST MEDICAL HISTORY:  Past Medical History:   Diagnosis Date     Acute renal failure (H)      Anemia      Anemia      Calculus of kidney      Congenital absence of left kidney      Congenital  absence of one kidney      Depression      Depression      Hydronephrosis      Kidney stone     at age 29     Pyelonephritis      Pyelonephritis      Smoker      Ureteral stricture      UTI (urinary tract infection)      Wrist fracture     Left       PAST SURGICAL HISTORY:  Past Surgical History:   Procedure Laterality Date      SECTION        SECTION      x1     COMBINED CYSTOSCOPY, RETROGRADES, URETEROSCOPY, LASER HOLMIUM LITHOTRIPSY URETER(S), INSERT STENT Right 2016    Procedure: COMBINED CYSTOSCOPY, RETROGRADES, URETEROSCOPY, LASER HOLMIUM LITHOTRIPSY URETER(S), INSERT STENT;  Surgeon: Florentino Awad MD;  Location: [facility] OR     CYSTOSCOPY, URETEROSCOPY, COMBINED Right 2016    Procedure: COMBINED CYSTOSCOPY, URETEROSCOPY;  Surgeon: Florentino Awad MD;  Location: [facility] OR     IR NEPHROLITHOTOMY  2014     IR NEPHROSTOGRAM EXISITING ACCESS  10/18/2018     IR NEPHROSTOMY TUBE CHANGE RIGHT  2018     IR NEPHROSTOMY TUBE CHANGE RIGHT  2020     IR NEPHROSTOMY TUBE CHANGE RIGHT  2018     IR NEPHROSTOMY TUBE CHANGE RIGHT  2020     IR NEPHROSTOMY TUBE PLACEMENT RIGHT  2016     IR NEPHROSTOMY TUBE PLACEMENT RIGHT  2017     KIDNEY STONE SURGERY Right 2016     LASER HOLMIUM LITHOTRIPSY URETER(S), INSERT STENT, COMBINED Left 2016    Procedure: COMBINED CYSTOSCOPY, URETEROSCOPY, LASER HOLMIUM LITHOTRIPSY URETER(S), INSERT STENT;  Surgeon: Florentino Awad MD;  Location: [facility] OR     LASER HOLMIUM NEPHROLITHOTOMY VIA PERCUTANEOUS NEPHROSTOMY Right 2016    Procedure: LASER HOLMIUM NEPHROLITHOTOMY VIA PERCUTANEOUS NEPHROSTOMY;  Surgeon: Florentino Awad MD;  Location: [facility] OR     NEPHROSTOMY W/ INTRODUCTION OF CATHETER Right      OTHER SURGICAL HISTORY      Mole removednasal     OTHER SURGICAL HISTORY Right     Cystoscopy, ureteroscopy, laser11/02/2014 x2 with ureteral stent insertion     PERCUTANEOUS NEPHROSTOMY  2016    Procedure:  PERCUTANEOUS NEPHROSTOMY;  Surgeon: Florentino Awad MD;  Location: [facility] OR     WISDOM TOOTH EXTRACTION       ZZC REMOVAL OF KIDNEY STONE           CURRENT MEDICATIONS:    No current facility-administered medications for this encounter.     Current Outpatient Medications   Medication     HYDROcodone-acetaminophen (NORCO) 5-325 MG tablet     levofloxacin (LEVAQUIN) 750 MG tablet     ondansetron (ZOFRAN ODT) 4 MG ODT tab     albuterol (ACCUNEB) 1.25 MG/3ML neb solution     sertraline (ZOLOFT) 100 MG tablet     SPRINTEC 28 0.25-35 MG-MCG tablet         ALLERGIES:  Allergies   Allergen Reactions     Vancomycin      Desogestrel-Ethinyl Estradiol Angioedema and Swelling     Swelling of hands and feet per pt.  Swelling of hands and feet per pt.  Swelling of hands and feet per pt.  Swelling of hands and feet per pt.  Swelling of hands and feet per pt.  Swelling of hands and feet per pt.  Swelling of hands and feet per pt.  Swelling of hands and feet per pt.       Penicillins Rash     As a child per mother; mother unsure if has tolerated another PCN since. Tolerated ampicillin 18     Sulfa Drugs Rash       FAMILY HISTORY:  Family History   Problem Relation Age of Onset     Anesthesia Reaction No family hx of      Malignant Hyperthermia No family hx of      Heart Disease Maternal Uncle         heart failure     Coronary Artery Disease Other      Heart Disease Maternal Grandmother         pacemaker     Gout Paternal Grandfather      Prostate Cancer Maternal Aunt         breast     Heart Disease Maternal Aunt         pacemaker     Heart Disease Maternal Aunt         pacemaker     Heart Disease Maternal Aunt         pacemaker       SOCIAL HISTORY:  Social History     Socioeconomic History     Marital status: Single   Tobacco Use     Smoking status: Current Every Day Smoker     Packs/day: 0.50     Years: 10.00     Pack years: 5.00     Last attempt to quit: 2016     Years since quittin.9     Smokeless tobacco:  Former User     Quit date: 9/1/2016     Tobacco comment: has information from last admission.   Substance and Sexual Activity     Alcohol use: Yes     Alcohol/week: 0.0 standard drinks     Comment: Alcoholic Drinks/day: occ     Drug use: No       VITALS:  Patient Vitals for the past 24 hrs:   BP Temp Temp src Pulse Resp SpO2 Height Weight   09/02/22 0909 115/82 97.8  F (36.6  C) Temporal 96 16 98 % 1.524 m (5') 81.6 kg (180 lb)       PHYSICAL EXAM    VITAL SIGNS: /82   Pulse 96   Temp 97.8  F (36.6  C) (Temporal)   Resp 16   Ht 1.524 m (5')   Wt 81.6 kg (180 lb)   LMP 08/19/2022   SpO2 98%   BMI 35.15 kg/m    General Appearance: Well-appearing, well-nourished, no acute distress   Head:  Normocephalic, without obvious abnormality, atraumatic  Eyes:  PERRL, conjunctiva/corneas clear, EOM's intact,  ENT:  Lips, mucosa, and tongue normal, membranes are moist without pallor  Neck:  Normal ROM, symmetrical, trachea midline    Cardio:  Regular rate and rhythm, no murmur, rub or gallop, 2+ pulses symmetric in all extremities  Pulm:  Clear to auscultation bilaterally, respirations unlabored,  Back:  ROM normal, right CVA tenderness, no spinal tenderness, no paraspinal tenderness  Abdomen:  Soft, non-tender, no rebound or guarding.  Musculoskeletal: Full ROM, no edema, no cyanosis, good ROM of major joints  Integument:  Warm, Dry, No erythema, No rash.    Neurologic:  Alert & oriented.  No focal deficits appreciated.  Ambulatory.  Psychiatric:  Affect normal, Judgment normal, Mood normal.      LABS  Results for orders placed or performed during the hospital encounter of 09/02/22 (from the past 24 hour(s))   UA with Microscopic reflex to Culture    Specimen: Urine, Midstream   Result Value Ref Range    Color Urine Yellow Colorless, Straw, Light Yellow, Yellow    Appearance Urine Turbid (A) Clear    Glucose Urine Negative Negative mg/dL    Bilirubin Urine Negative Negative    Ketones Urine Negative Negative mg/dL     Specific Gravity Urine 1.014 1.001 - 1.030    Blood Urine 0.06 mg/dL (A) Negative    pH Urine 6.5 5.0 - 7.0    Protein Albumin Urine 100  (A) Negative mg/dL    Urobilinogen Urine <2.0 <2.0 mg/dL    Nitrite Urine Negative Negative    Leukocyte Esterase Urine 500 Duglas/uL (A) Negative    Bacteria Urine Few (A) None Seen /HPF    Mucus Urine Present (A) None Seen /LPF    RBC Urine 25 (H) <=2 /HPF    WBC Urine 14 (H) <=5 /HPF    Squamous Epithelials Urine 3 (H) <=1 /HPF    Narrative    Urine Culture ordered based on laboratory criteria   HCG qualitative urine   Result Value Ref Range    hCG Urine Qualitative Negative Negative   CBC with platelets + differential    Narrative    The following orders were created for panel order CBC with platelets + differential.  Procedure                               Abnormality         Status                     ---------                               -----------         ------                     CBC with platelets and d...[[id number]]                      Final result                 Please view results for these tests on the individual orders.   Basic metabolic panel   Result Value Ref Range    Sodium 138 136 - 145 mmol/L    Potassium 3.7 3.5 - 5.0 mmol/L    Chloride 106 98 - 107 mmol/L    Carbon Dioxide (CO2) 22 22 - 31 mmol/L    Anion Gap 10 5 - 18 mmol/L    Urea Nitrogen 12 8 - 22 mg/dL    Creatinine 0.83 0.60 - 1.10 mg/dL    Calcium 10.8 (H) 8.5 - 10.5 mg/dL    Glucose 85 70 - 125 mg/dL    GFR Estimate >90 >60 mL/min/1.73m2   [place] Draw    Narrative    The following orders were created for panel order [place] Draw.  Procedure                               Abnormality         Status                     ---------                               -----------         ------                     Extra Red Top Tube[[id number]]                               Final result               Extra Green Top (Lithium...[[id number]]                      Final result               Extra Purple Top  Tube[[id number]]                            Final result                 Please view results for these tests on the individual orders.   CBC with platelets and differential   Result Value Ref Range    WBC Count 8.3 4.0 - 11.0 10e3/uL    RBC Count 4.56 3.80 - 5.20 10e6/uL    Hemoglobin 13.6 11.7 - 15.7 g/dL    Hematocrit 40.5 35.0 - 47.0 %    MCV 89 78 - 100 fL    MCH 29.8 26.5 - 33.0 pg    MCHC 33.6 31.5 - 36.5 g/dL    RDW 13.3 10.0 - 15.0 %    Platelet Count 169 150 - 450 10e3/uL    % Neutrophils 71 %    % Lymphocytes 20 %    % Monocytes 7 %    % Eosinophils 1 %    % Basophils 0 %    % Immature Granulocytes 1 %    NRBCs per 100 WBC 0 <1 /100    Absolute Neutrophils 5.8 1.6 - 8.3 10e3/uL    Absolute Lymphocytes 1.7 0.8 - 5.3 10e3/uL    Absolute Monocytes 0.6 0.0 - 1.3 10e3/uL    Absolute Eosinophils 0.1 0.0 - 0.7 10e3/uL    Absolute Basophils 0.0 0.0 - 0.2 10e3/uL    Absolute Immature Granulocytes 0.1 <=0.4 10e3/uL    Absolute NRBCs 0.0 10e3/uL   Extra Red Top Tube   Result Value Ref Range    Hold Specimen JIC    Extra Green Top (Lithium Heparin) Tube   Result Value Ref Range    Hold Specimen JIC    Extra Purple Top Tube   Result Value Ref Range    Hold Specimen JIC    Abd/pelvis CT no contrast - [place] Protocol    Narrative    EXAM: CT ABDOMEN PELVIS W/O CONTRAST  LOCATION: [facility]  DATE/TIME: 9/2/2022 10:33 AM    INDICATION: Right flank pain  COMPARISON: 08/30/2022 and older studies.  TECHNIQUE: CT scan of the abdomen and pelvis was performed without IV contrast. Multiplanar reformats were obtained. Dose reduction techniques were used.  CONTRAST: None.    FINDINGS:   LOWER CHEST: Linear fibroatelectasis in the lingula and right middle lobe.    HEPATOBILIARY: Normal.    PANCREAS: Normal.    SPLEEN: Spleen is at the upper limits of normal at 14 cm and unchanged.    ADRENAL GLANDS: Normal.    KIDNEYS/BLADDER: Miniscule left kidney with hypertrophied right. There is a percutaneous  nephrostomy tube in the lower pole of the right kidney. No hydronephrosis. There are multiple nonobstructing right intrarenal calculi largest of which measures 3 mm.   There are numerous right renal cysts as well, needing no follow-up. No perinephric stranding. No ureteral or bladder calculi.    BOWEL: Redundant colon. No inflammatory changes.    LYMPH NODES: Normal.    VASCULATURE: Unremarkable.    PELVIC ORGANS: Normal.    MUSCULOSKELETAL: Normal.      Impression    IMPRESSION:   1.  No change in the appearance of the right kidney with a percutaneous nephrostomy tube in the lower pole. No hydronephrosis or evidence of obstruction.  2.  Multiple punctate nonobstructing right intrarenal calculi and renal cysts are again noted.  3.  Right kidney is hypertrophied with a miniscule left kidney.  4.  Spleen is at the upper limits of normal, unchanged.           RADIOLOGY  Abd/pelvis CT no contrast - [place] Protocol   Final Result   IMPRESSION:    1.  No change in the appearance of the right kidney with a percutaneous nephrostomy tube in the lower pole. No hydronephrosis or evidence of obstruction.   2.  Multiple punctate nonobstructing right intrarenal calculi and renal cysts are again noted.   3.  Right kidney is hypertrophied with a miniscule left kidney.   4.  Spleen is at the upper limits of normal, unchanged.                  MEDICATIONS GIVEN IN THE EMERGENCY:  Medications   morphine (PF) injection 4 mg (4 mg Intravenous Given 9/2/22 0951)   morphine (PF) injection 4 mg (4 mg Intravenous Given 9/2/22 1111)       NEW PRESCRIPTIONS STARTED AT TODAY'S ER VISIT  New Prescriptions    HYDROCODONE-ACETAMINOPHEN (NORCO) 5-325 MG TABLET    Take 1 tablet by mouth every 6 hours as needed for severe pain    LEVOFLOXACIN (LEVAQUIN) 750 MG TABLET    Take 1 tablet (750 mg) by mouth daily for 5 days    ONDANSETRON (ZOFRAN ODT) 4 MG ODT TAB    Take 1 tablet (4 mg) by mouth every 8 hours as needed for nausea        Mary LORENZO  am serving as a scribe to document services personally performed by Dr. Florentino Maldonado based on my observation and the provider's statements to me. I, Florentino Maldonado, DO attest that Mary Anderson is acting in a scribe capacity, has observed my performance of the services and has documented them in accordance with my direction.     Florentino Maldonado D.O.  Emergency Medicine  [facility] EMERGENCY ROOM  [address] [id number] [phone]  Dept: [phone]      Florentino Maldonado DO  09/02/22 1311

## 2022-09-03 LAB — BACTERIA UR CULT: NORMAL

## 2022-09-07 ENCOUNTER — HOSPITAL ENCOUNTER (EMERGENCY)
Facility: CLINIC | Age: 37
Discharge: HOME | End: 2022-09-07
Attending: EMERGENCY MEDICINE | Admitting: EMERGENCY MEDICINE
Payer: COMMERCIAL

## 2022-09-07 VITALS
RESPIRATION RATE: 18 BRPM | OXYGEN SATURATION: 99 % | SYSTOLIC BLOOD PRESSURE: 125 MMHG | TEMPERATURE: 98.4 F | DIASTOLIC BLOOD PRESSURE: 82 MMHG | WEIGHT: 180 LBS | BODY MASS INDEX: 35.15 KG/M2 | HEART RATE: 87 BPM

## 2022-09-07 DIAGNOSIS — N12 PYELONEPHRITIS: ICD-10-CM

## 2022-09-07 LAB
ALBUMIN UR-MCNC: 100 MG/DL
ANION GAP SERPL CALCULATED.3IONS-SCNC: 10 MMOL/L (ref 5–18)
APPEARANCE UR: CLEAR
BACTERIA #/AREA URNS HPF: ABNORMAL /HPF
BILIRUB UR QL STRIP: NEGATIVE
BUN SERPL-MCNC: 11 MG/DL (ref 8–22)
CALCIUM SERPL-MCNC: 10 MG/DL (ref 8.5–10.5)
CHLORIDE BLD-SCNC: 105 MMOL/L (ref 98–107)
CO2 SERPL-SCNC: 21 MMOL/L (ref 22–31)
COLOR UR AUTO: ABNORMAL
CREAT SERPL-MCNC: 0.85 MG/DL (ref 0.6–1.1)
ERYTHROCYTE [DISTWIDTH] IN BLOOD BY AUTOMATED COUNT: 13.5 % (ref 10–15)
GFR SERPL CREATININE-BSD FRML MDRD: >90 ML/MIN/1.73M2
GLUCOSE BLD-MCNC: 86 MG/DL (ref 70–125)
GLUCOSE UR STRIP-MCNC: NEGATIVE MG/DL
HCT VFR BLD AUTO: 42.9 % (ref 35–47)
HGB BLD-MCNC: 14.3 G/DL (ref 11.7–15.7)
HGB UR QL STRIP: ABNORMAL
HYALINE CASTS: 1 /LPF
KETONES UR STRIP-MCNC: NEGATIVE MG/DL
LEUKOCYTE ESTERASE UR QL STRIP: ABNORMAL
MCH RBC QN AUTO: 30 PG (ref 26.5–33)
MCHC RBC AUTO-ENTMCNC: 33.3 G/DL (ref 31.5–36.5)
MCV RBC AUTO: 90 FL (ref 78–100)
MUCOUS THREADS #/AREA URNS LPF: PRESENT /LPF
NITRATE UR QL: NEGATIVE
PH UR STRIP: 6.5 [PH] (ref 5–7)
PLATELET # BLD AUTO: 199 10E3/UL (ref 150–450)
POTASSIUM BLD-SCNC: 4 MMOL/L (ref 3.5–5)
RBC # BLD AUTO: 4.77 10E6/UL (ref 3.8–5.2)
RBC URINE: 28 /HPF
SODIUM SERPL-SCNC: 136 MMOL/L (ref 136–145)
SP GR UR STRIP: 1.02 (ref 1–1.03)
SQUAMOUS EPITHELIAL: <1 /HPF
UROBILINOGEN UR STRIP-MCNC: <2 MG/DL
WBC # BLD AUTO: 8.9 10E3/UL (ref 4–11)
WBC URINE: 16 /HPF
YEAST #/AREA URNS HPF: ABNORMAL /HPF

## 2022-09-07 PROCEDURE — 36415 COLL VENOUS BLD VENIPUNCTURE: CPT | Performed by: EMERGENCY MEDICINE

## 2022-09-07 PROCEDURE — 87086 URINE CULTURE/COLONY COUNT: CPT | Performed by: EMERGENCY MEDICINE

## 2022-09-07 PROCEDURE — 81001 URINALYSIS AUTO W/SCOPE: CPT | Performed by: EMERGENCY MEDICINE

## 2022-09-07 PROCEDURE — 85027 COMPLETE CBC AUTOMATED: CPT | Performed by: EMERGENCY MEDICINE

## 2022-09-07 PROCEDURE — 99283 EMERGENCY DEPT VISIT LOW MDM: CPT

## 2022-09-07 PROCEDURE — 82310 ASSAY OF CALCIUM: CPT | Performed by: EMERGENCY MEDICINE

## 2022-09-07 RX ORDER — CEPHALEXIN 500 MG/1
500 CAPSULE ORAL 2 TIMES DAILY
Qty: 14 CAPSULE | Refills: 0 | Status: SHIPPED | OUTPATIENT
Start: 2022-09-07 | End: 2022-09-14

## 2022-09-07 ASSESSMENT — ACTIVITIES OF DAILY LIVING (ADL): ADLS_ACUITY_SCORE: 33

## 2022-09-08 NOTE — ED TRIAGE NOTES
Here with left sided flank pain that started on Monday, also endorses abdominal pain, back pain, dizziness, nausea and vomiting   Hx pyelonephritis   Was just seen on 9/2 for the same reason, was prescribed levofloxacin 750mg at the time, took last dose today      Triage Assessment     Row Name 09/07/22 1922       Triage Assessment (Adult)    Airway WDL WDL       Respiratory WDL    Respiratory WDL WDL       Skin Circulation/Temperature WDL    Skin Circulation/Temperature WDL WDL       Cardiac WDL    Cardiac WDL WDL       Peripheral/Neurovascular WDL    Peripheral Neurovascular WDL WDL

## 2022-09-08 NOTE — DISCHARGE INSTRUCTIONS
Your symptoms today appear to be due to an ongoing kidney infection.  I recommend switching to a different antibiotic and following up closely with your primary care doctor.  Return to the ER for any worsening symptoms or other concerns.

## 2022-09-09 LAB
BACTERIA UR CULT: NORMAL
BACTERIA UR CULT: NORMAL

## 2022-09-27 ENCOUNTER — HOSPITAL ENCOUNTER (EMERGENCY)
Facility: HOSPITAL | Age: 37
Discharge: HOME | End: 2022-09-28
Attending: EMERGENCY MEDICINE | Admitting: EMERGENCY MEDICINE
Payer: COMMERCIAL

## 2022-09-27 DIAGNOSIS — N10 PYELONEPHRITIS, ACUTE: ICD-10-CM

## 2022-09-27 LAB
ALBUMIN UR-MCNC: 100 MG/DL
AMORPH CRY #/AREA URNS HPF: ABNORMAL /HPF
APPEARANCE UR: ABNORMAL
BILIRUB UR QL STRIP: NEGATIVE
COLOR UR AUTO: ABNORMAL
GLUCOSE UR STRIP-MCNC: NEGATIVE MG/DL
HGB UR QL STRIP: ABNORMAL
KETONES UR STRIP-MCNC: NEGATIVE MG/DL
LEUKOCYTE ESTERASE UR QL STRIP: ABNORMAL
NITRATE UR QL: NEGATIVE
PH UR STRIP: 7.5 [PH] (ref 5–7)
RBC URINE: >182 /HPF
SP GR UR STRIP: 1.02 (ref 1–1.03)
SQUAMOUS EPITHELIAL: 2 /HPF
TRI-PHOS CRY #/AREA URNS HPF: ABNORMAL /HPF
UROBILINOGEN UR STRIP-MCNC: <2 MG/DL
WBC URINE: 7 /HPF

## 2022-09-27 PROCEDURE — 250N000013 HC RX MED GY IP 250 OP 250 PS 637: Performed by: EMERGENCY MEDICINE

## 2022-09-27 PROCEDURE — 99285 EMERGENCY DEPT VISIT HI MDM: CPT | Mod: 25

## 2022-09-27 PROCEDURE — 80048 BASIC METABOLIC PNL TOTAL CA: CPT | Performed by: EMERGENCY MEDICINE

## 2022-09-27 PROCEDURE — 85025 COMPLETE CBC W/AUTO DIFF WBC: CPT | Performed by: EMERGENCY MEDICINE

## 2022-09-27 PROCEDURE — 81025 URINE PREGNANCY TEST: CPT | Performed by: EMERGENCY MEDICINE

## 2022-09-27 PROCEDURE — 81001 URINALYSIS AUTO W/SCOPE: CPT | Performed by: STUDENT IN AN ORGANIZED HEALTH CARE EDUCATION/TRAINING PROGRAM

## 2022-09-27 PROCEDURE — 81001 URINALYSIS AUTO W/SCOPE: CPT | Performed by: EMERGENCY MEDICINE

## 2022-09-27 PROCEDURE — 96375 TX/PRO/DX INJ NEW DRUG ADDON: CPT

## 2022-09-27 PROCEDURE — 36415 COLL VENOUS BLD VENIPUNCTURE: CPT | Performed by: EMERGENCY MEDICINE

## 2022-09-27 PROCEDURE — 250N000011 HC RX IP 250 OP 636: Performed by: STUDENT IN AN ORGANIZED HEALTH CARE EDUCATION/TRAINING PROGRAM

## 2022-09-27 PROCEDURE — 87086 URINE CULTURE/COLONY COUNT: CPT | Performed by: EMERGENCY MEDICINE

## 2022-09-27 RX ORDER — ONDANSETRON 2 MG/ML
4 INJECTION INTRAMUSCULAR; INTRAVENOUS ONCE
Status: COMPLETED | OUTPATIENT
Start: 2022-09-28 | End: 2022-09-27

## 2022-09-27 RX ORDER — OXYCODONE AND ACETAMINOPHEN 5; 325 MG/1; MG/1
1 TABLET ORAL ONCE
Status: COMPLETED | OUTPATIENT
Start: 2022-09-27 | End: 2022-09-27

## 2022-09-27 RX ADMIN — OXYCODONE HYDROCHLORIDE AND ACETAMINOPHEN 1 TABLET: 5; 325 TABLET ORAL at 23:46

## 2022-09-27 RX ADMIN — ONDANSETRON 4 MG: 2 INJECTION INTRAMUSCULAR; INTRAVENOUS at 23:53

## 2022-09-27 ASSESSMENT — ENCOUNTER SYMPTOMS
NAUSEA: 1
FLANK PAIN: 1
DIARRHEA: 1

## 2022-09-28 ENCOUNTER — APPOINTMENT (OUTPATIENT)
Dept: CT IMAGING | Facility: HOSPITAL | Age: 37
End: 2022-09-28
Attending: EMERGENCY MEDICINE
Payer: COMMERCIAL

## 2022-09-28 VITALS
BODY MASS INDEX: 35.34 KG/M2 | WEIGHT: 180 LBS | HEIGHT: 60 IN | HEART RATE: 78 BPM | OXYGEN SATURATION: 98 % | RESPIRATION RATE: 15 BRPM | TEMPERATURE: 98.2 F | SYSTOLIC BLOOD PRESSURE: 126 MMHG | DIASTOLIC BLOOD PRESSURE: 74 MMHG

## 2022-09-28 LAB
ANION GAP SERPL CALCULATED.3IONS-SCNC: 11 MMOL/L (ref 7–15)
BASOPHILS # BLD AUTO: 0 10E3/UL (ref 0–0.2)
BASOPHILS NFR BLD AUTO: 0 %
BUN SERPL-MCNC: 15 MG/DL (ref 6–20)
CALCIUM SERPL-MCNC: 9.9 MG/DL (ref 8.6–10)
CHLORIDE SERPL-SCNC: 103 MMOL/L (ref 98–107)
CREAT SERPL-MCNC: 1 MG/DL (ref 0.51–0.95)
DEPRECATED HCO3 PLAS-SCNC: 21 MMOL/L (ref 22–29)
EOSINOPHIL # BLD AUTO: 0.1 10E3/UL (ref 0–0.7)
EOSINOPHIL NFR BLD AUTO: 1 %
ERYTHROCYTE [DISTWIDTH] IN BLOOD BY AUTOMATED COUNT: 13.2 % (ref 10–15)
GFR SERPL CREATININE-BSD FRML MDRD: 75 ML/MIN/1.73M2
GLUCOSE SERPL-MCNC: 85 MG/DL (ref 70–99)
HCG UR QL: NEGATIVE
HCT VFR BLD AUTO: 42.6 % (ref 35–47)
HGB BLD-MCNC: 14.3 G/DL (ref 11.7–15.7)
IMM GRANULOCYTES # BLD: 0 10E3/UL
IMM GRANULOCYTES NFR BLD: 0 %
LYMPHOCYTES # BLD AUTO: 2.6 10E3/UL (ref 0.8–5.3)
LYMPHOCYTES NFR BLD AUTO: 25 %
MCH RBC QN AUTO: 30.1 PG (ref 26.5–33)
MCHC RBC AUTO-ENTMCNC: 33.6 G/DL (ref 31.5–36.5)
MCV RBC AUTO: 90 FL (ref 78–100)
MONOCYTES # BLD AUTO: 0.8 10E3/UL (ref 0–1.3)
MONOCYTES NFR BLD AUTO: 7 %
NEUTROPHILS # BLD AUTO: 7.2 10E3/UL (ref 1.6–8.3)
NEUTROPHILS NFR BLD AUTO: 67 %
NRBC # BLD AUTO: 0 10E3/UL
NRBC BLD AUTO-RTO: 0 /100
PLATELET # BLD AUTO: 178 10E3/UL (ref 150–450)
POTASSIUM SERPL-SCNC: 3.9 MMOL/L (ref 3.4–5.3)
RBC # BLD AUTO: 4.75 10E6/UL (ref 3.8–5.2)
SODIUM SERPL-SCNC: 135 MMOL/L (ref 136–145)
WBC # BLD AUTO: 10.8 10E3/UL (ref 4–11)

## 2022-09-28 PROCEDURE — 96375 TX/PRO/DX INJ NEW DRUG ADDON: CPT

## 2022-09-28 PROCEDURE — 96365 THER/PROPH/DIAG IV INF INIT: CPT

## 2022-09-28 PROCEDURE — 250N000011 HC RX IP 250 OP 636: Performed by: EMERGENCY MEDICINE

## 2022-09-28 PROCEDURE — 96376 TX/PRO/DX INJ SAME DRUG ADON: CPT

## 2022-09-28 PROCEDURE — 74176 CT ABD & PELVIS W/O CONTRAST: CPT

## 2022-09-28 RX ORDER — CEPHALEXIN 500 MG/1
500 CAPSULE ORAL 4 TIMES DAILY
Qty: 28 CAPSULE | Refills: 0 | Status: SHIPPED | OUTPATIENT
Start: 2022-09-28 | End: 2022-10-05

## 2022-09-28 RX ORDER — OXYCODONE AND ACETAMINOPHEN 5; 325 MG/1; MG/1
1 TABLET ORAL EVERY 6 HOURS PRN
Qty: 12 TABLET | Refills: 0 | Status: SHIPPED | OUTPATIENT
Start: 2022-09-28 | End: 2022-10-01

## 2022-09-28 RX ORDER — MORPHINE SULFATE 4 MG/ML
4 INJECTION, SOLUTION INTRAMUSCULAR; INTRAVENOUS EVERY 30 MIN PRN
Status: DISCONTINUED | OUTPATIENT
Start: 2022-09-28 | End: 2022-09-28 | Stop reason: HOSPADM

## 2022-09-28 RX ORDER — CEFTRIAXONE 1 G/1
1 INJECTION, POWDER, FOR SOLUTION INTRAMUSCULAR; INTRAVENOUS ONCE
Status: COMPLETED | OUTPATIENT
Start: 2022-09-28 | End: 2022-09-28

## 2022-09-28 RX ADMIN — MORPHINE SULFATE 4 MG: 4 INJECTION, SOLUTION INTRAMUSCULAR; INTRAVENOUS at 01:44

## 2022-09-28 RX ADMIN — CEFTRIAXONE 1 G: 1 INJECTION, POWDER, FOR SOLUTION INTRAMUSCULAR; INTRAVENOUS at 01:32

## 2022-09-28 RX ADMIN — MORPHINE SULFATE 4 MG: 4 INJECTION, SOLUTION INTRAMUSCULAR; INTRAVENOUS at 00:31

## 2022-09-28 ASSESSMENT — ACTIVITIES OF DAILY LIVING (ADL): ADLS_ACUITY_SCORE: 35

## 2022-09-28 NOTE — DISCHARGE INSTRUCTIONS
Encourage fluids.  Tylenol or ibuprofen as tolerated every 6 hours as needed for pain.  1 Percocet every 6 hours instead only if needed for stronger pain.  Do not drive with this.  Have someone drive you home today.  Cephalexin as prescribed.  See your clinic doctor within the next 7 days and recheck urine in follow-up.  See them sooner if worse or problems or return.

## 2022-09-28 NOTE — ED PROVIDER NOTES
EMERGENCY DEPARTMENT ENCOUNTER      NAME: Jacqueline Pappas  AGE: 37 year old female  YOB: 1985  MRN: [id number]  EVALUATION DATE & TIME: No admission date for patient encounter.    PCP: Pao Montague    ED PROVIDER: Jose D Luong M.D.      Chief Complaint   Patient presents with     Flank Pain         FINAL IMPRESSION:  1.  Acute right flank pain.  2.  Acute urinary tract infection/right pyelonephritis.    ED COURSE & MEDICAL DECISION MAKING:    10:52 PM I met with the patient to gather history and to perform my initial exam. We discussed plans for the ED course, including diagnostic testing and treatment. PPE worn: cloth mask, glasses, gloves.  Patient with right flank pain for the last 3 days.  History of recurrent kidney infections and recurrent kidney stones.  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  She currently has a right nephrostomy tube as well as and ureteral stents in place.  She notes 3 days of her recurrent UTI symptoms.  Urinalysis ordered from triage shows 7 white blood cells and over 182 red blood cells.  Positive for blood, protein, and leukocyte esterase.  12:38 AM.  Urinalysis with multiple red cells to a lesser extent white cells.  Pregnancy test negative.  CT basic metabolic profile still pending.  CBC unremarkable.  Pain medications ordered.  Tentatively, patient will be signed out to the night ED physician to follow-up on pending studies and disposition.  1:15 AM.  Basic metabolic profile unremarkable.  CT with stones but no change in the right kidney appearance.  Findings most consistent with a recurrent right kidney infection.  Patient will receive a dose of Rocephin for discharged home on cephalexin and pain medicine.  Patient in agreement.    Pertinent Labs & Imaging studies reviewed. (See chart for details)    37 year old female presents to the Emergency Department for evaluation of right flank pain.    At the conclusion of the encounter I discussed the results of all of  the tests and the disposition. The questions were answered. The patient or family acknowledged understanding and was agreeable with the care plan.     MEDICATIONS GIVEN IN THE EMERGENCY:  Medications - No data to display    NEW PRESCRIPTIONS STARTED AT TODAY'S ER VISIT  New Prescriptions    No medications on file          =================================================================    HPI    Patient information was obtained from: Patient    Use of : N/A       Jacqueline EULA Pappas is a 37 year old female with a pertinent history of pyelonephritis, solitary kidney, and nephrostomy complication who presents to this ED by walk in for evaluation of flank pain.    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 Has a nephrostomy and stents placed before. Patient began having diarrhea and nausea yesterday and has started feeling like past infections. Notes cloudy urine. Her flank pain is 7/10. Is not on her menstrual cycle currently. Denies chance of pregnancy. No medication allergies. Does have a ride home.     She does not identify any waxing or waning symptoms otherwise, exacerbating or alleviating features,associated symptoms except as mentioned. She denies any pain related complaints.    REVIEW OF SYSTEMS   Review of Systems   Gastrointestinal: Positive for diarrhea and nausea.   Genitourinary: Positive for flank pain.        Positive for cloudy urine   All other systems reviewed and are negative.       PAST MEDICAL HISTORY:  Past Medical History:   Diagnosis Date     Acute renal failure (H)      Anemia      Anemia      Calculus of kidney      Congenital absence of left kidney      Congenital absence of one kidney      Depression      Depression      Hydronephrosis      Kidney stone     at age 29     Pyelonephritis      Pyelonephritis      Smoker      Ureteral stricture      UTI (urinary tract infection)      Wrist fracture      Left       PAST SURGICAL HISTORY:  Past Surgical History:   Procedure Laterality Date      SECTION        SECTION      x1     COMBINED CYSTOSCOPY, RETROGRADES, URETEROSCOPY, LASER HOLMIUM LITHOTRIPSY URETER(S), INSERT STENT Right 2016    Procedure: COMBINED CYSTOSCOPY, RETROGRADES, URETEROSCOPY, LASER HOLMIUM LITHOTRIPSY URETER(S), INSERT STENT;  Surgeon: Florentino Awad MD;  [facility]     CYSTOSCOPY, URETEROSCOPY, COMBINED Right 2016    Procedure: COMBINED CYSTOSCOPY, URETEROSCOPY;  Surgeon: Florentino Awad MD;  [facility]     IR NEPHROLITHOTOMY  2014     IR NEPHROSTOGRAM EXISITING ACCESS  10/18/2018     IR NEPHROSTOMY TUBE CHANGE RIGHT  2018     IR NEPHROSTOMY TUBE CHANGE RIGHT  2020     IR NEPHROSTOMY TUBE CHANGE RIGHT  2018     IR NEPHROSTOMY TUBE CHANGE RIGHT  2020     IR NEPHROSTOMY TUBE PLACEMENT RIGHT  2016     IR NEPHROSTOMY TUBE PLACEMENT RIGHT  2017     KIDNEY STONE SURGERY Right 2016     LASER HOLMIUM LITHOTRIPSY URETER(S), INSERT STENT, COMBINED Left 2016    Procedure: COMBINED CYSTOSCOPY, URETEROSCOPY, LASER HOLMIUM LITHOTRIPSY URETER(S), INSERT STENT;  Surgeon: Florentino Awad MD;  [facility]     LASER HOLMIUM NEPHROLITHOTOMY VIA PERCUTANEOUS NEPHROSTOMY Right 2016    Procedure: LASER HOLMIUM NEPHROLITHOTOMY VIA PERCUTANEOUS NEPHROSTOMY;  Surgeon: Florentino Awad MD;  [facility]     NEPHROSTOMY W/ INTRODUCTION OF CATHETER Right      OTHER SURGICAL HISTORY      Mole removednasal     OTHER SURGICAL HISTORY Right     Cystoscopy, ureteroscopy, laser11/02/2014 x2 with ureteral stent insertion     PERCUTANEOUS NEPHROSTOMY  2016    Procedure: PERCUTANEOUS NEPHROSTOMY;  Surgeon: Florentino Awad MD;  Location: [facility]     WISDOM TOOTH EXTRACTION       ZZC REMOVAL OF KIDNEY STONE             CURRENT MEDICATIONS:    albuterol (ACCUNEB) 1.25 MG/3ML neb solution  sertraline (ZOLOFT)  100 MG tablet  SPRINTEC 28 0.25-35 MG-MCG tablet        ALLERGIES:  Allergies   Allergen Reactions     Vancomycin      Desogestrel-Ethinyl Estradiol Angioedema and Swelling     Swelling of hands and feet per pt.  Swelling of hands and feet per pt.  Swelling of hands and feet per pt.  Swelling of hands and feet per pt.  Swelling of hands and feet per pt.  Swelling of hands and feet per pt.  Swelling of hands and feet per pt.  Swelling of hands and feet per pt.       Penicillins Rash     As a child per mother; mother unsure if has tolerated another PCN since. Tolerated ampicillin 16     Sulfa Drugs Rash       FAMILY HISTORY:  Family History   Problem Relation Age of Onset     Anesthesia Reaction No family hx of      Malignant Hyperthermia No family hx of      Heart Disease Maternal Uncle         heart failure     Coronary Artery Disease Other      Heart Disease Maternal Grandmother         pacemaker     Gout Paternal Grandfather      Prostate Cancer Maternal Aunt         breast     Heart Disease Maternal Aunt         pacemaker     Heart Disease Maternal Aunt         pacemaker     Heart Disease Maternal Aunt         pacemaker       SOCIAL HISTORY:   Social History     Socioeconomic History     Marital status: Single     Spouse name: None     Number of children: None     Years of education: None     Highest education level: None   Tobacco Use     Smoking status: Current Every Day Smoker     Packs/day: 0.50     Years: 10.00     Pack years: 5.00     Last attempt to quit: 2016     Years since quittin.0     Smokeless tobacco: Former User     Quit date: 2016     Tobacco comment: has information from last admission.   Substance and Sexual Activity     Alcohol use: Yes     Alcohol/week: 0.0 standard drinks     Comment: Alcoholic Drinks/day: occ     Drug use: No   Daily smoker, occasional alcohol, no drugs.    VITALS:  /85   Pulse 82   Temp 98.2  F (36.8  C) (Temporal)   Resp 19   Ht 1.524 m (5')    Wt 81.6 kg (180 lb)   LMP 09/13/2022   SpO2 98%   BMI 35.15 kg/m      PHYSICAL EXAM    Vital Signs:  /85   Pulse 82   Temp 98.2  F (36.8  C) (Temporal)   Resp 19   Ht 1.524 m (5')   Wt 81.6 kg (180 lb)   LMP 09/13/2022   SpO2 98%   BMI 35.15 kg/m    General:  On entering the room she is in no apparent distress.    Neck:  Neck supple with full range of motion and nontender.    Back:  Back and spine are nontender.  No costovertebral angle tenderness on the left, tender on the right.  Nephrostomy tube present..    HEENT:  Oropharynx clear with moist mucous membranes.  HEENT unremarkable.    Pulmonary:  Chest clear to auscultation without rhonchi rales or wheezing.    Cardiovascular:  Cardiac regular rate and rhythm without murmurs rubs or gallops.    Abdomen:  Abdomen soft nontender.  There is no rebound or guarding.    Muskuloskeletal:  she moves all 4 without any difficulty and has normal neurovascular exams.  Extremities without clubbing, cyanosis, or edema.  Legs and calves are nontender.    Neuro:  she is alert and oriented ×3 and moves all extremities symmetrically.    Psych:  Normal affect.    Skin:  Unremarkable and warm and dry.       LAB:  All pertinent labs reviewed and interpreted.  Labs Ordered and Resulted from Time of ED Arrival to Time of ED Departure   ROUTINE UA WITH MICROSCOPIC REFLEX TO CULTURE - Abnormal       Result Value    Color Urine Light Yellow      Appearance Urine Turbid (*)     Glucose Urine Negative      Bilirubin Urine Negative      Ketones Urine Negative      Specific Gravity Urine 1.017      Blood Urine 1.0 mg/dL (*)     pH Urine 7.5 (*)     Protein Albumin Urine 100  (*)     Urobilinogen Urine <2.0      Nitrite Urine Negative      Leukocyte Esterase Urine 500 Duglas/uL (*)     Amorphous Crystals Urine Few (*)     Triple Phosphate Crystals Urine Few (*)     RBC Urine >182 (*)     WBC Urine 7 (*)     Squamous Epithelials Urine 2 (*)    BASIC METABOLIC PANEL - Abnormal     Sodium 135 (*)     Potassium 3.9      Chloride 103      Carbon Dioxide (CO2) 21 (*)     Anion Gap 11      Urea Nitrogen 15.0      Creatinine 1.00 (*)     Calcium 9.9      Glucose 85      GFR Estimate 75     HCG QUALITATIVE URINE - Normal    hCG Urine Qualitative Negative     CBC WITH PLATELETS AND DIFFERENTIAL    WBC Count 10.8      RBC Count 4.75      Hemoglobin 14.3      Hematocrit 42.6      MCV 90      MCH 30.1      MCHC 33.6      RDW 13.2      Platelet Count 178      % Neutrophils 67      % Lymphocytes 25      % Monocytes 7      % Eosinophils 1      % Basophils 0      % Immature Granulocytes 0      NRBCs per 100 WBC 0      Absolute Neutrophils 7.2      Absolute Lymphocytes 2.6      Absolute Monocytes 0.8      Absolute Eosinophils 0.1      Absolute Basophils 0.0      Absolute Immature Granulocytes 0.0      Absolute NRBCs 0.0     URINE CULTURE       RADIOLOGY:  Reviewed all pertinent imaging. Please see official radiology report.  CT Abdomen Pelvis w/o Contrast   Final Result   IMPRESSION:    1.  No significant change in appearance of the right kidney. Percutaneous nephrostomy tube in the lower pole.   2.  Multiple right renal calculi and right renal cysts again seen.   3.  No bowel obstruction or abscess.   4.  Mild splenomegaly.                    EKG:        PROCEDURES:         I, Darryl Brizuela, am serving as a scribe to document services personally performed by Dr. Luong based on my observation and the provider's statements to me. I, Jose D Luong MD attest that Darryl Brizuela is acting in a scribe capacity, has observed my performance of the services and has documented them in accordance with my direction.    Jose D Luong M.D.  Emergency Medicine  [facility] EMERGENCY DEPARTMENT  [address] [place] [id number] [phone]  Dept: [phone]     Jose D Luong MD  09/28/22 0039       Jose D Luong MD  09/28/22 0040       Jose D Luong,  MD  09/28/22 0116

## 2022-09-28 NOTE — ED TRIAGE NOTES
Right flank pain since Sunday states has nephrostomy tube in place there and has known stents but only has one kidney so they wont remove stones. Patient states she also has diarrhea.        Yes

## 2022-09-29 LAB — BACTERIA UR CULT: NORMAL

## 2022-10-04 ENCOUNTER — HOSPITAL ENCOUNTER (EMERGENCY)
Facility: CLINIC | Age: 37
Discharge: HOME | End: 2022-10-04
Attending: EMERGENCY MEDICINE | Admitting: EMERGENCY MEDICINE
Payer: COMMERCIAL

## 2022-10-04 VITALS
DIASTOLIC BLOOD PRESSURE: 90 MMHG | HEART RATE: 88 BPM | OXYGEN SATURATION: 98 % | WEIGHT: 185 LBS | TEMPERATURE: 98.9 F | RESPIRATION RATE: 18 BRPM | BODY MASS INDEX: 36.32 KG/M2 | SYSTOLIC BLOOD PRESSURE: 127 MMHG | HEIGHT: 60 IN

## 2022-10-04 DIAGNOSIS — R10.9 RIGHT FLANK PAIN, CHRONIC: ICD-10-CM

## 2022-10-04 DIAGNOSIS — G89.29 RIGHT FLANK PAIN, CHRONIC: ICD-10-CM

## 2022-10-04 LAB
ALBUMIN SERPL-MCNC: 3.8 G/DL (ref 3.5–5)
ALBUMIN UR-MCNC: 100 MG/DL
ALP SERPL-CCNC: 79 U/L (ref 45–120)
ALT SERPL W P-5'-P-CCNC: 18 U/L (ref 0–45)
ANION GAP SERPL CALCULATED.3IONS-SCNC: 12 MMOL/L (ref 5–18)
APPEARANCE UR: ABNORMAL
AST SERPL W P-5'-P-CCNC: 18 U/L (ref 0–40)
BACTERIA #/AREA URNS HPF: ABNORMAL /HPF
BASOPHILS # BLD AUTO: 0 10E3/UL (ref 0–0.2)
BASOPHILS NFR BLD AUTO: 0 %
BILIRUB SERPL-MCNC: 0.6 MG/DL (ref 0–1)
BILIRUB UR QL STRIP: NEGATIVE
BUN SERPL-MCNC: 10 MG/DL (ref 8–22)
C REACTIVE PROTEIN LHE: 0.3 MG/DL (ref 0–?)
CALCIUM SERPL-MCNC: 10.3 MG/DL (ref 8.5–10.5)
CHLORIDE BLD-SCNC: 105 MMOL/L (ref 98–107)
CO2 SERPL-SCNC: 21 MMOL/L (ref 22–31)
COLOR UR AUTO: ABNORMAL
CREAT SERPL-MCNC: 0.95 MG/DL (ref 0.6–1.1)
EOSINOPHIL # BLD AUTO: 0.1 10E3/UL (ref 0–0.7)
EOSINOPHIL NFR BLD AUTO: 1 %
ERYTHROCYTE [DISTWIDTH] IN BLOOD BY AUTOMATED COUNT: 13.2 % (ref 10–15)
GFR SERPL CREATININE-BSD FRML MDRD: 80 ML/MIN/1.73M2
GLUCOSE BLD-MCNC: 90 MG/DL (ref 70–125)
GLUCOSE UR STRIP-MCNC: NEGATIVE MG/DL
HCT VFR BLD AUTO: 42.9 % (ref 35–47)
HGB BLD-MCNC: 14.4 G/DL (ref 11.7–15.7)
HGB UR QL STRIP: ABNORMAL
IMM GRANULOCYTES # BLD: 0 10E3/UL
IMM GRANULOCYTES NFR BLD: 0 %
KETONES UR STRIP-MCNC: NEGATIVE MG/DL
LACTATE SERPL-SCNC: 0.6 MMOL/L (ref 0.7–2)
LEUKOCYTE ESTERASE UR QL STRIP: ABNORMAL
LYMPHOCYTES # BLD AUTO: 2.3 10E3/UL (ref 0.8–5.3)
LYMPHOCYTES NFR BLD AUTO: 24 %
MCH RBC QN AUTO: 30.3 PG (ref 26.5–33)
MCHC RBC AUTO-ENTMCNC: 33.6 G/DL (ref 31.5–36.5)
MCV RBC AUTO: 90 FL (ref 78–100)
MONOCYTES # BLD AUTO: 0.6 10E3/UL (ref 0–1.3)
MONOCYTES NFR BLD AUTO: 6 %
MUCOUS THREADS #/AREA URNS LPF: PRESENT /LPF
NEUTROPHILS # BLD AUTO: 6.4 10E3/UL (ref 1.6–8.3)
NEUTROPHILS NFR BLD AUTO: 69 %
NITRATE UR QL: NEGATIVE
NRBC # BLD AUTO: 0 10E3/UL
NRBC BLD AUTO-RTO: 0 /100
PH UR STRIP: 7.5 [PH] (ref 5–7)
PLATELET # BLD AUTO: 195 10E3/UL (ref 150–450)
POTASSIUM BLD-SCNC: 3.7 MMOL/L (ref 3.5–5)
PROT SERPL-MCNC: 7.5 G/DL (ref 6–8)
RBC # BLD AUTO: 4.76 10E6/UL (ref 3.8–5.2)
RBC URINE: >182 /HPF
SODIUM SERPL-SCNC: 138 MMOL/L (ref 136–145)
SP GR UR STRIP: 1.01 (ref 1–1.03)
SQUAMOUS EPITHELIAL: 4 /HPF
UROBILINOGEN UR STRIP-MCNC: <2 MG/DL
WBC # BLD AUTO: 9.5 10E3/UL (ref 4–11)
WBC URINE: 16 /HPF

## 2022-10-04 PROCEDURE — 36415 COLL VENOUS BLD VENIPUNCTURE: CPT | Performed by: EMERGENCY MEDICINE

## 2022-10-04 PROCEDURE — 85014 HEMATOCRIT: CPT | Performed by: EMERGENCY MEDICINE

## 2022-10-04 PROCEDURE — 250N000011 HC RX IP 250 OP 636: Performed by: EMERGENCY MEDICINE

## 2022-10-04 PROCEDURE — 87086 URINE CULTURE/COLONY COUNT: CPT | Performed by: EMERGENCY MEDICINE

## 2022-10-04 PROCEDURE — 86140 C-REACTIVE PROTEIN: CPT | Performed by: EMERGENCY MEDICINE

## 2022-10-04 PROCEDURE — 96374 THER/PROPH/DIAG INJ IV PUSH: CPT

## 2022-10-04 PROCEDURE — 258N000003 HC RX IP 258 OP 636: Performed by: EMERGENCY MEDICINE

## 2022-10-04 PROCEDURE — 83605 ASSAY OF LACTIC ACID: CPT | Performed by: EMERGENCY MEDICINE

## 2022-10-04 PROCEDURE — 250N000013 HC RX MED GY IP 250 OP 250 PS 637: Performed by: EMERGENCY MEDICINE

## 2022-10-04 PROCEDURE — 81001 URINALYSIS AUTO W/SCOPE: CPT | Performed by: EMERGENCY MEDICINE

## 2022-10-04 PROCEDURE — 80053 COMPREHEN METABOLIC PANEL: CPT | Performed by: EMERGENCY MEDICINE

## 2022-10-04 PROCEDURE — 99284 EMERGENCY DEPT VISIT MOD MDM: CPT | Mod: 25

## 2022-10-04 RX ORDER — HYDROMORPHONE HYDROCHLORIDE 2 MG/1
2 TABLET ORAL ONCE
Status: COMPLETED | OUTPATIENT
Start: 2022-10-04 | End: 2022-10-04

## 2022-10-04 RX ADMIN — SODIUM CHLORIDE 1000 ML: 9 INJECTION, SOLUTION INTRAVENOUS at 21:39

## 2022-10-04 RX ADMIN — PROCHLORPERAZINE EDISYLATE 10 MG: 5 INJECTION INTRAMUSCULAR; INTRAVENOUS at 21:35

## 2022-10-04 RX ADMIN — HYDROMORPHONE HYDROCHLORIDE 2 MG: 2 TABLET ORAL at 21:26

## 2022-10-04 ASSESSMENT — ACTIVITIES OF DAILY LIVING (ADL): ADLS_ACUITY_SCORE: 33

## 2022-10-05 NOTE — ED PROVIDER NOTES
EMERGENCY DEPARTMENT ENCOUNTER      NAME: Jacqueline Pappas  AGE: 37 year old female  YOB: 1985  MRN: [id number]  EVALUATION DATE & TIME: 10/4/2022  8:14 PM    PCP: Pao Montague    ED PROVIDER: Beto Medellin M.D.      Chief Complaint   Patient presents with     Flank Pain     Nausea, Vomiting, & Diarrhea         FINAL IMPRESSION:  Right flank pain  Chronic right nephrostomy tube    ED COURSE & MEDICAL DECISION MAKING:    Pertinent Labs & Imaging studies reviewed. (See chart for details)  37 year old female presents to the Emergency Department for evaluation of continued right flank pain and vomiting.  Symptoms ongoing for a number of days.  Recent urine analysis with polymicrobial christie.  Urine analysis slightly more than a week ago with E. coli.  Patient on cephalexin but reports no improvement.  Patient with history of chronic obstruction her single right kidney.  Patient with chronic nephrostomy tube.  Due for changing later this week.  Examination reveals adult female in mild distress.  Ureterostomy site is normal in appearance.  Minimal tenderness.  No induration or erythema.  Mild right upper quadrant tenderness.  Review of records indicate patient with excessive CT imaging over the last few years.  Patient is nontoxic in appearance.  We will proceed with laboratory evaluation and ultrasound.  Patient treated symptomatically with intravenous Compazine and oral Dilaudid patient appears non toxic with stable vitals signs. Overall exam is benign.        9:12 PM I met with the patient for the initial interview and physical examination. Discussed plan for treatment and workup in the ED.    9:30 PM.  Patient requesting parenteral narcotics for her discomfort.  Patient with a history of chronic pain issues.  Attempting to avoid parental narcotics.  Patient given Compazine and instructed after nausea is improved we will proceed with oral meds.  10 PM.  Patient frustrated and left without completion of  her exam.  At the conclusion of the encounter I discussed the results of all of the tests and the disposition. The questions were answered and return precautions provided. The patient or family acknowledged understanding and was agreeable with the care plan.       PPE: Provider wore gloves, N95 mask, eye protection, surgical cap, and paper mask.     MEDICATIONS GIVEN IN THE EMERGENCY:  Medications   0.9% sodium chloride BOLUS (has no administration in time range)   prochlorperazine (COMPAZINE) injection 10 mg (has no administration in time range)   HYDROmorphone (DILAUDID) tablet 2 mg (has no administration in time range)       NEW PRESCRIPTIONS STARTED AT TODAY'S ER VISIT  New Prescriptions    No medications on file          =================================================================    HPI    Patient information was obtained from: Patient    Use of Intrepreter: N/A        Jacqueline Pappas is a 37 year old female with a pertient medical history of nephrostomy, nephritis, leukocytosis, pyelonephritis, congenital absence of one kidney who presents to the ED for evaluation of flank pain and vomiting.    Per chart review: Patient seen at [facility] ED on 9/27 for evaluation of flank pain. CT with stones but no change in the right kidney appearance. CBC unremarkable. Received dose of Rocephin in ED and placed on Cephalexin and discharged home.     Patient reports that she has right flank pain, weakness, nausea, vomiting, and diarrhea for about a week. Is currently on cephalexin for kidney infection. Her pain is not improving. Does have a nephrostomy tube in place and is to have it changed in two days. Notes her urine is cloudy and bloody. Patient denies any fever, cough, or any other complaints at this time.       REVIEW OF SYSTEMS   Constitutional:  Denies fever, chills  Respiratory:  Denies productive cough or increased work of breathing  Cardiovascular:  Denies chest pain, palpitations  GI:  Denies abdominal  pain, nausea, vomiting, or change in bowel or bladder habits   Musculoskeletal:  Denies any new muscle/joint swelling  Skin:  Denies rash   Neurologic:  Denies focal weakness  All systems negative except as marked.     PAST MEDICAL HISTORY:  Past Medical History:   Diagnosis Date     Acute renal failure (H)      Anemia      Anemia      Calculus of kidney      Congenital absence of left kidney      Congenital absence of one kidney      Depression      Depression      Hydronephrosis      Kidney stone     at age 29     Pyelonephritis      Pyelonephritis      Smoker      Ureteral stricture      UTI (urinary tract infection)      Wrist fracture     Left       PAST SURGICAL HISTORY:  Past Surgical History:   Procedure Laterality Date      SECTION        SECTION      x1     COMBINED CYSTOSCOPY, RETROGRADES, URETEROSCOPY, LASER HOLMIUM LITHOTRIPSY URETER(S), INSERT STENT Right 2016    Procedure: COMBINED CYSTOSCOPY, RETROGRADES, URETEROSCOPY, LASER HOLMIUM LITHOTRIPSY URETER(S), INSERT STENT;  Surgeon: Florentino Awad MD;  [facility]     CYSTOSCOPY, URETEROSCOPY, COMBINED Right 2016    Procedure: COMBINED CYSTOSCOPY, URETEROSCOPY;  Surgeon: Florentino Awad MD;  [facility]     IR NEPHROLITHOTOMY  2014     IR NEPHROSTOGRAM EXISITING ACCESS  10/18/2018     IR NEPHROSTOMY TUBE CHANGE RIGHT  2018     IR NEPHROSTOMY TUBE CHANGE RIGHT  2020     IR NEPHROSTOMY TUBE CHANGE RIGHT  2018     IR NEPHROSTOMY TUBE CHANGE RIGHT  2020     IR NEPHROSTOMY TUBE PLACEMENT RIGHT  2016     IR NEPHROSTOMY TUBE PLACEMENT RIGHT  2017     KIDNEY STONE SURGERY Right 2016     LASER HOLMIUM LITHOTRIPSY URETER(S), INSERT STENT, COMBINED Left 2016    Procedure: COMBINED CYSTOSCOPY, URETEROSCOPY, LASER HOLMIUM LITHOTRIPSY URETER(S), INSERT STENT;  Surgeon: Florentino Awad MD;  [facility]     LASER HOLMIUM NEPHROLITHOTOMY VIA PERCUTANEOUS NEPHROSTOMY  Right 9/14/2016    Procedure: LASER HOLMIUM NEPHROLITHOTOMY VIA PERCUTANEOUS NEPHROSTOMY;  Surgeon: Florentino Awad MD;  [facility]: [facility]     NEPHROSTOMY W/ INTRODUCTION OF CATHETER Right      OTHER SURGICAL HISTORY      Mole removednasal     OTHER SURGICAL HISTORY Right     Cystoscopy, ureteroscopy, laser11/02/2014 x2 with ureteral stent insertion     PERCUTANEOUS NEPHROSTOMY  11/1/2016    Procedure: PERCUTANEOUS NEPHROSTOMY;  Surgeon: Florentino Awad MD;  [facility]: [facility]     WISDOM TOOTH EXTRACTION       ZZC REMOVAL OF KIDNEY STONE           CURRENT MEDICATIONS:      Current Facility-Administered Medications:      0.9% sodium chloride BOLUS, 1,000 mL, Intravenous, Once, Beto Medellin MD     HYDROmorphone (DILAUDID) tablet 2 mg, 2 mg, Oral, Once, Beto Medellin MD     prochlorperazine (COMPAZINE) injection 10 mg, 10 mg, Intravenous, Once, Beto Medellin MD    Current Outpatient Medications:      albuterol (ACCUNEB) 1.25 MG/3ML neb solution, Take 1.25 mg by nebulization every 6 hours as needed for shortness of breath / dyspnea or wheezing, Disp: , Rfl:      cephALEXin (KEFLEX) 500 MG capsule, Take 1 capsule (500 mg) by mouth 4 times daily for 7 days, Disp: 28 capsule, Rfl: 0     sertraline (ZOLOFT) 100 MG tablet, Take 150 mg by mouth At Bedtime, Disp: , Rfl:      SPRINTEC 28 0.25-35 MG-MCG tablet, Take 1 tablet by mouth daily, Disp: , Rfl:     ALLERGIES:  Allergies   Allergen Reactions     Vancomycin      Desogestrel-Ethinyl Estradiol Angioedema and Swelling     Swelling of hands and feet per pt.  Swelling of hands and feet per pt.  Swelling of hands and feet per pt.  Swelling of hands and feet per pt.  Swelling of hands and feet per pt.  Swelling of hands and feet per pt.  Swelling of hands and feet per pt.  Swelling of hands and feet per pt.       Penicillins Rash     As a child per mother; mother unsure if has tolerated another PCN since. Tolerated ampicillin 9/20/16     Sulfa Drugs Rash        FAMILY HISTORY:  Family History   Problem Relation Age of Onset     Anesthesia Reaction No family hx of      Malignant Hyperthermia No family hx of      Heart Disease Maternal Uncle         heart failure     Coronary Artery Disease Other      Heart Disease Maternal Grandmother         pacemaker     Gout Paternal Grandfather      Prostate Cancer Maternal Aunt         breast     Heart Disease Maternal Aunt         pacemaker     Heart Disease Maternal Aunt         pacemaker     Heart Disease Maternal Aunt         pacemaker       SOCIAL HISTORY:   Social History     Socioeconomic History     Marital status: Single     Spouse name: None     Number of children: None     Years of education: None     Highest education level: None   Tobacco Use     Smoking status: Current Every Day Smoker     Packs/day: 0.50     Years: 10.00     Pack years: 5.00     Last attempt to quit: 2016     Years since quittin.0     Smokeless tobacco: Former User     Quit date: 2016     Tobacco comment: has information from last admission.   Substance and Sexual Activity     Alcohol use: Yes     Alcohol/week: 0.0 standard drinks     Comment: Alcoholic Drinks/day: occ     Drug use: No       VITALS:  Patient Vitals for the past 24 hrs:   BP Temp Temp src Pulse Resp SpO2 Height Weight   10/04/22 1919 (!) 127/90 98.9  F (37.2  C) Temporal 88 18 98 % 1.524 m (5') 83.9 kg (185 lb)        PHYSICAL EXAM    Constitutional:  Awake, alert. Mild distress.   HENT:  Normocephalic, Atraumatic. Bilateral external ears normal. Oropharynx moist. Nose normal. Neck- Normal range of motion with no guarding, No midline cervical tenderness, Supple, No stridor.   Eyes:  PERRL, EOMI with no signs of entrapment, Conjunctiva normal, No discharge.   Respiratory:  Normal breath sounds, No respiratory distress, No wheezing.    Cardiovascular:  Normal heart rate, Normal rhythm, No appreciable rubs or gallops.   GI:  Soft, No distension, No palpable masses.   Minimal tenderness around right nephrostomy stoma.  No warmth or erythema.  Mild RUQ tenderness.   Musculoskeletal:  Intact distal pulses, No edema. Good range of motion in all major joints. No tenderness to palpation or major deformities noted.  Integument:  Warm, Dry, No erythema, No rash.   Neurologic:  Alert & oriented, Normal motor function, Normal sensory function, No focal deficits noted.   Psychiatric:  Affect normal, Judgment normal, Mood normal.     LAB:  All pertinent labs reviewed and interpreted.  Results for orders placed or performed during the hospital encounter of 10/04/22   UA with Microscopic reflex to Culture    Specimen: Urine, Midstream   Result Value Ref Range    Color Urine Light Yellow Colorless, Straw, Light Yellow, Yellow    Appearance Urine Turbid (A) Clear    Glucose Urine Negative Negative mg/dL    Bilirubin Urine Negative Negative    Ketones Urine Negative Negative mg/dL    Specific Gravity Urine 1.014 1.001 - 1.030    Blood Urine 1.0 mg/dL (A) Negative    pH Urine 7.5 (H) 5.0 - 7.0    Protein Albumin Urine 100  (A) Negative mg/dL    Urobilinogen Urine <2.0 <2.0 mg/dL    Nitrite Urine Negative Negative    Leukocyte Esterase Urine 500 Duglas/uL (A) Negative    Bacteria Urine Few (A) None Seen /HPF    Mucus Urine Present (A) None Seen /LPF    RBC Urine >182 (H) <=2 /HPF    WBC Urine 16 (H) <=5 /HPF    Squamous Epithelials Urine 4 (H) <=1 /HPF       RADIOLOGY:  Reviewed all pertinent imaging. Please see official radiology report.  US Kidney Right    (Results Pending)       I, Darryl Brizuela, am serving as a scribe to document services personally performed by Beto Medellin MD, based on my observation and the provider's statements to me. I, Beto Medellin MD attest that Darryl Brizuela is acting in a scribe capacity, has observed my performance of the services and has documented them in accordance with my direction.    Beto Medellin M.D.  Emergency Medicine  [facility]  [facility] EMERGENCY ROOM     Beto Medellin MD  10/04/22 5638

## 2022-10-05 NOTE — ED NOTES
US tech came to bring pt to radiology. [facility] informed writer that pt stated that she was leaving. Writer went to room, pt was gone, with IV sitting on the bed. Pt was seen walking out of ED. MD updated.

## 2022-10-05 NOTE — ED TRIAGE NOTES
Pt seen in ED about 1 week ago for right flank pain. Is currently being treated for kideny infection and still has some antibiotics to take. Pt report the pain has not been improving and still feels weak, having increased urination, and right sided pain.     Triage Assessment     Row Name 10/04/22 1921       Triage Assessment (Adult)    Airway WDL WDL       Respiratory WDL    Respiratory WDL WDL       Skin Circulation/Temperature WDL    Skin Circulation/Temperature WDL WDL       Cardiac WDL    Cardiac WDL WDL       Peripheral/Neurovascular WDL    Peripheral Neurovascular WDL WDL       Cognitive/Neuro/Behavioral WDL    Cognitive/Neuro/Behavioral WDL WDL

## 2022-10-06 LAB — BACTERIA UR CULT: NORMAL

## 2022-10-07 ENCOUNTER — APPOINTMENT (OUTPATIENT)
Dept: ULTRASOUND IMAGING | Facility: HOSPITAL | Age: 37
End: 2022-10-07
Attending: EMERGENCY MEDICINE
Payer: COMMERCIAL

## 2022-10-07 ENCOUNTER — HOSPITAL ENCOUNTER (EMERGENCY)
Facility: HOSPITAL | Age: 37
Discharge: HOME | End: 2022-10-07
Attending: EMERGENCY MEDICINE | Admitting: EMERGENCY MEDICINE
Payer: COMMERCIAL

## 2022-10-07 VITALS
DIASTOLIC BLOOD PRESSURE: 55 MMHG | WEIGHT: 180 LBS | BODY MASS INDEX: 35.34 KG/M2 | OXYGEN SATURATION: 97 % | HEIGHT: 60 IN | HEART RATE: 78 BPM | RESPIRATION RATE: 15 BRPM | TEMPERATURE: 98.2 F | SYSTOLIC BLOOD PRESSURE: 99 MMHG

## 2022-10-07 DIAGNOSIS — R10.9 RIGHT FLANK PAIN: ICD-10-CM

## 2022-10-07 DIAGNOSIS — R11.2 NAUSEA AND VOMITING, UNSPECIFIED VOMITING TYPE: ICD-10-CM

## 2022-10-07 LAB
ALBUMIN UR-MCNC: 70 MG/DL
ANION GAP SERPL CALCULATED.3IONS-SCNC: 12 MMOL/L (ref 7–15)
APPEARANCE UR: CLEAR
BILIRUB UR QL STRIP: NEGATIVE
BUN SERPL-MCNC: 11.7 MG/DL (ref 6–20)
CALCIUM SERPL-MCNC: 9.5 MG/DL (ref 8.6–10)
CHLORIDE SERPL-SCNC: 101 MMOL/L (ref 98–107)
COLOR UR AUTO: ABNORMAL
CREAT SERPL-MCNC: 0.95 MG/DL (ref 0.51–0.95)
CRP SERPL-MCNC: 7.8 MG/L
DEPRECATED HCO3 PLAS-SCNC: 22 MMOL/L (ref 22–29)
ERYTHROCYTE [DISTWIDTH] IN BLOOD BY AUTOMATED COUNT: 13.2 % (ref 10–15)
GFR SERPL CREATININE-BSD FRML MDRD: 80 ML/MIN/1.73M2
GLUCOSE SERPL-MCNC: 105 MG/DL (ref 70–99)
GLUCOSE UR STRIP-MCNC: NEGATIVE MG/DL
HCT VFR BLD AUTO: 42.7 % (ref 35–47)
HGB BLD-MCNC: 14.2 G/DL (ref 11.7–15.7)
HGB UR QL STRIP: ABNORMAL
KETONES UR STRIP-MCNC: NEGATIVE MG/DL
LEUKOCYTE ESTERASE UR QL STRIP: ABNORMAL
MCH RBC QN AUTO: 30 PG (ref 26.5–33)
MCHC RBC AUTO-ENTMCNC: 33.3 G/DL (ref 31.5–36.5)
MCV RBC AUTO: 90 FL (ref 78–100)
MUCOUS THREADS #/AREA URNS LPF: PRESENT /LPF
NITRATE UR QL: NEGATIVE
PH UR STRIP: 7 [PH] (ref 5–7)
PLATELET # BLD AUTO: 146 10E3/UL (ref 150–450)
POTASSIUM SERPL-SCNC: 4 MMOL/L (ref 3.4–5.3)
RBC # BLD AUTO: 4.73 10E6/UL (ref 3.8–5.2)
RBC URINE: 4 /HPF
SODIUM SERPL-SCNC: 135 MMOL/L (ref 136–145)
SP GR UR STRIP: 1.02 (ref 1–1.03)
SQUAMOUS EPITHELIAL: 1 /HPF
UROBILINOGEN UR STRIP-MCNC: <2 MG/DL
WBC # BLD AUTO: 6.2 10E3/UL (ref 4–11)
WBC URINE: 7 /HPF

## 2022-10-07 PROCEDURE — 36415 COLL VENOUS BLD VENIPUNCTURE: CPT | Performed by: EMERGENCY MEDICINE

## 2022-10-07 PROCEDURE — 96361 HYDRATE IV INFUSION ADD-ON: CPT

## 2022-10-07 PROCEDURE — 96376 TX/PRO/DX INJ SAME DRUG ADON: CPT

## 2022-10-07 PROCEDURE — 99285 EMERGENCY DEPT VISIT HI MDM: CPT | Mod: 25

## 2022-10-07 PROCEDURE — 81001 URINALYSIS AUTO W/SCOPE: CPT | Performed by: EMERGENCY MEDICINE

## 2022-10-07 PROCEDURE — 86140 C-REACTIVE PROTEIN: CPT | Performed by: EMERGENCY MEDICINE

## 2022-10-07 PROCEDURE — 96375 TX/PRO/DX INJ NEW DRUG ADDON: CPT

## 2022-10-07 PROCEDURE — 87086 URINE CULTURE/COLONY COUNT: CPT | Performed by: EMERGENCY MEDICINE

## 2022-10-07 PROCEDURE — 96374 THER/PROPH/DIAG INJ IV PUSH: CPT

## 2022-10-07 PROCEDURE — 82947 ASSAY GLUCOSE BLOOD QUANT: CPT | Performed by: EMERGENCY MEDICINE

## 2022-10-07 PROCEDURE — 250N000011 HC RX IP 250 OP 636: Performed by: EMERGENCY MEDICINE

## 2022-10-07 PROCEDURE — 85018 HEMOGLOBIN: CPT | Performed by: EMERGENCY MEDICINE

## 2022-10-07 PROCEDURE — 258N000003 HC RX IP 258 OP 636: Performed by: EMERGENCY MEDICINE

## 2022-10-07 PROCEDURE — 76775 US EXAM ABDO BACK WALL LIM: CPT

## 2022-10-07 RX ORDER — HYDROMORPHONE HYDROCHLORIDE 1 MG/ML
0.5 INJECTION, SOLUTION INTRAMUSCULAR; INTRAVENOUS; SUBCUTANEOUS ONCE
Status: COMPLETED | OUTPATIENT
Start: 2022-10-07 | End: 2022-10-07

## 2022-10-07 RX ORDER — ONDANSETRON 2 MG/ML
4 INJECTION INTRAMUSCULAR; INTRAVENOUS ONCE
Status: COMPLETED | OUTPATIENT
Start: 2022-10-07 | End: 2022-10-07

## 2022-10-07 RX ORDER — METHOCARBAMOL 750 MG/1
750 TABLET, FILM COATED ORAL 3 TIMES DAILY PRN
Qty: 30 TABLET | Refills: 0 | Status: SHIPPED | OUTPATIENT
Start: 2022-10-07 | End: 2023-05-12

## 2022-10-07 RX ORDER — ONDANSETRON 4 MG/1
4 TABLET, ORALLY DISINTEGRATING ORAL EVERY 6 HOURS PRN
Qty: 10 TABLET | Refills: 0 | Status: SHIPPED | OUTPATIENT
Start: 2022-10-07 | End: 2023-03-03

## 2022-10-07 RX ADMIN — HYDROMORPHONE HYDROCHLORIDE 0.5 MG: 1 INJECTION, SOLUTION INTRAMUSCULAR; INTRAVENOUS; SUBCUTANEOUS at 06:39

## 2022-10-07 RX ADMIN — SODIUM CHLORIDE 1000 ML: 9 INJECTION, SOLUTION INTRAVENOUS at 06:34

## 2022-10-07 RX ADMIN — HYDROMORPHONE HYDROCHLORIDE 0.5 MG: 1 INJECTION, SOLUTION INTRAMUSCULAR; INTRAVENOUS; SUBCUTANEOUS at 09:09

## 2022-10-07 RX ADMIN — ONDANSETRON 4 MG: 2 INJECTION INTRAMUSCULAR; INTRAVENOUS at 06:39

## 2022-10-07 ASSESSMENT — ENCOUNTER SYMPTOMS
VOMITING: 1
CHILLS: 1
NAUSEA: 1
FATIGUE: 1
FLANK PAIN: 1

## 2022-10-07 ASSESSMENT — ACTIVITIES OF DAILY LIVING (ADL)
ADLS_ACUITY_SCORE: 35
ADLS_ACUITY_SCORE: 35

## 2022-10-07 NOTE — ED PROVIDER NOTES
EMERGENCY DEPARTMENT ENCOUNTER      NAME: Jacqueline Pappas  AGE: 37 year old female  YOB: 1985  MRN: [id number]  EVALUATION DATE & TIME: 10/7/2022  5:51 AM    PCP: Pao Montague    ED PROVIDER: Edmundo Rsoario M.D.      Chief Complaint   Patient presents with     Back Pain     Right         FINAL IMPRESSION:  1. Right flank pain    2. Nausea and vomiting, unspecified vomiting type          ED COURSE & MEDICAL DECISION MAKING:    Pertinent Labs & Imaging studies reviewed. (See chart for details)  ED Course as of 10/07/22 1017   Fri Oct 07, 2022   0633 Patient is a 37-year-old woman with history of chronic kidney infections, flank pain, status post right nephrostomy tube, here with persistent pain over the past couple of days, nausea vomiting since this morning.  She is afebrile, blood pressure is in the 1 10-1 15 range systolic.  Heart rate is about 108.  She is uncomfortable appearing but not toxic.  She is a benign abdomen, nephrostomy drainage is clear, straw-colored.  Differential includes pyelonephritis, cystitis, kidney stone, dislodged nephrostomy tube.   0637 Ultrasound of the right kidney ordered.  Based on the last CT scan there is no hydronephrosis, and hopefully there is no hydronephrosis seen again.  Otherwise she may need a CT scan to evaluate for stone or displacement of the nephrostomy tube.   0718 CRP Inflammation(!): 7.80   0737 Creatinine: 0.95   0828 US Kidney Right  Minimal right hydronephrosis.   1007 UA with Microscopic reflex to Culture(!)  Not c/w UTI     I discussed the reassuring results of her.  She has mild CRP elevation, but her urinalysis looks clean within the prior few samples, none of which have grown a definitive organism.  If urine culture is positive for single organism greater than 100,000 CFU then I will recommend she is treated with Omnicef, but until then no antibiotics.  She has chronic pain in this area, unclear if it is from her stricture, or complex  regional pain syndrome has developed, regardless we discussed the importance of establishing a single outpatient provider to give her pain medication.  She was very understanding about this.  I said that is not a good idea to get repeat prescriptions for the oxycodone for department.    She will follow-up with her primary care and urologist.  Discharged with Tylenol, Robaxin, Zofran.      Additional ED Course Timestamps:  6:33 AM I met with patient for initial interview and exam. PPE includes N95 and gloves.  8:08 AM Per RN, patient requesting more pain medications.  10:09 AM I updated the patient on lab results and imaging results. We discussed plans for discharge including supportive cares, symptomatic treatment, outpatient follow up, and reasons to return to the emergency department.     Medical Decision Making    Supplemental history from: N/A    External Record(s) Reviewed: Outpatient Record: clinic and ED visits    Differential Diagnosis: See MDM charting for differential considered.     I performed an independent interpretation of the: ultrasound    Discussed with radiology regarding test interpretation: N/A    Discussion of management with another provider: N/A    The following testing was considered but ultimately not selected: None    I considered prescription management with: N/A    The patient's care impacted: None    Consideration of Admission/Observation: no    Care significantly affected by Social Determinants of Health including: N/A  At the conclusion of the encounter I discussed the results of all of the tests and the disposition. The questions were answered. The patient or family acknowledged understanding and was agreeable with the care plan.           MEDICATIONS GIVEN IN THE EMERGENCY:  Medications   0.9% sodium chloride BOLUS (1,000 mLs Intravenous New Bag 10/7/22 0634)   ondansetron (ZOFRAN) injection 4 mg (4 mg Intravenous Given 10/7/22 0639)   HYDROmorphone (PF) (DILAUDID) injection 0.5 mg  (0.5 mg Intravenous Given 10/7/22 0639)   HYDROmorphone (PF) (DILAUDID) injection 0.5 mg (0.5 mg Intravenous Given 10/7/22 0909)         NEW PRESCRIPTIONS STARTED AT TODAY'S ER VISIT  New Prescriptions    METHOCARBAMOL (ROBAXIN) 750 MG TABLET    Take 1 tablet (750 mg) by mouth 3 times daily as needed for muscle spasms    ONDANSETRON (ZOFRAN ODT) 4 MG ODT TAB    Take 1 tablet (4 mg) by mouth every 6 hours as needed for nausea          =================================================================    HPI    Patient information was obtained from: patient     Use of : N/A       Jacqueline Pappas is a 37 year old female with a pertinent history of pyelonephritis and s/p right nephrostomy tube who presents to this ED for evaluation of flank pain.    Per chart review, patient has been seen multiple times in the ED for flank pain. Most recently, she was seen on 10/4 at [facility] for flank pain. Patient reported that she had right flank pain, weakness, nausea, vomiting, and diarrhea for about a week. Was on cephalexin for kidney infection. Patient given IV compazine and oral dilaudid. Patient left prior to finishing exam.     Patient continues to have right flank pain, nausea, vomiting, chills, and fatigue. She has the right nephrostomy tube in due to a blockage. Has had her tube dislodged in the past but this does not feel the same. Patient is able to urinate some. She follows at [facility] and has an appointment to exchange her tube on 10/20. Denies any other complaints.    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             REVIEW OF SYSTEMS   Review of Systems   Constitutional: Positive for chills and fatigue.   Gastrointestinal: Positive for nausea and vomiting.   Genitourinary: Positive for flank pain (right).   All other systems reviewed and are negative.       PAST MEDICAL HISTORY:  Past Medical History:   Diagnosis Date     Acute renal failure (H)      Anemia      Anemia      Calculus of  kidney      Congenital absence of left kidney      Congenital absence of one kidney      Depression      Depression      Hydronephrosis      Kidney stone     at age 29     Pyelonephritis      Pyelonephritis      Smoker      Ureteral stricture      UTI (urinary tract infection)      Wrist fracture     Left       PAST SURGICAL HISTORY:  Past Surgical History:   Procedure Laterality Date      SECTION        SECTION      x1     COMBINED CYSTOSCOPY, RETROGRADES, URETEROSCOPY, LASER HOLMIUM LITHOTRIPSY URETER(S), INSERT STENT Right 2016    Procedure: COMBINED CYSTOSCOPY, RETROGRADES, URETEROSCOPY, LASER HOLMIUM LITHOTRIPSY URETER(S), INSERT STENT;  Surgeon: Florentino Awad MD;  Location: [facility] OR     CYSTOSCOPY, URETEROSCOPY, COMBINED Right 2016    Procedure: COMBINED CYSTOSCOPY, URETEROSCOPY;  Surgeon: Florentino Awad MD;  Location: [facility] OR     IR NEPHROLITHOTOMY  2014     IR NEPHROSTOGRAM EXISITING ACCESS  10/18/2018     IR NEPHROSTOMY TUBE CHANGE RIGHT  2018     IR NEPHROSTOMY TUBE CHANGE RIGHT  2020     IR NEPHROSTOMY TUBE CHANGE RIGHT  2018     IR NEPHROSTOMY TUBE CHANGE RIGHT  2020     IR NEPHROSTOMY TUBE PLACEMENT RIGHT  2016     IR NEPHROSTOMY TUBE PLACEMENT RIGHT  2017     KIDNEY STONE SURGERY Right 2016     LASER HOLMIUM LITHOTRIPSY URETER(S), INSERT STENT, COMBINED Left 2016    Procedure: COMBINED CYSTOSCOPY, URETEROSCOPY, LASER HOLMIUM LITHOTRIPSY URETER(S), INSERT STENT;  Surgeon: Florentino Awad MD;  Location: [facility] OR     LASER HOLMIUM NEPHROLITHOTOMY VIA PERCUTANEOUS NEPHROSTOMY Right 2016    Procedure: LASER HOLMIUM NEPHROLITHOTOMY VIA PERCUTANEOUS NEPHROSTOMY;  Surgeon: Florentino Awad MD;  Location: [facility] OR     NEPHROSTOMY W/ INTRODUCTION OF CATHETER Right      OTHER SURGICAL HISTORY      Mole removednasal     OTHER SURGICAL HISTORY Right     Cystoscopy, ureteroscopy, laser11/02/2014 x2 with ureteral stent  insertion     PERCUTANEOUS NEPHROSTOMY  11/1/2016    Procedure: PERCUTANEOUS NEPHROSTOMY;  Surgeon: Florentino Awad MD;  Location: [facility] OR     WISDOM TOOTH EXTRACTION       ZZC REMOVAL OF KIDNEY STONE             CURRENT MEDICATIONS:    No current facility-administered medications for this encounter.     Current Outpatient Medications   Medication     methocarbamol (ROBAXIN) 750 MG tablet     ondansetron (ZOFRAN ODT) 4 MG ODT tab     albuterol (ACCUNEB) 1.25 MG/3ML neb solution     sertraline (ZOLOFT) 100 MG tablet     SPRINTEC 28 0.25-35 MG-MCG tablet       ALLERGIES:  Allergies   Allergen Reactions     Vancomycin      Desogestrel-Ethinyl Estradiol Angioedema and Swelling     Swelling of hands and feet per pt.  Swelling of hands and feet per pt.  Swelling of hands and feet per pt.  Swelling of hands and feet per pt.  Swelling of hands and feet per pt.  Swelling of hands and feet per pt.  Swelling of hands and feet per pt.  Swelling of hands and feet per pt.       Penicillins Rash     As a child per mother; mother unsure if has tolerated another PCN since. Tolerated ampicillin 9/20/16     Sulfa Drugs Rash       FAMILY HISTORY:  Family History   Problem Relation Age of Onset     Anesthesia Reaction No family hx of      Malignant Hyperthermia No family hx of      Heart Disease Maternal Uncle         heart failure     Coronary Artery Disease Other      Heart Disease Maternal Grandmother         pacemaker     Gout Paternal Grandfather      Prostate Cancer Maternal Aunt         breast     Heart Disease Maternal Aunt         pacemaker     Heart Disease Maternal Aunt         pacemaker     Heart Disease Maternal Aunt         pacemaker       SOCIAL HISTORY:   Social History     Socioeconomic History     Marital status: Single     Spouse name: None     Number of children: None     Years of education: None     Highest education level: None   Tobacco Use     Smoking status: Current Every Day Smoker     Packs/day: 0.50      Years: 10.00     Pack years: 5.00     Last attempt to quit: 2016     Years since quittin.0     Smokeless tobacco: Former User     Quit date: 2016     Tobacco comment: has information from last admission.   Substance and Sexual Activity     Alcohol use: Yes     Alcohol/week: 0.0 standard drinks     Comment: Alcoholic Drinks/day: occ     Drug use: No       VITALS:  BP 96/58   Pulse 76   Temp 98.2  F (36.8  C) (Oral)   Resp 16   Ht 1.524 m (5')   Wt 81.6 kg (180 lb)   LMP 2022   SpO2 96%   BMI 35.15 kg/m      PHYSICAL EXAM    Constitutional: Well developed, well nourished. Uncomfortable appearing, holding emesis bag  HENT: Normocephalic, atraumatic, mucous membranes moist, nose normal. Neck- Supple, gross ROM intact.   Eyes: Pupils mid-range, conjunctiva without injection, no discharge.   Respiratory: Clear to auscultation bilaterally, no respiratory distress, no wheezing, speaks full sentences easily. No cough.  Cardiovascular: Normal heart rate, regular rhythm, no murmurs.   GI: Soft, no tenderness to deep palpation in all quadrants, no masses. Right nephrostomy tube in place draining clear, straw colored urine.  Musculoskeletal: Moving all 4 extremities intentionally and without pain. No obvious deformity.  Skin: Warm, dry, no rash.  Neurologic: Alert & oriented x 3, cranial nerves grossly intact.  Psychiatric: Affect normal, cooperative.       LAB:  All pertinent labs reviewed and interpreted.  Labs Ordered and Resulted from Time of ED Arrival to Time of ED Departure   CBC WITH PLATELETS - Abnormal       Result Value    WBC Count 6.2      RBC Count 4.73      Hemoglobin 14.2      Hematocrit 42.7      MCV 90      MCH 30.0      MCHC 33.3      RDW 13.2      Platelet Count 146 (*)    BASIC METABOLIC PANEL - Abnormal    Sodium 135 (*)     Potassium 4.0      Chloride 101      Carbon Dioxide (CO2) 22      Anion Gap 12      Urea Nitrogen 11.7      Creatinine 0.95      Calcium 9.5      Glucose 105  (*)     GFR Estimate 80     ROUTINE UA WITH MICROSCOPIC REFLEX TO CULTURE - Abnormal    Color Urine Light Yellow      Appearance Urine Clear      Glucose Urine Negative      Bilirubin Urine Negative      Ketones Urine Negative      Specific Gravity Urine 1.016      Blood Urine 0.1 mg/dL (*)     pH Urine 7.0      Protein Albumin Urine 70  (*)     Urobilinogen Urine <2.0      Nitrite Urine Negative      Leukocyte Esterase Urine 500 Duglas/uL (*)     Mucus Urine Present (*)     RBC Urine 4 (*)     WBC Urine 7 (*)     Squamous Epithelials Urine 1     CRP INFLAMMATION - Abnormal    CRP Inflammation 7.80 (*)    URINE CULTURE       RADIOLOGY:  Reviewed all pertinent imaging. Please see official radiology report.  US Kidney Right   Final Result   IMPRESSION:   Minimal right hydronephrosis.          EKG:    All EKG interpretations will be found in ED course above.        I, Randi Babcock am serving as a scribe to document services personally performed by Dr. Edmundo Rosario based on my observation and the provider's statements to me. I, Edmundo Rosario MD attest that Randi Babcock is acting in a scribe capacity, has observed my performance of the services and has documented them in accordance with my direction.    Edmundo Rosario M.D.  Emergency Medicine  [facility] EMERGENCY DEPARTMENT  [address] [id number] [phone]  Dept: [phone]       Edmundo Rosario MD  10/07/22 3741

## 2022-10-07 NOTE — ED NOTES
"Patient is notified she cannot drive after receiving narcotics in ER. Patient reports she has arranged transportation home upon discharge, which will be provided by her sister. Patient says \"my sister a germaphobe so she probably won't come back here.\" Updated Dr. Rosario who states he is comfortable having patient meet her sister outside when discharged.  "

## 2022-10-07 NOTE — DISCHARGE INSTRUCTIONS
Please reach out to 2 separate people, first your primary care doctor to set up an appointment for your ongoing pain.  Second contact your urology clinic and let them know that he had to be seen in the emergency department a few times in the past couple of weeks.  It is important both these clinicians see you soon as they can to help address the underlying cause and help keep your pain under control.    In the meantime, use Tylenol scheduled for the next couple days, 1000 mg 3 times a day.  On top of that you can use heat packs, Zofran and the muscle relaxers.

## 2022-10-07 NOTE — Clinical Note
Jacqueline Pappas was seen and treated in our emergency department on 10/7/2022.  She may return to work on 10/08/2022.       If you have any questions or concerns, please don't hesitate to call.      Edmundo Rosario MD

## 2022-10-07 NOTE — ED TRIAGE NOTES
Pt c/o right back pain for a couple days. Pt woke this am with nausea/vomitting. Pt only has one kidney--right side with nephrostomy tube.

## 2022-10-08 LAB — BACTERIA UR CULT: NORMAL

## 2022-11-02 ENCOUNTER — HOSPITAL ENCOUNTER (EMERGENCY)
Facility: CLINIC | Age: 37
Discharge: HOME | End: 2022-11-03
Attending: EMERGENCY MEDICINE | Admitting: EMERGENCY MEDICINE
Payer: COMMERCIAL

## 2022-11-02 ENCOUNTER — APPOINTMENT (OUTPATIENT)
Dept: ULTRASOUND IMAGING | Facility: CLINIC | Age: 37
End: 2022-11-02
Attending: EMERGENCY MEDICINE
Payer: COMMERCIAL

## 2022-11-02 DIAGNOSIS — G89.29 RIGHT FLANK PAIN, CHRONIC: ICD-10-CM

## 2022-11-02 DIAGNOSIS — R11.2 NAUSEA AND VOMITING, UNSPECIFIED VOMITING TYPE: ICD-10-CM

## 2022-11-02 DIAGNOSIS — R10.9 RIGHT FLANK PAIN, CHRONIC: ICD-10-CM

## 2022-11-02 LAB
ALBUMIN SERPL-MCNC: 3.8 G/DL (ref 3.5–5)
ALBUMIN UR-MCNC: 100 MG/DL
ALP SERPL-CCNC: 80 U/L (ref 45–120)
ALT SERPL W P-5'-P-CCNC: 22 U/L (ref 0–45)
ANION GAP SERPL CALCULATED.3IONS-SCNC: 9 MMOL/L (ref 5–18)
APPEARANCE UR: ABNORMAL
AST SERPL W P-5'-P-CCNC: 23 U/L (ref 0–40)
BACTERIA #/AREA URNS HPF: ABNORMAL /HPF
BILIRUB SERPL-MCNC: 0.6 MG/DL (ref 0–1)
BILIRUB UR QL STRIP: NEGATIVE
BUN SERPL-MCNC: 8 MG/DL (ref 8–22)
CALCIUM SERPL-MCNC: 10 MG/DL (ref 8.5–10.5)
CHLORIDE BLD-SCNC: 105 MMOL/L (ref 98–107)
CO2 SERPL-SCNC: 21 MMOL/L (ref 22–31)
COLOR UR AUTO: ABNORMAL
CREAT SERPL-MCNC: 0.95 MG/DL (ref 0.6–1.1)
GFR SERPL CREATININE-BSD FRML MDRD: 80 ML/MIN/1.73M2
GLUCOSE BLD-MCNC: 92 MG/DL (ref 70–125)
GLUCOSE UR STRIP-MCNC: NEGATIVE MG/DL
HGB UR QL STRIP: ABNORMAL
KETONES UR STRIP-MCNC: NEGATIVE MG/DL
LACTATE SERPL-SCNC: 0.6 MMOL/L (ref 0.7–2)
LEUKOCYTE ESTERASE UR QL STRIP: ABNORMAL
MUCOUS THREADS #/AREA URNS LPF: PRESENT /LPF
NITRATE UR QL: NEGATIVE
PH UR STRIP: 7 [PH] (ref 5–7)
POTASSIUM BLD-SCNC: 3.8 MMOL/L (ref 3.5–5)
PROT SERPL-MCNC: 6.9 G/DL (ref 6–8)
RBC URINE: 19 /HPF
SODIUM SERPL-SCNC: 135 MMOL/L (ref 136–145)
SP GR UR STRIP: 1.01 (ref 1–1.03)
SQUAMOUS EPITHELIAL: 2 /HPF
UROBILINOGEN UR STRIP-MCNC: <2 MG/DL
WBC URINE: 17 /HPF

## 2022-11-02 PROCEDURE — 81001 URINALYSIS AUTO W/SCOPE: CPT | Performed by: EMERGENCY MEDICINE

## 2022-11-02 PROCEDURE — 87086 URINE CULTURE/COLONY COUNT: CPT | Performed by: EMERGENCY MEDICINE

## 2022-11-02 PROCEDURE — 258N000003 HC RX IP 258 OP 636: Performed by: EMERGENCY MEDICINE

## 2022-11-02 PROCEDURE — 250N000013 HC RX MED GY IP 250 OP 250 PS 637: Performed by: EMERGENCY MEDICINE

## 2022-11-02 PROCEDURE — 96374 THER/PROPH/DIAG INJ IV PUSH: CPT

## 2022-11-02 PROCEDURE — 83605 ASSAY OF LACTIC ACID: CPT | Performed by: EMERGENCY MEDICINE

## 2022-11-02 PROCEDURE — 36415 COLL VENOUS BLD VENIPUNCTURE: CPT | Performed by: EMERGENCY MEDICINE

## 2022-11-02 PROCEDURE — 76775 US EXAM ABDO BACK WALL LIM: CPT

## 2022-11-02 PROCEDURE — 250N000011 HC RX IP 250 OP 636: Performed by: EMERGENCY MEDICINE

## 2022-11-02 PROCEDURE — 80053 COMPREHEN METABOLIC PANEL: CPT | Performed by: EMERGENCY MEDICINE

## 2022-11-02 PROCEDURE — 99285 EMERGENCY DEPT VISIT HI MDM: CPT | Mod: 25

## 2022-11-02 PROCEDURE — 96361 HYDRATE IV INFUSION ADD-ON: CPT

## 2022-11-02 RX ORDER — HYDROCODONE BITARTRATE AND ACETAMINOPHEN 5; 325 MG/1; MG/1
1 TABLET ORAL ONCE
Status: COMPLETED | OUTPATIENT
Start: 2022-11-02 | End: 2022-11-02

## 2022-11-02 RX ORDER — ONDANSETRON 2 MG/ML
4 INJECTION INTRAMUSCULAR; INTRAVENOUS ONCE
Status: COMPLETED | OUTPATIENT
Start: 2022-11-02 | End: 2022-11-02

## 2022-11-02 RX ORDER — SODIUM CHLORIDE 9 MG/ML
INJECTION, SOLUTION INTRAVENOUS CONTINUOUS
Status: DISCONTINUED | OUTPATIENT
Start: 2022-11-02 | End: 2022-11-03 | Stop reason: HOSPADM

## 2022-11-02 RX ADMIN — SODIUM CHLORIDE 1000 ML: 9 INJECTION, SOLUTION INTRAVENOUS at 22:28

## 2022-11-02 RX ADMIN — HYDROCODONE BITARTRATE AND ACETAMINOPHEN 1 TABLET: 5; 325 TABLET ORAL at 23:26

## 2022-11-02 RX ADMIN — ONDANSETRON 4 MG: 2 INJECTION INTRAMUSCULAR; INTRAVENOUS at 22:30

## 2022-11-02 ASSESSMENT — ACTIVITIES OF DAILY LIVING (ADL): ADLS_ACUITY_SCORE: 35

## 2022-11-03 VITALS
DIASTOLIC BLOOD PRESSURE: 53 MMHG | SYSTOLIC BLOOD PRESSURE: 92 MMHG | BODY MASS INDEX: 35.34 KG/M2 | HEART RATE: 68 BPM | TEMPERATURE: 97.7 F | WEIGHT: 180 LBS | RESPIRATION RATE: 16 BRPM | HEIGHT: 60 IN | OXYGEN SATURATION: 97 %

## 2022-11-03 PROBLEM — R11.2 NAUSEA AND VOMITING, UNSPECIFIED VOMITING TYPE: Status: ACTIVE | Noted: 2022-11-03

## 2022-11-03 PROCEDURE — 96375 TX/PRO/DX INJ NEW DRUG ADDON: CPT

## 2022-11-03 PROCEDURE — 250N000011 HC RX IP 250 OP 636: Performed by: EMERGENCY MEDICINE

## 2022-11-03 RX ORDER — PROCHLORPERAZINE MALEATE 10 MG
10 TABLET ORAL EVERY 6 HOURS PRN
Qty: 10 TABLET | Refills: 0 | Status: SHIPPED | OUTPATIENT
Start: 2022-11-03 | End: 2023-05-12

## 2022-11-03 RX ADMIN — PROCHLORPERAZINE EDISYLATE 10 MG: 5 INJECTION INTRAMUSCULAR; INTRAVENOUS at 00:01

## 2022-11-03 ASSESSMENT — ACTIVITIES OF DAILY LIVING (ADL): ADLS_ACUITY_SCORE: 35

## 2022-11-03 NOTE — ED NOTES
EMERGENCY DEPARTMENT SIGN OUT NOTE        ED COURSE AND MEDICAL DECISION MAKING  Patient was signed out to me by Dr Beto Medellin at 12:11 AM.    In brief, Jacqueline Pappas is a 37 year old female who initially presented  for evaluation of flank pain. Patient started having right flank pain Monday that has gotten worse today. Patient had a couple episodes of emesis today. Patient also has chills. Patient denies fever.      At time of sign out, disposition was pending reevaluation and discharge.    Patient discharge.    FINAL IMPRESSION    1. Nausea and vomiting, unspecified vomiting type    2. Right flank pain, chronic        ED MEDS  Medications   0.9% sodium chloride BOLUS (1,000 mLs Intravenous New Bag 11/2/22 2228)     Followed by   sodium chloride 0.9% infusion (has no administration in time range)   ondansetron (ZOFRAN) injection 4 mg (4 mg Intravenous Given 11/2/22 2230)   HYDROcodone-acetaminophen (NORCO) 5-325 MG per tablet 1 tablet (1 tablet Oral Given 11/2/22 2326)   prochlorperazine (COMPAZINE) injection 10 mg (10 mg Intravenous Given 11/3/22 0001)       LAB  Labs Ordered and Resulted from Time of ED Arrival to Time of ED Departure   ROUTINE UA WITH MICROSCOPIC REFLEX TO CULTURE - Abnormal       Result Value    Color Urine Light Yellow      Appearance Urine Turbid (*)     Glucose Urine Negative      Bilirubin Urine Negative      Ketones Urine Negative      Specific Gravity Urine 1.014      Blood Urine 0.06 mg/dL (*)     pH Urine 7.0      Protein Albumin Urine 100 (*)     Urobilinogen Urine <2.0      Nitrite Urine Negative      Leukocyte Esterase Urine 500 Duglas/uL (*)     Bacteria Urine Few (*)     Mucus Urine Present (*)     RBC Urine 19 (*)     WBC Urine 17 (*)     Squamous Epithelials Urine 2 (*)    COMPREHENSIVE METABOLIC PANEL - Abnormal    Sodium 135 (*)     Potassium 3.8      Chloride 105      Carbon Dioxide (CO2) 21 (*)     Anion Gap 9      Urea Nitrogen 8      Creatinine 0.95      Calcium 10.0       Glucose 92      Alkaline Phosphatase 80      AST 23      ALT 22      Protein Total 6.9      Albumin 3.8      Bilirubin Total 0.6      GFR Estimate 80     LACTIC ACID WHOLE BLOOD - Abnormal    Lactic Acid 0.6 (*)    URINE CULTURE       EKG      RADIOLOGY    US Kidney Right   Final Result   IMPRESSION:   No hydronephrosis.          DISCHARGE MEDS  New Prescriptions    PROCHLORPERAZINE (COMPAZINE) 10 MG TABLET    Take 1 tablet (10 mg) by mouth every 6 hours as needed for nausea or vomiting       Ranjit Rojas MD  [facility] EMERGENCY ROOM  [address] [id number] [phone]     Ranjit Rojas MD  11/03/22 8342

## 2022-11-03 NOTE — ED PROVIDER NOTES
EMERGENCY DEPARTMENT ENCOUNTER      NAME: Jacqueline Pappas  AGE: 37 year old female  YOB: 1985  MRN: [id number]  EVALUATION DATE & TIME: 11/2/2022  9:18 PM    PCP: Pao Montague    ED PROVIDER: Beto Medellin M.D.      Chief Complaint   Patient presents with     Flank Pain         FINAL IMPRESSION:  Back pain  Vomiting      ED COURSE & MEDICAL DECISION MAKING:    Pertinent Labs & Imaging studies reviewed. (See chart for details)  37 year old female presents to the Emergency Department for reevaluation of right flank pain.  Patient with chronic intermittent right flank pain.  Patient with long-term indwelling nephrostomy tube secondary to solitary kidney and distal obstruction.  Nephrostomy tube was replaced 2 weeks ago per routine.  Patient reports onset of discomfort the last few days with vomiting today.  On exam she is an obese female in mild distress.  Vital signs normal.  Mild right upper quadrant tenderness.  Tenderness to light touch surrounding the stoma.  However no induration, erythema, discharge.  Patient be treated symptomatically with Zofran.  Oral pain medications will be offered thereafter.  Labs sent for evaluation assess for infectious process.  Urine obtained from the triage area without overt evidence of infection.  Review of records indicate multiple prior evaluations for similar symptomatology.  Last positive urine culture dated back to July.. Patient appears non toxic with stable vitals signs.     9:39 PM  I met with the patient for the initial interview and physical examination. Discussed plan for treatment and workup in the ED.    11:15 PM.  Lactic acid normal at 2.6.  Sodium minimally reduced at 135.  Bicarb minimally reduced at 21.  Awaiting ultrasound results.  11:54 PM.  Ultrasound is unremarkable.  No evidence of hydronephrosis.  Patient informed of findings.  She reports continued discomfort and another episode of vomiting.  We will proceed with Compazine for additional  control of her nausea vomiting.  Hydrocodone offered for discomfort.  Recommendations are follow-up with her primary care physician for management of her chronic back pain.    At the conclusion of the encounter I discussed the results of all of the tests and the disposition. The questions were answered and return precautions provided. The patient or family acknowledged understanding and was agreeable with the care plan.       PPE: Provider wore gloves, N95 mask, eye protection     MEDICATIONS GIVEN IN THE EMERGENCY:  Medications   0.9% sodium chloride BOLUS (has no administration in time range)     Followed by   sodium chloride 0.9% infusion (has no administration in time range)   ondansetron (ZOFRAN) injection 4 mg (has no administration in time range)       NEW PRESCRIPTIONS STARTED AT TODAY'S ER VISIT  New Prescriptions    No medications on file          =================================================================    HPI    Patient information was obtained from: Patient    Use of Intrepreter: N/A        Jacqueline Pappas is a 37 year old female with a pertient medical history of acute renal failure, anemia, calculus of kidney, Hydronephrosis,   who presents to the ED for evaluation of flank pain.    Patient started having right fla nk pain on Monday that has gotten worse today. Patient had a couple episodes of emesis today. Patient also has chills. Patient denies fever.       REVIEW OF SYSTEMS   Constitutional:  Denies fever. Positive for chills.   Respiratory:  Denies productive cough or increased work of breathing  Cardiovascular:  Denies chest pain, palpitations  GI:  Denies change in bowel or bladder habits . Positive for  nausea, vomiting, right flank pain, and abdominal pain.   Musculoskeletal:  Denies any new muscle/joint swelling  Skin:  Denies rash   Neurologic:  Denies focal weakness  All systems negative except as marked.     PAST MEDICAL HISTORY:  Past Medical History:   Diagnosis Date     Acute  renal failure (H)      Anemia      Anemia      Calculus of kidney      Congenital absence of left kidney      Congenital absence of one kidney      Depression      Depression      Hydronephrosis      Kidney stone     at age 29     Pyelonephritis      Pyelonephritis      Smoker      Ureteral stricture      UTI (urinary tract infection)      Wrist fracture     Left       PAST SURGICAL HISTORY:  Past Surgical History:   Procedure Laterality Date      SECTION        SECTION      x1     COMBINED CYSTOSCOPY, RETROGRADES, URETEROSCOPY, LASER HOLMIUM LITHOTRIPSY URETER(S), INSERT STENT Right 2016    Procedure: COMBINED CYSTOSCOPY, RETROGRADES, URETEROSCOPY, LASER HOLMIUM LITHOTRIPSY URETER(S), INSERT STENT;  Surgeon: Florentino Awad MD;  Location: [facility] OR     CYSTOSCOPY, URETEROSCOPY, COMBINED Right 2016    Procedure: COMBINED CYSTOSCOPY, URETEROSCOPY;  Surgeon: Florentino Awad MD;  Location: [facility] OR     IR NEPHROLITHOTOMY  2014     IR NEPHROSTOGRAM EXISITING ACCESS  10/18/2018     IR NEPHROSTOMY TUBE CHANGE RIGHT  2018     IR NEPHROSTOMY TUBE CHANGE RIGHT  2020     IR NEPHROSTOMY TUBE CHANGE RIGHT  2018     IR NEPHROSTOMY TUBE CHANGE RIGHT  2020     IR NEPHROSTOMY TUBE PLACEMENT RIGHT  2016     IR NEPHROSTOMY TUBE PLACEMENT RIGHT  2017     KIDNEY STONE SURGERY Right 2016     LASER HOLMIUM LITHOTRIPSY URETER(S), INSERT STENT, COMBINED Left 2016    Procedure: COMBINED CYSTOSCOPY, URETEROSCOPY, LASER HOLMIUM LITHOTRIPSY URETER(S), INSERT STENT;  Surgeon: Florentino Awad MD;  Location: [facility] OR     LASER HOLMIUM NEPHROLITHOTOMY VIA PERCUTANEOUS NEPHROSTOMY Right 2016    Procedure: LASER HOLMIUM NEPHROLITHOTOMY VIA PERCUTANEOUS NEPHROSTOMY;  Surgeon: Florentino Awad MD;  Location: [facility] OR     NEPHROSTOMY W/ INTRODUCTION OF CATHETER Right      OTHER SURGICAL HISTORY      Mole removednasal     OTHER SURGICAL HISTORY Right     Cystoscopy,  ureteroscopy, laser11/02/2014 x2 with ureteral stent insertion     PERCUTANEOUS NEPHROSTOMY  11/1/2016    Procedure: PERCUTANEOUS NEPHROSTOMY;  Surgeon: Florentino Awad MD;  Location: [facility] OR     WISDOM TOOTH EXTRACTION       ZZC REMOVAL OF KIDNEY STONE           CURRENT MEDICATIONS:      Current Facility-Administered Medications:      0.9% sodium chloride BOLUS, 1,000 mL, Intravenous, Once **FOLLOWED BY** sodium chloride 0.9% infusion, , Intravenous, Continuous, Beto Medellin MD     ondansetron (ZOFRAN) injection 4 mg, 4 mg, Intravenous, Once, Beto Medellin MD    Current Outpatient Medications:      albuterol (ACCUNEB) 1.25 MG/3ML neb solution, Take 1.25 mg by nebulization every 6 hours as needed for shortness of breath / dyspnea or wheezing, Disp: , Rfl:      methocarbamol (ROBAXIN) 750 MG tablet, Take 1 tablet (750 mg) by mouth 3 times daily as needed for muscle spasms, Disp: 30 tablet, Rfl: 0     ondansetron (ZOFRAN ODT) 4 MG ODT tab, Take 1 tablet (4 mg) by mouth every 6 hours as needed for nausea, Disp: 10 tablet, Rfl: 0     sertraline (ZOLOFT) 100 MG tablet, Take 150 mg by mouth At Bedtime, Disp: , Rfl:      SPRINTEC 28 0.25-35 MG-MCG tablet, Take 1 tablet by mouth daily, Disp: , Rfl:     ALLERGIES:  Allergies   Allergen Reactions     Vancomycin      Desogestrel-Ethinyl Estradiol Angioedema and Swelling     Swelling of hands and feet per pt.  Swelling of hands and feet per pt.  Swelling of hands and feet per pt.  Swelling of hands and feet per pt.  Swelling of hands and feet per pt.  Swelling of hands and feet per pt.  Swelling of hands and feet per pt.  Swelling of hands and feet per pt.       Penicillins Rash     As a child per mother; mother unsure if has tolerated another PCN since. Tolerated ampicillin 9/20/16     Sulfa Drugs Rash       FAMILY HISTORY:  Family History   Problem Relation Age of Onset     Anesthesia Reaction No family hx of      Malignant Hyperthermia No family hx of      Heart  Disease Maternal Uncle         heart failure     Coronary Artery Disease Other      Heart Disease Maternal Grandmother         pacemaker     Gout Paternal Grandfather      Prostate Cancer Maternal Aunt         breast     Heart Disease Maternal Aunt         pacemaker     Heart Disease Maternal Aunt         pacemaker     Heart Disease Maternal Aunt         pacemaker       SOCIAL HISTORY:   Social History     Socioeconomic History     Marital status: Single     Spouse name: None     Number of children: None     Years of education: None     Highest education level: None   Tobacco Use     Smoking status: Every Day     Packs/day: 0.50     Years: 10.00     Pack years: 5.00     Types: Cigarettes     Last attempt to quit: 2016     Years since quittin.1     Smokeless tobacco: Former     Quit date: 2016     Tobacco comments:     has information from last admission.   Substance and Sexual Activity     Alcohol use: Yes     Alcohol/week: 0.0 standard drinks     Comment: Alcoholic Drinks/day: occ     Drug use: No       VITALS:  Patient Vitals for the past 24 hrs:   BP Temp Temp src Pulse Resp SpO2 Height Weight   22 116/89 97.5  F (36.4  C) Oral 80 20 98 % 1.524 m (5') 81.6 kg (180 lb)        PHYSICAL EXAM    Constitutional:  Awake, alert, in no apparent distress. Obese  HENT:  Normocephalic, Atraumatic. Bilateral external ears normal. Oropharynx moist. Nose normal. Neck- Normal range of motion with no guarding, No midline cervical tenderness, Supple, No stridor.   Eyes:  PERRL, EOMI with no signs of entrapment, Conjunctiva normal, No discharge.   Respiratory:  Normal breath sounds, No respiratory distress, No wheezing.    Cardiovascular:  Normal heart rate, Normal rhythm, No appreciable rubs or gallops.   GI:   No distension, No palpable masses. RUQ tenderness. Nephrostomy site tenderness.   Musculoskeletal:  Intact distal pulses, No edema. Good range of motion in all major joints. No tenderness to  palpation or major deformities noted.  Integument:  Warm, Dry, No erythema, No rash.   Neurologic:  Alert & oriented, Normal motor function, Normal sensory function, No focal deficits noted.   Psychiatric:  Affect normal, Judgment normal, Mood normal.     LAB:  All pertinent labs reviewed and interpreted.  Results for orders placed or performed during the hospital encounter of 11/02/22   US Kidney Right     Status: None    Narrative    EXAM: US KIDNEY RIGHT  LOCATION: [facility]  DATE/TIME: 11/2/2022 11:29 PM    INDICATION: Right nephrostomy tube, increased pain  COMPARISON: None.  TECHNIQUE: Routine Right Renal and Bladder Ultrasound.    FINDINGS:    RIGHT KIDNEY: 9.4 x 5.6 x 5.9 cm. Nephrostomy tube in the lower pole. No hydronephrosis. Multiple parapelvic cysts.     BLADDER: Empty.      Impression    IMPRESSION:  No hydronephrosis.   UA with Microscopic reflex to Culture     Status: Abnormal    Specimen: Urine, Midstream   Result Value Ref Range    Color Urine Light Yellow Colorless, Straw, Light Yellow, Yellow    Appearance Urine Turbid (A) Clear    Glucose Urine Negative Negative mg/dL    Bilirubin Urine Negative Negative    Ketones Urine Negative Negative mg/dL    Specific Gravity Urine 1.014 1.001 - 1.030    Blood Urine 0.06 mg/dL (A) Negative    pH Urine 7.0 5.0 - 7.0    Protein Albumin Urine 100 (A) Negative mg/dL    Urobilinogen Urine <2.0 <2.0 mg/dL    Nitrite Urine Negative Negative    Leukocyte Esterase Urine 500 Duglas/uL (A) Negative    Bacteria Urine Few (A) None Seen /HPF    Mucus Urine Present (A) None Seen /LPF    RBC Urine 19 (H) <=2 /HPF    WBC Urine 17 (H) <=5 /HPF    Squamous Epithelials Urine 2 (H) <=1 /HPF    Narrative    Urine Culture ordered based on laboratory criteria   Comprehensive metabolic panel     Status: Abnormal   Result Value Ref Range    Sodium 135 (L) 136 - 145 mmol/L    Potassium 3.8 3.5 - 5.0 mmol/L    Chloride 105 98 - 107 mmol/L    Carbon Dioxide  (CO2) 21 (L) 22 - 31 mmol/L    Anion Gap 9 5 - 18 mmol/L    Urea Nitrogen 8 8 - 22 mg/dL    Creatinine 0.95 0.60 - 1.10 mg/dL    Calcium 10.0 8.5 - 10.5 mg/dL    Glucose 92 70 - 125 mg/dL    Alkaline Phosphatase 80 45 - 120 U/L    AST 23 0 - 40 U/L    ALT 22 0 - 45 U/L    Protein Total 6.9 6.0 - 8.0 g/dL    Albumin 3.8 3.5 - 5.0 g/dL    Bilirubin Total 0.6 0.0 - 1.0 mg/dL    GFR Estimate 80 >60 mL/min/1.73m2   Lactic acid whole blood     Status: Abnormal   Result Value Ref Range    Lactic Acid 0.6 (L) 0.7 - 2.0 mmol/L       RADIOLOGY:  Reviewed all pertinent imaging. Please see official radiology report.  US Kidney Right    (Results Pending)         I, Robert Garcia, am serving as a scribe to document services personally performed by Beto Medellin MD, based on my observation and the provider's statements to me. I, Beto Medellin MD attest that Robert Garcia is acting in a scribe capacity, has observed my performance of the services and has documented them in accordance with my direction.    Beto Medellin M.D.  Emergency Medicine  [facility] EMERGENCY ROOM       Beto Medellin MD  11/03/22 [id number]

## 2022-11-03 NOTE — ED TRIAGE NOTES
Pt reports R flank pain which radiates into her R abdomen. Started Monday, gradually worsening. Having nausea and emesis today. Pt has only one kidney and has nephrostomy.     Triage Assessment     Row Name 11/02/22 2008       Triage Assessment (Adult)    Airway WDL WDL       Respiratory WDL    Respiratory WDL WDL       Skin Circulation/Temperature WDL    Skin Circulation/Temperature WDL WDL       Cardiac WDL    Cardiac WDL WDL       Peripheral/Neurovascular WDL    Peripheral Neurovascular WDL WDL       Cognitive/Neuro/Behavioral WDL    Cognitive/Neuro/Behavioral WDL WDL

## 2022-11-04 LAB — BACTERIA UR CULT: NORMAL

## 2022-11-09 ENCOUNTER — HOSPITAL ENCOUNTER (EMERGENCY)
Facility: HOSPITAL | Age: 37
Discharge: HOME | End: 2022-11-09
Attending: EMERGENCY MEDICINE | Admitting: EMERGENCY MEDICINE
Payer: COMMERCIAL

## 2022-11-09 VITALS
BODY MASS INDEX: 34.93 KG/M2 | SYSTOLIC BLOOD PRESSURE: 112 MMHG | TEMPERATURE: 98.2 F | DIASTOLIC BLOOD PRESSURE: 58 MMHG | HEIGHT: 61 IN | RESPIRATION RATE: 18 BRPM | OXYGEN SATURATION: 95 % | WEIGHT: 185 LBS | HEART RATE: 74 BPM

## 2022-11-09 DIAGNOSIS — G89.29 OTHER CHRONIC PAIN: ICD-10-CM

## 2022-11-09 DIAGNOSIS — R10.30 LOWER ABDOMINAL PAIN: ICD-10-CM

## 2022-11-09 LAB
ALBUMIN SERPL BCG-MCNC: 3.8 G/DL (ref 3.5–5.2)
ALBUMIN UR-MCNC: 70 MG/DL
ALP SERPL-CCNC: 81 U/L (ref 35–104)
ALT SERPL W P-5'-P-CCNC: 19 U/L (ref 10–35)
ANION GAP SERPL CALCULATED.3IONS-SCNC: 11 MMOL/L (ref 7–15)
APPEARANCE UR: ABNORMAL
AST SERPL W P-5'-P-CCNC: 13 U/L (ref 10–35)
BACTERIA #/AREA URNS HPF: ABNORMAL /HPF
BASOPHILS # BLD AUTO: 0 10E3/UL (ref 0–0.2)
BASOPHILS NFR BLD AUTO: 0 %
BILIRUB DIRECT SERPL-MCNC: <0.2 MG/DL (ref 0–0.3)
BILIRUB SERPL-MCNC: 0.3 MG/DL
BILIRUB UR QL STRIP: NEGATIVE
BUN SERPL-MCNC: 14.8 MG/DL (ref 6–20)
CALCIUM SERPL-MCNC: 10.2 MG/DL (ref 8.6–10)
CHLORIDE SERPL-SCNC: 103 MMOL/L (ref 98–107)
COLOR UR AUTO: ABNORMAL
CREAT SERPL-MCNC: 0.95 MG/DL (ref 0.51–0.95)
DEPRECATED HCO3 PLAS-SCNC: 21 MMOL/L (ref 22–29)
EOSINOPHIL # BLD AUTO: 0.1 10E3/UL (ref 0–0.7)
EOSINOPHIL NFR BLD AUTO: 2 %
ERYTHROCYTE [DISTWIDTH] IN BLOOD BY AUTOMATED COUNT: 13.2 % (ref 10–15)
GFR SERPL CREATININE-BSD FRML MDRD: 80 ML/MIN/1.73M2
GLUCOSE SERPL-MCNC: 97 MG/DL (ref 70–99)
GLUCOSE UR STRIP-MCNC: NEGATIVE MG/DL
HCG UR QL: NEGATIVE
HCT VFR BLD AUTO: 40.2 % (ref 35–47)
HGB BLD-MCNC: 13.5 G/DL (ref 11.7–15.7)
HGB UR QL STRIP: ABNORMAL
HYALINE CASTS: 1 /LPF
IMM GRANULOCYTES # BLD: 0 10E3/UL
IMM GRANULOCYTES NFR BLD: 0 %
KETONES UR STRIP-MCNC: NEGATIVE MG/DL
LEUKOCYTE ESTERASE UR QL STRIP: ABNORMAL
LYMPHOCYTES # BLD AUTO: 2.5 10E3/UL (ref 0.8–5.3)
LYMPHOCYTES NFR BLD AUTO: 26 %
MCH RBC QN AUTO: 30.3 PG (ref 26.5–33)
MCHC RBC AUTO-ENTMCNC: 33.6 G/DL (ref 31.5–36.5)
MCV RBC AUTO: 90 FL (ref 78–100)
MONOCYTES # BLD AUTO: 0.7 10E3/UL (ref 0–1.3)
MONOCYTES NFR BLD AUTO: 8 %
NEUTROPHILS # BLD AUTO: 6.1 10E3/UL (ref 1.6–8.3)
NEUTROPHILS NFR BLD AUTO: 64 %
NITRATE UR QL: NEGATIVE
NRBC # BLD AUTO: 0 10E3/UL
NRBC BLD AUTO-RTO: 0 /100
PH UR STRIP: 7 [PH] (ref 5–7)
PLATELET # BLD AUTO: 155 10E3/UL (ref 150–450)
POTASSIUM SERPL-SCNC: 3.9 MMOL/L (ref 3.4–5.3)
PROT SERPL-MCNC: 6.3 G/DL (ref 6.4–8.3)
RBC # BLD AUTO: 4.45 10E6/UL (ref 3.8–5.2)
RBC URINE: 0 /HPF
SODIUM SERPL-SCNC: 135 MMOL/L (ref 136–145)
SP GR UR STRIP: 1.01 (ref 1–1.03)
SQUAMOUS EPITHELIAL: 1 /HPF
UROBILINOGEN UR STRIP-MCNC: <2 MG/DL
WBC # BLD AUTO: 9.5 10E3/UL (ref 4–11)
WBC URINE: 6 /HPF

## 2022-11-09 PROCEDURE — 81001 URINALYSIS AUTO W/SCOPE: CPT | Performed by: STUDENT IN AN ORGANIZED HEALTH CARE EDUCATION/TRAINING PROGRAM

## 2022-11-09 PROCEDURE — 85004 AUTOMATED DIFF WBC COUNT: CPT | Performed by: EMERGENCY MEDICINE

## 2022-11-09 PROCEDURE — 250N000013 HC RX MED GY IP 250 OP 250 PS 637: Performed by: EMERGENCY MEDICINE

## 2022-11-09 PROCEDURE — 81025 URINE PREGNANCY TEST: CPT | Performed by: EMERGENCY MEDICINE

## 2022-11-09 PROCEDURE — 81001 URINALYSIS AUTO W/SCOPE: CPT | Performed by: EMERGENCY MEDICINE

## 2022-11-09 PROCEDURE — 36415 COLL VENOUS BLD VENIPUNCTURE: CPT | Performed by: EMERGENCY MEDICINE

## 2022-11-09 PROCEDURE — 99283 EMERGENCY DEPT VISIT LOW MDM: CPT

## 2022-11-09 PROCEDURE — 87086 URINE CULTURE/COLONY COUNT: CPT | Performed by: EMERGENCY MEDICINE

## 2022-11-09 PROCEDURE — 80053 COMPREHEN METABOLIC PANEL: CPT | Performed by: EMERGENCY MEDICINE

## 2022-11-09 PROCEDURE — 250N000011 HC RX IP 250 OP 636: Performed by: EMERGENCY MEDICINE

## 2022-11-09 PROCEDURE — 81025 URINE PREGNANCY TEST: CPT | Performed by: STUDENT IN AN ORGANIZED HEALTH CARE EDUCATION/TRAINING PROGRAM

## 2022-11-09 PROCEDURE — 82248 BILIRUBIN DIRECT: CPT | Performed by: EMERGENCY MEDICINE

## 2022-11-09 RX ORDER — ONDANSETRON 4 MG/1
4 TABLET, ORALLY DISINTEGRATING ORAL ONCE
Status: COMPLETED | OUTPATIENT
Start: 2022-11-09 | End: 2022-11-09

## 2022-11-09 RX ORDER — OXYCODONE HYDROCHLORIDE 5 MG/1
5 TABLET ORAL ONCE
Status: COMPLETED | OUTPATIENT
Start: 2022-11-09 | End: 2022-11-09

## 2022-11-09 RX ADMIN — ONDANSETRON 4 MG: 4 TABLET, ORALLY DISINTEGRATING ORAL at 22:16

## 2022-11-09 RX ADMIN — OXYCODONE HYDROCHLORIDE 5 MG: 5 TABLET ORAL at 22:16

## 2022-11-09 ASSESSMENT — ENCOUNTER SYMPTOMS
NAUSEA: 1
VOMITING: 1
ABDOMINAL PAIN: 1
FEVER: 0
FLANK PAIN: 1
DYSURIA: 1

## 2022-11-09 ASSESSMENT — ACTIVITIES OF DAILY LIVING (ADL): ADLS_ACUITY_SCORE: 35

## 2022-11-10 ASSESSMENT — ENCOUNTER SYMPTOMS
DIARRHEA: 0
CONFUSION: 0
HEMATURIA: 0
SHORTNESS OF BREATH: 0
CHILLS: 0
DIZZINESS: 0
SORE THROAT: 0
JOINT SWELLING: 0

## 2022-11-10 NOTE — ED TRIAGE NOTES
Patient developed right flank pain last week, was seen at [facility] and negative UA. Pain has been constant since that time, now has low abdominal pain and emesis. Only has right kidney, has nephrostomy due to blockage in the kidney, still makes urine. Denies burning with urination, endorses some urinary frequency.      Triage Assessment     Row Name 11/09/22 1929       Triage Assessment (Adult)    Airway WDL WDL       Respiratory WDL    Respiratory WDL WDL       Skin Circulation/Temperature WDL    Skin Circulation/Temperature WDL WDL       Cardiac WDL    Cardiac WDL WDL       Peripheral/Neurovascular WDL    Peripheral Neurovascular WDL WDL       Cognitive/Neuro/Behavioral WDL    Cognitive/Neuro/Behavioral WDL WDL

## 2022-11-10 NOTE — DISCHARGE INSTRUCTIONS
Follow up closely with your primary providers for ongoing care.  Labs including urinalysis, CBC, chemistry with kidney function all reassuring. Return for fevers >100, uncontrolled vomiting or other new concerns.

## 2022-11-10 NOTE — ED PROVIDER NOTES
Emergency Department Encounter     Evaluation Date & Time:   11/9/2022  9:30 PM    CHIEF COMPLAINT:  Abdominal Pain and Flank Pain      Triage Note:  Patient developed right flank pain last week, was seen at [facility] and negative UA. Pain has been constant since that time, now has low abdominal pain and emesis. Only has right kidney, has nephrostomy due to blockage in the kidney, still makes urine. Denies burning with urination, endorses some urinary frequency.              ED COURSE & MEDICAL DECISION MAKING:     ED Course as of 11/10/22 0141   Wed Nov 09, 2022   2151 UA looks similar to improved compared to most recent ones and most recent cultures all negative.  Hcg negative.   2322 Labs all reassuring. Given them amount of CT abd/pelvis studies she's had, will not proceed as my clinical suspicion is quite low with reassuring vitals, benign abdominal exam and unremarkable labs.  Will discharge pt, continued outpatient follow up.     Pt with long history of chronic pain, seen numerous times in ED for similar complaints, presenting with ongoing right flank pain for the past week at least, but also having some lower abdominal pain now.  No recent vomiting, but having some nausea.  Pt had a negative urine from last week, as well as normal renal ultrasond where she has chronic right nephrostomy tube.  Pt with benign abdomen, very reassuring vitals. Hesitant to again do another CT abd/pelvis as she's had many in the past with a reassuring exam, afebrile. Will get serum labs, treat with oxycodone/zofran and reassess.      9:57 PM I met with the patient, obtained history, performed an initial exam, and discussed options and plan for diagnostics and treatment here in the ED. PPE worn includes N95 mask and gloves.  11:30 PM We discussed plans for discharge including supportive cares, symptomatic treatment, outpatient follow up, and reasons to return to the emergency department.        At the conclusion of the encounter  I discussed the results of all the tests and the disposition. The questions were answered. The patient or family acknowledged understanding and was agreeable with the care plan.      MEDICATIONS GIVEN IN THE EMERGENCY DEPARTMENT:  Medications   oxyCODONE (ROXICODONE) tablet 5 mg (5 mg Oral Given 22)   ondansetron (ZOFRAN ODT) ODT tab 4 mg (4 mg Oral Given 22)       NEW PRESCRIPTIONS STARTED AT TODAY'S ED VISIT:  Discharge Medication List as of 2022 11:32 PM          HPI   HPI     Jacqueline Pappas is a 37 year old female with a pertinent history of s/p nephrostomy tube (right flank), pyelonephritis, GT,  solitary kidney, hypotension, recurrent UTI, SIRS, chronic right flank pain, thymocytopenia, s/p  section, and s/p kidney stone removal who presents to this ED via walk-in for evaluation of abdominal and flank pain.    Per chart review, patient was seen at [facility] ER for nausea with vomiting and flank pain on -2022. Patient has a history of indwelling nephrostomy tube secondary to solitary kidney and distal obstruction. Patient's nephrostomy tube was replaced two weeks ago prior to this visit. Kidney exam showed right nephrostomy tube with increased pain, and right kidney 9/4 x 5.6 x 5.9 cm. No evidence of hydronephrosis. Multiple parapelvic cysts. Patient's last positive urine culture was in 2022 and negative in the ED. Discharged with Zofran.      Patient reports ongoing right flank pain and episodes of emesis for the past week. Patient has a nephrostomy tube in her right flank, which is provoking her pain. She was seen a week ago at [facility](-11/3) for the same symptoms and discharged with Zofran. She reports feeling nauseous with episodes of emesis earlier today. Patient has recurrent UTI's and endorses little dysuria as well. She has back tenderness but is unchanged. Currently, patient notes that the pain has become constant and has also developed lower  abdominal pain in the last few days. Patient is still nauseated, not vomit. She reports that her nephrostomy output looks more cloudy than usual. Otherwise, denies fever and any other symptoms. Patient denies any recent sick contacts.    REVIEW OF SYSTEMS:  Review of Systems   Constitutional: Negative for chills and fever.   HENT: Negative for sore throat.    Eyes: Negative for visual disturbance.   Respiratory: Negative for shortness of breath.    Cardiovascular: Negative for chest pain.   Gastrointestinal: Positive for abdominal pain (lower abdominal pain), nausea and vomiting. Negative for diarrhea.   Endocrine: Negative for polyuria.   Genitourinary: Positive for dysuria (mild dysuria) and flank pain (right side flank pain). Negative for hematuria.        - urinary changes     Musculoskeletal: Negative for joint swelling.   Skin: Negative for rash.   Neurological: Negative for dizziness.   Psychiatric/Behavioral: Negative for confusion.   All other systems reviewed and are negative.        Medical History     Past Medical History:   Diagnosis Date     Acute renal failure (H)      Anemia      Anemia      Calculus of kidney      Congenital absence of left kidney      Congenital absence of one kidney      Depression      Depression      Hydronephrosis      Kidney stone      Pyelonephritis      Pyelonephritis      Smoker      Ureteral stricture      UTI (urinary tract infection)      Wrist fracture        Past Surgical History:   Procedure Laterality Date      SECTION        SECTION      x1     COMBINED CYSTOSCOPY, RETROGRADES, URETEROSCOPY, LASER HOLMIUM LITHOTRIPSY URETER(S), INSERT STENT Right 2016    Procedure: COMBINED CYSTOSCOPY, RETROGRADES, URETEROSCOPY, LASER HOLMIUM LITHOTRIPSY URETER(S), INSERT STENT;  Surgeon: Florentino Awad MD;  Location: [facility] OR     CYSTOSCOPY, URETEROSCOPY, COMBINED Right 2016    Procedure: COMBINED CYSTOSCOPY, URETEROSCOPY;  Surgeon: Florentino Awad  MD Solitario;  Location: [facility] OR     IR NEPHROLITHOTOMY  12/11/2014     IR NEPHROSTOGRAM EXISITING ACCESS  10/18/2018     IR NEPHROSTOMY TUBE CHANGE RIGHT  12/12/2018     IR NEPHROSTOMY TUBE CHANGE RIGHT  6/11/2020     IR NEPHROSTOMY TUBE CHANGE RIGHT  12/12/2018     IR NEPHROSTOMY TUBE CHANGE RIGHT  6/11/2020     IR NEPHROSTOMY TUBE PLACEMENT RIGHT  7/24/2016     IR NEPHROSTOMY TUBE PLACEMENT RIGHT  9/14/2017     KIDNEY STONE SURGERY Right 05/2016     LASER HOLMIUM LITHOTRIPSY URETER(S), INSERT STENT, COMBINED Left 11/1/2016    Procedure: COMBINED CYSTOSCOPY, URETEROSCOPY, LASER HOLMIUM LITHOTRIPSY URETER(S), INSERT STENT;  Surgeon: Florentino Awad MD;  Location: [facility] OR     LASER HOLMIUM NEPHROLITHOTOMY VIA PERCUTANEOUS NEPHROSTOMY Right 9/14/2016    Procedure: LASER HOLMIUM NEPHROLITHOTOMY VIA PERCUTANEOUS NEPHROSTOMY;  Surgeon: Florentino Awad MD;  Location: [facility] OR     NEPHROSTOMY W/ INTRODUCTION OF CATHETER Right      OTHER SURGICAL HISTORY      Mole removednasal     OTHER SURGICAL HISTORY Right     Cystoscopy, ureteroscopy, laser11/02/2014 x2 with ureteral stent insertion     PERCUTANEOUS NEPHROSTOMY  11/1/2016    Procedure: PERCUTANEOUS NEPHROSTOMY;  Surgeon: Florentino Awad MD;  Location: [facility] OR     WISDOM TOOTH EXTRACTION       ZZC REMOVAL OF KIDNEY STONE         Family History   Problem Relation Age of Onset     Anesthesia Reaction No family hx of      Malignant Hyperthermia No family hx of      Heart Disease Maternal Uncle         heart failure     Coronary Artery Disease Other      Heart Disease Maternal Grandmother         pacemaker     Gout Paternal Grandfather      Prostate Cancer Maternal Aunt         breast     Heart Disease Maternal Aunt         pacemaker     Heart Disease Maternal Aunt         pacemaker     Heart Disease Maternal Aunt         pacemaker       Social History     Tobacco Use     Smoking status: Every Day     Packs/day: 0.50     Years: 10.00     Pack years: 5.00     Types:  "Cigarettes     Last attempt to quit: 2016     Years since quittin.1     Smokeless tobacco: Former     Quit date: 2016     Tobacco comments:     has information from last admission.   Substance Use Topics     Alcohol use: Yes     Alcohol/week: 0.0 standard drinks     Comment: Alcoholic Drinks/day: occ     Drug use: No       albuterol (ACCUNEB) 1.25 MG/3ML neb solution  methocarbamol (ROBAXIN) 750 MG tablet  ondansetron (ZOFRAN ODT) 4 MG ODT tab  prochlorperazine (COMPAZINE) 10 MG tablet  sertraline (ZOLOFT) 100 MG tablet  SPRINTEC 28 0.25-35 MG-MCG tablet        Physical Exam     Vitals:  /58   Pulse 74   Temp 98.2  F (36.8  C) (Oral)   Resp 18   Ht 1.537 m (5' 0.5\")   Wt 83.9 kg (185 lb)   LMP  (LMP Unknown)   SpO2 95%   BMI 35.54 kg/m      PHYSICAL EXAM:   Physical Exam  Vitals and nursing note reviewed.   Constitutional:       General: She is not in acute distress.     Appearance: Normal appearance.   HENT:      Head: Normocephalic and atraumatic.      Nose: Nose normal.      Mouth/Throat:      Mouth: Mucous membranes are moist.   Eyes:      Pupils: Pupils are equal, round, and reactive to light.   Cardiovascular:      Rate and Rhythm: Normal rate and regular rhythm.      Pulses: Normal pulses.           Radial pulses are 2+ on the right side and 2+ on the left side.        Dorsalis pedis pulses are 2+ on the right side and 2+ on the left side.   Pulmonary:      Effort: Pulmonary effort is normal. No respiratory distress.      Breath sounds: Normal breath sounds.   Abdominal:      Palpations: Abdomen is soft.      Tenderness: There is abdominal tenderness in the suprapubic area. Negative signs include McBurney's sign.      Comments: Nephrostomy tube to right flank. No external signs of infection. Straw color urine present in the bag. Minimal suprapubic tenderness.   Musculoskeletal:      Cervical back: Full passive range of motion without pain and neck supple.      Comments: No calf " tenderness or swelling b/l   Skin:     General: Skin is warm.      Findings: No rash.   Neurological:      General: No focal deficit present.      Mental Status: She is alert. Mental status is at baseline.      Comments: Fluent speech, no acute lateralizing deficits   Psychiatric:         Mood and Affect: Mood normal.         Behavior: Behavior normal.         Results     LAB:  All pertinent labs reviewed and interpreted  Labs Ordered and Resulted from Time of ED Arrival to Time of ED Departure   ROUTINE UA WITH MICROSCOPIC REFLEX TO CULTURE - Abnormal       Result Value    Color Urine Light Yellow      Appearance Urine Turbid (*)     Glucose Urine Negative      Bilirubin Urine Negative      Ketones Urine Negative      Specific Gravity Urine 1.015      Blood Urine 0.03 mg/dL (*)     pH Urine 7.0      Protein Albumin Urine 70 (*)     Urobilinogen Urine <2.0      Nitrite Urine Negative      Leukocyte Esterase Urine 500 Duglas/uL (*)     Bacteria Urine Few (*)     RBC Urine 0      WBC Urine 6 (*)     Squamous Epithelials Urine 1      Hyaline Casts Urine 1     BASIC METABOLIC PANEL - Abnormal    Sodium 135 (*)     Potassium 3.9      Chloride 103      Carbon Dioxide (CO2) 21 (*)     Anion Gap 11      Urea Nitrogen 14.8      Creatinine 0.95      Calcium 10.2 (*)     Glucose 97      GFR Estimate 80     HEPATIC FUNCTION PANEL - Abnormal    Protein Total 6.3 (*)     Albumin 3.8      Bilirubin Total 0.3      Alkaline Phosphatase 81      AST 13      ALT 19      Bilirubin Direct <0.20     HCG QUALITATIVE URINE - Normal    hCG Urine Qualitative Negative     CBC WITH PLATELETS AND DIFFERENTIAL    WBC Count 9.5      RBC Count 4.45      Hemoglobin 13.5      Hematocrit 40.2      MCV 90      MCH 30.3      MCHC 33.6      RDW 13.2      Platelet Count 155      % Neutrophils 64      % Lymphocytes 26      % Monocytes 8      % Eosinophils 2      % Basophils 0      % Immature Granulocytes 0      NRBCs per 100 WBC 0      Absolute Neutrophils  6.1      Absolute Lymphocytes 2.5      Absolute Monocytes 0.7      Absolute Eosinophils 0.1      Absolute Basophils 0.0      Absolute Immature Granulocytes 0.0      Absolute NRBCs 0.0     URINE CULTURE       RADIOLOGY:  No orders to display                ECG:  none    PROCEDURES:  Procedures:  none      FINAL IMPRESSION:    ICD-10-CM    1. Other chronic pain  G89.29       2. Lower abdominal pain  R10.30           0 minutes of critical care time      Jose D Licona DO  Emergency Medicine  [facility] EMERGENCY DEPARTMENT  11/9/2022  9:52 PM          Jose D Licona MD  11/10/22 0141

## 2022-11-11 LAB — BACTERIA UR CULT: NORMAL

## 2022-11-13 ENCOUNTER — HOSPITAL ENCOUNTER (EMERGENCY)
Facility: CLINIC | Age: 37
Discharge: LEFT BEFORE DISCHARGE | End: 2022-11-13
Admitting: EMERGENCY MEDICINE
Payer: COMMERCIAL

## 2022-11-13 VITALS
OXYGEN SATURATION: 98 % | SYSTOLIC BLOOD PRESSURE: 123 MMHG | HEIGHT: 60 IN | HEART RATE: 86 BPM | BODY MASS INDEX: 36.32 KG/M2 | WEIGHT: 185 LBS | TEMPERATURE: 98.4 F | DIASTOLIC BLOOD PRESSURE: 76 MMHG | RESPIRATION RATE: 16 BRPM

## 2022-11-13 LAB
ALBUMIN UR-MCNC: 200 MG/DL
AMORPH CRY #/AREA URNS HPF: ABNORMAL /HPF
APPEARANCE UR: ABNORMAL
BACTERIA #/AREA URNS HPF: ABNORMAL /HPF
BILIRUB UR QL STRIP: NEGATIVE
COLOR UR AUTO: ABNORMAL
GLUCOSE UR STRIP-MCNC: NEGATIVE MG/DL
HCG UR QL: NEGATIVE
HGB UR QL STRIP: ABNORMAL
KETONES UR STRIP-MCNC: NEGATIVE MG/DL
LEUKOCYTE ESTERASE UR QL STRIP: ABNORMAL
NITRATE UR QL: NEGATIVE
PH UR STRIP: 7.5 [PH] (ref 5–7)
RBC URINE: 27 /HPF
SP GR UR STRIP: 1.01 (ref 1–1.03)
SQUAMOUS EPITHELIAL: 2 /HPF
UROBILINOGEN UR STRIP-MCNC: <2 MG/DL
WBC URINE: 24 /HPF

## 2022-11-13 PROCEDURE — 81001 URINALYSIS AUTO W/SCOPE: CPT | Performed by: EMERGENCY MEDICINE

## 2022-11-13 PROCEDURE — 87086 URINE CULTURE/COLONY COUNT: CPT | Performed by: EMERGENCY MEDICINE

## 2022-11-13 PROCEDURE — 999N000104 HC STATISTIC NO CHARGE

## 2022-11-13 PROCEDURE — 81025 URINE PREGNANCY TEST: CPT | Performed by: NURSE PRACTITIONER

## 2022-11-13 ASSESSMENT — ACTIVITIES OF DAILY LIVING (ADL): ADLS_ACUITY_SCORE: 33

## 2022-11-13 NOTE — ED TRIAGE NOTES
Pt arrives to ED with c/o right sided flank pain with nausea and vomiting. Pt states flank pain has been going on for 1-2 weeks. Pt states pain is getting worse. Seen at [facility] for this as well. Pt was born with one kidney and has known kidney stones and nephrostomy tube in place. Pt endorses to odor in urine and vaginal discharge as well. Urinary frequency present.      Triage Assessment     Row Name 11/13/22 2286       Triage Assessment (Adult)    Airway WDL WDL       Respiratory WDL    Respiratory WDL WDL       Skin Circulation/Temperature WDL    Skin Circulation/Temperature WDL WDL       Cardiac WDL    Cardiac WDL WDL       Peripheral/Neurovascular WDL    Peripheral Neurovascular WDL WDL       Cognitive/Neuro/Behavioral WDL    Cognitive/Neuro/Behavioral WDL WDL

## 2022-11-15 ENCOUNTER — HOSPITAL ENCOUNTER (EMERGENCY)
Facility: HOSPITAL | Age: 37
Discharge: HOME | End: 2022-11-15
Payer: COMMERCIAL

## 2022-11-15 VITALS
SYSTOLIC BLOOD PRESSURE: 115 MMHG | RESPIRATION RATE: 16 BRPM | HEIGHT: 60 IN | WEIGHT: 180 LBS | OXYGEN SATURATION: 98 % | BODY MASS INDEX: 35.34 KG/M2 | HEART RATE: 82 BPM | TEMPERATURE: 98.1 F | DIASTOLIC BLOOD PRESSURE: 73 MMHG

## 2022-11-15 LAB — BACTERIA UR CULT: NORMAL

## 2022-11-15 NOTE — ED TRIAGE NOTES
Right flank pain for weeks, has been seen here and told nothing wrong hasn't seen pcp.     Triage Assessment     Row Name 11/15/22 1009       Triage Assessment (Adult)    Airway WDL WDL       Respiratory WDL    Respiratory WDL WDL       Skin Circulation/Temperature WDL    Skin Circulation/Temperature WDL WDL       Cardiac WDL    Cardiac WDL WDL       Peripheral/Neurovascular WDL    Peripheral Neurovascular WDL WDL       Cognitive/Neuro/Behavioral WDL    Cognitive/Neuro/Behavioral WDL WDL

## 2022-11-21 ENCOUNTER — HEALTH MAINTENANCE LETTER (OUTPATIENT)
Age: 37
End: 2022-11-21

## 2022-12-11 ENCOUNTER — HOSPITAL ENCOUNTER (EMERGENCY)
Facility: CLINIC | Age: 37
Discharge: HOME | End: 2022-12-11
Attending: EMERGENCY MEDICINE | Admitting: EMERGENCY MEDICINE
Payer: COMMERCIAL

## 2022-12-11 VITALS
TEMPERATURE: 97.7 F | RESPIRATION RATE: 18 BRPM | DIASTOLIC BLOOD PRESSURE: 63 MMHG | OXYGEN SATURATION: 99 % | SYSTOLIC BLOOD PRESSURE: 119 MMHG | HEART RATE: 80 BPM

## 2022-12-11 DIAGNOSIS — N10 PYELONEPHRITIS, ACUTE: ICD-10-CM

## 2022-12-11 LAB
ALBUMIN UR-MCNC: 200 MG/DL
ANION GAP SERPL CALCULATED.3IONS-SCNC: 9 MMOL/L (ref 5–18)
APPEARANCE UR: ABNORMAL
BACTERIA #/AREA URNS HPF: ABNORMAL /HPF
BASOPHILS # BLD AUTO: 0 10E3/UL (ref 0–0.2)
BASOPHILS NFR BLD AUTO: 0 %
BILIRUB UR QL STRIP: NEGATIVE
BUN SERPL-MCNC: 19 MG/DL (ref 8–22)
CALCIUM SERPL-MCNC: 10.4 MG/DL (ref 8.5–10.5)
CHLORIDE BLD-SCNC: 107 MMOL/L (ref 98–107)
CO2 SERPL-SCNC: 20 MMOL/L (ref 22–31)
COLOR UR AUTO: ABNORMAL
CREAT SERPL-MCNC: 0.97 MG/DL (ref 0.6–1.1)
EOSINOPHIL # BLD AUTO: 0.1 10E3/UL (ref 0–0.7)
EOSINOPHIL NFR BLD AUTO: 2 %
ERYTHROCYTE [DISTWIDTH] IN BLOOD BY AUTOMATED COUNT: 13.8 % (ref 10–15)
FLUAV RNA SPEC QL NAA+PROBE: NEGATIVE
FLUBV RNA RESP QL NAA+PROBE: NEGATIVE
GFR SERPL CREATININE-BSD FRML MDRD: 78 ML/MIN/1.73M2
GLUCOSE BLD-MCNC: 129 MG/DL (ref 70–125)
GLUCOSE UR STRIP-MCNC: NEGATIVE MG/DL
HCT VFR BLD AUTO: 41.9 % (ref 35–47)
HGB BLD-MCNC: 13.9 G/DL (ref 11.7–15.7)
HGB UR QL STRIP: ABNORMAL
IMM GRANULOCYTES # BLD: 0 10E3/UL
IMM GRANULOCYTES NFR BLD: 0 %
KETONES UR STRIP-MCNC: NEGATIVE MG/DL
LACTATE SERPL-SCNC: 1.1 MMOL/L (ref 0.7–2)
LEUKOCYTE ESTERASE UR QL STRIP: ABNORMAL
LYMPHOCYTES # BLD AUTO: 2.1 10E3/UL (ref 0.8–5.3)
LYMPHOCYTES NFR BLD AUTO: 25 %
MCH RBC QN AUTO: 29.6 PG (ref 26.5–33)
MCHC RBC AUTO-ENTMCNC: 33.2 G/DL (ref 31.5–36.5)
MCV RBC AUTO: 89 FL (ref 78–100)
MONOCYTES # BLD AUTO: 0.4 10E3/UL (ref 0–1.3)
MONOCYTES NFR BLD AUTO: 5 %
MUCOUS THREADS #/AREA URNS LPF: PRESENT /LPF
NEUTROPHILS # BLD AUTO: 5.5 10E3/UL (ref 1.6–8.3)
NEUTROPHILS NFR BLD AUTO: 68 %
NITRATE UR QL: NEGATIVE
NRBC # BLD AUTO: 0 10E3/UL
NRBC BLD AUTO-RTO: 0 /100
PH UR STRIP: 7.5 [PH] (ref 5–7)
PLATELET # BLD AUTO: 175 10E3/UL (ref 150–450)
POTASSIUM BLD-SCNC: 3.7 MMOL/L (ref 3.5–5)
RBC # BLD AUTO: 4.7 10E6/UL (ref 3.8–5.2)
RBC URINE: 114 /HPF
RSV RNA SPEC NAA+PROBE: NEGATIVE
SARS-COV-2 RNA RESP QL NAA+PROBE: NEGATIVE
SODIUM SERPL-SCNC: 136 MMOL/L (ref 136–145)
SP GR UR STRIP: 1.01 (ref 1–1.03)
SQUAMOUS EPITHELIAL: 3 /HPF
TRI-PHOS CRY #/AREA URNS HPF: ABNORMAL /HPF
UROBILINOGEN UR STRIP-MCNC: <2 MG/DL
WBC # BLD AUTO: 8.2 10E3/UL (ref 4–11)
WBC URINE: 26 /HPF

## 2022-12-11 PROCEDURE — 96375 TX/PRO/DX INJ NEW DRUG ADDON: CPT

## 2022-12-11 PROCEDURE — 96365 THER/PROPH/DIAG IV INF INIT: CPT

## 2022-12-11 PROCEDURE — 81001 URINALYSIS AUTO W/SCOPE: CPT | Performed by: EMERGENCY MEDICINE

## 2022-12-11 PROCEDURE — 36415 COLL VENOUS BLD VENIPUNCTURE: CPT | Performed by: EMERGENCY MEDICINE

## 2022-12-11 PROCEDURE — 96376 TX/PRO/DX INJ SAME DRUG ADON: CPT

## 2022-12-11 PROCEDURE — 83605 ASSAY OF LACTIC ACID: CPT | Performed by: EMERGENCY MEDICINE

## 2022-12-11 PROCEDURE — 258N000003 HC RX IP 258 OP 636: Performed by: EMERGENCY MEDICINE

## 2022-12-11 PROCEDURE — 96361 HYDRATE IV INFUSION ADD-ON: CPT

## 2022-12-11 PROCEDURE — 250N000011 HC RX IP 250 OP 636: Performed by: EMERGENCY MEDICINE

## 2022-12-11 PROCEDURE — 99285 EMERGENCY DEPT VISIT HI MDM: CPT | Mod: 25

## 2022-12-11 PROCEDURE — 80048 BASIC METABOLIC PNL TOTAL CA: CPT | Performed by: EMERGENCY MEDICINE

## 2022-12-11 PROCEDURE — 87637 SARSCOV2&INF A&B&RSV AMP PRB: CPT | Performed by: STUDENT IN AN ORGANIZED HEALTH CARE EDUCATION/TRAINING PROGRAM

## 2022-12-11 PROCEDURE — C9803 HOPD COVID-19 SPEC COLLECT: HCPCS

## 2022-12-11 PROCEDURE — 85004 AUTOMATED DIFF WBC COUNT: CPT | Performed by: EMERGENCY MEDICINE

## 2022-12-11 PROCEDURE — 87086 URINE CULTURE/COLONY COUNT: CPT | Performed by: EMERGENCY MEDICINE

## 2022-12-11 RX ORDER — CEPHALEXIN 500 MG/1
500 CAPSULE ORAL 4 TIMES DAILY
Qty: 28 CAPSULE | Refills: 0 | Status: SHIPPED | OUTPATIENT
Start: 2022-12-11 | End: 2022-12-18

## 2022-12-11 RX ORDER — ONDANSETRON 4 MG/1
4 TABLET, ORALLY DISINTEGRATING ORAL EVERY 8 HOURS PRN
Qty: 10 TABLET | Refills: 0 | Status: SHIPPED | OUTPATIENT
Start: 2022-12-11 | End: 2022-12-14

## 2022-12-11 RX ORDER — CEFTRIAXONE 1 G/1
1 INJECTION, POWDER, FOR SOLUTION INTRAMUSCULAR; INTRAVENOUS ONCE
Status: COMPLETED | OUTPATIENT
Start: 2022-12-11 | End: 2022-12-11

## 2022-12-11 RX ORDER — ONDANSETRON 2 MG/ML
4 INJECTION INTRAMUSCULAR; INTRAVENOUS ONCE
Status: COMPLETED | OUTPATIENT
Start: 2022-12-11 | End: 2022-12-11

## 2022-12-11 RX ORDER — OXYCODONE AND ACETAMINOPHEN 5; 325 MG/1; MG/1
1 TABLET ORAL EVERY 6 HOURS PRN
Qty: 12 TABLET | Refills: 0 | Status: SHIPPED | OUTPATIENT
Start: 2022-12-11 | End: 2022-12-14

## 2022-12-11 RX ADMIN — CEFTRIAXONE 1 G: 1 INJECTION, POWDER, FOR SOLUTION INTRAMUSCULAR; INTRAVENOUS at 21:26

## 2022-12-11 RX ADMIN — SODIUM CHLORIDE 1000 ML: 9 INJECTION, SOLUTION INTRAVENOUS at 20:21

## 2022-12-11 RX ADMIN — HYDROMORPHONE HYDROCHLORIDE 1 MG: 1 INJECTION, SOLUTION INTRAMUSCULAR; INTRAVENOUS; SUBCUTANEOUS at 21:25

## 2022-12-11 RX ADMIN — HYDROMORPHONE HYDROCHLORIDE 1 MG: 1 INJECTION, SOLUTION INTRAMUSCULAR; INTRAVENOUS; SUBCUTANEOUS at 20:22

## 2022-12-11 RX ADMIN — ONDANSETRON 4 MG: 2 INJECTION INTRAMUSCULAR; INTRAVENOUS at 20:22

## 2022-12-11 ASSESSMENT — ENCOUNTER SYMPTOMS
CHILLS: 1
FLANK PAIN: 1
SORE THROAT: 0
SHORTNESS OF BREATH: 0
COUGH: 0
DIARRHEA: 1

## 2022-12-11 ASSESSMENT — ACTIVITIES OF DAILY LIVING (ADL): ADLS_ACUITY_SCORE: 35

## 2022-12-12 NOTE — DISCHARGE INSTRUCTIONS
Your urinalysis overall was concerning for infection.  Please take antibiotics as prescribed.  Pain medication as prescribed as well as antiemetics for management of any nausea.  Please follow-up with your primary care physicians and your specialist tomorrow regarding your nephrostomy tube.  If your symptoms or not resolving or develop fever or worsening issues please return to emergency department for repeat assessment.

## 2022-12-12 NOTE — ED PROVIDER NOTES
"EMERGENCY DEPARTMENT ENCOUNTER      NAME: Jacqueline Pappas  AGE: 37 year old female  YOB: 1985  MRN: [id number]  EVALUATION DATE & TIME: 12/11/2022  7:31 PM    PCP: Pao Montague    ED PROVIDER: Payam Andrade MD      Chief Complaint   Patient presents with     Flank Pain     FINAL IMPRESSION:  1. Pyelonephritis, acute      ED COURSE & MEDICAL DECISION MAKING:    Pertinent Labs & Imaging studies reviewed. (See chart for details)  37 year old female presents to the Emergency Department for evaluation of right-sided flank pain chills and body aches.  Patient has a history of nephrectomy and subsequent right-sided kidney obstruction with chronic indwelling nephrostomy tube that is replaced every 3 months.  She is due for her replacement later this month.  Reports onset of chills fatigue aches and right-sided flank pain that per her report is typical of when she develops \"pyelonephritis\".  Does not have a temperature probe at home and does not know if she has been spiking temperatures.  This is similar to what she has experienced in the past.  She does have a history of chronic pain related to her medical issues.  On examination patient was well-appearing.  She was afebrile.  Mild hypertension.  She had discomfort to the right flank.  Nephrostomy tube appeared to be draining appropriately.  Urinalysis has been sent.  Laboratory testing pending to evaluate for possible infection.  She has had significant imaging in the past and at this point would prefer to hold on imaging at this time which I think is reasonable we will plan to reassess after return of her laboratory testing and urinalysis.    7:37 PM I met with the patient for the initial interview and physical examination. Discussed plan for treatment and workup in the ED.      9:54 PM  Urinalysis was concerning for acute infection with moderate bacteria.  She does not appear to be septic at this time her laboratory testing is unremarkable.  We " decided to hold on imaging given her frequent imaging in the past and it seems that the nephrostomy tube is draining appropriately.  Patient has been through this before and typically per her report has success with a dose of IV antibiotics and then oral antibiotics on an outpatient basis.  I think that is reasonable plan of care and we will institute antibiotic therapy here in the emergency department with overall plan of care for discharge.  We will provide a short course of pain management for acute discomfort in the setting of her chronic illness and her likely pyelonephritis.  Urine culture pending at this time.  Anticipate discharge post medications.     At the conclusion of the encounter I discussed the results of all of the tests and the disposition. The questions were answered. The patient or family acknowledged understanding and was agreeable with the care plan.     Medical Decision Making    History:    Supplemental history from: N/A    External Record(s) reviewed: Documented in HPI, if applicable.    Work Up:    Chart documentation includes differential considered and any EKGs or imaging interpreted by provider.    In additional to work up documented, I considered the following work up: See chart documentation, if applicable.    External consultation:    Discussion of management with another provider: See chart documentation, if applicable    Complicating factors:    Care impacted by chronic illness: Chronic Pain    Care affected by social determinants of health: N/A    Disposition considerations: Discharge. I prescribed additional prescription strength medication(s) as charted. I considered admission, but discharged patient after reassuring labs and/or imaging.    MEDICATIONS GIVEN IN THE EMERGENCY:  Medications   0.9% sodium chloride BOLUS (0 mLs Intravenous Stopped 12/11/22 2151)   HYDROmorphone (DILAUDID) injection 1 mg (1 mg Intravenous Given 12/11/22 2022)   ondansetron (ZOFRAN) injection 4 mg (4  mg Intravenous Given 22)   cefTRIAXone (ROCEPHIN) 1 g vial to attach to  mL bag for ADULTS or NS 50 mL bag for PEDS (0 g Intravenous Stopped 22)   HYDROmorphone (DILAUDID) injection 1 mg (1 mg Intravenous Given 22)       NEW PRESCRIPTIONS STARTED AT TODAY'S ER VISIT  New Prescriptions    CEPHALEXIN (KEFLEX) 500 MG CAPSULE    Take 1 capsule (500 mg) by mouth 4 times daily for 7 days    ONDANSETRON (ZOFRAN ODT) 4 MG ODT TAB    Take 1 tablet (4 mg) by mouth every 8 hours as needed for nausea    OXYCODONE-ACETAMINOPHEN (PERCOCET) 5-325 MG TABLET    Take 1 tablet by mouth every 6 hours as needed for pain          =================================================================    HPI    Patient information was obtained from: Patient     Use of : N/A       Jacqueline Pappas is a 37 year old female with a pertinent history of s/p nephrostomy tube (right flank), pyelonephritis, GT, solitary kidney, hypotension, recurrent UTI, SIRS, chronic right flank pain, thymocytopenia, s/p  section, and s/p kidney stone removal  who presents to this ED via private vehicle for evaluation of right sided flank pain and generalized body aches.    Per chart review, patient was evaluated at the [facility] [place] on 11/15/22 for right sided flank pain. Patient was diagnosed with a urinary tract infection associated with nephrostomy catheter and discharged with antibiotics for treatment.     Per chart review, patient was evaluated at [facility] ED on 22 for right sided flank pain. UA looked similar to improved tests and the most recent cultures were all negative for UTI. Patient discharged with pain medication.    Patient reports right sided flank pain with intermittent chills and diarrhea since Friday. She states that she feels as if this is an infection as it feels similar. Patient notes that her tube is scheduled to be changed the following Thursday and last had been  changed 3 months ago. She states that she had last had an infection ~1 month ago. Patient denies shortness of breath, cough, sore throat, or additional symptoms or complaints at this time.       REVIEW OF SYSTEMS   Review of Systems   Constitutional: Positive for chills.   HENT: Negative for sore throat.    Respiratory: Negative for cough and shortness of breath.    Gastrointestinal: Positive for diarrhea.   Genitourinary: Positive for flank pain.   All other systems reviewed and are negative.     PAST MEDICAL HISTORY:  Past Medical History:   Diagnosis Date     Acute renal failure (H)      Anemia      Anemia      Calculus of kidney      Congenital absence of left kidney      Congenital absence of one kidney      Depression      Depression      Hydronephrosis      Kidney stone     at age 29     Pyelonephritis      Pyelonephritis      Smoker      Ureteral stricture      UTI (urinary tract infection)      Wrist fracture     Left       PAST SURGICAL HISTORY:  Past Surgical History:   Procedure Laterality Date      SECTION        SECTION      x1     COMBINED CYSTOSCOPY, RETROGRADES, URETEROSCOPY, LASER HOLMIUM LITHOTRIPSY URETER(S), INSERT STENT Right 2016    Procedure: COMBINED CYSTOSCOPY, RETROGRADES, URETEROSCOPY, LASER HOLMIUM LITHOTRIPSY URETER(S), INSERT STENT;  Surgeon: Florentino Awad MD;  Location: [facility] OR     CYSTOSCOPY, URETEROSCOPY, COMBINED Right 2016    Procedure: COMBINED CYSTOSCOPY, URETEROSCOPY;  Surgeon: Florentino Awad MD;  Location: [facility] OR     IR NEPHROLITHOTOMY  2014     IR NEPHROSTOGRAM EXISITING ACCESS  10/18/2018     IR NEPHROSTOMY TUBE CHANGE RIGHT  2018     IR NEPHROSTOMY TUBE CHANGE RIGHT  2020     IR NEPHROSTOMY TUBE CHANGE RIGHT  2018     IR NEPHROSTOMY TUBE CHANGE RIGHT  2020     IR NEPHROSTOMY TUBE PLACEMENT RIGHT  2016     IR NEPHROSTOMY TUBE PLACEMENT RIGHT  2017     KIDNEY STONE SURGERY Right 2016     LASER  HOLMIUM LITHOTRIPSY URETER(S), INSERT STENT, COMBINED Left 11/1/2016    Procedure: COMBINED CYSTOSCOPY, URETEROSCOPY, LASER HOLMIUM LITHOTRIPSY URETER(S), INSERT STENT;  Surgeon: Florentino Awad MD;  Location: [facility] OR     LASER HOLMIUM NEPHROLITHOTOMY VIA PERCUTANEOUS NEPHROSTOMY Right 9/14/2016    Procedure: LASER HOLMIUM NEPHROLITHOTOMY VIA PERCUTANEOUS NEPHROSTOMY;  Surgeon: Florentino Awad MD;  Location: [facility] OR     NEPHROSTOMY W/ INTRODUCTION OF CATHETER Right      OTHER SURGICAL HISTORY      Mole removednasal     OTHER SURGICAL HISTORY Right     Cystoscopy, ureteroscopy, laser11/02/2014 x2 with ureteral stent insertion     PERCUTANEOUS NEPHROSTOMY  11/1/2016    Procedure: PERCUTANEOUS NEPHROSTOMY;  Surgeon: Florentino Awad MD;  Location: [facility] OR     WISDOM TOOTH EXTRACTION       ZZC REMOVAL OF KIDNEY STONE             CURRENT MEDICATIONS:    cephALEXin (KEFLEX) 500 MG capsule  ondansetron (ZOFRAN ODT) 4 MG ODT tab  oxyCODONE-acetaminophen (PERCOCET) 5-325 MG tablet  albuterol (ACCUNEB) 1.25 MG/3ML neb solution  methocarbamol (ROBAXIN) 750 MG tablet  ondansetron (ZOFRAN ODT) 4 MG ODT tab  prochlorperazine (COMPAZINE) 10 MG tablet  sertraline (ZOLOFT) 100 MG tablet  SPRINTEC 28 0.25-35 MG-MCG tablet        ALLERGIES:  Allergies   Allergen Reactions     Vancomycin      Desogestrel-Ethinyl Estradiol Angioedema and Swelling     Swelling of hands and feet per pt.  Swelling of hands and feet per pt.  Swelling of hands and feet per pt.  Swelling of hands and feet per pt.  Swelling of hands and feet per pt.  Swelling of hands and feet per pt.  Swelling of hands and feet per pt.  Swelling of hands and feet per pt.       Penicillins Rash     As a child per mother; mother unsure if has tolerated another PCN since. Tolerated ampicillin 9/20/16     Sulfa Drugs Rash       FAMILY HISTORY:  Family History   Problem Relation Age of Onset     Anesthesia Reaction No family hx of      Malignant Hyperthermia No family  hx of      Heart Disease Maternal Uncle         heart failure     Coronary Artery Disease Other      Heart Disease Maternal Grandmother         pacemaker     Gout Paternal Grandfather      Prostate Cancer Maternal Aunt         breast     Heart Disease Maternal Aunt         pacemaker     Heart Disease Maternal Aunt         pacemaker     Heart Disease Maternal Aunt         pacemaker       SOCIAL HISTORY:   Social History     Socioeconomic History     Marital status: Single     Spouse name: None     Number of children: None     Years of education: None     Highest education level: None   Tobacco Use     Smoking status: Every Day     Packs/day: 0.50     Years: 10.00     Pack years: 5.00     Types: Cigarettes     Last attempt to quit: 2016     Years since quittin.2     Smokeless tobacco: Former     Quit date: 2016     Tobacco comments:     has information from last admission.   Substance and Sexual Activity     Alcohol use: Yes     Alcohol/week: 0.0 standard drinks     Comment: Alcoholic Drinks/day: occ     Drug use: No     VITALS:  /63   Pulse 80   Temp 97.7  F (36.5  C)   Resp 18   LMP 2022 (Approximate)   SpO2 99%     PHYSICAL EXAM    PHYSICAL EXAM    Constitutional: Well developed, Well nourished, NAD  HENT: Normocephalic, Atraumatic, Bilateral external ears normal, Oropharynx normal, mucous membranes moist, Nose normal. Neck-  Normal range of motion, No tenderness, Supple, No stridor.   Eyes: PERRL, EOMI, Conjunctiva normal, No discharge.   Respiratory: Normal breath sounds, No respiratory distress, No wheezing, Speaks full sentences easily. No cough.   Cardiovascular: Normal heart rate, Regular rhythm, No murmurs Chest wall nontender.    GI: Right-sided abdominal discomfort  : Right-sided flank discomfort nephrostomy tube insertion and drainage appears appropriate  Musculoskeletal: 2+ DP pulses. No edema. No cyanosis. Good range of motion in all major joints. No tenderness to  palpation. No tenderness of the CTLS spine.   Integument: Warm, Dry, No erythema, No rash. No petechiae.   Neurologic: Alert & oriented x 3, Normal motor function, Normal sensory function, No focal deficits noted.   Psychiatric: Affect normal, Judgment normal, Mood normal. Cooperative.     LAB:  All pertinent labs reviewed and interpreted.  Results for orders placed or performed during the hospital encounter of 12/11/22   UA with Microscopic reflex to Culture    Specimen: Urine, Midstream   Result Value Ref Range    Color Urine Light Yellow Colorless, Straw, Light Yellow, Yellow    Appearance Urine Turbid (A) Clear    Glucose Urine Negative Negative mg/dL    Bilirubin Urine Negative Negative    Ketones Urine Negative Negative mg/dL    Specific Gravity Urine 1.015 1.001 - 1.030    Blood Urine 0.2 mg/dL (A) Negative    pH Urine 7.5 (H) 5.0 - 7.0    Protein Albumin Urine 200 (A) Negative mg/dL    Urobilinogen Urine <2.0 <2.0 mg/dL    Nitrite Urine Negative Negative    Leukocyte Esterase Urine 250 Duglas/uL (A) Negative    Bacteria Urine Many (A) None Seen /HPF    Mucus Urine Present (A) None Seen /LPF    Triple Phosphate Crystals Urine Many (A) None Seen /HPF    RBC Urine 114 (H) <=2 /HPF    WBC Urine 26 (H) <=5 /HPF    Squamous Epithelials Urine 3 (H) <=1 /HPF   Symptomatic Influenza A/B & SARS-CoV2 (COVID-19) Virus PCR Multiplex Nasopharyngeal    Specimen: Nasopharyngeal; Swab   Result Value Ref Range    Influenza A PCR Negative Negative    Influenza B PCR Negative Negative    RSV PCR Negative Negative    SARS CoV2 PCR Negative Negative   Basic metabolic panel   Result Value Ref Range    Sodium 136 136 - 145 mmol/L    Potassium 3.7 3.5 - 5.0 mmol/L    Chloride 107 98 - 107 mmol/L    Carbon Dioxide (CO2) 20 (L) 22 - 31 mmol/L    Anion Gap 9 5 - 18 mmol/L    Urea Nitrogen 19 8 - 22 mg/dL    Creatinine 0.97 0.60 - 1.10 mg/dL    Calcium 10.4 8.5 - 10.5 mg/dL    Glucose 129 (H) 70 - 125 mg/dL    GFR Estimate 78 >60  mL/min/1.73m2   Lactic acid whole blood   Result Value Ref Range    Lactic Acid 1.1 0.7 - 2.0 mmol/L   CBC with platelets and differential   Result Value Ref Range    WBC Count 8.2 4.0 - 11.0 10e3/uL    RBC Count 4.70 3.80 - 5.20 10e6/uL    Hemoglobin 13.9 11.7 - 15.7 g/dL    Hematocrit 41.9 35.0 - 47.0 %    MCV 89 78 - 100 fL    MCH 29.6 26.5 - 33.0 pg    MCHC 33.2 31.5 - 36.5 g/dL    RDW 13.8 10.0 - 15.0 %    Platelet Count 175 150 - 450 10e3/uL    % Neutrophils 68 %    % Lymphocytes 25 %    % Monocytes 5 %    % Eosinophils 2 %    % Basophils 0 %    % Immature Granulocytes 0 %    NRBCs per 100 WBC 0 <1 /100    Absolute Neutrophils 5.5 1.6 - 8.3 10e3/uL    Absolute Lymphocytes 2.1 0.8 - 5.3 10e3/uL    Absolute Monocytes 0.4 0.0 - 1.3 10e3/uL    Absolute Eosinophils 0.1 0.0 - 0.7 10e3/uL    Absolute Basophils 0.0 0.0 - 0.2 10e3/uL    Absolute Immature Granulocytes 0.0 <=0.4 10e3/uL    Absolute NRBCs 0.0 10e3/uL     RADIOLOGY:  Reviewed all pertinent imaging. Please see official radiology report.  No orders to display       I, Clement Bahena, am serving as a scribe to document services personally performed by Dr. Andrade, based on my observation and the provider's statements to me. I, Payam Andrade MD attest that Clement Bahena is acting in a scribe capacity, has observed my performance of the services and has documented them in accordance with my direction.    Payam Andrade MD  [facility] EMERGENCY ROOM  [address] [id number] [phone]      Payam Andrade MD  12/11/22 0974

## 2022-12-12 NOTE — ED TRIAGE NOTES
Pt presents with R flank side and generalized body aches that started 2 days ago. Of note pt has R nephrostomy tube. Pt also reports diarrhea. ABCs intact.      Triage Assessment     Row Name 12/11/22 2743       Triage Assessment (Adult)    Airway WDL WDL       Respiratory WDL    Respiratory WDL WDL       Skin Circulation/Temperature WDL    Skin Circulation/Temperature WDL WDL       Cardiac WDL    Cardiac WDL WDL       Peripheral/Neurovascular WDL    Peripheral Neurovascular WDL WDL       Cognitive/Neuro/Behavioral WDL    Cognitive/Neuro/Behavioral WDL WDL

## 2022-12-13 LAB — BACTERIA UR CULT: NORMAL

## 2022-12-27 ENCOUNTER — APPOINTMENT (OUTPATIENT)
Dept: CT IMAGING | Facility: CLINIC | Age: 37
End: 2022-12-27
Attending: STUDENT IN AN ORGANIZED HEALTH CARE EDUCATION/TRAINING PROGRAM
Payer: COMMERCIAL

## 2022-12-27 ENCOUNTER — HOSPITAL ENCOUNTER (EMERGENCY)
Facility: CLINIC | Age: 37
Discharge: HOME | End: 2022-12-28
Attending: EMERGENCY MEDICINE | Admitting: EMERGENCY MEDICINE
Payer: COMMERCIAL

## 2022-12-27 VITALS
SYSTOLIC BLOOD PRESSURE: 125 MMHG | DIASTOLIC BLOOD PRESSURE: 79 MMHG | TEMPERATURE: 97.7 F | RESPIRATION RATE: 16 BRPM | BODY MASS INDEX: 35.15 KG/M2 | HEART RATE: 91 BPM | OXYGEN SATURATION: 97 % | WEIGHT: 180 LBS

## 2022-12-27 DIAGNOSIS — R10.9 RIGHT FLANK PAIN: ICD-10-CM

## 2022-12-27 DIAGNOSIS — N39.0 URINARY TRACT INFECTION WITHOUT HEMATURIA, SITE UNSPECIFIED: ICD-10-CM

## 2022-12-27 DIAGNOSIS — E87.6 HYPOKALEMIA: ICD-10-CM

## 2022-12-27 LAB
ALBUMIN UR-MCNC: 100 MG/DL
ANION GAP SERPL CALCULATED.3IONS-SCNC: 10 MMOL/L (ref 5–18)
APPEARANCE UR: ABNORMAL
BASOPHILS # BLD AUTO: 0.1 10E3/UL (ref 0–0.2)
BASOPHILS NFR BLD AUTO: 1 %
BILIRUB UR QL STRIP: NEGATIVE
BUN SERPL-MCNC: 11 MG/DL (ref 8–22)
CALCIUM SERPL-MCNC: 10.4 MG/DL (ref 8.5–10.5)
CHLORIDE BLD-SCNC: 104 MMOL/L (ref 98–107)
CO2 SERPL-SCNC: 23 MMOL/L (ref 22–31)
COLOR UR AUTO: ABNORMAL
CREAT SERPL-MCNC: 0.9 MG/DL (ref 0.6–1.1)
EOSINOPHIL # BLD AUTO: 0.2 10E3/UL (ref 0–0.7)
EOSINOPHIL NFR BLD AUTO: 2 %
ERYTHROCYTE [DISTWIDTH] IN BLOOD BY AUTOMATED COUNT: 13.8 % (ref 10–15)
GFR SERPL CREATININE-BSD FRML MDRD: 85 ML/MIN/1.73M2
GLUCOSE BLD-MCNC: 108 MG/DL (ref 70–125)
GLUCOSE UR STRIP-MCNC: NEGATIVE MG/DL
HCT VFR BLD AUTO: 44.1 % (ref 35–47)
HGB BLD-MCNC: 14.8 G/DL (ref 11.7–15.7)
HGB UR QL STRIP: ABNORMAL
IMM GRANULOCYTES # BLD: 0.1 10E3/UL
IMM GRANULOCYTES NFR BLD: 1 %
KETONES UR STRIP-MCNC: NEGATIVE MG/DL
LEUKOCYTE ESTERASE UR QL STRIP: ABNORMAL
LYMPHOCYTES # BLD AUTO: 2.3 10E3/UL (ref 0.8–5.3)
LYMPHOCYTES NFR BLD AUTO: 25 %
MCH RBC QN AUTO: 29.8 PG (ref 26.5–33)
MCHC RBC AUTO-ENTMCNC: 33.6 G/DL (ref 31.5–36.5)
MCV RBC AUTO: 89 FL (ref 78–100)
MONOCYTES # BLD AUTO: 0.5 10E3/UL (ref 0–1.3)
MONOCYTES NFR BLD AUTO: 5 %
MUCOUS THREADS #/AREA URNS LPF: PRESENT /LPF
NEUTROPHILS # BLD AUTO: 6.4 10E3/UL (ref 1.6–8.3)
NEUTROPHILS NFR BLD AUTO: 66 %
NITRATE UR QL: NEGATIVE
NRBC # BLD AUTO: 0 10E3/UL
NRBC BLD AUTO-RTO: 0 /100
PH UR STRIP: 7 [PH] (ref 5–7)
PLATELET # BLD AUTO: 200 10E3/UL (ref 150–450)
POTASSIUM BLD-SCNC: 3.2 MMOL/L (ref 3.5–5)
RBC # BLD AUTO: 4.96 10E6/UL (ref 3.8–5.2)
RBC URINE: 19 /HPF
SODIUM SERPL-SCNC: 137 MMOL/L (ref 136–145)
SP GR UR STRIP: 1.01 (ref 1–1.03)
SQUAMOUS EPITHELIAL: 1 /HPF
UROBILINOGEN UR STRIP-MCNC: <2 MG/DL
WBC # BLD AUTO: 9.5 10E3/UL (ref 4–11)
WBC URINE: 15 /HPF

## 2022-12-27 PROCEDURE — 250N000011 HC RX IP 250 OP 636: Performed by: EMERGENCY MEDICINE

## 2022-12-27 PROCEDURE — 87086 URINE CULTURE/COLONY COUNT: CPT | Performed by: STUDENT IN AN ORGANIZED HEALTH CARE EDUCATION/TRAINING PROGRAM

## 2022-12-27 PROCEDURE — 99285 EMERGENCY DEPT VISIT HI MDM: CPT | Mod: 25

## 2022-12-27 PROCEDURE — 96365 THER/PROPH/DIAG IV INF INIT: CPT | Mod: 59

## 2022-12-27 PROCEDURE — 74177 CT ABD & PELVIS W/CONTRAST: CPT

## 2022-12-27 PROCEDURE — 82310 ASSAY OF CALCIUM: CPT | Performed by: STUDENT IN AN ORGANIZED HEALTH CARE EDUCATION/TRAINING PROGRAM

## 2022-12-27 PROCEDURE — 36415 COLL VENOUS BLD VENIPUNCTURE: CPT | Performed by: STUDENT IN AN ORGANIZED HEALTH CARE EDUCATION/TRAINING PROGRAM

## 2022-12-27 PROCEDURE — 81001 URINALYSIS AUTO W/SCOPE: CPT | Performed by: STUDENT IN AN ORGANIZED HEALTH CARE EDUCATION/TRAINING PROGRAM

## 2022-12-27 PROCEDURE — 85025 COMPLETE CBC W/AUTO DIFF WBC: CPT | Performed by: STUDENT IN AN ORGANIZED HEALTH CARE EDUCATION/TRAINING PROGRAM

## 2022-12-27 RX ORDER — POTASSIUM CHLORIDE 1500 MG/1
40 TABLET, EXTENDED RELEASE ORAL ONCE
Status: COMPLETED | OUTPATIENT
Start: 2022-12-28 | End: 2022-12-28

## 2022-12-27 RX ORDER — CEFTRIAXONE 1 G/1
1 INJECTION, POWDER, FOR SOLUTION INTRAMUSCULAR; INTRAVENOUS ONCE
Status: COMPLETED | OUTPATIENT
Start: 2022-12-28 | End: 2022-12-28

## 2022-12-27 RX ORDER — OXYCODONE HYDROCHLORIDE 5 MG/1
5 TABLET ORAL EVERY 6 HOURS PRN
Qty: 6 TABLET | Refills: 0 | Status: SHIPPED | OUTPATIENT
Start: 2022-12-27 | End: 2022-12-30

## 2022-12-27 RX ORDER — CEPHALEXIN 500 MG/1
500 CAPSULE ORAL 4 TIMES DAILY
Qty: 40 CAPSULE | Refills: 0 | Status: SHIPPED | OUTPATIENT
Start: 2022-12-27 | End: 2023-01-06

## 2022-12-27 RX ORDER — ONDANSETRON 4 MG/1
4 TABLET, FILM COATED ORAL ONCE
Status: COMPLETED | OUTPATIENT
Start: 2022-12-28 | End: 2022-12-28

## 2022-12-27 RX ORDER — IOPAMIDOL 755 MG/ML
100 INJECTION, SOLUTION INTRAVASCULAR ONCE
Status: COMPLETED | OUTPATIENT
Start: 2022-12-27 | End: 2022-12-27

## 2022-12-27 RX ORDER — ONDANSETRON 4 MG/1
4 TABLET, ORALLY DISINTEGRATING ORAL EVERY 8 HOURS PRN
Qty: 20 TABLET | Refills: 0 | Status: SHIPPED | OUTPATIENT
Start: 2022-12-27 | End: 2023-03-03

## 2022-12-27 RX ADMIN — IOPAMIDOL 100 ML: 755 INJECTION, SOLUTION INTRAVENOUS at 23:11

## 2022-12-27 ASSESSMENT — ACTIVITIES OF DAILY LIVING (ADL): ADLS_ACUITY_SCORE: 35

## 2022-12-28 PROCEDURE — 250N000013 HC RX MED GY IP 250 OP 250 PS 637: Performed by: EMERGENCY MEDICINE

## 2022-12-28 PROCEDURE — 250N000011 HC RX IP 250 OP 636: Performed by: EMERGENCY MEDICINE

## 2022-12-28 RX ADMIN — ONDANSETRON 4 MG: 4 TABLET, FILM COATED ORAL at 00:04

## 2022-12-28 RX ADMIN — POTASSIUM CHLORIDE 40 MEQ: 1500 TABLET, EXTENDED RELEASE ORAL at 00:04

## 2022-12-28 RX ADMIN — CEFTRIAXONE 1 G: 1 INJECTION, POWDER, FOR SOLUTION INTRAMUSCULAR; INTRAVENOUS at 00:02

## 2022-12-28 ASSESSMENT — ENCOUNTER SYMPTOMS
WOUND: 0
COUGH: 0
FREQUENCY: 1
VOMITING: 0
FLANK PAIN: 1
CHILLS: 0
DYSURIA: 1
NAUSEA: 1
CONFUSION: 0
SORE THROAT: 0
BACK PAIN: 1
ABDOMINAL PAIN: 0
FEVER: 0
DIARRHEA: 0
NUMBNESS: 0

## 2022-12-28 NOTE — ED NOTES
Presents to department c/o right flank pain. Hx of chronic flank pain/stones. Alert and oriented x 4. Call light in place. No observed acute distress. Took Tylenol at home. VSS. PIV, specimens and imaging per orders.

## 2022-12-28 NOTE — DISCHARGE INSTRUCTIONS
Recommend follow-up with urologist since you have had multiple ER visits recently for flank pain and urinary tract infections for discussion about having antibiotics on-call for you to hopefully frequent ER visits.  Recommend cephalexin 4 times a day for 10 days.  Use Tylenol every 4-6 hours for pain.  Use oxycodone every 4 hours as needed for breakthrough pain.  He can use Zofran every 8 hours as needed for nausea.  Return to the ER for worsening symptoms

## 2022-12-28 NOTE — ED TRIAGE NOTES
pt here with right sided flank pain. Pt has single kidney and hx of stones and blockage. Pain started a few days ago. Tylenol for pain. Pt reports urgency, frequency and hesitancy urination.

## 2022-12-28 NOTE — ED PROVIDER NOTES
EMERGENCY DEPARTMENT ENCOUNTER      NAME: Jacqueline Pappas  AGE: 37 year old female  YOB: 1985  MRN: [id number]  EVALUATION DATE & TIME: 12/27/2022 10:37 PM    PCP: Pao Montague    ED PROVIDER: Alfred Dixon MD        Chief Complaint   Patient presents with     Flank Pain         FINAL IMPRESSION:  1. Right flank pain    2. Urinary tract infection without hematuria, site unspecified    3. Hypokalemia          ED COURSE & MEDICAL DECISION MAKING:    Pertinent Labs & Imaging studies reviewed. (See chart for details)  37 year old female presents to the Emergency Department for evaluation of right flank pain and dysuria and frequency x1 week.  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  Frequent urinary tract infections.  History of kidney stone    Differential includes renal colic, appendicitis, biliary colic, pyelonephritis, hydronephrosis secondary to malfunction, pain from tube itself    Per EMR chart review had urostomy tube exchanged on December 15 through [facility]    Labs and CT imaging ordered prior to me seeing patient.    CT without obstructing stone or evidence of hydronephrosis.  Urine collected from urethra shows evidence of infection    Well-appearing with no fever or chills or elevated white blood cell count or evidence of cellulitis or drainage from her nephrostomy site    Given ceftriaxone which she is tolerated.  Given Zofran.  Given oxycodone for pain    Reviewed last urine cultures that showed mixed urogenital christie    Feel patient could be managed as an outpatient.    Discussed with patient I recommend she follow-up with her urologist and see if they can have an antibiotic prescription on-call for when she develops symptoms to avoid recurrent ER visits         11:44 PM I met with the patient, obtained history, performed an initial exam, and discussed options and plan for diagnostics and treatment here in the ED. PPE worn including N95 mask, surgical gloves, eye  protection.    Medical Decision Making    History:    Supplemental history from: Documented in HPI, if applicable    External Record(s) reviewed: Documented in [facility], if applicable.    Work Up:    Chart documentation includes differential considered and any EKGs or imaging independently interpreted by provider.    In additional to work up documented, I considered the following work up: See chart documentation, if applicable.    External consultation:    Discussion of management with another provider: See chart documentation, if applicable    Complicating factors:    Care impacted by chronic illness: Other: congenital absence of left kidney, s/p nephrostomy tube placement, recurrent UTIs, allergies to antibiotics    Care affected by social determinants of health: N/A    Disposition considerations: Discharge. I prescribed additional prescription strength medication(s) as charted. I considered admission, but ultimately discharged patient Since well-appearing, sepsis unlikely, tolerating oral intake, reassuring work-up.            At the conclusion of the encounter I discussed the results of all of the tests and the disposition. The questions were answered. The patient or family acknowledged understanding and was agreeable with the care plan.           MEDICATIONS GIVEN IN THE EMERGENCY:  Medications   iopamidol (ISOVUE-370) solution 100 mL (100 mLs Intravenous Given 12/27/22 2311)   potassium chloride ER (KLOR-CON M) CR tablet 40 mEq (40 mEq Oral Given 12/28/22 0004)   cefTRIAXone (ROCEPHIN) 1 g vial to attach to  mL bag for ADULTS or NS 50 mL bag for PEDS (0 g Intravenous Stopped 12/28/22 0023)   ondansetron (ZOFRAN) tablet 4 mg (4 mg Oral Given 12/28/22 0004)       NEW PRESCRIPTIONS STARTED AT TODAY'S ER VISIT  Discharge Medication List as of 12/28/2022 12:23 AM      START taking these medications    Details   cephALEXin (KEFLEX) 500 MG capsule Take 1 capsule (500 mg) by mouth 4 times daily for 10 days, Disp-40  "capsule, R-0, Local Print      !! ondansetron (ZOFRAN ODT) 4 MG ODT tab Take 1 tablet (4 mg) by mouth every 8 hours as needed for nausea, Disp-20 tablet, R-0, Local Print      oxyCODONE (ROXICODONE) 5 MG tablet Take 1 tablet (5 mg) by mouth every 6 hours as needed for pain, Disp-6 tablet, R-0, Local Print       !! - Potential duplicate medications found. Please discuss with provider.             =================================================================    HPI    Triage note  \"    pt here with right sided flank pain. Pt has single kidney and hx of stones and blockage. Pain started a few days ago. Tylenol for pain. Pt reports urgency, frequency and hesitancy urination.   \"      Patient information was obtained from: Patient    Use of : N/A        Jacqueline Pappas is a 37 year old female with a pertinent history of congenital absence, s/p right nephrostomy tube placement, recurrent UTIs who presents to this ED by walk in for evaluation of flank pain, dysuria, frequency. Patient endorses right sided flank pain occasionally radiating into the front, dysuria, frequency, nausea for the past couple of days. Patient states she was seen at this ED a couple of weeks ago and placed on antibiotics. She reports improvement to her symptoms, but states she did have some mild pain after finishing her antibiotic course. After she was discharged, patient reports she had her nephrostomy tube changed at the [facility] around     Patient reports she has routinely needs to be seen for her symptoms and estimates she is seen once per month for similar symptoms. Patient's current presentation is consistent with her prior UTIs.    Patient's nephrostomy tube continues to drain normally. No other reported complaints at this time.    Per chart review, patient was last seen at this ED on 12/11/22. UA was concerning for infection, urine culture was with a mix of urogenital christie, concerning for contamination during collection. " No imaging was obtained due to frequent imaging in the past for similar symptoms. Patient was given a shot of rocephin and then discharged with a 7 day course of Keflex 500 mg QID, oxycodone, Zofran.    REVIEW OF SYSTEMS   Review of Systems   Constitutional: Negative for chills and fever.   HENT: Negative for sore throat.    Respiratory: Negative for cough.    Cardiovascular: Negative for chest pain.   Gastrointestinal: Positive for nausea. Negative for abdominal pain, diarrhea and vomiting.   Genitourinary: Positive for dysuria, flank pain (right) and frequency.   Musculoskeletal: Positive for back pain.   Skin: Negative for wound.   Neurological: Negative for numbness.        Denies paraesthesias.   Psychiatric/Behavioral: Negative for confusion.       PAST MEDICAL HISTORY:  Past Medical History:   Diagnosis Date     Acute renal failure (H)      Anemia      Anemia      Calculus of kidney      Congenital absence of left kidney      Congenital absence of one kidney      Depression      Depression      Hydronephrosis      Kidney stone     at age 29     Pyelonephritis      Pyelonephritis      Smoker      Ureteral stricture      UTI (urinary tract infection)      Wrist fracture     Left       PAST SURGICAL HISTORY:  Past Surgical History:   Procedure Laterality Date      SECTION        SECTION      x1     COMBINED CYSTOSCOPY, RETROGRADES, URETEROSCOPY, LASER HOLMIUM LITHOTRIPSY URETER(S), INSERT STENT Right 2016    Procedure: COMBINED CYSTOSCOPY, RETROGRADES, URETEROSCOPY, LASER HOLMIUM LITHOTRIPSY URETER(S), INSERT STENT;  Surgeon: Florentino Awad MD;  Location: [facility] OR     CYSTOSCOPY, URETEROSCOPY, COMBINED Right 2016    Procedure: COMBINED CYSTOSCOPY, URETEROSCOPY;  Surgeon: Florentino Awad MD;  Location: [facility] OR     IR NEPHROLITHOTOMY  2014     IR NEPHROSTOGRAM EXISITING ACCESS  10/18/2018     IR NEPHROSTOMY TUBE CHANGE RIGHT  2018     IR NEPHROSTOMY TUBE CHANGE RIGHT   6/11/2020     IR NEPHROSTOMY TUBE CHANGE RIGHT  12/12/2018     IR NEPHROSTOMY TUBE CHANGE RIGHT  6/11/2020     IR NEPHROSTOMY TUBE PLACEMENT RIGHT  7/24/2016     IR NEPHROSTOMY TUBE PLACEMENT RIGHT  9/14/2017     KIDNEY STONE SURGERY Right 05/2016     LASER HOLMIUM LITHOTRIPSY URETER(S), INSERT STENT, COMBINED Left 11/1/2016    Procedure: COMBINED CYSTOSCOPY, URETEROSCOPY, LASER HOLMIUM LITHOTRIPSY URETER(S), INSERT STENT;  Surgeon: Florentino Awad MD;  Location: [facility] OR     LASER HOLMIUM NEPHROLITHOTOMY VIA PERCUTANEOUS NEPHROSTOMY Right 9/14/2016    Procedure: LASER HOLMIUM NEPHROLITHOTOMY VIA PERCUTANEOUS NEPHROSTOMY;  Surgeon: Florentino Awad MD;  Location: [facility] OR     NEPHROSTOMY W/ INTRODUCTION OF CATHETER Right      OTHER SURGICAL HISTORY      Mole removednasal     OTHER SURGICAL HISTORY Right     Cystoscopy, ureteroscopy, laser11/02/2014 x2 with ureteral stent insertion     PERCUTANEOUS NEPHROSTOMY  11/1/2016    Procedure: PERCUTANEOUS NEPHROSTOMY;  Surgeon: Florentino Awad MD;  Location: [facility] OR     WISDOM TOOTH EXTRACTION       ZZC REMOVAL OF KIDNEY STONE             CURRENT MEDICATIONS:    cephALEXin (KEFLEX) 500 MG capsule  ondansetron (ZOFRAN ODT) 4 MG ODT tab  oxyCODONE (ROXICODONE) 5 MG tablet  albuterol (ACCUNEB) 1.25 MG/3ML neb solution  methocarbamol (ROBAXIN) 750 MG tablet  ondansetron (ZOFRAN ODT) 4 MG ODT tab  prochlorperazine (COMPAZINE) 10 MG tablet  sertraline (ZOLOFT) 100 MG tablet  SPRINTEC 28 0.25-35 MG-MCG tablet        ALLERGIES:  Allergies   Allergen Reactions     Vancomycin      Desogestrel-Ethinyl Estradiol Angioedema and Swelling     Swelling of hands and feet per pt.  Swelling of hands and feet per pt.  Swelling of hands and feet per pt.  Swelling of hands and feet per pt.  Swelling of hands and feet per pt.  Swelling of hands and feet per pt.  Swelling of hands and feet per pt.  Swelling of hands and feet per pt.       Penicillins Rash     As a child per mother; mother  unsure if has tolerated another PCN since. Tolerated ampicillin 18     Sulfa Drugs Rash       FAMILY HISTORY:  Family History   Problem Relation Age of Onset     Anesthesia Reaction No family hx of      Malignant Hyperthermia No family hx of      Heart Disease Maternal Uncle         heart failure     Coronary Artery Disease Other      Heart Disease Maternal Grandmother         pacemaker     Gout Paternal Grandfather      Prostate Cancer Maternal Aunt         breast     Heart Disease Maternal Aunt         pacemaker     Heart Disease Maternal Aunt         pacemaker     Heart Disease Maternal Aunt         pacemaker       SOCIAL HISTORY:   Social History     Socioeconomic History     Marital status: Single   Tobacco Use     Smoking status: Every Day     Packs/day: 0.50     Years: 10.00     Pack years: 5.00     Types: Cigarettes     Last attempt to quit: 2016     Years since quittin.2     Smokeless tobacco: Former     Quit date: 2016     Tobacco comments:     has information from last admission.   Substance and Sexual Activity     Alcohol use: Yes     Alcohol/week: 0.0 standard drinks     Comment: Alcoholic Drinks/day: occ     Drug use: No       VITALS:  /79   Pulse 91   Temp 97.7  F (36.5  C) (Oral)   Resp 16   Wt 81.6 kg (180 lb)   LMP 2022 (Approximate)   SpO2 97%   BMI 35.15 kg/m      PHYSICAL EXAM      Vitals: /79   Pulse 91   Temp 97.7  F (36.5  C) (Oral)   Resp 16   Wt 81.6 kg (180 lb)   LMP 2022 (Approximate)   SpO2 97%   BMI 35.15 kg/m    General: Appears in no acute distress, awake, alert, interactive.  Eyes: Conjunctivae non-injected. Sclera anicteric.  HENT: Atraumatic.  Neck: Supple.  Respiratory/Chest: Respiration unlabored.  Abdomen: non distended.  No guarding or rigidity  Musculoskeletal: Normal extremities. No edema or erythema. Nephrostomy tube to right low back, no redness or drainage  Skin: Normal color. No rash or diaphoresis.  Neurologic: Face  symmetric, moves all extremities spontaneously. Speech clear.  Psychiatric: Oriented to person, place, and time. Affect appropriate.       LAB:  All pertinent labs reviewed and interpreted.  Results for orders placed or performed during the hospital encounter of 12/27/22   CT Abdomen Pelvis w Contrast    Impression    IMPRESSION:   1.  Atrophic native kidneys with compensatory hypertrophy of the right kidney containing a few small calyceal tip stones, without hydronephrosis or hydroureter. Small caliber right percutaneous nephrostomy, similar to prior. Benign cortical cysts   involving the right kidney which do not require specific follow-up.    2.  Diffusely fatty infiltrated moderately enlarged liver without discrete lesion. Smooth contour. Spleen is normal. No ascites.    3.  Slight left convex lumbar curve.   Basic metabolic panel   Result Value Ref Range    Sodium 137 136 - 145 mmol/L    Potassium 3.2 (L) 3.5 - 5.0 mmol/L    Chloride 104 98 - 107 mmol/L    Carbon Dioxide (CO2) 23 22 - 31 mmol/L    Anion Gap 10 5 - 18 mmol/L    Urea Nitrogen 11 8 - 22 mg/dL    Creatinine 0.90 0.60 - 1.10 mg/dL    Calcium 10.4 8.5 - 10.5 mg/dL    Glucose 108 70 - 125 mg/dL    GFR Estimate 85 >60 mL/min/1.73m2   UA with Microscopic reflex to Culture    Specimen: Urine, Clean Catch   Result Value Ref Range    Color Urine Light Yellow Colorless, Straw, Light Yellow, Yellow    Appearance Urine Turbid (A) Clear    Glucose Urine Negative Negative mg/dL    Bilirubin Urine Negative Negative    Ketones Urine Negative Negative mg/dL    Specific Gravity Urine 1.012 1.001 - 1.030    Blood Urine 0.2 mg/dL (A) Negative    pH Urine 7.0 5.0 - 7.0    Protein Albumin Urine 100 (A) Negative mg/dL    Urobilinogen Urine <2.0 <2.0 mg/dL    Nitrite Urine Negative Negative    Leukocyte Esterase Urine 500 Duglas/uL (A) Negative    Mucus Urine Present (A) None Seen /LPF    RBC Urine 19 (H) <=2 /HPF    WBC Urine 15 (H) <=5 /HPF    Squamous Epithelials Urine 1  <=1 /HPF   CBC with platelets and differential   Result Value Ref Range    WBC Count 9.5 4.0 - 11.0 10e3/uL    RBC Count 4.96 3.80 - 5.20 10e6/uL    Hemoglobin 14.8 11.7 - 15.7 g/dL    Hematocrit 44.1 35.0 - 47.0 %    MCV 89 78 - 100 fL    MCH 29.8 26.5 - 33.0 pg    MCHC 33.6 31.5 - 36.5 g/dL    RDW 13.8 10.0 - 15.0 %    Platelet Count 200 150 - 450 10e3/uL    % Neutrophils 66 %    % Lymphocytes 25 %    % Monocytes 5 %    % Eosinophils 2 %    % Basophils 1 %    % Immature Granulocytes 1 %    NRBCs per 100 WBC 0 <1 /100    Absolute Neutrophils 6.4 1.6 - 8.3 10e3/uL    Absolute Lymphocytes 2.3 0.8 - 5.3 10e3/uL    Absolute Monocytes 0.5 0.0 - 1.3 10e3/uL    Absolute Eosinophils 0.2 0.0 - 0.7 10e3/uL    Absolute Basophils 0.1 0.0 - 0.2 10e3/uL    Absolute Immature Granulocytes 0.1 <=0.4 10e3/uL    Absolute NRBCs 0.0 10e3/uL       RADIOLOGY:  Reviewed all pertinent imaging. Please see official radiology report.  CT Abdomen Pelvis w Contrast   Final Result   IMPRESSION:    1.  Atrophic native kidneys with compensatory hypertrophy of the right kidney containing a few small calyceal tip stones, without hydronephrosis or hydroureter. Small caliber right percutaneous nephrostomy, similar to prior. Benign cortical cysts    involving the right kidney which do not require specific follow-up.      2.  Diffusely fatty infiltrated moderately enlarged liver without discrete lesion. Smooth contour. Spleen is normal. No ascites.      3.  Slight left convex lumbar curve.          PROCEDURES:   None      I, Parker Tolliver, am serving as a scribe to document services personally performed by Abdoul Dixon MD based on my observation and the provider's statements to me. I, Dr. Abdoul Dixon, attest that Parker Tolliver is acting in a scribe capacity, has observed my performance of the services and has documented them in accordance with my direction.    Abdoul Dixon MD  Emergency Medicine  [facility]  [place] EMERGENCY ROOM  [facility] [facility] [id number]  [phone]     Abdoul Dixon MD  12/28/22 0041

## 2022-12-29 LAB — BACTERIA UR CULT: NORMAL

## 2023-01-01 ENCOUNTER — HOSPITAL ENCOUNTER (EMERGENCY)
Facility: HOSPITAL | Age: 38
Discharge: HOME | End: 2023-01-01
Attending: EMERGENCY MEDICINE | Admitting: EMERGENCY MEDICINE
Payer: COMMERCIAL

## 2023-01-01 ENCOUNTER — APPOINTMENT (OUTPATIENT)
Dept: ULTRASOUND IMAGING | Facility: HOSPITAL | Age: 38
End: 2023-01-01
Attending: EMERGENCY MEDICINE
Payer: COMMERCIAL

## 2023-01-01 VITALS
OXYGEN SATURATION: 98 % | BODY MASS INDEX: 35.34 KG/M2 | RESPIRATION RATE: 16 BRPM | DIASTOLIC BLOOD PRESSURE: 60 MMHG | HEART RATE: 86 BPM | HEIGHT: 60 IN | SYSTOLIC BLOOD PRESSURE: 106 MMHG | TEMPERATURE: 98 F | WEIGHT: 180 LBS

## 2023-01-01 DIAGNOSIS — R10.9 FLANK PAIN: ICD-10-CM

## 2023-01-01 PROCEDURE — 99284 EMERGENCY DEPT VISIT MOD MDM: CPT | Mod: 25

## 2023-01-01 PROCEDURE — 250N000013 HC RX MED GY IP 250 OP 250 PS 637: Performed by: EMERGENCY MEDICINE

## 2023-01-01 PROCEDURE — 76775 US EXAM ABDO BACK WALL LIM: CPT

## 2023-01-01 PROCEDURE — 250N000011 HC RX IP 250 OP 636: Performed by: EMERGENCY MEDICINE

## 2023-01-01 RX ORDER — ONDANSETRON 4 MG/1
4 TABLET, ORALLY DISINTEGRATING ORAL ONCE
Status: COMPLETED | OUTPATIENT
Start: 2023-01-01 | End: 2023-01-01

## 2023-01-01 RX ORDER — OXYCODONE AND ACETAMINOPHEN 5; 325 MG/1; MG/1
1 TABLET ORAL ONCE
Status: COMPLETED | OUTPATIENT
Start: 2023-01-01 | End: 2023-01-01

## 2023-01-01 RX ADMIN — ONDANSETRON 4 MG: 4 TABLET, ORALLY DISINTEGRATING ORAL at 01:11

## 2023-01-01 RX ADMIN — OXYCODONE HYDROCHLORIDE AND ACETAMINOPHEN 1 TABLET: 5; 325 TABLET ORAL at 01:11

## 2023-01-01 ASSESSMENT — ACTIVITIES OF DAILY LIVING (ADL): ADLS_ACUITY_SCORE: 35

## 2023-01-01 NOTE — ED PROVIDER NOTES
"  EMERGENCY DEPARTMENT ENCOUNTER      NAME: Jacqueline Pappas  AGE: 37 year old female  YOB: 1985  MRN: [id number]  EVALUATION DATE & TIME: 1/1/2023 12:47 AM    PCP: Pao Montague    ED PROVIDER: Beto Medellin M.D.      Chief Complaint   Patient presents with     Flank Pain         FINAL IMPRESSION:  Acute exacerbation of chronic right flank pain      ED COURSE & MEDICAL DECISION MAKING:    Pertinent Labs & Imaging studies reviewed. (See chart for details)  37 year old female presents to the Emergency Department for evaluation of right flank pain.  Patient reports pain is similar to when \"things get pulled\".  Patient well-known due to frequent visits for chronic right flank pain.  Patient with single kidney with right nephrostomy tube which is longstanding.  Patient reports most recently changed on December 19.  Review of records indicate patient seen multiple times over the last few months for flank pain.  Most recent visit few days ago.  Review of culture results indicate mixed christie from urine analysis.  Patient started empirically on antibiotics.  Reports she did feel slightly improved initially.  On exam patient is a mildly obese female in mild distress.  Vital signs unremarkable.  Patient with nephrostomy tube in right flank area.  Tube is unsecured.  Patient reports removing the dressing a few hours ago when she showered and not replacing it.  Patient with tenderness to light touch around the stoma.  No erythema or discharge noted.  Given the possibility of dislodgment we will proceed with ultrasound.  No indications for repeat laboratory evaluation or urinalysis given recent visit.  Patient does report some episodes of nausea.  Given Zofran for symptomatic relief.  Percocet to be given for discomfort.. Patient appears non toxic with stable vitals signs. Overall exam is benign.  Unlikely patient will require hospitalization given presentation.    12:54 AM I met with the patient for the initial " "interview and physical examination. Discussed plan for treatment and workup in the ED.    2:15 AM.  Ultrasound without acute findings in the right kidney.  Patient informed of findings.  Feeling improved after medications.  No indications for need for further evaluation, hospitalization.  At the conclusion of the encounter I discussed the results of all of the tests and the disposition. The questions were answered and return precautions provided. The patient or family acknowledged understanding and was agreeable with the care plan.       PPE: Provider wore gloves, N95 mask, eye protection, surgical cap    MEDICATIONS GIVEN IN THE EMERGENCY:  Medications   ondansetron (ZOFRAN ODT) ODT tab 4 mg (has no administration in time range)   oxyCODONE-acetaminophen (PERCOCET) 5-325 MG per tablet 1 tablet (has no administration in time range)       NEW PRESCRIPTIONS STARTED AT TODAY'S ER VISIT  New Prescriptions    No medications on file      Medical Decision Making      =================================================================    HPI    Patient information was obtained from: Patient     Use of Intrepreter: N/A       Jacqueline Pappas is a 37 year old female with a pertient medical history of pyelonephririts, kidnye stone, GT, solitary right kidney, and right nephrostomy tube who presents to the ED for evaluation of right flank pain    Per chart review: Patient was seen at [facility] ED on 12/27/22 (4 days ago) for right flank pain. CT scan showed no evidence of obstructing stone or hydronephrosis. Urine collected showed evidence of an infection. The patient had no evidence of cellulitis or drainage from nephrostomy site. The patient was given ceftriaxone, oxycodone, and Zofran which the patient tolerated well. the patient was discharged with Keflex.     The patient reports right flank pain starting with \"a little bit of pain\" 3 days ago which has steadily increased to them now feeling \"weak\", \"shaky\", and vomiting in " "the last \"couple of hours\". The patient notes that they started to feel better after taking their prescribed keflex (see chart review), but started to feel worse 3 days ago. The patient reports they had similar symptoms before when their right-sided nephrostomy tube \"got pulled\". The patient normally uses a bandage to hold their nephrostomy tube in place, but removed it \"a couple of hours ago\" when they went to take a shower and have not replaced it since.     REVIEW OF SYSTEMS   Constitutional:  Denies fever, chills. Positive for feeling \"weak and shaky\"  Respiratory:  Denies productive cough or increased work of breathing  Cardiovascular:  Denies chest pain, palpitations  GI:  Denies abdominal pain, nausea, or change in bowel or bladder habits. Positive for vomiting    Musculoskeletal:  Denies any new muscle/joint swelling. Right flank pain  Skin:  Denies rash   Neurologic:  Denies focal weakness  All systems negative except as marked.     PAST MEDICAL HISTORY:  Past Medical History:   Diagnosis Date     Acute renal failure (H)      Anemia      Anemia      Calculus of kidney      Congenital absence of left kidney      Congenital absence of one kidney      Depression      Depression      Hydronephrosis      Kidney stone     at age 29     Pyelonephritis      Pyelonephritis      Smoker      Ureteral stricture      UTI (urinary tract infection)      Wrist fracture     Left       PAST SURGICAL HISTORY:  Past Surgical History:   Procedure Laterality Date      SECTION        SECTION      x1     COMBINED CYSTOSCOPY, RETROGRADES, URETEROSCOPY, LASER HOLMIUM LITHOTRIPSY URETER(S), INSERT STENT Right 2016    Procedure: COMBINED CYSTOSCOPY, RETROGRADES, URETEROSCOPY, LASER HOLMIUM LITHOTRIPSY URETER(S), INSERT STENT;  Surgeon: Florentino Awad MD;  Location: [facility] OR     CYSTOSCOPY, URETEROSCOPY, COMBINED Right 2016    Procedure: COMBINED CYSTOSCOPY, URETEROSCOPY;  Surgeon: Florentino Awad, " MD;  Location: [facility] OR     IR NEPHROLITHOTOMY  12/11/2014     IR NEPHROSTOGRAM EXISITING ACCESS  10/18/2018     IR NEPHROSTOMY TUBE CHANGE RIGHT  12/12/2018     IR NEPHROSTOMY TUBE CHANGE RIGHT  6/11/2020     IR NEPHROSTOMY TUBE CHANGE RIGHT  12/12/2018     IR NEPHROSTOMY TUBE CHANGE RIGHT  6/11/2020     IR NEPHROSTOMY TUBE PLACEMENT RIGHT  7/24/2016     IR NEPHROSTOMY TUBE PLACEMENT RIGHT  9/14/2017     KIDNEY STONE SURGERY Right 05/2016     LASER HOLMIUM LITHOTRIPSY URETER(S), INSERT STENT, COMBINED Left 11/1/2016    Procedure: COMBINED CYSTOSCOPY, URETEROSCOPY, LASER HOLMIUM LITHOTRIPSY URETER(S), INSERT STENT;  Surgeon: Florentino Awad MD;  Location: [facility] OR     LASER HOLMIUM NEPHROLITHOTOMY VIA PERCUTANEOUS NEPHROSTOMY Right 9/14/2016    Procedure: LASER HOLMIUM NEPHROLITHOTOMY VIA PERCUTANEOUS NEPHROSTOMY;  Surgeon: Florentino Awad MD;  Location: [facility] OR     NEPHROSTOMY W/ INTRODUCTION OF CATHETER Right      OTHER SURGICAL HISTORY      Mole removednasal     OTHER SURGICAL HISTORY Right     Cystoscopy, ureteroscopy, laser11/02/2014 x2 with ureteral stent insertion     PERCUTANEOUS NEPHROSTOMY  11/1/2016    Procedure: PERCUTANEOUS NEPHROSTOMY;  Surgeon: Florentino Awad MD;  Location: [facility] OR     WISDOM TOOTH EXTRACTION       ZZC REMOVAL OF KIDNEY STONE           CURRENT MEDICATIONS:      Current Facility-Administered Medications:      ondansetron (ZOFRAN ODT) ODT tab 4 mg, 4 mg, Oral, Once, Beto Medellin MD     oxyCODONE-acetaminophen (PERCOCET) 5-325 MG per tablet 1 tablet, 1 tablet, Oral, Once, Beto Medellin MD    Current Outpatient Medications:      albuterol (ACCUNEB) 1.25 MG/3ML neb solution, Take 1.25 mg by nebulization every 6 hours as needed for shortness of breath / dyspnea or wheezing, Disp: , Rfl:      cephALEXin (KEFLEX) 500 MG capsule, Take 1 capsule (500 mg) by mouth 4 times daily for 10 days, Disp: 40 capsule, Rfl: 0     methocarbamol (ROBAXIN) 750 MG tablet, Take 1 tablet (750 mg)  by mouth 3 times daily as needed for muscle spasms, Disp: 30 tablet, Rfl: 0     ondansetron (ZOFRAN ODT) 4 MG ODT tab, Take 1 tablet (4 mg) by mouth every 8 hours as needed for nausea, Disp: 20 tablet, Rfl: 0     ondansetron (ZOFRAN ODT) 4 MG ODT tab, Take 1 tablet (4 mg) by mouth every 6 hours as needed for nausea, Disp: 10 tablet, Rfl: 0     prochlorperazine (COMPAZINE) 10 MG tablet, Take 1 tablet (10 mg) by mouth every 6 hours as needed for nausea or vomiting, Disp: 10 tablet, Rfl: 0     sertraline (ZOLOFT) 100 MG tablet, Take 150 mg by mouth At Bedtime, Disp: , Rfl:      SPRINTEC 28 0.25-35 MG-MCG tablet, Take 1 tablet by mouth daily, Disp: , Rfl:     ALLERGIES:  Allergies   Allergen Reactions     Vancomycin      Desogestrel-Ethinyl Estradiol Angioedema and Swelling     Swelling of hands and feet per pt.  Swelling of hands and feet per pt.  Swelling of hands and feet per pt.  Swelling of hands and feet per pt.  Swelling of hands and feet per pt.  Swelling of hands and feet per pt.  Swelling of hands and feet per pt.  Swelling of hands and feet per pt.       Penicillins Rash     As a child per mother; mother unsure if has tolerated another PCN since. Tolerated ampicillin 9/20/16     Sulfa Drugs Rash       FAMILY HISTORY:  Family History   Problem Relation Age of Onset     Anesthesia Reaction No family hx of      Malignant Hyperthermia No family hx of      Heart Disease Maternal Uncle         heart failure     Coronary Artery Disease Other      Heart Disease Maternal Grandmother         pacemaker     Gout Paternal Grandfather      Prostate Cancer Maternal Aunt         breast     Heart Disease Maternal Aunt         pacemaker     Heart Disease Maternal Aunt         pacemaker     Heart Disease Maternal Aunt         pacemaker       SOCIAL HISTORY:   Social History     Socioeconomic History     Marital status: Single     Spouse name: None     Number of children: None     Years of education: None     Highest education  level: None   Tobacco Use     Smoking status: Every Day     Packs/day: 0.50     Years: 10.00     Pack years: 5.00     Types: Cigarettes     Last attempt to quit: 2016     Years since quittin.2     Smokeless tobacco: Former     Quit date: 2016     Tobacco comments:     has information from last admission.   Substance and Sexual Activity     Alcohol use: Yes     Alcohol/week: 0.0 standard drinks     Comment: Alcoholic Drinks/day: occ     Drug use: No       VITALS:  Patient Vitals for the past 24 hrs:   BP Temp Temp src Pulse Resp Height Weight   23 0043 130/77 98.5  F (36.9  C) Tympanic 89 18 1.524 m (5') 81.6 kg (180 lb)        PHYSICAL EXAM    Constitutional:  Awake, alert, in mild apparent distress  HENT:  Normocephalic, Atraumatic. Bilateral external ears normal. Oropharynx moist. Nose normal. Neck- Normal range of motion  Eyes:  PERRL, EOMI with no signs of entrapment, Conjunctiva normal, No discharge.   Respiratory:  Normal breath sounds, No respiratory distress, No wheezing.    Cardiovascular:  Normal heart rate, Normal rhythm, No appreciable rubs or gallops.   GI:  Soft, No tenderness, No distension, No palpable masses.  Musculoskeletal:  Intact distal pulses, No edema. Good range of motion in all major joints. No tenderness to palpation or major deformities noted.  Integument:  Warm, Dry, No erythema, No rash. Right nephrostomy tube in place with moderate tenderness to light touch at stoma, no surrounding erythema or warmth or fluctuance  Neurologic:  Alert & oriented, Normal motor function, Normal sensory function, No focal deficits noted.   Psychiatric:  Affect normal, Judgment normal, Mood normal.     LAB:  All pertinent labs reviewed and interpreted.       RADIOLOGY:  Reviewed all pertinent imaging. Please see official radiology report.  CT Abdomen Pelvis w Contrast    Result Date: 2022  EXAM: CT ABDOMEN PELVIS W CONTRAST LOCATION: [facility] DATE/TIME:  12/27/2022 11:14 PM INDICATION: Right flank pain. COMPARISON: CT abdomen and pelvis without IV contrast 11/15/2022. TECHNIQUE: CT scan of the abdomen and pelvis was performed following injection of IV contrast. Multiplanar reformats were obtained. Dose reduction techniques were used. CONTRAST: 100 mL Isovue 370 IV. FINDINGS: LOWER CHEST: Minor strands of linear atelectasis in the lower lungs. No pleural effusion on either side. Normal cardiac size. No pericardial effusion. HEPATOBILIARY: Diffusely fatty infiltrated moderately enlarged liver, length of the right lobe measures 21 cm (image 39, series 4). No discrete lesion. Smooth hepatic contour. Patent portal veins. No calcified gallstones or biliary dilatation. PANCREAS: Normal. SPLEEN: Normal. ADRENAL GLANDS: Normal. KIDNEYS/BLADDER: Atrophic native left kidney with compensatory hypertrophy of the right kidney containing a few small calyceal tip stones measuring up to 0.4 cm (images 67, 75 and 79, series 2, images 55-56, series 4). Small caliber right percutaneous nephrostomy located in the inferior aspect of the collecting system. There are a few benign cortical cysts involving the right kidney which do not require specific follow-up. No hydronephrosis or hydroureter. Partially distended normal urinary bladder. BOWEL: Stomach is moderately distended with residual food material and fluid. No mechanical bowel obstruction, free gas or free fluid. LYMPH NODES: No suspicious abdominopelvic adenopathy VASCULATURE: Normal caliber abdominal aorta and the IVC. PELVIC ORGANS: Anteverted normal uterus. Dominant follicle in each ovary. No adenopathy or free fluid. MUSCULOSKELETAL: Slight left convex lumbar curve.     IMPRESSION: 1.  Atrophic native kidneys with compensatory hypertrophy of the right kidney containing a few small calyceal tip stones, without hydronephrosis or hydroureter. Small caliber right percutaneous nephrostomy, similar to prior. Benign cortical cysts  involving the right kidney which do not require specific follow-up. 2.  Diffusely fatty infiltrated moderately enlarged liver without discrete lesion. Smooth contour. Spleen is normal. No ascites. 3.  Slight left convex lumbar curve.    US Kidney Right    Result Date: 1/1/2023  EXAM: US KIDNEY RIGHT LOCATION: [facility] DATE/TIME: 1/1/2023 2:00 AM INDICATION: Right flank pain, history of nephrostomy tube COMPARISON: 12/27/2022 TECHNIQUE: Routine Right Renal and Bladder Ultrasound. FINDINGS: RIGHT KIDNEY: 10.8 cm. Right percutaneous nephrostomy tube inferiorly. Multiple right renal cysts. Renal calculi better seen on prior CT. BLADDER: Grossly within normal limits.     IMPRESSION: 1. No right hydronephrosis. Multiple right renal cyst as seen previously. 2. Renal calculi better seen on prior exam. 3. Percutaneous nephrostomy noted along the lower pole of the right kidney.          I, Deborah Key, am serving as a scribe to document services personally performed by Beto Medellin MD, based on my observation and the provider's statements to me. I, Beto Medellin MD attest that Deborah Key is acting in a scribe capacity, has observed my performance of the services and has documented them in accordance with my direction.    Beto Medellin M.D.  Emergency Medicine  [facility] EMERGENCY DEPARTMENT     Beto Medellin MD  01/01/23 0221

## 2023-01-01 NOTE — ED TRIAGE NOTES
Left flank pain onset 3 days ago. Nausea and vomiting within the last 2 Hours     Triage Assessment     Row Name 01/01/23 0045       Triage Assessment (Adult)    Airway WDL WDL       Respiratory WDL    Respiratory WDL WDL       Skin Circulation/Temperature WDL    Skin Circulation/Temperature WDL WDL       Cardiac WDL    Cardiac WDL WDL       Peripheral/Neurovascular WDL    Peripheral Neurovascular WDL WDL       Cognitive/Neuro/Behavioral WDL    Cognitive/Neuro/Behavioral WDL WDL

## 2023-01-25 ENCOUNTER — APPOINTMENT (OUTPATIENT)
Dept: CT IMAGING | Facility: HOSPITAL | Age: 38
End: 2023-01-25
Attending: EMERGENCY MEDICINE
Payer: COMMERCIAL

## 2023-01-25 ENCOUNTER — HOSPITAL ENCOUNTER (EMERGENCY)
Facility: HOSPITAL | Age: 38
Discharge: HOME | End: 2023-01-25
Admitting: PHYSICIAN ASSISTANT
Payer: COMMERCIAL

## 2023-01-25 VITALS
SYSTOLIC BLOOD PRESSURE: 121 MMHG | RESPIRATION RATE: 16 BRPM | HEART RATE: 63 BPM | BODY MASS INDEX: 35.15 KG/M2 | TEMPERATURE: 98.2 F | OXYGEN SATURATION: 99 % | WEIGHT: 180 LBS | DIASTOLIC BLOOD PRESSURE: 67 MMHG

## 2023-01-25 DIAGNOSIS — R10.9 FLANK PAIN: ICD-10-CM

## 2023-01-25 LAB
ALBUMIN UR-MCNC: 100 MG/DL
AMORPH CRY #/AREA URNS HPF: ABNORMAL /HPF
ANION GAP SERPL CALCULATED.3IONS-SCNC: 8 MMOL/L (ref 7–15)
APPEARANCE UR: ABNORMAL
BACTERIA #/AREA URNS HPF: ABNORMAL /HPF
BASOPHILS # BLD AUTO: 0 10E3/UL (ref 0–0.2)
BASOPHILS NFR BLD AUTO: 0 %
BILIRUB UR QL STRIP: NEGATIVE
BUN SERPL-MCNC: 17.5 MG/DL (ref 6–20)
CALCIUM SERPL-MCNC: 11 MG/DL (ref 8.6–10)
CHLORIDE SERPL-SCNC: 101 MMOL/L (ref 98–107)
COLOR UR AUTO: ABNORMAL
CREAT SERPL-MCNC: 1.17 MG/DL (ref 0.51–0.95)
DEPRECATED HCO3 PLAS-SCNC: 26 MMOL/L (ref 22–29)
EOSINOPHIL # BLD AUTO: 0.1 10E3/UL (ref 0–0.7)
EOSINOPHIL NFR BLD AUTO: 1 %
ERYTHROCYTE [DISTWIDTH] IN BLOOD BY AUTOMATED COUNT: 13.7 % (ref 10–15)
GFR SERPL CREATININE-BSD FRML MDRD: 62 ML/MIN/1.73M2
GLUCOSE SERPL-MCNC: 87 MG/DL (ref 70–99)
GLUCOSE UR STRIP-MCNC: NEGATIVE MG/DL
HCG UR QL: NEGATIVE
HCT VFR BLD AUTO: 45.5 % (ref 35–47)
HGB BLD-MCNC: 15.1 G/DL (ref 11.7–15.7)
HGB UR QL STRIP: ABNORMAL
IMM GRANULOCYTES # BLD: 0.1 10E3/UL
IMM GRANULOCYTES NFR BLD: 1 %
KETONES UR STRIP-MCNC: NEGATIVE MG/DL
LEUKOCYTE ESTERASE UR QL STRIP: ABNORMAL
LYMPHOCYTES # BLD AUTO: 1.9 10E3/UL (ref 0.8–5.3)
LYMPHOCYTES NFR BLD AUTO: 22 %
MCH RBC QN AUTO: 30.1 PG (ref 26.5–33)
MCHC RBC AUTO-ENTMCNC: 33.2 G/DL (ref 31.5–36.5)
MCV RBC AUTO: 91 FL (ref 78–100)
MONOCYTES # BLD AUTO: 0.6 10E3/UL (ref 0–1.3)
MONOCYTES NFR BLD AUTO: 7 %
MUCOUS THREADS #/AREA URNS LPF: PRESENT /LPF
NEUTROPHILS # BLD AUTO: 6.1 10E3/UL (ref 1.6–8.3)
NEUTROPHILS NFR BLD AUTO: 69 %
NITRATE UR QL: NEGATIVE
NRBC # BLD AUTO: 0 10E3/UL
NRBC BLD AUTO-RTO: 0 /100
PH UR STRIP: 8 [PH] (ref 5–7)
PLATELET # BLD AUTO: 200 10E3/UL (ref 150–450)
POTASSIUM SERPL-SCNC: 3.7 MMOL/L (ref 3.4–5.3)
RBC # BLD AUTO: 5.02 10E6/UL (ref 3.8–5.2)
RBC URINE: 7 /HPF
SODIUM SERPL-SCNC: 135 MMOL/L (ref 136–145)
SP GR UR STRIP: 1.01 (ref 1–1.03)
SQUAMOUS EPITHELIAL: 2 /HPF
TRI-PHOS CRY #/AREA URNS HPF: ABNORMAL /HPF
UROBILINOGEN UR STRIP-MCNC: <2 MG/DL
WBC # BLD AUTO: 8.8 10E3/UL (ref 4–11)
WBC URINE: 1 /HPF

## 2023-01-25 PROCEDURE — 81025 URINE PREGNANCY TEST: CPT | Performed by: EMERGENCY MEDICINE

## 2023-01-25 PROCEDURE — 85014 HEMATOCRIT: CPT | Performed by: STUDENT IN AN ORGANIZED HEALTH CARE EDUCATION/TRAINING PROGRAM

## 2023-01-25 PROCEDURE — 81001 URINALYSIS AUTO W/SCOPE: CPT | Performed by: STUDENT IN AN ORGANIZED HEALTH CARE EDUCATION/TRAINING PROGRAM

## 2023-01-25 PROCEDURE — 36415 COLL VENOUS BLD VENIPUNCTURE: CPT | Performed by: STUDENT IN AN ORGANIZED HEALTH CARE EDUCATION/TRAINING PROGRAM

## 2023-01-25 PROCEDURE — 87086 URINE CULTURE/COLONY COUNT: CPT | Performed by: STUDENT IN AN ORGANIZED HEALTH CARE EDUCATION/TRAINING PROGRAM

## 2023-01-25 PROCEDURE — 250N000013 HC RX MED GY IP 250 OP 250 PS 637: Performed by: PHYSICIAN ASSISTANT

## 2023-01-25 PROCEDURE — 80048 BASIC METABOLIC PNL TOTAL CA: CPT | Performed by: STUDENT IN AN ORGANIZED HEALTH CARE EDUCATION/TRAINING PROGRAM

## 2023-01-25 PROCEDURE — 74176 CT ABD & PELVIS W/O CONTRAST: CPT

## 2023-01-25 PROCEDURE — 99284 EMERGENCY DEPT VISIT MOD MDM: CPT | Mod: 25

## 2023-01-25 RX ORDER — OXYCODONE AND ACETAMINOPHEN 5; 325 MG/1; MG/1
1 TABLET ORAL ONCE
Status: COMPLETED | OUTPATIENT
Start: 2023-01-25 | End: 2023-01-25

## 2023-01-25 RX ADMIN — OXYCODONE HYDROCHLORIDE AND ACETAMINOPHEN 1 TABLET: 5; 325 TABLET ORAL at 19:25

## 2023-01-25 ASSESSMENT — ENCOUNTER SYMPTOMS
FATIGUE: 1
FLANK PAIN: 1
ABDOMINAL PAIN: 1

## 2023-01-25 ASSESSMENT — ACTIVITIES OF DAILY LIVING (ADL): ADLS_ACUITY_SCORE: 33

## 2023-01-25 NOTE — ED TRIAGE NOTES
Patient presents here for evaluation of right side flank pain. Patient has only one kidney, which is on the right side. She reports that she has a nephrostomy tube to that ureter. She has been experiencing pain for the past two days. She notes that her urine is darker than usual.

## 2023-01-26 NOTE — ED PROVIDER NOTES
EMERGENCY DEPARTMENT ENCOUNTER      NAME: Jacqueline Pappas  AGE: 37 year old female  YOB: 1985  MRN: [id number]  EVALUATION DATE & TIME: No admission date for patient encounter.    PCP: Pao Montague    ED PROVIDER: Cj Sebastian PA-C      Chief Complaint   Patient presents with     Flank Pain         FINAL IMPRESSION:  1. Flank pain          MEDICAL DECISION MAKING:    Pertinent Labs & Imaging studies reviewed. (See chart for details)  37 year old female presents to the Emergency Department for evaluation of 2-day history of worsening right-sided flank pain.  Patient has had previous left-sided nephrectomy and has had issues with kidney stones and hydronephrosis on the right side.  Currently has nephrostomy tube that goes into the ureter.  She describes output has been darker and pain has increased over the last couple of days.    I reviewed provider in triage work-up, currently patient has been brought to CT scan.    Medical Decision Making    History:    Supplemental history from: Documented in chart, if applicable    External Record(s) reviewed: Documented in chart, if applicable. and Inpatient Record: Previous ED visits in the last 3 months were reviewed as well as imaging records.    Work Up:    Chart documentation includes differential considered and any EKGs or imaging independently interpreted by provider, where specified.    In additional to work up documented, I considered the following work up: Documented in chart, if applicable.    External consultation:    Discussion of management with another provider: Documented in chart, if applicable    Complicating factors:    Care impacted by chronic illness: N/A    Care affected by social determinants of health: N/A    Disposition considerations: Discharge. No recommendations on prescription strength medication(s). N/A.        All of the patient's labs and imaging reports are reassuring, and these correlate with a patient that appears nontoxic  no distress with normal vital signs.  I do not feel that further emergent work-up is necessary.  Plan to recommend outpatient follow-up through her specialist at the [facility], as always if she has new or worsening symptoms she can consider returning to the ED.    Patient's urinalysis has similar appearance to previous, previous urine culture was reviewed and mixture of urogenital organisms grew out therefore no indication for empiric antibiotics at this time.    ED COURSE    I met with the patient, obtained history, performed an initial exam, and discussed options and plan for diagnostics and treatment here in the ED.    At the conclusion of the encounter I discussed the results of all of the tests and the disposition. The questions were answered. The patient or family acknowledged understanding and was agreeable with the care plan.     MEDICATIONS GIVEN IN THE EMERGENCY:  Medications   oxyCODONE-acetaminophen (PERCOCET) 5-325 MG per tablet 1 tablet (has no administration in time range)       NEW PRESCRIPTIONS STARTED AT TODAY'S ER VISIT  New Prescriptions    No medications on file            =================================================================    HPI    Patient information was obtained from: Patient and review of previous records      Jacqueline Pappas is a 37 year old female with a pertinent history of previous nephrectomy with recurrence of right-sided flank pain, infections and kidney stones who presents to this ED for evaluation of continued complaints of right-sided flank pain.  Patient states that over the last 2 days she has increasing in pain and the output from her nephrostomy tube is darker in color.  She denies any fever.  No reports of vomiting but she is nauseated.  Patient has had similar presentations in the ED for similar complaints.      REVIEW OF SYSTEMS   Review of Systems   Constitutional: Positive for fatigue.   Gastrointestinal: Positive for abdominal pain.   Genitourinary:  Positive for flank pain.        Darker colored urine   All other systems reviewed and are negative.         PAST MEDICAL HISTORY:  Past Medical History:   Diagnosis Date     Acute renal failure (H)      Anemia      Anemia      Calculus of kidney      Congenital absence of left kidney      Congenital absence of one kidney      Depression      Depression      Hydronephrosis      Kidney stone     at age 29     Pyelonephritis      Pyelonephritis      Smoker      Ureteral stricture      UTI (urinary tract infection)      Wrist fracture     Left       PAST SURGICAL HISTORY:  Past Surgical History:   Procedure Laterality Date      SECTION        SECTION      x1     COMBINED CYSTOSCOPY, RETROGRADES, URETEROSCOPY, LASER HOLMIUM LITHOTRIPSY URETER(S), INSERT STENT Right 2016    Procedure: COMBINED CYSTOSCOPY, RETROGRADES, URETEROSCOPY, LASER HOLMIUM LITHOTRIPSY URETER(S), INSERT STENT;  Surgeon: Florentino Awad MD;  Location: [facility] OR     CYSTOSCOPY, URETEROSCOPY, COMBINED Right 2016    Procedure: COMBINED CYSTOSCOPY, URETEROSCOPY;  Surgeon: Florentino Awad MD;  Location: [facility] OR     IR NEPHROLITHOTOMY  2014     IR NEPHROSTOGRAM EXISITING ACCESS  10/18/2018     IR NEPHROSTOMY TUBE CHANGE RIGHT  2018     IR NEPHROSTOMY TUBE CHANGE RIGHT  2020     IR NEPHROSTOMY TUBE CHANGE RIGHT  2018     IR NEPHROSTOMY TUBE CHANGE RIGHT  2020     IR NEPHROSTOMY TUBE PLACEMENT RIGHT  2016     IR NEPHROSTOMY TUBE PLACEMENT RIGHT  2017     KIDNEY STONE SURGERY Right 2016     LASER HOLMIUM LITHOTRIPSY URETER(S), INSERT STENT, COMBINED Left 2016    Procedure: COMBINED CYSTOSCOPY, URETEROSCOPY, LASER HOLMIUM LITHOTRIPSY URETER(S), INSERT STENT;  Surgeon: Florentino Awad MD;  Location: [facility] OR     LASER HOLMIUM NEPHROLITHOTOMY VIA PERCUTANEOUS NEPHROSTOMY Right 2016    Procedure: LASER HOLMIUM NEPHROLITHOTOMY VIA PERCUTANEOUS NEPHROSTOMY;  Surgeon: Ritesh  Florentino Jerez MD;  Location: [facility] OR     NEPHROSTOMY W/ INTRODUCTION OF CATHETER Right      OTHER SURGICAL HISTORY      Mole removednasal     OTHER SURGICAL HISTORY Right     Cystoscopy, ureteroscopy, laser11/02/2014 x2 with ureteral stent insertion     PERCUTANEOUS NEPHROSTOMY  11/1/2016    Procedure: PERCUTANEOUS NEPHROSTOMY;  Surgeon: Florentino Awad MD;  Location: [facility] OR     WISDOM TOOTH EXTRACTION       ZZC REMOVAL OF KIDNEY STONE           CURRENT MEDICATIONS:      Current Facility-Administered Medications:      oxyCODONE-acetaminophen (PERCOCET) 5-325 MG per tablet 1 tablet, 1 tablet, Oral, Once, Cj Sebastian PA-C    Current Outpatient Medications:      albuterol (ACCUNEB) 1.25 MG/3ML neb solution, Take 1.25 mg by nebulization every 6 hours as needed for shortness of breath / dyspnea or wheezing, Disp: , Rfl:      methocarbamol (ROBAXIN) 750 MG tablet, Take 1 tablet (750 mg) by mouth 3 times daily as needed for muscle spasms, Disp: 30 tablet, Rfl: 0     ondansetron (ZOFRAN ODT) 4 MG ODT tab, Take 1 tablet (4 mg) by mouth every 8 hours as needed for nausea, Disp: 20 tablet, Rfl: 0     ondansetron (ZOFRAN ODT) 4 MG ODT tab, Take 1 tablet (4 mg) by mouth every 6 hours as needed for nausea, Disp: 10 tablet, Rfl: 0     prochlorperazine (COMPAZINE) 10 MG tablet, Take 1 tablet (10 mg) by mouth every 6 hours as needed for nausea or vomiting, Disp: 10 tablet, Rfl: 0     sertraline (ZOLOFT) 100 MG tablet, Take 150 mg by mouth At Bedtime, Disp: , Rfl:      SPRINTEC 28 0.25-35 MG-MCG tablet, Take 1 tablet by mouth daily, Disp: , Rfl:       ALLERGIES:  Allergies   Allergen Reactions     Vancomycin      Desogestrel-Ethinyl Estradiol Angioedema and Swelling     Swelling of hands and feet per pt.  Swelling of hands and feet per pt.  Swelling of hands and feet per pt.  Swelling of hands and feet per pt.  Swelling of hands and feet per pt.  Swelling of hands and feet per pt.  Swelling of hands and feet per pt.  Swelling  of hands and feet per pt.       Penicillins Rash     As a child per mother; mother unsure if has tolerated another PCN since. Tolerated ampicillin 18     Sulfa Drugs Rash       FAMILY HISTORY:  Family History   Problem Relation Age of Onset     Anesthesia Reaction No family hx of      Malignant Hyperthermia No family hx of      Heart Disease Maternal Uncle         heart failure     Coronary Artery Disease Other      Heart Disease Maternal Grandmother         pacemaker     Gout Paternal Grandfather      Prostate Cancer Maternal Aunt         breast     Heart Disease Maternal Aunt         pacemaker     Heart Disease Maternal Aunt         pacemaker     Heart Disease Maternal Aunt         pacemaker       SOCIAL HISTORY:   Social History     Socioeconomic History     Marital status: Single   Tobacco Use     Smoking status: Every Day     Packs/day: 0.50     Years: 10.00     Pack years: 5.00     Types: Cigarettes     Last attempt to quit: 2016     Years since quittin.3     Smokeless tobacco: Former     Quit date: 2016     Tobacco comments:     has information from last admission.   Substance and Sexual Activity     Alcohol use: Yes     Alcohol/week: 0.0 standard drinks     Comment: Alcoholic Drinks/day: occ     Drug use: No       VITALS:  Patient Vitals for the past 24 hrs:   BP Temp Temp src Pulse Resp SpO2 Weight   23 1513 127/72 98.2  F (36.8  C) Oral 89 16 99 % 81.6 kg (180 lb)       PHYSICAL EXAM    Physical Exam  Vitals and nursing note reviewed.   Constitutional:       General: She is not in acute distress.     Appearance: She is normal weight. She is not ill-appearing or toxic-appearing.   HENT:      Nose: Nose normal.   Eyes:      Conjunctiva/sclera: Conjunctivae normal.   Cardiovascular:      Rate and Rhythm: Normal rate.      Pulses: Normal pulses.   Pulmonary:      Effort: Pulmonary effort is normal. No respiratory distress.   Abdominal:      General: Abdomen is flat.      Tenderness:  There is no abdominal tenderness. There is right CVA tenderness.      Comments: Skin around the nephrostomy tube is normal, no induration, erythema, warmth, no gross evidence of trauma.   Musculoskeletal:         General: No deformity or signs of injury. Normal range of motion.      Cervical back: Normal range of motion.   Skin:     General: Skin is warm and dry.      Findings: No rash.   Neurological:      General: No focal deficit present.      Mental Status: She is alert. Mental status is at baseline.      Sensory: No sensory deficit.      Motor: No weakness.          LAB:  All pertinent labs reviewed and interpreted.  Results for orders placed or performed during the hospital encounter of 01/25/23   CT Abdomen Pelvis w/o Contrast    Impression    IMPRESSION:     1.  No significant change since prior.     2.  No obvious acute findings to explain symptoms.    3.  Appropriately positioned right percutaneous nephrostomy tube. No hydronephrosis. No subcutaneous collection or gas along the course of the tubing.     4.  Stable nonobstructing right renal stones. No ureteral or bladder stones.       Basic metabolic panel   Result Value Ref Range    Sodium 135 (L) 136 - 145 mmol/L    Potassium 3.7 3.4 - 5.3 mmol/L    Chloride 101 98 - 107 mmol/L    Carbon Dioxide (CO2) 26 22 - 29 mmol/L    Anion Gap 8 7 - 15 mmol/L    Urea Nitrogen 17.5 6.0 - 20.0 mg/dL    Creatinine 1.17 (H) 0.51 - 0.95 mg/dL    Calcium 11.0 (H) 8.6 - 10.0 mg/dL    Glucose 87 70 - 99 mg/dL    GFR Estimate 62 >60 mL/min/1.73m2   UA with Microscopic reflex to Culture    Specimen: Urine, Clean Catch   Result Value Ref Range    Color Urine Light Yellow Colorless, Straw, Light Yellow, Yellow    Appearance Urine Turbid (A) Clear    Glucose Urine Negative Negative mg/dL    Bilirubin Urine Negative Negative    Ketones Urine Negative Negative mg/dL    Specific Gravity Urine 1.014 1.001 - 1.030    Blood Urine 0.06 mg/dL (A) Negative    pH Urine 8.0 (H) 5.0 - 7.0     Protein Albumin Urine 100 (A) Negative mg/dL    Urobilinogen Urine <2.0 <2.0 mg/dL    Nitrite Urine Negative Negative    Leukocyte Esterase Urine 500 Duglas/uL (A) Negative    Bacteria Urine Few (A) None Seen /HPF    Mucus Urine Present (A) None Seen /LPF    Amorphous Crystals Urine Few (A) None Seen /HPF    Triple Phosphate Crystals Urine Few (A) None Seen /HPF    RBC Urine 7 (H) <=2 /HPF    WBC Urine 1 <=5 /HPF    Squamous Epithelials Urine 2 (H) <=1 /HPF   HCG qualitative urine   Result Value Ref Range    hCG Urine Qualitative Negative Negative   CBC with platelets and differential   Result Value Ref Range    WBC Count 8.8 4.0 - 11.0 10e3/uL    RBC Count 5.02 3.80 - 5.20 10e6/uL    Hemoglobin 15.1 11.7 - 15.7 g/dL    Hematocrit 45.5 35.0 - 47.0 %    MCV 91 78 - 100 fL    MCH 30.1 26.5 - 33.0 pg    MCHC 33.2 31.5 - 36.5 g/dL    RDW 13.7 10.0 - 15.0 %    Platelet Count 200 150 - 450 10e3/uL    % Neutrophils 69 %    % Lymphocytes 22 %    % Monocytes 7 %    % Eosinophils 1 %    % Basophils 0 %    % Immature Granulocytes 1 %    NRBCs per 100 WBC 0 <1 /100    Absolute Neutrophils 6.1 1.6 - 8.3 10e3/uL    Absolute Lymphocytes 1.9 0.8 - 5.3 10e3/uL    Absolute Monocytes 0.6 0.0 - 1.3 10e3/uL    Absolute Eosinophils 0.1 0.0 - 0.7 10e3/uL    Absolute Basophils 0.0 0.0 - 0.2 10e3/uL    Absolute Immature Granulocytes 0.1 <=0.4 10e3/uL    Absolute NRBCs 0.0 10e3/uL       RADIOLOGY:  Reviewed all pertinent imaging. Please see official radiology report.  CT Abdomen Pelvis w/o Contrast   Final Result   IMPRESSION:       1.  No significant change since prior.       2.  No obvious acute findings to explain symptoms.      3.  Appropriately positioned right percutaneous nephrostomy tube. No hydronephrosis. No subcutaneous collection or gas along the course of the tubing.       4.  Stable nonobstructing right renal stones. No ureteral or bladder stones.                            Cj Sebastian PA-C  Emergency Medicine  [place]  Cj Velasco PA-C  01/25/23 1913

## 2023-01-26 NOTE — DISCHARGE INSTRUCTIONS
No evidence of infection or hydronephrosis or ureteral stone noted on CT.  Labs are all reassuring.  I recommend that you follow-up through your specialist through the [facility] if there is ongoing symptoms.

## 2023-01-27 LAB — BACTERIA UR CULT: NORMAL

## 2023-02-03 ENCOUNTER — HOSPITAL ENCOUNTER (EMERGENCY)
Facility: HOSPITAL | Age: 38
Discharge: HOME | End: 2023-02-03
Attending: STUDENT IN AN ORGANIZED HEALTH CARE EDUCATION/TRAINING PROGRAM | Admitting: STUDENT IN AN ORGANIZED HEALTH CARE EDUCATION/TRAINING PROGRAM
Payer: COMMERCIAL

## 2023-02-03 VITALS
WEIGHT: 180 LBS | BODY MASS INDEX: 35.34 KG/M2 | DIASTOLIC BLOOD PRESSURE: 67 MMHG | HEART RATE: 70 BPM | HEIGHT: 60 IN | RESPIRATION RATE: 20 BRPM | OXYGEN SATURATION: 99 % | TEMPERATURE: 97.3 F | SYSTOLIC BLOOD PRESSURE: 123 MMHG

## 2023-02-03 DIAGNOSIS — R05.9 COUGH, UNSPECIFIED TYPE: ICD-10-CM

## 2023-02-03 DIAGNOSIS — U07.1 COVID-19: ICD-10-CM

## 2023-02-03 LAB
FLUAV RNA SPEC QL NAA+PROBE: NEGATIVE
FLUBV RNA RESP QL NAA+PROBE: NEGATIVE
RSV RNA SPEC NAA+PROBE: NEGATIVE
SARS-COV-2 RNA RESP QL NAA+PROBE: POSITIVE

## 2023-02-03 PROCEDURE — C9803 HOPD COVID-19 SPEC COLLECT: HCPCS

## 2023-02-03 PROCEDURE — 87637 SARSCOV2&INF A&B&RSV AMP PRB: CPT | Performed by: STUDENT IN AN ORGANIZED HEALTH CARE EDUCATION/TRAINING PROGRAM

## 2023-02-03 PROCEDURE — 99283 EMERGENCY DEPT VISIT LOW MDM: CPT | Mod: CS

## 2023-02-03 ASSESSMENT — ENCOUNTER SYMPTOMS
VOMITING: 0
FEVER: 0
RHINORRHEA: 1
HEADACHES: 1
FLANK PAIN: 1
SHORTNESS OF BREATH: 0
DYSURIA: 0
COUGH: 1
DIARRHEA: 0

## 2023-02-03 ASSESSMENT — ACTIVITIES OF DAILY LIVING (ADL): ADLS_ACUITY_SCORE: 35

## 2023-02-03 NOTE — Clinical Note
Jacqueline Pappas was seen and treated in our emergency department on 2/3/2023.  She may return to work on 02/06/2023.  If fever free and symptoms improving     If you have any questions or concerns, please don't hesitate to call.      Ingrid Mina MD

## 2023-02-03 NOTE — ED PROVIDER NOTES
NAME: Jacqueline Pappas  AGE: 37 year old female  YOB: 1985  MRN: [id number]  EVALUATION DATE & TIME: 2/3/2023  5:35 AM    PCP: Pao Montague  ED PROVIDER: Ingrid Mina MD.    Chief Complaint   Patient presents with     runny nose/cough, Covid?       FINAL IMPRESSION:  1. COVID-19    2. Cough, unspecified type        MEDICAL DECISION MAKIN:28 AM I met with the patient, obtained history, performed an initial exam, and discussed options and plan for diagnostics and treatment here in the ED.   7:40 AM Rechecked patient. Updated them on results. Discussed plans for discharge, symptom management and return precautions. Patient is in agreement.    MDM: 36 y/o F with h/o solitary kidney s/p chronic nephrostomy tubes who presents with cough, runny nose, mild headache. She reports she is here for a covid test because she took one today that had a faint positive line.  On exam she is nontoxic-appearing with no respiratory distress and has reassuring vitals.  Lungs are clear, I discussed considering a chest x-ray to evaluate for pneumonia or other etiologies but she declined which I think is reasonable given her reassuring exam.  He has no chest pain or shortness of breath, do not suspect PE, pericarditis, myocarditis, ACS, CHF.  She has a history of chronic flank pain, this is actually improved since her visit  and she has no urinary symptoms.  Do not feel we need to repeat labs, urine or CT imaging today.  Her COVID test did return positive.  Given onset greater than 5 days ago did not recommend paxlovid.  Recommended close follow-up with her primary care doctor. Strict return precautions discussed and patient is in agreement with plan, endorses understanding and her questions were answered.    Medical Decision Making    History:    Supplemental history from: Documented in chart, if applicable    External Record(s) reviewed: Documented in chart, if applicable. and Outpatient Record: 23 [facility]  room visit    Work Up:    Chart documentation includes differential considered and any EKGs or imaging interpreted by provider.    In additional to work up documented, I considered the following work up: Documented in chart, if applicable.    External consultation:    Discussion of management with another provider: Documented in chart, if applicable    Complicating factors:    Care impacted by chronic illness: Chronic Kidney Disease and Smoking / Nicotine Use    Care affected by social determinants of health: N/A    Disposition considerations: Discharge. No recommendations on prescription strength medication(s). See documentation for any additional details.    MEDICATIONS GIVEN IN THE EMERGENCY:  Medications - No data to display    NEW PRESCRIPTIONS STARTED AT TODAY'S ER VISIT:  New Prescriptions    No medications on file        =================================================================  HPI    Patient information was obtained from: Patient  Use of : N/A      Jacqueline Pappas is a 37 year old female with a past medical history of solitary right kidney, s/p chronic nephrostomy tube, SIRS, tobacco abuse, and morbid obesity who presents via private vehicle for the evaluation of upper respiratory symptoms.     Per patient she notes her sister and father having tested positive for COVID-19. Patient took an at home COVID-19 test which she states had a faint line and is unsure if it was positive. She notes she has been experiencing a cough with rhinorrhea and a headache. Has had symptoms for the last week. Patient notes having chronic flank pain which she states has improved. Patient otherwise denies any fevers, vomiting, diarrhea, chest pain, shortness of breath, dysuria, as well as any other complaints at the time.     Per chart review patient seen 1/28/23 at [facility] for the evaluation of cough, sinus congestion, and body aches. Notes symptoms started on Thursday. Recently seen at [facility] for flank pain. CT abdomen showed no significant changes from prior and no obvious acute findings. Reports taking Tylenol without relief. Discussed with patient and with shared decision making decided to forego any further imaging here. Suspect viral URI. Swabs for COVID-19, influenza and strep all negative. Recommended symptomatic care with prescription for robitussin and tessalon. Patient discharged in stable condition with return precautions.     Seen in the ED 23 for flank pain. Labs sand imaging were reassuring. CT abd/pelvis showed appropriately positioned right percutaneous nephrostomy tube, no acute findings . UA looked similar to priors and was not started on treatment.     REVIEW OF SYSTEMS   Review of Systems   Constitutional: Negative for fever.   HENT: Positive for rhinorrhea.    Respiratory: Positive for cough. Negative for shortness of breath.    Cardiovascular: Negative for chest pain.   Gastrointestinal: Negative for diarrhea and vomiting.   Genitourinary: Positive for flank pain (chronic flank pain, has improved). Negative for dysuria.   Neurological: Positive for headaches.   All other systems reviewed and are negative.       PAST MEDICAL HISTORY:  Past Medical History:   Diagnosis Date     Acute renal failure (H)      Anemia      Anemia      Calculus of kidney      Congenital absence of left kidney      Congenital absence of one kidney      Depression      Depression      Hydronephrosis      Kidney stone     at age 29     Pyelonephritis      Pyelonephritis      Smoker      Ureteral stricture      UTI (urinary tract infection)      Wrist fracture     Left       PAST SURGICAL HISTORY:  Past Surgical History:   Procedure Laterality Date      SECTION        SECTION      x1     COMBINED CYSTOSCOPY, RETROGRADES, URETEROSCOPY, LASER HOLMIUM LITHOTRIPSY URETER(S), INSERT STENT Right 2016    Procedure: COMBINED CYSTOSCOPY, RETROGRADES, URETEROSCOPY, LASER HOLMIUM  LITHOTRIPSY URETER(S), INSERT STENT;  Surgeon: Florentino wAad MD;  Location: [facility]     CYSTOSCOPY, URETEROSCOPY, COMBINED Right 9/14/2016    Procedure: COMBINED CYSTOSCOPY, URETEROSCOPY;  Surgeon: Florentino Awad MD;  Location: [facility] OR     IR NEPHROLITHOTOMY  12/11/2014     IR NEPHROSTOGRAM EXISITING ACCESS  10/18/2018     IR NEPHROSTOMY TUBE CHANGE RIGHT  12/12/2018     IR NEPHROSTOMY TUBE CHANGE RIGHT  6/11/2020     IR NEPHROSTOMY TUBE CHANGE RIGHT  12/12/2018     IR NEPHROSTOMY TUBE CHANGE RIGHT  6/11/2020     IR NEPHROSTOMY TUBE PLACEMENT RIGHT  7/24/2016     IR NEPHROSTOMY TUBE PLACEMENT RIGHT  9/14/2017     KIDNEY STONE SURGERY Right 05/2016     LASER HOLMIUM LITHOTRIPSY URETER(S), INSERT STENT, COMBINED Left 11/1/2016    Procedure: COMBINED CYSTOSCOPY, URETEROSCOPY, LASER HOLMIUM LITHOTRIPSY URETER(S), INSERT STENT;  Surgeon: Florentino Awad MD;  Location: [facility] OR     LASER HOLMIUM NEPHROLITHOTOMY VIA PERCUTANEOUS NEPHROSTOMY Right 9/14/2016    Procedure: LASER HOLMIUM NEPHROLITHOTOMY VIA PERCUTANEOUS NEPHROSTOMY;  Surgeon: Florentino Awad MD;  Location: [facility] OR     NEPHROSTOMY W/ INTRODUCTION OF CATHETER Right      OTHER SURGICAL HISTORY      Mole removednasal     OTHER SURGICAL HISTORY Right     Cystoscopy, ureteroscopy, laser11/02/2014 x2 with ureteral stent insertion     PERCUTANEOUS NEPHROSTOMY  11/1/2016    Procedure: PERCUTANEOUS NEPHROSTOMY;  Surgeon: Florentino Awad MD;  Location: [facility] OR     WISDOM TOOTH EXTRACTION       ZZC REMOVAL OF KIDNEY STONE         CURRENT MEDICATIONS:    No current facility-administered medications for this encounter.    Current Outpatient Medications:      albuterol (ACCUNEB) 1.25 MG/3ML neb solution, Take 1.25 mg by nebulization every 6 hours as needed for shortness of breath / dyspnea or wheezing, Disp: , Rfl:      methocarbamol (ROBAXIN) 750 MG tablet, Take 1 tablet (750 mg) by mouth 3 times daily as needed for muscle spasms, Disp: 30 tablet, Rfl:  0     ondansetron (ZOFRAN ODT) 4 MG ODT tab, Take 1 tablet (4 mg) by mouth every 8 hours as needed for nausea, Disp: 20 tablet, Rfl: 0     ondansetron (ZOFRAN ODT) 4 MG ODT tab, Take 1 tablet (4 mg) by mouth every 6 hours as needed for nausea, Disp: 10 tablet, Rfl: 0     prochlorperazine (COMPAZINE) 10 MG tablet, Take 1 tablet (10 mg) by mouth every 6 hours as needed for nausea or vomiting, Disp: 10 tablet, Rfl: 0     sertraline (ZOLOFT) 100 MG tablet, Take 150 mg by mouth At Bedtime, Disp: , Rfl:      SPRINTEC 28 0.25-35 MG-MCG tablet, Take 1 tablet by mouth daily, Disp: , Rfl:     ALLERGIES:  Allergies   Allergen Reactions     Vancomycin      Desogestrel-Ethinyl Estradiol Angioedema and Swelling     Swelling of hands and feet per pt.  Swelling of hands and feet per pt.  Swelling of hands and feet per pt.  Swelling of hands and feet per pt.  Swelling of hands and feet per pt.  Swelling of hands and feet per pt.  Swelling of hands and feet per pt.  Swelling of hands and feet per pt.       Penicillins Rash     As a child per mother; mother unsure if has tolerated another PCN since. Tolerated ampicillin 9/20/16     Sulfa Drugs Rash       FAMILY HISTORY:  Family History   Problem Relation Age of Onset     Anesthesia Reaction No family hx of      Malignant Hyperthermia No family hx of      Heart Disease Maternal Uncle         heart failure     Coronary Artery Disease Other      Heart Disease Maternal Grandmother         pacemaker     Gout Paternal Grandfather      Prostate Cancer Maternal Aunt         breast     Heart Disease Maternal Aunt         pacemaker     Heart Disease Maternal Aunt         pacemaker     Heart Disease Maternal Aunt         pacemaker       SOCIAL HISTORY:   Social History     Socioeconomic History     Marital status: Single   Tobacco Use     Smoking status: Every Day     Packs/day: 0.50     Years: 10.00     Pack years: 5.00     Types: Cigarettes     Last attempt to quit: 9/23/2016     Years since  quittin.3     Smokeless tobacco: Former     Quit date: 2016     Tobacco comments:     has information from last admission.   Substance and Sexual Activity     Alcohol use: Yes     Alcohol/week: 0.0 standard drinks     Comment: Alcoholic Drinks/day: occ     Drug use: No       PHYSICAL EXAM:    Vitals: /67   Pulse 70   Temp 97.3  F (36.3  C) (Temporal)   Resp 20   Ht 1.524 m (5')   Wt 81.6 kg (180 lb)   LMP 2023 (Approximate)   SpO2 99%   BMI 35.15 kg/m     Constitutional: Well developed, well nourished. No acute distress.  HENT: Normocephalic, atraumatic, Neck-gross ROM intact.   Eyes: Pupils mid-range, sclera white, no discharge  Respiratory: CTAB, no respiratory distress, no wheezing, speaks full sentences easily.  Cardiovascular: Normal heart rate, regular rhythm. No lower extremity edema  GI: Soft, not distended, not tender to palpation  Musculoskeletal: Moving all 4 extremities intentionally and without pain. No obvious deformity.  Skin: Warm, dry, no rash.  Neurologic: Alert & oriented x 3, speech clear, moving all extremities spontaneously   Psychiatric: Affect normal, cooperative.     LAB:  All pertinent labs reviewed and interpreted.  Labs Ordered and Resulted from Time of ED Arrival to Time of ED Departure   INFLUENZA A/B & SARS-COV2 PCR MULTIPLEX - Abnormal       Result Value    Influenza A PCR Negative      Influenza B PCR Negative      RSV PCR Negative      SARS CoV2 PCR Positive (*)        RADIOLOGY:  No orders to display       I, Elmira Keller, am serving as a scribe to document services personally performed by Dr. Ingrid Mina based on my observation and the provider's statements to me. I, Ingrid Mina MD attest that Elmira Keller is acting in a scribe capacity, has observed my performance of the services and has documented them in accordance with my direction.    Ingrid Mina M.D.  Emergency Medicine  [facility] EMERGENCY  DEPARTMENT  [address] [place] [id number]  [phone]  Dept: [phone]     Ingrid Mina MD  02/03/23 0754       Ingrid Mina MD  02/03/23 0757

## 2023-02-03 NOTE — DISCHARGE INSTRUCTIONS
You tested positive for covid19  Follow up with your primary care doctor  Return to the Emergency Department if you have chest pain, increased difficulty breathing or other worsening symptoms or concerns

## 2023-02-03 NOTE — ED TRIAGE NOTES
Runny nose and cough since Sunday. Father and sister have Covid. Patient tried Dayquil and Tylenol with mild relief. She reports a productive green/yellow cough.     Triage Assessment     Row Name 02/03/23 0533       Triage Assessment (Adult)    Airway WDL WDL       Respiratory WDL    Respiratory WDL WDL       Skin Circulation/Temperature WDL    Skin Circulation/Temperature WDL WDL       Cardiac WDL    Cardiac WDL WDL       Peripheral/Neurovascular WDL    Peripheral Neurovascular WDL WDL       Cognitive/Neuro/Behavioral WDL    Cognitive/Neuro/Behavioral WDL WDL

## 2023-02-08 ENCOUNTER — HOSPITAL ENCOUNTER (EMERGENCY)
Facility: CLINIC | Age: 38
Discharge: LEFT BEFORE DISCHARGE | End: 2023-02-09
Admitting: STUDENT IN AN ORGANIZED HEALTH CARE EDUCATION/TRAINING PROGRAM
Payer: COMMERCIAL

## 2023-02-08 VITALS
SYSTOLIC BLOOD PRESSURE: 114 MMHG | WEIGHT: 180 LBS | HEIGHT: 60 IN | DIASTOLIC BLOOD PRESSURE: 79 MMHG | BODY MASS INDEX: 35.34 KG/M2 | RESPIRATION RATE: 16 BRPM | OXYGEN SATURATION: 99 % | TEMPERATURE: 97.9 F | HEART RATE: 79 BPM

## 2023-02-08 LAB
ALBUMIN UR-MCNC: 100 MG/DL
APPEARANCE UR: ABNORMAL
BILIRUB UR QL STRIP: NEGATIVE
COLOR UR AUTO: ABNORMAL
GLUCOSE UR STRIP-MCNC: NEGATIVE MG/DL
HGB UR QL STRIP: ABNORMAL
KETONES UR STRIP-MCNC: NEGATIVE MG/DL
LEUKOCYTE ESTERASE UR QL STRIP: ABNORMAL
MUCOUS THREADS #/AREA URNS LPF: PRESENT /LPF
NITRATE UR QL: NEGATIVE
PH UR STRIP: 7.5 [PH] (ref 5–7)
RBC URINE: 26 /HPF
SP GR UR STRIP: 1.02 (ref 1–1.03)
SQUAMOUS EPITHELIAL: <1 /HPF
TRI-PHOS CRY #/AREA URNS HPF: ABNORMAL /HPF
UROBILINOGEN UR STRIP-MCNC: <2 MG/DL
WBC URINE: 15 /HPF

## 2023-02-08 PROCEDURE — 999N000104 HC STATISTIC NO CHARGE

## 2023-02-08 PROCEDURE — 81001 URINALYSIS AUTO W/SCOPE: CPT | Performed by: STUDENT IN AN ORGANIZED HEALTH CARE EDUCATION/TRAINING PROGRAM

## 2023-02-08 PROCEDURE — 87086 URINE CULTURE/COLONY COUNT: CPT | Performed by: STUDENT IN AN ORGANIZED HEALTH CARE EDUCATION/TRAINING PROGRAM

## 2023-02-08 ASSESSMENT — ACTIVITIES OF DAILY LIVING (ADL): ADLS_ACUITY_SCORE: 33

## 2023-02-09 ENCOUNTER — HOSPITAL ENCOUNTER (EMERGENCY)
Facility: HOSPITAL | Age: 38
Discharge: HOME | End: 2023-02-10
Attending: STUDENT IN AN ORGANIZED HEALTH CARE EDUCATION/TRAINING PROGRAM | Admitting: STUDENT IN AN ORGANIZED HEALTH CARE EDUCATION/TRAINING PROGRAM
Payer: COMMERCIAL

## 2023-02-09 DIAGNOSIS — N12 PYELONEPHRITIS: Primary | ICD-10-CM

## 2023-02-09 DIAGNOSIS — U07.1 COVID-19: ICD-10-CM

## 2023-02-09 LAB
ALBUMIN UR-MCNC: 100 MG/DL
APPEARANCE UR: ABNORMAL
BILIRUB UR QL STRIP: NEGATIVE
COLOR UR AUTO: ABNORMAL
GLUCOSE UR STRIP-MCNC: NEGATIVE MG/DL
HGB UR QL STRIP: ABNORMAL
HYALINE CASTS: 3 /LPF
KETONES UR STRIP-MCNC: NEGATIVE MG/DL
LEUKOCYTE ESTERASE UR QL STRIP: ABNORMAL
NITRATE UR QL: NEGATIVE
PH UR STRIP: 8.5 [PH] (ref 5–7)
RBC URINE: 22 /HPF
SP GR UR STRIP: 1.01 (ref 1–1.03)
SQUAMOUS EPITHELIAL: 1 /HPF
TRI-PHOS CRY #/AREA URNS HPF: ABNORMAL /HPF
UROBILINOGEN UR STRIP-MCNC: <2 MG/DL
WBC URINE: 27 /HPF

## 2023-02-09 PROCEDURE — 36415 COLL VENOUS BLD VENIPUNCTURE: CPT | Performed by: STUDENT IN AN ORGANIZED HEALTH CARE EDUCATION/TRAINING PROGRAM

## 2023-02-09 PROCEDURE — 96375 TX/PRO/DX INJ NEW DRUG ADDON: CPT

## 2023-02-09 PROCEDURE — 87086 URINE CULTURE/COLONY COUNT: CPT | Performed by: STUDENT IN AN ORGANIZED HEALTH CARE EDUCATION/TRAINING PROGRAM

## 2023-02-09 PROCEDURE — 250N000011 HC RX IP 250 OP 636: Performed by: STUDENT IN AN ORGANIZED HEALTH CARE EDUCATION/TRAINING PROGRAM

## 2023-02-09 PROCEDURE — 96365 THER/PROPH/DIAG IV INF INIT: CPT | Mod: 59

## 2023-02-09 PROCEDURE — 258N000003 HC RX IP 258 OP 636

## 2023-02-09 PROCEDURE — 80048 BASIC METABOLIC PNL TOTAL CA: CPT | Performed by: STUDENT IN AN ORGANIZED HEALTH CARE EDUCATION/TRAINING PROGRAM

## 2023-02-09 PROCEDURE — 250N000011 HC RX IP 250 OP 636

## 2023-02-09 PROCEDURE — 99285 EMERGENCY DEPT VISIT HI MDM: CPT | Mod: 25

## 2023-02-09 PROCEDURE — 81001 URINALYSIS AUTO W/SCOPE: CPT | Performed by: STUDENT IN AN ORGANIZED HEALTH CARE EDUCATION/TRAINING PROGRAM

## 2023-02-09 PROCEDURE — 96361 HYDRATE IV INFUSION ADD-ON: CPT

## 2023-02-09 RX ORDER — MORPHINE SULFATE 4 MG/ML
4 INJECTION, SOLUTION INTRAMUSCULAR; INTRAVENOUS ONCE
Status: COMPLETED | OUTPATIENT
Start: 2023-02-10 | End: 2023-02-09

## 2023-02-09 RX ORDER — ONDANSETRON 2 MG/ML
4 INJECTION INTRAMUSCULAR; INTRAVENOUS ONCE
Status: COMPLETED | OUTPATIENT
Start: 2023-02-09 | End: 2023-02-09

## 2023-02-09 RX ORDER — HYDROMORPHONE HYDROCHLORIDE 1 MG/ML
0.5 INJECTION, SOLUTION INTRAMUSCULAR; INTRAVENOUS; SUBCUTANEOUS ONCE
Status: COMPLETED | OUTPATIENT
Start: 2023-02-09 | End: 2023-02-09

## 2023-02-09 RX ORDER — LEVOFLOXACIN 5 MG/ML
750 INJECTION, SOLUTION INTRAVENOUS ONCE
Status: COMPLETED | OUTPATIENT
Start: 2023-02-10 | End: 2023-02-10

## 2023-02-09 RX ADMIN — HYDROMORPHONE HYDROCHLORIDE 0.5 MG: 1 INJECTION, SOLUTION INTRAMUSCULAR; INTRAVENOUS; SUBCUTANEOUS at 22:55

## 2023-02-09 RX ADMIN — LEVOFLOXACIN 750 MG: 5 INJECTION, SOLUTION INTRAVENOUS at 23:56

## 2023-02-09 RX ADMIN — MORPHINE SULFATE 4 MG: 4 INJECTION, SOLUTION INTRAMUSCULAR; INTRAVENOUS at 23:56

## 2023-02-09 RX ADMIN — SODIUM CHLORIDE 1000 ML: 9 INJECTION, SOLUTION INTRAVENOUS at 22:53

## 2023-02-09 RX ADMIN — ONDANSETRON 4 MG: 2 INJECTION INTRAMUSCULAR; INTRAVENOUS at 22:55

## 2023-02-09 ASSESSMENT — ACTIVITIES OF DAILY LIVING (ADL): ADLS_ACUITY_SCORE: 35

## 2023-02-09 NOTE — ED TRIAGE NOTES
Not feeling well for a couple weeks, last 2 days of right flank pain, pt notes urinary urgency and foul smelling urine.  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     Triage Assessment       Row Name 02/08/23 7072       Triage Assessment (Adult)    Airway WDL WDL       Respiratory WDL    Respiratory WDL WDL       Skin Circulation/Temperature WDL    Skin Circulation/Temperature WDL WDL       Cardiac WDL    Cardiac WDL WDL       Peripheral/Neurovascular WDL    Peripheral Neurovascular WDL WDL       Cognitive/Neuro/Behavioral WDL    Cognitive/Neuro/Behavioral WDL WDL

## 2023-02-10 ENCOUNTER — APPOINTMENT (OUTPATIENT)
Dept: CT IMAGING | Facility: HOSPITAL | Age: 38
End: 2023-02-10
Payer: COMMERCIAL

## 2023-02-10 VITALS
BODY MASS INDEX: 35.34 KG/M2 | WEIGHT: 180 LBS | HEIGHT: 60 IN | SYSTOLIC BLOOD PRESSURE: 107 MMHG | RESPIRATION RATE: 16 BRPM | OXYGEN SATURATION: 97 % | HEART RATE: 81 BPM | TEMPERATURE: 98.4 F | DIASTOLIC BLOOD PRESSURE: 63 MMHG

## 2023-02-10 LAB
ALBUMIN SERPL BCG-MCNC: 3.8 G/DL (ref 3.5–5.2)
ALP SERPL-CCNC: 81 U/L (ref 35–104)
ALT SERPL W P-5'-P-CCNC: 29 U/L (ref 10–35)
ANION GAP SERPL CALCULATED.3IONS-SCNC: 9 MMOL/L (ref 7–15)
AST SERPL W P-5'-P-CCNC: 26 U/L (ref 10–35)
BACTERIA UR CULT: NORMAL
BASOPHILS # BLD AUTO: 0 10E3/UL (ref 0–0.2)
BASOPHILS NFR BLD AUTO: 0 %
BILIRUB DIRECT SERPL-MCNC: <0.2 MG/DL (ref 0–0.3)
BILIRUB SERPL-MCNC: 0.3 MG/DL
BUN SERPL-MCNC: 13.4 MG/DL (ref 6–20)
CALCIUM SERPL-MCNC: 9.6 MG/DL (ref 8.6–10)
CHLORIDE SERPL-SCNC: 105 MMOL/L (ref 98–107)
CREAT SERPL-MCNC: 0.86 MG/DL (ref 0.51–0.95)
DEPRECATED HCO3 PLAS-SCNC: 22 MMOL/L (ref 22–29)
EOSINOPHIL # BLD AUTO: 0.1 10E3/UL (ref 0–0.7)
EOSINOPHIL NFR BLD AUTO: 2 %
ERYTHROCYTE [DISTWIDTH] IN BLOOD BY AUTOMATED COUNT: 13.6 % (ref 10–15)
GFR SERPL CREATININE-BSD FRML MDRD: 90 ML/MIN/1.73M2
GLUCOSE SERPL-MCNC: 88 MG/DL (ref 70–99)
HCT VFR BLD AUTO: 39.3 % (ref 35–47)
HGB BLD-MCNC: 13 G/DL (ref 11.7–15.7)
HOLD SPECIMEN: NORMAL
HOLD SPECIMEN: NORMAL
IMM GRANULOCYTES # BLD: 0.1 10E3/UL
IMM GRANULOCYTES NFR BLD: 1 %
LACTATE SERPL-SCNC: 0.6 MMOL/L (ref 0.7–2)
LYMPHOCYTES # BLD AUTO: 1.6 10E3/UL (ref 0.8–5.3)
LYMPHOCYTES NFR BLD AUTO: 23 %
MAGNESIUM SERPL-MCNC: 2.3 MG/DL (ref 1.7–2.3)
MCH RBC QN AUTO: 30 PG (ref 26.5–33)
MCHC RBC AUTO-ENTMCNC: 33.1 G/DL (ref 31.5–36.5)
MCV RBC AUTO: 91 FL (ref 78–100)
MONOCYTES # BLD AUTO: 0.5 10E3/UL (ref 0–1.3)
MONOCYTES NFR BLD AUTO: 7 %
NEUTROPHILS # BLD AUTO: 4.9 10E3/UL (ref 1.6–8.3)
NEUTROPHILS NFR BLD AUTO: 67 %
NRBC # BLD AUTO: 0 10E3/UL
NRBC BLD AUTO-RTO: 0 /100
PLATELET # BLD AUTO: 162 10E3/UL (ref 150–450)
POTASSIUM SERPL-SCNC: 3.9 MMOL/L (ref 3.4–5.3)
PROT SERPL-MCNC: 6.2 G/DL (ref 6.4–8.3)
RBC # BLD AUTO: 4.33 10E6/UL (ref 3.8–5.2)
SODIUM SERPL-SCNC: 136 MMOL/L (ref 136–145)
WBC # BLD AUTO: 7.2 10E3/UL (ref 4–11)

## 2023-02-10 PROCEDURE — 85025 COMPLETE CBC W/AUTO DIFF WBC: CPT | Performed by: STUDENT IN AN ORGANIZED HEALTH CARE EDUCATION/TRAINING PROGRAM

## 2023-02-10 PROCEDURE — 250N000011 HC RX IP 250 OP 636: Performed by: STUDENT IN AN ORGANIZED HEALTH CARE EDUCATION/TRAINING PROGRAM

## 2023-02-10 PROCEDURE — 83605 ASSAY OF LACTIC ACID: CPT | Performed by: STUDENT IN AN ORGANIZED HEALTH CARE EDUCATION/TRAINING PROGRAM

## 2023-02-10 PROCEDURE — 96366 THER/PROPH/DIAG IV INF ADDON: CPT

## 2023-02-10 PROCEDURE — 74177 CT ABD & PELVIS W/CONTRAST: CPT

## 2023-02-10 PROCEDURE — 36415 COLL VENOUS BLD VENIPUNCTURE: CPT

## 2023-02-10 PROCEDURE — 83735 ASSAY OF MAGNESIUM: CPT

## 2023-02-10 PROCEDURE — 250N000013 HC RX MED GY IP 250 OP 250 PS 637: Performed by: STUDENT IN AN ORGANIZED HEALTH CARE EDUCATION/TRAINING PROGRAM

## 2023-02-10 PROCEDURE — 96361 HYDRATE IV INFUSION ADD-ON: CPT

## 2023-02-10 PROCEDURE — 80076 HEPATIC FUNCTION PANEL: CPT

## 2023-02-10 RX ORDER — IOPAMIDOL 755 MG/ML
100 INJECTION, SOLUTION INTRAVASCULAR ONCE
Status: COMPLETED | OUTPATIENT
Start: 2023-02-10 | End: 2023-02-10

## 2023-02-10 RX ORDER — OXYCODONE HYDROCHLORIDE 5 MG/1
5 TABLET ORAL ONCE
Status: COMPLETED | OUTPATIENT
Start: 2023-02-10 | End: 2023-02-10

## 2023-02-10 RX ORDER — LEVOFLOXACIN 750 MG/1
750 TABLET, FILM COATED ORAL DAILY
Qty: 5 TABLET | Refills: 0 | Status: SHIPPED | OUTPATIENT
Start: 2023-02-10 | End: 2023-02-15

## 2023-02-10 RX ADMIN — OXYCODONE HYDROCHLORIDE 5 MG: 5 TABLET ORAL at 01:24

## 2023-02-10 RX ADMIN — IOPAMIDOL 100 ML: 755 INJECTION, SOLUTION INTRAVENOUS at 00:52

## 2023-02-10 ASSESSMENT — ENCOUNTER SYMPTOMS
WOUND: 0
CHILLS: 1
COUGH: 0
TROUBLE SWALLOWING: 0
MYALGIAS: 1
DYSURIA: 0
HEADACHES: 1
FLANK PAIN: 1
SHORTNESS OF BREATH: 0
ABDOMINAL PAIN: 1
VOMITING: 1
WEAKNESS: 1
DIARRHEA: 1
SORE THROAT: 1
VOICE CHANGE: 0
NAUSEA: 1
BRUISES/BLEEDS EASILY: 0
FREQUENCY: 1
FEVER: 0
RHINORRHEA: 1
BLOOD IN STOOL: 0

## 2023-02-10 ASSESSMENT — ACTIVITIES OF DAILY LIVING (ADL): ADLS_ACUITY_SCORE: 35

## 2023-02-10 NOTE — ED NOTES
EMERGENCY DEPARTMENT SIGN OUT NOTE        ED COURSE AND MEDICAL DECISION MAKING  Patient was signed out to me by Dr Elías Earl at 12:20 AM.    In brief, Jacqueline Pappas is a 37 year old female who initially presented headache, right flank pain, and overall body aches. She also is positive for COVID 2/3. No other medical complaints at this time.      At time of sign out, disposition was pending imaging, labs and final dispo.    1:26 AM Results were communicated with the patient. Discussed discharge plans along with return precautions. Patient was agreeable with plan.      ED Course as of 02/10/23 0130   Fri Feb 10, 2023   0017 Sign out: 36 y/o F here with left flank pain (h/o nephrostomy tube). Pending CT scan and some labs. UA does show some signs of infection and started on levaquin.    0124 CT abd/pelvis:  1.  No acute process.  2.  Right percutaneous nephrostomy tube.  3.  Right renal cysts and stones without obstruction.  4.  Hepatosplenomegaly.   0124 Her labs are overall reassuring---normal lactic acid, no leukocytosis, reassuring vitals (do not suspect sepsis)   0126 Updated patient. She is resting comfortably. She feels comfortable with plan for discharge with antibiotics and close outpatient follow up. Strict return precautions discussed and patient is in agreement with plan, endorses understanding and their questions were answered.         FINAL IMPRESSION    1. Pyelonephritis    2. COVID-19        ED MEDS  Medications   0.9% sodium chloride BOLUS (1,000 mLs Intravenous New Bag 2/9/23 2253)   HYDROmorphone (PF) (DILAUDID) injection 0.5 mg (0.5 mg Intravenous Given 2/9/23 2255)   ondansetron (ZOFRAN) injection 4 mg (4 mg Intravenous Given 2/9/23 2255)   levofloxacin (LEVAQUIN) infusion 750 mg (750 mg Intravenous New Bag 2/9/23 2356)   morphine (PF) injection 4 mg (4 mg Intravenous Given 2/9/23 2356)   iopamidol (ISOVUE-370) solution 100 mL (100 mLs Intravenous Given 2/10/23 0052)   oxyCODONE (ROXICODONE)  tablet 5 mg (5 mg Oral Given 2/10/23 0124)       LAB  Labs Ordered and Resulted from Time of ED Arrival to Time of ED Departure   ROUTINE UA WITH MICROSCOPIC REFLEX TO CULTURE - Abnormal       Result Value    Color Urine Light Yellow      Appearance Urine Cloudy (*)     Glucose Urine Negative      Bilirubin Urine Negative      Ketones Urine Negative      Specific Gravity Urine 1.011      Blood Urine 0.2 mg/dL (*)     pH Urine 8.5 (*)     Protein Albumin Urine 100 (*)     Urobilinogen Urine <2.0      Nitrite Urine Negative      Leukocyte Esterase Urine 500 Duglas/uL (*)     Triple Phosphate Crystals Urine Many (*)     RBC Urine 22 (*)     WBC Urine 27 (*)     Squamous Epithelials Urine 1      Hyaline Casts Urine 3 (*)    HEPATIC FUNCTION PANEL - Abnormal    Protein Total 6.2 (*)     Albumin 3.8      Bilirubin Total 0.3      Alkaline Phosphatase 81      AST 26      ALT 29      Bilirubin Direct <0.20     LACTIC ACID WHOLE BLOOD - Abnormal    Lactic Acid 0.6 (*)    BASIC METABOLIC PANEL - Normal    Sodium 136      Potassium 3.9      Chloride 105      Carbon Dioxide (CO2) 22      Anion Gap 9      Urea Nitrogen 13.4      Creatinine 0.86      Calcium 9.6      Glucose 88      GFR Estimate 90     MAGNESIUM - Normal    Magnesium 2.3     CBC WITH PLATELETS AND DIFFERENTIAL    WBC Count 7.2      RBC Count 4.33      Hemoglobin 13.0      Hematocrit 39.3      MCV 91      MCH 30.0      MCHC 33.1      RDW 13.6      Platelet Count 162      % Neutrophils 67      % Lymphocytes 23      % Monocytes 7      % Eosinophils 2      % Basophils 0      % Immature Granulocytes 1      NRBCs per 100 WBC 0      Absolute Neutrophils 4.9      Absolute Lymphocytes 1.6      Absolute Monocytes 0.5      Absolute Eosinophils 0.1      Absolute Basophils 0.0      Absolute Immature Granulocytes 0.1      Absolute NRBCs 0.0     URINE CULTURE       RADIOLOGY    CT Abdomen Pelvis w Contrast   Final Result   IMPRESSION:    1.  No acute process.   2.  Right  percutaneous nephrostomy tube.   3.  Right renal cysts and stones without obstruction.   4.  Hepatosplenomegaly.          DISCHARGE MEDS  New Prescriptions    LEVOFLOXACIN (LEVAQUIN) 750 MG TABLET    Take 1 tablet (750 mg) by mouth daily for 5 days       Ingrid Mina MD  [facility] EMERGENCY DEPARTMENT  [address] [place] [id number] [phone]     Ingrid Mina MD  02/10/23 0138

## 2023-02-10 NOTE — ED TRIAGE NOTES
Patient comes in with complaints of headache that she can not get rid of, right flank pain and over all body aches. Patient has a nephro tube that has known stones.   Patient reports that she has not been taking any tylenol or ibuprofen , and does not want. Symptoms have been going on for greater than  Weeks with testing positive for covid on 2/3.    No measured fevers     Triage Assessment     Row Name 02/09/23 4342       Triage Assessment (Adult)    Airway WDL WDL

## 2023-02-10 NOTE — ED NOTES
Emergency Department Midlevel Supervisory Note     I personally saw the patient and performed a substantive portion of the visit including all aspects of the medical decision making.    ED Course:  10:37 PM Radha Castellano PA-C staffed patient with me. I agree with their assessment and plan of management, and I will see the patient.   I met with the patient to introduce myself, gather additional history, perform my initial exam, and discuss the plan.     Brief HPI:     Jacqueline Pappas is a 37 year old female who presents for evaluation of COVID symptoms.   Patient endorses headaches, right flank pain, and overall body aches. Patient tested positive for COVID on 2/3.     I, Luba Amador, am serving as a scribe to document services personally performed by Elías Earl, based on my observations and the provider's statements to me.   I, Elías Earl attest that Luba Amador was acting in a scribe capacity, has observed my performance of the services and has documented them in accordance with my direction.    Brief Physical Exam: /72   Pulse 73   Temp 98.4  F (36.9  C) (Temporal)   Resp 16   Ht 1.524 m (5')   Wt 81.6 kg (180 lb)   LMP 02/05/2023 (Approximate)   SpO2 98%   BMI 35.15 kg/m    Constitutional:  Alert, in no acute distress  EYES: Conjunctivae clear  HENT:  Atraumatic, normocephalic  Respiratory:  Respirations even, unlabored, in no acute respiratory distress  Cardiovascular:  Regular rate and rhythm, good peripheral perfusion  GI: Soft, nondistended, nontender, no palpable masses, no rebound, no guarding   Musculoskeletal:  No edema. No cyanosis. Range of motion major extremities intact.    Integument: Warm, Dry, No erythema, No rash.   Neurologic:  Alert & oriented, no focal deficits noted  Psych: Normal mood and affect     MDM:  Patient presented with body aches and not feeling well after recent diagnosis of COVID.  Patient has nephrostomy tubes and also says this feels like previous  episodes of infection.  Patient is nonseptic appearing.  Urinalysis does show white cells however previous urinalysis have also appeared this way and had nonpathologic culture results.  Do not believe the patient is septic and believe most likely patient's symptoms are due to the COVID.  At this time we are awaiting the blood work including lactic acid.  Patient will be given a dose of Levaquin and assuming blood work is normal we will send the patient home with some antibiotics for follow-up at [facility] with her urologist or her primary doctor in the next couple days.  Please see PA note for ultimate disposition.       No diagnosis found.    Labs and Imaging:  Results for orders placed or performed during the hospital encounter of 02/09/23   UA with Microscopic reflex to Culture    Specimen: Urine, Midstream   Result Value Ref Range    Color Urine Light Yellow Colorless, Straw, Light Yellow, Yellow    Appearance Urine Cloudy (A) Clear    Glucose Urine Negative Negative mg/dL    Bilirubin Urine Negative Negative    Ketones Urine Negative Negative mg/dL    Specific Gravity Urine 1.011 1.001 - 1.030    Blood Urine 0.2 mg/dL (A) Negative    pH Urine 8.5 (H) 5.0 - 7.0    Protein Albumin Urine 100 (A) Negative mg/dL    Urobilinogen Urine <2.0 <2.0 mg/dL    Nitrite Urine Negative Negative    Leukocyte Esterase Urine 500 Duglas/uL (A) Negative    Triple Phosphate Crystals Urine Many (A) None Seen /HPF    RBC Urine 22 (H) <=2 /HPF    WBC Urine 27 (H) <=5 /HPF    Squamous Epithelials Urine 1 <=1 /HPF    Hyaline Casts Urine 3 (H) <=2 /LPF     I have reviewed the relevant laboratory and radiology studies    Procedures:  I was present for the key portions of this procedure: none    Elías Earl MD  [facility] EMERGENCY DEPARTMENT  [address] [place] [id number]  [phone]         Elías Earl MD  02/09/23 4585

## 2023-02-10 NOTE — ED NOTES
IN [facility]. INFORMED HER PT WAS DIFFICULT STICK. NURSE WAS ABLE TO OBTAIN BLOOD FROM IV STICK. STATES SHE WILL COME BACK TO STICK PT.

## 2023-02-10 NOTE — ED NOTES
MICHAEL AMIN, CHARGE NURSE, NOTIFIED PT NEEDED LAB WORK. NURSE ABLE TO OBTAIN IV.    [Follow-Up] : a follow-up visit [FreeTextEntry1] : [facility]ER[facility]

## 2023-02-10 NOTE — ED NOTES
Discussed discharge instructions with pt and prescription with pt. Pt voices understanding. Pt left er ambulating with a steady gait and all belongings. Friend to drive pt home.

## 2023-02-10 NOTE — ED PROVIDER NOTES
EMERGENCY DEPARTMENT ENCOUNTER      NAME: Jacqueline Pappas  AGE: 37 year old female  YOB: 1985  MRN: [id number]  EVALUATION DATE & TIME: 2/9/2023  9:56 PM    PCP: Pao Montague    ED PROVIDER: Radha Castellano PA-C      Chief Complaint   Patient presents with     Generalized Body Aches     Flank Pain         FINAL IMPRESSION:  1. Pyelonephritis    2. COVID-19          ED COURSE & MEDICAL DECISION MAKING:    10:25 PM Met with patient for initial interview. Plan for care discussed.  10:36 PM Staffed patient with Dr. Earl.  12:30 AM Signed patient out to Dr. Mina. Disposition pending.    Pertinent Labs & Imaging studies reviewed. (See chart for details)  37 year old female with a history of solitary kidney s/p chronic nephrostomy tubes (right) presents to the Emergency Department for evaluation of right flank pain, urinary urgency/frequency, generalized weakness, body aches, headache, diarrhea and vomiting. Patient recently diagnosed with COVID on 2/03/23. Upon exam, patient is afebrile, hemodynamically stable, and in no acute distress. Right CVA tenderness to percussion and right middle abdominal tenderness to palpation without guarding, rebound, or peritoneal signs. Differential diagnosis includes but not limited to pyelonephritis, nephrolithiasis, renal cyst/mass, diverticulitis, appendicitis, electrolyte abnormality, anemia, COVID-19 illness. Based on patient's presenting symptoms, laboratories and imaging were ordered.    CBC without leukocytosis or anemia. Lactate negative. BMP unremarkable. Mg WNL. UA concerning for infection, culture pending. HFP pending. CT pending.    Patient was treated with dilaudid and zofran upon arrival with improvement in symptoms.  Patient was treated with dose of Levaquin while in the ED for presumed urinary tract infection.  Previous cultures reviewed and typically grow normal urogenital christie.  Signed patient out to Dr. Mina. Disposition pending CT scan. As  long as CT reassuring, will plan for outpatient treatment with prescription levofloxacin given history of nephrostomy tubes and close follow up with her urologist/nephrologist and PCP.    Medical Decision Making    History:    Supplemental history from: Documented in chart, if applicable    External Record(s) reviewed: Documented in chart, if applicable.    Work Up:    Chart documentation includes differential considered and any EKGs or imaging independently interpreted by provider, where specified.    In additional to work up documented, I considered the following work up: Documented in chart, if applicable.    External consultation:    Discussion of management with another provider: Documented in chart, if applicable    Complicating factors:    Care impacted by chronic illness: Chronic Kidney Disease    Care affected by social determinants of health: N/A    Disposition considerations: Admission considered. Patient was signed out to the oncoming physician, disposition pending.      MEDICATIONS GIVEN IN THE EMERGENCY:  Medications   levofloxacin (LEVAQUIN) infusion 750 mg (750 mg Intravenous New Bag 2/9/23 2356)   0.9% sodium chloride BOLUS (1,000 mLs Intravenous New Bag 2/9/23 2253)   HYDROmorphone (PF) (DILAUDID) injection 0.5 mg (0.5 mg Intravenous Given 2/9/23 2255)   ondansetron (ZOFRAN) injection 4 mg (4 mg Intravenous Given 2/9/23 2255)   morphine (PF) injection 4 mg (4 mg Intravenous Given 2/9/23 2356)   iopamidol (ISOVUE-370) solution 100 mL (100 mLs Intravenous Given 2/10/23 0052)       NEW PRESCRIPTIONS STARTED AT TODAY'S ER VISIT  New Prescriptions    LEVOFLOXACIN (LEVAQUIN) 750 MG TABLET    Take 1 tablet (750 mg) by mouth daily for 5 days          =================================================================    HPI    Patient information was obtained from: patient    Use of : N/A    Jacqueline Pappas is a 37 year old female with a pertinent history of solitary kidney s/p chronic nephrostomy  tubes (right) who presents to this ED for evaluation of right flank pain, generalized weakness, body aches, headache. Per chart review, patient was seen in the ER on 2/03/23 for similar complaints and tested positive for COVID.  Since then, patient reports continued URI-like symptoms, headache, generalized body aches, and chills.  She also reports an episode of vomiting about once per day and diarrhea once per day with associated right-sided abdominal pain.  She also reports increased right flank pain over the last 2 days, urinary frequency and urgency, and decreased urine output from both nephrostomy tube and bladder.  She denies associated chest pain, shortness of breath, cough, or fever.  She denies vaginal bleeding, discharge, pelvic pain, concern for STD or pregnancy.  Her last menstrual period was yesterday.  Per chart review, it appears patient presented to the ED on 2/08/23, but left before being seen. UA from that day concerning for possible UTI, culture pending.      REVIEW OF SYSTEMS   Review of Systems   Constitutional: Positive for chills. Negative for fever.   HENT: Positive for congestion, rhinorrhea and sore throat. Negative for ear pain, trouble swallowing and voice change.    Respiratory: Negative for cough and shortness of breath.    Cardiovascular: Negative for chest pain.   Gastrointestinal: Positive for abdominal pain (right-sided), diarrhea (x1), nausea and vomiting. Negative for blood in stool.   Genitourinary: Positive for flank pain (right), frequency, urgency and vaginal bleeding (LMP yesterday). Negative for dysuria, pelvic pain, vaginal discharge and vaginal pain.   Musculoskeletal: Positive for myalgias (generalized).   Skin: Negative for rash and wound.   Neurological: Positive for weakness (generalized) and headaches.   Hematological: Does not bruise/bleed easily.     PAST MEDICAL HISTORY:  Past Medical History:   Diagnosis Date     Acute renal failure (H)      Anemia      Anemia       Calculus of kidney      Congenital absence of left kidney      Congenital absence of one kidney      Depression      Depression      Hydronephrosis      Kidney stone     at age 29     Pyelonephritis      Pyelonephritis      Smoker      Ureteral stricture      UTI (urinary tract infection)      Wrist fracture     Left       PAST SURGICAL HISTORY:  Past Surgical History:   Procedure Laterality Date      SECTION        SECTION      x1     COMBINED CYSTOSCOPY, RETROGRADES, URETEROSCOPY, LASER HOLMIUM LITHOTRIPSY URETER(S), INSERT STENT Right 2016    Procedure: COMBINED CYSTOSCOPY, RETROGRADES, URETEROSCOPY, LASER HOLMIUM LITHOTRIPSY URETER(S), INSERT STENT;  Surgeon: Florentino Awad MD;  Location: [facility] OR     CYSTOSCOPY, URETEROSCOPY, COMBINED Right 2016    Procedure: COMBINED CYSTOSCOPY, URETEROSCOPY;  Surgeon: Florentino Awad MD;  Location: [facility] OR     IR NEPHROLITHOTOMY  2014     IR NEPHROSTOGRAM EXISITING ACCESS  10/18/2018     IR NEPHROSTOMY TUBE CHANGE RIGHT  2018     IR NEPHROSTOMY TUBE CHANGE RIGHT  2020     IR NEPHROSTOMY TUBE CHANGE RIGHT  2018     IR NEPHROSTOMY TUBE CHANGE RIGHT  2020     IR NEPHROSTOMY TUBE PLACEMENT RIGHT  2016     IR NEPHROSTOMY TUBE PLACEMENT RIGHT  2017     KIDNEY STONE SURGERY Right 2016     LASER HOLMIUM LITHOTRIPSY URETER(S), INSERT STENT, COMBINED Left 2016    Procedure: COMBINED CYSTOSCOPY, URETEROSCOPY, LASER HOLMIUM LITHOTRIPSY URETER(S), INSERT STENT;  Surgeon: Florentino Awad MD;  Location: [facility] OR     LASER HOLMIUM NEPHROLITHOTOMY VIA PERCUTANEOUS NEPHROSTOMY Right 2016    Procedure: LASER HOLMIUM NEPHROLITHOTOMY VIA PERCUTANEOUS NEPHROSTOMY;  Surgeon: Florentino Awad MD;  Location: [facility] OR     NEPHROSTOMY W/ INTRODUCTION OF CATHETER Right      OTHER SURGICAL HISTORY      Mole removednasal     OTHER SURGICAL HISTORY Right     Cystoscopy, ureteroscopy, laser11/02/2014 x2 with ureteral  stent insertion     PERCUTANEOUS NEPHROSTOMY  2016    Procedure: PERCUTANEOUS NEPHROSTOMY;  Surgeon: Florentino Awad MD;  Location: [facility] OR     WISDOM TOOTH EXTRACTION       ZZC REMOVAL OF KIDNEY STONE             CURRENT MEDICATIONS:    levofloxacin (LEVAQUIN) 750 MG tablet  albuterol (ACCUNEB) 1.25 MG/3ML neb solution  methocarbamol (ROBAXIN) 750 MG tablet  ondansetron (ZOFRAN ODT) 4 MG ODT tab  ondansetron (ZOFRAN ODT) 4 MG ODT tab  prochlorperazine (COMPAZINE) 10 MG tablet  sertraline (ZOLOFT) 100 MG tablet  SPRINTEC 28 0.25-35 MG-MCG tablet        ALLERGIES:  Allergies   Allergen Reactions     Vancomycin      Desogestrel-Ethinyl Estradiol Angioedema and Swelling     Swelling of hands and feet per pt.  Swelling of hands and feet per pt.  Swelling of hands and feet per pt.  Swelling of hands and feet per pt.  Swelling of hands and feet per pt.  Swelling of hands and feet per pt.  Swelling of hands and feet per pt.  Swelling of hands and feet per pt.       Penicillins Rash     As a child per mother; mother unsure if has tolerated another PCN since. Tolerated ampicillin 18     Sulfa Drugs Rash       FAMILY HISTORY:  Family History   Problem Relation Age of Onset     Anesthesia Reaction No family hx of      Malignant Hyperthermia No family hx of      Heart Disease Maternal Uncle         heart failure     Coronary Artery Disease Other      Heart Disease Maternal Grandmother         pacemaker     Gout Paternal Grandfather      Prostate Cancer Maternal Aunt         breast     Heart Disease Maternal Aunt         pacemaker     Heart Disease Maternal Aunt         pacemaker     Heart Disease Maternal Aunt         pacemaker       SOCIAL HISTORY:   Social History     Socioeconomic History     Marital status: Single   Tobacco Use     Smoking status: Every Day     Packs/day: 0.50     Years: 10.00     Pack years: 5.00     Types: Cigarettes     Last attempt to quit: 2016     Years since quittin.3      Smokeless tobacco: Former     Quit date: 9/1/2016     Tobacco comments:     has information from last admission.   Substance and Sexual Activity     Alcohol use: Yes     Alcohol/week: 0.0 standard drinks     Comment: Alcoholic Drinks/day: occ     Drug use: No       VITALS:  /82   Pulse 78   Temp 98.4  F (36.9  C) (Temporal)   Resp 16   Ht 1.524 m (5')   Wt 81.6 kg (180 lb)   LMP 02/05/2023 (Approximate)   SpO2 97%   BMI 35.15 kg/m      PHYSICAL EXAM    Constitutional:  Alert, in no acute distress. Cooperative.  EYES: PERRL. EOM intact. Conjunctivae clear.   HENT:  Atraumatic, normocephalic. Normal nose. Oropharynx without erythema or swelling. Moist membranes.   Respiratory:  Respirations even, unlabored, in no acute respiratory distress. Lungs clear to auscultation bilaterally without wheeze, rhonchi, or rales. No cough. Speaks in full sentences easily.  Cardiovascular:  Regular rate and rhythm, good peripheral perfusion. No peripheral edema. No chest wall tenderness.  GI: Soft, flat, nondistended, right lower abdominal tenderness to palpation without guarding, rebound, or peritoneal signs. Bowel sounds normal. Right CVA tenderness to percussion.   Musculoskeletal:  No edema. No cyanosis. Range of motion major extremities intact.    Integument: Warm, Dry, No erythema, No rash. Nephrostomy tube in place without evidence of infection.    Neurologic:  Alert & oriented. No fo her lactate was normal rubens deficits noted. GCS 15.   Psych: Normal mood and affect.      LAB:  All pertinent labs reviewed and interpreted.  Results for orders placed or performed during the hospital encounter of 02/09/23   UA with Microscopic reflex to Culture    Specimen: Urine, Midstream   Result Value Ref Range    Color Urine Light Yellow Colorless, Straw, Light Yellow, Yellow    Appearance Urine Cloudy (A) Clear    Glucose Urine Negative Negative mg/dL    Bilirubin Urine Negative Negative    Ketones Urine Negative Negative mg/dL     Specific Gravity Urine 1.011 1.001 - 1.030    Blood Urine 0.2 mg/dL (A) Negative    pH Urine 8.5 (H) 5.0 - 7.0    Protein Albumin Urine 100 (A) Negative mg/dL    Urobilinogen Urine <2.0 <2.0 mg/dL    Nitrite Urine Negative Negative    Leukocyte Esterase Urine 500 Duglas/uL (A) Negative    Triple Phosphate Crystals Urine Many (A) None Seen /HPF    RBC Urine 22 (H) <=2 /HPF    WBC Urine 27 (H) <=5 /HPF    Squamous Epithelials Urine 1 <=1 /HPF    Hyaline Casts Urine 3 (H) <=2 /LPF   Basic metabolic panel   Result Value Ref Range    Sodium 136 136 - 145 mmol/L    Potassium 3.9 3.4 - 5.3 mmol/L    Chloride 105 98 - 107 mmol/L    Carbon Dioxide (CO2) 22 22 - 29 mmol/L    Anion Gap 9 7 - 15 mmol/L    Urea Nitrogen 13.4 6.0 - 20.0 mg/dL    Creatinine 0.86 0.51 - 0.95 mg/dL    Calcium 9.6 8.6 - 10.0 mg/dL    Glucose 88 70 - 99 mg/dL    GFR Estimate 90 >60 mL/min/1.73m2   CBC with platelets and differential   Result Value Ref Range    WBC Count 7.2 4.0 - 11.0 10e3/uL    RBC Count 4.33 3.80 - 5.20 10e6/uL    Hemoglobin 13.0 11.7 - 15.7 g/dL    Hematocrit 39.3 35.0 - 47.0 %    MCV 91 78 - 100 fL    MCH 30.0 26.5 - 33.0 pg    MCHC 33.1 31.5 - 36.5 g/dL    RDW 13.6 10.0 - 15.0 %    Platelet Count 162 150 - 450 10e3/uL    % Neutrophils 67 %    % Lymphocytes 23 %    % Monocytes 7 %    % Eosinophils 2 %    % Basophils 0 %    % Immature Granulocytes 1 %    NRBCs per 100 WBC 0 <1 /100    Absolute Neutrophils 4.9 1.6 - 8.3 10e3/uL    Absolute Lymphocytes 1.6 0.8 - 5.3 10e3/uL    Absolute Monocytes 0.5 0.0 - 1.3 10e3/uL    Absolute Eosinophils 0.1 0.0 - 0.7 10e3/uL    Absolute Basophils 0.0 0.0 - 0.2 10e3/uL    Absolute Immature Granulocytes 0.1 <=0.4 10e3/uL    Absolute NRBCs 0.0 10e3/uL   Result Value Ref Range    Magnesium 2.3 1.7 - 2.3 mg/dL   Lactic acid whole blood   Result Value Ref Range    Lactic Acid 0.6 (L) 0.7 - 2.0 mmol/L       RADIOLOGY:  Reviewed all pertinent imaging. Please see official radiology report.  CT Abdomen  Pelvis w Contrast    (Results Pending)       Radha Castellano PA-C  [facility] EMERGENCY DEPARTMENT  [facility] [facility] [id number] [phone]     Radha Castellano PA-C  02/10/23 0053     Normal

## 2023-02-10 NOTE — DISCHARGE INSTRUCTIONS
You are seen in the ER today for evaluation of right flank pain and generalized weakness.  Your urine is concerning for infection.  You were treated with IV antibiotic in the ED.    You are prescribed an antibiotic, Levaquin, for UTI.      Rest, stay well-hydrated, heating pad, please quarantine until Tylenol as needed for pain.  Please quarantine and mask until your symptoms have resolved.    Please follow-up with your urologist/nephrologist for reevaluation next week, or sooner as needed.    Return to the ER if any new or worsening symptoms develop including increased abdominal pain, fever/chills, painful urination, blood in your urine, redness/warmth/drainage from your nephrostomy site, bloody stools or vomit, chest pain, shortness of breath, severe headache, or any other concerning symptom.

## 2023-02-11 LAB — BACTERIA UR CULT: NORMAL

## 2023-02-21 ENCOUNTER — HOSPITAL ENCOUNTER (EMERGENCY)
Facility: HOSPITAL | Age: 38
Discharge: HOME | End: 2023-02-21
Payer: COMMERCIAL

## 2023-02-21 VITALS
HEIGHT: 60 IN | DIASTOLIC BLOOD PRESSURE: 79 MMHG | SYSTOLIC BLOOD PRESSURE: 125 MMHG | WEIGHT: 180 LBS | OXYGEN SATURATION: 94 % | BODY MASS INDEX: 35.34 KG/M2 | TEMPERATURE: 98.4 F | RESPIRATION RATE: 18 BRPM | HEART RATE: 77 BPM

## 2023-02-21 DIAGNOSIS — M54.50 MIDLINE LOW BACK PAIN WITHOUT SCIATICA: ICD-10-CM

## 2023-02-21 PROBLEM — Z72.0 TOBACCO ABUSE: Status: ACTIVE | Noted: 2019-04-14

## 2023-02-21 PROBLEM — E66.812 OBESITY, CLASS II, BMI 35-39.9: Status: ACTIVE | Noted: 2018-02-15

## 2023-02-21 PROBLEM — N13.5 CROSSING VESSEL AND STRICTURE OF URETER WITHOUT HYDRONEPHROSIS: Status: ACTIVE | Noted: 2017-01-31

## 2023-02-21 PROBLEM — Z93.9 ARTIFICIAL OPENING STATUS: Status: ACTIVE | Noted: 2017-12-19

## 2023-02-21 PROBLEM — F32.A DEPRESSIVE DISORDER: Status: ACTIVE | Noted: 2019-03-27

## 2023-02-21 PROBLEM — Q60.5 CONGENITAL RENAL AGENESIS AND DYSGENESIS: Status: ACTIVE | Noted: 2023-02-21

## 2023-02-21 PROBLEM — Q60.0 RENAL AGENESIS, UNILATERAL: Status: ACTIVE | Noted: 2023-02-21

## 2023-02-21 PROBLEM — Q60.2 CONGENITAL RENAL AGENESIS AND DYSGENESIS: Status: ACTIVE | Noted: 2023-02-21

## 2023-02-21 PROBLEM — N12 PYELONEPHRITIS: Status: ACTIVE | Noted: 2017-03-23

## 2023-02-21 PROBLEM — R11.2 NON-INTRACTABLE VOMITING WITH NAUSEA: Status: ACTIVE | Noted: 2017-03-24

## 2023-02-21 PROCEDURE — 250N000013 HC RX MED GY IP 250 OP 250 PS 637

## 2023-02-21 PROCEDURE — 99283 EMERGENCY DEPT VISIT LOW MDM: CPT

## 2023-02-21 PROCEDURE — 250N000012 HC RX MED GY IP 250 OP 636 PS 637

## 2023-02-21 PROCEDURE — 250N000011 HC RX IP 250 OP 636

## 2023-02-21 RX ORDER — ACETAMINOPHEN 500 MG
1000 TABLET ORAL ONCE
Status: COMPLETED | OUTPATIENT
Start: 2023-02-21 | End: 2023-02-21

## 2023-02-21 RX ORDER — LIDOCAINE 4 G/G
1 PATCH TOPICAL ONCE
Status: DISCONTINUED | OUTPATIENT
Start: 2023-02-21 | End: 2023-02-21 | Stop reason: HOSPADM

## 2023-02-21 RX ORDER — LIDOCAINE 4 G/G
1 PATCH TOPICAL EVERY 24 HOURS
Qty: 3 PATCH | Refills: 0 | Status: SHIPPED | OUTPATIENT
Start: 2023-02-21 | End: 2023-05-12

## 2023-02-21 RX ORDER — ONDANSETRON 4 MG/1
4 TABLET, ORALLY DISINTEGRATING ORAL ONCE
Status: COMPLETED | OUTPATIENT
Start: 2023-02-21 | End: 2023-02-21

## 2023-02-21 RX ADMIN — HYDROMORPHONE HYDROCHLORIDE 1 MG: 2 TABLET ORAL at 12:22

## 2023-02-21 RX ADMIN — LIDOCAINE 1 PATCH: 4 PATCH TOPICAL at 12:22

## 2023-02-21 RX ADMIN — ONDANSETRON 4 MG: 4 TABLET, ORALLY DISINTEGRATING ORAL at 12:26

## 2023-02-21 RX ADMIN — DEXAMETHASONE 10 MG: 4 TABLET ORAL at 12:20

## 2023-02-21 RX ADMIN — ACETAMINOPHEN 1000 MG: 500 TABLET ORAL at 12:20

## 2023-02-21 ASSESSMENT — ENCOUNTER SYMPTOMS
FEVER: 0
NUMBNESS: 0
JOINT SWELLING: 0
BACK PAIN: 1
CHILLS: 0
CONSTIPATION: 0
DYSURIA: 0
FLANK PAIN: 0
BRUISES/BLEEDS EASILY: 0
HEMATURIA: 0
COLOR CHANGE: 0
WEAKNESS: 0

## 2023-02-21 ASSESSMENT — ACTIVITIES OF DAILY LIVING (ADL): ADLS_ACUITY_SCORE: 35

## 2023-02-21 NOTE — DISCHARGE INSTRUCTIONS
You were seen in the ER for lower back pain.     Rest, no heavy lifting. You may apply ice or heat to the area (do not apply ice directly to the skin). You may use a topical pain relieving patch such as Lidoderm or Icy Hot. You can continue to use Tylenol (do not exceed 4000 mg in 24 hrs) as needed. You can use lidocaine patch (only 12 hours out of every 24 hours) as needed.    Follow up with your Primary Care Provider or the [facility] if your symptoms persist for reevaluation and ongoing management strategies including but not limited to possible injection and physical therapy.    Return to ER if any new or worsening symptoms develop including fever/chills, worsening pain, new numbness/tingling/weakness, loss of bowel or bladder function or any other new or concerning symptoms.

## 2023-02-21 NOTE — ED PROVIDER NOTES
EMERGENCY DEPARTMENT ENCOUNTER      NAME: Jacqueline Pappas  AGE: 37 year old female  YOB: 1985  MRN: [id number]  EVALUATION DATE & TIME: No admission date for patient encounter.    PCP: Pao Montague    ED PROVIDER: Radha Castellano PA-C      Chief Complaint   Patient presents with     Back Pain         FINAL IMPRESSION:  1. Midline low back pain without sciatica          ED COURSE & MEDICAL DECISION MAKIN:22 AM I met with the patient, obtained history, performed an initial exam, and discussed options and plan for diagnostics and treatment here in the ED.   12:55 PM Reevaluated and updated patient. I discussed the plan for discharge with the patient, and patient is agreeable. We discussed supportive cares at home and reasons for return to the ER including new or worsening symptoms. All questions and concerns addressed. Patient to be discharged by RN.    Pertinent Labs & Imaging studies reviewed. (See chart for details)  37 year old female with a history of congenital solitary kidney with nephrostomy tube in place presents to the Emergency Department for evaluation of low back pain after bending over to pick something off the ground yesterday. Upon exam, patient is afebrile, hemodynamically stable, and in no acute distress. Midline lumbar tenderness to palpation without overlying skin changes, lower extremities 5/5 strength, reflexes intact and symmetrical, and no associated numbness or paresthesias. Nephrostomy tube in place with no evidence of infection. No CVA tenderness to percussion, dysuria, or hematuria. Differential diagnosis includes but not limited to muscle spasm/strain, slipped disc with or without radicular pain including sciatica, cauda equina syndrome, vertebral fracture, vertebral tumor, epidural abscess/discitis, or pyelonephritis.      Based on history and exam, the most likely etiology is muscle spasm/strain, but could be due to a slipped disc. Emergent MRI is not indicated  as no new weakness or evidence of cauda equina syndrome (no saddle anesthesia, bowel/bladder incontinence/retention). Low suspicion for fracture as there was no preceding trauma/injury. Using shared decision making, no imaging of the spine was performed. Low suspicion for an epidural abscess as the patient denies hx of IVDU, no fevers/chills or recent procedures. Low suspicion for infiltrative vertebral tumor as no associated weight loss, night sweats, or known history of cancer.     Patient was treated with dilaudid, tylenol, decadron, zofran, and lidocaine patch upon arrival with improvement in symptoms. Plan to discharge patient home with symptomatic management including prescription lidocaine patches and close follow up with their PCP for reevaluation and ongoing management. The patient was stable and well appearing throughout entire ER visit and upon discharge. The patient was advised to return to the ER if any new or worsening symptoms develop. The patient verbalizes understanding and agrees with the plan. At the conclusion of the encounter I discussed the results of all of the tests and the disposition. The questions were   answered. The patient or family acknowledged understanding and was agreeable with the care plan.     Medical Decision Making    History:    Supplemental history from: N/A    External Record(s) reviewed: Documented in chart, if applicable.    Work Up:    Chart documentation includes differential considered and any EKGs or imaging independently interpreted by provider, where specified.    In additional to work up documented, I considered the following work up: Documented in chart, if applicable.    External consultation:    Discussion of management with another provider: Documented in chart, if applicable    Complicating factors:    Care impacted by chronic illness: solitary kidney and inability to tolerate NSAIDs    Care affected by social determinants of health: N/A    Disposition  "considerations: Discharge. I prescribed additional prescription strength medication(s) as charted. See documentation for any additional details.      MEDICATIONS GIVEN IN THE EMERGENCY:  Medications   Lidocaine (LIDOCARE) 4 % Patch 1 patch (1 patch Transdermal Patch/Med Applied 2/21/23 1222)   lidocaine patch in PLACE (has no administration in time range)   HYDROmorphone (DILAUDID) half-tab 1 mg (1 mg Oral Given 2/21/23 1222)   ondansetron (ZOFRAN ODT) ODT tab 4 mg (4 mg Oral Given 2/21/23 1226)   acetaminophen (TYLENOL) tablet 1,000 mg (1,000 mg Oral Given 2/21/23 1220)   dexamethasone (DECADRON) tablet 10 mg (10 mg Oral Given 2/21/23 1220)       NEW PRESCRIPTIONS STARTED AT TODAY'S ER VISIT  New Prescriptions    LIDOCAINE (LIDOCARE) 4 % PATCH    Place 1 patch onto the skin every 24 hours To prevent lidocaine toxicity, patient should be patch free for 12 hrs daily.          =================================================================    HPI    Patient information was obtained from: patient     Use of : N/A       Jacqueline EULA Pappas is a 37 year old female with a pertinent history of absence of one kidney, pyelonephrosis, hypotension, recurrent UTI, GT, and thrombocytopenia who presents to this ED via wheelchair for evaluation of low back pain.    Last night the patient was bending down to  clothes when she stood straight up she heard a \"crack\" and had instant pain in the middle of her lower back. Now, she has pain that is exacerbated with ambulation. She tried taking Tylenol at 7:30 AM, a heating pad, and a cold pad but none of these have alleviated the pain. Of note, she has only only one kidney and unable to tolerate NSAIDs. Additionally, she was diagnosed with pyelonephritis on 2/9 and is no longer having those symptoms.     She denies sciatica, numbness or tingling in her legs, saddle anesthesia, incontinence, inability to urinate or pass a bowel movement, gait problems, fever, chills, " unintentional weight loss, night sweats, dysuria, hematuria, or any other complaints at this time. She is not an IV drug user.     REVIEW OF SYSTEMS   Review of Systems   Constitutional: Negative for chills and fever.   Gastrointestinal: Negative for constipation.   Genitourinary: Negative for dysuria, flank pain and hematuria.        Negative for incontinence   Musculoskeletal: Positive for back pain (lower). Negative for gait problem and joint swelling.   Skin: Negative for color change and rash.   Neurological: Negative for weakness and numbness.   Hematological: Does not bruise/bleed easily.   All other systems reviewed and are negative.       PAST MEDICAL HISTORY:  Past Medical History:   Diagnosis Date     Acute renal failure (H)      Anemia      Anemia      Calculus of kidney      Congenital absence of left kidney      Congenital absence of one kidney      Depression      Depression      Hydronephrosis      Kidney stone     at age 29     Pyelonephritis      Pyelonephritis      Smoker      Ureteral stricture      UTI (urinary tract infection)      Wrist fracture     Left       PAST SURGICAL HISTORY:  Past Surgical History:   Procedure Laterality Date      SECTION        SECTION      x1     COMBINED CYSTOSCOPY, RETROGRADES, URETEROSCOPY, LASER HOLMIUM LITHOTRIPSY URETER(S), INSERT STENT Right 2016    Procedure: COMBINED CYSTOSCOPY, RETROGRADES, URETEROSCOPY, LASER HOLMIUM LITHOTRIPSY URETER(S), INSERT STENT;  Surgeon: Florentino Awad MD;  Location: [facility] OR     CYSTOSCOPY, URETEROSCOPY, COMBINED Right 2016    Procedure: COMBINED CYSTOSCOPY, URETEROSCOPY;  Surgeon: Florentino Awad MD;  Location: [facility] OR     IR NEPHROLITHOTOMY  2014     IR NEPHROSTOGRAM EXISITING ACCESS  10/18/2018     IR NEPHROSTOMY TUBE CHANGE RIGHT  2018     IR NEPHROSTOMY TUBE CHANGE RIGHT  2020     IR NEPHROSTOMY TUBE CHANGE RIGHT  2018     IR NEPHROSTOMY TUBE CHANGE RIGHT  2020      IR NEPHROSTOMY TUBE PLACEMENT RIGHT  7/24/2016     IR NEPHROSTOMY TUBE PLACEMENT RIGHT  9/14/2017     KIDNEY STONE SURGERY Right 05/2016     LASER HOLMIUM LITHOTRIPSY URETER(S), INSERT STENT, COMBINED Left 11/1/2016    Procedure: COMBINED CYSTOSCOPY, URETEROSCOPY, LASER HOLMIUM LITHOTRIPSY URETER(S), INSERT STENT;  Surgeon: Florentino Awad MD;  Location: [facility] OR     LASER HOLMIUM NEPHROLITHOTOMY VIA PERCUTANEOUS NEPHROSTOMY Right 9/14/2016    Procedure: LASER HOLMIUM NEPHROLITHOTOMY VIA PERCUTANEOUS NEPHROSTOMY;  Surgeon: Florentino Awad MD;  Location: [facility] OR     NEPHROSTOMY W/ INTRODUCTION OF CATHETER Right      OTHER SURGICAL HISTORY      Mole removednasal     OTHER SURGICAL HISTORY Right     Cystoscopy, ureteroscopy, laser11/02/2014 x2 with ureteral stent insertion     PERCUTANEOUS NEPHROSTOMY  11/1/2016    Procedure: PERCUTANEOUS NEPHROSTOMY;  Surgeon: Florentino Awad MD;  Location: [facility] OR     WISDOM TOOTH EXTRACTION       ZZC REMOVAL OF KIDNEY STONE             CURRENT MEDICATIONS:    Lidocaine (LIDOCARE) 4 % Patch  albuterol (ACCUNEB) 1.25 MG/3ML neb solution  methocarbamol (ROBAXIN) 750 MG tablet  ondansetron (ZOFRAN ODT) 4 MG ODT tab  ondansetron (ZOFRAN ODT) 4 MG ODT tab  prochlorperazine (COMPAZINE) 10 MG tablet  sertraline (ZOLOFT) 100 MG tablet  SPRINTEC 28 0.25-35 MG-MCG tablet        ALLERGIES:  Allergies   Allergen Reactions     Vancomycin      Desogestrel-Ethinyl Estradiol Angioedema and Swelling     Swelling of hands and feet per pt.  Swelling of hands and feet per pt.  Swelling of hands and feet per pt.  Swelling of hands and feet per pt.  Swelling of hands and feet per pt.  Swelling of hands and feet per pt.  Swelling of hands and feet per pt.  Swelling of hands and feet per pt.       Penicillins Rash     As a child per mother; mother unsure if has tolerated another PCN since. Tolerated ampicillin 9/20/16     Sulfa Drugs Rash       FAMILY HISTORY:  Family History   Problem  Relation Age of Onset     Anesthesia Reaction No family hx of      Malignant Hyperthermia No family hx of      Heart Disease Maternal Uncle         heart failure     Coronary Artery Disease Other      Heart Disease Maternal Grandmother         pacemaker     Gout Paternal Grandfather      Prostate Cancer Maternal Aunt         breast     Heart Disease Maternal Aunt         pacemaker     Heart Disease Maternal Aunt         pacemaker     Heart Disease Maternal Aunt         pacemaker       SOCIAL HISTORY:   Social History     Socioeconomic History     Marital status: Single     Spouse name: None     Number of children: None     Years of education: None     Highest education level: None   Tobacco Use     Smoking status: Every Day     Packs/day: 0.50     Years: 10.00     Pack years: 5.00     Types: Cigarettes     Last attempt to quit: 2016     Years since quittin.4     Smokeless tobacco: Former     Quit date: 2016     Tobacco comments:     has information from last admission.   Substance and Sexual Activity     Alcohol use: Yes     Alcohol/week: 0.0 standard drinks     Comment: Alcoholic Drinks/day: occ     Drug use: No       VITALS:  /79   Pulse 77   Temp 98.4  F (36.9  C)   Resp 18   Ht 1.524 m (5')   Wt 81.6 kg (180 lb)   LMP 2023 (Approximate)   SpO2 94%   BMI 35.15 kg/m      PHYSICAL EXAM    Constitutional:  Alert, in no acute distress. Cooperative.  EYES: Conjunctivae clear.   HENT:  Atraumatic, normocephalic.  Respiratory:  Respirations even, unlabored, in no acute respiratory distress. Speaks in full sentences easily.  Cardiovascular:  Regular rate and rhythm, good peripheral perfusion. No peripheral edema.  GI: Soft, flat, non-distended.  Musculoskeletal:  Midline lumbar back pain to palpation without overlying erythema, warmth, purulence, mass, fluctuance, or step off. Lower extremities full ROM without pain.   Integument: Warm, Dry, No erythema, No rash. Right nephrostomy tube in  place and CDI without surrounding erythema, warmth, purulence, fluctuance, tenderness, or other skin changes.   Neurologic:  Alert & oriented. Lower extremities 5/5 strength. Patellar and Achilles reflexes intact and symmetrical. Heel and toe walking intact. Negative straight leg raises. Sensation intact, no numbness or paraesthesias.   Psych: Normal mood and affect.      I, Tad Min, am serving as a scribe to document services personally performed by Radha Castellano PA-C based on my observation and the provider's statements to me. I, Radha Castellano PA-C, attest that Tad Min is acting in a scribe capacity, has observed my performance of the services and has documented them in accordance with my direction.    Radha Castellano PA-C  [facility] EMERGENCY DEPARTMENT  [address] [place] [id number]  [phone]     Radha Castellano PA-C  02/21/23 [id number]

## 2023-02-21 NOTE — ED TRIAGE NOTES
patient states yesterday she was bending down to  laundry basket and developed lumbar pain, non radiating. Did take Tylenol with poor relief. No loss of bladder or bowels

## 2023-02-21 NOTE — ED NOTES
Discharge paperwork, follow up care and prescriptions discussed with pt. Pt verbalized understanding and has no questions at this time. VSS. Pt is a/ox4 abc's intact and ambulatory with steady gait when leaving ED today

## 2023-03-03 ENCOUNTER — HOSPITAL ENCOUNTER (EMERGENCY)
Facility: HOSPITAL | Age: 38
Discharge: HOME | End: 2023-03-03
Admitting: PHYSICIAN ASSISTANT
Payer: COMMERCIAL

## 2023-03-03 VITALS
OXYGEN SATURATION: 95 % | HEIGHT: 60 IN | WEIGHT: 180 LBS | DIASTOLIC BLOOD PRESSURE: 57 MMHG | BODY MASS INDEX: 35.34 KG/M2 | TEMPERATURE: 99.2 F | HEART RATE: 85 BPM | RESPIRATION RATE: 24 BRPM | SYSTOLIC BLOOD PRESSURE: 114 MMHG

## 2023-03-03 DIAGNOSIS — R11.0 NAUSEA: ICD-10-CM

## 2023-03-03 DIAGNOSIS — Z43.6 ATTENTION TO NEPHROSTOMY (H): ICD-10-CM

## 2023-03-03 DIAGNOSIS — R10.9 RIGHT FLANK PAIN: ICD-10-CM

## 2023-03-03 LAB
ALBUMIN SERPL BCG-MCNC: 4.1 G/DL (ref 3.5–5.2)
ALBUMIN UR-MCNC: 200 MG/DL
ALP SERPL-CCNC: 80 U/L (ref 35–104)
ALT SERPL W P-5'-P-CCNC: 18 U/L (ref 10–35)
AMORPH CRY #/AREA URNS HPF: ABNORMAL /HPF
ANION GAP SERPL CALCULATED.3IONS-SCNC: 13 MMOL/L (ref 7–15)
APPEARANCE UR: ABNORMAL
AST SERPL W P-5'-P-CCNC: 16 U/L (ref 10–35)
BACTERIA #/AREA URNS HPF: ABNORMAL /HPF
BILIRUB DIRECT SERPL-MCNC: <0.2 MG/DL (ref 0–0.3)
BILIRUB SERPL-MCNC: 0.8 MG/DL
BILIRUB UR QL STRIP: NEGATIVE
BUN SERPL-MCNC: 13.5 MG/DL (ref 6–20)
CALCIUM SERPL-MCNC: 9.7 MG/DL (ref 8.6–10)
CHLORIDE SERPL-SCNC: 105 MMOL/L (ref 98–107)
COLOR UR AUTO: YELLOW
CREAT SERPL-MCNC: 0.94 MG/DL (ref 0.51–0.95)
DEPRECATED HCO3 PLAS-SCNC: 21 MMOL/L (ref 22–29)
ERYTHROCYTE [DISTWIDTH] IN BLOOD BY AUTOMATED COUNT: 13.8 % (ref 10–15)
GFR SERPL CREATININE-BSD FRML MDRD: 81 ML/MIN/1.73M2
GLUCOSE SERPL-MCNC: 169 MG/DL (ref 70–99)
GLUCOSE UR STRIP-MCNC: NEGATIVE MG/DL
HCG UR QL: NEGATIVE
HCT VFR BLD AUTO: 43.7 % (ref 35–47)
HGB BLD-MCNC: 14.6 G/DL (ref 11.7–15.7)
HGB UR QL STRIP: NEGATIVE
HOLD SPECIMEN: NORMAL
KETONES UR STRIP-MCNC: NEGATIVE MG/DL
LEUKOCYTE ESTERASE UR QL STRIP: ABNORMAL
LIPASE SERPL-CCNC: 18 U/L (ref 13–60)
MCH RBC QN AUTO: 30.3 PG (ref 26.5–33)
MCHC RBC AUTO-ENTMCNC: 33.4 G/DL (ref 31.5–36.5)
MCV RBC AUTO: 91 FL (ref 78–100)
MUCOUS THREADS #/AREA URNS LPF: PRESENT /LPF
NITRATE UR QL: NEGATIVE
PH UR STRIP: 8.5 [PH] (ref 5–7)
PLATELET # BLD AUTO: 174 10E3/UL (ref 150–450)
POTASSIUM SERPL-SCNC: 3.9 MMOL/L (ref 3.4–5.3)
PROT SERPL-MCNC: 7 G/DL (ref 6.4–8.3)
RBC # BLD AUTO: 4.82 10E6/UL (ref 3.8–5.2)
RBC URINE: 0 /HPF
SODIUM SERPL-SCNC: 139 MMOL/L (ref 136–145)
SP GR UR STRIP: 1.02 (ref 1–1.03)
SQUAMOUS EPITHELIAL: 1 /HPF
TRI-PHOS CRY #/AREA URNS HPF: ABNORMAL /HPF
UROBILINOGEN UR STRIP-MCNC: <2 MG/DL
WBC # BLD AUTO: 8.1 10E3/UL (ref 4–11)
WBC URINE: 0 /HPF

## 2023-03-03 PROCEDURE — 250N000011 HC RX IP 250 OP 636: Performed by: PHYSICIAN ASSISTANT

## 2023-03-03 PROCEDURE — 81001 URINALYSIS AUTO W/SCOPE: CPT | Performed by: STUDENT IN AN ORGANIZED HEALTH CARE EDUCATION/TRAINING PROGRAM

## 2023-03-03 PROCEDURE — 96376 TX/PRO/DX INJ SAME DRUG ADON: CPT

## 2023-03-03 PROCEDURE — 82248 BILIRUBIN DIRECT: CPT | Performed by: PHYSICIAN ASSISTANT

## 2023-03-03 PROCEDURE — 83690 ASSAY OF LIPASE: CPT | Performed by: PHYSICIAN ASSISTANT

## 2023-03-03 PROCEDURE — 81025 URINE PREGNANCY TEST: CPT | Performed by: PHYSICIAN ASSISTANT

## 2023-03-03 PROCEDURE — 96375 TX/PRO/DX INJ NEW DRUG ADDON: CPT

## 2023-03-03 PROCEDURE — 36415 COLL VENOUS BLD VENIPUNCTURE: CPT | Performed by: PHYSICIAN ASSISTANT

## 2023-03-03 PROCEDURE — 87086 URINE CULTURE/COLONY COUNT: CPT | Performed by: STUDENT IN AN ORGANIZED HEALTH CARE EDUCATION/TRAINING PROGRAM

## 2023-03-03 PROCEDURE — 96374 THER/PROPH/DIAG INJ IV PUSH: CPT

## 2023-03-03 PROCEDURE — 85027 COMPLETE CBC AUTOMATED: CPT | Performed by: PHYSICIAN ASSISTANT

## 2023-03-03 PROCEDURE — 258N000003 HC RX IP 258 OP 636: Performed by: PHYSICIAN ASSISTANT

## 2023-03-03 PROCEDURE — 99285 EMERGENCY DEPT VISIT HI MDM: CPT | Mod: 25

## 2023-03-03 PROCEDURE — 96361 HYDRATE IV INFUSION ADD-ON: CPT

## 2023-03-03 RX ORDER — HYDROMORPHONE HYDROCHLORIDE 1 MG/ML
0.5 INJECTION, SOLUTION INTRAMUSCULAR; INTRAVENOUS; SUBCUTANEOUS ONCE
Status: COMPLETED | OUTPATIENT
Start: 2023-03-03 | End: 2023-03-03

## 2023-03-03 RX ORDER — OXYCODONE HYDROCHLORIDE 5 MG/1
5 TABLET ORAL EVERY 6 HOURS PRN
Qty: 8 TABLET | Refills: 0 | Status: SHIPPED | OUTPATIENT
Start: 2023-03-03 | End: 2023-03-06

## 2023-03-03 RX ORDER — ONDANSETRON 2 MG/ML
4 INJECTION INTRAMUSCULAR; INTRAVENOUS ONCE
Status: COMPLETED | OUTPATIENT
Start: 2023-03-03 | End: 2023-03-03

## 2023-03-03 RX ORDER — ONDANSETRON 4 MG/1
4 TABLET, ORALLY DISINTEGRATING ORAL EVERY 8 HOURS PRN
Qty: 15 TABLET | Refills: 0 | Status: SHIPPED | OUTPATIENT
Start: 2023-03-03 | End: 2023-03-06

## 2023-03-03 RX ADMIN — SODIUM CHLORIDE 1000 ML: 9 INJECTION, SOLUTION INTRAVENOUS at 17:58

## 2023-03-03 RX ADMIN — HYDROMORPHONE HYDROCHLORIDE 0.5 MG: 1 INJECTION, SOLUTION INTRAMUSCULAR; INTRAVENOUS; SUBCUTANEOUS at 18:32

## 2023-03-03 RX ADMIN — HYDROMORPHONE HYDROCHLORIDE 0.5 MG: 1 INJECTION, SOLUTION INTRAMUSCULAR; INTRAVENOUS; SUBCUTANEOUS at 19:42

## 2023-03-03 RX ADMIN — ONDANSETRON 4 MG: 2 INJECTION INTRAMUSCULAR; INTRAVENOUS at 18:08

## 2023-03-03 ASSESSMENT — ACTIVITIES OF DAILY LIVING (ADL)
ADLS_ACUITY_SCORE: 35
ADLS_ACUITY_SCORE: 35

## 2023-03-03 ASSESSMENT — ENCOUNTER SYMPTOMS
NAUSEA: 1
FEVER: 1
CHILLS: 1
ABDOMINAL PAIN: 1
VOMITING: 1
DIARRHEA: 1
BACK PAIN: 1
DYSURIA: 0

## 2023-03-03 NOTE — ED TRIAGE NOTES
Pt developed right sided flank pain two days ago, along with nausea. She started vomiting and having diarrhea today. Pt has history of kidney stones. Pt endorses urinary frequency as well.      Triage Assessment     Row Name 03/03/23 2897       Triage Assessment (Adult)    Airway WDL WDL       Respiratory WDL    Respiratory WDL WDL       Skin Circulation/Temperature WDL    Skin Circulation/Temperature WDL WDL       Cardiac WDL    Cardiac WDL WDL       Peripheral/Neurovascular WDL    Peripheral Neurovascular WDL WDL       Cognitive/Neuro/Behavioral WDL    Cognitive/Neuro/Behavioral WDL WDL

## 2023-03-03 NOTE — ED PROVIDER NOTES
EMERGENCY DEPARTMENT ENCOUNTER      NAME: Jacqueline Pappas  AGE: 37 year old female  YOB: 1985  MRN: [id number]  EVALUATION DATE & TIME: No admission date for patient encounter.    PCP: Pao Montague    ED PROVIDER: Tessy Neumann PA-C      Chief Complaint   Patient presents with     Flank Pain         FINAL IMPRESSION:  1. Right flank pain    2. Attention to nephrostomy (H)    3. Nausea          ED COURSE & MEDICAL DECISION MAKIN:56 PM I introduced myself to patient, performed initial HPI and examination.   8:13 PM Reexamined patient. Discussed plan for discharge.       37 year old female with PMH chronic right flank pain, nausea and vomiting, nephrostomy tube in place, GT, congenital absence of one kidney, recurrent UTI presents to the Emergency Department for evaluation of right flank pain and diarrhea x 2 days. Endorses associated nausea and vomiting. Does not know if pain feels similar to prior kidney stones or infections. Feels myalgias, no true fevers. Nephrostomy tube due to be replaced next week. Follows with [place]. History of , no other abdominal surgeries    Differential for right flank pain include: Cholelithiasis, cholecystitis, pyelonephritis, ureterolithiasis, appendicitis, chronic pain, nephrostomy malfunction, and others.     T 99.2F, Vital signs otherwise unremarkable.  Exam with mild right sided abdominal tenderness not specifically localized to RLQ. Nephrostomy tube in place with clear urine in nephrostomy bag. No surrounding skin changes. No CVAT.     Labs with no leukocytosis, no anemia. No notable electrolyte derangement. Creatinine is WNL. LFT and Lipase WNL. UA with no blood. 250 leukocyte esterase, few bacteria and few crystals but ultimately not consistent with infection, appears improved from prior. Reviewed prior urine cultures with no recent growth. Patient was most recently seen 23 and treated with Levofloxacin with concern for pyelonephritis. Will  wait for urine culture prior to treatment given low suspicion for acute infection/pyelonephritis.     I considered imaging for patient's right flank pain, however patient has had 4 CTs in the past 6 months and 10 in the past year. I reviewed patient's imaging from 2/1 with no biliary stones, kidney grossly unremarkable with normal nephrostomy tube. With normal labs I see no indication for repeat imaging at this time. Discussed with patient, who is agreeable.    Patient does endorse a component of chronic right flank pain, and I suspect this may be contributing. Reviewed  with no concerning prescribing patterns, last controlled substance prescription 12/27 for #6 oxycodone. Ultimately I think a small dose of oxycodone for acute pain is reasonable.     Instructed on at home management, close follow up with PCP and instructed on red flags/indications to return to the emergency department. All questions were answered to the best of my ability and patient is agreeable with plan.         Medical Decision Making    History:    Supplemental history from: Documented in chart, if applicable    External Record(s) reviewed: Documented in chart, if applicable.    Work Up:    Chart documentation includes differential considered and any EKGs or imaging independently interpreted by provider.    In additional to work up documented, I considered the following work up: Documented in chart, if applicable.    External consultation:    Discussion of management with another provider: Documented in chart, if applicable    Complicating factors:    Care impacted by chronic illness: Chronic Kidney Disease and Chronic Pain    Care affected by social determinants of health: N/A    Disposition considerations: Discharge. I prescribed additional prescription strength medication(s) as charted. See documentation for any additional details.      MEDICATIONS GIVEN IN THE EMERGENCY:  Medications   0.9% sodium chloride BOLUS (0 mLs Intravenous  Stopped 3/3/23 1933)   ondansetron (ZOFRAN) injection 4 mg (4 mg Intravenous $Given 3/3/23 180)   HYDROmorphone (PF) (DILAUDID) injection 0.5 mg (0.5 mg Intravenous $Given 3/3/23 183)   HYDROmorphone (PF) (DILAUDID) injection 0.5 mg (0.5 mg Intravenous $Given 3/3/23 194)       NEW PRESCRIPTIONS STARTED AT TODAY'S ER VISIT  Discharge Medication List as of 3/3/2023  9:01 PM      START taking these medications    Details   oxyCODONE (ROXICODONE) 5 MG tablet Take 1 tablet (5 mg) by mouth every 6 hours as needed for pain, Disp-8 tablet, R-0, Local Print                =================================================================    HPI    Patient information was obtained from: Patient    Use of : N/A        Jacqueline Pappas is a 37 year old female with a PMH chronic right flank pain, nausea and vomiting, nephrostomy tube in place, GT, congenital absence of one kidney, recurrent UTI  who presents to this ED for evaluation of right flank pain for 2 days, nausea, vomiting, and diarrhea.  Patient has a complicated kidney history, nephrostomy tube in place.  History of stones and kidney infections.  Follows with [facility].  She is due to have her nephrostomy tube changed next week.  Does not feel the symptoms are necessarily typical with prior kidney infections or stones.  Does states she has had diarrhea though sometimes with kidney stones. Does report history of prior , otherwise no other abdominal surgeries.    Endorses body aches, chills.  No changes in urination/output, no noted hematuria.  Patient is currently on her menstrual cycle, denies vaginal discharge.    Patient took 4 mg of Zofran prior to arrival.    Chart review: 23 patient was seen in the ED, diagnosed with right pyelonephritis, discharged on Levaquin.  Urine culture ultimately grew mixture of urogenital christie. CT also performed at this visit, left kidney atrophic hypoplastic, Right kidney with multiple cysts. Perc nephrostomy tube  in place. Multiple stones, no hydronephrosis. Bladder distended. Patient has had 4 CT scans withini the past 6 months and 10 within the past 1 year.     REVIEW OF SYSTEMS   Review of Systems   Constitutional: Positive for chills and fever.   Gastrointestinal: Positive for abdominal pain, diarrhea, nausea and vomiting.   Genitourinary: Positive for vaginal bleeding. Negative for dysuria, enuresis, vaginal discharge and vaginal pain.   Musculoskeletal: Positive for back pain (Right flank pain).   Skin: Negative for rash.   All other systems reviewed and are negative.      PAST MEDICAL HISTORY:  Past Medical History:   Diagnosis Date     Acute renal failure (H)      Anemia      Anemia      Calculus of kidney      Congenital absence of left kidney      Congenital absence of one kidney      Depression      Depression      Hydronephrosis      Kidney stone     at age 29     Pyelonephritis      Pyelonephritis      Smoker      Ureteral stricture      UTI (urinary tract infection)      Wrist fracture     Left       PAST SURGICAL HISTORY:  Past Surgical History:   Procedure Laterality Date      SECTION        SECTION      x1     COMBINED CYSTOSCOPY, RETROGRADES, URETEROSCOPY, LASER HOLMIUM LITHOTRIPSY URETER(S), INSERT STENT Right 2016    Procedure: COMBINED CYSTOSCOPY, RETROGRADES, URETEROSCOPY, LASER HOLMIUM LITHOTRIPSY URETER(S), INSERT STENT;  Surgeon: Florentino Awad MD;  Location: [facility]     CYSTOSCOPY, URETEROSCOPY, COMBINED Right 2016    Procedure: COMBINED CYSTOSCOPY, URETEROSCOPY;  Surgeon: Florentino Awad MD;  Location: [facility] OR     IR NEPHROLITHOTOMY  2014     IR NEPHROSTOGRAM EXISITING ACCESS  10/18/2018     IR NEPHROSTOMY TUBE CHANGE RIGHT  2018     IR NEPHROSTOMY TUBE CHANGE RIGHT  2020     IR NEPHROSTOMY TUBE CHANGE RIGHT  2018     IR NEPHROSTOMY TUBE CHANGE RIGHT  2020     IR NEPHROSTOMY TUBE PLACEMENT RIGHT  2016     IR NEPHROSTOMY TUBE  PLACEMENT RIGHT  9/14/2017     KIDNEY STONE SURGERY Right 05/2016     LASER HOLMIUM LITHOTRIPSY URETER(S), INSERT STENT, COMBINED Left 11/1/2016    Procedure: COMBINED CYSTOSCOPY, URETEROSCOPY, LASER HOLMIUM LITHOTRIPSY URETER(S), INSERT STENT;  Surgeon: Florentino Awad MD;  Location: [facility] OR     LASER HOLMIUM NEPHROLITHOTOMY VIA PERCUTANEOUS NEPHROSTOMY Right 9/14/2016    Procedure: LASER HOLMIUM NEPHROLITHOTOMY VIA PERCUTANEOUS NEPHROSTOMY;  Surgeon: Florentino Awad MD;  Location: [facility] OR     NEPHROSTOMY W/ INTRODUCTION OF CATHETER Right      OTHER SURGICAL HISTORY      Mole removednasal     OTHER SURGICAL HISTORY Right     Cystoscopy, ureteroscopy, laser11/02/2014 x2 with ureteral stent insertion     PERCUTANEOUS NEPHROSTOMY  11/1/2016    Procedure: PERCUTANEOUS NEPHROSTOMY;  Surgeon: Florentino Awad MD;  Location: [facility] OR     WISDOM TOOTH EXTRACTION       ZZC REMOVAL OF KIDNEY STONE         CURRENT MEDICATIONS:    albuterol (ACCUNEB) 1.25 MG/3ML neb solution  Lidocaine (LIDOCARE) 4 % Patch  methocarbamol (ROBAXIN) 750 MG tablet  ondansetron (ZOFRAN ODT) 4 MG ODT tab  oxyCODONE (ROXICODONE) 5 MG tablet  prochlorperazine (COMPAZINE) 10 MG tablet  sertraline (ZOLOFT) 100 MG tablet  SPRINTEC 28 0.25-35 MG-MCG tablet        ALLERGIES:  Allergies   Allergen Reactions     Vancomycin      Desogestrel-Ethinyl Estradiol Angioedema and Swelling     Swelling of hands and feet per pt.  Swelling of hands and feet per pt.  Swelling of hands and feet per pt.  Swelling of hands and feet per pt.  Swelling of hands and feet per pt.  Swelling of hands and feet per pt.  Swelling of hands and feet per pt.  Swelling of hands and feet per pt.       Penicillins Rash     As a child per mother; mother unsure if has tolerated another PCN since. Tolerated ampicillin 9/20/16     Sulfa Drugs Rash       FAMILY HISTORY:  Family History   Problem Relation Age of Onset     Anesthesia Reaction No family hx of      Malignant  Hyperthermia No family hx of      Heart Disease Maternal Uncle         heart failure     Coronary Artery Disease Other      Heart Disease Maternal Grandmother         pacemaker     Gout Paternal Grandfather      Prostate Cancer Maternal Aunt         breast     Heart Disease Maternal Aunt         pacemaker     Heart Disease Maternal Aunt         pacemaker     Heart Disease Maternal Aunt         pacemaker       SOCIAL HISTORY:   Social History     Socioeconomic History     Marital status: Single   Tobacco Use     Smoking status: Every Day     Packs/day: 0.50     Years: 10.00     Pack years: 5.00     Types: Cigarettes     Last attempt to quit: 2016     Years since quittin.4     Smokeless tobacco: Former     Quit date: 2016     Tobacco comments:     has information from last admission.   Substance and Sexual Activity     Alcohol use: Yes     Alcohol/week: 0.0 standard drinks     Comment: Alcoholic Drinks/day: occ     Drug use: No       VITALS:  /57   Pulse 85   Temp 99.2  F (37.3  C) (Oral)   Resp 24   Ht 1.524 m (5')   Wt 81.6 kg (180 lb)   LMP 2023 (Approximate)   SpO2 95%   BMI 35.15 kg/m      PHYSICAL EXAM    Constitutional: Well developed, Well nourished, NAD, GCS 15   HENT: Normocephalic, Atraumatic  Neck- Supple, Nontender.   Eyes: Conjunctiva normal.   Respiratory: No respiratory distress, speaking in full sentences.   Cardiovascular: Normal heart rate, Regular rhythm, No murmurs.   GI: Soft, mild right sided abdominal tenderness. No CVAT. Nephrostomy in place with clear urine in nephrostomy bag.   Musculoskeletal: No deformities, Moves all extremities equally.   Integument: Warm, Dry, No erythema, ecchymosis, or rash.  Neurologic: Alert & oriented x 3, Normal sensory function. No focal deficits  Psychiatric: Affect normal, Judgment normal, Mood normal. Cooperative.      LAB:  All pertinent labs reviewed and interpreted.  Results for orders placed or performed during the hospital  encounter of 03/03/23   UA with Microscopic reflex to Culture    Specimen: Urine, Midstream   Result Value Ref Range    Color Urine Yellow Colorless, Straw, Light Yellow, Yellow    Appearance Urine Cloudy (A) Clear    Glucose Urine Negative Negative mg/dL    Bilirubin Urine Negative Negative    Ketones Urine Negative Negative mg/dL    Specific Gravity Urine 1.021 1.001 - 1.030    Blood Urine Negative Negative    pH Urine 8.5 (H) 5.0 - 7.0    Protein Albumin Urine 200 (A) Negative mg/dL    Urobilinogen Urine <2.0 <2.0 mg/dL    Nitrite Urine Negative Negative    Leukocyte Esterase Urine 250 Duglas/uL (A) Negative    Bacteria Urine Few (A) None Seen /HPF    Mucus Urine Present (A) None Seen /LPF    Amorphous Crystals Urine Few (A) None Seen /HPF    Triple Phosphate Crystals Urine Few (A) None Seen /HPF    RBC Urine 0 <=2 /HPF    WBC Urine 0 <=5 /HPF    Squamous Epithelials Urine 1 <=1 /HPF   HCG qualitative urine   Result Value Ref Range    hCG Urine Qualitative Negative Negative   CBC (+ platelets, no diff)   Result Value Ref Range    WBC Count 8.1 4.0 - 11.0 10e3/uL    RBC Count 4.82 3.80 - 5.20 10e6/uL    Hemoglobin 14.6 11.7 - 15.7 g/dL    Hematocrit 43.7 35.0 - 47.0 %    MCV 91 78 - 100 fL    MCH 30.3 26.5 - 33.0 pg    MCHC 33.4 31.5 - 36.5 g/dL    RDW 13.8 10.0 - 15.0 %    Platelet Count 174 150 - 450 10e3/uL   Basic metabolic panel   Result Value Ref Range    Sodium 139 136 - 145 mmol/L    Potassium 3.9 3.4 - 5.3 mmol/L    Chloride 105 98 - 107 mmol/L    Carbon Dioxide (CO2) 21 (L) 22 - 29 mmol/L    Anion Gap 13 7 - 15 mmol/L    Urea Nitrogen 13.5 6.0 - 20.0 mg/dL    Creatinine 0.94 0.51 - 0.95 mg/dL    Calcium 9.7 8.6 - 10.0 mg/dL    Glucose 169 (H) 70 - 99 mg/dL    GFR Estimate 81 >60 mL/min/1.73m2   Hepatic function panel   Result Value Ref Range    Protein Total 7.0 6.4 - 8.3 g/dL    Albumin 4.1 3.5 - 5.2 g/dL    Bilirubin Total 0.8 <=1.2 mg/dL    Alkaline Phosphatase 80 35 - 104 U/L    AST 16 10 - 35 U/L     ALT 18 10 - 35 U/L    Bilirubin Direct <0.20 0.00 - 0.30 mg/dL   Result Value Ref Range    Lipase 18 13 - 60 U/L   Extra Red Top Tube   Result Value Ref Range    Hold Specimen JIC        RADIOLOGY:  None    EKG:    None    PROCEDURES:   None        Tessy Neumann PA-C  Emergency Medicine  [facility] EMERGENCY DEPARTMENT  [address] [place] [id number]  [phone]             Tessy Neumann PA-C  03/04/23 0110

## 2023-03-03 NOTE — Clinical Note
Jacqueline Pappas was seen and treated in our emergency department on 3/3/2023.  She may return to work on 03/07/2023.       If you have any questions or concerns, please don't hesitate to call.      Tessy Neumann PA-C

## 2023-03-03 NOTE — ED TRIAGE NOTES
Pt offered Zofran ODT and warm pack but refused verbalized she took Zofran an hour ago and not helping.  Still feeling nauseous.  Instructed to collect urine and urine cup given     Triage Assessment     Row Name 03/03/23 [id number]       Triage Assessment (Adult)    Airway WDL WDL       Respiratory WDL    Respiratory WDL WDL       Skin Circulation/Temperature WDL    Skin Circulation/Temperature WDL WDL       Cardiac WDL    Cardiac WDL WDL       Peripheral/Neurovascular WDL    Peripheral Neurovascular WDL WDL       Cognitive/Neuro/Behavioral WDL    Cognitive/Neuro/Behavioral WDL WDL

## 2023-03-04 NOTE — DISCHARGE INSTRUCTIONS
Continue to monitor your symptoms at home.  Continue with nephrostomy replacement next week as planned.    Follow up in clinic on Monday for re-check of your symptoms.    Return to the emergency department if you develop new fevers, worsening/changing abdominal pain, vomiting/not keeping fluids down, decreased urinary output or new blood in urine, or any other concerning symptoms. We would be happy to see you.

## 2023-03-05 LAB — BACTERIA UR CULT: NORMAL

## 2023-04-23 ENCOUNTER — HOSPITAL ENCOUNTER (EMERGENCY)
Facility: HOSPITAL | Age: 38
Discharge: HOME | End: 2023-04-23
Attending: STUDENT IN AN ORGANIZED HEALTH CARE EDUCATION/TRAINING PROGRAM | Admitting: STUDENT IN AN ORGANIZED HEALTH CARE EDUCATION/TRAINING PROGRAM
Payer: COMMERCIAL

## 2023-04-23 VITALS
RESPIRATION RATE: 17 BRPM | TEMPERATURE: 97.7 F | BODY MASS INDEX: 35.34 KG/M2 | HEIGHT: 60 IN | WEIGHT: 180 LBS | HEART RATE: 80 BPM | DIASTOLIC BLOOD PRESSURE: 68 MMHG | SYSTOLIC BLOOD PRESSURE: 116 MMHG | OXYGEN SATURATION: 99 %

## 2023-04-23 DIAGNOSIS — N39.0 URINARY TRACT INFECTION WITHOUT HEMATURIA, SITE UNSPECIFIED: ICD-10-CM

## 2023-04-23 LAB
ALBUMIN UR-MCNC: 200 MG/DL
AMORPH CRY #/AREA URNS HPF: ABNORMAL /HPF
APPEARANCE UR: ABNORMAL
BACTERIA #/AREA URNS HPF: ABNORMAL /HPF
BILIRUB UR QL STRIP: NEGATIVE
COLOR UR AUTO: ABNORMAL
GLUCOSE UR STRIP-MCNC: NEGATIVE MG/DL
HGB UR QL STRIP: ABNORMAL
KETONES UR STRIP-MCNC: NEGATIVE MG/DL
LEUKOCYTE ESTERASE UR QL STRIP: ABNORMAL
NITRATE UR QL: POSITIVE
PH UR STRIP: 6.5 [PH] (ref 5–7)
RBC URINE: 11 /HPF
SP GR UR STRIP: 1.01 (ref 1–1.03)
SQUAMOUS EPITHELIAL: <1 /HPF
UROBILINOGEN UR STRIP-MCNC: <2 MG/DL
WBC URINE: 35 /HPF

## 2023-04-23 PROCEDURE — 87086 URINE CULTURE/COLONY COUNT: CPT | Performed by: STUDENT IN AN ORGANIZED HEALTH CARE EDUCATION/TRAINING PROGRAM

## 2023-04-23 PROCEDURE — 99283 EMERGENCY DEPT VISIT LOW MDM: CPT

## 2023-04-23 PROCEDURE — 81001 URINALYSIS AUTO W/SCOPE: CPT | Performed by: STUDENT IN AN ORGANIZED HEALTH CARE EDUCATION/TRAINING PROGRAM

## 2023-04-23 PROCEDURE — 250N000013 HC RX MED GY IP 250 OP 250 PS 637: Performed by: STUDENT IN AN ORGANIZED HEALTH CARE EDUCATION/TRAINING PROGRAM

## 2023-04-23 RX ORDER — HYDROCODONE BITARTRATE AND ACETAMINOPHEN 5; 325 MG/1; MG/1
1 TABLET ORAL ONCE
Status: COMPLETED | OUTPATIENT
Start: 2023-04-23 | End: 2023-04-23

## 2023-04-23 RX ORDER — CEPHALEXIN 500 MG/1
500 CAPSULE ORAL 4 TIMES DAILY
Qty: 28 CAPSULE | Refills: 0 | Status: SHIPPED | OUTPATIENT
Start: 2023-04-23 | End: 2023-04-30

## 2023-04-23 RX ADMIN — HYDROCODONE BITARTRATE AND ACETAMINOPHEN 1 TABLET: 5; 325 TABLET ORAL at 21:36

## 2023-04-23 ASSESSMENT — ACTIVITIES OF DAILY LIVING (ADL): ADLS_ACUITY_SCORE: 33

## 2023-04-24 NOTE — ED PROVIDER NOTES
Emergency Department Encounter         FINAL IMPRESSION:  UTI, flank pain        ED COURSE AND MEDICAL DECISION MAKING       ED Course as of 04/23/23 2127   Sun Apr 23, 2023 2022  I met the patient and performed my initial interview and exam.      2027 38-year-old history of congenital kidney disease, has a right-sided PERC nephrostomy tube, has been seen multiple times, well-known, here with right flank pain for last few days.  Increased diarrhea which is nonbloody melanotic.  Afebrile.  No nausea vomiting.  No anterior bowel discomfort.  Patient states the pain is located just above her right nephrostomy tube.  On arrival she looks well.  Vitals are stable.  Heart and lungs normal.  No significant anterior daniella discomfort.  Post examination revealing an intact right nephrostomy tube.  No signs of infection.  No purulence.  No bleeding.  No rash.  Plan for UA and reevaluate   2126  I rechecked and updated the patient      -Patient sent home with Keflex.  Pain controlled here.  No signs of septicemia with no tachycardia or fever.  She looks well and nontoxic.  Declining CT imaging today.    Medical Decision Making    History:    Supplemental history from: Documented in chart, if applicable    External Record(s) reviewed: Documented in chart, if applicable.    Work Up:    Chart documentation includes differential considered and any EKGs or imaging independently interpreted by provider, where specified.    In additional to work up documented, I considered the following work up: Documented in chart, if applicable.    External consultation:    Discussion of management with another provider: Documented in chart, if applicable    Complicating factors:    Care impacted by chronic illness: Chronic Kidney Disease and Chronic Pain    Care affected by social determinants of health: N/A    Disposition considerations: Discharge. I prescribed additional prescription strength medication(s) as charted. N/A.      Critical Care      Performed by: Aroldo Mendoza or    Authorized by: Aroldo Mendoza  Total critical care time:  minutes  Critical care was necessary to treat or prevent imminent or life-threatening deterioration of the following conditions:   Critical care was time spent personally by me on the following activities: development of treatment plan with patient or surrogate, discussions with consultants, examination of patient, evaluation of patient's response to treatment, obtaining history from patient or surrogate, ordering and performing treatments and interventions, ordering and review of laboratory studies, ordering and review of radiographic studies, re-evaluation of patient's condition and monitoring for potential decompensation.  Critical care time was exclusive of separately billable procedures and treating other patients.'    At the conclusion of the encounter I discussed the results of all the tests and the disposition. The questions were answered. The patient or family acknowledged understanding and was agreeable with the care plan.      MEDICATIONS GIVEN IN THE EMERGENCY DEPARTMENT:  Medications - No data to display    NEW PRESCRIPTIONS STARTED AT TODAY'S ED VISIT:  New Prescriptions    No medications on file       HPI     Patient information obtained from: patient     Use of Interpretor: N/A    Jacqueline Pappas is a 38 year old female with a pertinent history of chronic right flank pain, nausea and vomiting, nephrostomy tube in place, GT, congenital absence of one kidney, recurrent UTI who presents to this ED via walk in for evaluation of flank pain.     Per chart review: patient was seen on 3/03/23 for evaluation of flank pain. Labs with no leukocytosis, no anemia. No notable electrolyte derangement. Creatinine is WNL. LFT and Lipase WNL. UA with no blood. 250 leukocyte esterase, few bacteria and few crystals but ultimately not consistent with infection, appears improved from prior. Patient was seen 2/9/23 and treated with  Levofloxacin with concern for pyelonephritis. Patient has had 4 CTs in the past 7 months and 10 in the past year. Patient's imaging from  with no biliary stones, kidney grossly unremarkable with normal nephrostomy tube. Patient discharged home.     Since Thursday (), the patient endorses right flank pain. She notes the pain is similar to pain in the past. Patient also reports diarrhea. No blood. No nausea or vomiting. The patient denies any shortness of breath, fever, or any other complaints at this time.    The patient has a right nephrostomy tube in place and follows with [facility]. The patient recently had her tube replaced at the end of March. The patient notes her tube gets exchanged every three months.     REVIEW OF SYSTEMS:  Review of Systems   Constitutional: Negative for fever, malaise  HEENT: Negative runny nose, sore throat, ear pain, neck pain  Respiratory: Negative for shortness of breath, cough, congestion  Cardiovascular: Negative for chest pain, leg edema  Gastrointestinal: Negative for abdominal distention, abdominal pain, constipation, vomiting, nausea, Positive for diarrhea   Genitourinary: Negative for dysuria and hematuria. Positive for right flank pain  Integument: Negative for rash, skin breakdown  Neurological: Negative for paresthesias, weakness, headache.  Musculoskeletal: Negative for joint pain, joint swelling.       All other systems reviewed and are negative.      MEDICAL HISTORY     Past Medical History:   Diagnosis Date     Acute renal failure (H)      Anemia      Anemia      Calculus of kidney      Congenital absence of left kidney      Congenital absence of one kidney      Depression      Depression      Hydronephrosis      Kidney stone      Pyelonephritis      Pyelonephritis      Smoker      Ureteral stricture      UTI (urinary tract infection)      Wrist fracture        Past Surgical History:   Procedure Laterality Date      SECTION        SECTION      x1      COMBINED CYSTOSCOPY, RETROGRADES, URETEROSCOPY, LASER HOLMIUM LITHOTRIPSY URETER(S), INSERT STENT Right 2016    Procedure: COMBINED CYSTOSCOPY, RETROGRADES, URETEROSCOPY, LASER HOLMIUM LITHOTRIPSY URETER(S), INSERT STENT;  Surgeon: Florentino Awad MD;  Location: [facility] OR     CYSTOSCOPY, URETEROSCOPY, COMBINED Right 2016    Procedure: COMBINED CYSTOSCOPY, URETEROSCOPY;  Surgeon: Florentino Awad MD;  Location: [facility] OR     IR NEPHROLITHOTOMY  2014     IR NEPHROSTOGRAM EXISITING ACCESS  10/18/2018     IR NEPHROSTOMY TUBE CHANGE RIGHT  2018     IR NEPHROSTOMY TUBE CHANGE RIGHT  2020     IR NEPHROSTOMY TUBE CHANGE RIGHT  2018     IR NEPHROSTOMY TUBE CHANGE RIGHT  2020     IR NEPHROSTOMY TUBE PLACEMENT RIGHT  2016     IR NEPHROSTOMY TUBE PLACEMENT RIGHT  2017     KIDNEY STONE SURGERY Right 2016     LASER HOLMIUM LITHOTRIPSY URETER(S), INSERT STENT, COMBINED Left 2016    Procedure: COMBINED CYSTOSCOPY, URETEROSCOPY, LASER HOLMIUM LITHOTRIPSY URETER(S), INSERT STENT;  Surgeon: Florentino Awad MD;  Location: [facility] OR     LASER HOLMIUM NEPHROLITHOTOMY VIA PERCUTANEOUS NEPHROSTOMY Right 2016    Procedure: LASER HOLMIUM NEPHROLITHOTOMY VIA PERCUTANEOUS NEPHROSTOMY;  Surgeon: Florentino Awad MD;  Location: [facility] OR     NEPHROSTOMY W/ INTRODUCTION OF CATHETER Right      OTHER SURGICAL HISTORY      Mole removednasal     OTHER SURGICAL HISTORY Right     Cystoscopy, ureteroscopy, laser11/02/2014 x2 with ureteral stent insertion     PERCUTANEOUS NEPHROSTOMY  2016    Procedure: PERCUTANEOUS NEPHROSTOMY;  Surgeon: Florentino Awad MD;  Location: [facility] OR     WISDOM TOOTH EXTRACTION       ZZC REMOVAL OF KIDNEY STONE         Social History     Tobacco Use     Smoking status: Every Day     Packs/day: 0.50     Years: 10.00     Pack years: 5.00     Types: Cigarettes     Last attempt to quit: 2016     Years since quittin.5     Smokeless tobacco: Former      Quit date: 9/1/2016     Tobacco comments:     has information from last admission.   Substance Use Topics     Alcohol use: Yes     Alcohol/week: 0.0 standard drinks of alcohol     Comment: Alcoholic Drinks/day: occ     Drug use: No       albuterol (ACCUNEB) 1.25 MG/3ML neb solution  Lidocaine (LIDOCARE) 4 % Patch  methocarbamol (ROBAXIN) 750 MG tablet  prochlorperazine (COMPAZINE) 10 MG tablet  sertraline (ZOLOFT) 100 MG tablet  SPRINTEC 28 0.25-35 MG-MCG tablet        PHYSICAL EXAM     /76   Pulse 80   Temp 97.7  F (36.5  C) (Temporal)   Resp 16   Ht 1.524 m (5')   Wt 81.6 kg (180 lb)   SpO2 99%   BMI 35.15 kg/m        PHYSICAL EXAM:     General: Patient appears well, nontoxic, comfortable  HEENT: Moist mucous membranes,  No head trauma.    Cardiovascular: Normal rate, normal rhythm, no extremity edema.  No appreciable murmur.  Respiratory: No signs of respiratory distress, lungs are clear to auscultation bilaterally with no wheezes rhonchi or rales.  Abdominal: Soft, nontender, nondistended, no palpable masses, no guarding, no rebound.   Musculoskeletal: Full range of motion of joints, no deformities appreciated.  Neurological: Alert and oriented, grossly neurologically intact.  Psychological: Normal affect and mood.  Integument:  Intact right nephrostomy tube. No signs of infection. No purulence. No bleeding. No rashes appreciated.          RESULTS       Labs Ordered and Resulted from Time of ED Arrival to Time of ED Departure   ROUTINE UA WITH MICROSCOPIC REFLEX TO CULTURE - Abnormal       Result Value    Color Urine Light Yellow      Appearance Urine Turbid (*)     Glucose Urine Negative      Bilirubin Urine Negative      Ketones Urine Negative      Specific Gravity Urine 1.011      Blood Urine 0.2 mg/dL (*)     pH Urine 6.5      Protein Albumin Urine 200 (*)     Urobilinogen Urine <2.0      Nitrite Urine Positive (*)     Leukocyte Esterase Urine 500 Duglas/uL (*)     Bacteria Urine Moderate (*)      Amorphous Crystals Urine Few (*)     RBC Urine 11 (*)     WBC Urine 35 (*)     Squamous Epithelials Urine <1     URINE CULTURE       No orders to display       PROCEDURES:  Procedures:  Procedures       I, Randi Sams am serving as a scribe to document services personally performed by Aroldo Mendoza DO, based on my observations and the provider's statements to me.  I, Aroldo Mendoza DO, attest that Randi Sams is acting in a scribe capacity, has observed my performance of the services and has documented them in accordance with my direction.    Aroldo Mendoza DO  Emergency Medicine  [facility] EMERGENCY DEPARTMENT     Aroldo Mendoza DO  04/23/23 3935

## 2023-04-24 NOTE — ED TRIAGE NOTES
Pt here d/t right flank pain starting Thursday. Pain is constant and gets worse intermittently. Tylenol was helping, not any more. States she has been having diarrhea, but no changes to BM. Pt has nephrostomy tube on right. Afebrile. Complex PMH     Triage Assessment     Row Name 04/23/23 1947       Triage Assessment (Adult)    Airway WDL WDL

## 2023-04-25 LAB — BACTERIA UR CULT: NORMAL

## 2023-05-08 ENCOUNTER — HOSPITAL ENCOUNTER (EMERGENCY)
Facility: CLINIC | Age: 38
Discharge: HOME | End: 2023-05-09
Attending: EMERGENCY MEDICINE | Admitting: EMERGENCY MEDICINE
Payer: COMMERCIAL

## 2023-05-08 ENCOUNTER — APPOINTMENT (OUTPATIENT)
Dept: CT IMAGING | Facility: CLINIC | Age: 38
End: 2023-05-08
Attending: EMERGENCY MEDICINE
Payer: COMMERCIAL

## 2023-05-08 DIAGNOSIS — R52 BODY ACHES: ICD-10-CM

## 2023-05-08 DIAGNOSIS — N39.0 ACUTE UTI: ICD-10-CM

## 2023-05-08 LAB
ALBUMIN SERPL-MCNC: 4 G/DL (ref 3.5–5)
ALBUMIN UR-MCNC: 100 MG/DL
ALBUMIN UR-MCNC: 300 MG/DL
ALP SERPL-CCNC: 79 U/L (ref 45–120)
ALT SERPL W P-5'-P-CCNC: 19 U/L (ref 0–45)
ANION GAP SERPL CALCULATED.3IONS-SCNC: 11 MMOL/L (ref 5–18)
APPEARANCE UR: ABNORMAL
APPEARANCE UR: ABNORMAL
AST SERPL W P-5'-P-CCNC: 16 U/L (ref 0–40)
BACTERIA #/AREA URNS HPF: ABNORMAL /HPF
BACTERIA #/AREA URNS HPF: ABNORMAL /HPF
BASOPHILS # BLD AUTO: 0 10E3/UL (ref 0–0.2)
BASOPHILS NFR BLD AUTO: 0 %
BILIRUB DIRECT SERPL-MCNC: 0.1 MG/DL
BILIRUB SERPL-MCNC: 0.6 MG/DL (ref 0–1)
BILIRUB UR QL STRIP: NEGATIVE
BILIRUB UR QL STRIP: NEGATIVE
BUN SERPL-MCNC: 14 MG/DL (ref 8–22)
CALCIUM SERPL-MCNC: 10.4 MG/DL (ref 8.5–10.5)
CHLORIDE BLD-SCNC: 101 MMOL/L (ref 98–107)
CO2 SERPL-SCNC: 21 MMOL/L (ref 22–31)
COLOR UR AUTO: ABNORMAL
COLOR UR AUTO: YELLOW
CREAT SERPL-MCNC: 1.17 MG/DL (ref 0.6–1.1)
EOSINOPHIL # BLD AUTO: 0.2 10E3/UL (ref 0–0.7)
EOSINOPHIL NFR BLD AUTO: 2 %
ERYTHROCYTE [DISTWIDTH] IN BLOOD BY AUTOMATED COUNT: 13.5 % (ref 10–15)
GFR SERPL CREATININE-BSD FRML MDRD: 62 ML/MIN/1.73M2
GLUCOSE BLD-MCNC: 162 MG/DL (ref 70–125)
GLUCOSE UR STRIP-MCNC: NEGATIVE MG/DL
GLUCOSE UR STRIP-MCNC: NEGATIVE MG/DL
HCG UR QL: NEGATIVE
HCT VFR BLD AUTO: 44.6 % (ref 35–47)
HGB BLD-MCNC: 15.2 G/DL (ref 11.7–15.7)
HGB UR QL STRIP: ABNORMAL
HGB UR QL STRIP: ABNORMAL
HOLD SPECIMEN: NORMAL
IMM GRANULOCYTES # BLD: 0 10E3/UL
IMM GRANULOCYTES NFR BLD: 0 %
KETONES UR STRIP-MCNC: NEGATIVE MG/DL
KETONES UR STRIP-MCNC: NEGATIVE MG/DL
LEUKOCYTE ESTERASE UR QL STRIP: ABNORMAL
LEUKOCYTE ESTERASE UR QL STRIP: ABNORMAL
LIPASE SERPL-CCNC: 18 U/L (ref 0–52)
LYMPHOCYTES # BLD AUTO: 1.4 10E3/UL (ref 0.8–5.3)
LYMPHOCYTES NFR BLD AUTO: 16 %
MCH RBC QN AUTO: 30.3 PG (ref 26.5–33)
MCHC RBC AUTO-ENTMCNC: 34.1 G/DL (ref 31.5–36.5)
MCV RBC AUTO: 89 FL (ref 78–100)
MONOCYTES # BLD AUTO: 0.5 10E3/UL (ref 0–1.3)
MONOCYTES NFR BLD AUTO: 6 %
MUCOUS THREADS #/AREA URNS LPF: PRESENT /LPF
MUCOUS THREADS #/AREA URNS LPF: PRESENT /LPF
NEUTROPHILS # BLD AUTO: 6.9 10E3/UL (ref 1.6–8.3)
NEUTROPHILS NFR BLD AUTO: 76 %
NITRATE UR QL: POSITIVE
NITRATE UR QL: POSITIVE
NRBC # BLD AUTO: 0 10E3/UL
NRBC BLD AUTO-RTO: 0 /100
PH UR STRIP: 6.5 [PH] (ref 5–7)
PH UR STRIP: 6.5 [PH] (ref 5–7)
PLATELET # BLD AUTO: 182 10E3/UL (ref 150–450)
POTASSIUM BLD-SCNC: 3.4 MMOL/L (ref 3.5–5)
PROT SERPL-MCNC: 7.5 G/DL (ref 6–8)
RBC # BLD AUTO: 5.02 10E6/UL (ref 3.8–5.2)
RBC URINE: 11 /HPF
RBC URINE: 81 /HPF
SODIUM SERPL-SCNC: 133 MMOL/L (ref 136–145)
SP GR UR STRIP: 1.01 (ref 1–1.03)
SP GR UR STRIP: 1.01 (ref 1–1.03)
SQUAMOUS EPITHELIAL: 1 /HPF
SQUAMOUS EPITHELIAL: 3 /HPF
UROBILINOGEN UR STRIP-MCNC: <2 MG/DL
UROBILINOGEN UR STRIP-MCNC: <2 MG/DL
WBC # BLD AUTO: 9 10E3/UL (ref 4–11)
WBC CLUMPS #/AREA URNS HPF: PRESENT /HPF
WBC CLUMPS #/AREA URNS HPF: PRESENT /HPF
WBC URINE: 173 /HPF
WBC URINE: 25 /HPF

## 2023-05-08 PROCEDURE — 99285 EMERGENCY DEPT VISIT HI MDM: CPT | Mod: 25,CS

## 2023-05-08 PROCEDURE — 80053 COMPREHEN METABOLIC PANEL: CPT | Performed by: EMERGENCY MEDICINE

## 2023-05-08 PROCEDURE — 81025 URINE PREGNANCY TEST: CPT | Performed by: EMERGENCY MEDICINE

## 2023-05-08 PROCEDURE — 81001 URINALYSIS AUTO W/SCOPE: CPT | Performed by: EMERGENCY MEDICINE

## 2023-05-08 PROCEDURE — 87086 URINE CULTURE/COLONY COUNT: CPT | Performed by: EMERGENCY MEDICINE

## 2023-05-08 PROCEDURE — 87637 SARSCOV2&INF A&B&RSV AMP PRB: CPT | Performed by: EMERGENCY MEDICINE

## 2023-05-08 PROCEDURE — 82248 BILIRUBIN DIRECT: CPT | Performed by: EMERGENCY MEDICINE

## 2023-05-08 PROCEDURE — 36415 COLL VENOUS BLD VENIPUNCTURE: CPT | Performed by: EMERGENCY MEDICINE

## 2023-05-08 PROCEDURE — 250N000011 HC RX IP 250 OP 636: Performed by: EMERGENCY MEDICINE

## 2023-05-08 PROCEDURE — 71250 CT THORAX DX C-: CPT

## 2023-05-08 PROCEDURE — C9803 HOPD COVID-19 SPEC COLLECT: HCPCS

## 2023-05-08 PROCEDURE — 82310 ASSAY OF CALCIUM: CPT | Performed by: EMERGENCY MEDICINE

## 2023-05-08 PROCEDURE — 96375 TX/PRO/DX INJ NEW DRUG ADDON: CPT

## 2023-05-08 PROCEDURE — 96374 THER/PROPH/DIAG INJ IV PUSH: CPT

## 2023-05-08 PROCEDURE — 85025 COMPLETE CBC W/AUTO DIFF WBC: CPT | Performed by: EMERGENCY MEDICINE

## 2023-05-08 PROCEDURE — 87040 BLOOD CULTURE FOR BACTERIA: CPT | Performed by: EMERGENCY MEDICINE

## 2023-05-08 PROCEDURE — 83690 ASSAY OF LIPASE: CPT | Performed by: EMERGENCY MEDICINE

## 2023-05-08 RX ORDER — MORPHINE SULFATE 4 MG/ML
4 INJECTION, SOLUTION INTRAMUSCULAR; INTRAVENOUS ONCE
Status: COMPLETED | OUTPATIENT
Start: 2023-05-08 | End: 2023-05-08

## 2023-05-08 RX ORDER — ONDANSETRON 2 MG/ML
4 INJECTION INTRAMUSCULAR; INTRAVENOUS ONCE
Status: COMPLETED | OUTPATIENT
Start: 2023-05-08 | End: 2023-05-08

## 2023-05-08 RX ADMIN — MORPHINE SULFATE 4 MG: 4 INJECTION, SOLUTION INTRAMUSCULAR; INTRAVENOUS at 23:36

## 2023-05-08 RX ADMIN — ONDANSETRON 4 MG: 2 INJECTION INTRAMUSCULAR; INTRAVENOUS at 23:36

## 2023-05-08 ASSESSMENT — ENCOUNTER SYMPTOMS
HEMATURIA: 0
CHILLS: 1
NAUSEA: 1
TROUBLE SWALLOWING: 0
VOMITING: 0
FEVER: 1
DIARRHEA: 1
DYSURIA: 0
COUGH: 1
ABDOMINAL PAIN: 0

## 2023-05-08 NOTE — Clinical Note
Jacqueline Pappas was seen and treated in our emergency department on 5/8/2023.  She may return to work on 05/12/2023.  Jacqueline can return to work sooner if she is feeling better.     If you have any questions or concerns, please don't hesitate to call.      Susanna Crowley MD

## 2023-05-09 VITALS
HEIGHT: 61 IN | HEART RATE: 66 BPM | WEIGHT: 185 LBS | TEMPERATURE: 97.8 F | SYSTOLIC BLOOD PRESSURE: 133 MMHG | OXYGEN SATURATION: 95 % | DIASTOLIC BLOOD PRESSURE: 73 MMHG | BODY MASS INDEX: 34.93 KG/M2 | RESPIRATION RATE: 18 BRPM

## 2023-05-09 LAB
FLUAV RNA SPEC QL NAA+PROBE: NEGATIVE
FLUBV RNA RESP QL NAA+PROBE: NEGATIVE
RSV RNA SPEC NAA+PROBE: NEGATIVE
SARS-COV-2 RNA RESP QL NAA+PROBE: NEGATIVE

## 2023-05-09 PROCEDURE — 96365 THER/PROPH/DIAG IV INF INIT: CPT

## 2023-05-09 PROCEDURE — 250N000011 HC RX IP 250 OP 636: Performed by: EMERGENCY MEDICINE

## 2023-05-09 PROCEDURE — 96361 HYDRATE IV INFUSION ADD-ON: CPT

## 2023-05-09 PROCEDURE — 258N000003 HC RX IP 258 OP 636: Performed by: EMERGENCY MEDICINE

## 2023-05-09 PROCEDURE — 250N000013 HC RX MED GY IP 250 OP 250 PS 637: Performed by: EMERGENCY MEDICINE

## 2023-05-09 RX ORDER — CEPHALEXIN 500 MG/1
500 CAPSULE ORAL 4 TIMES DAILY
Qty: 40 CAPSULE | Refills: 0 | Status: SHIPPED | OUTPATIENT
Start: 2023-05-09 | End: 2023-05-12

## 2023-05-09 RX ORDER — OXYCODONE HYDROCHLORIDE 5 MG/1
5 TABLET ORAL ONCE
Status: COMPLETED | OUTPATIENT
Start: 2023-05-09 | End: 2023-05-09

## 2023-05-09 RX ORDER — CEFTRIAXONE 1 G/1
1 INJECTION, POWDER, FOR SOLUTION INTRAMUSCULAR; INTRAVENOUS ONCE
Status: COMPLETED | OUTPATIENT
Start: 2023-05-09 | End: 2023-05-09

## 2023-05-09 RX ORDER — ACETAMINOPHEN 325 MG/1
975 TABLET ORAL ONCE
Status: COMPLETED | OUTPATIENT
Start: 2023-05-09 | End: 2023-05-09

## 2023-05-09 RX ADMIN — ACETAMINOPHEN 975 MG: 325 TABLET ORAL at 00:43

## 2023-05-09 RX ADMIN — OXYCODONE HYDROCHLORIDE 5 MG: 5 TABLET ORAL at 00:43

## 2023-05-09 RX ADMIN — SODIUM CHLORIDE 1000 ML: 9 INJECTION, SOLUTION INTRAVENOUS at 00:37

## 2023-05-09 RX ADMIN — CEFTRIAXONE 1 G: 1 INJECTION, POWDER, FOR SOLUTION INTRAMUSCULAR; INTRAVENOUS at 00:38

## 2023-05-09 ASSESSMENT — ACTIVITIES OF DAILY LIVING (ADL): ADLS_ACUITY_SCORE: 35

## 2023-05-09 NOTE — ED TRIAGE NOTES
Pt started feeling ill yesterday. Today, she began to have severe R flank pain, body aches, and nausea.     Triage Assessment     Row Name 05/08/23 1259       Triage Assessment (Adult)    Airway WDL WDL       Respiratory WDL    Respiratory WDL WDL       Skin Circulation/Temperature WDL    Skin Circulation/Temperature WDL WDL       Cardiac WDL    Cardiac WDL WDL       Peripheral/Neurovascular WDL    Peripheral Neurovascular WDL WDL       Cognitive/Neuro/Behavioral WDL    Cognitive/Neuro/Behavioral WDL WDL

## 2023-05-09 NOTE — DISCHARGE INSTRUCTIONS
Your urine looks consistent with a urinary tract infection.  Take the antibiotics as directed and until gone.  Your urine has been sent for culture and we will contact you if we need to adjust antibiotics based on the culture.    Given your complex medical history I would recommend that you follow-up with your family doctor in 2 days for reevaluation.    Return to emergency department with worsening fevers, worsening pain, difficulty breathing, feel that you are worsening in any way, or any other concerns.    Thank you for choosing [facility] Emergency Department.  It has been my pleasure caring for you today.     ~Dr. Carline MD

## 2023-05-09 NOTE — ED PROVIDER NOTES
EMERGENCY DEPARTMENT ENCOUNTER      NAME: Jacqueline Pappas  AGE: 38 year old female  YOB: 1985  MRN: [id number]  EVALUATION DATE & TIME: No admission date for patient encounter.    PCP: Pao Montague    ED PROVIDER: Susanna Crowley M.D.        Chief Complaint   Patient presents with     Flank Pain         FINAL IMPRESSION:    1. Acute UTI    2. Body aches            MEDICAL DECISION MAKIN year old female with history of congenital solitary kidney, chronic nephrostomy tube, kidney stones, who presents emergency department with body aches and fevers.  Urinalysis suggestive of UTI from both the urethra sample as well as the nephrostomy tube.  Imaging overall though unremarkable and reassuring.  Patient does not appear toxic or septic.  Plan at this time is dose of IV ceftriaxone here in the ER and discharged home with prescription for home use of antibiotics while urine culture pending.  Patient feels comfortable with this plan and all of her questions have been answered.  Nephrostomy tube itself looks great without any obvious signs for infection.  Do not think she requires emergent exchange of the nephrostomy tube.      ED COURSE:  10:22 PM  I met with the patient to gather history and perform my exam. ED course and treatment discussed.    12:04 AM  I updated patient on her results.  Urinalysis certainly concerning for UTI though she did have a similar appearing UA recently with a negative culture.  Either way she is complaining of symptoms and she is certainly at risk for UTIs given her chronic nephrostomy tube.  She does not appear toxic or septic.  Plan is to give her a dose of IV ceftriaxone (she states that she tolerates this medication well) and send her out with prescription for Keflex.  Patient does not appear toxic.  She agrees with this plan.    I do not think that this represents ACS, PE, ruptured AAA, aortic dissection, bowel obstruction, bowel ischemia, cholecystitis,  "pancreatitis, appendicitis, diverticulitis, kidney stone, incarcerated or strangulated hernia, ovarian torsion, PID, ectopic pregnancy, tubo-ovarian abscess, viscus perforation, perforated GI ulcer, or other such etiologies at this time.    COVID-19 PPE worn during patient evaluation:  Mask: surgical  Eye Protection: none   Gown: none   Hair cover: yes  Face shield: none  Patient wearing a mask: none    At the conclusion of the encounter I discussed the results of all of the tests and the disposition. Their questions were answered. The patient (and any family present) acknowledged understanding and were agreeable with the care plan.        CONSULTANTS:  none        MEDICATIONS GIVEN IN THE EMERGENCY:  Medications   ondansetron (ZOFRAN) injection 4 mg (4 mg Intravenous $Given 5/8/23 2336)   morphine (PF) injection 4 mg (4 mg Intravenous $Given 5/8/23 2336)   0.9% sodium chloride BOLUS (0 mLs Intravenous Stopped 5/9/23 0148)   cefTRIAXone (ROCEPHIN) 1 g vial to attach to  mL bag for ADULTS or NS 50 mL bag for PEDS (0 g Intravenous Stopped 5/9/23 0116)   acetaminophen (TYLENOL) tablet 975 mg (975 mg Oral $Given 5/9/23 0043)   oxyCODONE (ROXICODONE) tablet 5 mg (5 mg Oral $Given 5/9/23 0043)           NEW PRESCRIPTIONS STARTED AT TODAY'S ER VISIT     Medication List      Started    cephALEXin 500 MG capsule  Commonly known as: KEFLEX  500 mg, Oral, 4 TIMES DAILY            CONDITION:  stable        DISPOSITION:  discharge home         =================================================================  =================================================================  TRIAGE ASSESSMENT:  Pt started feeling ill yesterday. Today, she began to have severe R flank pain, body aches, and nausea.      ED Triage Vitals [05/08/23 2209]   Enc Vitals Group      /73      Pulse 84      Resp 18      Temp 97.8  F (36.6  C)      Temp src Oral      SpO2 100 %      Weight 83.9 kg (185 lb)      Height 1.537 m (5' 0.5\")    "     ================================================================  ================================================================    HPI    Patient information was obtained from: patient    Use of Intrepreter: N/A     Jacqueline Pappas is a 38 year old female with history of kidney stones, congenital absence of left kidney, pyelonephritis, stricture of ureter, acute interstitial nephritis, chronic right nephrostomy tube who presents to the ER with complaints of feeling ill.    Patient states that for the last day or so she has just not felt well.  She has had subjective fevers.  She did take a dose of Tylenol prior to arrival today.    She has had a little bit of an increasing cough, otherwise denies any chest pain, abdominal pain, flank pain, dysuria, foul-smelling urine, + nausea without vomiting, +diarrhea.      REVIEW OF SYSTEMS  Review of Systems   Constitutional: Positive for chills and fever.   HENT: Negative for trouble swallowing.    Respiratory: Positive for cough.    Cardiovascular: Negative for chest pain.   Gastrointestinal: Positive for diarrhea and nausea. Negative for abdominal pain and vomiting.   Genitourinary: Negative for dysuria and hematuria.   Skin: Negative for rash.   Allergic/Immunologic: Negative for immunocompromised state.   All other systems reviewed and are negative.          PAST MEDICAL HISTORY:  Past Medical History:   Diagnosis Date     Acute renal failure (H)      Anemia      Anemia      Calculus of kidney      Congenital absence of left kidney      Congenital absence of one kidney      Depression      Depression      Hydronephrosis      Kidney stone     at age 29     Pyelonephritis      Pyelonephritis      Smoker      Ureteral stricture      UTI (urinary tract infection)      Wrist fracture     Left         PAST SURGICAL HISTORY:  Past Surgical History:   Procedure Laterality Date      SECTION        SECTION      x1     COMBINED CYSTOSCOPY, RETROGRADES,  URETEROSCOPY, LASER HOLMIUM LITHOTRIPSY URETER(S), INSERT STENT Right 8/30/2016    Procedure: COMBINED CYSTOSCOPY, RETROGRADES, URETEROSCOPY, LASER HOLMIUM LITHOTRIPSY URETER(S), INSERT STENT;  Surgeon: Florentino Awad MD;  Location: [facility] OR     CYSTOSCOPY, URETEROSCOPY, COMBINED Right 9/14/2016    Procedure: COMBINED CYSTOSCOPY, URETEROSCOPY;  Surgeon: Florentino Awad MD;  Location: [facility] OR     IR NEPHROLITHOTOMY  12/11/2014     IR NEPHROSTOGRAM EXISITING ACCESS  10/18/2018     IR NEPHROSTOMY TUBE CHANGE RIGHT  12/12/2018     IR NEPHROSTOMY TUBE CHANGE RIGHT  6/11/2020     IR NEPHROSTOMY TUBE CHANGE RIGHT  12/12/2018     IR NEPHROSTOMY TUBE CHANGE RIGHT  6/11/2020     IR NEPHROSTOMY TUBE PLACEMENT RIGHT  7/24/2016     IR NEPHROSTOMY TUBE PLACEMENT RIGHT  9/14/2017     KIDNEY STONE SURGERY Right 05/2016     LASER HOLMIUM LITHOTRIPSY URETER(S), INSERT STENT, COMBINED Left 11/1/2016    Procedure: COMBINED CYSTOSCOPY, URETEROSCOPY, LASER HOLMIUM LITHOTRIPSY URETER(S), INSERT STENT;  Surgeon: Florentino Awad MD;  Location: [facility] OR     LASER HOLMIUM NEPHROLITHOTOMY VIA PERCUTANEOUS NEPHROSTOMY Right 9/14/2016    Procedure: LASER HOLMIUM NEPHROLITHOTOMY VIA PERCUTANEOUS NEPHROSTOMY;  Surgeon: Florentino Awad MD;  Location: [facility] OR     NEPHROSTOMY W/ INTRODUCTION OF CATHETER Right      OTHER SURGICAL HISTORY      Mole removednasal     OTHER SURGICAL HISTORY Right     Cystoscopy, ureteroscopy, laser11/02/2014 x2 with ureteral stent insertion     PERCUTANEOUS NEPHROSTOMY  11/1/2016    Procedure: PERCUTANEOUS NEPHROSTOMY;  Surgeon: Florentino Awad MD;  Location: [facility] OR     WISDOM TOOTH EXTRACTION       ZZ REMOVAL OF KIDNEY STONE           CURRENT MEDICATIONS:    Prior to Admission medications    Medication Sig Start Date End Date Taking? Authorizing Provider   albuterol (ACCUNEB) 1.25 MG/3ML neb solution Take 1.25 mg by nebulization every 6 hours as needed for shortness of breath / dyspnea or wheezing     Reported, Patient   Lidocaine (LIDOCARE) 4 % Patch Place 1 patch onto the skin every 24 hours To prevent lidocaine toxicity, patient should be patch free for 12 hrs daily. 2/21/23   Radha Castellano PA-C   methocarbamol (ROBAXIN) 750 MG tablet Take 1 tablet (750 mg) by mouth 3 times daily as needed for muscle spasms 10/7/22   Edmundo Rosario MD   prochlorperazine (COMPAZINE) 10 MG tablet Take 1 tablet (10 mg) by mouth every 6 hours as needed for nausea or vomiting 11/3/22   Beto Medellin MD   sertraline (ZOLOFT) 100 MG tablet Take 150 mg by mouth At Bedtime 8/18/21   Unknown, Entered By History   SPRINTEC 28 0.25-35 MG-MCG tablet Take 1 tablet by mouth daily 2/14/22   Unknown, Entered By History         ALLERGIES:  Allergies   Allergen Reactions     Vancomycin      Desogestrel-Ethinyl Estradiol Angioedema and Swelling     Swelling of hands and feet per pt.  Swelling of hands and feet per pt.  Swelling of hands and feet per pt.  Swelling of hands and feet per pt.  Swelling of hands and feet per pt.  Swelling of hands and feet per pt.  Swelling of hands and feet per pt.  Swelling of hands and feet per pt.       Penicillins Rash     As a child per mother; mother unsure if has tolerated another PCN since. Tolerated ampicillin 9/20/16     Sulfa Antibiotics Rash         FAMILY HISTORY:  Family History   Problem Relation Age of Onset     Anesthesia Reaction No family hx of      Malignant Hyperthermia No family hx of      Heart Disease Maternal Uncle         heart failure     Coronary Artery Disease Other      Heart Disease Maternal Grandmother         pacemaker     Gout Paternal Grandfather      Prostate Cancer Maternal Aunt         breast     Heart Disease Maternal Aunt         pacemaker     Heart Disease Maternal Aunt         pacemaker     Heart Disease Maternal Aunt         pacemaker         SOCIAL HISTORY:  Social History     Socioeconomic History     Marital status: Single   Tobacco Use     Smoking status: Every  "Day     Packs/day: 0.50     Years: 10.00     Pack years: 5.00     Types: Cigarettes     Last attempt to quit: 2016     Years since quittin.6     Smokeless tobacco: Former     Quit date: 2016     Tobacco comments:     has information from last admission.   Substance and Sexual Activity     Alcohol use: Yes     Alcohol/week: 0.0 standard drinks of alcohol     Comment: Alcoholic Drinks/day: occ     Drug use: No         VITALS:  Patient Vitals for the past 24 hrs:   BP Temp Temp src Pulse Resp SpO2 Height Weight   23 0130 -- -- -- 66 -- 95 % -- --   23 0100 -- -- -- 65 -- 97 % -- --   23 0030 -- -- -- 67 -- 95 % -- --   23 0000 -- -- -- 67 -- 95 % -- --   23 2209 133/73 97.8  F (36.6  C) Oral 84 18 100 % 1.537 m (5' 0.5\") 83.9 kg (185 lb)       Wt Readings from Last 3 Encounters:   23 83.9 kg (185 lb)   23 81.6 kg (180 lb)   23 81.6 kg (180 lb)       Estimated Creatinine Clearance: 64.6 mL/min (A) (based on SCr of 1.17 mg/dL (H)).    PHYSICAL EXAM    Constitutional:  Well developed, Well nourished, NAD, GCS 15  HENT:  Normocephalic, Atraumatic, Bilateral external ears normal, Nose normal. Neck- Supple, No stridor.   Eyes:  PERRL, EOMI, Conjunctiva normal, No discharge.  Respiratory:  Normal breath sounds, No respiratory distress, No wheezing, Speaks full sentences easily. +cough.   Cardiovascular:  Normal heart rate, Regular rhythm, No rubs, No gallops. Chest wall nontender.   GI:  No excessive obesity.  Bowel sounds normal, Soft, No tenderness, No masses, No flank tenderness. No rebound or guarding. + right Nephrostomy tube site looks fantastic.  No erythema, drainage, or even swelling.  : deferred  Musculoskeletal: No cyanosis, No clubbing. Good range of motion in all major joints. No major deformities noted.   Integument:  Warm, Dry, No erythema, No rash.  No petechiae.   Neurologic:  Alert & oriented x 3, Normal gait.   Psychiatric:  Affect normal, " Cooperative         LAB:  All pertinent labs reviewed and interpreted.  Recent Results (from the past 24 hour(s))   Basic metabolic panel    Collection Time: 05/08/23 10:25 PM   Result Value Ref Range    Sodium 133 (L) 136 - 145 mmol/L    Potassium 3.4 (L) 3.5 - 5.0 mmol/L    Chloride 101 98 - 107 mmol/L    Carbon Dioxide (CO2) 21 (L) 22 - 31 mmol/L    Anion Gap 11 5 - 18 mmol/L    Urea Nitrogen 14 8 - 22 mg/dL    Creatinine 1.17 (H) 0.60 - 1.10 mg/dL    Calcium 10.4 8.5 - 10.5 mg/dL    Glucose 162 (H) 70 - 125 mg/dL    GFR Estimate 62 >60 mL/min/1.73m2   Hepatic function panel    Collection Time: 05/08/23 10:25 PM   Result Value Ref Range    Bilirubin Total 0.6 0.0 - 1.0 mg/dL    Bilirubin Direct 0.1 <=0.5 mg/dL    Protein Total 7.5 6.0 - 8.0 g/dL    Albumin 4.0 3.5 - 5.0 g/dL    Alkaline Phosphatase 79 45 - 120 U/L    AST 16 0 - 40 U/L    ALT 19 0 - 45 U/L   Lipase    Collection Time: 05/08/23 10:25 PM   Result Value Ref Range    Lipase 18 0 - 52 U/L   CBC with platelets and differential    Collection Time: 05/08/23 10:25 PM   Result Value Ref Range    WBC Count 9.0 4.0 - 11.0 10e3/uL    RBC Count 5.02 3.80 - 5.20 10e6/uL    Hemoglobin 15.2 11.7 - 15.7 g/dL    Hematocrit 44.6 35.0 - 47.0 %    MCV 89 78 - 100 fL    MCH 30.3 26.5 - 33.0 pg    MCHC 34.1 31.5 - 36.5 g/dL    RDW 13.5 10.0 - 15.0 %    Platelet Count 182 150 - 450 10e3/uL    % Neutrophils 76 %    % Lymphocytes 16 %    % Monocytes 6 %    % Eosinophils 2 %    % Basophils 0 %    % Immature Granulocytes 0 %    NRBCs per 100 WBC 0 <1 /100    Absolute Neutrophils 6.9 1.6 - 8.3 10e3/uL    Absolute Lymphocytes 1.4 0.8 - 5.3 10e3/uL    Absolute Monocytes 0.5 0.0 - 1.3 10e3/uL    Absolute Eosinophils 0.2 0.0 - 0.7 10e3/uL    Absolute Basophils 0.0 0.0 - 0.2 10e3/uL    Absolute Immature Granulocytes 0.0 <=0.4 10e3/uL    Absolute NRBCs 0.0 10e3/uL   Extra Red Top Tube    Collection Time: 05/08/23 10:25 PM   Result Value Ref Range    Hold Specimen JI    UA with  Microscopic reflex to Culture    Collection Time: 05/08/23 10:44 PM    Specimen: Nephrostomy, Right; Urine   Result Value Ref Range    Color Urine Yellow Colorless, Straw, Light Yellow, Yellow    Appearance Urine Turbid (A) Clear    Glucose Urine Negative Negative mg/dL    Bilirubin Urine Negative Negative    Ketones Urine Negative Negative mg/dL    Specific Gravity Urine 1.015 1.001 - 1.030    Blood Urine 0.2 mg/dL (A) Negative    pH Urine 6.5 5.0 - 7.0    Protein Albumin Urine 300 (A) Negative mg/dL    Urobilinogen Urine <2.0 <2.0 mg/dL    Nitrite Urine Positive (A) Negative    Leukocyte Esterase Urine 500 Duglas/uL (A) Negative    Bacteria Urine Moderate (A) None Seen /HPF    WBC Clumps Urine Present (A) None Seen /HPF    Mucus Urine Present (A) None Seen /LPF    RBC Urine 81 (H) <=2 /HPF    WBC Urine 25 (H) <=5 /HPF    Squamous Epithelials Urine 3 (H) <=1 /HPF   HCG qualitative urine    Collection Time: 05/08/23 10:44 PM   Result Value Ref Range    hCG Urine Qualitative Negative Negative   Symptomatic Influenza A/B, RSV, & SARS-CoV2 PCR (COVID-19) Nasopharyngeal    Collection Time: 05/08/23 11:20 PM    Specimen: Nasopharyngeal; Swab   Result Value Ref Range    Influenza A PCR Negative Negative    Influenza B PCR Negative Negative    RSV PCR Negative Negative    SARS CoV2 PCR Negative Negative   UA with Microscopic reflex to Culture    Collection Time: 05/08/23 11:33 PM    Specimen: Urine, Clean Catch   Result Value Ref Range    Color Urine Light Yellow Colorless, Straw, Light Yellow, Yellow    Appearance Urine Turbid (A) Clear    Glucose Urine Negative Negative mg/dL    Bilirubin Urine Negative Negative    Ketones Urine Negative Negative mg/dL    Specific Gravity Urine 1.007 1.001 - 1.030    Blood Urine 0.5 mg/dL (A) Negative    pH Urine 6.5 5.0 - 7.0    Protein Albumin Urine 100 (A) Negative mg/dL    Urobilinogen Urine <2.0 <2.0 mg/dL    Nitrite Urine Positive (A) Negative    Leukocyte Esterase Urine 500 Duglas/uL  (A) Negative    Bacteria Urine Moderate (A) None Seen /HPF    WBC Clumps Urine Present (A) None Seen /HPF    Mucus Urine Present (A) None Seen /LPF    RBC Urine 11 (H) <=2 /HPF    WBC Urine 173 (H) <=5 /HPF    Squamous Epithelials Urine 1 <=1 /HPF       No results found for: ABORH        RADIOLOGY:  Reviewed all pertinent imaging. Please see official radiology report.    CT Chest Abdomen Pelvis w/o Contrast   Final Result   IMPRESSION:   1.  No bowel obstruction or abscess.   2.  Right percutaneous nephrostomy tube.   3.  Right renal calculi.   4.  Appearance similar to prior.            EKG:    none    PROCEDURES:  None      Medical Decision Making    History:    Supplemental history from: Documented in chart, if applicable    External Record(s) reviewed: Documented in chart, if applicable.    Work Up:    Chart documentation includes differential considered and any EKGs or imaging independently interpreted by provider, where specified.    In additional to work up documented, I considered the following work up: Documented in chart, if applicable.    External consultation:    Discussion of management with another provider: Documented in chart, if applicable    Complicating factors:    Care impacted by chronic illness: Other: Chronic nephrostomy tube, nonobstructing kidney stones, congenital absence of the kidney.    Care affected by social determinants of health: N/A    Disposition considerations: Discharge. I prescribed additional prescription strength medication(s) as charted. I considered admission, but ultimately discharged patient But she does not appear toxic or septic.  CT scan otherwise unremarkable.  Patient feels comfortable with discharge home and will give a single dose of IV ceftriaxone here before discharge.  Urine culture has been sent..      Susanna Crowley M.D. [facility]  Emergency Medicine and Medical Toxicology  Former[facility] EMERGENCY  ROOM  [address] [place] [id number]  [phone]  Dept: [phone]           Susanna Crowley MD  05/09/23 0412

## 2023-05-10 LAB — BACTERIA UR CULT: NORMAL

## 2023-05-11 ENCOUNTER — HOSPITAL ENCOUNTER (INPATIENT)
Facility: HOSPITAL | Age: 38
LOS: 2 days | Discharge: HOME | End: 2023-05-14
Attending: EMERGENCY MEDICINE | Admitting: FAMILY MEDICINE
Payer: COMMERCIAL

## 2023-05-11 DIAGNOSIS — N12 PYELONEPHRITIS: ICD-10-CM

## 2023-05-11 DIAGNOSIS — R10.9 RIGHT FLANK PAIN: ICD-10-CM

## 2023-05-11 LAB
ALBUMIN UR-MCNC: 100 MG/DL
AMORPH CRY #/AREA URNS HPF: ABNORMAL /HPF
APPEARANCE UR: ABNORMAL
BACTERIA #/AREA URNS HPF: ABNORMAL /HPF
BACTERIA UR CULT: ABNORMAL
BILIRUB UR QL STRIP: NEGATIVE
COLOR UR AUTO: ABNORMAL
GLUCOSE UR STRIP-MCNC: NEGATIVE MG/DL
HCG UR QL: NEGATIVE
HGB UR QL STRIP: ABNORMAL
KETONES UR STRIP-MCNC: NEGATIVE MG/DL
LEUKOCYTE ESTERASE UR QL STRIP: ABNORMAL
MUCOUS THREADS #/AREA URNS LPF: PRESENT /LPF
NITRATE UR QL: NEGATIVE
PH UR STRIP: 7 [PH] (ref 5–7)
RBC URINE: 51 /HPF
SP GR UR STRIP: 1.01 (ref 1–1.03)
SQUAMOUS EPITHELIAL: 1 /HPF
UROBILINOGEN UR STRIP-MCNC: <2 MG/DL
WBC URINE: 21 /HPF

## 2023-05-11 PROCEDURE — 80053 COMPREHEN METABOLIC PANEL: CPT | Performed by: EMERGENCY MEDICINE

## 2023-05-11 PROCEDURE — 36415 COLL VENOUS BLD VENIPUNCTURE: CPT | Performed by: EMERGENCY MEDICINE

## 2023-05-11 PROCEDURE — 99285 EMERGENCY DEPT VISIT HI MDM: CPT | Mod: 25

## 2023-05-11 PROCEDURE — 85025 COMPLETE CBC W/AUTO DIFF WBC: CPT | Performed by: EMERGENCY MEDICINE

## 2023-05-11 PROCEDURE — 86140 C-REACTIVE PROTEIN: CPT | Performed by: EMERGENCY MEDICINE

## 2023-05-11 PROCEDURE — 81025 URINE PREGNANCY TEST: CPT | Performed by: EMERGENCY MEDICINE

## 2023-05-11 PROCEDURE — 81001 URINALYSIS AUTO W/SCOPE: CPT | Performed by: EMERGENCY MEDICINE

## 2023-05-11 PROCEDURE — 87086 URINE CULTURE/COLONY COUNT: CPT | Performed by: EMERGENCY MEDICINE

## 2023-05-11 RX ORDER — ONDANSETRON 2 MG/ML
4 INJECTION INTRAMUSCULAR; INTRAVENOUS EVERY 30 MIN PRN
Status: DISCONTINUED | OUTPATIENT
Start: 2023-05-11 | End: 2023-05-14 | Stop reason: HOSPADM

## 2023-05-11 RX ORDER — HYDROMORPHONE HYDROCHLORIDE 1 MG/ML
0.5 INJECTION, SOLUTION INTRAMUSCULAR; INTRAVENOUS; SUBCUTANEOUS
Status: COMPLETED | OUTPATIENT
Start: 2023-05-11 | End: 2023-05-12

## 2023-05-11 ASSESSMENT — ENCOUNTER SYMPTOMS
COUGH: 1
MYALGIAS: 1
VOMITING: 0
DIARRHEA: 1
DYSURIA: 0
DIFFICULTY URINATING: 0
FEVER: 1
HEMATURIA: 0
COLOR CHANGE: 0
NAUSEA: 1

## 2023-05-12 ENCOUNTER — APPOINTMENT (OUTPATIENT)
Dept: RADIOLOGY | Facility: HOSPITAL | Age: 38
End: 2023-05-12
Attending: FAMILY MEDICINE
Payer: COMMERCIAL

## 2023-05-12 ENCOUNTER — APPOINTMENT (OUTPATIENT)
Dept: RADIOLOGY | Facility: HOSPITAL | Age: 38
End: 2023-05-12
Attending: EMERGENCY MEDICINE
Payer: COMMERCIAL

## 2023-05-12 PROBLEM — R10.9 RIGHT FLANK PAIN: Status: ACTIVE | Noted: 2023-05-12

## 2023-05-12 LAB
ALBUMIN SERPL BCG-MCNC: 3.9 G/DL (ref 3.5–5.2)
ALP SERPL-CCNC: 83 U/L (ref 35–104)
ALT SERPL W P-5'-P-CCNC: 17 U/L (ref 10–35)
ANION GAP SERPL CALCULATED.3IONS-SCNC: 10 MMOL/L (ref 7–15)
ANION GAP SERPL CALCULATED.3IONS-SCNC: 9 MMOL/L (ref 7–15)
AST SERPL W P-5'-P-CCNC: 25 U/L (ref 10–35)
BASOPHILS # BLD AUTO: 0 10E3/UL (ref 0–0.2)
BASOPHILS # BLD AUTO: 0 10E3/UL (ref 0–0.2)
BASOPHILS NFR BLD AUTO: 0 %
BASOPHILS NFR BLD AUTO: 0 %
BILIRUB SERPL-MCNC: 0.4 MG/DL
BUN SERPL-MCNC: 10.8 MG/DL (ref 6–20)
BUN SERPL-MCNC: 12.2 MG/DL (ref 6–20)
CALCIUM SERPL-MCNC: 10.1 MG/DL (ref 8.6–10)
CALCIUM SERPL-MCNC: 9.5 MG/DL (ref 8.6–10)
CALCIUM, IONIZED MEASURED: 1.3 MMOL/L (ref 1.11–1.3)
CHLORIDE SERPL-SCNC: 104 MMOL/L (ref 98–107)
CHLORIDE SERPL-SCNC: 107 MMOL/L (ref 98–107)
CREAT SERPL-MCNC: 1.13 MG/DL (ref 0.51–0.95)
CREAT SERPL-MCNC: 1.26 MG/DL (ref 0.51–0.95)
CRP SERPL-MCNC: 24.9 MG/L
DEPRECATED HCO3 PLAS-SCNC: 22 MMOL/L (ref 22–29)
DEPRECATED HCO3 PLAS-SCNC: 22 MMOL/L (ref 22–29)
EOSINOPHIL # BLD AUTO: 0.2 10E3/UL (ref 0–0.7)
EOSINOPHIL # BLD AUTO: 0.2 10E3/UL (ref 0–0.7)
EOSINOPHIL NFR BLD AUTO: 3 %
EOSINOPHIL NFR BLD AUTO: 3 %
ERYTHROCYTE [DISTWIDTH] IN BLOOD BY AUTOMATED COUNT: 13.6 % (ref 10–15)
ERYTHROCYTE [DISTWIDTH] IN BLOOD BY AUTOMATED COUNT: 13.6 % (ref 10–15)
GFR SERPL CREATININE-BSD FRML MDRD: 56 ML/MIN/1.73M2
GFR SERPL CREATININE-BSD FRML MDRD: 64 ML/MIN/1.73M2
GLUCOSE SERPL-MCNC: 104 MG/DL (ref 70–99)
GLUCOSE SERPL-MCNC: 115 MG/DL (ref 70–99)
HCT VFR BLD AUTO: 39.4 % (ref 35–47)
HCT VFR BLD AUTO: 40.3 % (ref 35–47)
HGB BLD-MCNC: 12.9 G/DL (ref 11.7–15.7)
HGB BLD-MCNC: 13.5 G/DL (ref 11.7–15.7)
HOLD SPECIMEN: NORMAL
IMM GRANULOCYTES # BLD: 0 10E3/UL
IMM GRANULOCYTES # BLD: 0 10E3/UL
IMM GRANULOCYTES NFR BLD: 0 %
IMM GRANULOCYTES NFR BLD: 0 %
ION CA PH 7.4: 1.24 MMOL/L (ref 1.11–1.3)
LYMPHOCYTES # BLD AUTO: 1.6 10E3/UL (ref 0.8–5.3)
LYMPHOCYTES # BLD AUTO: 1.6 10E3/UL (ref 0.8–5.3)
LYMPHOCYTES NFR BLD AUTO: 19 %
LYMPHOCYTES NFR BLD AUTO: 24 %
MCH RBC QN AUTO: 29.6 PG (ref 26.5–33)
MCH RBC QN AUTO: 30 PG (ref 26.5–33)
MCHC RBC AUTO-ENTMCNC: 32.7 G/DL (ref 31.5–36.5)
MCHC RBC AUTO-ENTMCNC: 33.5 G/DL (ref 31.5–36.5)
MCV RBC AUTO: 90 FL (ref 78–100)
MCV RBC AUTO: 90 FL (ref 78–100)
MONOCYTES # BLD AUTO: 0.5 10E3/UL (ref 0–1.3)
MONOCYTES # BLD AUTO: 0.7 10E3/UL (ref 0–1.3)
MONOCYTES NFR BLD AUTO: 8 %
MONOCYTES NFR BLD AUTO: 8 %
NEUTROPHILS # BLD AUTO: 4.1 10E3/UL (ref 1.6–8.3)
NEUTROPHILS # BLD AUTO: 5.8 10E3/UL (ref 1.6–8.3)
NEUTROPHILS NFR BLD AUTO: 65 %
NEUTROPHILS NFR BLD AUTO: 70 %
NRBC # BLD AUTO: 0 10E3/UL
NRBC # BLD AUTO: 0 10E3/UL
NRBC BLD AUTO-RTO: 0 /100
NRBC BLD AUTO-RTO: 0 /100
PH: 7.31 (ref 7.35–7.45)
PLATELET # BLD AUTO: 155 10E3/UL (ref 150–450)
PLATELET # BLD AUTO: 158 10E3/UL (ref 150–450)
POTASSIUM SERPL-SCNC: 3.8 MMOL/L (ref 3.4–5.3)
POTASSIUM SERPL-SCNC: 4.1 MMOL/L (ref 3.4–5.3)
PROT SERPL-MCNC: 6.6 G/DL (ref 6.4–8.3)
RBC # BLD AUTO: 4.36 10E6/UL (ref 3.8–5.2)
RBC # BLD AUTO: 4.5 10E6/UL (ref 3.8–5.2)
SODIUM SERPL-SCNC: 136 MMOL/L (ref 136–145)
SODIUM SERPL-SCNC: 138 MMOL/L (ref 136–145)
WBC # BLD AUTO: 6.4 10E3/UL (ref 4–11)
WBC # BLD AUTO: 8.4 10E3/UL (ref 4–11)

## 2023-05-12 PROCEDURE — 250N000013 HC RX MED GY IP 250 OP 250 PS 637: Performed by: FAMILY MEDICINE

## 2023-05-12 PROCEDURE — G0378 HOSPITAL OBSERVATION PER HR: HCPCS

## 2023-05-12 PROCEDURE — 71046 X-RAY EXAM CHEST 2 VIEWS: CPT

## 2023-05-12 PROCEDURE — 258N000003 HC RX IP 258 OP 636: Performed by: EMERGENCY MEDICINE

## 2023-05-12 PROCEDURE — 120N000001 HC R&B MED SURG/OB

## 2023-05-12 PROCEDURE — 250N000011 HC RX IP 250 OP 636: Performed by: EMERGENCY MEDICINE

## 2023-05-12 PROCEDURE — 96365 THER/PROPH/DIAG IV INF INIT: CPT

## 2023-05-12 PROCEDURE — 99223 1ST HOSP IP/OBS HIGH 75: CPT | Performed by: FAMILY MEDICINE

## 2023-05-12 PROCEDURE — 258N000003 HC RX IP 258 OP 636: Performed by: FAMILY MEDICINE

## 2023-05-12 PROCEDURE — 96375 TX/PRO/DX INJ NEW DRUG ADDON: CPT

## 2023-05-12 PROCEDURE — 96361 HYDRATE IV INFUSION ADD-ON: CPT

## 2023-05-12 PROCEDURE — 82330 ASSAY OF CALCIUM: CPT | Performed by: FAMILY MEDICINE

## 2023-05-12 PROCEDURE — 96376 TX/PRO/DX INJ SAME DRUG ADON: CPT

## 2023-05-12 PROCEDURE — 80048 BASIC METABOLIC PNL TOTAL CA: CPT | Performed by: FAMILY MEDICINE

## 2023-05-12 PROCEDURE — 36415 COLL VENOUS BLD VENIPUNCTURE: CPT | Performed by: FAMILY MEDICINE

## 2023-05-12 PROCEDURE — 74018 RADEX ABDOMEN 1 VIEW: CPT

## 2023-05-12 PROCEDURE — 85004 AUTOMATED DIFF WBC COUNT: CPT | Performed by: FAMILY MEDICINE

## 2023-05-12 PROCEDURE — 250N000013 HC RX MED GY IP 250 OP 250 PS 637: Performed by: STUDENT IN AN ORGANIZED HEALTH CARE EDUCATION/TRAINING PROGRAM

## 2023-05-12 PROCEDURE — 87040 BLOOD CULTURE FOR BACTERIA: CPT | Performed by: EMERGENCY MEDICINE

## 2023-05-12 PROCEDURE — 250N000011 HC RX IP 250 OP 636: Performed by: FAMILY MEDICINE

## 2023-05-12 PROCEDURE — 96374 THER/PROPH/DIAG INJ IV PUSH: CPT

## 2023-05-12 RX ORDER — ACETAMINOPHEN 325 MG/1
650 TABLET ORAL EVERY 4 HOURS PRN
Status: DISCONTINUED | OUTPATIENT
Start: 2023-05-12 | End: 2023-05-13

## 2023-05-12 RX ORDER — NALOXONE HYDROCHLORIDE 0.4 MG/ML
0.4 INJECTION, SOLUTION INTRAMUSCULAR; INTRAVENOUS; SUBCUTANEOUS
Status: DISCONTINUED | OUTPATIENT
Start: 2023-05-12 | End: 2023-05-14 | Stop reason: HOSPADM

## 2023-05-12 RX ORDER — HYDROMORPHONE HYDROCHLORIDE 2 MG/1
2 TABLET ORAL
Status: DISCONTINUED | OUTPATIENT
Start: 2023-05-12 | End: 2023-05-14 | Stop reason: HOSPADM

## 2023-05-12 RX ORDER — NALOXONE HYDROCHLORIDE 0.4 MG/ML
0.2 INJECTION, SOLUTION INTRAMUSCULAR; INTRAVENOUS; SUBCUTANEOUS
Status: DISCONTINUED | OUTPATIENT
Start: 2023-05-12 | End: 2023-05-14 | Stop reason: HOSPADM

## 2023-05-12 RX ORDER — ONDANSETRON 2 MG/ML
4 INJECTION INTRAMUSCULAR; INTRAVENOUS EVERY 6 HOURS PRN
Status: DISCONTINUED | OUTPATIENT
Start: 2023-05-12 | End: 2023-05-14 | Stop reason: HOSPADM

## 2023-05-12 RX ORDER — CEFTRIAXONE 1 G/1
1 INJECTION, POWDER, FOR SOLUTION INTRAMUSCULAR; INTRAVENOUS ONCE
Status: COMPLETED | OUTPATIENT
Start: 2023-05-12 | End: 2023-05-12

## 2023-05-12 RX ORDER — ONDANSETRON 4 MG/1
4 TABLET, ORALLY DISINTEGRATING ORAL EVERY 6 HOURS PRN
Status: DISCONTINUED | OUTPATIENT
Start: 2023-05-12 | End: 2023-05-14 | Stop reason: HOSPADM

## 2023-05-12 RX ORDER — LEVALBUTEROL INHALATION SOLUTION 0.63 MG/3ML
0.63 SOLUTION RESPIRATORY (INHALATION) EVERY 6 HOURS PRN
Status: DISCONTINUED | OUTPATIENT
Start: 2023-05-12 | End: 2023-05-14 | Stop reason: HOSPADM

## 2023-05-12 RX ORDER — ALBUTEROL SULFATE 1.25 MG/3ML
1.25 SOLUTION RESPIRATORY (INHALATION) EVERY 6 HOURS PRN
Status: DISCONTINUED | OUTPATIENT
Start: 2023-05-12 | End: 2023-05-12

## 2023-05-12 RX ORDER — CEFTRIAXONE 1 G/1
1 INJECTION, POWDER, FOR SOLUTION INTRAMUSCULAR; INTRAVENOUS EVERY 24 HOURS
Status: DISCONTINUED | OUTPATIENT
Start: 2023-05-12 | End: 2023-05-14

## 2023-05-12 RX ORDER — HYDROMORPHONE HYDROCHLORIDE 1 MG/ML
0.5 INJECTION, SOLUTION INTRAMUSCULAR; INTRAVENOUS; SUBCUTANEOUS
Status: DISCONTINUED | OUTPATIENT
Start: 2023-05-12 | End: 2023-05-14 | Stop reason: HOSPADM

## 2023-05-12 RX ORDER — SODIUM CHLORIDE 9 MG/ML
INJECTION, SOLUTION INTRAVENOUS CONTINUOUS
Status: DISCONTINUED | OUTPATIENT
Start: 2023-05-12 | End: 2023-05-13

## 2023-05-12 RX ORDER — LIDOCAINE 40 MG/G
CREAM TOPICAL
Status: DISCONTINUED | OUTPATIENT
Start: 2023-05-12 | End: 2023-05-14 | Stop reason: HOSPADM

## 2023-05-12 RX ADMIN — HYDROMORPHONE HYDROCHLORIDE 0.5 MG: 1 INJECTION, SOLUTION INTRAMUSCULAR; INTRAVENOUS; SUBCUTANEOUS at 14:38

## 2023-05-12 RX ADMIN — HYDROMORPHONE HYDROCHLORIDE 0.5 MG: 1 INJECTION, SOLUTION INTRAMUSCULAR; INTRAVENOUS; SUBCUTANEOUS at 10:55

## 2023-05-12 RX ADMIN — SERTRALINE HYDROCHLORIDE 100 MG: 50 TABLET ORAL at 21:14

## 2023-05-12 RX ADMIN — ACETAMINOPHEN 650 MG: 325 TABLET, FILM COATED ORAL at 10:55

## 2023-05-12 RX ADMIN — CEFTRIAXONE SODIUM 1 G: 1 INJECTION, POWDER, FOR SOLUTION INTRAMUSCULAR; INTRAVENOUS at 21:14

## 2023-05-12 RX ADMIN — HYDROMORPHONE HYDROCHLORIDE 0.5 MG: 1 INJECTION, SOLUTION INTRAMUSCULAR; INTRAVENOUS; SUBCUTANEOUS at 00:21

## 2023-05-12 RX ADMIN — HYDROMORPHONE HYDROCHLORIDE 2 MG: 2 TABLET ORAL at 18:35

## 2023-05-12 RX ADMIN — SODIUM CHLORIDE 1000 ML: 9 INJECTION, SOLUTION INTRAVENOUS at 00:23

## 2023-05-12 RX ADMIN — HYDROMORPHONE HYDROCHLORIDE 0.5 MG: 1 INJECTION, SOLUTION INTRAMUSCULAR; INTRAVENOUS; SUBCUTANEOUS at 01:39

## 2023-05-12 RX ADMIN — SODIUM CHLORIDE: 9 INJECTION, SOLUTION INTRAVENOUS at 21:15

## 2023-05-12 RX ADMIN — ONDANSETRON 4 MG: 2 INJECTION INTRAMUSCULAR; INTRAVENOUS at 00:20

## 2023-05-12 RX ADMIN — CEFTRIAXONE SODIUM 1 G: 1 INJECTION, POWDER, FOR SOLUTION INTRAMUSCULAR; INTRAVENOUS at 01:25

## 2023-05-12 RX ADMIN — HYDROMORPHONE HYDROCHLORIDE 0.5 MG: 1 INJECTION, SOLUTION INTRAMUSCULAR; INTRAVENOUS; SUBCUTANEOUS at 21:59

## 2023-05-12 RX ADMIN — ACETAMINOPHEN 650 MG: 325 TABLET, FILM COATED ORAL at 18:35

## 2023-05-12 RX ADMIN — ACETAMINOPHEN 650 MG: 325 TABLET, FILM COATED ORAL at 02:39

## 2023-05-12 RX ADMIN — ONDANSETRON 4 MG: 4 TABLET, ORALLY DISINTEGRATING ORAL at 21:59

## 2023-05-12 RX ADMIN — SODIUM CHLORIDE: 9 INJECTION, SOLUTION INTRAVENOUS at 02:35

## 2023-05-12 RX ADMIN — HYDROMORPHONE HYDROCHLORIDE 0.5 MG: 1 INJECTION, SOLUTION INTRAMUSCULAR; INTRAVENOUS; SUBCUTANEOUS at 04:13

## 2023-05-12 RX ADMIN — HYDROMORPHONE HYDROCHLORIDE 0.5 MG: 1 INJECTION, SOLUTION INTRAMUSCULAR; INTRAVENOUS; SUBCUTANEOUS at 07:46

## 2023-05-12 ASSESSMENT — ACTIVITIES OF DAILY LIVING (ADL)
ADLS_ACUITY_SCORE: 35
ADLS_ACUITY_SCORE: 35
CHANGE_IN_FUNCTIONAL_STATUS_SINCE_ONSET_OF_CURRENT_ILLNESS/INJURY: NO
WALKING_OR_CLIMBING_STAIRS_DIFFICULTY: NO
ADLS_ACUITY_SCORE: 18
ADLS_ACUITY_SCORE: 35
CONCENTRATING,_REMEMBERING_OR_MAKING_DECISIONS_DIFFICULTY: NO
ADLS_ACUITY_SCORE: 35
DOING_ERRANDS_INDEPENDENTLY_DIFFICULTY: NO
ADLS_ACUITY_SCORE: 35
ADLS_ACUITY_SCORE: 18
WEAR_GLASSES_OR_BLIND: NO
ADLS_ACUITY_SCORE: 18
FALL_HISTORY_WITHIN_LAST_SIX_MONTHS: NO
DRESSING/BATHING_DIFFICULTY: NO
ADLS_ACUITY_SCORE: 35
DIFFICULTY_EATING/SWALLOWING: NO
TOILETING_ISSUES: NO

## 2023-05-12 NOTE — PLAN OF CARE
Goal Outcome Evaluation:      Plan of Care Reviewed With: patient    Overall Patient Progress: improvingOverall Patient Progress: improving    Outcome Evaluation: Tolerating iv abx, pain 4/10 R flank, able to ambulate in the [facility] difficulty.  Switching to oral pain meds, monitor pain levels.

## 2023-05-12 NOTE — H&P
[facility]    History and Physical - Hospitalist Service       Date of Admission:  5/12/2023    Assessment & Plan      Jacqueline Pappas is a 38 year old female with PMH significant for congenital solitary kidney, Right nephrostomy tube, recurrent UTI/pyelonephritis and chronic pain who is  admitted on 5/12/2023 with UTI/ possible pyelonephritis.    1. UTI/ Possible Pyelonephritis- Admit patient. Discontinue home dose of Keflex. Continue Rocephin. Patient seen in ED on 5/08/2023 with similar symptoms. CT abd/pel 5/8/2023 report reviewed. Check KUB. Give IV flluids. PRN Dilaudid. Repeat labs in a.m. Monitor vitals closely.    2. Right Flank Pain- CT abd/pel from 5/8/2023 shows multiple renal cysts and nephrolithiasis. On Rocephin for Possible Pyelonephritis. Check KUB. PRN Dilaudid.    3.GT- Mild. Hx of solitary kidney. Avoid nephrotoxic agents. Give IV fluids. Recommend Nephrology consultation if Creatinine does not improve with IV hydration.    4. Hypercalcemia- Check Ionized Calcium.     Diet:  Regular  DVT Prophylaxis: Pneumatic Compression Devices  Farr Catheter: Not present  Lines: None     Cardiac Monitoring: None  Code Status:   Full Code    Clinically Significant Risk Factors Present on Admission                       # Obesity: Estimated body mass index is 35.15 kg/m  as calculated from the following:    Height as of this encounter: 1.524 m (5').    Weight as of this encounter: 81.6 kg (180 lb).            Disposition Plan  Anticipate discharge home when medically stable.    Expected Discharge Date: 5/14/2023.       Lanie Larson MD  Hospitalist Service  [facility]  Securely message with [employer] (more info)  Text page via [employer] Paging/Directory     ______________________________________________________________________    Chief Complaint   Right Flank Pain    History is obtained from the patient    History of Present Illness   Jacqueline Pappas is a 38 year  "old female with PMH significant for congenital solitary kidney, Right nephrostomy tube, recurrent UTI/pyelonephritis and chronic pain who presents to ED due to persistent right flank pain. It has been present for the past 5-6 days. She was seen in ED 4 days ago and diagnosed with UTI. She was discharged with Keflex. CT abd/pel done 2023 showed: \"IMPRESSION:  1.  No bowel obstruction or abscess.  2.  Right percutaneous nephrostomy tube.  3.  Right renal calculi.  4.  Appearance similar to prior.\"    Associated symptoms include subjective fever, chills, nausea and increased urinary frequency. She denies vomiting, chest pain or shortness of breath.        Past Medical History    Past Medical History:   Diagnosis Date     Acute renal failure (H)      Anemia      Anemia      Calculus of kidney      Congenital absence of left kidney      Congenital absence of one kidney      Depression      Depression      Hydronephrosis      Kidney stone     at age 29     Pyelonephritis      Pyelonephritis      Smoker      Ureteral stricture      UTI (urinary tract infection)      Wrist fracture     Left       Past Surgical History   Past Surgical History:   Procedure Laterality Date      SECTION        SECTION      x1     COMBINED CYSTOSCOPY, RETROGRADES, URETEROSCOPY, LASER HOLMIUM LITHOTRIPSY URETER(S), INSERT STENT Right 2016    Procedure: COMBINED CYSTOSCOPY, RETROGRADES, URETEROSCOPY, LASER HOLMIUM LITHOTRIPSY URETER(S), INSERT STENT;  Surgeon: Florentino Awad MD;  Location: [facility] OR     CYSTOSCOPY, URETEROSCOPY, COMBINED Right 2016    Procedure: COMBINED CYSTOSCOPY, URETEROSCOPY;  Surgeon: Florentino Awad MD;  Location: [facility] OR     IR NEPHROLITHOTOMY  2014     IR NEPHROSTOGRAM EXISITING ACCESS  10/18/2018     IR NEPHROSTOMY TUBE CHANGE RIGHT  2018     IR NEPHROSTOMY TUBE CHANGE RIGHT  2020     IR NEPHROSTOMY TUBE CHANGE RIGHT  2018     IR NEPHROSTOMY TUBE CHANGE RIGHT  " 6/11/2020     IR NEPHROSTOMY TUBE PLACEMENT RIGHT  7/24/2016     IR NEPHROSTOMY TUBE PLACEMENT RIGHT  9/14/2017     KIDNEY STONE SURGERY Right 05/2016     LASER HOLMIUM LITHOTRIPSY URETER(S), INSERT STENT, COMBINED Left 11/1/2016    Procedure: COMBINED CYSTOSCOPY, URETEROSCOPY, LASER HOLMIUM LITHOTRIPSY URETER(S), INSERT STENT;  Surgeon: Florentino Awad MD;  Location: [facility] OR     LASER HOLMIUM NEPHROLITHOTOMY VIA PERCUTANEOUS NEPHROSTOMY Right 9/14/2016    Procedure: LASER HOLMIUM NEPHROLITHOTOMY VIA PERCUTANEOUS NEPHROSTOMY;  Surgeon: Florentino Awad MD;  Location: [facility] OR     NEPHROSTOMY W/ INTRODUCTION OF CATHETER Right      OTHER SURGICAL HISTORY      Mole removednasal     OTHER SURGICAL HISTORY Right     Cystoscopy, ureteroscopy, laser11/02/2014 x2 with ureteral stent insertion     PERCUTANEOUS NEPHROSTOMY  11/1/2016    Procedure: PERCUTANEOUS NEPHROSTOMY;  Surgeon: Florentino Awad MD;  Location: [facility] OR     WISDOM TOOTH EXTRACTION       ZZC REMOVAL OF KIDNEY STONE         Prior to Admission Medications   Prior to Admission Medications   Prescriptions Last Dose Informant Patient Reported? Taking?   Lidocaine (LIDOCARE) 4 % Patch   No No   Sig: Place 1 patch onto the skin every 24 hours To prevent lidocaine toxicity, patient should be patch free for 12 hrs daily.   SPRINTEC 28 0.25-35 MG-MCG tablet   Yes No   Sig: Take 1 tablet by mouth daily   albuterol (ACCUNEB) 1.25 MG/3ML neb solution   Yes No   Sig: Take 1.25 mg by nebulization every 6 hours as needed for shortness of breath / dyspnea or wheezing   cephALEXin (KEFLEX) 500 MG capsule   No No   Sig: Take 1 capsule (500 mg) by mouth 4 times daily for 10 days   methocarbamol (ROBAXIN) 750 MG tablet   No No   Sig: Take 1 tablet (750 mg) by mouth 3 times daily as needed for muscle spasms   prochlorperazine (COMPAZINE) 10 MG tablet   No No   Sig: Take 1 tablet (10 mg) by mouth every 6 hours as needed for nausea or vomiting   sertraline (ZOLOFT) 100 MG  tablet   Yes No   Sig: Take 150 mg by mouth At Bedtime      Facility-Administered Medications: None        Review of Systems    The 10 point Review of Systems is negative other than noted in the HPI.    Physical Exam   Vital Signs: Temp: 97.9  F (36.6  C) Temp src: Temporal BP: 114/75 Pulse: 100   Resp: 20 SpO2: 98 %      Weight: 180 lbs 0 oz    General Appearance: AAOx3, NAD  Respiratory: CTAB, decreased breath sounds in bases  Cardiovascular: Regular rate, S1S2  GI: _BS, soft, Nondistended, Right CVA tenderness, Right nephrostomy tube in place.  Skin: No rash  Other: No pedal edema     Medical Decision Making     50 MINUTES SPENT BY ME on the date of service doing chart review, history, exam, documentation & further activities per the note.      Data     I have personally reviewed the following data over the past 24 hrs:    8.4  \   13.5   / 158     136 104 12.2 /  115 (H)   3.8 22 1.26 (H) \       ALT: 17 AST: 25 AP: 83 TBILI: 0.4   ALB: 3.9 TOT PROTEIN: 6.6 LIPASE: N/A       Procal: N/A CRP: 24.90 (H) Lactic Acid: N/A         Imaging results reviewed over the past 24 hrs:   Recent Results (from the past 24 hour(s))   XR Chest 2 Views    Narrative    EXAM: XR CHEST 2 VIEWS  LOCATION: [facility]  DATE/TIME: 5/12/2023 12:38 AM CDT    INDICATION: H o pyelonephritis but now having cough too.  COMPARISON: 04/10/2022.    FINDINGS: The heart size is normal. Mild bibasilar atelectasis and scarring. The lungs are otherwise clear. There is no pneumothorax or pleural effusion.      Impression    IMPRESSION: No acute abnormality.

## 2023-05-12 NOTE — PROGRESS NOTES
Brief hospitalist note  38-year-old female patient with history of congenital solitary kidney and right nephrolithiasis.  Presented for UTI/possible pyelonephritis.  Patient is currently boarding in ER awaiting bed assignment.  Seen by urology      Plan  Continue management as per admitting hospitalist  Cain Lopez MD

## 2023-05-12 NOTE — ED TRIAGE NOTES
Right flank pain of 7 for 2-3 days. HX of kidney stones. Also complains of cough and body aches. She tried tylenol with little relief. She also reports frequent urination and has nephrostomy tube in right kidney.     Triage Assessment     Row Name 05/11/23 7370       Triage Assessment (Adult)    Airway WDL WDL       Respiratory WDL    Respiratory WDL WDL       Skin Circulation/Temperature WDL    Skin Circulation/Temperature WDL WDL       Cardiac WDL    Cardiac WDL WDL       Peripheral/Neurovascular WDL    Peripheral Neurovascular WDL WDL       Cognitive/Neuro/Behavioral WDL    Cognitive/Neuro/Behavioral WDL WDL

## 2023-05-12 NOTE — PHARMACY-ADMISSION MEDICATION HISTORY
Pharmacist Admission Medication History    Admission medication history is complete. The information provided in this note is only as accurate as the sources available at the time of the update.    Medication reconciliation/reorder completed by provider prior to medication history? No    Information Source(s): Patient via in-person    Pertinent Information: none    Changes made to PTA medication list:    Added: None    Deleted: Sprintec, prochlorperazine, methocarbamol, lidocaine patch, cephalexan    Changed: sertraline dose    Medication Affordability:  Not including over the counter (OTC) medications, was there a time in the past 3 months when you did not take your medications as prescribed because of cost?: No    Allergies reviewed with patient and updates made in EHR: yes    Medication History Completed By: Krista Jackson [facility] 5/12/2023 7:54 AM    Prior to Admission medications    Medication Sig Last Dose Taking? Auth Provider Long Term End Date   albuterol (ACCUNEB) 1.25 MG/3ML neb solution Take 1.25 mg by nebulization every 6 hours as needed for shortness of breath / dyspnea or wheezing 5/11/2023 at pm Yes Reported, Patient     sertraline (ZOLOFT) 100 MG tablet Take 100 mg by mouth At Bedtime Past Week Yes Unknown, Entered By History Yes

## 2023-05-12 NOTE — PLAN OF CARE
Problem: Plan of Care - These are the overarching goals to be used throughout the patient stay.    Goal: Absence of Hospital-Acquired Illness or Injury  5/12/2023 1530 by Constantino Brunson RN  Outcome: Progressing     Problem: Plan of Care - These are the overarching goals to be used throughout the patient stay.    Goal: Optimal Comfort and Wellbeing  5/12/2023 1530 by Constantino Brunson, RN  Outcome: Progressing  5/12/2023 0949 by Constantino Brunson RN  Outcome: Progressing  5/12/2023 0949 by Constantino Brunson RN  Outcome: Progressing  Intervention: Monitor Pain and Promote Comfort  Recent Flowsheet Documentation  Taken 5/12/2023 1110 by Constantino Brunson RN  Pain Management Interventions: distraction  Taken 5/12/2023 0746 by Constantino Brunson RN  Pain Management Interventions: medication (see MAR)   Goal Outcome Evaluation:       Pain managed with IV dilaudid and PRN acetaminophen. Pt is tolerating continuous infusion of normal saline well. Pt independent in the room. Pt to transfer to room 124 on P1. Report called to P1 RN.   Constantino Brunson RN  5/12/2023  3:32 PM

## 2023-05-12 NOTE — ED PROVIDER NOTES
NAME: Jacqueline Pappas  AGE: 38 year old female  YOB: 1985  MRN: [id number]  EVALUATION DATE & TIME: 2023 11:28 PM    PCP: Pao Montague    ED PROVIDER: Adi Ko M.D.    Chief Complaint   Patient presents with     Flank Pain     FINAL IMPRESSION:  1. Right flank pain    2. Pyelonephritis      MEDICAL DECISION MAKIN:30 PM I met with the patient, obtained history, performed an initial exam, and discussed options and plan for diagnostics and treatment here in the ED.   12:55 AM I rechecked and updated the patient.   1:02 AM I discussed the case with hospitalist, Dr Wells, who accepts the patient.  Patient was clinically assessed and consented to treatment. After assessment, medical decision making and workup were discussed with the patient. The patient was agreeable to plan for testing, workup, and treatment.  Pertinent Labs & Imaging studies reviewed. (See chart for details)     Medical Decision Making    History:    Supplemental history from: Documented in chart, if applicable    External Record(s) reviewed: Documented in chart, if applicable.    Work Up:    Chart documentation includes differential considered and any EKGs or imaging independently interpreted by provider, where specified.    In additional to work up documented, I considered the following work up: Documented in chart, if applicable.    External consultation:    Discussion of management with another provider: Hospitalist    Complicating factors:    Care impacted by chronic illness: Smoking / Nicotine Use and Other: Acute Renal Failure and Congenital Absence of Left Kidney    Care affected by social determinants of health: N/A    Disposition considerations: Admit.    Jacqueline Pappas is a 38 year old female who presents with right flank pain.   Differential diagnosis includes but not limited to pyelonephritis, kidney stone, nephrostomy tube dysfunction, acute kidney injury.  Patient with history of single kidney  and chronic nephrostomy tube in the right kidney.  She has had this for 6 years and gets changed every 3 months.  Patient was recently seen 3 days ago for right flank pain and started on Keflex for presumed urinary tract infection.  Urine at that time did show positive for nitrites.  Patient not feeling better for 3 days and still having pain as well as chills and nausea.  She does not vomiting but despite antibiotics is worsening.  Recheck of labs showed elevated CRP, no change in leukocytes, increase in creatinine as well as urine that was equivocal and culture will be sent.  Culture from 3 days ago showed multiple bacteria and unable to isolate and get specificity.  Patient may have resistant organism and worsening creatinine.  Given the single kidney I would be concerned about worsening kidney function and discussed with patient admission.  Urine culture from both urethral and the nephrostomy tubes were sent again as well as blood culture.  Patient will be started back on Rocephin which she was given 3 days ago in the ER before discharge home on Keflex.  Patient will plan for admission was discussed with hospitalist.    0 minutes of critical care time    MEDICATIONS GIVEN IN THE EMERGENCY:  Medications   ondansetron (ZOFRAN) injection 4 mg (4 mg Intravenous $Given 5/12/23 0020)   HYDROmorphone (PF) (DILAUDID) injection 0.5 mg (0.5 mg Intravenous $Given 5/12/23 0139)   acetaminophen (TYLENOL) tablet 650 mg (650 mg Oral $Given 5/12/23 0239)   cefTRIAXone (ROCEPHIN) 1 g vial to attach to  mL bag for ADULTS or NS 50 mL bag for PEDS (has no administration in time range)   lidocaine 1 % 0.1-1 mL (has no administration in time range)   lidocaine (LMX4) cream (has no administration in time range)   sodium chloride (PF) 0.9% PF flush 3 mL (3 mLs Intracatheter Not Given 5/12/23 0240)   sodium chloride (PF) 0.9% PF flush 3 mL (has no administration in time range)   sodium chloride 0.9% infusion ( Intravenous $New Bag  5/12/23 0235)   ondansetron (ZOFRAN ODT) ODT tab 4 mg (has no administration in time range)     Or   ondansetron (ZOFRAN) injection 4 mg (has no administration in time range)   0.9% sodium chloride BOLUS (0 mLs Intravenous Stopped 5/12/23 0110)   HYDROmorphone (PF) (DILAUDID) injection 0.5 mg (0.5 mg Intravenous $Given 5/12/23 0021)   cefTRIAXone (ROCEPHIN) 1 g vial to attach to  mL bag for ADULTS or NS 50 mL bag for PEDS (0 g Intravenous Stopped 5/12/23 0219)       NEW PRESCRIPTIONS STARTED AT TODAY'S ER VISIT:  New Prescriptions    No medications on file          =================================================================    HPI    Patient information was obtained from: Patient     Use of : N/A         Jacqueline Pappas is a 38 year old female with a past medical history of kidney stones, congenital absence of left kidney, hydronephrosis, pyelonephritis, acute renal failure, s/p nephrostomy tube placement (~6 years ago), s/p uretal surgery, and tobacco abuse, who presents with flank pain.    Per chart review, the patient was seen on 5/8/23 (~3 days ago) for body aches, fevers, and flank pain. Urinalysis certainly concerning for UTI though she did have a similar appearing UA recently with a negative culture. CT scan otherwise unremarkable. She did not appear toxic or septic. Patient was given a dose of IV ceftriaxone (she stated that she tolerated this medication well) and discharged with prescription for Keflex.    Patient reports of right flank pain near her s/p nephrostomy tube placement. The patient states that her pain is located to the same region it was a few days ago was she was in the ED, but notes that her pain is worse. She denies redness to that area. Patient denies urinary changes. She endorses associated nausea and diarrhea, but denies vomiting. The patient notes of a chest pain, secondary to cough. She also notes of some body aches and fevers. She has been taking Tylenol with no  relief.  Additionally, the patient reports that her most recent nephrostomy tube replacement was in March (~2 months ago). She has only had a UTI since then, but no other infections. The patient otherwise denies any other symptoms or complaints at this time.     REVIEW OF SYSTEMS   Review of Systems   Constitutional: Positive for fever.   Respiratory: Positive for cough.    Cardiovascular: Positive for chest pain (secondary to cough).   Gastrointestinal: Positive for diarrhea and nausea. Negative for vomiting.        Positive for flank pain.   Genitourinary: Negative for difficulty urinating, dysuria, hematuria and urgency.   Musculoskeletal: Positive for myalgias.   Skin: Negative for color change.   All other systems reviewed and are negative.     PAST MEDICAL HISTORY:  Past Medical History:   Diagnosis Date     Acute renal failure (H)      Anemia      Anemia      Calculus of kidney      Congenital absence of left kidney      Congenital absence of one kidney      Depression      Depression      Hydronephrosis      Kidney stone     at age 29     Pyelonephritis      Pyelonephritis      Smoker      Ureteral stricture      UTI (urinary tract infection)      Wrist fracture     Left       PAST SURGICAL HISTORY:  Past Surgical History:   Procedure Laterality Date      SECTION        SECTION      x1     COMBINED CYSTOSCOPY, RETROGRADES, URETEROSCOPY, LASER HOLMIUM LITHOTRIPSY URETER(S), INSERT STENT Right 2016    Procedure: COMBINED CYSTOSCOPY, RETROGRADES, URETEROSCOPY, LASER HOLMIUM LITHOTRIPSY URETER(S), INSERT STENT;  Surgeon: Florentino Awad MD;  Location: [facility] OR     CYSTOSCOPY, URETEROSCOPY, COMBINED Right 2016    Procedure: COMBINED CYSTOSCOPY, URETEROSCOPY;  Surgeon: Florentino Awad MD;  Location: [facility] OR     IR NEPHROLITHOTOMY  2014     IR NEPHROSTOGRAM EXISITING ACCESS  10/18/2018     IR NEPHROSTOMY TUBE CHANGE RIGHT  2018     IR NEPHROSTOMY TUBE CHANGE RIGHT   6/11/2020     IR NEPHROSTOMY TUBE CHANGE RIGHT  12/12/2018     IR NEPHROSTOMY TUBE CHANGE RIGHT  6/11/2020     IR NEPHROSTOMY TUBE PLACEMENT RIGHT  7/24/2016     IR NEPHROSTOMY TUBE PLACEMENT RIGHT  9/14/2017     KIDNEY STONE SURGERY Right 05/2016     LASER HOLMIUM LITHOTRIPSY URETER(S), INSERT STENT, COMBINED Left 11/1/2016    Procedure: COMBINED CYSTOSCOPY, URETEROSCOPY, LASER HOLMIUM LITHOTRIPSY URETER(S), INSERT STENT;  Surgeon: Florentino Awad MD;  Location: [facility] OR     LASER HOLMIUM NEPHROLITHOTOMY VIA PERCUTANEOUS NEPHROSTOMY Right 9/14/2016    Procedure: LASER HOLMIUM NEPHROLITHOTOMY VIA PERCUTANEOUS NEPHROSTOMY;  Surgeon: Florentino Awad MD;  Location: [facility] OR     NEPHROSTOMY W/ INTRODUCTION OF CATHETER Right      OTHER SURGICAL HISTORY      Mole removednasal     OTHER SURGICAL HISTORY Right     Cystoscopy, ureteroscopy, laser11/02/2014 x2 with ureteral stent insertion     PERCUTANEOUS NEPHROSTOMY  11/1/2016    Procedure: PERCUTANEOUS NEPHROSTOMY;  Surgeon: Florentino Awad MD;  Location: [facility] OR     WISDOM TOOTH EXTRACTION       ZZC REMOVAL OF KIDNEY STONE         CURRENT MEDICATIONS:      Current Facility-Administered Medications:      acetaminophen (TYLENOL) tablet 650 mg, 650 mg, Oral, Q4H PRN, Lanie Wells MD, 650 mg at 05/12/23 0239     cefTRIAXone (ROCEPHIN) 1 g vial to attach to  mL bag for ADULTS or NS 50 mL bag for PEDS, 1 g, Intravenous, Q24H, Lanie Wells MD     HYDROmorphone (PF) (DILAUDID) injection 0.5 mg, 0.5 mg, Intravenous, Q3H PRN, Lanie Wells MD, 0.5 mg at 05/12/23 0139     lidocaine (LMX4) cream, , Topical, Q1H PRN, Lanie Wells MD     lidocaine 1 % 0.1-1 mL, 0.1-1 mL, Other, Q1H PRN, Lanie Wells MD     ondansetron (ZOFRAN ODT) ODT tab 4 mg, 4 mg, Oral, Q6H PRN **OR** ondansetron (ZOFRAN) injection 4 mg, 4 mg, Intravenous, Q6H PRN, Lanie Wells MD     ondansetron (ZOFRAN) injection 4 mg, 4 mg, Intravenous, Q30 Min PRN, Adi Ko  MD Rickey, 4 mg at 05/12/23 0020     sodium chloride (PF) 0.9% PF flush 3 mL, 3 mL, Intracatheter, Q8H, Lanie Wells MD     sodium chloride (PF) 0.9% PF flush 3 mL, 3 mL, Intracatheter, q1 min prn, Lanie Wells MD     sodium chloride 0.9% infusion, , Intravenous, Continuous, Lanie Wells MD, Last Rate: 100 mL/hr at 05/12/23 0235, New Bag at 05/12/23 0235    Current Outpatient Medications:      albuterol (ACCUNEB) 1.25 MG/3ML neb solution, Take 1.25 mg by nebulization every 6 hours as needed for shortness of breath / dyspnea or wheezing, Disp: , Rfl:      cephALEXin (KEFLEX) 500 MG capsule, Take 1 capsule (500 mg) by mouth 4 times daily for 10 days, Disp: 40 capsule, Rfl: 0     Lidocaine (LIDOCARE) 4 % Patch, Place 1 patch onto the skin every 24 hours To prevent lidocaine toxicity, patient should be patch free for 12 hrs daily., Disp: 3 patch, Rfl: 0     methocarbamol (ROBAXIN) 750 MG tablet, Take 1 tablet (750 mg) by mouth 3 times daily as needed for muscle spasms, Disp: 30 tablet, Rfl: 0     prochlorperazine (COMPAZINE) 10 MG tablet, Take 1 tablet (10 mg) by mouth every 6 hours as needed for nausea or vomiting, Disp: 10 tablet, Rfl: 0     sertraline (ZOLOFT) 100 MG tablet, Take 150 mg by mouth At Bedtime, Disp: , Rfl:      SPRINTEC 28 0.25-35 MG-MCG tablet, Take 1 tablet by mouth daily, Disp: , Rfl:     ALLERGIES:  Allergies   Allergen Reactions     Vancomycin      Desogestrel-Ethinyl Estradiol Angioedema and Swelling     Swelling of hands and feet per pt.  Swelling of hands and feet per pt.  Swelling of hands and feet per pt.  Swelling of hands and feet per pt.  Swelling of hands and feet per pt.  Swelling of hands and feet per pt.  Swelling of hands and feet per pt.  Swelling of hands and feet per pt.       Penicillins Rash     As a child per mother; mother unsure if has tolerated another PCN since. Tolerated ampicillin 9/20/16     Sulfa Antibiotics Rash       FAMILY HISTORY:  Family History    Problem Relation Age of Onset     Anesthesia Reaction No family hx of      Malignant Hyperthermia No family hx of      Heart Disease Maternal Uncle         heart failure     Coronary Artery Disease Other      Heart Disease Maternal Grandmother         pacemaker     Gout Paternal Grandfather      Prostate Cancer Maternal Aunt         breast     Heart Disease Maternal Aunt         pacemaker     Heart Disease Maternal Aunt         pacemaker     Heart Disease Maternal Aunt         pacemaker       SOCIAL HISTORY:   Social History     Socioeconomic History     Marital status: Single     Spouse name: None     Number of children: None     Years of education: None     Highest education level: None   Tobacco Use     Smoking status: Every Day     Packs/day: 0.50     Years: 10.00     Pack years: 5.00     Types: Cigarettes     Last attempt to quit: 2016     Years since quittin.6     Smokeless tobacco: Former     Quit date: 2016     Tobacco comments:     has information from last admission.   Substance and Sexual Activity     Alcohol use: Yes     Alcohol/week: 0.0 standard drinks of alcohol     Comment: Alcoholic Drinks/day: occ     Drug use: No       PHYSICAL EXAM:    Vitals: /75   Pulse 100   Temp 97.9  F (36.6  C) (Temporal)   Resp 20   Ht 1.524 m (5')   Wt 81.6 kg (180 lb)   LMP 2023 (Approximate)   SpO2 98%   BMI 35.15 kg/m     Physical Exam  Vitals and nursing note reviewed.   Constitutional:       General: She is not in acute distress.     Appearance: Normal appearance. She is normal weight. She is not ill-appearing, toxic-appearing or diaphoretic.   HENT:      Head: Normocephalic.      Mouth/Throat:      Mouth: Mucous membranes are dry.   Eyes:      General: No scleral icterus.  Cardiovascular:      Rate and Rhythm: Regular rhythm. Tachycardia present.      Heart sounds: Normal heart sounds.   Pulmonary:      Effort: Pulmonary effort is normal. No respiratory distress.      Breath  sounds: Normal breath sounds.   Abdominal:      General: There is no distension.      Palpations: Abdomen is soft.      Tenderness: There is no abdominal tenderness. There is right CVA tenderness. There is no left CVA tenderness, guarding or rebound.      Hernia: No hernia is present.       Musculoskeletal:      Cervical back: Normal range of motion and neck supple.      Right lower leg: No edema.      Left lower leg: No edema.   Skin:     General: Skin is warm and dry.      Findings: No erythema.   Neurological:      General: No focal deficit present.      Mental Status: She is alert.   Psychiatric:         Behavior: Behavior normal.        LAB:  All pertinent labs reviewed and interpreted.  Labs Ordered and Resulted from Time of ED Arrival to Time of ED Departure   ROUTINE UA WITH MICROSCOPIC REFLEX TO CULTURE - Abnormal       Result Value    Color Urine Light Yellow      Appearance Urine Turbid (*)     Glucose Urine Negative      Bilirubin Urine Negative      Ketones Urine Negative      Specific Gravity Urine 1.008      Blood Urine 1.0 mg/dL (*)     pH Urine 7.0      Protein Albumin Urine 100 (*)     Urobilinogen Urine <2.0      Nitrite Urine Negative      Leukocyte Esterase Urine 500 Duglas/uL (*)     Bacteria Urine Few (*)     Mucus Urine Present (*)     Amorphous Crystals Urine Few (*)     RBC Urine 51 (*)     WBC Urine 21 (*)     Squamous Epithelials Urine 1     COMPREHENSIVE METABOLIC PANEL - Abnormal    Sodium 136      Potassium 3.8      Chloride 104      Carbon Dioxide (CO2) 22      Anion Gap 10      Urea Nitrogen 12.2      Creatinine 1.26 (*)     Calcium 10.1 (*)     Glucose 115 (*)     Alkaline Phosphatase 83      AST 25      ALT 17      Protein Total 6.6      Albumin 3.9      Bilirubin Total 0.4      GFR Estimate 56 (*)    CRP INFLAMMATION - Abnormal    CRP Inflammation 24.90 (*)    HCG QUALITATIVE URINE - Normal    hCG Urine Qualitative Negative     CBC WITH PLATELETS AND DIFFERENTIAL    WBC Count 8.4       RBC Count 4.50      Hemoglobin 13.5      Hematocrit 40.3      MCV 90      MCH 30.0      MCHC 33.5      RDW 13.6      Platelet Count 158      % Neutrophils 70      % Lymphocytes 19      % Monocytes 8      % Eosinophils 3      % Basophils 0      % Immature Granulocytes 0      NRBCs per 100 WBC 0      Absolute Neutrophils 5.8      Absolute Lymphocytes 1.6      Absolute Monocytes 0.7      Absolute Eosinophils 0.2      Absolute Basophils 0.0      Absolute Immature Granulocytes 0.0      Absolute NRBCs 0.0     IONIZED CALCIUM   BASIC METABOLIC PANEL   URINE CULTURE   BLOOD CULTURE     RADIOLOGY:  XR Chest 2 Views   Final Result   IMPRESSION: No acute abnormality.      XR Abdomen Port 1 View    (Results Pending)     PROCEDURES:   Procedures     I, Ravinder Reyonlds, am serving as a scribe to document services personally performed by Dr. Adi Ko  based on my observation and the provider's statements to me. I, Adi Ko MD attest that Ravinder Reynolds is acting in a scribe capacity, has observed my performance of the services and has documented them in accordance with my direction.    Adi Ko M.D.  Emergency Medicine  [facility] Emergency Department     Adi Ko MD  05/12/23 0316

## 2023-05-12 NOTE — UTILIZATION REVIEW
Admission Status; Secondary Review Determination   Under the authority of the Utilization Management Committee, the utilization review process indicated a secondary review on Jacqueline Pappas. The review outcome is based on review of the medical records, discussions with staff, and applying clinical experience noted on the date of the review.   (x) Inpatient Status Appropriate - This patient's medical care is consistent with medical management for inpatient care and reasonable inpatient medical practice.     RATIONALE FOR DETERMINATION   Jacqueline Pappas is a 38 yr old female with hx solitary kidney who has a chronic nephrostomy tube who presented with flank pain on 5/11/23.  Similar symptoms on 5/8/23 and nitrates on UA thus started on Keflex and attempted outpatient treatment.  Now with ongoing illness/pain/chills/nausea.  Cr 1.1 baseline was 1.26 on presentation.  UCx nonspecific from 5/8.  CRP 24.9 with WBC 9.0.  Known nephrolithiasis raising concern for recurrent UTI.  Frequent IV pain med doses needed.      At the time of admission with the information available to the attending physician more than 2 nights Hospital complex care was anticipated, based on patient risk of adverse outcome if treated as outpatient and complex care required. Inpatient admission is appropriate based on the Medicare guidelines.   The information on this document is developed by the utilization review team in order for the business office to ensure compliance. This only denotes the appropriateness of proper admission status and does not reflect the quality of care rendered.   The definitions of Inpatient Status and Observation Status used in making the determination above are those provided in the CMS Coverage Manual, Chapter 1 and Chapter 6, section 70.4.   Sincerely,   Mellisa Donahue MD  Utilization Review  Physician Advisor  [facility]

## 2023-05-12 NOTE — PROGRESS NOTES
PRIMARY DIAGNOSIS: PYELONEPHRITIS  OUTPATIENT/OBSERVATION GOALS TO BE MET BEFORE DISCHARGE:  1. Diagnostic test and consults (if applicable) complete: Yes    2. Vitals signs stable or return to baseline: Yes    3. Tolerate oral intake to maintain hydration: No    4. Pain status: Improved but still requiring IV narcotics.    5. Tolerating oral antibiotics or has plans for home infusion setup:  No    6. Return to near baseline physical activity: Yes    Discharge Planner Nurse   Safe discharge environment identified: Yes  Barriers to discharge: Yes       Entered by: RUSLAN REYNOLDS RN 05/12/2023 4:33 AM     Please review provider order for any additional goals.   Nurse to notify provider when observation goals have been met and patient is ready for discharge.

## 2023-05-12 NOTE — CONSULTS
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 Olivo     History of present illness:  Jacqueline Pappas is a 38 year old female that presented to the hospital with Pyelonephritis. Urology is consulted for pyelonephritis.  History is obtained through the patient,  and chart review.     She was recently evaluated at [facility] ED due to the same complaint on 5/8/2023.   Per chart review, \"Urinalysis suggestive of UTI from both the urethra sample as well as the nephrostomy tube.  Imaging overall though unremarkable and reassuring.  Patient does not appear toxic or septic.  Plan at this time is dose of IV ceftriaxone here in the ER and discharged home with prescription for home use of antibiotics while urine culture pending.  Patient feels comfortable with this plan and all of her questions have been answered.  Nephrostomy tube itself looks great without any obvious signs for infection\"    WBC WNL at admission  Creatinine elevated to 1.26  CRP elevated to 24.90    CT chest/ab/pelvis (5/8/2023)   IMPRESSION:  1.  No bowel obstruction or abscess.  2.  Right percutaneous nephrostomy tube.  3.  Right renal calculi.  4.  Appearance similar to prior.    XR KUB (5/12/2023)  IMPRESSION: Large body habitus.  Lung bases are clear.   Coiled right lower percutaneous nephrostomy tube again noted. Right, intrarenal calculi are again noted but better characterized on the recent CT. Largest measure approximately 3 to 4 mm.  Bowel has pattern is unremarkable. No abnormally dilated loops of bowel. Minimal stool burden.         Past medical history  Past Medical History:   Diagnosis Date     Acute renal failure (H)      Anemia      Anemia      Calculus of kidney      Congenital absence of left kidney      Congenital absence of one kidney      " Depression      Depression      Hydronephrosis      Kidney stone     at age 29     Pyelonephritis      Pyelonephritis      Smoker      Ureteral stricture      UTI (urinary tract infection)      Wrist fracture     Left        Past surgical history  Past Surgical History:   Procedure Laterality Date      SECTION        SECTION      x1     COMBINED CYSTOSCOPY, RETROGRADES, URETEROSCOPY, LASER HOLMIUM LITHOTRIPSY URETER(S), INSERT STENT Right 2016    Procedure: COMBINED CYSTOSCOPY, RETROGRADES, URETEROSCOPY, LASER HOLMIUM LITHOTRIPSY URETER(S), INSERT STENT;  Surgeon: Florentino Awad MD;  Location: [facility]     CYSTOSCOPY, URETEROSCOPY, COMBINED Right 2016    Procedure: COMBINED CYSTOSCOPY, URETEROSCOPY;  Surgeon: Florentino Awad MD;  Location: [facility] OR     IR NEPHROLITHOTOMY  2014     IR NEPHROSTOGRAM EXISITING ACCESS  10/18/2018     IR NEPHROSTOMY TUBE CHANGE RIGHT  2018     IR NEPHROSTOMY TUBE CHANGE RIGHT  2020     IR NEPHROSTOMY TUBE CHANGE RIGHT  2018     IR NEPHROSTOMY TUBE CHANGE RIGHT  2020     IR NEPHROSTOMY TUBE PLACEMENT RIGHT  2016     IR NEPHROSTOMY TUBE PLACEMENT RIGHT  2017     KIDNEY STONE SURGERY Right 2016     LASER HOLMIUM LITHOTRIPSY URETER(S), INSERT STENT, COMBINED Left 2016    Procedure: COMBINED CYSTOSCOPY, URETEROSCOPY, LASER HOLMIUM LITHOTRIPSY URETER(S), INSERT STENT;  Surgeon: Florentino Awad MD;  Location: [facility] OR     LASER HOLMIUM NEPHROLITHOTOMY VIA PERCUTANEOUS NEPHROSTOMY Right 2016    Procedure: LASER HOLMIUM NEPHROLITHOTOMY VIA PERCUTANEOUS NEPHROSTOMY;  Surgeon: Florentino Awad MD;  Location: [facility] OR     NEPHROSTOMY W/ INTRODUCTION OF CATHETER Right      OTHER SURGICAL HISTORY      Mole removednasal     OTHER SURGICAL HISTORY Right     Cystoscopy, ureteroscopy, laser11/02/2014 x2 with ureteral stent insertion     PERCUTANEOUS NEPHROSTOMY  2016    Procedure: PERCUTANEOUS NEPHROSTOMY;   Surgeon: Florentino Awad MD;  Location: [facility] OR     WISDOM TOOTH EXTRACTION       ZZC REMOVAL OF KIDNEY STONE          Social history  Social History     Socioeconomic History     Marital status: Single     Spouse name: Not on file     Number of children: Not on file     Years of education: Not on file     Highest education level: Not on file   Occupational History     Not on file   Tobacco Use     Smoking status: Every Day     Packs/day: 0.50     Years: 10.00     Pack years: 5.00     Types: Cigarettes     Last attempt to quit: 2016     Years since quittin.6     Smokeless tobacco: Former     Quit date: 2016     Tobacco comments:     has information from last admission.   Vaping Use     Vaping status: Not on file   Substance and Sexual Activity     Alcohol use: Yes     Alcohol/week: 0.0 standard drinks of alcohol     Comment: Alcoholic Drinks/day: occ     Drug use: No     Sexual activity: Not on file   Other Topics Concern     Not on file   Social History Narrative     Not on file     Social Determinants of Health     Financial Resource Strain: Not on file   Food Insecurity: Not on file   Transportation Needs: Not on file   Physical Activity: Not on file   Stress: Not on file   Social Connections: Not on file   Intimate Partner Violence: Not on file   Housing Stability: Not on file       Medications    Current Facility-Administered Medications:      acetaminophen (TYLENOL) tablet 650 mg, 650 mg, Oral, Q4H PRN, Lanie Wells MD, 650 mg at 23 1055     cefTRIAXone (ROCEPHIN) 1 g vial to attach to  mL bag for ADULTS or NS 50 mL bag for PEDS, 1 g, Intravenous, Q24H, Lanie Wells MD     HYDROmorphone (PF) (DILAUDID) injection 0.5 mg, 0.5 mg, Intravenous, Q3H PRN, Lanie Wells MD, 0.5 mg at 23 1055     lidocaine (LMX4) cream, , Topical, Q1H PRN, Lanie Wells MD     lidocaine 1 % 0.1-1 mL, 0.1-1 mL, Other, Q1H PRN, Lanie Wells MD     naloxone (NARCAN) injection 0.2 mg,  0.2 mg, Intravenous, Q2 Min PRN **OR** naloxone (NARCAN) injection 0.4 mg, 0.4 mg, Intravenous, Q2 Min PRN **OR** naloxone (NARCAN) injection 0.2 mg, 0.2 mg, Intramuscular, Q2 Min PRN **OR** naloxone (NARCAN) injection 0.4 mg, 0.4 mg, Intramuscular, Q2 Min PRN, Lanie Wells MD     ondansetron (ZOFRAN ODT) ODT tab 4 mg, 4 mg, Oral, Q6H PRN **OR** ondansetron (ZOFRAN) injection 4 mg, 4 mg, Intravenous, Q6H PRN, Lanie Wells MD     ondansetron (ZOFRAN) injection 4 mg, 4 mg, Intravenous, Q30 Min PRN, WallAdi MD, 4 mg at 05/12/23 0020     sodium chloride (PF) 0.9% PF flush 3 mL, 3 mL, Intracatheter, Q8H, Lanie Wells MD, 3 mL at 05/12/23 0946     sodium chloride (PF) 0.9% PF flush 3 mL, 3 mL, Intracatheter, q1 min prn, Lanie Wells MD     sodium chloride 0.9% infusion, , Intravenous, Continuous, Lanie Wells MD, Last Rate: 100 mL/hr at 05/12/23 1057, 100 mL/hr at 05/12/23 1057    Current Outpatient Medications:      albuterol (ACCUNEB) 1.25 MG/3ML neb solution, Take 1.25 mg by nebulization every 6 hours as needed for shortness of breath / dyspnea or wheezing, Disp: , Rfl:      sertraline (ZOLOFT) 100 MG tablet, Take 100 mg by mouth At Bedtime, Disp: , Rfl:     Allergies  Allergies   Allergen Reactions     Vancomycin      Desogestrel-Ethinyl Estradiol Angioedema and Swelling     Swelling of hands and feet per pt.  Swelling of hands and feet per pt.  Swelling of hands and feet per pt.  Swelling of hands and feet per pt.  Swelling of hands and feet per pt.  Swelling of hands and feet per pt.  Swelling of hands and feet per pt.  Swelling of hands and feet per pt.       Penicillins Rash     As a child per mother; mother unsure if has tolerated another PCN since. Tolerated ampicillin 9/20/16     Sulfa Antibiotics Rash        Review of systems:   A full 12 point review of systems was taken and is negative aside from what is noted above in the HPI.     Physical exam:  BP 99/63 (BP  Location: Left arm)   Pulse 69   Temp 98.1  F (36.7  C) (Oral)   Resp 20   Ht 1.524 m (5')   Wt 81.6 kg (180 lb)   LMP 04/17/2023 (Approximate)   SpO2 94%   BMI 35.15 kg/m     General: NAD, alert, cooperative  Head: normocephalic, without abnormality / atraumatic  Abdomen: soft, nontender, nondistended. no suprapubic fullness/tenderness. CVA tenderness noted   Geniturinary: Right nephrostomy tube draining clear yellow urine. No surrounding erythema noted around access site.   Skin: no rashes or lesions  Musculoskeletal: moves all four extremities equally; no calf edema or tenderness  Psychological: alert and oriented, answers questions appropriately    LABS:   Lab Results   Component Value Date    WBC 6.4 05/12/2023    HGB 12.9 05/12/2023    HCT 39.4 05/12/2023     05/12/2023    ALT 17 05/11/2023    AST 25 05/11/2023     05/12/2023    BUN 10.8 05/12/2023    CO2 22 05/12/2023    INR 0.94 12/30/2018       Lab Results   Component Value Date    UROBILINOGEN <2.0 mg/dL 06/16/2021    NITRITE Negative 05/11/2023    BACTERIA Few (A) 05/11/2023       Cultures:  Urine Culture: pending    Blood Culture: in process    Lab Results: personally reviewed     IMAGING:    CT chest/ab/pelvis (5/8/2023)   IMPRESSION:  1.  No bowel obstruction or abscess.  2.  Right percutaneous nephrostomy tube.  3.  Right renal calculi.  4.  Appearance similar to prior.    XR KUB (5/12/2023)  IMPRESSION: Large body habitus.  Lung bases are clear.   Coiled right lower percutaneous nephrostomy tube again noted. Right, intrarenal calculi are again noted but better characterized on the recent CT. Largest measure approximately 3 to 4 mm.  Bowel has pattern is unremarkable. No abnormally dilated loops of bowel. Minimal stool burden.          I have personally reviewed the imaging reports above.     ASSESSMENT/PLAN:  Jacqueline Pappas is being seen by [facility] for pyelonephritis     - UC/BC pending - continue with broad spectrum  antibiotics until culture and sensitivities return    - continue with supportive cares including; hydration, antipyretics and analgesics per primary team   - failure to respond to appropriate therapy within 72 hours requires repeat imaging to rule out obstruction, abscess and other abnormalities  - Nephrostomy tube appears to be draining well.   - She has known nephrolithiasis which is likely putting her an increase risk of recurring UTIs along with the nephrostomy tube. She states that her primary Urology team has recommended against stone treatment at this time  - Her last Nephrostomy tube as done in March. States she is due at the end of June of her next exchange.   - Could consider increasing her exchanges to every 2 months to try and prevent an infection  - Will monitor to ensure she is improving, otherwise will refer to IR for neph tube exchange     This case was discussed with:  Dr. Bernal     Thank you for consulting [facility] regarding this patient's care. Please contact us with questions or concerns.     Lizzette Peralta PA-C  [facility]   [phone]

## 2023-05-12 NOTE — PLAN OF CARE
Problem: Plan of Care - These are the overarching goals to be used throughout the patient stay.    Goal: Optimal Comfort and Wellbeing  5/12/2023 0949 by Constantino Brunson, RN  Outcome: Progressing  5/12/2023 0949 by Constantino Brunson, RN  Outcome: Progressing  Intervention: Monitor Pain and Promote Comfort  Recent Flowsheet Documentation  Taken 5/12/2023 0746 by Constantino Brunson, RN  Pain Management Interventions: medication (see MAR)     Problem: Plan of Care - These are the overarching goals to be used throughout the patient stay.    Goal: Optimal Comfort and Wellbeing  Intervention: Monitor Pain and Promote Comfort  Recent Flowsheet Documentation  Taken 5/12/2023 0746 by Constantino Brunson, RN  Pain Management Interventions: medication (see MAR)   Goal Outcome Evaluation:       Pt's right flank pain managed with PRN IV dilaudid. Pt reported that pain improved from 8/10 to 6/10. Pt is alert and oriented and able to make concerns known.   Constantino Brunson RN  5/12/2023  9:50 AM

## 2023-05-13 LAB — BACTERIA UR CULT: NORMAL

## 2023-05-13 PROCEDURE — 99231 SBSQ HOSP IP/OBS SF/LOW 25: CPT | Performed by: STUDENT IN AN ORGANIZED HEALTH CARE EDUCATION/TRAINING PROGRAM

## 2023-05-13 PROCEDURE — 250N000011 HC RX IP 250 OP 636: Performed by: FAMILY MEDICINE

## 2023-05-13 PROCEDURE — 258N000003 HC RX IP 258 OP 636: Performed by: FAMILY MEDICINE

## 2023-05-13 PROCEDURE — 120N000001 HC R&B MED SURG/OB

## 2023-05-13 PROCEDURE — 250N000013 HC RX MED GY IP 250 OP 250 PS 637: Performed by: STUDENT IN AN ORGANIZED HEALTH CARE EDUCATION/TRAINING PROGRAM

## 2023-05-13 PROCEDURE — 250N000013 HC RX MED GY IP 250 OP 250 PS 637: Performed by: FAMILY MEDICINE

## 2023-05-13 RX ORDER — PHENAZOPYRIDINE HYDROCHLORIDE 100 MG/1
100 TABLET, FILM COATED ORAL
Status: DISCONTINUED | OUTPATIENT
Start: 2023-05-13 | End: 2023-05-14 | Stop reason: HOSPADM

## 2023-05-13 RX ORDER — ACETAMINOPHEN 325 MG/1
975 TABLET ORAL 3 TIMES DAILY
Status: DISCONTINUED | OUTPATIENT
Start: 2023-05-13 | End: 2023-05-14 | Stop reason: HOSPADM

## 2023-05-13 RX ADMIN — ACETAMINOPHEN 975 MG: 325 TABLET ORAL at 20:47

## 2023-05-13 RX ADMIN — CEFTRIAXONE SODIUM 1 G: 1 INJECTION, POWDER, FOR SOLUTION INTRAMUSCULAR; INTRAVENOUS at 21:05

## 2023-05-13 RX ADMIN — SODIUM CHLORIDE: 9 INJECTION, SOLUTION INTRAVENOUS at 09:16

## 2023-05-13 RX ADMIN — ACETAMINOPHEN 975 MG: 325 TABLET ORAL at 15:12

## 2023-05-13 RX ADMIN — PHENAZOPYRIDINE 100 MG: 100 TABLET ORAL at 17:57

## 2023-05-13 RX ADMIN — ACETAMINOPHEN 650 MG: 325 TABLET, FILM COATED ORAL at 10:00

## 2023-05-13 RX ADMIN — SERTRALINE HYDROCHLORIDE 100 MG: 50 TABLET ORAL at 20:47

## 2023-05-13 ASSESSMENT — ACTIVITIES OF DAILY LIVING (ADL)
ADLS_ACUITY_SCORE: 18

## 2023-05-13 NOTE — PROGRESS NOTES
Patient lives alone and is alert,oriented and independent with ADLs. She identifies a good support system. She plans on going home at discharge . Family will transport. No needs identified.

## 2023-05-13 NOTE — PLAN OF CARE
Problem: Plan of Care - These are the overarching goals to be used throughout the patient stay.    Goal: Plan of Care Review  Description: The Plan of Care Review/Shift note should be completed every shift.  The Outcome Evaluation is a brief statement about your assessment that the patient is improving, declining, or no change.  This information will be displayed automatically on your shift note.  Outcome: Progressing   Goal Outcome Evaluation:  Plan of care discussed. Will continue to monitor lab values.  Problem: Plan of Care - These are the overarching goals to be used throughout the patient stay.    Goal: Optimal Comfort and Wellbeing  Outcome: Progressing  Intervention: Monitor Pain and Promote Comfort  Recent Flowsheet Documentation  Taken 5/13/2023 0811 by Jolene Snyder, RN  Pain Management Interventions: medication (see MAR)   Flank pain improved. Enc. To be oob more.

## 2023-05-13 NOTE — PLAN OF CARE
Problem: Infection  Goal: Absence of Infection Signs and Symptoms  Outcome: Progressing     Problem: Plan of Care - These are the overarching goals to be used throughout the patient stay.    Goal: Absence of Hospital-Acquired Illness or Injury  Intervention: Prevent Skin Injury  Recent Flowsheet Documentation  Taken 5/13/2023 0000 by Maggie Peters RN  Body Position: position changed independently   Goal Outcome Evaluation:  Pt c/o of pain 8/10 to the right flank and nausea. PRN IV dilaudid and Zofran was given NS infusing @100ml/hr. No concerns overnight.

## 2023-05-13 NOTE — PROGRESS NOTES
[facility]    Medicine Progress Note - Hospitalist Service    Date of Admission:  5/11/2023    Assessment & Plan   Jacqueline Pappas is a 38 year old female with PMH significant for congenital solitary kidney, Right nephrostomy tube, recurrent UTI/pyelonephritis and chronic pain who is  admitted on 5/12/2023 with UTI/ possible pyelonephritis.     1. UTI/ Possible Pyelonephritis  -Patient eating and drinking, stop IVF   -Continue IV ceftriaxone, await cultures   -Pain control: tylenol TID and Dilaudid PO and IV available   -Await to hear from urology if neph tube exchange needed     2. Right Flank Pain  -CT abd/pel from 5/8/2023 shows multiple renal cysts and nephrolithiasis.   -IV abx as above      3.GT  -Mild; Hx of solitary kidney  -Avoid nephrotoxic agents  -Trend improving, will recheck after stopping IVF     4. Hypercalcemia- resolved       Diet: Combination Diet Regular Diet Adult    DVT Prophylaxis: Ambulate every shift  Farr Catheter: Not present  Lines: None     Cardiac Monitoring: None  Code Status: Full Code      Clinically Significant Risk Factors Present on Admission                       # Obesity: Estimated body mass index is 39.01 kg/m  as calculated from the following:    Height as of this encounter: 1.524 m (5').    Weight as of this encounter: 90.6 kg (199 lb 11.8 oz).            Disposition Plan      Expected Discharge Date: 05/14/2023                  Argenis De Los Santos MD  Hospitalist Service  [facility]  Securely message with [employer] (more info)  Text page via [employer] Paging/Directory   ______________________________________________________________________    Interval History   Reports feeling significantly better, pain is tolerable but using pain meds, eating and drinking.     Physical Exam   Vital Signs: Temp: 98.5  F (36.9  C) Temp src: Oral BP: 114/57 Pulse: 70   Resp: 18 SpO2: 95 % O2 Device: None (Room air)    Weight: 199 lbs 11.79  oz    Physical Exam  Vitals reviewed.   Constitutional:       General: She is not in acute distress.     Appearance: She is obese.   HENT:      Head: Normocephalic and atraumatic.   Cardiovascular:      Rate and Rhythm: Normal rate and regular rhythm.   Pulmonary:      Effort: Pulmonary effort is normal.      Breath sounds: Normal breath sounds.   Abdominal:      Tenderness: There is right CVA tenderness (patient says its better than yesterday).   Neurological:      General: No focal deficit present.      Mental Status: She is alert and oriented to person, place, and time.         Medical Decision Making       30 MINUTES SPENT BY ME on the date of service doing chart review, history, exam, documentation & further activities per the note.      Data         Imaging results reviewed over the past 24 hrs:   No results found for this or any previous visit (from the past 24 hour(s)).

## 2023-05-14 VITALS
HEIGHT: 60 IN | OXYGEN SATURATION: 95 % | WEIGHT: 199.74 LBS | BODY MASS INDEX: 39.21 KG/M2 | TEMPERATURE: 98.5 F | RESPIRATION RATE: 16 BRPM | HEART RATE: 62 BPM | DIASTOLIC BLOOD PRESSURE: 59 MMHG | SYSTOLIC BLOOD PRESSURE: 107 MMHG

## 2023-05-14 LAB
ANION GAP SERPL CALCULATED.3IONS-SCNC: 10 MMOL/L (ref 7–15)
ATRIAL RATE - MUSE: 63 BPM
BACTERIA BLD CULT: NO GROWTH
BACTERIA BLD CULT: NO GROWTH
BUN SERPL-MCNC: 13 MG/DL (ref 6–20)
CALCIUM SERPL-MCNC: 10.2 MG/DL (ref 8.6–10)
CHLORIDE SERPL-SCNC: 108 MMOL/L (ref 98–107)
CREAT SERPL-MCNC: 0.88 MG/DL (ref 0.51–0.95)
DEPRECATED HCO3 PLAS-SCNC: 19 MMOL/L (ref 22–29)
DIASTOLIC BLOOD PRESSURE - MUSE: NORMAL MMHG
GFR SERPL CREATININE-BSD FRML MDRD: 87 ML/MIN/1.73M2
GLUCOSE SERPL-MCNC: 105 MG/DL (ref 70–99)
HOLD SPECIMEN: NORMAL
INTERPRETATION ECG - MUSE: NORMAL
P AXIS - MUSE: 20 DEGREES
POTASSIUM SERPL-SCNC: 4.1 MMOL/L (ref 3.4–5.3)
PR INTERVAL - MUSE: 126 MS
QRS DURATION - MUSE: 80 MS
QT - MUSE: 408 MS
QTC - MUSE: 417 MS
R AXIS - MUSE: 34 DEGREES
SODIUM SERPL-SCNC: 137 MMOL/L (ref 136–145)
SYSTOLIC BLOOD PRESSURE - MUSE: NORMAL MMHG
T AXIS - MUSE: 34 DEGREES
VENTRICULAR RATE- MUSE: 63 BPM

## 2023-05-14 PROCEDURE — 250N000013 HC RX MED GY IP 250 OP 250 PS 637: Performed by: STUDENT IN AN ORGANIZED HEALTH CARE EDUCATION/TRAINING PROGRAM

## 2023-05-14 PROCEDURE — 93005 ELECTROCARDIOGRAM TRACING: CPT

## 2023-05-14 PROCEDURE — 80048 BASIC METABOLIC PNL TOTAL CA: CPT | Performed by: STUDENT IN AN ORGANIZED HEALTH CARE EDUCATION/TRAINING PROGRAM

## 2023-05-14 PROCEDURE — 36415 COLL VENOUS BLD VENIPUNCTURE: CPT | Performed by: STUDENT IN AN ORGANIZED HEALTH CARE EDUCATION/TRAINING PROGRAM

## 2023-05-14 PROCEDURE — 93010 ELECTROCARDIOGRAM REPORT: CPT | Performed by: INTERNAL MEDICINE

## 2023-05-14 PROCEDURE — 93005 ELECTROCARDIOGRAM TRACING: CPT | Performed by: STUDENT IN AN ORGANIZED HEALTH CARE EDUCATION/TRAINING PROGRAM

## 2023-05-14 PROCEDURE — 99239 HOSP IP/OBS DSCHRG MGMT >30: CPT | Performed by: STUDENT IN AN ORGANIZED HEALTH CARE EDUCATION/TRAINING PROGRAM

## 2023-05-14 PROCEDURE — 250N000011 HC RX IP 250 OP 636: Performed by: STUDENT IN AN ORGANIZED HEALTH CARE EDUCATION/TRAINING PROGRAM

## 2023-05-14 RX ORDER — CEFTRIAXONE 1 G/1
1 INJECTION, POWDER, FOR SOLUTION INTRAMUSCULAR; INTRAVENOUS ONCE
Status: COMPLETED | OUTPATIENT
Start: 2023-05-14 | End: 2023-05-14

## 2023-05-14 RX ORDER — PHENAZOPYRIDINE HYDROCHLORIDE 100 MG/1
100 TABLET, FILM COATED ORAL
Qty: 30 TABLET | Refills: 0 | Status: SHIPPED | OUTPATIENT
Start: 2023-05-14 | End: 2023-05-14

## 2023-05-14 RX ORDER — LEVOFLOXACIN 750 MG/1
750 TABLET, FILM COATED ORAL DAILY
Qty: 10 TABLET | Refills: 0 | Status: SHIPPED | OUTPATIENT
Start: 2023-05-14 | End: 2023-05-24

## 2023-05-14 RX ADMIN — CEFTRIAXONE SODIUM 1 G: 1 INJECTION, POWDER, FOR SOLUTION INTRAMUSCULAR; INTRAVENOUS at 09:13

## 2023-05-14 RX ADMIN — ACETAMINOPHEN 975 MG: 325 TABLET ORAL at 09:13

## 2023-05-14 RX ADMIN — PHENAZOPYRIDINE 100 MG: 100 TABLET ORAL at 09:14

## 2023-05-14 ASSESSMENT — ACTIVITIES OF DAILY LIVING (ADL)
ADLS_ACUITY_SCORE: 18

## 2023-05-14 NOTE — PLAN OF CARE
Problem: Plan of Care - These are the overarching goals to be used throughout the patient stay.    Goal: Plan of Care Review  Description: The Plan of Care Review/Shift note should be completed every shift.  The Outcome Evaluation is a brief statement about your assessment that the patient is improving, declining, or no change.  This information will be displayed automatically on your shift note.  Outcome: Progressing     Problem: Plan of Care - These are the overarching goals to be used throughout the patient stay.    Goal: Absence of Hospital-Acquired Illness or Injury  Outcome: Progressing  Intervention: Prevent Skin Injury  Recent Flowsheet Documentation  Taken 5/13/2023 2045 by Ricki Campbell RN  Body Position: position changed independently     Problem: Plan of Care - These are the overarching goals to be used throughout the patient stay.    Goal: Optimal Comfort and Wellbeing  Outcome: Progressing  Intervention: Monitor Pain and Promote Comfort  Recent Flowsheet Documentation  Taken 5/13/2023 2045 by Ricki Campbell RN  Pain Management Interventions: medication (see MAR)     Problem: Plan of Care - These are the overarching goals to be used throughout the patient stay.    Goal: Readiness for Transition of Care  Outcome: Progressing   Goal Outcome Evaluation:

## 2023-05-14 NOTE — DISCHARGE SUMMARY
[facility]  Hospitalist Discharge Summary      Date of Admission:  5/11/2023  Date of Discharge:  5/14/2023  Discharging Provider: Argenis De Los Santos MD  Discharge Service: Hospitalist Service    Discharge Diagnoses    Pyelonephritis  GT   Hypercalcemia     Clinically Significant Risk Factors     # Obesity: Estimated body mass index is 39.01 kg/m  as calculated from the following:    Height as of this encounter: 1.524 m (5').    Weight as of this encounter: 90.6 kg (199 lb 11.8 oz).       Follow-ups Needed After Discharge   -F/u with urology for further management    Unresulted Labs Ordered in the Past 30 Days of this Admission     Date and Time Order Name Status Description    5/12/2023  1:43 AM Blood Culture Line, venous Preliminary       These results will be followed up by PCP    Discharge Disposition   Discharged to home  Condition at discharge: Stable    Hospital Course   Jacqueline Pappas is a 38 year old female with PMH significant for congenital solitary kidney, Right nephrostomy tube, recurrent UTI/pyelonephritis and chronic pain who is  admitted on 5/12/2023 with UTI/ possible pyelonephritis.     Right Pyelonephritis   -Tolerating PO diet    -Ucultures from this admisson with mixture of urogenital christie however culture from 5/8 with gram positive bacilli but that was the final result.   -Symptoms improved with IV antibiotics so discharged with 10 more days of levofloxacin (QTc 417)  -Evaluated by urology, recommends considering more frequent nephrostomy changes to mitigate infections     GT  -Mild; Hx of solitary kidney  -Resolved with IVF     Hypercalcemia- resolved    Consultations This Hospital Stay   UROLOGY IP CONSULT    Code Status   Full Code    Time Spent on this Encounter   I, Argenis De Los Santos MD, personally saw the patient today and spent greater than 30 minutes discharging this patient.       Argenis De Los Santos MD  [facility]  [address]  [facility] [address]  Phone: [phone]  Fax: [phone]  ______________________________________________________________________    Physical Exam   Vital Signs: Temp: 98.5  F (36.9  C) Temp src: Oral BP: 107/59 Pulse: 62   Resp: 16 SpO2: 95 % O2 Device: None (Room air)    Weight: 199 lbs 11.79 oz  Vitals reviewed.   Constitutional:       General: She is not in acute distress.     Appearance: She is obese.   HENT:      Head: Normocephalic and atraumatic.   Cardiovascular:      Rate and Rhythm: Normal rate and regular rhythm.   Pulmonary:      Effort: Pulmonary effort is normal.      Breath sounds: Normal breath sounds.   Abdominal:      Tenderness: There is no CVA tenderness  Neurological:      General: No focal deficit present.      Mental Status: She is alert and oriented to person, place, and time.            Primary Care Physician   Pao Montague    Discharge Orders      Reason for your hospital stay    You presented with pain and found to have a urinary infection. Given your complex kidney history please take the 10 days of antibiotics as prescribed. Follow up with your urologist and discuss if more frequent tube changes might be beneficial for you.     Activity    Your activity upon discharge: activity as tolerated     Follow-up and recommended labs and tests     Follow up with primary care provider, Pao Montague, within 7 days for hospital follow- up.  No follow up labs or test are needed.     Diet    Follow this diet upon discharge: Orders Placed This Encounter      Combination Diet Regular Diet Adult       Significant Results and Procedures   Most Recent 3 CBC's:Recent Labs   Lab Test 05/12/23  0517 05/11/23  2355 05/08/23  2225   WBC 6.4 8.4 9.0   HGB 12.9 13.5 15.2   MCV 90 90 89    158 182     Most Recent 3 BMP's:Recent Labs   Lab Test 05/14/23  0653 05/12/23  0517 05/11/23  2355    138 136   POTASSIUM 4.1 4.1 3.8   CHLORIDE 108* 107 104   CO2 19* 22 22   BUN 13.0 10.8  12.2   CR 0.88 1.13* 1.26*   ANIONGAP 10 9 10   LUKAS 10.2* 9.5 10.1*   * 104* 115*   ,   Results for orders placed or performed during the hospital encounter of 05/11/23   XR Chest 2 Views    Narrative    EXAM: XR CHEST 2 VIEWS  LOCATION: [facility]  DATE/TIME: 5/12/2023 12:38 AM CDT    INDICATION: H o pyelonephritis but now having cough too.  COMPARISON: 04/10/2022.    FINDINGS: The heart size is normal. Mild bibasilar atelectasis and scarring. The lungs are otherwise clear. There is no pneumothorax or pleural effusion.      Impression    IMPRESSION: No acute abnormality.   XR Abdomen Port 1 View    Narrative    EXAM: XR ABDOMEN PORT 1 VIEW  LOCATION: [facility]  DATE/TIME: 5/12/2023 8:44 AM CDT    INDICATION: right flank pain  COMPARISON: CT 05/08/2023 and multiple prior studies.      Impression    IMPRESSION: Large body habitus.    Lung bases are clear.     Coiled right lower percutaneous nephrostomy tube again noted. Right, intrarenal calculi are again noted but better characterized on the recent CT. Largest measure approximately 3 to 4 mm.    Bowel has pattern is unremarkable. No abnormally dilated loops of bowel. Minimal stool burden.             Discharge Medications   Discharge Medication List as of 5/14/2023 10:19 AM      START taking these medications    Details   levofloxacin (LEVAQUIN) 750 MG tablet Take 1 tablet (750 mg) by mouth daily for 10 days, Disp-10 tablet, R-0, Local Print         CONTINUE these medications which have NOT CHANGED    Details   albuterol (ACCUNEB) 1.25 MG/3ML neb solution Take 1.25 mg by nebulization every 6 hours as needed for shortness of breath / dyspnea or wheezing, Historical      sertraline (ZOLOFT) 100 MG tablet Take 100 mg by mouth At Bedtime, Historical         STOP taking these medications       phenazopyridine (PYRIDIUM) 100 MG tablet Comments:   Reason for Stopping:             Allergies   Allergies   Allergen  Reactions     Vancomycin      Desogestrel-Ethinyl Estradiol Angioedema and Swelling     Swelling of hands and feet per pt.  Swelling of hands and feet per pt.  Swelling of hands and feet per pt.  Swelling of hands and feet per pt.  Swelling of hands and feet per pt.  Swelling of hands and feet per pt.  Swelling of hands and feet per pt.  Swelling of hands and feet per pt.       Penicillins Rash     As a child per mother; mother unsure if has tolerated another PCN since. Tolerated ampicillin 9/20/16     Sulfa Antibiotics Rash

## 2023-05-14 NOTE — PLAN OF CARE
"Goal Outcome Evaluation: Pt is a/o x 4. Cooperative with cares. Pt denied pain at this time, Will continued to monitor.  Vital signs:  Temp: 98.4  F (36.9  C) Temp src: Oral BP: 136/64 Pulse: 74   Resp: 18 SpO2: 96 % O2 Device: None (Room air)   Height: 152.4 cm (5') Weight: 90.6 kg (199 lb 11.8 oz)  Estimated body mass index is 39.01 kg/m  as calculated from the following:    Height as of this encounter: 1.524 m (5').    Weight as of this encounter: 90.6 kg (199 lb 11.8 oz).    Problem: Plan of Care - These are the overarching goals to be used throughout the patient stay.    Goal: Patient-Specific Goal (Individualized)  Description: You can add care plan individualizations to a care plan. Examples of Individualization might be:  \"Parent requests to be called daily at 9am for status\", \"I have a hard time hearing out of my right ear\", or \"Do not touch me to wake me up as it startles me\".  5/14/2023 0437 by Ricki Campbell RN  Outcome: Progressing  5/13/2023 2142 by Ricki Campbell RN  Outcome: Progressing     Problem: Plan of Care - These are the overarching goals to be used throughout the patient stay.    Goal: Plan of Care Review  Description: The Plan of Care Review/Shift note should be completed every shift.  The Outcome Evaluation is a brief statement about your assessment that the patient is improving, declining, or no change.  This information will be displayed automatically on your shift note.  5/14/2023 0437 by Ricki Campbell RN  Outcome: Progressing  5/13/2023 2142 by Ricki Campbell RN  Outcome: Progressing     Problem: Plan of Care - These are the overarching goals to be used throughout the patient stay.    Goal: Absence of Hospital-Acquired Illness or Injury  Intervention: Identify and Manage Fall Risk  Recent Flowsheet Documentation  Taken 5/13/2023 8481 by Ricki Campbell RN  Safety Promotion/Fall Prevention: safety round/check completed     Problem: Plan of Care - These are the " overarching goals to be used throughout the patient stay.    Goal: Absence of Hospital-Acquired Illness or Injury  Intervention: Prevent Skin Injury  Recent Flowsheet Documentation  Taken 5/13/2023 2326 by Ricki Campbell RN  Body Position: position changed independently  Taken 5/13/2023 2045 by Ricki Campbell RN  Body Position: position changed independently

## 2023-05-14 NOTE — PROGRESS NOTES
To Whom it May Concern,      Jacqueline Pappas was admitted to [facility] on 5/11/2023 and discharged on 5/14/2023.  The patient is medically stable to return to work on Friday, 5/19/23.      If questions, please call Dr. Salomon at Cell Phone [phone].      Sincerely,      Dr. Phoenix Salomon  [facility]

## 2023-05-16 ENCOUNTER — PATIENT OUTREACH (OUTPATIENT)
Dept: CARE COORDINATION | Facility: CLINIC | Age: 38
End: 2023-05-16
Payer: COMMERCIAL

## 2023-05-16 NOTE — PROGRESS NOTES
Connected Care Resource Center Contact  [facility]/Voicemail     Clinical Data: Transitional Care Management Outreach     Outreach attempted x 2.  Left message on patient's voicemail, providing [facility]'s 24/7 scheduling and nurse triage phone number [phone] ([phone]) for questions/concerns and/or to schedule an appt with an [facility] provider, if they do not have a PCP.      Plan:  [facility] will do no further outreaches at this time.       Karen Wyman  Connected Care Resource Center, [facility]    *Connected Care Resource Team does NOT follow patient ongoing. Referrals are identified based on internal discharge reports and the outreach is to ensure patient has an understanding of their discharge instructions.

## 2023-05-17 LAB — BACTERIA BLD CULT: NO GROWTH

## 2023-06-23 ENCOUNTER — HOSPITAL ENCOUNTER (EMERGENCY)
Facility: HOSPITAL | Age: 38
Discharge: HOME | End: 2023-06-23
Attending: STUDENT IN AN ORGANIZED HEALTH CARE EDUCATION/TRAINING PROGRAM | Admitting: STUDENT IN AN ORGANIZED HEALTH CARE EDUCATION/TRAINING PROGRAM
Payer: COMMERCIAL

## 2023-06-23 ENCOUNTER — APPOINTMENT (OUTPATIENT)
Dept: CT IMAGING | Facility: HOSPITAL | Age: 38
End: 2023-06-23
Attending: STUDENT IN AN ORGANIZED HEALTH CARE EDUCATION/TRAINING PROGRAM
Payer: COMMERCIAL

## 2023-06-23 VITALS
TEMPERATURE: 98 F | WEIGHT: 190 LBS | DIASTOLIC BLOOD PRESSURE: 59 MMHG | RESPIRATION RATE: 16 BRPM | OXYGEN SATURATION: 100 % | SYSTOLIC BLOOD PRESSURE: 105 MMHG | HEART RATE: 59 BPM | BODY MASS INDEX: 37.3 KG/M2 | HEIGHT: 60 IN

## 2023-06-23 DIAGNOSIS — R10.9 RIGHT FLANK PAIN: Primary | ICD-10-CM

## 2023-06-23 LAB
ALBUMIN SERPL BCG-MCNC: 3.8 G/DL (ref 3.5–5.2)
ALBUMIN UR-MCNC: 70 MG/DL
ALP SERPL-CCNC: 84 U/L (ref 35–104)
ALT SERPL W P-5'-P-CCNC: 17 U/L (ref 0–50)
ANION GAP SERPL CALCULATED.3IONS-SCNC: 12 MMOL/L (ref 7–15)
APPEARANCE UR: CLEAR
AST SERPL W P-5'-P-CCNC: 20 U/L (ref 0–45)
BACTERIA #/AREA URNS HPF: ABNORMAL /HPF
BASOPHILS # BLD AUTO: 0 10E3/UL (ref 0–0.2)
BASOPHILS NFR BLD AUTO: 1 %
BILIRUB SERPL-MCNC: 0.2 MG/DL
BILIRUB UR QL STRIP: NEGATIVE
BUN SERPL-MCNC: 16.1 MG/DL (ref 6–20)
CALCIUM SERPL-MCNC: 10 MG/DL (ref 8.6–10)
CHLORIDE SERPL-SCNC: 104 MMOL/L (ref 98–107)
COLOR UR AUTO: ABNORMAL
CREAT SERPL-MCNC: 1.04 MG/DL (ref 0.51–0.95)
DEPRECATED HCO3 PLAS-SCNC: 20 MMOL/L (ref 22–29)
EOSINOPHIL # BLD AUTO: 0.3 10E3/UL (ref 0–0.7)
EOSINOPHIL NFR BLD AUTO: 4 %
ERYTHROCYTE [DISTWIDTH] IN BLOOD BY AUTOMATED COUNT: 14 % (ref 10–15)
GFR SERPL CREATININE-BSD FRML MDRD: 71 ML/MIN/1.73M2
GLUCOSE SERPL-MCNC: 119 MG/DL (ref 70–99)
GLUCOSE UR STRIP-MCNC: NEGATIVE MG/DL
HCG UR QL: NEGATIVE
HCT VFR BLD AUTO: 42.2 % (ref 35–47)
HGB BLD-MCNC: 14 G/DL (ref 11.7–15.7)
HGB UR QL STRIP: ABNORMAL
HYALINE CASTS: 3 /LPF
IMM GRANULOCYTES # BLD: 0.1 10E3/UL
IMM GRANULOCYTES NFR BLD: 1 %
KETONES UR STRIP-MCNC: NEGATIVE MG/DL
LACTATE SERPL-SCNC: 0.8 MMOL/L (ref 0.7–2)
LACTATE SERPL-SCNC: 2.6 MMOL/L (ref 0.7–2)
LEUKOCYTE ESTERASE UR QL STRIP: ABNORMAL
LYMPHOCYTES # BLD AUTO: 1.6 10E3/UL (ref 0.8–5.3)
LYMPHOCYTES NFR BLD AUTO: 19 %
MCH RBC QN AUTO: 30 PG (ref 26.5–33)
MCHC RBC AUTO-ENTMCNC: 33.2 G/DL (ref 31.5–36.5)
MCV RBC AUTO: 90 FL (ref 78–100)
MONOCYTES # BLD AUTO: 0.6 10E3/UL (ref 0–1.3)
MONOCYTES NFR BLD AUTO: 7 %
MUCOUS THREADS #/AREA URNS LPF: PRESENT /LPF
NEUTROPHILS # BLD AUTO: 5.8 10E3/UL (ref 1.6–8.3)
NEUTROPHILS NFR BLD AUTO: 68 %
NITRATE UR QL: NEGATIVE
NRBC # BLD AUTO: 0 10E3/UL
NRBC BLD AUTO-RTO: 0 /100
PH UR STRIP: 6.5 [PH] (ref 5–7)
PLATELET # BLD AUTO: 173 10E3/UL (ref 150–450)
POTASSIUM SERPL-SCNC: 3.5 MMOL/L (ref 3.4–5.3)
PROT SERPL-MCNC: 6.5 G/DL (ref 6.4–8.3)
RBC # BLD AUTO: 4.67 10E6/UL (ref 3.8–5.2)
RBC URINE: 5 /HPF
SODIUM SERPL-SCNC: 136 MMOL/L (ref 136–145)
SP GR UR STRIP: 1.02 (ref 1–1.03)
SQUAMOUS EPITHELIAL: 1 /HPF
UROBILINOGEN UR STRIP-MCNC: <2 MG/DL
WBC # BLD AUTO: 8.4 10E3/UL (ref 4–11)
WBC URINE: 5 /HPF

## 2023-06-23 PROCEDURE — 250N000011 HC RX IP 250 OP 636: Mod: JZ | Performed by: STUDENT IN AN ORGANIZED HEALTH CARE EDUCATION/TRAINING PROGRAM

## 2023-06-23 PROCEDURE — 85025 COMPLETE CBC W/AUTO DIFF WBC: CPT | Performed by: STUDENT IN AN ORGANIZED HEALTH CARE EDUCATION/TRAINING PROGRAM

## 2023-06-23 PROCEDURE — 74177 CT ABD & PELVIS W/CONTRAST: CPT

## 2023-06-23 PROCEDURE — 99285 EMERGENCY DEPT VISIT HI MDM: CPT | Mod: 25

## 2023-06-23 PROCEDURE — 87088 URINE BACTERIA CULTURE: CPT | Performed by: STUDENT IN AN ORGANIZED HEALTH CARE EDUCATION/TRAINING PROGRAM

## 2023-06-23 PROCEDURE — 250N000013 HC RX MED GY IP 250 OP 250 PS 637: Performed by: STUDENT IN AN ORGANIZED HEALTH CARE EDUCATION/TRAINING PROGRAM

## 2023-06-23 PROCEDURE — 96374 THER/PROPH/DIAG INJ IV PUSH: CPT | Mod: 59

## 2023-06-23 PROCEDURE — 80053 COMPREHEN METABOLIC PANEL: CPT | Performed by: STUDENT IN AN ORGANIZED HEALTH CARE EDUCATION/TRAINING PROGRAM

## 2023-06-23 PROCEDURE — 96361 HYDRATE IV INFUSION ADD-ON: CPT

## 2023-06-23 PROCEDURE — 81001 URINALYSIS AUTO W/SCOPE: CPT | Performed by: STUDENT IN AN ORGANIZED HEALTH CARE EDUCATION/TRAINING PROGRAM

## 2023-06-23 PROCEDURE — 81025 URINE PREGNANCY TEST: CPT | Performed by: STUDENT IN AN ORGANIZED HEALTH CARE EDUCATION/TRAINING PROGRAM

## 2023-06-23 PROCEDURE — 87040 BLOOD CULTURE FOR BACTERIA: CPT | Performed by: STUDENT IN AN ORGANIZED HEALTH CARE EDUCATION/TRAINING PROGRAM

## 2023-06-23 PROCEDURE — 258N000003 HC RX IP 258 OP 636: Performed by: STUDENT IN AN ORGANIZED HEALTH CARE EDUCATION/TRAINING PROGRAM

## 2023-06-23 PROCEDURE — 83605 ASSAY OF LACTIC ACID: CPT | Performed by: STUDENT IN AN ORGANIZED HEALTH CARE EDUCATION/TRAINING PROGRAM

## 2023-06-23 PROCEDURE — 36415 COLL VENOUS BLD VENIPUNCTURE: CPT | Performed by: STUDENT IN AN ORGANIZED HEALTH CARE EDUCATION/TRAINING PROGRAM

## 2023-06-23 RX ORDER — ONDANSETRON 2 MG/ML
4 INJECTION INTRAMUSCULAR; INTRAVENOUS ONCE
Status: COMPLETED | OUTPATIENT
Start: 2023-06-23 | End: 2023-06-23

## 2023-06-23 RX ORDER — OXYCODONE HYDROCHLORIDE 5 MG/1
5 TABLET ORAL ONCE
Status: COMPLETED | OUTPATIENT
Start: 2023-06-23 | End: 2023-06-23

## 2023-06-23 RX ORDER — IOPAMIDOL 755 MG/ML
90 INJECTION, SOLUTION INTRAVASCULAR ONCE
Status: COMPLETED | OUTPATIENT
Start: 2023-06-23 | End: 2023-06-23

## 2023-06-23 RX ADMIN — SODIUM CHLORIDE 1000 ML: 9 INJECTION, SOLUTION INTRAVENOUS at 08:28

## 2023-06-23 RX ADMIN — SODIUM CHLORIDE 1000 ML: 9 INJECTION, SOLUTION INTRAVENOUS at 06:53

## 2023-06-23 RX ADMIN — IOPAMIDOL 90 ML: 755 INJECTION, SOLUTION INTRAVENOUS at 07:49

## 2023-06-23 RX ADMIN — ONDANSETRON 4 MG: 2 INJECTION INTRAMUSCULAR; INTRAVENOUS at 07:49

## 2023-06-23 RX ADMIN — OXYCODONE HYDROCHLORIDE 5 MG: 5 TABLET ORAL at 06:52

## 2023-06-23 ASSESSMENT — ACTIVITIES OF DAILY LIVING (ADL)
ADLS_ACUITY_SCORE: 35
ADLS_ACUITY_SCORE: 35

## 2023-06-23 NOTE — ED TRIAGE NOTES
Pt has a nephrostomy tube on the right side that she has had for the past 7 years. It gets changed every 3 months. Pt just had it changed last Thursday and since then she has been having worsening pain in her right flank. Also reports frequent urination since.

## 2023-06-23 NOTE — DISCHARGE INSTRUCTIONS
Your scan results today were reassuring  You will be notified if the urine or blood cultures grow anything  Continue to follow closely with your primary care doctor and urology team  Return to the Emergency Room with fever, significant increase in pain or other worsening symptoms or concerns

## 2023-06-23 NOTE — ED PROVIDER NOTES
NAME: Jacqueline Pappas  AGE: 38 year old female  YOB: 1985  MRN: [id number]  EVALUATION DATE & TIME: 2023  6:14 AM    PCP: Pao Montague  ED PROVIDER: Ingrid Mina MD.    Chief Complaint   Patient presents with     Flank Pain       FINAL IMPRESSION:  1. Right flank pain        MEDICAL DECISION MAKIN:22 AM I met with the patient, obtained history, performed an initial exam, and discussed options and plan for diagnostics and treatment here in the ED.   8:27 AM I reevaluated and updated the patient.  10:09 AM I discharged the patient.     Jacqueline Pappas is a 38 year old female with PMH significant for congenital solitary kidney, right nephrostomy tube, recurrent UTI/pyelonephritis and chronic pain who presents with right flank pain. Reviewed admission in May for pyelonephritis - u cultures grew mixture of urogenital christie, cultures from  grew gram positive bacilli, discharged on levofloxacin and urology recommended more frequent nephrostomy changes.  Had IR nephrostomy tube exchange at [facility] on 6/15/23. Here with worsening right flank pain and some urinary frequency.    Reviewed patient's care plan. Her UA often shows sign of infection with negative cultures. She has mild lactic acid elevation at 2.6, which improved with fluids to 0.8. UA shows leuk esterase, but no bacteria, nitrites and normal WBC count. With this, no fevers, no leukocytosis and does not meet SIRs criteria I have low suspicion for sepsis or pyelonephritis and discussed with patient holding on antibiotics and sending urine culture which she is in agreement. CT abd/pelvis without acute findings. No chest pain or shortness of breath- do not suspect ACS, PE, dissection. She was given fluids, oxycodone, zofran with improvement in symptoms and she feels ready to go home. Discharged with close outpatient follow up. Strict return precautions discussed and patient is in agreement with plan, endorses understanding and  her questions were answered.    Medical Decision Making    History:    Supplemental history from: Documented in chart, if applicable    External Record(s) reviewed: Documented in chart, if applicable.    Work Up:    Chart documentation includes differential considered and any EKGs or imaging interpreted by provider.    In additional to work up documented, I considered the following work up: Documented in chart, if applicable.    External consultation:    Discussion of management with another provider: Documented in chart, if applicable    Complicating factors:    Care impacted by chronic illness: Chronic Kidney Disease and Smoking / Nicotine Use    Care affected by social determinants of health: N/A    Disposition considerations: Discharge. No recommendations on prescription strength medication(s). I considered admission, but ultimately discharged patient given reassuring vitals, CT and improvement in symptoms.      MEDICATIONS GIVEN IN THE EMERGENCY:  Medications   oxyCODONE (ROXICODONE) tablet 5 mg (5 mg Oral $Given 6/23/23 0652)   0.9% sodium chloride BOLUS (0 mLs Intravenous Stopped 6/23/23 0826)   ondansetron (ZOFRAN) injection 4 mg (4 mg Intravenous $Given 6/23/23 0749)   iopamidol (ISOVUE-370) solution 90 mL (90 mLs Intravenous $Given 6/23/23 0749)   0.9% sodium chloride BOLUS (1,000 mLs Intravenous $New Bag 6/23/23 0828)       NEW PRESCRIPTIONS STARTED AT TODAY'S ER VISIT:  New Prescriptions    No medications on file        =================================================================  [facility]    Patient information was obtained from: the patient  Use of : N/A       Jacqueline Pappas is a 38 year old female with a past medical history of congenital solitary kidney, right nephrostomy tube, recurrent UTI/pyleonephritis, and chronic pain who presents flank pain.    Per chart review, the patient was admitted to the hospital on 5/11/23 and was discharged on 5/14/23. The patient was admitted due to pain  and a urinary infection. Urology evaluated the patient and recommended more frequent nephrostomy changes to mitigate infections. She was discharged with levofloxacin 750 mg (1 tablet by mouth daily for 10 days).    Patient visited the [facility] on 6/15 for her last nephrostomy tube exchange.     The patient came in for right flank pain. She has a nephrostomy tube on her right side that gets changed every 3 months. She had it recently changed last Thursday and states that she has been having soreness on her right side longer than usual. The patient notes that she has been urinating more frequently and that her urine has an odor. The patient endorses diarrhea. She denies hematuria, cough, chest pain, vomiting, or any other issues at this time.    REVIEW OF SYSTEMS   All systems reviewed, please see HPI for pertinent findings    PAST MEDICAL HISTORY:  Past Medical History:   Diagnosis Date     Acute renal failure (H)      Anemia      Anemia      Calculus of kidney      Congenital absence of left kidney      Congenital absence of one kidney      Depression      Depression      Hydronephrosis      Kidney stone     at age 29     Pyelonephritis      Pyelonephritis      Smoker      Ureteral stricture      UTI (urinary tract infection)      Wrist fracture     Left       PAST SURGICAL HISTORY:  Past Surgical History:   Procedure Laterality Date      SECTION        SECTION      x1     COMBINED CYSTOSCOPY, RETROGRADES, URETEROSCOPY, LASER HOLMIUM LITHOTRIPSY URETER(S), INSERT STENT Right 2016    Procedure: COMBINED CYSTOSCOPY, RETROGRADES, URETEROSCOPY, LASER HOLMIUM LITHOTRIPSY URETER(S), INSERT STENT;  Surgeon: Florentino Awad MD;  Location: [facility] OR     CYSTOSCOPY, URETEROSCOPY, COMBINED Right 2016    Procedure: COMBINED CYSTOSCOPY, URETEROSCOPY;  Surgeon: Florentino Awad MD;  Location: [facility] OR     IR NEPHROLITHOTOMY  2014     IR NEPHROSTOGRAM EXISITING ACCESS  10/18/2018     IR  NEPHROSTOMY TUBE CHANGE RIGHT  12/12/2018     IR NEPHROSTOMY TUBE CHANGE RIGHT  6/11/2020     IR NEPHROSTOMY TUBE CHANGE RIGHT  12/12/2018     IR NEPHROSTOMY TUBE CHANGE RIGHT  6/11/2020     IR NEPHROSTOMY TUBE PLACEMENT RIGHT  7/24/2016     IR NEPHROSTOMY TUBE PLACEMENT RIGHT  9/14/2017     KIDNEY STONE SURGERY Right 05/2016     LASER HOLMIUM LITHOTRIPSY URETER(S), INSERT STENT, COMBINED Left 11/1/2016    Procedure: COMBINED CYSTOSCOPY, URETEROSCOPY, LASER HOLMIUM LITHOTRIPSY URETER(S), INSERT STENT;  Surgeon: Florentino Awad MD;  Location: [facility] OR     LASER HOLMIUM NEPHROLITHOTOMY VIA PERCUTANEOUS NEPHROSTOMY Right 9/14/2016    Procedure: LASER HOLMIUM NEPHROLITHOTOMY VIA PERCUTANEOUS NEPHROSTOMY;  Surgeon: Florentino Awad MD;  Location: [facility] OR     NEPHROSTOMY W/ INTRODUCTION OF CATHETER Right      OTHER SURGICAL HISTORY      Mole removednasal     OTHER SURGICAL HISTORY Right     Cystoscopy, ureteroscopy, laser11/02/2014 x2 with ureteral stent insertion     PERCUTANEOUS NEPHROSTOMY  11/1/2016    Procedure: PERCUTANEOUS NEPHROSTOMY;  Surgeon: Florentino Awad MD;  Location: [facility] OR     WISDOM TOOTH EXTRACTION       ZZC REMOVAL OF KIDNEY STONE         CURRENT MEDICATIONS:    No current facility-administered medications for this encounter.    Current Outpatient Medications:      albuterol (ACCUNEB) 1.25 MG/3ML neb solution, Take 1.25 mg by nebulization every 6 hours as needed for shortness of breath / dyspnea or wheezing, Disp: , Rfl:      sertraline (ZOLOFT) 100 MG tablet, Take 100 mg by mouth At Bedtime, Disp: , Rfl:     ALLERGIES:  Allergies   Allergen Reactions     Vancomycin      Desogestrel-Ethinyl Estradiol Angioedema and Swelling     Swelling of hands and feet per pt.  Swelling of hands and feet per pt.  Swelling of hands and feet per pt.  Swelling of hands and feet per pt.  Swelling of hands and feet per pt.  Swelling of hands and feet per pt.  Swelling of hands and feet per pt.  Swelling of  hands and feet per pt.       Penicillins Rash     As a child per mother; mother unsure if has tolerated another PCN since. Tolerated ampicillin 16     Sulfa Antibiotics Rash       FAMILY HISTORY:  Family History   Problem Relation Age of Onset     Anesthesia Reaction No family hx of      Malignant Hyperthermia No family hx of      Heart Disease Maternal Uncle         heart failure     Coronary Artery Disease Other      Heart Disease Maternal Grandmother         pacemaker     Gout Paternal Grandfather      Prostate Cancer Maternal Aunt         breast     Heart Disease Maternal Aunt         pacemaker     Heart Disease Maternal Aunt         pacemaker     Heart Disease Maternal Aunt         pacemaker       SOCIAL HISTORY:   Social History     Socioeconomic History     Marital status: Single   Tobacco Use     Smoking status: Every Day     Packs/day: 0.50     Years: 10.00     Pack years: 5.00     Types: Cigarettes     Last attempt to quit: 2016     Years since quittin.7     Smokeless tobacco: Former     Quit date: 2016     Tobacco comments:     has information from last admission.   Substance and Sexual Activity     Alcohol use: Yes     Alcohol/week: 0.0 standard drinks of alcohol     Comment: Alcoholic Drinks/day: occ     Drug use: No       PHYSICAL EXAM:    Vitals: /56   Pulse 59   Temp 98  F (36.7  C) (Oral)   Resp 16   Ht 1.524 m (5')   Wt 86.2 kg (190 lb)   LMP 2023 (Approximate)   SpO2 100%   Breastfeeding No   BMI 37.11 kg/m     Constitutional: Well developed, well nourished. Comfortable appearing.   HENT: Normocephalic, atraumatic, mucous membranes moist, nose normal  Eyes: Pupils mid range, sclera white, no discharge  Neck- Gross ROM intact.   Respiratory: Normal work of breathing, normal rate, speaks in full sentences  Cardiovascular: Normal heart rate and rhythm  Abdomen: soft, not distended, not tender. Right flank with tenderness around the nephrostomy tube, no  erythema/warmth/fluctance/drainage  Musculoskeletal: Moving all 4 extremities intentionally and without pain.  Neurologic: Alert & oriented, cranial nerves grossly intact. Speech clear. Moving all extremities.    LAB:  All pertinent labs reviewed and interpreted.  Labs Ordered and Resulted from Time of ED Arrival to Time of ED Departure   ROUTINE UA WITH MICROSCOPIC REFLEX TO CULTURE - Abnormal       Result Value    Color Urine Light Yellow      Appearance Urine Clear      Glucose Urine Negative      Bilirubin Urine Negative      Ketones Urine Negative      Specific Gravity Urine 1.016      Blood Urine 0.06 mg/dL (*)     pH Urine 6.5      Protein Albumin Urine 70 (*)     Urobilinogen Urine <2.0      Nitrite Urine Negative      Leukocyte Esterase Urine 500 Duglas/uL (*)     Bacteria Urine Few (*)     Mucus Urine Present (*)     RBC Urine 5 (*)     WBC Urine 5      Squamous Epithelials Urine 1      Hyaline Casts Urine 3 (*)    COMPREHENSIVE METABOLIC PANEL - Abnormal    Sodium 136      Potassium 3.5      Chloride 104      Carbon Dioxide (CO2) 20 (*)     Anion Gap 12      Urea Nitrogen 16.1      Creatinine 1.04 (*)     Calcium 10.0      Glucose 119 (*)     Alkaline Phosphatase 84      AST 20      ALT 17      Protein Total 6.5      Albumin 3.8      Bilirubin Total 0.2      GFR Estimate 71     LACTIC ACID WHOLE BLOOD - Abnormal    Lactic Acid 2.6 (*)    HCG QUALITATIVE URINE - Normal    hCG Urine Qualitative Negative     CBC WITH PLATELETS AND DIFFERENTIAL    WBC Count 8.4      RBC Count 4.67      Hemoglobin 14.0      Hematocrit 42.2      MCV 90      MCH 30.0      MCHC 33.2      RDW 14.0      Platelet Count 173      % Neutrophils 68      % Lymphocytes 19      % Monocytes 7      % Eosinophils 4      % Basophils 1      % Immature Granulocytes 1      NRBCs per 100 WBC 0      Absolute Neutrophils 5.8      Absolute Lymphocytes 1.6      Absolute Monocytes 0.6      Absolute Eosinophils 0.3      Absolute Basophils 0.0      Absolute  Immature Granulocytes 0.1      Absolute NRBCs 0.0     LACTIC ACID WHOLE BLOOD   BLOOD CULTURE   BLOOD CULTURE   URINE CULTURE       RADIOLOGY:  CT Abdomen Pelvis w Contrast   Final Result   IMPRESSION:    1.  No abnormalities are seen to explain pain.   2.  New right nephrostomy tube in place without any perinephric complications.   3.  Multiple right renal cysts with a few nonobstructing punctate renal calculi again noted.   4.  Mild splenomegaly is stable.   5.  Diminutive left kidney.          EKG:   N/A    PROCEDURES:   Procedures     I, Bob Martinez, am serving as a scribe to document services personally performed by Dr. Ingrid Mina based on my observation and the provider's statements to me. I, Ingrid Mina MD attest that Bob Martinez is acting in a scribe capacity, has observed my performance of the services and has documented them in accordance with my direction.    Ingrid Mina M.D.  Emergency Medicine  [facility] EMERGENCY DEPARTMENT  [address] [place] [address] [id number] [phone]  Dept: [phone]       Ingrid Mina MD  06/23/23 1012

## 2023-06-25 LAB
BACTERIA UR CULT: ABNORMAL
BACTERIA UR CULT: ABNORMAL

## 2023-06-28 LAB — BACTERIA BLD CULT: NO GROWTH

## 2023-07-08 ENCOUNTER — APPOINTMENT (OUTPATIENT)
Dept: CT IMAGING | Facility: HOSPITAL | Age: 38
End: 2023-07-08
Attending: EMERGENCY MEDICINE
Payer: MEDICAID

## 2023-07-08 ENCOUNTER — HEALTH MAINTENANCE LETTER (OUTPATIENT)
Age: 38
End: 2023-07-08

## 2023-07-08 ENCOUNTER — HOSPITAL ENCOUNTER (EMERGENCY)
Facility: HOSPITAL | Age: 38
Discharge: HOME | End: 2023-07-08
Attending: EMERGENCY MEDICINE | Admitting: EMERGENCY MEDICINE
Payer: MEDICAID

## 2023-07-08 VITALS
TEMPERATURE: 97.8 F | RESPIRATION RATE: 16 BRPM | OXYGEN SATURATION: 96 % | HEART RATE: 77 BPM | BODY MASS INDEX: 38.93 KG/M2 | HEIGHT: 60 IN | DIASTOLIC BLOOD PRESSURE: 66 MMHG | SYSTOLIC BLOOD PRESSURE: 115 MMHG | WEIGHT: 198.3 LBS

## 2023-07-08 DIAGNOSIS — N30.00 ACUTE CYSTITIS WITHOUT HEMATURIA: ICD-10-CM

## 2023-07-08 LAB
ALBUMIN UR-MCNC: 300 MG/DL
ANION GAP SERPL CALCULATED.3IONS-SCNC: 13 MMOL/L (ref 7–15)
APPEARANCE UR: ABNORMAL
BACTERIA #/AREA URNS HPF: ABNORMAL /HPF
BILIRUB UR QL STRIP: NEGATIVE
BUN SERPL-MCNC: 10.5 MG/DL (ref 6–20)
CALCIUM SERPL-MCNC: 10.5 MG/DL (ref 8.6–10)
CHLORIDE SERPL-SCNC: 103 MMOL/L (ref 98–107)
COLOR UR AUTO: ABNORMAL
CREAT SERPL-MCNC: 1.01 MG/DL (ref 0.51–0.95)
DEPRECATED HCO3 PLAS-SCNC: 22 MMOL/L (ref 22–29)
ERYTHROCYTE [DISTWIDTH] IN BLOOD BY AUTOMATED COUNT: 13.8 % (ref 10–15)
GFR SERPL CREATININE-BSD FRML MDRD: 74 ML/MIN/1.73M2
GLUCOSE SERPL-MCNC: 120 MG/DL (ref 70–99)
GLUCOSE UR STRIP-MCNC: NEGATIVE MG/DL
HCT VFR BLD AUTO: 43.4 % (ref 35–47)
HGB BLD-MCNC: 14.5 G/DL (ref 11.7–15.7)
HGB UR QL STRIP: ABNORMAL
KETONES UR STRIP-MCNC: NEGATIVE MG/DL
LEUKOCYTE ESTERASE UR QL STRIP: ABNORMAL
MCH RBC QN AUTO: 30.4 PG (ref 26.5–33)
MCHC RBC AUTO-ENTMCNC: 33.4 G/DL (ref 31.5–36.5)
MCV RBC AUTO: 91 FL (ref 78–100)
MUCOUS THREADS #/AREA URNS LPF: PRESENT /LPF
NITRATE UR QL: NEGATIVE
PH UR STRIP: 7 [PH] (ref 5–7)
PLATELET # BLD AUTO: 203 10E3/UL (ref 150–450)
POTASSIUM SERPL-SCNC: 3.5 MMOL/L (ref 3.4–5.3)
RBC # BLD AUTO: 4.77 10E6/UL (ref 3.8–5.2)
RBC URINE: 72 /HPF
SODIUM SERPL-SCNC: 138 MMOL/L (ref 136–145)
SP GR UR STRIP: 1.01 (ref 1–1.03)
UROBILINOGEN UR STRIP-MCNC: <2 MG/DL
WBC # BLD AUTO: 8.7 10E3/UL (ref 4–11)
WBC URINE: 35 /HPF

## 2023-07-08 PROCEDURE — 85027 COMPLETE CBC AUTOMATED: CPT | Performed by: EMERGENCY MEDICINE

## 2023-07-08 PROCEDURE — 258N000003 HC RX IP 258 OP 636: Performed by: EMERGENCY MEDICINE

## 2023-07-08 PROCEDURE — 96374 THER/PROPH/DIAG INJ IV PUSH: CPT

## 2023-07-08 PROCEDURE — 36415 COLL VENOUS BLD VENIPUNCTURE: CPT | Performed by: EMERGENCY MEDICINE

## 2023-07-08 PROCEDURE — 250N000011 HC RX IP 250 OP 636: Performed by: EMERGENCY MEDICINE

## 2023-07-08 PROCEDURE — 96376 TX/PRO/DX INJ SAME DRUG ADON: CPT

## 2023-07-08 PROCEDURE — 74176 CT ABD & PELVIS W/O CONTRAST: CPT

## 2023-07-08 PROCEDURE — 87086 URINE CULTURE/COLONY COUNT: CPT | Performed by: EMERGENCY MEDICINE

## 2023-07-08 PROCEDURE — 82947 ASSAY GLUCOSE BLOOD QUANT: CPT | Performed by: EMERGENCY MEDICINE

## 2023-07-08 PROCEDURE — 99285 EMERGENCY DEPT VISIT HI MDM: CPT | Mod: 25

## 2023-07-08 PROCEDURE — 81001 URINALYSIS AUTO W/SCOPE: CPT | Performed by: EMERGENCY MEDICINE

## 2023-07-08 PROCEDURE — 96375 TX/PRO/DX INJ NEW DRUG ADDON: CPT

## 2023-07-08 PROCEDURE — 96361 HYDRATE IV INFUSION ADD-ON: CPT

## 2023-07-08 RX ORDER — ONDANSETRON 2 MG/ML
4 INJECTION INTRAMUSCULAR; INTRAVENOUS ONCE
Status: COMPLETED | OUTPATIENT
Start: 2023-07-08 | End: 2023-07-08

## 2023-07-08 RX ORDER — MORPHINE SULFATE 4 MG/ML
4 INJECTION, SOLUTION INTRAMUSCULAR; INTRAVENOUS ONCE
Status: COMPLETED | OUTPATIENT
Start: 2023-07-08 | End: 2023-07-08

## 2023-07-08 RX ORDER — CEPHALEXIN 500 MG/1
500 CAPSULE ORAL 2 TIMES DAILY
Qty: 14 CAPSULE | Refills: 0 | Status: SHIPPED | OUTPATIENT
Start: 2023-07-08 | End: 2023-07-15

## 2023-07-08 RX ORDER — CEFTRIAXONE 1 G/1
1 INJECTION, POWDER, FOR SOLUTION INTRAMUSCULAR; INTRAVENOUS ONCE
Status: COMPLETED | OUTPATIENT
Start: 2023-07-08 | End: 2023-07-08

## 2023-07-08 RX ADMIN — MORPHINE SULFATE 4 MG: 4 INJECTION, SOLUTION INTRAMUSCULAR; INTRAVENOUS at 22:01

## 2023-07-08 RX ADMIN — CEFTRIAXONE SODIUM 1 G: 1 INJECTION, POWDER, FOR SOLUTION INTRAMUSCULAR; INTRAVENOUS at 22:54

## 2023-07-08 RX ADMIN — MORPHINE SULFATE 4 MG: 4 INJECTION, SOLUTION INTRAMUSCULAR; INTRAVENOUS at 22:52

## 2023-07-08 RX ADMIN — SODIUM CHLORIDE 500 ML: 9 INJECTION, SOLUTION INTRAVENOUS at 22:01

## 2023-07-08 RX ADMIN — ONDANSETRON 4 MG: 2 INJECTION INTRAMUSCULAR; INTRAVENOUS at 22:01

## 2023-07-08 ASSESSMENT — ACTIVITIES OF DAILY LIVING (ADL): ADLS_ACUITY_SCORE: 35

## 2023-07-09 NOTE — DISCHARGE INSTRUCTIONS
Your symptoms today appear to be due to a urinary tract infection.  Take the prescribed antibiotics as directed and follow-up closely as an outpatient.  Return to the emergency department for any worsening symptoms or other concerns.

## 2023-07-09 NOTE — ED TRIAGE NOTES
Patient reports that she was born with one kidney and has a nephrostomy tube. Patient has been having flank pain on that side since Wednesday along with nausea and chills.      Triage Assessment     Row Name 07/08/23 2122       Triage Assessment (Adult)    Airway WDL WDL       Respiratory WDL    Respiratory WDL WDL       Skin Circulation/Temperature WDL    Skin Circulation/Temperature WDL WDL       Cardiac WDL    Cardiac WDL WDL       Peripheral/Neurovascular WDL    Peripheral Neurovascular WDL WDL       Cognitive/Neuro/Behavioral WDL    Cognitive/Neuro/Behavioral WDL WDL

## 2023-07-09 NOTE — ED PROVIDER NOTES
EMERGENCY DEPARTMENT ENCOUnter      NAME: Jacqueline Pappas  AGE: 38 year old female  YOB: 1985  MRN: [id number]  EVALUATION DATE & TIME: 2023  9:24 PM    PCP: Pao Montague    ED PROVIDER: Jose D Enriquez DO      Chief Complaint   Patient presents with     Flank Pain         FINAL IMPRESSION:  1. Acute cystitis without hematuria          ED COURSE & MEDICAL DECISION MAKIN:40 PM Met with and introduced myself to the patient. Discussed history and plan of care.  10:44 PM Rechecked and updated patient on lab and imaging results. Discussed further plan of care.      The patient presented to the emergency department today complaining of right flank pain.  She has a solitary right kidney and a nephrostomy tube on that side.  She states that everything has been working normally.  She denies any other focal symptoms.  No concerning findings on physical exam.  Laboratory testing is notable only for signs of a urinary tract infection.  CT is unremarkable.  Given her well appearance I feel she can be safely discharged home.  She was given an additional IV dose of antibiotics here and will be prescribed additional oral antibiotics.  She has been instructed to return for any worsening symptoms or other concerns.  She is comfortable with this plan.      Medical Decision Making    History:    Supplemental history from: Documented in chart, if applicable    External Record(s) reviewed: Inpatient Record: Review from ED visit on 23    Work Up:    Chart documentation includes differential considered and any EKGs or imaging independently interpreted by provider, where specified.    In additional to work up documented, I considered the following work up: Documented in chart, if applicable.    External consultation:    Discussion of management with another provider: Documented in chart, if applicable    Complicating factors:    Care impacted by chronic illness: Other: kidney problems    Care affected by  social determinants of health: N/A    Disposition considerations: Discharge. I prescribed additional prescription strength medication(s) as charted. See documentation for any additional details.        At the conclusion of the encounter I discussed the results of all of the tests and the disposition. The questions were answered. The patient or family acknowledged understanding and was agreeable with the care plan.         MEDICATIONS GIVEN IN THE EMERGENCY:  Medications   ondansetron (ZOFRAN) injection 4 mg (4 mg Intravenous $Given 7/8/23 2201)   morphine (PF) injection 4 mg (4 mg Intravenous $Given 7/8/23 2201)   0.9% sodium chloride BOLUS (0 mLs Intravenous Stopped 7/8/23 2255)   cefTRIAXone (ROCEPHIN) 1 g vial to attach to  mL bag for ADULTS or NS 50 mL bag for PEDS (0 g Intravenous Stopped 7/8/23 2305)   morphine (PF) injection 4 mg (4 mg Intravenous $Given 7/8/23 2252)       NEW PRESCRIPTIONS STARTED AT TODAY'S ER VISIT  Discharge Medication List as of 7/8/2023 11:12 PM      START taking these medications    Details   cephALEXin (KEFLEX) 500 MG capsule Take 1 capsule (500 mg) by mouth 2 times daily for 7 days, Disp-14 capsule, R-0, E-Prescribe                =================================================================    HPI    Jacqueline Pappas is a 38 year old female with a pertinent history of tobacco abuse, congenital absence of one kidney, calculus of kidney, pyelonephritis, acute renal failure, UTI, hydronephrosis, and ureteral stricture who presents to this ED via walk in for evaluation of flank pain.    Per chart review, the patient presented to [facility] ED on 6/23/23 for evaluation of flank pain. Admission in May for pyelonephritis showed that u cultures grew mixture of urogenital christie and cultures from 5/8 grew gram positive bacilli. She was discharged on levofloxacin and urology recommended more frequent nephrostomy changes. She had IR nephrostomy tube exchange at [facility] on 6/15/23.  On , she had mild lactic acid elevation at 2.6, which improved with fluids to 0.8. UA showed leuk esterase, but no bacteria, nitrites and normal WBC count. With this, no fevers, no leukocytosis and does not meet SIRs criteria, there was low suspicion for sepsis or pyelonephritis. CT abd/pelvis without acute findings. She was given fluids, oxycodone, and zofran with improvement in symptoms Discharged with close outpatient follow up.    The patient reports right-sided flank pain that began on Wednesday (). Her symptoms since worsened last night when she developed associated nausea and diarrhea. She does not endorse a fever, but does note chills. The patient reports that she was born with one kidney on the right. She currently has a nephrostomy tube in place that is draining appropriately. Notes normal urine output, although she does endorse urinary frequency and a foul smelling odor. She has a history of chronic UTI's. No other medical problems noted. The patient has been taking Tylenol for pain, but this only provides temporary relief. No other concerns reported at this time.    PAST MEDICAL HISTORY:  Past Medical History:   Diagnosis Date     Acute renal failure (H)      Anemia      Anemia      Calculus of kidney      Congenital absence of left kidney      Congenital absence of one kidney      Depression      Depression      Hydronephrosis      Kidney stone     at age 29     Pyelonephritis      Pyelonephritis      Smoker      Ureteral stricture      UTI (urinary tract infection)      Wrist fracture     Left       PAST SURGICAL HISTORY:  Past Surgical History:   Procedure Laterality Date      SECTION        SECTION      x1     COMBINED CYSTOSCOPY, RETROGRADES, URETEROSCOPY, LASER HOLMIUM LITHOTRIPSY URETER(S), INSERT STENT Right 2016    Procedure: COMBINED CYSTOSCOPY, RETROGRADES, URETEROSCOPY, LASER HOLMIUM LITHOTRIPSY URETER(S), INSERT STENT;  Surgeon: Florentino Awad MD;   Location: [facility] OR     CYSTOSCOPY, URETEROSCOPY, COMBINED Right 9/14/2016    Procedure: COMBINED CYSTOSCOPY, URETEROSCOPY;  Surgeon: Florentino Awad MD;  Location: [facility] OR     IR NEPHROLITHOTOMY  12/11/2014     IR NEPHROSTOGRAM EXISITING ACCESS  10/18/2018     IR NEPHROSTOMY TUBE CHANGE RIGHT  12/12/2018     IR NEPHROSTOMY TUBE CHANGE RIGHT  6/11/2020     IR NEPHROSTOMY TUBE CHANGE RIGHT  12/12/2018     IR NEPHROSTOMY TUBE CHANGE RIGHT  6/11/2020     IR NEPHROSTOMY TUBE PLACEMENT RIGHT  7/24/2016     IR NEPHROSTOMY TUBE PLACEMENT RIGHT  9/14/2017     KIDNEY STONE SURGERY Right 05/2016     LASER HOLMIUM LITHOTRIPSY URETER(S), INSERT STENT, COMBINED Left 11/1/2016    Procedure: COMBINED CYSTOSCOPY, URETEROSCOPY, LASER HOLMIUM LITHOTRIPSY URETER(S), INSERT STENT;  Surgeon: Florentino Awad MD;  Location: [facility] OR     LASER HOLMIUM NEPHROLITHOTOMY VIA PERCUTANEOUS NEPHROSTOMY Right 9/14/2016    Procedure: LASER HOLMIUM NEPHROLITHOTOMY VIA PERCUTANEOUS NEPHROSTOMY;  Surgeon: Florentino Awad MD;  Location: [facility] OR     NEPHROSTOMY W/ INTRODUCTION OF CATHETER Right      OTHER SURGICAL HISTORY      Mole removednasal     OTHER SURGICAL HISTORY Right     Cystoscopy, ureteroscopy, laser11/02/2014 x2 with ureteral stent insertion     PERCUTANEOUS NEPHROSTOMY  11/1/2016    Procedure: PERCUTANEOUS NEPHROSTOMY;  Surgeon: Florentino Awad MD;  Location: [facility] OR     WISDOM TOOTH EXTRACTION       ZZC REMOVAL OF KIDNEY STONE             CURRENT MEDICATIONS:    cephALEXin (KEFLEX) 500 MG capsule  albuterol (ACCUNEB) 1.25 MG/3ML neb solution  sertraline (ZOLOFT) 100 MG tablet        ALLERGIES:  Allergies   Allergen Reactions     Vancomycin      Desogestrel-Ethinyl Estradiol Angioedema and Swelling     Swelling of hands and feet per pt.  Swelling of hands and feet per pt.  Swelling of hands and feet per pt.  Swelling of hands and feet per pt.  Swelling of hands and feet per pt.  Swelling of hands and feet per pt.  Swelling of  hands and feet per pt.  Swelling of hands and feet per pt.       Penicillins Rash     As a child per mother; mother unsure if has tolerated another PCN since. Tolerated ampicillin 18     Sulfa Antibiotics Rash       FAMILY HISTORY:  Family History   Problem Relation Age of Onset     Anesthesia Reaction No family hx of      Malignant Hyperthermia No family hx of      Heart Disease Maternal Uncle         heart failure     Coronary Artery Disease Other      Heart Disease Maternal Grandmother         pacemaker     Gout Paternal Grandfather      Prostate Cancer Maternal Aunt         breast     Heart Disease Maternal Aunt         pacemaker     Heart Disease Maternal Aunt         pacemaker     Heart Disease Maternal Aunt         pacemaker       SOCIAL HISTORY:   Social History     Socioeconomic History     Marital status: Single     Spouse name: None     Number of children: None     Years of education: None     Highest education level: None   Tobacco Use     Smoking status: Every Day     Packs/day: 0.50     Years: 10.00     Pack years: 5.00     Types: Cigarettes     Last attempt to quit: 2016     Years since quittin.7     Smokeless tobacco: Former     Quit date: 2016   Substance and Sexual Activity     Alcohol use: Yes     Alcohol/week: 0.0 standard drinks of alcohol     Comment: Alcoholic Drinks/day: occ     Drug use: No       VITALS:  Patient Vitals for the past 24 hrs:   BP Temp Temp src Pulse Resp SpO2 Height Weight   23 2300 115/66 -- -- 77 -- 96 % -- --   23 2230 112/66 -- -- 78 -- 97 % -- --   23 2205 130/74 -- -- 76 -- 98 % -- --   23 2123 (!) 148/80 -- -- 80 16 95 % -- --   23 -- 97.8  F (36.6  C) Axillary -- -- -- 1.524 m (5') 89.9 kg (198 lb 4.8 oz)       PHYSICAL EXAM    Constitutional:  Well developed, Well nourished,  HENT:  Normocephalic, Atraumatic, Oropharynx moist, Nose normal.   Eyes:  EOMI, Conjunctiva normal, No discharge.   Respiratory:  Normal  breath sounds, No respiratory distress, No wheezing, No chest tenderness.   Cardiovascular:  Normal heart rate, Normal rhythm, No murmurs  GI:  Soft, No guarding. Mild right CVA tenderness. Right sided nephrostomy tube in place.  Musculoskeletal:  No tenderness to palpation or major deformities noted.   Extremities: No lower extremity edema.  Neurologic:  Alert & oriented x 3, No focal deficits noted.   Psychiatric:  Affect normal, Judgment normal, Mood normal.        LAB:  All pertinent labs reviewed and interpreted.  Results for orders placed or performed during the hospital encounter of 07/08/23              UA with Microscopic reflex to Culture    Specimen: Urine, Midstream   Result Value Ref Range    Color Urine Light Yellow Colorless, Straw, Light Yellow, Yellow    Appearance Urine Turbid (A) Clear    Glucose Urine Negative Negative mg/dL    Bilirubin Urine Negative Negative    Ketones Urine Negative Negative mg/dL    Specific Gravity Urine 1.015 1.001 - 1.030    Blood Urine 0.2 mg/dL (A) Negative    pH Urine 7.0 5.0 - 7.0    Protein Albumin Urine 300 (A) Negative mg/dL    Urobilinogen Urine <2.0 <2.0 mg/dL    Nitrite Urine Negative Negative    Leukocyte Esterase Urine 500 Duglas/uL (A) Negative    Bacteria Urine Many (A) None Seen /HPF    Mucus Urine Present (A) None Seen /LPF    RBC Urine 72 (H) <=2 /HPF    WBC Urine 35 (H) <=5 /HPF   CBC with platelets   Result Value Ref Range    WBC Count 8.7 4.0 - 11.0 10e3/uL    RBC Count 4.77 3.80 - 5.20 10e6/uL    Hemoglobin 14.5 11.7 - 15.7 g/dL    Hematocrit 43.4 35.0 - 47.0 %    MCV 91 78 - 100 fL    MCH 30.4 26.5 - 33.0 pg    MCHC 33.4 31.5 - 36.5 g/dL    RDW 13.8 10.0 - 15.0 %    Platelet Count 203 150 - 450 10e3/uL   Basic metabolic panel   Result Value Ref Range    Sodium 138 136 - 145 mmol/L    Potassium 3.5 3.4 - 5.3 mmol/L    Chloride 103 98 - 107 mmol/L    Carbon Dioxide (CO2) 22 22 - 29 mmol/L    Anion Gap 13 7 - 15 mmol/L    Urea Nitrogen 10.5 6.0 - 20.0  mg/dL    Creatinine 1.01 (H) 0.51 - 0.95 mg/dL    Calcium 10.5 (H) 8.6 - 10.0 mg/dL    Glucose 120 (H) 70 - 99 mg/dL    GFR Estimate 74 >60 mL/min/1.73m2       RADIOLOGY:  I have independently reviewed and interpreted the above imaging, pending the final radiology read.  CT Abdomen Pelvis w/o Contrast   Final Result   IMPRESSION:    1.  No new abnormality to explain symptoms. No change in the position of percutaneous right nephrostomy tube, no new hydronephrosis.   2.  Multiple right renal cysts and stones unchanged.   3.  Atrophic left kidney.   4.  Bowel unremarkable.                 I, Karen Roy, am serving as a scribe to document services personally performed by Dr. Enriquez based on my observation and the provider's statements to me. I, Jose D Enriquez, DO attest that Karen Roy is acting in a scribe capacity, has observed my performance of the services and has documented them in accordance with my direction.    Jose D Enriquez DO  Emergency Medicine  [facility] EMERGENCY DEPARTMENT  [address] [place] [id number]  [phone]  Dept: [phone]     Jose D Enriquez MD  07/09/23 0057

## 2023-07-10 LAB — BACTERIA UR CULT: NORMAL

## 2023-08-18 ENCOUNTER — HOSPITAL ENCOUNTER (EMERGENCY)
Facility: CLINIC | Age: 38
Discharge: HOME | End: 2023-08-18
Attending: STUDENT IN AN ORGANIZED HEALTH CARE EDUCATION/TRAINING PROGRAM | Admitting: STUDENT IN AN ORGANIZED HEALTH CARE EDUCATION/TRAINING PROGRAM
Payer: MEDICAID

## 2023-08-18 ENCOUNTER — APPOINTMENT (OUTPATIENT)
Dept: CT IMAGING | Facility: CLINIC | Age: 38
End: 2023-08-18
Attending: STUDENT IN AN ORGANIZED HEALTH CARE EDUCATION/TRAINING PROGRAM
Payer: MEDICAID

## 2023-08-18 VITALS
WEIGHT: 195 LBS | BODY MASS INDEX: 36.82 KG/M2 | RESPIRATION RATE: 21 BRPM | SYSTOLIC BLOOD PRESSURE: 125 MMHG | OXYGEN SATURATION: 96 % | HEIGHT: 61 IN | DIASTOLIC BLOOD PRESSURE: 59 MMHG | HEART RATE: 58 BPM | TEMPERATURE: 98.1 F

## 2023-08-18 DIAGNOSIS — R07.9 CHEST PAIN, UNSPECIFIED TYPE: ICD-10-CM

## 2023-08-18 DIAGNOSIS — R10.9 ACUTE RIGHT FLANK PAIN: ICD-10-CM

## 2023-08-18 LAB
ALBUMIN SERPL-MCNC: 3.7 G/DL (ref 3.5–5)
ALBUMIN UR-MCNC: 200 MG/DL
ALP SERPL-CCNC: 76 U/L (ref 45–120)
ALT SERPL W P-5'-P-CCNC: 18 U/L (ref 0–45)
ANION GAP SERPL CALCULATED.3IONS-SCNC: 7 MMOL/L (ref 5–18)
APPEARANCE UR: ABNORMAL
AST SERPL W P-5'-P-CCNC: 20 U/L (ref 0–40)
BACTERIA #/AREA URNS HPF: ABNORMAL /HPF
BASOPHILS # BLD AUTO: 0 10E3/UL (ref 0–0.2)
BASOPHILS NFR BLD AUTO: 1 %
BILIRUB SERPL-MCNC: 0.3 MG/DL (ref 0–1)
BILIRUB UR QL STRIP: NEGATIVE
BUN SERPL-MCNC: 12 MG/DL (ref 8–22)
CALCIUM SERPL-MCNC: 10.1 MG/DL (ref 8.5–10.5)
CHLORIDE BLD-SCNC: 106 MMOL/L (ref 98–107)
CO2 SERPL-SCNC: 23 MMOL/L (ref 22–31)
COLOR UR AUTO: ABNORMAL
CREAT SERPL-MCNC: 0.88 MG/DL (ref 0.6–1.1)
EOSINOPHIL # BLD AUTO: 0.5 10E3/UL (ref 0–0.7)
EOSINOPHIL NFR BLD AUTO: 6 %
ERYTHROCYTE [DISTWIDTH] IN BLOOD BY AUTOMATED COUNT: 13.4 % (ref 10–15)
FLUAV RNA SPEC QL NAA+PROBE: NEGATIVE
FLUBV RNA RESP QL NAA+PROBE: NEGATIVE
GFR SERPL CREATININE-BSD FRML MDRD: 87 ML/MIN/1.73M2
GLUCOSE BLD-MCNC: 81 MG/DL (ref 70–125)
GLUCOSE UR STRIP-MCNC: NEGATIVE MG/DL
HCT VFR BLD AUTO: 42.4 % (ref 35–47)
HGB BLD-MCNC: 14.2 G/DL (ref 11.7–15.7)
HGB UR QL STRIP: ABNORMAL
IMM GRANULOCYTES # BLD: 0 10E3/UL
IMM GRANULOCYTES NFR BLD: 1 %
KETONES UR STRIP-MCNC: NEGATIVE MG/DL
LEUKOCYTE ESTERASE UR QL STRIP: ABNORMAL
LYMPHOCYTES # BLD AUTO: 1.8 10E3/UL (ref 0.8–5.3)
LYMPHOCYTES NFR BLD AUTO: 22 %
MCH RBC QN AUTO: 30 PG (ref 26.5–33)
MCHC RBC AUTO-ENTMCNC: 33.5 G/DL (ref 31.5–36.5)
MCV RBC AUTO: 90 FL (ref 78–100)
MONOCYTES # BLD AUTO: 0.6 10E3/UL (ref 0–1.3)
MONOCYTES NFR BLD AUTO: 7 %
MUCOUS THREADS #/AREA URNS LPF: PRESENT /LPF
NEUTROPHILS # BLD AUTO: 5.3 10E3/UL (ref 1.6–8.3)
NEUTROPHILS NFR BLD AUTO: 63 %
NITRATE UR QL: NEGATIVE
NRBC # BLD AUTO: 0 10E3/UL
NRBC BLD AUTO-RTO: 0 /100
PH UR STRIP: 8 [PH] (ref 5–7)
PLATELET # BLD AUTO: 186 10E3/UL (ref 150–450)
POTASSIUM BLD-SCNC: 4.2 MMOL/L (ref 3.5–5)
PROT SERPL-MCNC: 7 G/DL (ref 6–8)
RBC # BLD AUTO: 4.73 10E6/UL (ref 3.8–5.2)
RBC URINE: 32 /HPF
RSV RNA SPEC NAA+PROBE: NEGATIVE
SARS-COV-2 RNA RESP QL NAA+PROBE: NEGATIVE
SODIUM SERPL-SCNC: 136 MMOL/L (ref 136–145)
SP GR UR STRIP: 1.01 (ref 1–1.03)
SQUAMOUS EPITHELIAL: <1 /HPF
TRI-PHOS CRY #/AREA URNS HPF: ABNORMAL /HPF
TROPONIN I SERPL-MCNC: <0.01 NG/ML (ref 0–0.29)
TROPONIN I SERPL-MCNC: <0.01 NG/ML (ref 0–0.29)
UROBILINOGEN UR STRIP-MCNC: <2 MG/DL
WBC # BLD AUTO: 8.2 10E3/UL (ref 4–11)
WBC URINE: 20 /HPF

## 2023-08-18 PROCEDURE — 84484 ASSAY OF TROPONIN QUANT: CPT | Performed by: STUDENT IN AN ORGANIZED HEALTH CARE EDUCATION/TRAINING PROGRAM

## 2023-08-18 PROCEDURE — 250N000013 HC RX MED GY IP 250 OP 250 PS 637: Performed by: STUDENT IN AN ORGANIZED HEALTH CARE EDUCATION/TRAINING PROGRAM

## 2023-08-18 PROCEDURE — 99285 EMERGENCY DEPT VISIT HI MDM: CPT | Mod: 25

## 2023-08-18 PROCEDURE — 36415 COLL VENOUS BLD VENIPUNCTURE: CPT | Performed by: STUDENT IN AN ORGANIZED HEALTH CARE EDUCATION/TRAINING PROGRAM

## 2023-08-18 PROCEDURE — 81001 URINALYSIS AUTO W/SCOPE: CPT | Performed by: STUDENT IN AN ORGANIZED HEALTH CARE EDUCATION/TRAINING PROGRAM

## 2023-08-18 PROCEDURE — 96361 HYDRATE IV INFUSION ADD-ON: CPT

## 2023-08-18 PROCEDURE — 80053 COMPREHEN METABOLIC PANEL: CPT | Performed by: STUDENT IN AN ORGANIZED HEALTH CARE EDUCATION/TRAINING PROGRAM

## 2023-08-18 PROCEDURE — 250N000011 HC RX IP 250 OP 636: Mod: JZ | Performed by: STUDENT IN AN ORGANIZED HEALTH CARE EDUCATION/TRAINING PROGRAM

## 2023-08-18 PROCEDURE — 85014 HEMATOCRIT: CPT | Performed by: STUDENT IN AN ORGANIZED HEALTH CARE EDUCATION/TRAINING PROGRAM

## 2023-08-18 PROCEDURE — 74174 CTA ABD&PLVS W/CONTRAST: CPT

## 2023-08-18 PROCEDURE — 258N000003 HC RX IP 258 OP 636: Performed by: STUDENT IN AN ORGANIZED HEALTH CARE EDUCATION/TRAINING PROGRAM

## 2023-08-18 PROCEDURE — 87637 SARSCOV2&INF A&B&RSV AMP PRB: CPT | Performed by: STUDENT IN AN ORGANIZED HEALTH CARE EDUCATION/TRAINING PROGRAM

## 2023-08-18 PROCEDURE — 87086 URINE CULTURE/COLONY COUNT: CPT | Performed by: STUDENT IN AN ORGANIZED HEALTH CARE EDUCATION/TRAINING PROGRAM

## 2023-08-18 PROCEDURE — 93005 ELECTROCARDIOGRAM TRACING: CPT | Performed by: EMERGENCY MEDICINE

## 2023-08-18 PROCEDURE — 96360 HYDRATION IV INFUSION INIT: CPT | Mod: 59

## 2023-08-18 RX ORDER — IOPAMIDOL 755 MG/ML
100 INJECTION, SOLUTION INTRAVASCULAR ONCE
Status: COMPLETED | OUTPATIENT
Start: 2023-08-18 | End: 2023-08-18

## 2023-08-18 RX ORDER — CEPHALEXIN 500 MG/1
500 CAPSULE ORAL 4 TIMES DAILY
Qty: 40 CAPSULE | Refills: 0 | Status: SHIPPED | OUTPATIENT
Start: 2023-08-18 | End: 2023-08-28

## 2023-08-18 RX ORDER — OXYCODONE HYDROCHLORIDE 5 MG/1
5 TABLET ORAL ONCE
Status: COMPLETED | OUTPATIENT
Start: 2023-08-18 | End: 2023-08-18

## 2023-08-18 RX ADMIN — IOPAMIDOL 100 ML: 755 INJECTION, SOLUTION INTRAVENOUS at 09:45

## 2023-08-18 RX ADMIN — SODIUM CHLORIDE 1000 ML: 9 INJECTION, SOLUTION INTRAVENOUS at 08:46

## 2023-08-18 RX ADMIN — OXYCODONE HYDROCHLORIDE 5 MG: 5 TABLET ORAL at 09:15

## 2023-08-18 ASSESSMENT — ACTIVITIES OF DAILY LIVING (ADL)
ADLS_ACUITY_SCORE: 35

## 2023-08-18 NOTE — ED TRIAGE NOTES
Patient has chest pain near sternum, left breast and radiates to right. Right sided flank pain. 1 native kidney, nephrostomy on right. Changes in urine clarity and odor reported. Subjective fevers, had tylenol last dose 0500.    Triage Assessment       Row Name 08/18/23 0803       Triage Assessment (Adult)    Airway WDL WDL       Respiratory WDL    Respiratory WDL X;cough    Cough Frequency infrequent       Skin Circulation/Temperature WDL    Skin Circulation/Temperature WDL WDL       Cardiac WDL    Cardiac WDL X;chest pain       Chest Pain Assessment    Chest Pain Location midsternal    Chest Pain Radiation abdomen

## 2023-08-18 NOTE — DISCHARGE INSTRUCTIONS
Please take antibiotics as prescribed  Follow up closely with your primary care provider  Return to the Emergency Room with new/worsening chest or abdominal pain, difficulty breathing, fever, or other worsening symptoms or concerns

## 2023-08-18 NOTE — ED PROVIDER NOTES
NAME: Jacqueline Pappas  AGE: 38 year old female  YOB: 1985  MRN: [id number]  EVALUATION DATE & TIME: 2023  7:57 AM    PCP: Pao Montague  ED PROVIDER: Ingrid Mina MD.    Chief Complaint   Patient presents with    Chest Pain    Flank Pain       FINAL IMPRESSION:  1. Acute right flank pain    2. Chest pain, unspecified type        MEDICAL DECISION MAKIN:12 AM I met with the patient, obtained history, performed an initial exam, and discussed options and plan for diagnostics and treatment here in the ED.   10:55 AM Updated the patient about imaging and lab results.     37 y/o F with h/o solitary kidney, right nephrostomy tube, recurrent UTI, chronic pain who presents with chest pain, right flank pain, body aches.  The chest pain started last night and has been intermittent, it is not exertional.  It is located underneath her left breast and is somewhat reproducible on exam.  Differential includes but not limited to dissection, PE, ACS, pneumonia, viral infection, kidney stone, UTI, pyelonephritis, among others.  Her EKG is sinus rhythm without concerning ischemic changes, troponin reassuring x 2, overall low suspicion for ACS. CTA chest/abd/pelvis without dissection or PE. There are some findings of nonspecific bronchiolitis, no infiltrate or effusion. Right nephrostomy is stable, no hydronephrosis. Covid/influenza/RSV negative. Lab work is reassuring. UA shows findings of infection (though it often does with negative cultures) - she would like to proceed with oral antibiotics (doesn't want IV dose here), urine culture is pending. With reassuring vitals and no leukocytosis do not suspect sepsis. Her pain was improved after fluids and dose of oxycodone. Recommended close outpatient follow up. Strict return precautions discussed and patient is in agreement with plan, endorses understanding and her questions were answered.    Medical Decision Making    History:  Supplemental history from:  Documented in chart, if applicable  External Record(s) reviewed: Documented in chart, if applicable.    Work Up:  Chart documentation includes differential considered and any EKGs or imaging interpreted by provider.  In additional to work up documented, I considered the following work up: Documented in chart, if applicable.    External consultation:  Discussion of management with another provider: Documented in chart, if applicable    Complicating factors:  Care impacted by chronic illness: Chronic Kidney Disease  Care affected by social determinants of health: N/A    Disposition considerations: Discharge. I prescribed additional prescription strength medication(s) as charted. I considered admission, but ultimately discharged patient given reassuring imaging, labs, vitals and improvement in symptoms.      MEDICATIONS GIVEN IN THE EMERGENCY:  Medications   0.9% sodium chloride BOLUS (1,000 mLs Intravenous $New Bag 8/18/23 0846)   oxyCODONE (ROXICODONE) tablet 5 mg (5 mg Oral $Given 8/18/23 0915)   iopamidol (ISOVUE-370) solution 100 mL (100 mLs Intravenous $Given 8/18/23 0945)       NEW PRESCRIPTIONS STARTED AT TODAY'S ER VISIT:  New Prescriptions    No medications on file        =================================================================  HPI    Patient information was obtained from: Patient   Use of : N/A       Jacqueline Pappas is a 38 year old female with a past medical history of tobacco abuse, congenital absence of one kidney, calculus of kidney, pyelonephritis, acute renal failure, UTI, hydronephrosis, and ureteral stricture, who presents via walk in for evaluation of chest pain and flank pain.     Yesterday, the patient woke up from a nap and started experiencing body aches and headache. She states have runny nose and cough. She later started having sharp chest pain located to left chest and radiates across to the right chest. The chest pain comes and goes. Pain is not worse with exertion. When  the pain comes, she has difficulty breathing. She took Tylenol for the pain with minimal relief. She endorses right sided flank pain and has 1 native kidney on the right, this is similar to pain she has had in the past. She currently has a nephrostomy tube  on the right side in place that is draining appropriately. She gets nephrostomy tube changed every 3 months. Last change was in 2023. Lately, she notice the urine odor has been stronger then usual. She denies any fever, chills, dysuria, hematuria, nausea, vomiting, diarrhea, or any other medical concerns at the moment.     REVIEW OF SYSTEMS   All systems reviewed, please see HPI for pertinent findings    PAST MEDICAL HISTORY:  Past Medical History:   Diagnosis Date    Acute renal failure (H)     Anemia     Anemia     Calculus of kidney     Congenital absence of left kidney     Congenital absence of one kidney     Depression     Depression     Hydronephrosis     Kidney stone     at age 29    Pyelonephritis     Pyelonephritis     Smoker     Ureteral stricture     UTI (urinary tract infection)     Wrist fracture     Left       PAST SURGICAL HISTORY:  Past Surgical History:   Procedure Laterality Date     SECTION       SECTION      x1    COMBINED CYSTOSCOPY, RETROGRADES, URETEROSCOPY, LASER HOLMIUM LITHOTRIPSY URETER(S), INSERT STENT Right 2016    Procedure: COMBINED CYSTOSCOPY, RETROGRADES, URETEROSCOPY, LASER HOLMIUM LITHOTRIPSY URETER(S), INSERT STENT;  Surgeon: Florentino Awad MD;  Location: [facility]    CYSTOSCOPY, URETEROSCOPY, COMBINED Right 2016    Procedure: COMBINED CYSTOSCOPY, URETEROSCOPY;  Surgeon: Florentino Awad MD;  Location: [facility] OR    IR NEPHROLITHOTOMY  2014    IR NEPHROSTOGRAM EXISITING ACCESS  10/18/2018    IR NEPHROSTOMY TUBE CHANGE RIGHT  2018    IR NEPHROSTOMY TUBE CHANGE RIGHT  2020    IR NEPHROSTOMY TUBE CHANGE RIGHT  2018    IR NEPHROSTOMY TUBE CHANGE RIGHT  2020    IR  NEPHROSTOMY TUBE PLACEMENT RIGHT  7/24/2016    IR NEPHROSTOMY TUBE PLACEMENT RIGHT  9/14/2017    KIDNEY STONE SURGERY Right 05/2016    LASER HOLMIUM LITHOTRIPSY URETER(S), INSERT STENT, COMBINED Left 11/1/2016    Procedure: COMBINED CYSTOSCOPY, URETEROSCOPY, LASER HOLMIUM LITHOTRIPSY URETER(S), INSERT STENT;  Surgeon: Florentino Awad MD;  Location: [facility] OR    LASER HOLMIUM NEPHROLITHOTOMY VIA PERCUTANEOUS NEPHROSTOMY Right 9/14/2016    Procedure: LASER HOLMIUM NEPHROLITHOTOMY VIA PERCUTANEOUS NEPHROSTOMY;  Surgeon: Florentino Awad MD;  Location: [facility] OR    NEPHROSTOMY W/ INTRODUCTION OF CATHETER Right     OTHER SURGICAL HISTORY      Mole removednasal    OTHER SURGICAL HISTORY Right     Cystoscopy, ureteroscopy, laser11/02/2014 x2 with ureteral stent insertion    PERCUTANEOUS NEPHROSTOMY  11/1/2016    Procedure: PERCUTANEOUS NEPHROSTOMY;  Surgeon: Florentino Awad MD;  Location: [facility] OR    WISDOM TOOTH EXTRACTION      ZZC REMOVAL OF KIDNEY STONE         CURRENT MEDICATIONS:    No current facility-administered medications for this encounter.    Current Outpatient Medications:     albuterol (ACCUNEB) 1.25 MG/3ML neb solution, Take 1.25 mg by nebulization every 6 hours as needed for shortness of breath / dyspnea or wheezing, Disp: , Rfl:     sertraline (ZOLOFT) 100 MG tablet, Take 100 mg by mouth At Bedtime, Disp: , Rfl:     ALLERGIES:  Allergies   Allergen Reactions    Vancomycin     Desogestrel-Ethinyl Estradiol Angioedema and Swelling     Swelling of hands and feet per pt.  Swelling of hands and feet per pt.  Swelling of hands and feet per pt.  Swelling of hands and feet per pt.  Swelling of hands and feet per pt.  Swelling of hands and feet per pt.  Swelling of hands and feet per pt.  Swelling of hands and feet per pt.      Penicillins Rash     As a child per mother; mother unsure if has tolerated another PCN since. Tolerated ampicillin 9/20/16    Sulfa Antibiotics Rash       FAMILY HISTORY:  Family  "History   Problem Relation Age of Onset    Anesthesia Reaction No family hx of     Malignant Hyperthermia No family hx of     Heart Disease Maternal Uncle         heart failure    Coronary Artery Disease Other     Heart Disease Maternal Grandmother         pacemaker    Gout Paternal Grandfather     Prostate Cancer Maternal Aunt         breast    Heart Disease Maternal Aunt         pacemaker    Heart Disease Maternal Aunt         pacemaker    Heart Disease Maternal Aunt         pacemaker       SOCIAL HISTORY:   Social History     Socioeconomic History    Marital status: Single   Tobacco Use    Smoking status: Every Day     Packs/day: 0.50     Years: 10.00     Pack years: 5.00     Types: Cigarettes     Last attempt to quit: 2016     Years since quittin.9    Smokeless tobacco: Former     Quit date: 2016   Substance and Sexual Activity    Alcohol use: Yes     Alcohol/week: 0.0 standard drinks of alcohol     Comment: Alcoholic Drinks/day: occ    Drug use: No     PHYSICAL EXAM:    Vitals: /77   Pulse 63   Temp 98.1  F (36.7  C) (Oral)   Resp 20   Ht 1.537 m (5' 0.5\")   Wt 88.5 kg (195 lb)   LMP 2023 (Approximate)   SpO2 99%   BMI 37.46 kg/m     Constitutional: Well developed, well nourished. No acute distress.  HENT: Normocephalic, atraumatic. Neck-gross ROM intact.   Eyes: Pupils mid-range, sclera white  Respiratory: CTAB, no respiratory distress, speaks full sentences easily.  Cardiovascular: Normal heart rate, regular rhythm. No lower extremity edema  GI: Soft, not distended, abdomen is not tender to palpation. Does have some tenderness to the left lower chest wall under her breast. Nephrostomy tube on the right without drainage/erythema/fluctance  Musculoskeletal: Moving all 4 extremities intentionally and without pain. No obvious deformity.  Skin: Warm, dry, no rash.  Neurologic: Alert & oriented, speech clear, no focal deficits noted, moving all extremities    LAB:  All pertinent " labs reviewed and interpreted.  Labs Ordered and Resulted from Time of ED Arrival to Time of ED Departure   ROUTINE UA WITH MICROSCOPIC REFLEX TO CULTURE - Abnormal       Result Value    Color Urine Light Yellow      Appearance Urine Turbid (*)     Glucose Urine Negative      Bilirubin Urine Negative      Ketones Urine Negative      Specific Gravity Urine 1.009      Blood Urine 0.1 mg/dL (*)     pH Urine 8.0 (*)     Protein Albumin Urine 200 (*)     Urobilinogen Urine <2.0      Nitrite Urine Negative      Leukocyte Esterase Urine 500 Duglas/uL (*)     Bacteria Urine Many (*)     Mucus Urine Present (*)     Triple Phosphate Crystals Urine Few (*)     RBC Urine 32 (*)     WBC Urine 20 (*)     Squamous Epithelials Urine <1     COMPREHENSIVE METABOLIC PANEL - Normal    Sodium 136      Potassium 4.2      Chloride 106      Carbon Dioxide (CO2) 23      Anion Gap 7      Urea Nitrogen 12      Creatinine 0.88      Calcium 10.1      Glucose 81      Alkaline Phosphatase 76      AST 20      ALT 18      Protein Total 7.0      Albumin 3.7      Bilirubin Total 0.3      GFR Estimate 87     INFLUENZA A/B, RSV, & SARS-COV2 PCR - Normal    Influenza A PCR Negative      Influenza B PCR Negative      RSV PCR Negative      SARS CoV2 PCR Negative     TROPONIN I - Normal    Troponin I <0.01     CBC WITH PLATELETS AND DIFFERENTIAL    WBC Count 8.2      RBC Count 4.73      Hemoglobin 14.2      Hematocrit 42.4      MCV 90      MCH 30.0      MCHC 33.5      RDW 13.4      Platelet Count 186      % Neutrophils 63      % Lymphocytes 22      % Monocytes 7      % Eosinophils 6      % Basophils 1      % Immature Granulocytes 1      NRBCs per 100 WBC 0      Absolute Neutrophils 5.3      Absolute Lymphocytes 1.8      Absolute Monocytes 0.6      Absolute Eosinophils 0.5      Absolute Basophils 0.0      Absolute Immature Granulocytes 0.0      Absolute NRBCs 0.0     URINE CULTURE       RADIOLOGY:  CTA Chest Abdomen Pelvis w Contrast   Final Result    IMPRESSION:   1.  No thoracic or abdominal aortic aneurysm, dissection or intramural hematoma. No pulmonary artery embolism.   2.  Nonspecific bronchiolitis with bronchial wall thickening and pulmonary air trapping. No pneumonic infiltrate or pleural effusion.   3.  Stable right nephrostomy drain. No hydronephrosis or inflammatory process.   4.  Stable right renal cysts and nonobstructing stones.   5.  Mild splenomegaly.   6.  Severe left renal atrophy.             EKG:   Performed at: 18-AUG-2023 08:07  Impression: Sinus rhythm  Rate: 65 BPM  Rhythm: Normal sinus  QRS Interval: 78 ms  QTc Interval: 401 ms  Comparison: When compared with ECG of 14-MAY-2023 07:51, No significant change was found.   I have independently reviewed and interpreted the EKG(s) documented above.     PROCEDURES:   Procedures     I, Estephanie Nguyen, am serving as a scribe to document services personally performed by Dr. Ingrid Mina based on my observation and the provider's statements to me. I, Ingrid Mina MD attest that Estephanie Nguyen is acting in a scribe capacity, has observed my performance of the services and has documented them in accordance with my direction.    Ingrid Mina M.D.  Emergency Medicine  [facility] EMERGENCY ROOM  [address] [facility] [id number] [phone]  Dept: [phone]       Ingrid Mina MD  08/18/23 4554

## 2023-08-18 NOTE — Clinical Note
Jacqueline Pappas was seen and treated in our emergency department on 8/18/2023.         Sincerely,     [facility] Emergency Room

## 2023-08-19 LAB — BACTERIA UR CULT: NORMAL

## 2023-08-24 LAB
ATRIAL RATE - MUSE: 65 BPM
DIASTOLIC BLOOD PRESSURE - MUSE: 63 MMHG
INTERPRETATION ECG - MUSE: NORMAL
P AXIS - MUSE: 20 DEGREES
PR INTERVAL - MUSE: 134 MS
QRS DURATION - MUSE: 78 MS
QT - MUSE: 386 MS
QTC - MUSE: 401 MS
R AXIS - MUSE: 33 DEGREES
SYSTOLIC BLOOD PRESSURE - MUSE: 144 MMHG
T AXIS - MUSE: 34 DEGREES
VENTRICULAR RATE- MUSE: 65 BPM

## 2023-09-10 ENCOUNTER — APPOINTMENT (OUTPATIENT)
Dept: RADIOLOGY | Facility: HOSPITAL | Age: 38
End: 2023-09-10
Attending: EMERGENCY MEDICINE
Payer: MEDICAID

## 2023-09-10 ENCOUNTER — HOSPITAL ENCOUNTER (EMERGENCY)
Facility: HOSPITAL | Age: 38
Discharge: HOME | End: 2023-09-10
Attending: EMERGENCY MEDICINE | Admitting: EMERGENCY MEDICINE
Payer: MEDICAID

## 2023-09-10 VITALS
BODY MASS INDEX: 36.82 KG/M2 | HEIGHT: 61 IN | TEMPERATURE: 97.6 F | DIASTOLIC BLOOD PRESSURE: 57 MMHG | HEART RATE: 81 BPM | SYSTOLIC BLOOD PRESSURE: 98 MMHG | WEIGHT: 195 LBS | RESPIRATION RATE: 18 BRPM | OXYGEN SATURATION: 94 %

## 2023-09-10 DIAGNOSIS — Z72.0 TOBACCO USE: ICD-10-CM

## 2023-09-10 DIAGNOSIS — R05.2 SUBACUTE COUGH: ICD-10-CM

## 2023-09-10 LAB
ANION GAP SERPL CALCULATED.3IONS-SCNC: 10 MMOL/L (ref 7–15)
BUN SERPL-MCNC: 10.1 MG/DL (ref 6–20)
CALCIUM SERPL-MCNC: 10.2 MG/DL (ref 8.6–10)
CHLORIDE SERPL-SCNC: 104 MMOL/L (ref 98–107)
CREAT SERPL-MCNC: 0.94 MG/DL (ref 0.51–0.95)
DEPRECATED HCO3 PLAS-SCNC: 20 MMOL/L (ref 22–29)
EGFRCR SERPLBLD CKD-EPI 2021: 80 ML/MIN/1.73M2
ERYTHROCYTE [DISTWIDTH] IN BLOOD BY AUTOMATED COUNT: 13.4 % (ref 10–15)
GLUCOSE SERPL-MCNC: 77 MG/DL (ref 70–99)
HCT VFR BLD AUTO: 40.9 % (ref 35–47)
HGB BLD-MCNC: 13.7 G/DL (ref 11.7–15.7)
MCH RBC QN AUTO: 30 PG (ref 26.5–33)
MCHC RBC AUTO-ENTMCNC: 33.5 G/DL (ref 31.5–36.5)
MCV RBC AUTO: 90 FL (ref 78–100)
PLATELET # BLD AUTO: 176 10E3/UL (ref 150–450)
POTASSIUM SERPL-SCNC: 3.7 MMOL/L (ref 3.4–5.3)
RBC # BLD AUTO: 4.57 10E6/UL (ref 3.8–5.2)
SODIUM SERPL-SCNC: 134 MMOL/L (ref 136–145)
WBC # BLD AUTO: 8.6 10E3/UL (ref 4–11)

## 2023-09-10 PROCEDURE — 82310 ASSAY OF CALCIUM: CPT | Performed by: EMERGENCY MEDICINE

## 2023-09-10 PROCEDURE — 250N000012 HC RX MED GY IP 250 OP 636 PS 637: Performed by: EMERGENCY MEDICINE

## 2023-09-10 PROCEDURE — 36415 COLL VENOUS BLD VENIPUNCTURE: CPT | Performed by: EMERGENCY MEDICINE

## 2023-09-10 PROCEDURE — 94640 AIRWAY INHALATION TREATMENT: CPT

## 2023-09-10 PROCEDURE — 85027 COMPLETE CBC AUTOMATED: CPT | Performed by: EMERGENCY MEDICINE

## 2023-09-10 PROCEDURE — 71046 X-RAY EXAM CHEST 2 VIEWS: CPT

## 2023-09-10 PROCEDURE — 250N000009 HC RX 250: Performed by: EMERGENCY MEDICINE

## 2023-09-10 PROCEDURE — 99284 EMERGENCY DEPT VISIT MOD MDM: CPT | Mod: 25

## 2023-09-10 RX ORDER — PREDNISONE 20 MG/1
TABLET ORAL
Qty: 10 TABLET | Refills: 0 | Status: SHIPPED | OUTPATIENT
Start: 2023-09-10 | End: 2023-09-17

## 2023-09-10 RX ORDER — IPRATROPIUM BROMIDE AND ALBUTEROL SULFATE 2.5; .5 MG/3ML; MG/3ML
3 SOLUTION RESPIRATORY (INHALATION)
Status: DISPENSED | OUTPATIENT
Start: 2023-09-10 | End: 2023-09-10

## 2023-09-10 RX ORDER — IPRATROPIUM BROMIDE AND ALBUTEROL SULFATE 2.5; .5 MG/3ML; MG/3ML
1 SOLUTION RESPIRATORY (INHALATION) EVERY 6 HOURS PRN
Qty: 180 ML | Refills: 0 | Status: SHIPPED | OUTPATIENT
Start: 2023-09-10 | End: 2023-09-10

## 2023-09-10 RX ORDER — IPRATROPIUM BROMIDE AND ALBUTEROL SULFATE 2.5; .5 MG/3ML; MG/3ML
1 SOLUTION RESPIRATORY (INHALATION) EVERY 6 HOURS PRN
Qty: 180 ML | Refills: 0 | Status: SHIPPED | OUTPATIENT
Start: 2023-09-10 | End: 2023-10-31

## 2023-09-10 RX ORDER — DEXAMETHASONE SODIUM PHOSPHATE 10 MG/ML
6 INJECTION, SOLUTION INTRAMUSCULAR; INTRAVENOUS ONCE
Status: DISCONTINUED | OUTPATIENT
Start: 2023-09-10 | End: 2023-09-10

## 2023-09-10 RX ADMIN — IPRATROPIUM BROMIDE AND ALBUTEROL SULFATE 3 ML: 2.5; .5 SOLUTION RESPIRATORY (INHALATION) at 04:43

## 2023-09-10 RX ADMIN — DEXAMETHASONE 10 MG: 2 TABLET ORAL at 06:16

## 2023-09-10 ASSESSMENT — ACTIVITIES OF DAILY LIVING (ADL): ADLS_ACUITY_SCORE: 35

## 2023-09-10 NOTE — ED TRIAGE NOTES
Patient has had a cough for over three weeks, was seen on 8/18 and told that the cough was probably viral. Cough has persisted. Patient reports right flank pain also, has only one kidney on the right and has a nephrostomy. Dry, infrequent cough noted in triage, no acute distress. Patient states that she is having difficulty sleeping due to cough.     Triage Assessment       Row Name 09/10/23 0333       Triage Assessment (Adult)    Airway WDL WDL       Respiratory WDL    Respiratory WDL X;cough    Cough Frequency infrequent    Cough Type dry       Skin Circulation/Temperature WDL    Skin Circulation/Temperature WDL WDL       Cardiac WDL    Cardiac WDL WDL       Peripheral/Neurovascular WDL    Peripheral Neurovascular WDL WDL    Capillary Refill, General less than/equal to 3 secs       Cognitive/Neuro/Behavioral WDL    Cognitive/Neuro/Behavioral WDL WDL

## 2023-09-10 NOTE — ED PROVIDER NOTES
EMERGENCY DEPARTMENT ENCOUNTER      NAME: Jacqueline Pappas  YOB: 1985  MRN: [id number]    FINAL IMPRESSION  1. Subacute cough    2. Tobacco use        MEDICAL DECISION MAKING   Pertinent Labs & Imaging studies reviewed. (See chart for details)    Jacqueline Pappas is a 38 year old female who presents for evaluation of a cough, shortness of breath, and chronic right flank pain.  She was seen in the ED on 8/18/2023 reports that she was told she had a viral infection.  Patient has tried using over-the-counter medications, Tylenol, and her inhaler without improvement in symptoms.  Patient does have some mild nasal congestion but denies sore throat, fever, abdominal pain, nausea, vomiting, or new urinary symptoms.  No recent travel or sick contacts.  Patient does smoke cigarettes. Remainder of history and exam, as below.     Records reviewed.  Patient was seen at [facility] on 8/18/2023 for evaluation of chest pain, body aches, and right flank pain.  She had a very thorough evaluation including CTA chest/abdomen/pelvis, troponin x2, EKG, and basic labs.  All studies were unremarkable.  She also tested negative for COVID, influenza, and RSV.  UA was obtained blood culture subsequently grew out mixed urogenital christie.    Considered a broad differential including but not limited to viral URI, asthma/COPD exacerbation, pneumonia, bronchitis, pulmonary edema, pleural effusion.  Patient just had a thorough work-up for cardiac etiology of symptoms and I do have lower suspicion for ACS/ischemia, unstable angina, pericarditis, myocarditis, or PE given history and exam.  Although she does complain of right flank pain, she states that this is similar to her chronic discomfort and is less of a concern today then the cough and respiratory symptoms.  She has no tenderness on exam and no difficulty with nephrostomy tube so I do feel that right flank pain is more representation of her chronic discomfort.  Further, she just  had a negative CT scan for the symptoms less than a month ago and has had extensive scans throughout the recent past.  Discussed options for work-up and management with the patient.  We have agreed on plan for x-ray, basic labs, and trial of nebulizers.  We will hold on reswabbing for COVID/influenza given she just tested negative for both.    CBC reassuring. No evidence of leukocytosis to suggest systemic infectious/inflammatory process. No acute anemia. PLTs wnl. BMP reassuring. No evidence of GT, acidosis, or significant electrolyte derangement. CXR is pending.     Patient was signed out to night ED provider pending results of CXR and recheck of symptoms. If she is feeling better, I anticipate she will be able to discharge home.         Medical Decision Making    History:  Supplemental history from: Documented in chart, if applicable  External Record(s) reviewed: Outpatient Record: Reviewed ED visit on 8/18 for acute right flank pain and chest pain    Work Up:  Chart documentation includes differential considered and any EKGs or imaging independently interpreted by provider, where specified.  In additional to work up documented, I considered the following work up: Documented in chart, if applicable.    External consultation:  Discussion of management with another provider: Documented in chart, if applicable    Complicating factors:  Care impacted by chronic illness: Chronic Kidney Disease, Chronic Pain, Smoking / Nicotine Use, and Other: Recurrent UTI  Care affected by social determinants of health: Access to Medical Care    Disposition considerations: Admission considered. Patient was signed out to the oncoming physician, disposition pending.          ED COURSE  3:55 AM I met with the patient, obtained history, performed an initial exam, and discussed options and plan for diagnostics and treatment here in the ED.  5:15 AM I rechecked the patient. She is receiving neb treatment.   5:37 AM Signed out to night ED  provider.       MEDICATIONS GIVEN IN THE ED  Medications   ipratropium - albuterol 0.5 mg/2.5 mg/3 mL (DUONEB) neb solution 3 mL (3 mLs Nebulization $Given 9/10/23 0551)   dexAMETHasone PF (DECADRON) injection 6 mg (has no administration in time range)       NEW PRESCRIPTIONS STARTED AT TODAY'S VISIT  New Prescriptions    IPRATROPIUM - ALBUTEROL 0.5 MG/2.5 MG/3 ML (DUONEB) 0.5-2.5 (3) MG/3ML NEB SOLUTION    Take 1 vial (3 mLs) by nebulization every 6 hours as needed for shortness of breath, wheezing or cough          =================================================================    Chief Complaint   Patient presents with    Cough    Flank Pain         HPI:    Patient information was obtained from: patient    Use of : N/A     Jacqueline Pappas is a 38 year old female with a pertinent history of tobacco abuse, congenital absence of one kidney, calculus of kidney, pyelonephritis, acute renal failure, UTI, hydronephrosis, and ureteral stricture  who presents to the emergency department via walk-in for evaluation of chest pain.    Per chart review, the patient was seen at [facility] ED for chest pain on 8/18/23. Patient reported chest pain, right flank pain, body aches. Her EKG is sinus rhythm without concerning ischemic changes, troponin reassuring x 2, overall low suspicion for ACS. CTA chest/abd/pelvis without dissection or PE. There were some findings of nonspecific bronchiolitis, no infiltrate or effusion. Right nephrostomy is stable, no hydronephrosis. Covid/influenza/RSV were negative. Lab work was reassuring. UA did show findings of infection (though it often does with negative cultures) - she wanted to proceed with oral antibiotics, urine culture was pending. Her pain was improved after fluids and dose of oxycodone. Recommended close outpatient follow up. Discharged.    Patient reports ongoing worsening chest pain, cough, and chronic right flank pain. She notes that her chest pain and cough never got  "better after being seen and treated in the ED on 8/18. They thought her symptoms were viral. She reports that she wakes up at night due to coughing fits which exacerbates her chest pain. She also notes intermittent wheezing. Patient tried OTC medications and her inhaler for the cough, without relief. She also took tylenol which normally resolves her chronic flank pain but it didn't tonight. Endorses congestion. She denies sore throat, fevers, ear pain, abdominal pain, nausea, vomiting, diarrhea, dysuria, and any other symptoms.    RELEVANT HISTORY, MEDICATIONS, & ALLERGIES   Past medical history, surgical history, family history, medications, and allergies reviewed and pertinent noted in HPI.    REVIEW OF SYSTEMS:  A complete review of systems was performed with pertinent positives and negatives noted in the HPI. All other systems negative.     PHYSICAL EXAM:    Vitals: /77   Pulse 88   Temp 97.5  F (36.4  C) (Temporal)   Resp 18   Ht 1.537 m (5' 0.5\")   Wt 88.5 kg (195 lb)   LMP  (LMP Unknown)   SpO2 100%   BMI 37.46 kg/m     General: Alert and interactive, comfortable appearing.  HENT: Atraumatic. Full AROM of neck. Conjunctiva clear.   Cardiovascular: Regular rate and rhythm.   Chest/Pulmonary: Normal work of breathing. Speaking in complete sentences.  Frequent cough.  Faint expiratory wheezes bilaterally. No chest wall tenderness or deformities.  Abdomen: Soft, nondistended. Nontender without guarding or rebound.  Back: Right nephrostomy tube in place.  No tenderness palpation around insertion site.  No erythema, warmth, drainage.  Extremities: Normal AROM of all major joints.  Skin: Warm and dry. Normal skin color.   Neuro: Speech clear. CNs grossly intact. Moves all extremities spontaneously.   Psych: Normal affect/mood, cooperative, memory appropriate.    LAB  Labs Ordered and Resulted from Time of ED Arrival to Time of ED Departure   BASIC METABOLIC PANEL - Abnormal       Result Value    Sodium " 134 (*)     Potassium 3.7      Chloride 104      Carbon Dioxide (CO2) 20 (*)     Anion Gap 10      Urea Nitrogen 10.1      Creatinine 0.94      Calcium 10.2 (*)     Glucose 77      GFR Estimate 80     CBC WITH PLATELETS - Normal    WBC Count 8.6      RBC Count 4.57      Hemoglobin 13.7      Hematocrit 40.9      MCV 90      MCH 30.0      MCHC 33.5      RDW 13.4      Platelet Count 176         RADIOLOGY  XR Chest 2 Views    (Results Pending)         I, Niki Schmidt, am serving as a scribe to document services personally performed by Dr. Layla Miguel based on my observation and the provider's statements to me. I, Layla Miguel MD attest that Niki Schmidt is acting in a scribe capacity, has observed my performance of the services and has documented them in accordance with my direction.    Layla Miguel M.D.  Emergency Medicine  [facility] EMERGENCY DEPARTMENT  [facility] [facility] [id number] [phone]  Dept: [phone]       Layla Miguel MD  09/10/23 0545

## 2023-09-10 NOTE — ED NOTES
5:36 AM - Patient signed out to me by Dr. Miguel at routine shift change. 38 year old female with 1 month of cough. Blood tests unremarkable. Plan is follow up CXR and discharge. Please see Dr. Miguel's note for details.    6:02 AM - CXR clear with no acute cardiopulmonary process. Ok for outpatient management. Neb solution prescription & prednisone burst given for home. Patient comfortable with discharge at this time. Return precautions and need for PCP follow up discussed and understood. No further questions at the time of discharge.        --------------------------------------------------------------------------------   --------------------------------------------------------------------------------     IMPRESSION  1. Subacute cough    2. Tobacco use        PLAN  - prednisone 40mg daily x5 days  - close PCP follow up  - discharge to home        Ezequiel Boyer MD  09/10/23  Emergency Medicine  [facility] EMERGENCY DEPARTMENT  [address] [place] [id number] [phone]  Dept: [phone]     Ezequiel Boyer MD  09/10/23 0603

## 2023-09-13 ENCOUNTER — HOSPITAL ENCOUNTER (EMERGENCY)
Facility: HOSPITAL | Age: 38
Discharge: HOME | End: 2023-09-14
Attending: STUDENT IN AN ORGANIZED HEALTH CARE EDUCATION/TRAINING PROGRAM | Admitting: STUDENT IN AN ORGANIZED HEALTH CARE EDUCATION/TRAINING PROGRAM
Payer: MEDICAID

## 2023-09-13 DIAGNOSIS — R05.1 ACUTE COUGH: ICD-10-CM

## 2023-09-13 PROCEDURE — 87637 SARSCOV2&INF A&B&RSV AMP PRB: CPT | Performed by: STUDENT IN AN ORGANIZED HEALTH CARE EDUCATION/TRAINING PROGRAM

## 2023-09-13 PROCEDURE — 99285 EMERGENCY DEPT VISIT HI MDM: CPT | Mod: 25

## 2023-09-13 PROCEDURE — 93005 ELECTROCARDIOGRAM TRACING: CPT | Performed by: STUDENT IN AN ORGANIZED HEALTH CARE EDUCATION/TRAINING PROGRAM

## 2023-09-13 RX ORDER — IPRATROPIUM BROMIDE AND ALBUTEROL SULFATE 2.5; .5 MG/3ML; MG/3ML
3 SOLUTION RESPIRATORY (INHALATION) ONCE
Status: COMPLETED | OUTPATIENT
Start: 2023-09-14 | End: 2023-09-14

## 2023-09-14 ENCOUNTER — APPOINTMENT (OUTPATIENT)
Dept: CT IMAGING | Facility: HOSPITAL | Age: 38
End: 2023-09-14
Attending: STUDENT IN AN ORGANIZED HEALTH CARE EDUCATION/TRAINING PROGRAM
Payer: MEDICAID

## 2023-09-14 VITALS
HEART RATE: 74 BPM | SYSTOLIC BLOOD PRESSURE: 117 MMHG | DIASTOLIC BLOOD PRESSURE: 65 MMHG | RESPIRATION RATE: 23 BRPM | OXYGEN SATURATION: 96 % | TEMPERATURE: 98 F

## 2023-09-14 LAB
ANION GAP SERPL CALCULATED.3IONS-SCNC: 11 MMOL/L (ref 7–15)
BASOPHILS # BLD AUTO: 0 10E3/UL (ref 0–0.2)
BASOPHILS NFR BLD AUTO: 0 %
BUN SERPL-MCNC: 23.5 MG/DL (ref 6–20)
CALCIUM SERPL-MCNC: 10.2 MG/DL (ref 8.6–10)
CHLORIDE SERPL-SCNC: 101 MMOL/L (ref 98–107)
CREAT SERPL-MCNC: 1.2 MG/DL (ref 0.51–0.95)
D DIMER PPP FEU-MCNC: 1.88 UG/ML FEU (ref 0–0.5)
DEPRECATED HCO3 PLAS-SCNC: 21 MMOL/L (ref 22–29)
EGFRCR SERPLBLD CKD-EPI 2021: 60 ML/MIN/1.73M2
EOSINOPHIL # BLD AUTO: 0 10E3/UL (ref 0–0.7)
EOSINOPHIL NFR BLD AUTO: 0 %
ERYTHROCYTE [DISTWIDTH] IN BLOOD BY AUTOMATED COUNT: 13.3 % (ref 10–15)
FLUAV RNA SPEC QL NAA+PROBE: NEGATIVE
FLUBV RNA RESP QL NAA+PROBE: NEGATIVE
GLUCOSE SERPL-MCNC: 167 MG/DL (ref 70–99)
HCT VFR BLD AUTO: 41.4 % (ref 35–47)
HGB BLD-MCNC: 13.9 G/DL (ref 11.7–15.7)
HOLD SPECIMEN: NORMAL
IMM GRANULOCYTES # BLD: 0.1 10E3/UL
IMM GRANULOCYTES NFR BLD: 1 %
LYMPHOCYTES # BLD AUTO: 0.7 10E3/UL (ref 0.8–5.3)
LYMPHOCYTES NFR BLD AUTO: 5 %
MCH RBC QN AUTO: 30 PG (ref 26.5–33)
MCHC RBC AUTO-ENTMCNC: 33.6 G/DL (ref 31.5–36.5)
MCV RBC AUTO: 89 FL (ref 78–100)
MONOCYTES # BLD AUTO: 0.8 10E3/UL (ref 0–1.3)
MONOCYTES NFR BLD AUTO: 6 %
NEUTROPHILS # BLD AUTO: 12.4 10E3/UL (ref 1.6–8.3)
NEUTROPHILS NFR BLD AUTO: 88 %
NRBC # BLD AUTO: 0 10E3/UL
NRBC BLD AUTO-RTO: 0 /100
PLATELET # BLD AUTO: 193 10E3/UL (ref 150–450)
POTASSIUM SERPL-SCNC: 4.6 MMOL/L (ref 3.4–5.3)
RBC # BLD AUTO: 4.63 10E6/UL (ref 3.8–5.2)
RSV RNA SPEC NAA+PROBE: NEGATIVE
SARS-COV-2 RNA RESP QL NAA+PROBE: NEGATIVE
SODIUM SERPL-SCNC: 133 MMOL/L (ref 136–145)
TROPONIN T SERPL HS-MCNC: <6 NG/L
WBC # BLD AUTO: 14 10E3/UL (ref 4–11)

## 2023-09-14 PROCEDURE — 85379 FIBRIN DEGRADATION QUANT: CPT | Performed by: STUDENT IN AN ORGANIZED HEALTH CARE EDUCATION/TRAINING PROGRAM

## 2023-09-14 PROCEDURE — 71275 CT ANGIOGRAPHY CHEST: CPT

## 2023-09-14 PROCEDURE — 94640 AIRWAY INHALATION TREATMENT: CPT

## 2023-09-14 PROCEDURE — 250N000013 HC RX MED GY IP 250 OP 250 PS 637: Performed by: STUDENT IN AN ORGANIZED HEALTH CARE EDUCATION/TRAINING PROGRAM

## 2023-09-14 PROCEDURE — 250N000009 HC RX 250: Performed by: STUDENT IN AN ORGANIZED HEALTH CARE EDUCATION/TRAINING PROGRAM

## 2023-09-14 PROCEDURE — 36415 COLL VENOUS BLD VENIPUNCTURE: CPT | Performed by: STUDENT IN AN ORGANIZED HEALTH CARE EDUCATION/TRAINING PROGRAM

## 2023-09-14 PROCEDURE — 85025 COMPLETE CBC W/AUTO DIFF WBC: CPT | Performed by: STUDENT IN AN ORGANIZED HEALTH CARE EDUCATION/TRAINING PROGRAM

## 2023-09-14 PROCEDURE — 84484 ASSAY OF TROPONIN QUANT: CPT | Performed by: STUDENT IN AN ORGANIZED HEALTH CARE EDUCATION/TRAINING PROGRAM

## 2023-09-14 PROCEDURE — 80048 BASIC METABOLIC PNL TOTAL CA: CPT | Performed by: STUDENT IN AN ORGANIZED HEALTH CARE EDUCATION/TRAINING PROGRAM

## 2023-09-14 PROCEDURE — 250N000011 HC RX IP 250 OP 636: Mod: JZ | Performed by: STUDENT IN AN ORGANIZED HEALTH CARE EDUCATION/TRAINING PROGRAM

## 2023-09-14 RX ORDER — ACETAMINOPHEN 325 MG/1
975 TABLET ORAL ONCE
Status: COMPLETED | OUTPATIENT
Start: 2023-09-14 | End: 2023-09-14

## 2023-09-14 RX ORDER — IOPAMIDOL 755 MG/ML
75 INJECTION, SOLUTION INTRAVASCULAR ONCE
Status: COMPLETED | OUTPATIENT
Start: 2023-09-14 | End: 2023-09-14

## 2023-09-14 RX ORDER — IBUPROFEN 600 MG/1
600 TABLET, FILM COATED ORAL ONCE
Status: COMPLETED | OUTPATIENT
Start: 2023-09-14 | End: 2023-09-14

## 2023-09-14 RX ORDER — AZITHROMYCIN 250 MG/1
TABLET, FILM COATED ORAL
Qty: 6 TABLET | Refills: 0 | Status: ON HOLD | OUTPATIENT
Start: 2023-09-14 | End: 2023-09-18

## 2023-09-14 RX ADMIN — IPRATROPIUM BROMIDE AND ALBUTEROL SULFATE 3 ML: 2.5; .5 SOLUTION RESPIRATORY (INHALATION) at 00:19

## 2023-09-14 RX ADMIN — ACETAMINOPHEN 975 MG: 325 TABLET ORAL at 01:34

## 2023-09-14 RX ADMIN — IBUPROFEN 600 MG: 600 TABLET, FILM COATED ORAL at 01:34

## 2023-09-14 RX ADMIN — IOPAMIDOL 75 ML: 755 INJECTION, SOLUTION INTRAVENOUS at 02:13

## 2023-09-14 ASSESSMENT — ACTIVITIES OF DAILY LIVING (ADL): ADLS_ACUITY_SCORE: 35

## 2023-09-14 NOTE — ED PROVIDER NOTES
Emergency Department Encounter         FINAL IMPRESSION:  Viral syndrome        ED COURSE AND MEDICAL DECISION MAKING       ED Course as of 09/14/23 0108   u Sep 14, 2023   0015 Patient is a 38-year-old female who is well-known to this emergency department, here with cough, congestion, body aches fatigue.  Was seen a few days ago and had negative work-up.  Here with persisting symptoms.  Pleuritic pain at times.  No hemoptysis or leg swelling.  No abdominal pain or dysuria.  On arrival she looks well.  Congested sounding cough.  Stuffy nose.  Heart and lungs showing some mild wheezing on the expiratory phase.  She has no conversational dyspnea or significant tachypnea.  Abdomen benign.  No leg swelling.  No meningismus.  Plan for cardiopulmonary valuation most likely discharge home.  She is a smoker I suspect a viral syndrome.  Her COVID swab was initially negative few days ago, we will repeat this today.     -Labs normal.  Dimer elevated.  CT PE showing generalized congestion with no focality.  No PE.  Patient sent in with azithromycin and recommended continued prednisone.      Additional ED Course Timeline:  11:58 PM I met with the patient, obtained history, performed an initial exam, and discussed options and plan for diagnostics and treatment here in the ED.   1:08 AM I checked on the patient and updated her on her lab results.                      Medical Decision Making    History:  Supplemental history from: Documented in chart, if applicable  External Record(s) reviewed: Documented in chart, if applicable. and Inpatient Record: 08/18/2023  & 09/10/2023    Work Up:  Chart documentation includes differential considered and any EKGs or imaging independently interpreted by provider, where specified.  In additional to work up documented, I considered the following work up: Documented in chart, if applicable.    External consultation:  Discussion of management with another provider: Documented in chart, if  applicable    Complicating factors:  Care impacted by chronic illness: Chronic Kidney Disease  Care affected by social determinants of health: Access to Medical Care    Disposition considerations: Discharge. I prescribed additional prescription strength medication(s) as charted. See documentation for any additional details.                    Critical Care     Performed by: Aroldo Mendoza or    Authorized by: Aroldo Mendoza  Total critical care time:  minutes  Critical care was necessary to treat or prevent imminent or life-threatening deterioration of the following conditions:   Critical care was time spent personally by me on the following activities: development of treatment plan with patient or surrogate, discussions with consultants, examination of patient, evaluation of patient's response to treatment, obtaining history from patient or surrogate, ordering and performing treatments and interventions, ordering and review of laboratory studies, ordering and review of radiographic studies, re-evaluation of patient's condition and monitoring for potential decompensation.  Critical care time was exclusive of separately billable procedures and treating other patients.'    At the conclusion of the encounter I discussed the results of all the tests and the disposition. The questions were answered. The patient or family acknowledged understanding and was agreeable with the care plan.                  MEDICATIONS GIVEN IN THE EMERGENCY DEPARTMENT:  Medications - No data to display    NEW PRESCRIPTIONS STARTED AT TODAY'S ED VISIT:  New Prescriptions    No medications on file       HPI     Patient information obtained from: The patient    Use of : N/A    Jacqueline Pappas is a 38 year old female with a pertinent history of anemia, who presents to this ED via walk-in for evaluation of chest pain.    The patient is complaining of body aches, chills, cough, chest tightness for the past 4 days. She was seen in the ER recently for  the same symptoms and tolerated a breathing treatment with some improvement of her symptoms. She was discharged with prednisone and notes it has not been effective. The patient is a chronic smoker.     Otherwise, the patient denied having leg swelling, fevers, and any other medical complaints or concerns at this time.    Per chart review, the patient was seen at [facility] ER on 08/18/2023 for chest pain and flank pain. She developed an onset of sharp chest pain located to the left chest that radiates to the right chest. Her pain comes and go. EKG was obtained and shows sinus rhythm without concerning ischemic changes, ischemic changes, troponin reassuring x2. CTA chest/abd/pelvis without dissection or PE. Right nephrostomy is stable, no hydronephrosis. Lab work is reassuring. UA sows findings of infection and would like to proceed with oral antibiotics. Her pain improved after receiving IVF and oxycodone. The patient was discharged.     She presented to the ER on 09/10/2023 for a cough and flank pain. She reports her chest pain did not improve. Negative chest XR. CBC reassuring. No evidence of leukocytosis to suggest systemic infectious/inflammatory process. PLTs wnl. BMP reassuring. The patient felt better after receiving decadron and duo neb. She was discharged.      REVIEW OF SYSTEMS:  Review of Systems   Constitutional: Negative for fever, malaise. Positive for body aches, chills.  HEENT: Negative runny nose, sore throat, ear pain, neck pain  Respiratory: Negative for shortness of breath, congestion. Positive for cough with chest tightness.  Cardiovascular: Negative for chest pain, leg edema  Gastrointestinal: Negative for abdominal distention, abdominal pain, constipation, vomiting, nausea, diarrhea  Genitourinary: Negative for dysuria and hematuria.   Integument: Negative for rash, skin breakdown  Neurological: Negative for paresthesias, weakness, headache.  Musculoskeletal: Negative for joint pain, joint  swelling      All other systems reviewed and are negative.          MEDICAL HISTORY     Past Medical History:   Diagnosis Date    Acute renal failure (H)     Anemia     Anemia     Calculus of kidney     Congenital absence of left kidney     Congenital absence of one kidney     Depression     Depression     Hydronephrosis     Kidney stone     Pyelonephritis     Pyelonephritis     Smoker     Ureteral stricture     UTI (urinary tract infection)     Wrist fracture        Past Surgical History:   Procedure Laterality Date     SECTION       SECTION      x1    COMBINED CYSTOSCOPY, RETROGRADES, URETEROSCOPY, LASER HOLMIUM LITHOTRIPSY URETER(S), INSERT STENT Right 2016    Procedure: COMBINED CYSTOSCOPY, RETROGRADES, URETEROSCOPY, LASER HOLMIUM LITHOTRIPSY URETER(S), INSERT STENT;  Surgeon: Florentino Awad MD;  [facility]    CYSTOSCOPY, URETEROSCOPY, COMBINED Right 2016    Procedure: COMBINED CYSTOSCOPY, URETEROSCOPY;  Surgeon: Florentino Awad MD;  [facility]: [facility] OR    IR NEPHROLITHOTOMY  2014    IR NEPHROSTOGRAM EXISITING ACCESS  10/18/2018    IR NEPHROSTOMY TUBE CHANGE RIGHT  2018    IR NEPHROSTOMY TUBE CHANGE RIGHT  2020    IR NEPHROSTOMY TUBE CHANGE RIGHT  2018    IR NEPHROSTOMY TUBE CHANGE RIGHT  2020    IR NEPHROSTOMY TUBE PLACEMENT RIGHT  2016    IR NEPHROSTOMY TUBE PLACEMENT RIGHT  2017    KIDNEY STONE SURGERY Right 2016    LASER HOLMIUM LITHOTRIPSY URETER(S), INSERT STENT, COMBINED Left 2016    Procedure: COMBINED CYSTOSCOPY, URETEROSCOPY, LASER HOLMIUM LITHOTRIPSY URETER(S), INSERT STENT;  Surgeon: Florentino Awad MD;  [facility]    LASER HOLMIUM NEPHROLITHOTOMY VIA PERCUTANEOUS NEPHROSTOMY Right 2016    Procedure: LASER HOLMIUM NEPHROLITHOTOMY VIA PERCUTANEOUS NEPHROSTOMY;  Surgeon: Florentino Awad MD;  [facility]: [facility] OR    NEPHROSTOMY W/ INTRODUCTION OF CATHETER Right     OTHER SURGICAL HISTORY      Mole  removednasal    OTHER SURGICAL HISTORY Right     Cystoscopy, ureteroscopy, laser11/02/2014 x2 with ureteral stent insertion    PERCUTANEOUS NEPHROSTOMY  2016    Procedure: PERCUTANEOUS NEPHROSTOMY;  Surgeon: Florentino Awad MD;  Location: [facility] OR    WISDOM TOOTH EXTRACTION      ZZC REMOVAL OF KIDNEY STONE         Social History     Tobacco Use    Smoking status: Every Day     Packs/day: 0.50     Years: 10.00     Pack years: 5.00     Types: Cigarettes     Last attempt to quit: 2016     Years since quittin.9    Smokeless tobacco: Former     Quit date: 2016   Substance Use Topics    Alcohol use: Yes     Alcohol/week: 0.0 standard drinks of alcohol     Comment: Alcoholic Drinks/day: occ    Drug use: No       albuterol (ACCUNEB) 1.25 MG/3ML neb solution  ipratropium - albuterol 0.5 mg/2.5 mg/3 mL (DUONEB) 0.5-2.5 (3) MG/3ML neb solution  predniSONE (DELTASONE) 20 MG tablet  sertraline (ZOLOFT) 100 MG tablet            PHYSICAL EXAM     BP (!) 119/92   Pulse 86   Temp 98  F (36.7  C) (Oral)   Resp 16   LMP 2023 (Approximate)   SpO2 96%       PHYSICAL EXAM:     General: Patient appears well, nontoxic, comfortable  HEENT: Moist mucous membranes,  No head trauma.    Cardiovascular: Normal rate, normal rhythm, no extremity edema.  No appreciable murmur.  Respiratory: No signs of respiratory distress, mild congestion bilaterally.  No conversational dyspnea.  Abdominal: Soft, nontender, nondistended, no palpable masses, no guarding, no rebound  Musculoskeletal: Full range of motion of joints, no deformities appreciated.  Neurological: Alert and oriented, grossly neurologically intact.  Psychological: Normal affect and mood.  Integument: No rashes appreciated          RESULTS       Labs Ordered and Resulted from Time of ED Arrival to Time of ED Departure - No data to display    No orders to display                     PROCEDURES:  Procedures:  Procedures       I, Beka Wilson am serving as a scribe  to document services personally performed by Aroldo Mendoza DO, based on my observations and the provider's statements to me.  I, Aroldo Mendoza DO, attest that Beka Wilson is acting in a scribe capacity, has observed my performance of the services and has documented them in accordance with my direction.    Aroldo Mendoza DO  Emergency Medicine  [facility] EMERGENCY DEPARTMENT       Aroldo Mendoza DO  09/14/23 0240

## 2023-09-14 NOTE — ED TRIAGE NOTES
Pt reports chest pain that started on 9/9. She was seen at [facility] er on 9/10 and discharged with an oral steroid. She reports that she had developed body aches and worsening chest pain since discharge. She reports sob that gets worse with exertion.

## 2023-09-15 ENCOUNTER — HOSPITAL ENCOUNTER (EMERGENCY)
Facility: HOSPITAL | Age: 38
Discharge: HOME | End: 2023-09-16
Attending: STUDENT IN AN ORGANIZED HEALTH CARE EDUCATION/TRAINING PROGRAM | Admitting: STUDENT IN AN ORGANIZED HEALTH CARE EDUCATION/TRAINING PROGRAM
Payer: MEDICAID

## 2023-09-15 DIAGNOSIS — J06.9 VIRAL UPPER RESPIRATORY TRACT INFECTION: ICD-10-CM

## 2023-09-15 LAB
ANION GAP SERPL CALCULATED.3IONS-SCNC: 11 MMOL/L (ref 7–15)
BASOPHILS # BLD AUTO: 0 10E3/UL (ref 0–0.2)
BASOPHILS NFR BLD AUTO: 0 %
BUN SERPL-MCNC: 12.7 MG/DL (ref 6–20)
CALCIUM SERPL-MCNC: 10.8 MG/DL (ref 8.6–10)
CHLORIDE SERPL-SCNC: 102 MMOL/L (ref 98–107)
CREAT SERPL-MCNC: 0.94 MG/DL (ref 0.51–0.95)
DEPRECATED HCO3 PLAS-SCNC: 21 MMOL/L (ref 22–29)
EGFRCR SERPLBLD CKD-EPI 2021: 80 ML/MIN/1.73M2
EOSINOPHIL # BLD AUTO: 0 10E3/UL (ref 0–0.7)
EOSINOPHIL NFR BLD AUTO: 0 %
ERYTHROCYTE [DISTWIDTH] IN BLOOD BY AUTOMATED COUNT: 13.7 % (ref 10–15)
GLUCOSE SERPL-MCNC: 149 MG/DL (ref 70–99)
HCT VFR BLD AUTO: 44.7 % (ref 35–47)
HGB BLD-MCNC: 14.7 G/DL (ref 11.7–15.7)
HOLD SPECIMEN: NORMAL
IMM GRANULOCYTES # BLD: 0.1 10E3/UL
IMM GRANULOCYTES NFR BLD: 1 %
LYMPHOCYTES # BLD AUTO: 0.7 10E3/UL (ref 0.8–5.3)
LYMPHOCYTES NFR BLD AUTO: 4 %
MCH RBC QN AUTO: 29.9 PG (ref 26.5–33)
MCHC RBC AUTO-ENTMCNC: 32.9 G/DL (ref 31.5–36.5)
MCV RBC AUTO: 91 FL (ref 78–100)
MONOCYTES # BLD AUTO: 0.6 10E3/UL (ref 0–1.3)
MONOCYTES NFR BLD AUTO: 4 %
NEUTROPHILS # BLD AUTO: 14.7 10E3/UL (ref 1.6–8.3)
NEUTROPHILS NFR BLD AUTO: 91 %
NRBC # BLD AUTO: 0 10E3/UL
NRBC BLD AUTO-RTO: 0 /100
PLATELET # BLD AUTO: 210 10E3/UL (ref 150–450)
POTASSIUM SERPL-SCNC: 5.5 MMOL/L (ref 3.4–5.3)
RBC # BLD AUTO: 4.91 10E6/UL (ref 3.8–5.2)
SODIUM SERPL-SCNC: 134 MMOL/L (ref 136–145)
TROPONIN T SERPL HS-MCNC: <6 NG/L
WBC # BLD AUTO: 16.1 10E3/UL (ref 4–11)

## 2023-09-15 PROCEDURE — 93005 ELECTROCARDIOGRAM TRACING: CPT | Performed by: STUDENT IN AN ORGANIZED HEALTH CARE EDUCATION/TRAINING PROGRAM

## 2023-09-15 PROCEDURE — 85025 COMPLETE CBC W/AUTO DIFF WBC: CPT | Performed by: STUDENT IN AN ORGANIZED HEALTH CARE EDUCATION/TRAINING PROGRAM

## 2023-09-15 PROCEDURE — 80048 BASIC METABOLIC PNL TOTAL CA: CPT | Performed by: STUDENT IN AN ORGANIZED HEALTH CARE EDUCATION/TRAINING PROGRAM

## 2023-09-15 PROCEDURE — 250N000013 HC RX MED GY IP 250 OP 250 PS 637: Performed by: STUDENT IN AN ORGANIZED HEALTH CARE EDUCATION/TRAINING PROGRAM

## 2023-09-15 PROCEDURE — 36415 COLL VENOUS BLD VENIPUNCTURE: CPT | Performed by: STUDENT IN AN ORGANIZED HEALTH CARE EDUCATION/TRAINING PROGRAM

## 2023-09-15 PROCEDURE — 99284 EMERGENCY DEPT VISIT MOD MDM: CPT

## 2023-09-15 PROCEDURE — 84484 ASSAY OF TROPONIN QUANT: CPT | Performed by: STUDENT IN AN ORGANIZED HEALTH CARE EDUCATION/TRAINING PROGRAM

## 2023-09-15 PROCEDURE — 250N000011 HC RX IP 250 OP 636: Performed by: STUDENT IN AN ORGANIZED HEALTH CARE EDUCATION/TRAINING PROGRAM

## 2023-09-15 RX ORDER — ONDANSETRON 4 MG/1
4 TABLET, ORALLY DISINTEGRATING ORAL ONCE
Status: COMPLETED | OUTPATIENT
Start: 2023-09-15 | End: 2023-09-15

## 2023-09-15 RX ORDER — ACETAMINOPHEN 325 MG/1
975 TABLET ORAL ONCE
Status: COMPLETED | OUTPATIENT
Start: 2023-09-16 | End: 2023-09-15

## 2023-09-15 RX ADMIN — ONDANSETRON 4 MG: 4 TABLET, ORALLY DISINTEGRATING ORAL at 22:29

## 2023-09-15 RX ADMIN — ACETAMINOPHEN 975 MG: 325 TABLET ORAL at 23:54

## 2023-09-15 ASSESSMENT — ACTIVITIES OF DAILY LIVING (ADL): ADLS_ACUITY_SCORE: 35

## 2023-09-15 NOTE — Clinical Note
Jacqueline Pappas was seen and treated in our emergency department on 9/15/2023.  She may return to work on 09/18/2023.       If you have any questions or concerns, please don't hesitate to call.      Jb Gómez MD

## 2023-09-16 ENCOUNTER — HOSPITAL ENCOUNTER (INPATIENT)
Facility: CLINIC | Age: 38
LOS: 1 days | Discharge: HOME | End: 2023-09-18
Attending: EMERGENCY MEDICINE | Admitting: INTERNAL MEDICINE
Payer: MEDICAID

## 2023-09-16 VITALS
BODY MASS INDEX: 37.3 KG/M2 | WEIGHT: 190 LBS | SYSTOLIC BLOOD PRESSURE: 105 MMHG | OXYGEN SATURATION: 97 % | HEIGHT: 60 IN | HEART RATE: 73 BPM | TEMPERATURE: 98.6 F | DIASTOLIC BLOOD PRESSURE: 62 MMHG | RESPIRATION RATE: 16 BRPM

## 2023-09-16 DIAGNOSIS — B34.8 RHINOVIRUS: Primary | ICD-10-CM

## 2023-09-16 DIAGNOSIS — N12 PYELONEPHRITIS: ICD-10-CM

## 2023-09-16 DIAGNOSIS — J18.9 PNEUMONIA DUE TO INFECTIOUS ORGANISM, UNSPECIFIED LATERALITY, UNSPECIFIED PART OF LUNG: ICD-10-CM

## 2023-09-16 DIAGNOSIS — R09.02 HYPOXIA: ICD-10-CM

## 2023-09-16 DIAGNOSIS — J18.9 ATYPICAL PNEUMONIA: ICD-10-CM

## 2023-09-16 LAB
ATRIAL RATE - MUSE: 85 BPM
DIASTOLIC BLOOD PRESSURE - MUSE: NORMAL MMHG
INTERPRETATION ECG - MUSE: NORMAL
P AXIS - MUSE: 50 DEGREES
PR INTERVAL - MUSE: 122 MS
QRS DURATION - MUSE: 72 MS
QT - MUSE: 336 MS
QTC - MUSE: 399 MS
R AXIS - MUSE: 66 DEGREES
SYSTOLIC BLOOD PRESSURE - MUSE: NORMAL MMHG
T AXIS - MUSE: 57 DEGREES
VENTRICULAR RATE- MUSE: 85 BPM

## 2023-09-16 PROCEDURE — 94640 AIRWAY INHALATION TREATMENT: CPT

## 2023-09-16 PROCEDURE — 250N000009 HC RX 250: Performed by: EMERGENCY MEDICINE

## 2023-09-16 PROCEDURE — 99285 EMERGENCY DEPT VISIT HI MDM: CPT | Mod: 25

## 2023-09-16 RX ORDER — KETOROLAC TROMETHAMINE 15 MG/ML
15 INJECTION, SOLUTION INTRAMUSCULAR; INTRAVENOUS ONCE
Status: COMPLETED | OUTPATIENT
Start: 2023-09-16 | End: 2023-09-17

## 2023-09-16 RX ORDER — CEFTRIAXONE 2 G/1
2 INJECTION, POWDER, FOR SOLUTION INTRAMUSCULAR; INTRAVENOUS ONCE
Status: COMPLETED | OUTPATIENT
Start: 2023-09-17 | End: 2023-09-17

## 2023-09-16 RX ORDER — IPRATROPIUM BROMIDE AND ALBUTEROL SULFATE 2.5; .5 MG/3ML; MG/3ML
3 SOLUTION RESPIRATORY (INHALATION) ONCE
Status: COMPLETED | OUTPATIENT
Start: 2023-09-16 | End: 2023-09-16

## 2023-09-16 RX ORDER — AZITHROMYCIN 500 MG/5ML
500 INJECTION, POWDER, LYOPHILIZED, FOR SOLUTION INTRAVENOUS ONCE
Status: COMPLETED | OUTPATIENT
Start: 2023-09-17 | End: 2023-09-17

## 2023-09-16 RX ADMIN — IPRATROPIUM BROMIDE AND ALBUTEROL SULFATE 3 ML: .5; 3 SOLUTION RESPIRATORY (INHALATION) at 23:27

## 2023-09-16 ASSESSMENT — ACTIVITIES OF DAILY LIVING (ADL): ADLS_ACUITY_SCORE: 33

## 2023-09-16 NOTE — ED TRIAGE NOTES
Pt c/o chest pain, shortness of breath, lightheadedness, and nausea since last Saturday, and c/o diarrhea for past 2-3 days.  Pt was seen here twice in the past week for same thing and was placed on a Z-Pack and prednisone.  Pt's last Prednisone taken today.  Pt states she feels like her symptoms are getting worse.  Pt's oxygen sats were 90-91% in triage, so oxygen 2 LPM applied via n/c and sats improved.     Triage Assessment       Row Name 09/15/23 2040       Triage Assessment (Adult)    Airway WDL WDL       Respiratory WDL    Respiratory WDL X  c/o SOB       Skin Circulation/Temperature WDL    Skin Circulation/Temperature WDL WDL       Cardiac WDL    Cardiac WDL X;chest pain       Peripheral/Neurovascular WDL    Peripheral Neurovascular WDL WDL       Cognitive/Neuro/Behavioral WDL    Cognitive/Neuro/Behavioral WDL WDL

## 2023-09-16 NOTE — ED PROVIDER NOTES
EMERGENCY DEPARTMENT ENCOUNTER      NAME: Jacqueline Pappas  AGE: 38 year old female  YOB: 1985  MRN: [id number]  EVALUATION DATE & TIME: 9/15/2023 10:08 PM    PCP: Pao Montague    ED PROVIDER: Jb Gómez MD      Chief Complaint   Patient presents with    Shortness of Breath    Chest Pain    Lightheaded    Nausea    Diarrhea         FINAL IMPRESSION:  No diagnosis found.      ED COURSE & MEDICAL DECISION MAKING:    Pertinent Labs & Imaging studies reviewed. (See chart for details)  38 year old female presents to the Emergency Department for evaluation of cough and shortness of breath.    Patient states that for the past week or so she has been having some feelings of shortness of breath as well as intermittent chest tightness cough with some productive sputum over the past few days.  Was evaluated 2 days ago here with a CTA that did not show any signs of pulmonary embolism but did note some bilateral groundglass opacities.  She started on azithromycin and prednisone at that point in time.  She states she has been checking her oxygen at home with O2 saturations in the high 80s and she was in the low 90s here in triage and was placed on O2 for this.  However during my examination and history I turned off her oxygen she maintained sats greater than 95%.    On examination here she is clear lungs without any wheezes or rails no increased work of breathing.  Her initial tachycardia resolved and she has a heart rate in the 70s during my examination.  She has benign abdomen.  She appears well-hydrated.  No lower extremity edema or unilateral leg swelling.      With a rather extensive work-up 2 days ago negative CTA at that point in time for PE do not believe she needs repeat cross-sectional imaging.  Suspect persistent symptoms related to viral respiratory infection particular as she does have some associated diarrhea as well.  However, I think is appropriate for her to continue with her azithromycin she  is a smoker as well as her prednisone dose.  Labs here do show mild leukocytosis to think is secondary to the steroids.  Troponin is negative.  Very slight elevation in her potassium here but overall actually improved renal function compared to baseline.  Recommend patient follow-up  with the primary care doctor for repeat lab testing to ensure no persistent hyperkalemia but do not believe this requires emergent management at this point in time particular given no ECG changes related to hyper-K.  She is saturating well on room air at this point in time believe she needs further inpatient hospitalization or treatment of her respiratory symptoms.  She may complete her course of azithromycin and the steroids.  She return to the emergency department she develops significantly worsening shortness of breath, persistent fevers, intractable vomiting or other new or concerning symptoms.  Otherwise safe for discharge home at this point in time.         Medical Decision Making    History:  Supplemental history from: Documented in chart, if applicable  External Record(s) reviewed: Documented in chart, if applicable.    Work Up:  Chart documentation includes differential considered and any EKGs or imaging independently interpreted by provider, where specified.  In additional to work up documented, I considered the following work up: Documented in chart, if applicable.    External consultation:  Discussion of management with another provider: Documented in chart, if applicable    Complicating factors:  Care impacted by chronic illness: N/A  Care affected by social determinants of health: N/A    Disposition considerations: Discharge. No recommendations on prescription strength medication(s). See documentation for any additional details.       At the conclusion of the encounter I discussed the results of all of the tests and the disposition. The questions were answered. The patient or family acknowledged understanding and was  agreeable with the care plan.     MEDICATIONS GIVEN IN THE EMERGENCY:  Medications   ondansetron (ZOFRAN ODT) ODT tab 4 mg (has no administration in time range)       NEW PRESCRIPTIONS STARTED AT TODAY'S ER VISIT  New Prescriptions    No medications on file          =================================================================    HPI    Patient is a 38-year-old female presented to the emergency department for evaluation of about a week of cough and generalized fatigue as well as shortness of breath.  She is been evaluated at the emergency department several times previously for this and most recently this was 2 days ago.  At that point time she had a CTA that showed no signs of pulm embolism did show some bilateral airspace disease with groundglass opacities.  She was started on azithromycin and prednisone at that point in time.  However she continues to have some shortness of breath at home.  She denies any fevers.  Denies any particular chest pain but has a generalized feeling of chest tightness.  No nausea or vomiting but has had a few episodes of nonbloody diarrhea.  No known sick contacts.  COVID and flu were negative from 2 days ago.  Does smoke about half a pack a day.      REVIEW OF SYSTEMS   Refer to the [facility]    PAST MEDICAL HISTORY:  Past Medical History:   Diagnosis Date    Acute renal failure (H)     Anemia     Anemia     Calculus of kidney     Congenital absence of left kidney     Congenital absence of one kidney     Depression     Depression     Hydronephrosis     Kidney stone     at age 29    Pyelonephritis     Pyelonephritis     Smoker     Ureteral stricture     UTI (urinary tract infection)     Wrist fracture     Left       PAST SURGICAL HISTORY:  Past Surgical History:   Procedure Laterality Date     SECTION       SECTION      x1    COMBINED CYSTOSCOPY, RETROGRADES, URETEROSCOPY, LASER HOLMIUM LITHOTRIPSY URETER(S), INSERT STENT Right 2016    Procedure: COMBINED CYSTOSCOPY,  RETROGRADES, URETEROSCOPY, LASER HOLMIUM LITHOTRIPSY URETER(S), INSERT STENT;  Surgeon: Florentino Awad MD;  Location: [facility] OR    CYSTOSCOPY, URETEROSCOPY, COMBINED Right 9/14/2016    Procedure: COMBINED CYSTOSCOPY, URETEROSCOPY;  Surgeon: Florentino Awad MD;  Location: [facility] OR    IR NEPHROLITHOTOMY  12/11/2014    IR NEPHROSTOGRAM EXISITING ACCESS  10/18/2018    IR NEPHROSTOMY TUBE CHANGE RIGHT  12/12/2018    IR NEPHROSTOMY TUBE CHANGE RIGHT  6/11/2020    IR NEPHROSTOMY TUBE CHANGE RIGHT  12/12/2018    IR NEPHROSTOMY TUBE CHANGE RIGHT  6/11/2020    IR NEPHROSTOMY TUBE PLACEMENT RIGHT  7/24/2016    IR NEPHROSTOMY TUBE PLACEMENT RIGHT  9/14/2017    KIDNEY STONE SURGERY Right 05/2016    LASER HOLMIUM LITHOTRIPSY URETER(S), INSERT STENT, COMBINED Left 11/1/2016    Procedure: COMBINED CYSTOSCOPY, URETEROSCOPY, LASER HOLMIUM LITHOTRIPSY URETER(S), INSERT STENT;  Surgeon: Florentino Awad MD;  Location: [facility] OR    LASER HOLMIUM NEPHROLITHOTOMY VIA PERCUTANEOUS NEPHROSTOMY Right 9/14/2016    Procedure: LASER HOLMIUM NEPHROLITHOTOMY VIA PERCUTANEOUS NEPHROSTOMY;  Surgeon: Florentino Awad MD;  Location: [facility] OR    NEPHROSTOMY W/ INTRODUCTION OF CATHETER Right     OTHER SURGICAL HISTORY      Mole removednasal    OTHER SURGICAL HISTORY Right     Cystoscopy, ureteroscopy, laser11/02/2014 x2 with ureteral stent insertion    PERCUTANEOUS NEPHROSTOMY  11/1/2016    Procedure: PERCUTANEOUS NEPHROSTOMY;  Surgeon: Florentino Awad MD;  Location: [facility] OR    WISDOM TOOTH EXTRACTION      ZZC REMOVAL OF KIDNEY STONE             CURRENT MEDICATIONS:    albuterol (ACCUNEB) 1.25 MG/3ML neb solution  azithromycin (ZITHROMAX Z-SHAHIDA) 250 MG tablet  ipratropium - albuterol 0.5 mg/2.5 mg/3 mL (DUONEB) 0.5-2.5 (3) MG/3ML neb solution  predniSONE (DELTASONE) 20 MG tablet  sertraline (ZOLOFT) 100 MG tablet        ALLERGIES:  Allergies   Allergen Reactions    Vancomycin     Desogestrel-Ethinyl Estradiol Angioedema and Swelling      Swelling of hands and feet per pt.  Swelling of hands and feet per pt.  Swelling of hands and feet per pt.  Swelling of hands and feet per pt.  Swelling of hands and feet per pt.  Swelling of hands and feet per pt.  Swelling of hands and feet per pt.  Swelling of hands and feet per pt.      Penicillins Rash     As a child per mother; mother unsure if has tolerated another PCN since. Tolerated ampicillin 18    Sulfa Antibiotics Rash       FAMILY HISTORY:  Family History   Problem Relation Age of Onset    Anesthesia Reaction No family hx of     Malignant Hyperthermia No family hx of     Heart Disease Maternal Uncle         heart failure    Coronary Artery Disease Other     Heart Disease Maternal Grandmother         pacemaker    Gout Paternal Grandfather     Prostate Cancer Maternal Aunt         breast    Heart Disease Maternal Aunt         pacemaker    Heart Disease Maternal Aunt         pacemaker    Heart Disease Maternal Aunt         pacemaker       SOCIAL HISTORY:   Social History     Socioeconomic History    Marital status: Single   Tobacco Use    Smoking status: Every Day     Packs/day: 0.50     Years: 10.00     Pack years: 5.00     Types: Cigarettes     Last attempt to quit: 2016     Years since quittin.9    Smokeless tobacco: Former     Quit date: 2016   Substance and Sexual Activity    Alcohol use: Yes     Alcohol/week: 0.0 standard drinks of alcohol     Comment: Alcoholic Drinks/day: occ    Drug use: No       VITALS:  /64   Pulse 104   Temp 98.6  F (37  C) (Oral)   Resp 16   Ht 1.524 m (5')   Wt 86.2 kg (190 lb)   LMP 2023 (Approximate)   SpO2 91%   BMI 37.11 kg/m      PHYSICAL EXAM    Constitutional: Well developed, Well nourished, NAD,  HENT: Normocephalic, Atraumatic,  mucous membranes moist,   Neck- trachea midline, No stridor.    Eyes:EOMI, Conjunctiva normal, No discharge.   Respiratory: Normal work of breathing, no wheezes or rales, speaks in full  sentences  Cardiovascular: Normal heart rate, Regular rhythm,  No murmurs, No rubs, No gallops. Chest wall nontender.    Abdominal: Soft, No tenderness, No rebound or guarding.     Musculoskeletal:No edema. No cyanosis,   Integument: Warm, Dry, No erythema, No rash.   Neurologic: Alert & oriented x 3   Psychiatric: Affect normal, Judgment normal, Mood normal. Cooperative.      LAB:  All pertinent labs reviewed and interpreted.       RADIOLOGY:  Reviewed all pertinent imaging. Please see official radiology report.  No orders to display       EKG:    Performed at: 2050    Impression: EKG shows a sinus rhythm at 85 beats a minute, normal axis, MI interval 122 ms, QRS 72 ms, QTc 399 ms, no ST or T wave changes jesting acute ischemia.  Overall EKG does not show any significant changes when compared to prior.    I have independently reviewed and interpreted the EKG(s) documented above.    PROCEDURES:         [employer] Documentation:   CMS Diagnoses:                  Jb Gómez MD  [facility] EMERGENCY DEPARTMENT  [facility] [facility] [id number]  [phone]      Jb Gómez MD  09/16/23 0557

## 2023-09-17 ENCOUNTER — APPOINTMENT (OUTPATIENT)
Dept: CT IMAGING | Facility: CLINIC | Age: 38
End: 2023-09-17
Attending: EMERGENCY MEDICINE
Payer: MEDICAID

## 2023-09-17 ENCOUNTER — APPOINTMENT (OUTPATIENT)
Dept: RADIOLOGY | Facility: CLINIC | Age: 38
End: 2023-09-17
Attending: EMERGENCY MEDICINE
Payer: MEDICAID

## 2023-09-17 PROBLEM — J18.9 ATYPICAL PNEUMONIA: Status: ACTIVE | Noted: 2023-09-17

## 2023-09-17 PROBLEM — R09.02 HYPOXIA: Status: ACTIVE | Noted: 2023-09-17

## 2023-09-17 LAB
ALBUMIN UR-MCNC: 300 MG/DL
ANION GAP SERPL CALCULATED.3IONS-SCNC: 10 MMOL/L (ref 7–15)
APPEARANCE UR: ABNORMAL
ATRIAL RATE - MUSE: 113 BPM
BACTERIA #/AREA URNS HPF: ABNORMAL /HPF
BASOPHILS # BLD AUTO: 0 10E3/UL (ref 0–0.2)
BASOPHILS NFR BLD AUTO: 0 %
BILIRUB UR QL STRIP: NEGATIVE
BUN SERPL-MCNC: 17.2 MG/DL (ref 6–20)
C PNEUM DNA SPEC QL NAA+PROBE: NOT DETECTED
CALCIUM SERPL-MCNC: 10.1 MG/DL (ref 8.6–10)
CHLORIDE SERPL-SCNC: 99 MMOL/L (ref 98–107)
COLOR UR AUTO: YELLOW
CREAT SERPL-MCNC: 1.2 MG/DL (ref 0.51–0.95)
DEPRECATED HCO3 PLAS-SCNC: 26 MMOL/L (ref 22–29)
DIASTOLIC BLOOD PRESSURE - MUSE: 58 MMHG
EGFRCR SERPLBLD CKD-EPI 2021: 60 ML/MIN/1.73M2
EOSINOPHIL # BLD AUTO: 0.6 10E3/UL (ref 0–0.7)
EOSINOPHIL NFR BLD AUTO: 4 %
ERYTHROCYTE [DISTWIDTH] IN BLOOD BY AUTOMATED COUNT: 13.6 % (ref 10–15)
FLUAV H1 2009 PAND RNA SPEC QL NAA+PROBE: NOT DETECTED
FLUAV H1 RNA SPEC QL NAA+PROBE: NOT DETECTED
FLUAV H3 RNA SPEC QL NAA+PROBE: NOT DETECTED
FLUAV RNA SPEC QL NAA+PROBE: NEGATIVE
FLUAV RNA SPEC QL NAA+PROBE: NOT DETECTED
FLUBV RNA RESP QL NAA+PROBE: NEGATIVE
FLUBV RNA SPEC QL NAA+PROBE: NOT DETECTED
GLUCOSE SERPL-MCNC: 112 MG/DL (ref 70–99)
GLUCOSE UR STRIP-MCNC: NEGATIVE MG/DL
HADV DNA SPEC QL NAA+PROBE: NOT DETECTED
HBA1C MFR BLD: 5 %
HCOV PNL SPEC NAA+PROBE: NOT DETECTED
HCT VFR BLD AUTO: 41.8 % (ref 35–47)
HGB BLD-MCNC: 13.8 G/DL (ref 11.7–15.7)
HGB UR QL STRIP: ABNORMAL
HMPV RNA SPEC QL NAA+PROBE: NOT DETECTED
HPIV1 RNA SPEC QL NAA+PROBE: NOT DETECTED
HPIV2 RNA SPEC QL NAA+PROBE: NOT DETECTED
HPIV3 RNA SPEC QL NAA+PROBE: NOT DETECTED
HPIV4 RNA SPEC QL NAA+PROBE: NOT DETECTED
HYALINE CASTS: 5 /LPF
IMM GRANULOCYTES # BLD: 0.1 10E3/UL
IMM GRANULOCYTES NFR BLD: 1 %
INTERPRETATION ECG - MUSE: NORMAL
KETONES UR STRIP-MCNC: NEGATIVE MG/DL
LACTATE SERPL-SCNC: 1.2 MMOL/L (ref 0.7–2)
LEUKOCYTE ESTERASE UR QL STRIP: ABNORMAL
LYMPHOCYTES # BLD AUTO: 1.6 10E3/UL (ref 0.8–5.3)
LYMPHOCYTES NFR BLD AUTO: 12 %
M PNEUMO DNA SPEC QL NAA+PROBE: NOT DETECTED
MCH RBC QN AUTO: 30.1 PG (ref 26.5–33)
MCHC RBC AUTO-ENTMCNC: 33 G/DL (ref 31.5–36.5)
MCV RBC AUTO: 91 FL (ref 78–100)
MONOCYTES # BLD AUTO: 0.8 10E3/UL (ref 0–1.3)
MONOCYTES NFR BLD AUTO: 6 %
MUCOUS THREADS #/AREA URNS LPF: PRESENT /LPF
NEUTROPHILS # BLD AUTO: 10.6 10E3/UL (ref 1.6–8.3)
NEUTROPHILS NFR BLD AUTO: 77 %
NITRATE UR QL: NEGATIVE
NRBC # BLD AUTO: 0 10E3/UL
NRBC BLD AUTO-RTO: 0 /100
P AXIS - MUSE: 63 DEGREES
PH UR STRIP: 8 [PH] (ref 5–7)
PLATELET # BLD AUTO: 204 10E3/UL (ref 150–450)
POTASSIUM SERPL-SCNC: 4.4 MMOL/L (ref 3.4–5.3)
PR INTERVAL - MUSE: 116 MS
QRS DURATION - MUSE: 70 MS
QT - MUSE: 304 MS
QTC - MUSE: 416 MS
R AXIS - MUSE: 74 DEGREES
RBC # BLD AUTO: 4.58 10E6/UL (ref 3.8–5.2)
RBC URINE: >182 /HPF
RSV RNA SPEC NAA+PROBE: NEGATIVE
RSV RNA SPEC QL NAA+PROBE: NOT DETECTED
RSV RNA SPEC QL NAA+PROBE: NOT DETECTED
RV+EV RNA SPEC QL NAA+PROBE: DETECTED
SARS-COV-2 RNA RESP QL NAA+PROBE: NEGATIVE
SODIUM SERPL-SCNC: 135 MMOL/L (ref 136–145)
SP GR UR STRIP: 1.01 (ref 1–1.03)
SQUAMOUS EPITHELIAL: 1 /HPF
SYSTOLIC BLOOD PRESSURE - MUSE: 116 MMHG
T AXIS - MUSE: 73 DEGREES
UROBILINOGEN UR STRIP-MCNC: <2 MG/DL
VENTRICULAR RATE- MUSE: 113 BPM
WBC # BLD AUTO: 13.9 10E3/UL (ref 4–11)
WBC CLUMPS #/AREA URNS HPF: PRESENT /HPF
WBC URINE: >182 /HPF

## 2023-09-17 PROCEDURE — 999N000157 HC STATISTIC RCP TIME EA 10 MIN

## 2023-09-17 PROCEDURE — 96367 TX/PROPH/DG ADDL SEQ IV INF: CPT

## 2023-09-17 PROCEDURE — 96375 TX/PRO/DX INJ NEW DRUG ADDON: CPT

## 2023-09-17 PROCEDURE — 74176 CT ABD & PELVIS W/O CONTRAST: CPT

## 2023-09-17 PROCEDURE — 250N000013 HC RX MED GY IP 250 OP 250 PS 637: Performed by: STUDENT IN AN ORGANIZED HEALTH CARE EDUCATION/TRAINING PROGRAM

## 2023-09-17 PROCEDURE — 87633 RESP VIRUS 12-25 TARGETS: CPT | Performed by: INTERNAL MEDICINE

## 2023-09-17 PROCEDURE — 250N000011 HC RX IP 250 OP 636: Performed by: EMERGENCY MEDICINE

## 2023-09-17 PROCEDURE — 93005 ELECTROCARDIOGRAM TRACING: CPT | Performed by: EMERGENCY MEDICINE

## 2023-09-17 PROCEDURE — 83605 ASSAY OF LACTIC ACID: CPT | Performed by: EMERGENCY MEDICINE

## 2023-09-17 PROCEDURE — 96366 THER/PROPH/DIAG IV INF ADDON: CPT

## 2023-09-17 PROCEDURE — 120N000001 HC R&B MED SURG/OB

## 2023-09-17 PROCEDURE — 94799 UNLISTED PULMONARY SVC/PX: CPT

## 2023-09-17 PROCEDURE — 87637 SARSCOV2&INF A&B&RSV AMP PRB: CPT | Performed by: EMERGENCY MEDICINE

## 2023-09-17 PROCEDURE — 250N000013 HC RX MED GY IP 250 OP 250 PS 637: Performed by: INTERNAL MEDICINE

## 2023-09-17 PROCEDURE — 83036 HEMOGLOBIN GLYCOSYLATED A1C: CPT | Performed by: INTERNAL MEDICINE

## 2023-09-17 PROCEDURE — 87086 URINE CULTURE/COLONY COUNT: CPT | Performed by: EMERGENCY MEDICINE

## 2023-09-17 PROCEDURE — 258N000003 HC RX IP 258 OP 636: Performed by: INTERNAL MEDICINE

## 2023-09-17 PROCEDURE — 87486 CHLMYD PNEUM DNA AMP PROBE: CPT | Performed by: INTERNAL MEDICINE

## 2023-09-17 PROCEDURE — 81001 URINALYSIS AUTO W/SCOPE: CPT | Performed by: EMERGENCY MEDICINE

## 2023-09-17 PROCEDURE — 85025 COMPLETE CBC W/AUTO DIFF WBC: CPT | Performed by: EMERGENCY MEDICINE

## 2023-09-17 PROCEDURE — 36415 COLL VENOUS BLD VENIPUNCTURE: CPT | Performed by: EMERGENCY MEDICINE

## 2023-09-17 PROCEDURE — 96365 THER/PROPH/DIAG IV INF INIT: CPT

## 2023-09-17 PROCEDURE — 87040 BLOOD CULTURE FOR BACTERIA: CPT | Performed by: EMERGENCY MEDICINE

## 2023-09-17 PROCEDURE — 258N000003 HC RX IP 258 OP 636: Performed by: EMERGENCY MEDICINE

## 2023-09-17 PROCEDURE — 99223 1ST HOSP IP/OBS HIGH 75: CPT | Performed by: INTERNAL MEDICINE

## 2023-09-17 PROCEDURE — 80048 BASIC METABOLIC PNL TOTAL CA: CPT | Performed by: EMERGENCY MEDICINE

## 2023-09-17 PROCEDURE — 71046 X-RAY EXAM CHEST 2 VIEWS: CPT

## 2023-09-17 RX ORDER — IPRATROPIUM BROMIDE AND ALBUTEROL SULFATE 2.5; .5 MG/3ML; MG/3ML
1 SOLUTION RESPIRATORY (INHALATION) EVERY 6 HOURS PRN
Status: DISCONTINUED | OUTPATIENT
Start: 2023-09-17 | End: 2023-09-18 | Stop reason: HOSPADM

## 2023-09-17 RX ORDER — ALBUTEROL SULFATE 0.83 MG/ML
2.5 SOLUTION RESPIRATORY (INHALATION)
Status: DISCONTINUED | OUTPATIENT
Start: 2023-09-17 | End: 2023-09-17

## 2023-09-17 RX ORDER — ALBUTEROL SULFATE 0.83 MG/ML
2.5 SOLUTION RESPIRATORY (INHALATION)
Status: DISCONTINUED | OUTPATIENT
Start: 2023-09-17 | End: 2023-09-18 | Stop reason: HOSPADM

## 2023-09-17 RX ORDER — HYDROMORPHONE HYDROCHLORIDE 1 MG/ML
0.5 INJECTION, SOLUTION INTRAMUSCULAR; INTRAVENOUS; SUBCUTANEOUS EVERY 30 MIN PRN
Status: COMPLETED | OUTPATIENT
Start: 2023-09-17 | End: 2023-09-17

## 2023-09-17 RX ORDER — LIDOCAINE 40 MG/G
CREAM TOPICAL
Status: DISCONTINUED | OUTPATIENT
Start: 2023-09-17 | End: 2023-09-18 | Stop reason: HOSPADM

## 2023-09-17 RX ORDER — SERTRALINE HYDROCHLORIDE 100 MG/1
100 TABLET, FILM COATED ORAL AT BEDTIME
Status: DISCONTINUED | OUTPATIENT
Start: 2023-09-17 | End: 2023-09-18 | Stop reason: HOSPADM

## 2023-09-17 RX ORDER — ACETAMINOPHEN 650 MG/1
650 SUPPOSITORY RECTAL EVERY 6 HOURS PRN
Status: DISCONTINUED | OUTPATIENT
Start: 2023-09-17 | End: 2023-09-18 | Stop reason: HOSPADM

## 2023-09-17 RX ORDER — ACETAMINOPHEN 325 MG/1
650 TABLET ORAL EVERY 6 HOURS PRN
Status: DISCONTINUED | OUTPATIENT
Start: 2023-09-17 | End: 2023-09-18 | Stop reason: HOSPADM

## 2023-09-17 RX ORDER — AZITHROMYCIN 500 MG/5ML
500 INJECTION, POWDER, LYOPHILIZED, FOR SOLUTION INTRAVENOUS EVERY 24 HOURS
Status: DISCONTINUED | OUTPATIENT
Start: 2023-09-18 | End: 2023-09-18 | Stop reason: HOSPADM

## 2023-09-17 RX ORDER — ONDANSETRON 4 MG/1
4 TABLET, ORALLY DISINTEGRATING ORAL EVERY 6 HOURS PRN
Status: DISCONTINUED | OUTPATIENT
Start: 2023-09-17 | End: 2023-09-18 | Stop reason: HOSPADM

## 2023-09-17 RX ORDER — ALBUTEROL SULFATE 1.25 MG/3ML
1.25 SOLUTION RESPIRATORY (INHALATION) EVERY 6 HOURS PRN
Status: DISCONTINUED | OUTPATIENT
Start: 2023-09-17 | End: 2023-09-18 | Stop reason: HOSPADM

## 2023-09-17 RX ORDER — ONDANSETRON 2 MG/ML
4 INJECTION INTRAMUSCULAR; INTRAVENOUS EVERY 6 HOURS PRN
Status: DISCONTINUED | OUTPATIENT
Start: 2023-09-17 | End: 2023-09-18 | Stop reason: HOSPADM

## 2023-09-17 RX ORDER — SODIUM CHLORIDE 9 MG/ML
INJECTION, SOLUTION INTRAVENOUS CONTINUOUS
Status: DISCONTINUED | OUTPATIENT
Start: 2023-09-17 | End: 2023-09-17

## 2023-09-17 RX ORDER — CEFTRIAXONE 2 G/1
2 INJECTION, POWDER, FOR SOLUTION INTRAMUSCULAR; INTRAVENOUS EVERY 24 HOURS
Status: DISCONTINUED | OUTPATIENT
Start: 2023-09-18 | End: 2023-09-18 | Stop reason: HOSPADM

## 2023-09-17 RX ORDER — ACETAMINOPHEN 500 MG
500-1000 TABLET ORAL EVERY 6 HOURS PRN
COMMUNITY

## 2023-09-17 RX ORDER — SODIUM CHLORIDE 9 MG/ML
INJECTION, SOLUTION INTRAVENOUS CONTINUOUS
Status: DISCONTINUED | OUTPATIENT
Start: 2023-09-17 | End: 2023-09-18 | Stop reason: HOSPADM

## 2023-09-17 RX ORDER — ACETAMINOPHEN AND CODEINE PHOSPHATE 300; 30 MG/1; MG/1
1 TABLET ORAL EVERY 4 HOURS PRN
Status: DISCONTINUED | OUTPATIENT
Start: 2023-09-17 | End: 2023-09-18 | Stop reason: HOSPADM

## 2023-09-17 RX ORDER — ACETAMINOPHEN 500 MG
500-1000 TABLET ORAL EVERY 6 HOURS PRN
Status: DISCONTINUED | OUTPATIENT
Start: 2023-09-17 | End: 2023-09-18 | Stop reason: HOSPADM

## 2023-09-17 RX ADMIN — SODIUM CHLORIDE: 9 INJECTION, SOLUTION INTRAVENOUS at 20:28

## 2023-09-17 RX ADMIN — CEFTRIAXONE SODIUM 2 G: 2 INJECTION, POWDER, FOR SOLUTION INTRAMUSCULAR; INTRAVENOUS at 02:02

## 2023-09-17 RX ADMIN — HYDROMORPHONE HYDROCHLORIDE 0.5 MG: 1 INJECTION, SOLUTION INTRAMUSCULAR; INTRAVENOUS; SUBCUTANEOUS at 02:50

## 2023-09-17 RX ADMIN — HYDROMORPHONE HYDROCHLORIDE 0.5 MG: 1 INJECTION, SOLUTION INTRAMUSCULAR; INTRAVENOUS; SUBCUTANEOUS at 13:25

## 2023-09-17 RX ADMIN — ACETAMINOPHEN AND CODEINE PHOSPHATE 1 TABLET: 300; 30 TABLET ORAL at 20:25

## 2023-09-17 RX ADMIN — ACETAMINOPHEN AND CODEINE PHOSPHATE 1 TABLET: 300; 30 TABLET ORAL at 15:58

## 2023-09-17 RX ADMIN — HYDROMORPHONE HYDROCHLORIDE 0.5 MG: 1 INJECTION, SOLUTION INTRAMUSCULAR; INTRAVENOUS; SUBCUTANEOUS at 10:22

## 2023-09-17 RX ADMIN — SODIUM CHLORIDE: 9 INJECTION, SOLUTION INTRAVENOUS at 10:17

## 2023-09-17 RX ADMIN — Medication 1 MG: at 20:26

## 2023-09-17 RX ADMIN — KETOROLAC TROMETHAMINE 15 MG: 15 INJECTION INTRAMUSCULAR; INTRAVENOUS at 01:02

## 2023-09-17 RX ADMIN — SERTRALINE 100 MG: 100 TABLET, FILM COATED ORAL at 20:25

## 2023-09-17 RX ADMIN — AZITHROMYCIN MONOHYDRATE 500 MG: 500 INJECTION, POWDER, LYOPHILIZED, FOR SOLUTION INTRAVENOUS at 00:55

## 2023-09-17 ASSESSMENT — ACTIVITIES OF DAILY LIVING (ADL)
ADLS_ACUITY_SCORE: 35

## 2023-09-17 NOTE — ED TRIAGE NOTES
Patient presents to the ED complaining of right sided flank pain for the past day.  She states she only has a right kidney with a nephrostomy tube in place, she states she has been producing urine.  Is on day 3 of a zpak for lung inflammation she reports.  O2 90% on room air.       Triage Assessment       Row Name 09/16/23 3583       Triage Assessment (Adult)    Airway WDL WDL       Respiratory WDL    Respiratory WDL WDL       Skin Circulation/Temperature WDL    Skin Circulation/Temperature WDL WDL       Cardiac WDL    Cardiac WDL WDL       Peripheral/Neurovascular WDL    Peripheral Neurovascular WDL WDL       Cognitive/Neuro/Behavioral WDL    Cognitive/Neuro/Behavioral WDL WDL                     yes

## 2023-09-17 NOTE — CONSULTS
I have been asked to consult on Jacqueline Pappas, a 38 year old female  by  Right nephrostomy tube     HPI: 39 yo female with history chronic nephrostomy tube right side. Reports due for stent exchange typically done at [facility]     REVIEW OF SYSTEMS:  Report no pain right nephrostomy.      Past Medical History:   Diagnosis Date    Acute renal failure (H)     Anemia     Anemia     Calculus of kidney     Congenital absence of left kidney     Congenital absence of one kidney     Depression     Depression     Hydronephrosis     Kidney stone     at age 29    Pyelonephritis     Pyelonephritis     Smoker     Ureteral stricture     UTI (urinary tract infection)     Wrist fracture     Left     Family History   Problem Relation Age of Onset    Anesthesia Reaction No family hx of     Malignant Hyperthermia No family hx of     Heart Disease Maternal Uncle         heart failure    Coronary Artery Disease Other     Heart Disease Maternal Grandmother         pacemaker    Gout Paternal Grandfather     Prostate Cancer Maternal Aunt         breast    Heart Disease Maternal Aunt         pacemaker    Heart Disease Maternal Aunt         pacemaker    Heart Disease Maternal Aunt         pacemaker     Social History     Tobacco Use    Smoking status: Every Day     Packs/day: 0.50     Years: 10.00     Pack years: 5.00     Types: Cigarettes     Last attempt to quit: 2016     Years since quittin.9    Smokeless tobacco: Former     Quit date: 2016    Tobacco comments:     Hasn't smoked in a week due to breathing issues    Substance Use Topics    Alcohol use: Not Currently     Comment: Alcoholic Drinks/day: occ     Past Surgical History:   Procedure Laterality Date     SECTION       SECTION      x1    COMBINED CYSTOSCOPY, RETROGRADES, URETEROSCOPY, LASER HOLMIUM LITHOTRIPSY URETER(S), INSERT STENT Right 2016    Procedure: COMBINED CYSTOSCOPY, RETROGRADES, URETEROSCOPY, LASER HOLMIUM LITHOTRIPSY URETER(S),  INSERT STENT;  Surgeon: Florentino Awad MD;  Location: [facility] OR    CYSTOSCOPY, URETEROSCOPY, COMBINED Right 9/14/2016    Procedure: COMBINED CYSTOSCOPY, URETEROSCOPY;  Surgeon: Florentino Awad MD;  Location: [facility] OR    IR NEPHROLITHOTOMY  12/11/2014    IR NEPHROSTOGRAM EXISITING ACCESS  10/18/2018    IR NEPHROSTOMY TUBE CHANGE RIGHT  12/12/2018    IR NEPHROSTOMY TUBE CHANGE RIGHT  6/11/2020    IR NEPHROSTOMY TUBE CHANGE RIGHT  12/12/2018    IR NEPHROSTOMY TUBE CHANGE RIGHT  6/11/2020    IR NEPHROSTOMY TUBE PLACEMENT RIGHT  7/24/2016    IR NEPHROSTOMY TUBE PLACEMENT RIGHT  9/14/2017    KIDNEY STONE SURGERY Right 05/2016    LASER HOLMIUM LITHOTRIPSY URETER(S), INSERT STENT, COMBINED Left 11/1/2016    Procedure: COMBINED CYSTOSCOPY, URETEROSCOPY, LASER HOLMIUM LITHOTRIPSY URETER(S), INSERT STENT;  Surgeon: Florentino Awad MD;  Location: [facility] OR    LASER HOLMIUM NEPHROLITHOTOMY VIA PERCUTANEOUS NEPHROSTOMY Right 9/14/2016    Procedure: LASER HOLMIUM NEPHROLITHOTOMY VIA PERCUTANEOUS NEPHROSTOMY;  Surgeon: Florentino Awad MD;  Location: [facility] OR    NEPHROSTOMY W/ INTRODUCTION OF CATHETER Right     OTHER SURGICAL HISTORY      Mole removednasal    OTHER SURGICAL HISTORY Right     Cystoscopy, ureteroscopy, laser11/02/2014 x2 with ureteral stent insertion    PERCUTANEOUS NEPHROSTOMY  11/1/2016    Procedure: PERCUTANEOUS NEPHROSTOMY;  Surgeon: Florentino Awad MD;  Location: [facility] OR    WISDOM TOOTH EXTRACTION      ZZC REMOVAL OF KIDNEY STONE       Current Facility-Administered Medications   Medication    acetaminophen (TYLENOL) tablet 650 mg    Or    acetaminophen (TYLENOL) Suppository 650 mg    acetaminophen (TYLENOL) tablet 500-1,000 mg    acetaminophen-codeine (TYLENOL #3) 300-30 MG per tablet 1 tablet    albuterol (ACCUNEB) nebulizer solution 1.25 mg    albuterol (PROVENTIL) neb solution 2.5 mg    albuterol (PROVENTIL) neb solution 2.5 mg    [START ON 9/18/2023] azithromycin 500 mg (ZITHROMAX) in 0.9% NaCl  250 mL intermittent infusion 500 mg    [START ON 9/18/2023] cefTRIAXone (ROCEPHIN) 2 g vial to attach to  ml bag for ADULTS or NS 50 ml bag for PEDS    ipratropium - albuterol 0.5 mg/2.5 mg/3 mL (DUONEB) neb solution 3 mL    lidocaine (LMX4) cream    lidocaine 1 % 0.1-1 mL    melatonin tablet 1 mg    ondansetron (ZOFRAN ODT) ODT tab 4 mg    Or    ondansetron (ZOFRAN) injection 4 mg    sertraline (ZOLOFT) tablet 100 mg    sodium chloride (PF) 0.9% PF flush 3 mL    sodium chloride (PF) 0.9% PF flush 3 mL    sodium chloride 0.9% infusion     ALLERGIES:  Desogestrel-ethinyl estradiol, Penicillins, Sulfa antibiotics, and Vancomycin    VITALS:  Blood pressure 113/71, pulse 84, temperature 98.5  F (36.9  C), temperature source Oral, resp. rate 21, height 1.524 m (5'), weight 86.2 kg (190 lb), last menstrual period 08/13/2023, SpO2 95 %, not currently breastfeeding.    EXAM:  Patient is a 38 year old female in no acute distress. Agree with vital signs as noted above.  Sitting comfortably in bed.  She has a right nephrostomy tube in place which is well-healed around the nephrostomy tube site.  The urine draining from the nephrostomy is clear.    SELECT LAB RESULTS:  WBC   Date Value Ref Range Status   11/02/2016 12.7 (H) 4.0 - 11.0 10e9/L Final     WBC Count   Date Value Ref Range Status   09/17/2023 13.9 (H) 4.0 - 11.0 10e3/uL Final     RBC Count   Date Value Ref Range Status   09/17/2023 4.58 3.80 - 5.20 10e6/uL Final   11/02/2016 3.57 (L) 3.8 - 5.2 10e12/L Final     Hemoglobin   Date Value Ref Range Status   09/17/2023 13.8 11.7 - 15.7 g/dL Final   11/02/2016 10.1 (L) 11.7 - 15.7 g/dL Final     Hematocrit   Date Value Ref Range Status   09/17/2023 41.8 35.0 - 47.0 % Final   11/02/2016 32.4 (L) 35.0 - 47.0 % Final     MCV   Date Value Ref Range Status   09/17/2023 91 78 - 100 fL Final   11/02/2016 91 78 - 100 fl Final     MCH   Date Value Ref Range Status   09/17/2023 30.1 26.5 - 33.0 pg Final   11/02/2016 28.3 26.5  - 33.0 pg Final     MCHC   Date Value Ref Range Status   09/17/2023 33.0 31.5 - 36.5 g/dL Final   11/02/2016 31.2 (L) 31.5 - 36.5 g/dL Final     RDW   Date Value Ref Range Status   09/17/2023 13.6 10.0 - 15.0 % Final   11/02/2016 15.1 (H) 10.0 - 15.0 % Final     Color Urine   Date Value Ref Range Status   09/17/2023 Yellow Colorless, Straw, Light Yellow, Yellow Final   09/21/2016 Yellow  Final     Appearance Urine   Date Value Ref Range Status   09/17/2023 Turbid (A) Clear Final   09/21/2016 Slightly Cloudy  Final     Specific Gravity Urine   Date Value Ref Range Status   09/17/2023 1.012 1.001 - 1.030 Final   09/21/2016 1.009 1.003 - 1.035 Final     pH Arterial   Date Value Ref Range Status   09/22/2016 7.48 (H) 7.35 - 7.45 pH Final     pH   Date Value Ref Range Status   05/12/2023 7.31 (L) 7.35 - 7.45 Final     Glucose Urine   Date Value Ref Range Status   09/17/2023 Negative Negative mg/dL Final   09/21/2016 Negative NEG mg/dL Final     Ketones Urine   Date Value Ref Range Status   09/17/2023 Negative Negative mg/dL Final   09/21/2016 Negative NEG mg/dL Final     Blood Urine   Date Value Ref Range Status   09/17/2023 1.0 mg/dL (A) Negative Final   09/21/2016 Moderate (A) NEG Final     Bilirubin Urine   Date Value Ref Range Status   09/17/2023 Negative Negative Final   09/21/2016 Negative NEG Final     Urobilinogen Urine   Date Value Ref Range Status   06/16/2021 <2.0 mg/dL <2.0 mg/dL Final     Nitrite Urine   Date Value Ref Range Status   09/17/2023 Negative Negative Final   09/21/2016 Negative NEG Final     Leukocyte Esterase Urine   Date Value Ref Range Status   09/17/2023 500 Duglas/uL (A) Negative Final   09/21/2016 Large (A) NEG Final     Bacteria Urine   Date Value Ref Range Status   09/17/2023 Moderate (A) None Seen /HPF Final   09/15/2016 Many (A) NEG /HPF Final     Squamous Epithelials Urine   Date Value Ref Range Status   03/01/2021 0-5 None Seen, 0-5 lpf Final     Mucous Urine   Date Value Ref Range  Status   08/23/2016 Present (A) NEG /LPF Final     Hyaline Casts Urine   Date Value Ref Range Status   09/17/2023 5 (H) <=2 /LPF Final   03/01/2021 0-5 0-5, None Seen lpf Final     CT shows nephrostomy tube in place.     Urinalysis findings are noted from the nephrostomy tube.  Urine culture is pending.  Some bacteriuria and pyuria mild expected    ASSESSMENT/PLAN:: 38-year-old female who presents with chronic indwelling nephrostomy tube and acute presentation for combination of symptoms may be related to pneumonia and/or pyelonephritis of which she has a history.  Urinalysis is currently pending and currently on antibiotics.  The clinical picture is more likely to be related to a pulmonary concerns.  Her nephrostomy tube is in good position without any significant inflammatory changes around the kidney at this time.  No further recommendations from urology standpoint at this point.  Does not need acute intervention for nephrostomy tube change at this time.  We will continue to follow along with you.          Copied cc to Consulting provider.    Wade Morse MD, Urological Surgeon

## 2023-09-17 NOTE — PROGRESS NOTES
[facility]     Medicine Progress Note - Hospitalist Service *note billed as my colleague Dr. Johnson saw a few hours earlier at 5:13am 9/17/2023     Date of Admission:  9/16/2023    Assessment & Plan   Assessment and Plan:  38 year old female past medical history significant for congenital solitary kidney, ureteral stricture, kidney stones, chronic nephrostomy tube in place, tobacco abuse, recurrent UTI/pyelonephritis, depression with multiple ED visits recently, presented with complaints of productive cough, shortness of breath for a week, was seen in the ED on 9/15 and was diagnosed with pneumonia and discharged on azithromycin presented with persistent symptoms, mild hypoxia, right flank pain urinalysis abnormal, but has chronic nephrostomy and on admission felt could be contamination.    On ceftriaxone and azithromycin for CAP. Ceftriaxone would also cover UTI .Urology consulted and pending further input.     -------   Community-acquired pneumonia  Acute respiratory insufficiency  Afebrile here, leukocytosis improving from 16-13.9  Continue ceftriaxone, azithromycin  Follow-up on sputum cultures, pending  Hold off on prednisone for now, stable  Albuterol nebs  Oxygen as needed  Home medications reconciled      Chronic nephrostomy  Congenital solitary kidney, ureteral stricture  Mild GT  Abnormal UA, UTI versus contamination  Last nephrostomy tube exchanged in 3 months ago  Continue above antibiotics  Await urine cultures  Urology to see, appreciate; consult placed 9/17  Cr 1.2 on admit; recheck pending still*     Mild hypercalcemia chronic  Follow-up with IV fluids  Pending HOD1 labs, not drawn yet     History of depression  Stable. Monitor clinically        Diet: Combination Diet Regular Diet Adult    DVT Prophylaxis: Pneumatic Compression Devices  Farr Catheter: Not present  Lines: None     Cardiac Monitoring: None  Code Status: Full Code      Clinically Significant Risk Factors  Present on Admission        # Hyperkalemia: Highest K = 5.5 mmol/L in last 2 days, will monitor as appropriate    # Hypercalcemia: Highest Ca = 10.8 mg/dL in last 2 days, will monitor as appropriate             # Obesity: Estimated body mass index is 37.11 kg/m  as calculated from the following:    Height as of this encounter: 1.524 m (5').    Weight as of this encounter: 86.2 kg (190 lb).              Disposition Plan      Expected Discharge Date: 09/19/2023                  Alejo Ramos MD  Hospitalist Service  [facility]  Securely message with [employer] (more info)  Text page via [employer] Paging/Directory   ______________________________________________________________________    Interval History   - no acute events  - no new pain or new symptoms  - discussed care plans and urology consult  - she has no further concerns and wants to return to sleep   -remains on 2L 02    Physical Exam   Vital Signs: Temp: 98.4  F (36.9  C) Temp src: Oral BP: 116/65 Pulse: 77   Resp: 30 SpO2: 95 % O2 Device: None (Room air) Oxygen Delivery: 2 LPM  Weight: 190 lbs 0 oz    General: alert, oriented, and in no acute distress, sleepy   Pulmonary: mildly decreased breath sounds but remains clear to auscultation bilaterally, normal respiratory effort, on room air, no rales or wheezes or evidence of accessory muscle use; on 2L NC  CVS: regular rate and rhythm, no murmurs, rubs, or gallops; no blatant jugular venous distention; no extremity edema and extremities are warm to the touch  GI: soft, nontender, BS+, no rebound or guarding, no conspicuous organomegaly   Neuro: nonfocal, moves all extremities of own volition  Psych: appropriate  Msk- right CVA tenderness          Medical Decision Making       50 MINUTES SPENT BY ME on the date of service doing chart review, history, exam, documentation & further activities per the note.  *note billed as my colleague Dr. Johnson saw a few hours earlier at 5:13am 9/17/2023      Data   ------------------------- PAST 24 HR DATA REVIEWED -----------------------------------------------    I have personally reviewed the following data over the past 24 hrs:    13.9 (H)  \   13.8   / 204     135 (L) 99 17.2 /  112 (H)   4.4 26 1.20 (H) \     TSH: N/A T4: N/A A1C: 5.0     Procal: N/A CRP: N/A Lactic Acid: 1.2         Imaging results reviewed over the past 24 hrs:   Recent Results (from the past 24 hour(s))   XR Chest 2 Views    Narrative    EXAM: XR CHEST 2 VIEWS  LOCATION: [facility]  DATE: 9/17/2023    INDICATION: dyspnea. cough  COMPARISON: 09/10/2023      Impression    IMPRESSION: Scattered airspace opacities bilateral mid and lower lungs, likely infectious/inflammatory. No pleural effusion or pneumothorax. Normal heart size and pulmonary vascularity.   CT Abdomen Pelvis w/o Contrast    Narrative    EXAM: CT ABDOMEN PELVIS W/O CONTRAST  LOCATION: [facility]  DATE: 9/17/2023    INDICATION: right flank pain  COMPARISON: 08/18/2023  TECHNIQUE: CT scan of the abdomen and pelvis was performed without IV contrast. Multiplanar reformats were obtained. Dose reduction techniques were used.  CONTRAST: None.    FINDINGS:   LOWER CHEST: Patchy groundglass opacities in the lung bases.    HEPATOBILIARY: No significant mass or bile duct dilatation. No calcified gallstones.     PANCREAS: Normal.    SPLEEN: Splenomegaly, 17 cm.    ADRENAL GLANDS: Normal.    KIDNEYS/BLADDER: Right percutaneous nephrostomy catheter, stable. No significant hydronephrosis. Several nonobstructing right renal calculi. Right renal cysts, no follow-up is needed. Severe left renal atrophy.    BOWEL: No obstruction or inflammatory change. Normal appendix.    LYMPH NODES: Normal.    VASCULATURE: Unremarkable.    PELVIC ORGANS: Normal.    MUSCULOSKELETAL: Normal.      Impression    IMPRESSION:   1.  Stable right percutaneous nephrostomy catheter. No hydronephrosis.   2.   Nonobstructing right renal calculi.  3.  No other acute process in the abdomen or pelvis.  4.  Patchy groundglass opacities in the lung bases, likely infectious/inflammatory.

## 2023-09-17 NOTE — PHARMACY-ADMISSION MEDICATION HISTORY
Pharmacy Intern Admission Medication History    Admission medication history is complete. The information provided in this note is only as accurate as the sources available at the time of the update.    Medication reconciliation/reorder completed by provider prior to medication history? Yes    Information Source(s): Patient and CareEverywhere/SureScripts via in-person    Pertinent Information: Patient was able to provide current medications and last known administration times. Patient is currently on day 4/5 Azithromycin Z-Corby and recently completed a 5 day course of Prednisone (40mg daily) on 9/15.     Changes made to PTA medication list:  Added: Tylenol   Deleted: Duoneb and prednisone   Changed: None    Allergies reviewed with patient and updates made in EHR: yes    Medication History Completed By: Ceferino Gardner, Pharmacy Intern 9/17/2023 7:49 AM    Prior to Admission medications    Medication Sig Last Dose Taking? Auth Provider Long Term End Date   acetaminophen (TYLENOL) 500 MG tablet Take 500-1,000 mg by mouth every 6 hours as needed for mild pain PRN Yes Unknown, Entered By History     albuterol (ACCUNEB) 1.25 MG/3ML neb solution Take 1.25 mg by nebulization every 6 hours as needed for shortness of breath / dyspnea or wheezing 9/16/2023 Yes Reported, Patient     azithromycin (ZITHROMAX Z-CORBY) 250 MG tablet Two tablets on the first day, then one tablet daily for the next 4 days 9/16/2023 at 1000 Yes Ohl, Aroldo Gonzalez, DO  9/19/23   ipratropium - albuterol 0.5 mg/2.5 mg/3 mL (DUONEB) 0.5-2.5 (3) MG/3ML neb solution Take 1 vial (3 mLs) by nebulization every 6 hours as needed for shortness of breath, wheezing or cough 9/16/2023 Yes Ezequiel Boyer MD Yes    sertraline (ZOLOFT) 100 MG tablet Take 100 mg by mouth At Bedtime 9/15/2023 at 2200 Yes Unknown, Entered By History Yes

## 2023-09-17 NOTE — ED NOTES
Pt stated she was having 7/10 pain. Offered oral analgesics. Declined oral. Requested dilaudid. Given. Nephrostomy tube site clean dry intact. No redness, drainage, or swelling noted. Pt states that is where her pain is.

## 2023-09-17 NOTE — PROVIDER NOTIFICATION
Pt Hx of congenital solitary kidney, ureteral stricture, kidney stones, chronic nephrostomy tube, tobacco use, recurrent pyelonephritis, recent dx of PNA (9/15/23).  Pt positive for Human Rhinovirus  CXR shows scattered airspace opacities bilat mid and lower lungs.  Pt takes nebs PRN   @ home.  Pt on 2 LPM NC, o2 sats 95%, BS diminished, crackles in bases, scattered exp wheeze.  Pt declined AM neb and 1400 neb, pt requested Alb nebs BID and PRN.  Pt instructed on use of AEROBIKA, pt demonstrated well and is independent with device.  Will change Alb neb to BID @ this time and RE-EVAL 9/20/23 or sooner if needed.       09/17/23 1420   RCAT Assessment   Reason for Assessment Other (see comments)  (PNA)   Pulmonary Status 2   Surgical Status 0   Chest X-ray 4   Respiratory Pattern 0   Mental Status 0   Breath Sounds 4   Cough Effectiveness 0   Level of Activity 1   O2 Required for SpO2>=92% 1   Acuity Level (points) 12   Acuity Level  3   Re-eval Interval Guideline Every 3 days   Re-evaluation Date 09/20/23   Clinical Indications/Symptoms   Aerosol Therapy Physician order;Bronchospasm   Volume Expansion Prevent atelectasis   Aerosol Therapy (SVN)   Daily Review of Necessity (SVN) completed   Respiratory Treatment Status (SVN) refused  (Pt requested nebs BID)   Patient Position HOB elevated   Breath Sounds   Breath Sounds All Fields   All Lung Fields Breath Sounds wheezes, expiratory   LLL Breath Sounds crackles   RML Breath Sounds crackles   RLL Breath Sounds crackles

## 2023-09-17 NOTE — H&P
Admission History & Physical  Jacqueline Pappas, 1985, [id number]    [facility]  [place], [phone]    Assessment and Plan:  38 year old female past medical history significant for congenital solitary kidney, ureteral stricture, kidney stones, chronic nephrostomy tube in place, tobacco abuse, recurrent UTI/pyelonephritis, depression with multiple ED visits recently, presented with complaints of productive cough, shortness of breath for a week, was seen in the ED on 9/15 and was diagnosed with pneumonia and discharged on azithromycin presented with persistent symptoms, mild hypoxia, right flank pain urinalysis abnormal, but has chronic nephrostomy and could be contamination.    Community-acquired pneumonia  Acute respiratory insufficiency  Afebrile here, leukocytosis improving from 16-13.9  Continue ceftriaxone, azithromycin  Follow-up on sputum cultures  Hold off on prednisone for now  Albuterol nebs  Oxygen as needed    Chronic nephrostomy  Congenital solitary kidney, ureteral stricture  Mild GT  Abnormal UA, UTI versus contamination  Last nephrostomy tube exchanged in 3 months ago  Continue above antibiotics  Await urine cultures  Urology to see    Mild hypercalcemia chronic  Follow-up with IV fluids    History of depression  DVT prophylaxis SCDs  Code: full       Expected length of stay : anticipate hospitalization more than 2 days.     Chief Complaint: Shortness of breath, cough    HPI:     Jacqueline Pappas is a 38 year old female past medical history significant for congenital solitary kidney, ureteral stricture, kidney stones, chronic nephrostomy tube in place, tobacco abuse, recurrent UTI/pyelonephritis, depression with multiple ED visits recently, presented with complaints of productive cough, shortness of breath for a week.  She was initially seen in the ED on 9/15 and was diagnosed with pneumonia and discharged on azithromycin, prednisone.  Presented with persistent  symptoms.  She complains of productive cough with yellow-colored phlegm, chest pain with coughing, shortness of breath.  Denies any fevers any chills.  She also complains of right flank pain.  No other blood in the urine, nausea vomiting.  She had some diarrhea.  Her urine is somewhat cloudy.    In ED she is afebrile, was mildly hypoxemic with oxygen saturations of 89% improved on 2 L, labs reveal improving leukocytosis 13.9, mild GT with a creatinine of 1.2.  UA was abnormal, though it is a nephrostomy tube specimen, she was given ceftriaxone, azithromycin and admitted.    Medical History  Past Medical History:   Diagnosis Date    Acute renal failure (H)     Anemia     Anemia     Calculus of kidney     Congenital absence of left kidney     Congenital absence of one kidney     Depression     Depression     Hydronephrosis     Kidney stone     at age 29    Pyelonephritis     Pyelonephritis     Smoker     Ureteral stricture     UTI (urinary tract infection)     Wrist fracture     Left      Patient Active Problem List    Diagnosis Date Noted    Hypoxia 09/17/2023     Priority: Medium    Atypical pneumonia 09/17/2023     Priority: Medium    Right flank pain 05/12/2023     Priority: Medium    Congenital renal agenesis and dysgenesis 02/21/2023     Priority: Medium    Renal agenesis, unilateral 02/21/2023     Priority: Medium    Nausea and vomiting, unspecified vomiting type 11/03/2022     Priority: Medium    Attention to nephrostomy (H) 04/03/2022     Priority: Medium    Diarrhea, unspecified type 04/03/2022     Priority: Medium    Hypokalemia 04/03/2022     Priority: Medium    Hypomagnesemia 04/03/2022     Priority: Medium    Nephrostomy complication (H) 04/03/2022     Priority: Medium    Renal colic on right side 04/03/2022     Priority: Medium    Thrombocytopenia (H) 04/03/2022     Priority: Medium    Urinary tract infection 04/03/2022     Priority: Medium    Recurrent UTI 01/17/2022     Priority: Medium    Smoker  01/17/2022     Priority: Medium    Tobacco abuse 04/14/2019     Priority: Medium    Depressive disorder 03/27/2019     Priority: Medium    Morbid obesity with BMI of 40.0-44.9, adult (H) 05/15/2018     Priority: Medium    Obesity, Class II, BMI 35-39.9 02/15/2018     Priority: Medium    Other artificial openings of urinary tract status (H) 12/19/2017     Priority: Medium    Artificial opening status 12/19/2017     Priority: Medium     Formatting of this note might be different from the original.  Getting nephrostomy tube changed every 3 months at [facility]      Leukocytosis, unspecified type 03/24/2017     Priority: Medium    SIRS (systemic inflammatory response syndrome) (H) 03/24/2017     Priority: Medium     Formatting of this note might be different from the original.  IMO Update      Non-intractable vomiting with nausea 03/24/2017     Priority: Medium    Pyelonephritis 03/23/2017     Priority: Medium    Bilious vomiting with nausea 01/31/2017     Priority: Medium    Crossing vessel and stricture of ureter without hydronephrosis 01/31/2017     Priority: Medium    Hematuria 11/22/2016     Priority: Medium    Perinephric hematoma 11/22/2016     Priority: Medium    Ureteral stricture 11/01/2016     Priority: Medium    Stricture or kinking of ureter 09/25/2016     Priority: Medium    Hypotension 09/16/2016     Priority: Medium    Solitary kidney, congenital 09/14/2016     Priority: Medium    Congenital absence of one kidney      Priority: Medium    Depression      Priority: Medium    Abdominal pain 08/07/2016     Priority: Medium    Right flank pain, chronic 08/07/2016     Priority: Medium    Elevated blood pressure 07/24/2016     Priority: Medium    GT (acute kidney injury) (H) 07/24/2016     Priority: Medium    Other hydronephrosis 07/24/2016     Priority: Medium     Formatting of this note might be different from the original.  Single right kidney      Renal agenesis 07/24/2016     Priority: Medium    Recurrent major  depressive disorder, in partial remission (H) 2016     Priority: Medium    Family planning 2015     Priority: Medium     Formatting of this note might be different from the original.  On birth control pills and Depo-Provera in the past  2016: On no birth control, not sexually active currently  Formatting of this note might be different from the original.  Overview:   On birth control pills and Depo-Provera in the past  2016: On no birth control, not sexually active currently      Serum calcium elevated 2015     Priority: Medium    Nausea 2014     Priority: Medium    Calculus of kidney 2014     Priority: Medium    Postoperative anemia due to acute blood loss 2014     Priority: Medium    Anemia 2014     Priority: Medium    Sinus bradycardia 2014     Priority: Medium    Acute interstitial nephritis 2014     Priority: Medium    Acute pyelonephritis 2014     Priority: Medium        Surgical History  She  has a past surgical history that includes  section; REMOVAL OF KIDNEY STONE; Combined Cystoscopy, Retrogrades, Ureteroscopy, Laser Holmium Lithotripsy Ureter(S), Insert Stent (Right, 2016); Cystoscopy, ureteroscopy, combined (Right, 2016); Laser holmium nephrolithotomy via percutaneous nephrostomy (Right, 2016); Laser holmium lithotripsy ureter(s), insert stent, combined (Left, 2016); Percutaneous nephrostomy (2016); IR Nephrolithotomy (2014); IR Nephrostomy Tube Placement Right (2016); IR Nephrostomy Tube Placement Right (2017); IR Nephrostogram Exisiting Access (10/18/2018); IR Nephrostomy Tube Change Right (2018); IR Nephrostomy Tube Change Right (2020);  Section; other surgical history; [place] Tooth Extraction; other surgical history (Right); Kidney Stone Surgery (Right, 2016); Nephrostomy W/ Introduction Of Catheter (Right); Ir Nephrostomy Tube Change Right (2018); and Ir  Nephrostomy Tube Change Right (2020).     Past Surgical History:   Procedure Laterality Date     SECTION       SECTION      x1    COMBINED CYSTOSCOPY, RETROGRADES, URETEROSCOPY, LASER HOLMIUM LITHOTRIPSY URETER(S), INSERT STENT Right 2016    Procedure: COMBINED CYSTOSCOPY, RETROGRADES, URETEROSCOPY, LASER HOLMIUM LITHOTRIPSY URETER(S), INSERT STENT;  Surgeon: Florentino Awad MD;  Location: [facility] OR    CYSTOSCOPY, URETEROSCOPY, COMBINED Right 2016    Procedure: COMBINED CYSTOSCOPY, URETEROSCOPY;  Surgeon: Florentino Awad MD;  Location: [facility] OR    IR NEPHROLITHOTOMY  2014    IR NEPHROSTOGRAM EXISITING ACCESS  10/18/2018    IR NEPHROSTOMY TUBE CHANGE RIGHT  2018    IR NEPHROSTOMY TUBE CHANGE RIGHT  2020    IR NEPHROSTOMY TUBE CHANGE RIGHT  2018    IR NEPHROSTOMY TUBE CHANGE RIGHT  2020    IR NEPHROSTOMY TUBE PLACEMENT RIGHT  2016    IR NEPHROSTOMY TUBE PLACEMENT RIGHT  2017    KIDNEY STONE SURGERY Right 2016    LASER HOLMIUM LITHOTRIPSY URETER(S), INSERT STENT, COMBINED Left 2016    Procedure: COMBINED CYSTOSCOPY, URETEROSCOPY, LASER HOLMIUM LITHOTRIPSY URETER(S), INSERT STENT;  Surgeon: Florentino Awad MD;  Location: [facility] OR    LASER HOLMIUM NEPHROLITHOTOMY VIA PERCUTANEOUS NEPHROSTOMY Right 2016    Procedure: LASER HOLMIUM NEPHROLITHOTOMY VIA PERCUTANEOUS NEPHROSTOMY;  Surgeon: Florentino Awad MD;  Location: [facility] OR    NEPHROSTOMY W/ INTRODUCTION OF CATHETER Right     OTHER SURGICAL HISTORY      Mole removednasal    OTHER SURGICAL HISTORY Right     Cystoscopy, ureteroscopy, laser11/02/2014 x2 with ureteral stent insertion    PERCUTANEOUS NEPHROSTOMY  2016    Procedure: PERCUTANEOUS NEPHROSTOMY;  Surgeon: Florentino Awad MD;  Location: [facility] OR    WISDOM TOOTH EXTRACTION      ZZC REMOVAL OF KIDNEY STONE         Allergies  Allergies   Allergen Reactions    Vancomycin     Desogestrel-Ethinyl Estradiol Angioedema and  Swelling     Swelling of hands and feet per pt.  Swelling of hands and feet per pt.  Swelling of hands and feet per pt.  Swelling of hands and feet per pt.  Swelling of hands and feet per pt.  Swelling of hands and feet per pt.  Swelling of hands and feet per pt.  Swelling of hands and feet per pt.      Penicillins Rash     As a child per mother; mother unsure if has tolerated another PCN since. Tolerated ampicillin 16    Sulfa Antibiotics Rash       Prior to Admission Medications   (Not in a hospital admission)      Social History  Reviewed, and she  reports that she has been smoking cigarettes. She has a 5.00 pack-year smoking history. She quit smokeless tobacco use about 7 years ago. She reports that she does not currently use alcohol. She reports that she does not use drugs.  Social History     Tobacco Use    Smoking status: Every Day     Packs/day: 0.50     Years: 10.00     Pack years: 5.00     Types: Cigarettes     Last attempt to quit: 2016     Years since quittin.9    Smokeless tobacco: Former     Quit date: 2016    Tobacco comments:     Hasn't smoked in a week due to breathing issues    Substance Use Topics    Alcohol use: Not Currently     Comment: Alcoholic Drinks/day: occ       Family History  Reviewed, and I have reviewed this patient's family history and updated it with pertinent information if needed.  Family History   Problem Relation Age of Onset    Anesthesia Reaction No family hx of     Malignant Hyperthermia No family hx of     Heart Disease Maternal Uncle         heart failure    Coronary Artery Disease Other     Heart Disease Maternal Grandmother         pacemaker    Gout Paternal Grandfather     Prostate Cancer Maternal Aunt         breast    Heart Disease Maternal Aunt         pacemaker    Heart Disease Maternal Aunt         pacemaker    Heart Disease Maternal Aunt         pacemaker          Review Of Systems  Complete As per admission HPI, all other systems reviewed and  negative.     Physical Exam:  Vital signs:  Temp: 98.4  F (36.9  C) Temp src: Oral BP: 102/55 Pulse: 80   Resp: 26 SpO2: 94 % O2 Device: None (Room air) Oxygen Delivery: 2 LPM Height: 152.4 cm (5') Weight: 86.2 kg (190 lb)  Estimated body mass index is 37.11 kg/m  as calculated from the following:    Height as of this encounter: 1.524 m (5').    Weight as of this encounter: 86.2 kg (190 lb).    General Appearance:  Awake Alert, orientedx3, not in any apparent distress   Head:  Normocephalic, without obvious abnormality   Eyes:  PERRL, conjunctiva/corneas clear   Throat: Oral mucosa dry   Neck: Supple,  no JVD   Lungs:   Bilateral crackles, occasional wheezing bilaterally, respirations unlabored   Chest Wall:  No tenderness   Heart:  Regular rate and rhythm, S1, S2 normal,no murmur   Abdomen:   Soft, non-tender, bowel sounds present,  no guarding, rigidity   , right-sided nephrostomy tube, bag with mild cloudy urine   Extremities:  no edema, no joint swelling   Skin: Skin color, texture, turgor normal, no rashes or lesions   Neurologic: Alert and oriented X 3, no focal deficits     Results:  Labs Ordered and Resulted from Time of ED Arrival to Time of ED Departure   BASIC METABOLIC PANEL - Abnormal       Result Value    Sodium 135 (*)     Potassium 4.4      Chloride 99      Carbon Dioxide (CO2) 26      Anion Gap 10      Urea Nitrogen 17.2      Creatinine 1.20 (*)     Calcium 10.1 (*)     Glucose 112 (*)     GFR Estimate 60 (*)    ROUTINE UA WITH MICROSCOPIC REFLEX TO CULTURE - Abnormal    Color Urine Yellow      Appearance Urine Turbid (*)     Glucose Urine Negative      Bilirubin Urine Negative      Ketones Urine Negative      Specific Gravity Urine 1.012      Blood Urine 1.0 mg/dL (*)     pH Urine 8.0 (*)     Protein Albumin Urine 300 (*)     Urobilinogen Urine <2.0      Nitrite Urine Negative      Leukocyte Esterase Urine 500 D[place]las/uL (*)     Bacteria Urine Moderate (*)     WBC Clumps Urine Present (*)     Mucus  Urine Present (*)     RBC Urine >182 (*)     WBC Urine >182 (*)     Squamous Epithelials Urine 1      Hyaline Casts Urine 5 (*)    CBC WITH PLATELETS AND DIFFERENTIAL - Abnormal    WBC Count 13.9 (*)     RBC Count 4.58      Hemoglobin 13.8      Hematocrit 41.8      MCV 91      MCH 30.1      MCHC 33.0      RDW 13.6      Platelet Count 204      % Neutrophils 77      % Lymphocytes 12      % Monocytes 6      % Eosinophils 4      % Basophils 0      % Immature Granulocytes 1      NRBCs per 100 WBC 0      Absolute Neutrophils 10.6 (*)     Absolute Lymphocytes 1.6      Absolute Monocytes 0.8      Absolute Eosinophils 0.6      Absolute Basophils 0.0      Absolute Immature Granulocytes 0.1      Absolute NRBCs 0.0     LACTIC ACID WHOLE BLOOD - Normal    Lactic Acid 1.2     INFLUENZA A/B, RSV, & SARS-COV2 PCR - Normal    Influenza A PCR Negative      Influenza B PCR Negative      RSV PCR Negative      SARS CoV2 PCR Negative     BLOOD CULTURE   BLOOD CULTURE   URINE CULTURE      EKG:  EKG: Normal sinus rhythm, no other acute ST-T wave changes.    Imaging:   CT Abdomen Pelvis w/o Contrast    Result Date: 9/17/2023  EXAM: CT ABDOMEN PELVIS W/O CONTRAST LOCATION: [facility] DATE: 9/17/2023 INDICATION: right flank pain COMPARISON: 08/18/2023 TECHNIQUE: CT scan of the abdomen and pelvis was performed without IV contrast. Multiplanar reformats were obtained. Dose reduction techniques were used. CONTRAST: None. FINDINGS: LOWER CHEST: Patchy groundglass opacities in the lung bases. HEPATOBILIARY: No significant mass or bile duct dilatation. No calcified gallstones. PANCREAS: Normal. SPLEEN: Splenomegaly, 17 cm. ADRENAL GLANDS: Normal. KIDNEYS/BLADDER: Right percutaneous nephrostomy catheter, stable. No significant hydronephrosis. Several nonobstructing right renal calculi. Right renal cysts, no follow-up is needed. Severe left renal atrophy. BOWEL: No obstruction or inflammatory change. Normal appendix. LYMPH  NODES: Normal. VASCULATURE: Unremarkable. PELVIC ORGANS: Normal. MUSCULOSKELETAL: Normal.     IMPRESSION: 1.  Stable right percutaneous nephrostomy catheter. No hydronephrosis. 2.  Nonobstructing right renal calculi. 3.  No other acute process in the abdomen or pelvis. 4.  Patchy groundglass opacities in the lung bases, likely infectious/inflammatory.     XR Chest 2 Views    Result Date: 9/17/2023  EXAM: XR CHEST 2 VIEWS LOCATION: [facility] DATE: 9/17/2023 INDICATION: dyspnea. cough COMPARISON: 09/10/2023     IMPRESSION: Scattered airspace opacities bilateral mid and lower lungs, likely infectious/inflammatory. No pleural effusion or pneumothorax. Normal heart size and pulmonary vascularity.    CT Chest Pulmonary Embolism w Contrast    Result Date: 9/14/2023  EXAM: CT CHEST PULMONARY EMBOLISM W CONTRAST LOCATION: [facility] DATE: 9/14/2023 INDICATION: Shortness of breath COMPARISON: 08/18/2023 CT TECHNIQUE: CT chest pulmonary angiogram during arterial phase injection of IV contrast. Multiplanar reformats and MIP reconstructions were performed. Dose reduction techniques were used. CONTRAST: ISOVUE 370 75ML FINDINGS: ANGIOGRAM CHEST: Pulmonary arteries are normal caliber and negative for pulmonary emboli. Thoracic aorta is negative for dissection. No CT evidence of right heart strain. LUNGS AND PLEURA: Patchy groundglass opacities throughout the lungs, likely infectious/inflammatory. No pleural effusion. MEDIASTINUM/AXILLAE: No lymphadenopathy. Normal esophagus. No significant pericardial effusion. CORONARY ARTERY CALCIFICATION: None. UPPER ABDOMEN: Right percutaneous nephrostomy catheter. Renal cysts and right renal calculi, unchanged. MUSCULOSKELETAL: Normal.     IMPRESSION: 1.  No pulmonary embolism. 2.  Patchy groundglass opacities throughout the lungs, likely infectious/inflammatory.    XR Chest 2 Views    Result Date: 9/10/2023  EXAM: XR CHEST 2 VIEWS LOCATION:  [facility] DATE: 9/10/2023 INDICATION: cough, dyspnea, wheezing COMPARISON: 05/12/2023     IMPRESSION: Negative chest.    CTA Chest Abdomen Pelvis w Contrast    Result Date: 8/18/2023  EXAM: CTA CHEST ABDOMEN PELVIS W CONTRAST LOCATION: [facility] DATE: 8/18/2023 INDICATION: chest pain, right flank pain (has nephrostomy tube, h o pyelo) COMPARISON: CT exams 07/20/2023, 6/23/2023 and 5/20/2023 TECHNIQUE: CT angiogram chest abdomen pelvis during arterial phase of injection of IV contrast. 2D and 3D MIP reconstructions were performed by the CT technologist. Dose reduction techniques were used. CONTRAST: Isovue 370 90ml FINDINGS: CT ANGIOGRAM CHEST, ABDOMEN, AND PELVIS: The precontrast imaging shows no evidence of a thoracic or abdominal aortic intramural hematoma. Normal caliber thoracic and abdominal aorta without evidence of a dissection. The thoracic aortic arch vessels, celiac artery and its major branch vessels, SMA, right renal artery and MARCIAL are widely patent. No pulmonary artery embolism. LUNGS AND PLEURA: The central airways are clear. Bronchial wall thickening and mosaic lung attenuation consistent with air trapping. No focal pneumonic consolidation or pleural effusion. MEDIASTINUM/AXILLAE: No thoracic adenopathy. Normal heart size and no pericardial effusion. CORONARY ARTERY CALCIFICATION: None. HEPATOBILIARY: Hepatic steatosis. Normal gallbladder and bile ducts. PANCREAS: Normal. SPLEEN: Mildly enlarged. ADRENAL GLANDS: Normal. KIDNEYS/BLADDER: Stable severe left renal atrophy. Multiple right renal cysts and small nonobstructing right renal calculi are stable since the prior exam. Stable right percutaneous nephrostomy drain. No perinephric inflammation and no hydronephrosis. No  hydroureter. Normal bladder. BOWEL: Normal caliber small bowel and colon. No free air or free fluid. LYMPH NODES: Normal. PELVIC ORGANS: Incidental small collapsing left ovarian  corpus luteal cyst. No pelvic free fluid. MUSCULOSKELETAL: Normal.     IMPRESSION: 1.  No thoracic or abdominal aortic aneurysm, dissection or intramural hematoma. No pulmonary artery embolism. 2.  Nonspecific bronchiolitis with bronchial wall thickening and pulmonary air trapping. No pneumonic infiltrate or pleural effusion. 3.  Stable right nephrostomy drain. No hydronephrosis or inflammatory process. 4.  Stable right renal cysts and nonobstructing stones. 5.  Mild splenomegaly. 6.  Severe left renal atrophy.         > 75 MINUTES SPENT BY ME on the date of service doing chart review, history, exam, documentation & further activities per the note.      Caitlin Johnson M.D  [facility]  Internal Medicine    9/17/2023  4:55 AM

## 2023-09-17 NOTE — ED PROVIDER NOTES
EMERGENCY DEPARTMENT ENCOUNTER      NAME: Jacqueline Pappas  AGE: 38 year old female  YOB: 1985  MRN: [id number]  EVALUATION DATE & TIME: 9/16/2023 10:49 PM    PCP: Pao Montague    ED PROVIDER: Ranjit Rojas M.D.      Chief Complaint   Patient presents with    Flank Pain     right         FINAL IMPRESSION:  1. Atypical pneumonia    2. Hypoxia    3. Pyelonephritis          ED COURSE & MEDICAL DECISION MAKING:    Pertinent Labs & Imaging studies reviewed. (See chart for details)  38 year old female presents to the Emergency Department for evaluation of flank pain and shortness of breath.  Patient been having issues with for shortness of breath and cough for the last week has been seen 3 times prior to this.  Did review electronic medical record and had a CT scan done of 3 days ago that did not show PE but did show likely atypical pneumonia has been azithromycin despite this is getting worse.  Oxygen saturation dropped into the 80s here.  He is requiring 2 L of oxygen.  Chest x-ray shows worsening infiltrate.  I suspect she has worsening pneumonia.  COVID influenza and RSV are negative.  Given IV ceftriaxone and azithromycin.  Also having flank pain.  Has chronic flank pain secondary to a solitary kidney with chronic nephrostomy.  Urinalysis does show infection.  Ceftriaxone will also cover this.  Culture sent.  Did do CT scan of the abdomen.  Urostomy is in good position.  No other abnormalities noted to cause her flank pain.  Does have chronic flank pain.  Patient is not hypotensive.  Will admit for further care.  Discussed with hospitalist.    11:13 PM I met with the patient to gather history and to perform my initial exam. I discussed the plan for care while in the Emergency Department.   3:46 PM I spoke with Dr. Johnson, hospitalist.    At the conclusion of the encounter I discussed the results of all of the tests and the disposition. The questions were answered. The patient or family acknowledged  understanding and was agreeable with the care plan.     Medical Decision Making    History:  Supplemental history from: N/A  External Record(s) reviewed: Outpatient Record: [facility] ED 9/10/23, [facility] ED 9/13/23, [facility] ED 9/15/23     Work Up:  Chart documentation includes differential considered and any EKGs or imaging independently interpreted by provider, where specified.  In additional to work up documented, I considered the following work up: Documented in chart, if applicable.    External consultation:  Discussion of management with another provider: Documented in chart, if applicable    Complicating factors:  Care impacted by chronic illness: Smoking / Nicotine Use and Other: anemia  Care affected by social determinants of health: N/A    Disposition considerations: Admit.             MEDICATIONS GIVEN IN THE EMERGENCY:  Medications   HYDROmorphone (PF) (DILAUDID) injection 0.5 mg (0.5 mg Intravenous $Given 9/17/23 0250)   albuterol (PROVENTIL) neb solution 2.5 mg (has no administration in time range)   albuterol (PROVENTIL) neb solution 2.5 mg (has no administration in time range)   cefTRIAXone (ROCEPHIN) 2 g vial to attach to  ml bag for ADULTS or NS 50 ml bag for PEDS (has no administration in time range)   azithromycin 500 mg (ZITHROMAX) in 0.9% NaCl 250 mL intermittent infusion 500 mg (has no administration in time range)   lidocaine 1 % 0.1-1 mL (has no administration in time range)   lidocaine (LMX4) cream (has no administration in time range)   sodium chloride (PF) 0.9% PF flush 3 mL (has no administration in time range)   sodium chloride (PF) 0.9% PF flush 3 mL (has no administration in time range)   acetaminophen (TYLENOL) tablet 650 mg (has no administration in time range)     Or   acetaminophen (TYLENOL) Suppository 650 mg (has no administration in time range)   melatonin tablet 1 mg (has no administration in time range)   ondansetron (ZOFRAN ODT) ODT tab 4 mg (has no administration  in time range)     Or   ondansetron (ZOFRAN) injection 4 mg (has no administration in time range)   sodium chloride 0.9% infusion (has no administration in time range)   acetaminophen-codeine (TYLENOL #3) 300-30 MG per tablet 1 tablet (has no administration in time range)   ipratropium - albuterol 0.5 mg/2.5 mg/3 mL (DUONEB) neb solution 3 mL (3 mLs Nebulization $Given 9/16/23 6571)   ketorolac (TORADOL) injection 15 mg (15 mg Intravenous $Given 9/17/23 0102)   cefTRIAXone (ROCEPHIN) 2 g vial to attach to  ml bag for ADULTS or NS 50 ml bag for PEDS (0 g Intravenous Stopped 9/17/23 0504)   azithromycin 500 mg (ZITHROMAX) in 0.9% NaCl 250 mL intermittent infusion 500 mg (0 mg Intravenous Stopped 9/17/23 0202)       NEW PRESCRIPTIONS STARTED AT TODAY'S ER VISIT  New Prescriptions    No medications on file          =================================================================    HPI    Patient information was obtained from: Patient     Use of : N/A         Jacqueline Pappas is a 38 year old female with a pertinent history of smoking and anemia who presents to this ED for evaluation of right flank pain.     Per chart review, the patient was seen in the [facility] ED on 9/10/23 for cough and flank pain. CBC reassuring. No evidence of leukocytosis to suggest systemic infectious/inflammatory process. No acute anemia. PLTs wnl. BMP reassuring. No evidence of GT, acidosis, or significant electrolyte derangement. CXR clear with no acute cardiopulmonary process. Patient discharged home with neb solution prescription & prednisone burst.    Per chart review, the patient was seen in the [facility] ED on 9/13/23 for chest pain. Labs normal. Dimer elevated. CT PE showing generalized congestion with no focality. No PE. Patient sent in with azithromycin and discharged home to continued prednisone.     Per chart review, the patient was seen in the [facility] ED on 9/15/23 for shortness of breath, chest pain, light  "headedness, nausea and chest pain. Troponin is negative. Very slight elevation in her potassium but overall actually improved renal function compared to baseline. Recommend patient follow-up  with the primary care doctor for repeat lab testing to ensure no persistent hyperkalemia but do not believe this requires emergent management at this point in time.    Per the patient, she endorses right flank pain rated an 8/10 that started last night, diarrhea for the past few days, light headedness and constant shortness of breath. She denies fever, chills, nausea, vomiting or any other complaints at this time. Patient has a nephrostomy tube and states that there is \"stuff draining from the site\". She finished taking her prescribed prednisone 2 days ago and is on her 3rd day of z bret. Patient denies any other complaints at this time.       PAST MEDICAL HISTORY:  Past Medical History:   Diagnosis Date    Acute renal failure (H)     Anemia     Anemia     Calculus of kidney     Congenital absence of left kidney     Congenital absence of one kidney     Depression     Depression     Hydronephrosis     Kidney stone     at age 29    Pyelonephritis     Pyelonephritis     Smoker     Ureteral stricture     UTI (urinary tract infection)     Wrist fracture     Left       PAST SURGICAL HISTORY:  Past Surgical History:   Procedure Laterality Date     SECTION       SECTION      x1    COMBINED CYSTOSCOPY, RETROGRADES, URETEROSCOPY, LASER HOLMIUM LITHOTRIPSY URETER(S), INSERT STENT Right 2016    Procedure: COMBINED CYSTOSCOPY, RETROGRADES, URETEROSCOPY, LASER HOLMIUM LITHOTRIPSY URETER(S), INSERT STENT;  Surgeon: Florentino Awad MD;  Location: [facility] OR    CYSTOSCOPY, URETEROSCOPY, COMBINED Right 2016    Procedure: COMBINED CYSTOSCOPY, URETEROSCOPY;  Surgeon: Florentino Awad MD;  Location: [facility] OR    IR NEPHROLITHOTOMY  2014    IR NEPHROSTOGRAM EXISITING ACCESS  10/18/2018    IR NEPHROSTOMY TUBE CHANGE " RIGHT  12/12/2018    IR NEPHROSTOMY TUBE CHANGE RIGHT  6/11/2020    IR NEPHROSTOMY TUBE CHANGE RIGHT  12/12/2018    IR NEPHROSTOMY TUBE CHANGE RIGHT  6/11/2020    IR NEPHROSTOMY TUBE PLACEMENT RIGHT  7/24/2016    IR NEPHROSTOMY TUBE PLACEMENT RIGHT  9/14/2017    KIDNEY STONE SURGERY Right 05/2016    LASER HOLMIUM LITHOTRIPSY URETER(S), INSERT STENT, COMBINED Left 11/1/2016    Procedure: COMBINED CYSTOSCOPY, URETEROSCOPY, LASER HOLMIUM LITHOTRIPSY URETER(S), INSERT STENT;  Surgeon: Florentino Awad MD;  Location: [facility] OR    LASER HOLMIUM NEPHROLITHOTOMY VIA PERCUTANEOUS NEPHROSTOMY Right 9/14/2016    Procedure: LASER HOLMIUM NEPHROLITHOTOMY VIA PERCUTANEOUS NEPHROSTOMY;  Surgeon: Florentino Awad MD;  Location: [facility] OR    NEPHROSTOMY W/ INTRODUCTION OF CATHETER Right     OTHER SURGICAL HISTORY      Mole removednasal    OTHER SURGICAL HISTORY Right     Cystoscopy, ureteroscopy, laser11/02/2014 x2 with ureteral stent insertion    PERCUTANEOUS NEPHROSTOMY  11/1/2016    Procedure: PERCUTANEOUS NEPHROSTOMY;  Surgeon: Florentino Awad MD;  Location: [facility] OR    WISDOM TOOTH EXTRACTION      ZZC REMOVAL OF KIDNEY STONE             CURRENT MEDICATIONS:    Current Facility-Administered Medications   Medication    acetaminophen (TYLENOL) tablet 650 mg    Or    acetaminophen (TYLENOL) Suppository 650 mg    acetaminophen-codeine (TYLENOL #3) 300-30 MG per tablet 1 tablet    albuterol (PROVENTIL) neb solution 2.5 mg    albuterol (PROVENTIL) neb solution 2.5 mg    azithromycin 500 mg (ZITHROMAX) in 0.9% NaCl 250 mL intermittent infusion 500 mg    cefTRIAXone (ROCEPHIN) 2 g vial to attach to  ml bag for ADULTS or NS 50 ml bag for PEDS    HYDROmorphone (PF) (DILAUDID) injection 0.5 mg    lidocaine (LMX4) cream    lidocaine 1 % 0.1-1 mL    melatonin tablet 1 mg    ondansetron (ZOFRAN ODT) ODT tab 4 mg    Or    ondansetron (ZOFRAN) injection 4 mg    sodium chloride (PF) 0.9% PF flush 3 mL    sodium chloride (PF) 0.9% PF  flush 3 mL    sodium chloride 0.9% infusion     Current Outpatient Medications   Medication    albuterol (ACCUNEB) 1.25 MG/3ML neb solution    azithromycin (ZITHROMAX Z-SHAHIDA) 250 MG tablet    ipratropium - albuterol 0.5 mg/2.5 mg/3 mL (DUONEB) 0.5-2.5 (3) MG/3ML neb solution    sertraline (ZOLOFT) 100 MG tablet    predniSONE (DELTASONE) 20 MG tablet         ALLERGIES:  Allergies   Allergen Reactions    Vancomycin     Desogestrel-Ethinyl Estradiol Angioedema and Swelling     Swelling of hands and feet per pt.  Swelling of hands and feet per pt.  Swelling of hands and feet per pt.  Swelling of hands and feet per pt.  Swelling of hands and feet per pt.  Swelling of hands and feet per pt.  Swelling of hands and feet per pt.  Swelling of hands and feet per pt.      Penicillins Rash     As a child per mother; mother unsure if has tolerated another PCN since. Tolerated ampicillin 18    Sulfa Antibiotics Rash       FAMILY HISTORY:  Family History   Problem Relation Age of Onset    Anesthesia Reaction No family hx of     Malignant Hyperthermia No family hx of     Heart Disease Maternal Uncle         heart failure    Coronary Artery Disease Other     Heart Disease Maternal Grandmother         pacemaker    Gout Paternal Grandfather     Prostate Cancer Maternal Aunt         breast    Heart Disease Maternal Aunt         pacemaker    Heart Disease Maternal Aunt         pacemaker    Heart Disease Maternal Aunt         pacemaker       SOCIAL HISTORY:   Social History     Socioeconomic History    Marital status: Single     Spouse name: None    Number of children: None    Years of education: None    Highest education level: None   Tobacco Use    Smoking status: Every Day     Packs/day: 0.50     Years: 10.00     Pack years: 5.00     Types: Cigarettes     Last attempt to quit: 2016     Years since quittin.9    Smokeless tobacco: Former     Quit date: 2016    Tobacco comments:     Hasn't smoked in a week due to  breathing issues    Substance and Sexual Activity    Alcohol use: Not Currently     Comment: Alcoholic Drinks/day: occ    Drug use: No    Sexual activity: Not Currently       VITALS:  /55   Pulse 80   Temp 98.4  F (36.9  C) (Oral)   Resp 26   Ht 1.524 m (5')   Wt 86.2 kg (190 lb)   LMP 08/13/2023 (Approximate)   SpO2 94%   BMI 37.11 kg/m      PHYSICAL EXAM    Physical Exam      LAB:  All pertinent labs reviewed and interpreted.  Labs Ordered and Resulted from Time of ED Arrival to Time of ED Departure   BASIC METABOLIC PANEL - Abnormal       Result Value    Sodium 135 (*)     Potassium 4.4      Chloride 99      Carbon Dioxide (CO2) 26      Anion Gap 10      Urea Nitrogen 17.2      Creatinine 1.20 (*)     Calcium 10.1 (*)     Glucose 112 (*)     GFR Estimate 60 (*)    ROUTINE UA WITH MICROSCOPIC REFLEX TO CULTURE - Abnormal    Color Urine Yellow      Appearance Urine Turbid (*)     Glucose Urine Negative      Bilirubin Urine Negative      Ketones Urine Negative      Specific Gravity Urine 1.012      Blood Urine 1.0 mg/dL (*)     pH Urine 8.0 (*)     Protein Albumin Urine 300 (*)     Urobilinogen Urine <2.0      Nitrite Urine Negative      Leukocyte Esterase Urine 500 Duglas/uL (*)     Bacteria Urine Moderate (*)     WBC Clumps Urine Present (*)     Mucus Urine Present (*)     RBC Urine >182 (*)     WBC Urine >182 (*)     Squamous Epithelials Urine 1      Hyaline Casts Urine 5 (*)    CBC WITH PLATELETS AND DIFFERENTIAL - Abnormal    WBC Count 13.9 (*)     RBC Count 4.58      Hemoglobin 13.8      Hematocrit 41.8      MCV 91      MCH 30.1      MCHC 33.0      RDW 13.6      Platelet Count 204      % Neutrophils 77      % Lymphocytes 12      % Monocytes 6      % Eosinophils 4      % Basophils 0      % Immature Granulocytes 1      NRBCs per 100 WBC 0      Absolute Neutrophils 10.6 (*)     Absolute Lymphocytes 1.6      Absolute Monocytes 0.8      Absolute Eosinophils 0.6      Absolute Basophils 0.0      Absolute  Immature Granulocytes 0.1      Absolute NRBCs 0.0     LACTIC ACID WHOLE BLOOD - Normal    Lactic Acid 1.2     INFLUENZA A/B, RSV, & SARS-COV2 PCR - Normal    Influenza A PCR Negative      Influenza B PCR Negative      RSV PCR Negative      SARS CoV2 PCR Negative     BLOOD CULTURE   BLOOD CULTURE   URINE CULTURE   RESPIRATORY PANEL PCR   RESPIRATORY AEROBIC BACTERIAL CULTURE       RADIOLOGY:  Reviewed all pertinent imaging. Please see official radiology report.  CT Abdomen Pelvis w/o Contrast   Final Result   IMPRESSION:    1.  Stable right percutaneous nephrostomy catheter. No hydronephrosis.    2.  Nonobstructing right renal calculi.   3.  No other acute process in the abdomen or pelvis.   4.  Patchy groundglass opacities in the lung bases, likely infectious/inflammatory.         XR Chest 2 Views   Final Result   IMPRESSION: Scattered airspace opacities bilateral mid and lower lungs, likely infectious/inflammatory. No pleural effusion or pneumothorax. Normal heart size and pulmonary vascularity.          EKG:    None.    PROCEDURES:   None.      I, Sean Ramirez, am serving as a scribe to document services personally performed by Dr. Ranjit Rojas, based on my observation and the provider's statements to me. I, Ranjit Rojas MD attest that Sean Ramirez is acting in a scribe capacity, has observed my performance of the services and has documented them in accordance with my direction.    Ranjit Rojas M.D.  Emergency Medicine  [facility] EMERGENCY ROOM  [address] [facility] [id number] [phone]  Dept: [phone]       Ranjit Rojas MD  09/17/23 [id number]

## 2023-09-18 VITALS
RESPIRATION RATE: 18 BRPM | DIASTOLIC BLOOD PRESSURE: 63 MMHG | BODY MASS INDEX: 37.3 KG/M2 | HEIGHT: 60 IN | OXYGEN SATURATION: 95 % | WEIGHT: 190 LBS | HEART RATE: 92 BPM | TEMPERATURE: 98.1 F | SYSTOLIC BLOOD PRESSURE: 121 MMHG

## 2023-09-18 LAB
ANION GAP SERPL CALCULATED.3IONS-SCNC: 10 MMOL/L (ref 7–15)
BACTERIA UR CULT: NORMAL
BUN SERPL-MCNC: 11.4 MG/DL (ref 6–20)
CALCIUM SERPL-MCNC: 9.4 MG/DL (ref 8.6–10)
CHLORIDE SERPL-SCNC: 109 MMOL/L (ref 98–107)
CREAT SERPL-MCNC: 0.84 MG/DL (ref 0.51–0.95)
DEPRECATED HCO3 PLAS-SCNC: 20 MMOL/L (ref 22–29)
EGFRCR SERPLBLD CKD-EPI 2021: >90 ML/MIN/1.73M2
ERYTHROCYTE [DISTWIDTH] IN BLOOD BY AUTOMATED COUNT: 13.2 % (ref 10–15)
GLUCOSE SERPL-MCNC: 91 MG/DL (ref 70–99)
HCT VFR BLD AUTO: 37.1 % (ref 35–47)
HGB BLD-MCNC: 12 G/DL (ref 11.7–15.7)
MCH RBC QN AUTO: 29.9 PG (ref 26.5–33)
MCHC RBC AUTO-ENTMCNC: 32.3 G/DL (ref 31.5–36.5)
MCV RBC AUTO: 92 FL (ref 78–100)
PLATELET # BLD AUTO: 180 10E3/UL (ref 150–450)
POTASSIUM SERPL-SCNC: 4.1 MMOL/L (ref 3.4–5.3)
RBC # BLD AUTO: 4.02 10E6/UL (ref 3.8–5.2)
SODIUM SERPL-SCNC: 139 MMOL/L (ref 136–145)
WBC # BLD AUTO: 8.1 10E3/UL (ref 4–11)

## 2023-09-18 PROCEDURE — 36415 COLL VENOUS BLD VENIPUNCTURE: CPT | Performed by: INTERNAL MEDICINE

## 2023-09-18 PROCEDURE — 250N000009 HC RX 250: Performed by: STUDENT IN AN ORGANIZED HEALTH CARE EDUCATION/TRAINING PROGRAM

## 2023-09-18 PROCEDURE — 999N000157 HC STATISTIC RCP TIME EA 10 MIN

## 2023-09-18 PROCEDURE — 250N000011 HC RX IP 250 OP 636: Performed by: INTERNAL MEDICINE

## 2023-09-18 PROCEDURE — 99239 HOSP IP/OBS DSCHRG MGMT >30: CPT | Performed by: STUDENT IN AN ORGANIZED HEALTH CARE EDUCATION/TRAINING PROGRAM

## 2023-09-18 PROCEDURE — 94640 AIRWAY INHALATION TREATMENT: CPT

## 2023-09-18 PROCEDURE — 80048 BASIC METABOLIC PNL TOTAL CA: CPT | Performed by: INTERNAL MEDICINE

## 2023-09-18 PROCEDURE — 258N000003 HC RX IP 258 OP 636: Performed by: INTERNAL MEDICINE

## 2023-09-18 PROCEDURE — 85027 COMPLETE CBC AUTOMATED: CPT | Performed by: INTERNAL MEDICINE

## 2023-09-18 RX ORDER — CEFDINIR 300 MG/1
300 CAPSULE ORAL 2 TIMES DAILY
Qty: 6 CAPSULE | Refills: 0 | Status: SHIPPED | OUTPATIENT
Start: 2023-09-18 | End: 2023-09-21

## 2023-09-18 RX ADMIN — CEFTRIAXONE SODIUM 2 G: 2 INJECTION, POWDER, FOR SOLUTION INTRAMUSCULAR; INTRAVENOUS at 02:29

## 2023-09-18 RX ADMIN — AZITHROMYCIN MONOHYDRATE 500 MG: 500 INJECTION, POWDER, LYOPHILIZED, FOR SOLUTION INTRAVENOUS at 01:09

## 2023-09-18 RX ADMIN — ALBUTEROL SULFATE 2.5 MG: 2.5 SOLUTION RESPIRATORY (INHALATION) at 07:20

## 2023-09-18 ASSESSMENT — ACTIVITIES OF DAILY LIVING (ADL)
ADLS_ACUITY_SCORE: 35

## 2023-09-18 NOTE — PLAN OF CARE
Alert and oriented, calm and cooperative. Flat and hypoactive mood. Independent in room with mobility and ADL's. Empties nephrostomy independently. Weaned off O2 and maintaining on room air. Pain managed with Tylenol #3, given once in the evening. IV NS running at 100 mL/hr. IV antibiotics given.       Problem: Plan of Care - These are the overarching goals to be used throughout the patient stay.    Goal: Optimal Comfort and Wellbeing  Outcome: Progressing  Intervention: Monitor Pain and Promote Comfort  Recent Flowsheet Documentation  Taken 9/18/2023 0236 by Lisa Crowley, RN  Pain Management Interventions:   quiet environment facilitated   rest  Taken 9/17/2023 2025 by Lisa Crowley, RN  Pain Management Interventions: medication (see MAR)     Problem: Plan of Care - These are the overarching goals to be used throughout the patient stay.    Goal: Readiness for Transition of Care  Outcome: Progressing     Problem: Gas Exchange Impaired  Goal: Optimal Gas Exchange  Outcome: Progressing

## 2023-09-18 NOTE — PROGRESS NOTES
Care Management Discharge Note    Discharge Date: 09/18/2023       Discharge Disposition: Home    Discharge Services: None    Discharge DME: None    Discharge Transportation: family or friend will provide    Education Provided on the Discharge Plan: Yes (AVS per bedside RN)    Persons Notified of Discharge Plans: patient    Patient/Family in Agreement with the Plan: yes      Additional Information:  CM reviewed chart. No CM needs. Transportation per family or friend.     Ro Gates RN

## 2023-09-18 NOTE — PLAN OF CARE
Goal Outcome Evaluation:         Patient vitally stable, on room air. Patient reports pain tolerable on R flank at 5/10. Discharge education given, and all questions answered. All belongings sent home with patient. Patient discharging via private vehicle with father.

## 2023-09-18 NOTE — PROGRESS NOTES
RESPIRATORY CARE NOTE     Patient Name: Jacqueline Pappas  Today's Date: 9/18/2023       Pt continues to receive Alb nebs BID. BS are RUL clear, lower lobes insp wheezes. WENDY and RML coarse. Pt is on RA, SpO2 is 92%. Pt also perceives improvement.  RT encouraged deep breathing and coughing techniques . PT has flutter in room offered to her but she did not want to use this morning, uses independently. RT will continue to monitor and assess.    Camila Llanos, RT

## 2023-09-18 NOTE — PROGRESS NOTES
Place of Service:  [facility]     Reason for follow up: Chronic nephrostomy tube, acute presentation for combination of symptoms may be related to pneumonia and/or pyelonephritis of which she has a history.     SUBJECTIVE:  Events: no acute events overnight    Patient reports she is feeling ok. Denies significant abdominal/flank pain, fever, chills. C/o cough this AM. Reports she is voiding without difficulty, yellow urine, no burning with urination or urgency/frequency.     OBJECTIVE:  PHYSICAL EXAM:  Temp: 98.1  F (36.7  C) Temp src: Oral BP: 121/63 Pulse: 92   Resp: 18 SpO2: 95 % O2 Device: None (Room air) Oxygen Delivery: 1 LPM  General: NAD, alert, cooperative  Head: normocephalic, without abnormality / atraumatic  Abdomen: soft, non- tender, non- distended. no suprapubic fullness, no suprapubic tenderness. Mild right CVA tenderness. PNT exit site is dry, without erythema or edema, PNT draining clear yellow urine.   Genitourinary: Deferred.   Skin: No rashes or lesions  Psychological: alert and oriented, answers questions appropriately    LABS:  Creatinine   Date Value Ref Range Status   09/18/2023 0.84 0.51 - 0.95 mg/dL Final   11/02/2016 1.04 0.52 - 1.04 mg/dL Final     WBC   Date Value Ref Range Status   11/02/2016 12.7 (H) 4.0 - 11.0 10e9/L Final     WBC Count   Date Value Ref Range Status   09/18/2023 8.1 4.0 - 11.0 10e3/uL Final     Hemoglobin   Date Value Ref Range Status   09/18/2023 12.0 11.7 - 15.7 g/dL Final   11/02/2016 10.1 (L) 11.7 - 15.7 g/dL Final     Platelet Count   Date Value Ref Range Status   09/18/2023 180 150 - 450 10e3/uL Final   11/02/2016 180 150 - 450 10e9/L Final       UA:  UA RESULTS:  Recent Labs   Lab Test 09/17/23  0207 09/14/21  0104 06/16/21  2100   COLOR Yellow   < > Light Yellow   APPEARANCE Turbid*   < > Clear   URINEGLC Negative   < > Negative   URINEBILI Negative   < > Negative   URINEKETONE Negative   < > Negative   SG 1.012   < > 1.008   UBLD 1.0 mg/dL*   < >  0.5 mg/dL*   URINEPH 8.0*   < > 7.0   PROTEIN 300*   < > Negative   UROBILINOGEN  --   --  <2.0 mg/dL   NITRITE Negative   < > Negative   LEUKEST 500 Duglas/uL*   < > Negative   RBCU >182*   < > 2   WBCU >182*   < > 1    < > = values in this interval not displayed.     Cultures:  Urine 9/17: 50-100k mix of urogenital christie    Blood 9/17: NGTD    Lab Results: personally reviewed.     ASSESSMENT/PLAN:  Jacqueline Pappas is being seen by [facility] for Chronic nephrostomy tube, acute presentation for combination of symptoms may be related to pneumonia and/or pyelonephritis of which she has a history.    - WBC normalized today. Afebrile. VSS. UC: mix urogenital christie. BC's: NGTD.   - PNT due for exchange, ok to return to [place] for this.   - [facility] will sign off. Please re-consult with any new or worsening urologic concerns.      Updated:  Dr Wade Sherman, APRN, CNP  [facility]   [phone]

## 2023-09-18 NOTE — DISCHARGE SUMMARY
[facility]  Hospitalist Discharge Summary      Date of Admission:  9/16/2023  Date of Discharge:  9/18/2023 10:15 AM  Discharging Provider: Alejo Ramos MD  Discharge Service: Hospitalist Service    Discharge Diagnoses   Community acquired pneumonia, covered empirically, could be concomitant to below  Positive rhinovirus on viral panel  Acute respiratory failure with hypoxia; resolved on room air by discharge   Mild GT  Abnormal urinalysis, urology felt not consistent with UTI given hx    Clinically Significant Risk Factors     # Obesity: Estimated body mass index is 37.11 kg/m  as calculated from the following:    Height as of this encounter: 1.524 m (5').    Weight as of this encounter: 86.2 kg (190 lb).       Follow-ups Needed After Discharge   Follow-up Appointments     Follow-up and recommended labs and tests       Follow up with primary care provider, Pao Montague, within 7 days for   hospital follow- up.             Unresulted Labs Ordered in the Past 30 Days of this Admission       Date and Time Order Name Status Description    9/16/2023 11:13 PM Blood Culture Line, venous Preliminary     9/16/2023 11:13 PM Blood Culture Line, venous Preliminary         These results will be followed up by [facility]    Discharge Disposition   Discharged to home  Condition at discharge: Stable    Hospital Course   Assessment and Plan:  38 year old female past medical history significant for congenital solitary kidney, ureteral stricture, kidney stones, chronic nephrostomy tube in place, tobacco abuse, recurrent UTI/pyelonephritis, depression with multiple ED visits recently, presented with complaints of productive cough, shortness of breath for a week, was seen in the ED on 9/15 and was diagnosed with pneumonia and discharged on azithromycin then subsequently presented with persistent symptoms, mild hypoxia, right flank pain with abnormal urinalysis in setting of chronic nephrostomy and on admission  felt could be contamination. However she was hypoxic.     Admitted. On ceftriaxone and azithromycin for CAP. Ceftriaxone would also cover UTI. Her creatinine normalized as did her mild hyponatremia. Leukocytosis also improved. Urology consulted and felt no active UTI. On 9/18/2023 the pt was weaned back to room air without hypoxia. She did have a positive result for rhinovirus on viral panel. Discussed empiric antibiotics for her pneumonia, which could be a bacterial one superimposed on her rhinovirus infection. As such, will be discharged w/ po cefdinir to complete course (she already completed azithromycin PTA and during stay) and was instructed on proper precautions/masking upon discharge. She was hemodynamically and vitally stable.     Consultations This Hospital Stay   UROLOGY IP CONSULT    Code Status   Full Code    Time Spent on this Encounter   I, Alejo Ramos MD, personally saw the patient today and spent greater than 30 minutes discharging this patient.       Alejo Ramos MD  [address] [address]  Phone: [phone]  Fax: [phone]  ______________________________________________________________________    Physical Exam   Vital Signs: Temp: 98.1  F (36.7  C) Temp src: Oral BP: 121/63 Pulse: 92   Resp: 18 SpO2: 95 % O2 Device: None (Room air) Oxygen Delivery: 1 LPM  Weight: 190 lbs 0 oz    General: alert, oriented, and in no acute distress, seen in precautions  Pulmonary: clear to auscultation bilaterally, normal respiratory effort, on room air, no rales or wheezes or evidence of accessory muscle use; coughing a bit during conversation  CVS: regular rate and rhythm, no murmurs, rubs, or gallops; no blatant jugular venous distention; no extremity edema and extremities are warm to the touch  GI: soft, nontender, BS+, no rebound or guarding, no conspicuous organomegaly   Neuro: nonfocal, moves all extremities of own  volition  Psych: appropriate            Primary Care Physician   Pao Montague    Discharge Orders      Reason for your hospital stay    Pneumonia, also found with Rhinovirus. You are recommended to complete 3 more days of empiric antibiotics in case there is a bacterial component. You do not have a urinary tract infection. See your primary care physician within a week for hospital follow up. Isolate and wear masks as discussed until a couple days after symptoms improve.     Follow-up and recommended labs and tests     Follow up with primary care provider, Pao Montague, within 7 days for hospital follow- up.     Activity    Your activity upon discharge: activity as tolerated     Diet    Follow this diet upon discharge: Orders Placed This Encounter      Combination Diet Regular Diet Adult       Significant Results and Procedures   Results for orders placed or performed during the hospital encounter of 09/16/23   CT Abdomen Pelvis w/o Contrast    Narrative    EXAM: CT ABDOMEN PELVIS W/O CONTRAST  LOCATION: [facility]  DATE: 9/17/2023    INDICATION: right flank pain  COMPARISON: 08/18/2023  TECHNIQUE: CT scan of the abdomen and pelvis was performed without IV contrast. Multiplanar reformats were obtained. Dose reduction techniques were used.  CONTRAST: None.    FINDINGS:   LOWER CHEST: Patchy groundglass opacities in the lung bases.    HEPATOBILIARY: No significant mass or bile duct dilatation. No calcified gallstones.     PANCREAS: Normal.    SPLEEN: Splenomegaly, 17 cm.    ADRENAL GLANDS: Normal.    KIDNEYS/BLADDER: Right percutaneous nephrostomy catheter, stable. No significant hydronephrosis. Several nonobstructing right renal calculi. Right renal cysts, no follow-up is needed. Severe left renal atrophy.    BOWEL: No obstruction or inflammatory change. Normal appendix.    LYMPH NODES: Normal.    VASCULATURE: Unremarkable.    PELVIC ORGANS: Normal.    MUSCULOSKELETAL: Normal.       Impression    IMPRESSION:   1.  Stable right percutaneous nephrostomy catheter. No hydronephrosis.   2.  Nonobstructing right renal calculi.  3.  No other acute process in the abdomen or pelvis.  4.  Patchy groundglass opacities in the lung bases, likely infectious/inflammatory.     XR Chest 2 Views    Narrative    EXAM: XR CHEST 2 VIEWS  LOCATION: [facility]  DATE: 9/17/2023    INDICATION: dyspnea. cough  COMPARISON: 09/10/2023      Impression    IMPRESSION: Scattered airspace opacities bilateral mid and lower lungs, likely infectious/inflammatory. No pleural effusion or pneumothorax. Normal heart size and pulmonary vascularity.       Discharge Medications   Current Discharge Medication List        START taking these medications    Details   cefdinir (OMNICEF) 300 MG capsule Take 1 capsule (300 mg) by mouth 2 times daily for 3 days  Qty: 6 capsule, Refills: 0    Comments: Completing course of antibiotics from hospitalization  Associated Diagnoses: Pneumonia due to infectious organism, unspecified laterality, unspecified part of lung           CONTINUE these medications which have NOT CHANGED    Details   acetaminophen (TYLENOL) 500 MG tablet Take 500-1,000 mg by mouth every 6 hours as needed for mild pain      albuterol (ACCUNEB) 1.25 MG/3ML neb solution Take 1.25 mg by nebulization every 6 hours as needed for shortness of breath / dyspnea or wheezing      ipratropium - albuterol 0.5 mg/2.5 mg/3 mL (DUONEB) 0.5-2.5 (3) MG/3ML neb solution Take 1 vial (3 mLs) by nebulization every 6 hours as needed for shortness of breath, wheezing or cough  Qty: 180 mL, Refills: 0      sertraline (ZOLOFT) 100 MG tablet Take 100 mg by mouth At Bedtime           STOP taking these medications       azithromycin (ZITHROMAX Z-SHAHIDA) 250 MG tablet Comments:   Reason for Stopping:             Allergies   Allergies   Allergen Reactions    Desogestrel-Ethinyl Estradiol Angioedema and Swelling     Swelling of hands  and feet per pt.  Swelling of hands and feet per pt.  Swelling of hands and feet per pt.  Swelling of hands and feet per pt.  Swelling of hands and feet per pt.  Swelling of hands and feet per pt.  Swelling of hands and feet per pt.  Swelling of hands and feet per pt.      Penicillins Rash     As a child per mother; mother unsure if has tolerated another PCN since. Tolerated ampicillin 9/20/16    Sulfa Antibiotics Rash    Vancomycin Rash

## 2023-09-20 ENCOUNTER — PATIENT OUTREACH (OUTPATIENT)
Dept: CARE COORDINATION | Facility: CLINIC | Age: 38
End: 2023-09-20
Payer: MEDICAID

## 2023-09-20 NOTE — PROGRESS NOTES
Clinic Care Coordination Contact  [facility]/Voicemail       Clinical Data: Care Coordinator Outreach  Outreach attempted x 2.  Left message on patient's voicemail with call back information and requested return call.  Care Coordinator will do no further outreaches at this time.    Tawanna Ashton  [phone]  Care

## 2023-09-22 LAB
BACTERIA BLD CULT: NO GROWTH
BACTERIA BLD CULT: NO GROWTH

## 2023-10-01 ENCOUNTER — HOSPITAL ENCOUNTER (EMERGENCY)
Facility: CLINIC | Age: 38
Discharge: HOME | End: 2023-10-01
Attending: STUDENT IN AN ORGANIZED HEALTH CARE EDUCATION/TRAINING PROGRAM | Admitting: STUDENT IN AN ORGANIZED HEALTH CARE EDUCATION/TRAINING PROGRAM
Payer: COMMERCIAL

## 2023-10-01 ENCOUNTER — APPOINTMENT (OUTPATIENT)
Dept: RADIOLOGY | Facility: CLINIC | Age: 38
End: 2023-10-01
Attending: EMERGENCY MEDICINE
Payer: COMMERCIAL

## 2023-10-01 VITALS
HEIGHT: 61 IN | SYSTOLIC BLOOD PRESSURE: 114 MMHG | OXYGEN SATURATION: 94 % | TEMPERATURE: 97.5 F | WEIGHT: 194 LBS | DIASTOLIC BLOOD PRESSURE: 74 MMHG | HEART RATE: 91 BPM | BODY MASS INDEX: 36.63 KG/M2 | RESPIRATION RATE: 18 BRPM

## 2023-10-01 DIAGNOSIS — J20.9 ACUTE BRONCHITIS, UNSPECIFIED ORGANISM: ICD-10-CM

## 2023-10-01 LAB
ATRIAL RATE - MUSE: 93 BPM
DIASTOLIC BLOOD PRESSURE - MUSE: 74 MMHG
FLUAV RNA SPEC QL NAA+PROBE: NEGATIVE
FLUBV RNA RESP QL NAA+PROBE: NEGATIVE
INTERPRETATION ECG - MUSE: NORMAL
P AXIS - MUSE: 41 DEGREES
PR INTERVAL - MUSE: 134 MS
QRS DURATION - MUSE: 72 MS
QT - MUSE: 354 MS
QTC - MUSE: 440 MS
R AXIS - MUSE: 39 DEGREES
RSV RNA SPEC NAA+PROBE: NEGATIVE
SARS-COV-2 RNA RESP QL NAA+PROBE: NEGATIVE
SYSTOLIC BLOOD PRESSURE - MUSE: 114 MMHG
T AXIS - MUSE: 29 DEGREES
VENTRICULAR RATE- MUSE: 93 BPM

## 2023-10-01 PROCEDURE — 71046 X-RAY EXAM CHEST 2 VIEWS: CPT

## 2023-10-01 PROCEDURE — 93005 ELECTROCARDIOGRAM TRACING: CPT | Performed by: EMERGENCY MEDICINE

## 2023-10-01 PROCEDURE — 87637 SARSCOV2&INF A&B&RSV AMP PRB: CPT | Performed by: STUDENT IN AN ORGANIZED HEALTH CARE EDUCATION/TRAINING PROGRAM

## 2023-10-01 PROCEDURE — 93005 ELECTROCARDIOGRAM TRACING: CPT | Performed by: STUDENT IN AN ORGANIZED HEALTH CARE EDUCATION/TRAINING PROGRAM

## 2023-10-01 PROCEDURE — 99285 EMERGENCY DEPT VISIT HI MDM: CPT | Mod: 25

## 2023-10-01 RX ORDER — BENZONATATE 100 MG/1
100 CAPSULE ORAL 3 TIMES DAILY PRN
Qty: 15 CAPSULE | Refills: 0 | Status: SHIPPED | OUTPATIENT
Start: 2023-10-01 | End: 2023-10-06

## 2023-10-01 ASSESSMENT — ACTIVITIES OF DAILY LIVING (ADL): ADLS_ACUITY_SCORE: 35

## 2023-10-01 NOTE — ED TRIAGE NOTES
Patient has cough since yesterday. Today woke with shortness of breath and chest tightness that radiates between shoulder blades. Pain worsens with ambulation. Recent pneumonia hospitalization.      Triage Assessment       Row Name 10/01/23 1515       Respiratory WDL    Respiratory WDL X  SOB, nonproductive cough       Cardiac WDL    Cardiac WDL X  chest tightness

## 2023-10-01 NOTE — ED PROVIDER NOTES
EMERGENCY DEPARTMENT ENCOUNTER      NAME: Jacqueline Pappas  AGE: 38 year old female  YOB: 1985  MRN: [id number]  EVALUATION DATE & TIME: 10/1/2023  3:36 PM    PCP: Pao Montague    ED PROVIDER: Rojelio Quach MD      Chief Complaint   Patient presents with    Shortness of Breath         FINAL IMPRESSION:  1. Acute bronchitis, unspecified organism          ED COURSE & MEDICAL DECISION MAKING:    Pertinent Labs & Imaging studies reviewed. (See chart for details)  38 year old female presents to the Emergency Department for evaluation of cough    ED Course as of 10/01/23 1712   Sun Oct 01, 2023   1708 Patient is a 38-year-old female who presents the emergency department with a dry cough for 2 days, shortness of breath in the absence of fever.  No hemoptysis.  Fortunately patient is not hypoxic or tachypneic or using accessory muscle use on exam.  Lungs are clear.  She is not toxic appearing, but does appear uncomfortable.  Fortunately her viral swabs are negative, and her chest x-ray does not show pneumonia or developing empyema from her recent bout with pneumonia.  Discussed with the patient that her diagnosis is bronchitis, and that there are no directed therapies for this other than trying to help suppress the cough, which we will provide Tessalon Perles, and recommended that she continue to use over-the-counter medications and gave strict return precautions.       5:01 PM  I met the patient and performed my initial interview and exam.     Medical Decision Making    History:  Supplemental history from: N/A  External Record(s) reviewed: Inpatient Record: [facility] Admission 9/16/23-9/18/23    Work Up:  Chart documentation includes differential considered and any EKGs or imaging independently interpreted by provider, where specified.  In additional to work up documented, I considered the following work up: Documented in chart, if applicable.    External consultation:  Discussion of management with  another provider: Documented in chart, if applicable    Complicating factors:  Care impacted by chronic illness: Chronic Pain, Smoking / Nicotine Use, and Other: hypoxia, anemia, depression, chronic nephrostomy tube in place   Care affected by social determinants of health: N/A    Disposition considerations: Discharge. I prescribed additional prescription strength medication(s) as charted. See documentation for any additional details.        At the conclusion of the encounter I discussed the results of all of the tests and the disposition. The questions were answered. The patient or family acknowledged understanding and was agreeable with the care plan.     0 minutes of critical care time     MEDICATIONS GIVEN IN THE EMERGENCY:  Medications - No data to display    NEW PRESCRIPTIONS STARTED AT TODAY'S ER VISIT  New Prescriptions    BENZONATATE (TESSALON) 100 MG CAPSULE    Take 1 capsule (100 mg) by mouth 3 times daily as needed for cough          =================================================================    HPI    Patient information was obtained from: Patient     Use of : N/A         Jacqueline Pappas is a 38 year old female with a pertinent history of chronic pain, smoking, hypoxia, anemia, depression, chronic nephrostomy tube in place, recurrent UTI/pyelonephritis who presents to this ED by private vehicle for evaluation of shortness of breath.    Per chart review, the patient was admitted to [facility] ED from 9/16/23 to 9/18/23 for pneumonia. She was on ceftriaxone and azithromycin for CAP. Ceftriaxone would also cover UTI. Her creatinine normalized as did her mild hyponatremia. Leukocytosis also improved. Urology consulted and felt no active UTI. She did have a positive result for rhinovirus on viral panel. Discussed empiric antibiotics for her pneumonia, which could be a bacterial one superimposed on her rhinovirus infection. Was discharged w/ po cefdinir to complete course (she already  completed azithromycin PTA and during stay) and was instructed on proper precautions/masking upon discharge.     Per the patient, she has had a persistent cough for the past few days but endorses shortness of breath and chest tightness that began yesterday. She is not coughing up any phlegm or blood. Patient endorses chills and weakness but denies fever, runny nose, sore throat, sinus pain or pressure, leg swelling or any other complaints at this time. She states that after her hospital discharge on 23, her symptoms were not completely resolved and feels like her current symptoms are a continuation. Patient denies recent sick contacts.      PAST MEDICAL HISTORY:  Past Medical History:   Diagnosis Date    Acute renal failure (H24)     Anemia     Anemia     Calculus of kidney     Congenital absence of left kidney     Congenital absence of one kidney     Depression     Depression     Hydronephrosis     Kidney stone     at age 29    Pyelonephritis     Pyelonephritis     Smoker     Ureteral stricture     UTI (urinary tract infection)     Wrist fracture     Left       PAST SURGICAL HISTORY:  Past Surgical History:   Procedure Laterality Date     SECTION       SECTION      x1    COMBINED CYSTOSCOPY, RETROGRADES, URETEROSCOPY, LASER HOLMIUM LITHOTRIPSY URETER(S), INSERT STENT Right 2016    Procedure: COMBINED CYSTOSCOPY, RETROGRADES, URETEROSCOPY, LASER HOLMIUM LITHOTRIPSY URETER(S), INSERT STENT;  Surgeon: Florentino Awad MD;  Location: [facility] OR    CYSTOSCOPY, URETEROSCOPY, COMBINED Right 2016    Procedure: COMBINED CYSTOSCOPY, URETEROSCOPY;  Surgeon: Florentino Awad MD;  Location: [facility] OR    IR NEPHROLITHOTOMY  2014    IR NEPHROSTOGRAM EXISITING ACCESS  10/18/2018    IR NEPHROSTOMY TUBE CHANGE RIGHT  2018    IR NEPHROSTOMY TUBE CHANGE RIGHT  2020    IR NEPHROSTOMY TUBE CHANGE RIGHT  2018    IR NEPHROSTOMY TUBE CHANGE RIGHT  2020    IR NEPHROSTOMY TUBE  PLACEMENT RIGHT  7/24/2016    IR NEPHROSTOMY TUBE PLACEMENT RIGHT  9/14/2017    KIDNEY STONE SURGERY Right 05/2016    LASER HOLMIUM LITHOTRIPSY URETER(S), INSERT STENT, COMBINED Left 11/1/2016    Procedure: COMBINED CYSTOSCOPY, URETEROSCOPY, LASER HOLMIUM LITHOTRIPSY URETER(S), INSERT STENT;  Surgeon: Florentino Awad MD;  Location: [facility] OR    LASER HOLMIUM NEPHROLITHOTOMY VIA PERCUTANEOUS NEPHROSTOMY Right 9/14/2016    Procedure: LASER HOLMIUM NEPHROLITHOTOMY VIA PERCUTANEOUS NEPHROSTOMY;  Surgeon: Florentino Awad MD;  Location: [facility] OR    NEPHROSTOMY W/ INTRODUCTION OF CATHETER Right     OTHER SURGICAL HISTORY      Mole removednasal    OTHER SURGICAL HISTORY Right     Cystoscopy, ureteroscopy, laser11/02/2014 x2 with ureteral stent insertion    PERCUTANEOUS NEPHROSTOMY  11/1/2016    Procedure: PERCUTANEOUS NEPHROSTOMY;  Surgeon: Florentino Awad MD;  Location: [facility] OR    WISDOM TOOTH EXTRACTION      ZZC REMOVAL OF KIDNEY STONE             CURRENT MEDICATIONS:    benzonatate (TESSALON) 100 MG capsule  acetaminophen (TYLENOL) 500 MG tablet  albuterol (ACCUNEB) 1.25 MG/3ML neb solution  ipratropium - albuterol 0.5 mg/2.5 mg/3 mL (DUONEB) 0.5-2.5 (3) MG/3ML neb solution  sertraline (ZOLOFT) 100 MG tablet        ALLERGIES:  Allergies   Allergen Reactions    Desogestrel-Ethinyl Estradiol Angioedema and Swelling     Swelling of hands and feet per pt.  Swelling of hands and feet per pt.  Swelling of hands and feet per pt.  Swelling of hands and feet per pt.  Swelling of hands and feet per pt.  Swelling of hands and feet per pt.  Swelling of hands and feet per pt.  Swelling of hands and feet per pt.      Penicillins Rash     As a child per mother; mother unsure if has tolerated another PCN since. Tolerated ampicillin 9/20/16    Sulfa Antibiotics Rash    Vancomycin Rash       FAMILY HISTORY:  Family History   Problem Relation Age of Onset    Anesthesia Reaction No family hx of     Malignant Hyperthermia No family  "hx of     Heart Disease Maternal Uncle         heart failure    Coronary Artery Disease Other     Heart Disease Maternal Grandmother         pacemaker    Gout Paternal Grandfather     Prostate Cancer Maternal Aunt         breast    Heart Disease Maternal Aunt         pacemaker    Heart Disease Maternal Aunt         pacemaker    Heart Disease Maternal Aunt         pacemaker       SOCIAL HISTORY:   Social History     Socioeconomic History    Marital status: Single   Tobacco Use    Smoking status: Every Day     Packs/day: 0.50     Years: 10.00     Pack years: 5.00     Types: Cigarettes     Last attempt to quit: 2016     Years since quittin.0    Smokeless tobacco: Former     Quit date: 2016    Tobacco comments:     Hasn't smoked in a week due to breathing issues    Substance and Sexual Activity    Alcohol use: Not Currently     Comment: Alcoholic Drinks/day: occ    Drug use: No    Sexual activity: Not Currently       VITALS:  /74 (BP Location: Left arm, Patient Position: Sitting)   Pulse 90   Temp 97.5  F (36.4  C) (Temporal)   Resp 20   Ht 1.537 m (5' 0.5\")   Wt 88 kg (194 lb)   LMP 2023 (Approximate)   SpO2 94%   BMI 37.26 kg/m      PHYSICAL EXAM    Physical Exam  Vitals and nursing note reviewed.   Constitutional:       General: She is not in acute distress.     Appearance: Normal appearance. She is normal weight. She is not ill-appearing.   HENT:      Head: Normocephalic and atraumatic.      Nose: Nose normal.      Mouth/Throat:      Mouth: Mucous membranes are moist.      Pharynx: Oropharynx is clear.   Eyes:      Extraocular Movements: Extraocular movements intact.      Conjunctiva/sclera: Conjunctivae normal.      Pupils: Pupils are equal, round, and reactive to light.   Cardiovascular:      Rate and Rhythm: Normal rate and regular rhythm.      Pulses: Normal pulses.      Heart sounds: Normal heart sounds. No murmur heard.  Pulmonary:      Effort: Pulmonary effort is normal. No " respiratory distress.      Breath sounds: Normal breath sounds. No stridor. No wheezing, rhonchi or rales.   Abdominal:      General: Abdomen is flat. There is no distension.      Palpations: Abdomen is soft.      Tenderness: There is no abdominal tenderness.   Musculoskeletal:         General: Normal range of motion.      Cervical back: Normal range of motion.      Right lower leg: No edema.      Left lower leg: No edema.   Skin:     General: Skin is warm and dry.      Capillary Refill: Capillary refill takes less than 2 seconds.   Neurological:      General: No focal deficit present.      Mental Status: She is alert and oriented to person, place, and time. Mental status is at baseline.   Psychiatric:         Mood and Affect: Mood normal.         Behavior: Behavior normal.         Thought Content: Thought content normal.         Judgment: Judgment normal.            LAB:  All pertinent labs reviewed and interpreted.  Results for orders placed or performed during the hospital encounter of 10/01/23   Chest XR,  PA & LAT    Impression    IMPRESSION: Negative chest.   Symptomatic Influenza A/B, RSV, & SARS-CoV2 PCR (COVID-19) Nasopharyngeal    Specimen: Nasopharyngeal; Swab   Result Value Ref Range    Influenza A PCR Negative Negative    Influenza B PCR Negative Negative    RSV PCR Negative Negative    SARS CoV2 PCR Negative Negative   ECG 12-LEAD WITH MUSE ([facility])   Result Value Ref Range    Systolic Blood Pressure 114 mmHg    Diastolic Blood Pressure 74 mmHg    Ventricular Rate 93 BPM    Atrial Rate 93 BPM    LA Interval 134 ms    QRS Duration 72 ms     ms    QTc 440 ms    P Axis 41 degrees    R AXIS 39 degrees    T Axis 29 degrees    Interpretation ECG       Sinus rhythm  Normal ECG  When compared with ECG of 17-SEP-2023 00:02,  Fusion complexes are no longer Present  Premature ventricular complexes are no longer Present  Nonspecific T wave abnormality now evident in Inferior leads  Confirmed by SEE ED PROVIDER  NOTE FOR, ECG INTERPRETATION (4000),  Toyin Arana ([id number]) on 10/1/2023 3:55:24 PM         RADIOLOGY:  Reviewed all pertinent imaging. Please see official radiology report.  Chest XR,  PA & LAT   Final Result   IMPRESSION: Negative chest.          PROCEDURES:   None      [facility] Documentation:   CMS Diagnoses:               I, Sean Ramirez, am serving as a scribe to document services personally performed by Rojelio Quach MD based on my observation and the provider's statements to me. I, Rojelio Quach MD, attest that Sean Ramirez is acting in a scribe capacity, has observed my performance of the services and has documented them in accordance with my direction.    Rojelio Quach MD  [facility] EMERGENCY ROOM  [address] [address]  [phone]       Rojelio Quach MD  10/01/23 1671

## 2023-10-01 NOTE — Clinical Note
Jacqueline Pappas was seen and treated in our emergency department on 10/1/2023.  She may return to work on 10/03/2023.       If you have any questions or concerns, please don't hesitate to call.      Rojelio Quach MD

## 2023-10-06 ENCOUNTER — HOSPITAL ENCOUNTER (EMERGENCY)
Facility: HOSPITAL | Age: 38
Discharge: HOME | End: 2023-10-06
Attending: EMERGENCY MEDICINE | Admitting: EMERGENCY MEDICINE
Payer: COMMERCIAL

## 2023-10-06 ENCOUNTER — APPOINTMENT (OUTPATIENT)
Dept: CT IMAGING | Facility: HOSPITAL | Age: 38
End: 2023-10-06
Attending: EMERGENCY MEDICINE
Payer: COMMERCIAL

## 2023-10-06 VITALS
BODY MASS INDEX: 37.3 KG/M2 | WEIGHT: 190 LBS | HEART RATE: 94 BPM | OXYGEN SATURATION: 95 % | RESPIRATION RATE: 20 BRPM | DIASTOLIC BLOOD PRESSURE: 55 MMHG | TEMPERATURE: 98.8 F | SYSTOLIC BLOOD PRESSURE: 108 MMHG | HEIGHT: 60 IN

## 2023-10-06 DIAGNOSIS — N39.0 URINARY TRACT INFECTION WITHOUT HEMATURIA, SITE UNSPECIFIED: ICD-10-CM

## 2023-10-06 LAB
ALBUMIN UR-MCNC: 200 MG/DL
ANION GAP SERPL CALCULATED.3IONS-SCNC: 12 MMOL/L (ref 7–15)
APPEARANCE UR: ABNORMAL
BACTERIA #/AREA URNS HPF: ABNORMAL /HPF
BASO+EOS+MONOS # BLD AUTO: ABNORMAL 10*3/UL
BASO+EOS+MONOS NFR BLD AUTO: ABNORMAL %
BASOPHILS # BLD AUTO: 0 10E3/UL (ref 0–0.2)
BASOPHILS NFR BLD AUTO: 0 %
BILIRUB UR QL STRIP: NEGATIVE
BUN SERPL-MCNC: 10 MG/DL (ref 6–20)
CALCIUM SERPL-MCNC: 10.2 MG/DL (ref 8.6–10)
CHLORIDE SERPL-SCNC: 103 MMOL/L (ref 98–107)
COLOR UR AUTO: ABNORMAL
CREAT SERPL-MCNC: 1 MG/DL (ref 0.51–0.95)
DEPRECATED HCO3 PLAS-SCNC: 21 MMOL/L (ref 22–29)
EGFRCR SERPLBLD CKD-EPI 2021: 75 ML/MIN/1.73M2
EOSINOPHIL # BLD AUTO: 1 10E3/UL (ref 0–0.7)
EOSINOPHIL NFR BLD AUTO: 11 %
ERYTHROCYTE [DISTWIDTH] IN BLOOD BY AUTOMATED COUNT: 14.2 % (ref 10–15)
GLUCOSE SERPL-MCNC: 163 MG/DL (ref 70–99)
GLUCOSE UR STRIP-MCNC: NEGATIVE MG/DL
HCT VFR BLD AUTO: 38.3 % (ref 35–47)
HGB BLD-MCNC: 12.6 G/DL (ref 11.7–15.7)
HGB UR QL STRIP: ABNORMAL
HOLD SPECIMEN: NORMAL
HOLD SPECIMEN: NORMAL
IMM GRANULOCYTES # BLD: 0 10E3/UL
IMM GRANULOCYTES NFR BLD: 1 %
KETONES UR STRIP-MCNC: NEGATIVE MG/DL
LEUKOCYTE ESTERASE UR QL STRIP: ABNORMAL
LYMPHOCYTES # BLD AUTO: 1.4 10E3/UL (ref 0.8–5.3)
LYMPHOCYTES NFR BLD AUTO: 15 %
MCH RBC QN AUTO: 29.8 PG (ref 26.5–33)
MCHC RBC AUTO-ENTMCNC: 32.9 G/DL (ref 31.5–36.5)
MCV RBC AUTO: 91 FL (ref 78–100)
MONOCYTES # BLD AUTO: 0.5 10E3/UL (ref 0–1.3)
MONOCYTES NFR BLD AUTO: 6 %
MUCOUS THREADS #/AREA URNS LPF: PRESENT /LPF
NEUTROPHILS # BLD AUTO: 5.9 10E3/UL (ref 1.6–8.3)
NEUTROPHILS NFR BLD AUTO: 67 %
NITRATE UR QL: NEGATIVE
NRBC # BLD AUTO: 0 10E3/UL
NRBC BLD AUTO-RTO: 0 /100
PH UR STRIP: 7.5 [PH] (ref 5–7)
PLATELET # BLD AUTO: 241 10E3/UL (ref 150–450)
POTASSIUM SERPL-SCNC: 3.9 MMOL/L (ref 3.4–5.3)
RBC # BLD AUTO: 4.23 10E6/UL (ref 3.8–5.2)
RBC URINE: 4 /HPF
SODIUM SERPL-SCNC: 136 MMOL/L (ref 135–145)
SP GR UR STRIP: 1.01 (ref 1–1.03)
SQUAMOUS EPITHELIAL: 3 /HPF
UROBILINOGEN UR STRIP-MCNC: <2 MG/DL
WBC # BLD AUTO: 8.8 10E3/UL (ref 4–11)
WBC CLUMPS #/AREA URNS HPF: PRESENT /HPF
WBC URINE: 36 /HPF

## 2023-10-06 PROCEDURE — 36415 COLL VENOUS BLD VENIPUNCTURE: CPT | Performed by: EMERGENCY MEDICINE

## 2023-10-06 PROCEDURE — 85025 COMPLETE CBC W/AUTO DIFF WBC: CPT | Performed by: EMERGENCY MEDICINE

## 2023-10-06 PROCEDURE — 258N000003 HC RX IP 258 OP 636: Performed by: EMERGENCY MEDICINE

## 2023-10-06 PROCEDURE — 81001 URINALYSIS AUTO W/SCOPE: CPT | Performed by: EMERGENCY MEDICINE

## 2023-10-06 PROCEDURE — 74176 CT ABD & PELVIS W/O CONTRAST: CPT

## 2023-10-06 PROCEDURE — 96375 TX/PRO/DX INJ NEW DRUG ADDON: CPT

## 2023-10-06 PROCEDURE — 96361 HYDRATE IV INFUSION ADD-ON: CPT

## 2023-10-06 PROCEDURE — 80048 BASIC METABOLIC PNL TOTAL CA: CPT | Performed by: EMERGENCY MEDICINE

## 2023-10-06 PROCEDURE — 250N000011 HC RX IP 250 OP 636: Performed by: EMERGENCY MEDICINE

## 2023-10-06 PROCEDURE — 99285 EMERGENCY DEPT VISIT HI MDM: CPT | Mod: 25

## 2023-10-06 PROCEDURE — 96374 THER/PROPH/DIAG INJ IV PUSH: CPT

## 2023-10-06 PROCEDURE — 87086 URINE CULTURE/COLONY COUNT: CPT | Performed by: EMERGENCY MEDICINE

## 2023-10-06 RX ORDER — KETOROLAC TROMETHAMINE 15 MG/ML
15 INJECTION, SOLUTION INTRAMUSCULAR; INTRAVENOUS ONCE
Status: COMPLETED | OUTPATIENT
Start: 2023-10-06 | End: 2023-10-06

## 2023-10-06 RX ORDER — ONDANSETRON 2 MG/ML
4 INJECTION INTRAMUSCULAR; INTRAVENOUS ONCE
Status: COMPLETED | OUTPATIENT
Start: 2023-10-06 | End: 2023-10-06

## 2023-10-06 RX ORDER — CEPHALEXIN 500 MG/1
500 CAPSULE ORAL 4 TIMES DAILY
Qty: 28 CAPSULE | Refills: 0 | Status: SHIPPED | OUTPATIENT
Start: 2023-10-06 | End: 2023-10-13

## 2023-10-06 RX ADMIN — KETOROLAC TROMETHAMINE 15 MG: 15 INJECTION INTRAMUSCULAR; INTRAVENOUS at 03:36

## 2023-10-06 RX ADMIN — SODIUM CHLORIDE 1000 ML: 9 INJECTION, SOLUTION INTRAVENOUS at 03:34

## 2023-10-06 RX ADMIN — ONDANSETRON 4 MG: 2 INJECTION INTRAMUSCULAR; INTRAVENOUS at 03:34

## 2023-10-06 ASSESSMENT — ENCOUNTER SYMPTOMS
SHORTNESS OF BREATH: 1
NAUSEA: 1
COUGH: 1
DIARRHEA: 1
VOMITING: 1
FEVER: 0

## 2023-10-06 ASSESSMENT — ACTIVITIES OF DAILY LIVING (ADL): ADLS_ACUITY_SCORE: 35

## 2023-10-06 NOTE — DISCHARGE INSTRUCTIONS
Encourage fluids.  Ice or heat off-and-on to sore areas can help with pain.  Tylenol or ibuprofen as tolerated.  Cephalexin 4 times a day until gone.  You must see your clinic next week and have them recheck your urine for an follow-up.  See them sooner if worse or problems.

## 2023-10-06 NOTE — ED PROVIDER NOTES
EMERGENCY DEPARTMENT ENCOUNTER      NAME: Jacqueline Pappas  AGE: 38 year old female  YOB: 1985  MRN: [id number]  EVALUATION DATE & TIME: 10/6/2023  2:28 AM    PCP: Pao Montague    ED PROVIDER: Jose D Luong M.D.      Chief Complaint   Patient presents with    Flank Pain    Cough    Shortness of Breath    Nausea, Vomiting, & Diarrhea         FINAL IMPRESSION:  1.  Acute right flank pain.  2.  Acute UTI/pyelonephritis.    ED COURSE & MEDICAL DECISION MAKIN:45 AM I met with the patient to gather history and to perform my initial exam. We discussed plans for the ED course, including diagnostic testing and treatment. PPE worn: cloth mask..  Patient with 3 days of symptoms consisting of a cough mostly nonproductive but occasionally yellowish phlegm.  No fever, some mild shortness of breath, right flank pain, as well as some nausea, vomiting, and diarrhea for the last 3 days.  A special note.  Patient has a chronic special needs care plan advocating avoidance of urinary antibiotics and IV and oral narcotics.  3:58 AM I rechecked and updated the patient.  CBC negative.  Chemistries negative other than sugar 163 and bicarbonate of 21.  Urinalysis with 36 white blood cells and positive leukocyte esterase and patient is symptomatic.  We will treat with cephalexin.  Abdominal CT without acute findings.  Patient will follow-up with her doctor.  Patient in agreement with the plan.    Pertinent Labs & Imaging studies reviewed. (See chart for details)  38 year old female presents to the Emergency Department for evaluation of right flank pain.    At the conclusion of the encounter I discussed the results of all of the tests and the disposition. The questions were answered. The patient or family acknowledged understanding and was agreeable with the care plan.              Medical Decision Making    History:  Supplemental history from: Documented in chart, if applicable  External Record(s) reviewed:  Documented in chart, if applicable. and Other: 10/1/23 visit to [facility] ED    Work Up:  Chart documentation includes differential considered and any EKGs or imaging independently interpreted by provider, where specified.  Differential diagnosis includes UTI, gastroenteritis, other abdominal pathology, etc.  In additional to work up documented, I considered the following work up: Documented in chart, if applicable.    External consultation:  Discussion of management with another provider: Documented in chart, if applicable    Complicating factors:  Care impacted by chronic illness: Mental Health and Smoking / Nicotine Use  Care affected by social determinants of health: Access to Medical Care    Disposition considerations: Anticipate probable discharge home after evaluation.          MEDICATIONS GIVEN IN THE EMERGENCY:  Medications   sodium chloride 0.9% BOLUS 1,000 mL (1,000 mLs Intravenous $New Bag 10/6/23 0334)   ondansetron (ZOFRAN) injection 4 mg (4 mg Intravenous $Given 10/6/23 0334)   ketorolac (TORADOL) injection 15 mg (15 mg Intravenous $Given 10/6/23 0336)       NEW PRESCRIPTIONS STARTED AT TODAY'S ER VISIT  New Prescriptions    No medications on file          =================================================================    HPI    Patient information was obtained from: Patient    Use of : N/A     Per chart review: Patient presented to the [facility] ED on 10/1/23 (5 days ago) for evaluation of shortness of breath. Viral swabs negative. Chest x-ray negative for penumonia or developing empyema from her recent bout with pneumonia. Patient diagnosed with bronchitis and discharged with Tessalon Perles.      Jacqueline Pappas is a 38 year old female with a pertinent history of hypotension, congenital solitary kidney, anemia, and major depressive disorder who presents to this ED via private vehicle for evaluation of cough, SOB, nausea, vomiting, diarrhea, and flank pain.    Patient reports cough for  the past 3 weeks with occasional yellow sputum. Patient mentions that she was hospitalized 3 weeks ago on 23 for pneumonia. She says she quit smoking when she was diagnosed with pneumonia. Patient denies any tobacco, alcohol, or drug use. She denies any allergies to pain or nausea medications. Patient denies any fevers.    Patient has a special needs care plan to avoid narcotics since 23.    She does not identify any waxing or waning symptoms otherwise, exacerbating or alleviating features, associated symptoms except as mentioned.     REVIEW OF SYSTEMS   Review of Systems   Constitutional:  Negative for fever.   Respiratory:  Positive for cough and shortness of breath.    Gastrointestinal:  Positive for diarrhea, nausea and vomiting.   Musculoskeletal:         Positive for flank pain.        PAST MEDICAL HISTORY:  Past Medical History:   Diagnosis Date    Acute renal failure (H24)     Anemia     Anemia     Calculus of kidney     Congenital absence of left kidney     Congenital absence of one kidney     Depression     Depression     Hydronephrosis     Kidney stone     at age 29    Pyelonephritis     Pyelonephritis     Smoker     Ureteral stricture     UTI (urinary tract infection)     Wrist fracture     Left       PAST SURGICAL HISTORY:  Past Surgical History:   Procedure Laterality Date     SECTION       SECTION      x1    COMBINED CYSTOSCOPY, RETROGRADES, URETEROSCOPY, LASER HOLMIUM LITHOTRIPSY URETER(S), INSERT STENT Right 2016    Procedure: COMBINED CYSTOSCOPY, RETROGRADES, URETEROSCOPY, LASER HOLMIUM LITHOTRIPSY URETER(S), INSERT STENT;  Surgeon: Florentino Awad MD;  Location: [facility] OR    CYSTOSCOPY, URETEROSCOPY, COMBINED Right 2016    Procedure: COMBINED CYSTOSCOPY, URETEROSCOPY;  Surgeon: Florentino Awad MD;  Location: [facility] OR    IR NEPHROLITHOTOMY  2014    IR NEPHROSTOGRAM EXISITING ACCESS  10/18/2018    IR NEPHROSTOMY TUBE CHANGE RIGHT  2018    IR  NEPHROSTOMY TUBE CHANGE RIGHT  6/11/2020    IR NEPHROSTOMY TUBE CHANGE RIGHT  12/12/2018    IR NEPHROSTOMY TUBE CHANGE RIGHT  6/11/2020    IR NEPHROSTOMY TUBE PLACEMENT RIGHT  7/24/2016    IR NEPHROSTOMY TUBE PLACEMENT RIGHT  9/14/2017    KIDNEY STONE SURGERY Right 05/2016    LASER HOLMIUM LITHOTRIPSY URETER(S), INSERT STENT, COMBINED Left 11/1/2016    Procedure: COMBINED CYSTOSCOPY, URETEROSCOPY, LASER HOLMIUM LITHOTRIPSY URETER(S), INSERT STENT;  Surgeon: Florentino Awad MD;  Location: [facility] OR    LASER HOLMIUM NEPHROLITHOTOMY VIA PERCUTANEOUS NEPHROSTOMY Right 9/14/2016    Procedure: LASER HOLMIUM NEPHROLITHOTOMY VIA PERCUTANEOUS NEPHROSTOMY;  Surgeon: Florentino Awad MD;  Location: [facility] OR    NEPHROSTOMY W/ INTRODUCTION OF CATHETER Right     OTHER SURGICAL HISTORY      Mole removednasal    OTHER SURGICAL HISTORY Right     Cystoscopy, ureteroscopy, laser11/02/2014 x2 with ureteral stent insertion    PERCUTANEOUS NEPHROSTOMY  11/1/2016    Procedure: PERCUTANEOUS NEPHROSTOMY;  Surgeon: Florentino Awad MD;  Location: [facility] OR    WISDOM TOOTH EXTRACTION      ZZC REMOVAL OF KIDNEY STONE             CURRENT MEDICATIONS:    acetaminophen (TYLENOL) 500 MG tablet  albuterol (ACCUNEB) 1.25 MG/3ML neb solution  benzonatate (TESSALON) 100 MG capsule  ipratropium - albuterol 0.5 mg/2.5 mg/3 mL (DUONEB) 0.5-2.5 (3) MG/3ML neb solution  sertraline (ZOLOFT) 100 MG tablet        ALLERGIES:  Allergies   Allergen Reactions    Desogestrel-Ethinyl Estradiol Angioedema and Swelling     Swelling of hands and feet per pt.  Swelling of hands and feet per pt.  Swelling of hands and feet per pt.  Swelling of hands and feet per pt.  Swelling of hands and feet per pt.  Swelling of hands and feet per pt.  Swelling of hands and feet per pt.  Swelling of hands and feet per pt.      Penicillins Rash     As a child per mother; mother unsure if has tolerated another PCN since. Tolerated ampicillin 9/20/16    Sulfa Antibiotics Rash     Vancomycin Rash       FAMILY HISTORY:  Family History   Problem Relation Age of Onset    Anesthesia Reaction No family hx of     Malignant Hyperthermia No family hx of     Heart Disease Maternal Uncle         heart failure    Coronary Artery Disease Other     Heart Disease Maternal Grandmother         pacemaker    Gout Paternal Grandfather     Prostate Cancer Maternal Aunt         breast    Heart Disease Maternal Aunt         pacemaker    Heart Disease Maternal Aunt         pacemaker    Heart Disease Maternal Aunt         pacemaker       SOCIAL HISTORY:   Social History     Socioeconomic History    Marital status: Single   Tobacco Use    Smoking status: Every Day     Packs/day: 0.50     Years: 10.00     Pack years: 5.00     Types: Cigarettes     Last attempt to quit: 2016     Years since quittin.0    Smokeless tobacco: Former     Quit date: 2016    Tobacco comments:     Hasn't smoked in a week due to breathing issues    Substance and Sexual Activity    Alcohol use: Not Currently     Comment: Alcoholic Drinks/day: occ    Drug use: No    Sexual activity: Not Currently   No drugs or alcohol.  Patient quit smoking less than a month ago.  On the left, tender on the right costovertebral angle and right flank.    VITALS:  /61   Pulse 86   Temp 98.8  F (37.1  C) (Oral)   Resp 20   Ht 1.524 m (5')   Wt 86.2 kg (190 lb)   LMP 2023 (Approximate)   SpO2 95%   BMI 37.11 kg/m      PHYSICAL EXAM    Vital Signs:  /61   Pulse 86   Temp 98.8  F (37.1  C) (Oral)   Resp 20   Ht 1.524 m (5')   Wt 86.2 kg (190 lb)   LMP 2023 (Approximate)   SpO2 95%   BMI 37.11 kg/m    General:  On entering the room she is in no apparent distress.    Neck:  Neck supple with full range of motion and nontender.    Back:  Back and spine are nontender.  No costovertebral angle tenderness    HEENT:  Oropharynx clear with moist mucous membranes.  HEENT unremarkable.    Pulmonary:  Chest clear to auscultation  without rhonchi rales or wheezing.    Cardiovascular:  Cardiac regular rate and rhythm without murmurs rubs or gallops.    Abdomen:  Abdomen soft nontender.  Some suprapubic discomfort.  There is no rebound or guarding.    Muskuloskeletal:  She moves all 4 without any difficulty and has normal neurovascular exams.  Extremities without clubbing, cyanosis, or edema.  Legs and calves are nontender.    Neuro:  She is alert and oriented ×3 and moves all extremities symmetrically.    Psych:  Normal affect.    Skin:  Unremarkable and warm and dry.       LAB:  All pertinent labs reviewed and interpreted.  Labs Ordered and Resulted from Time of ED Arrival to Time of ED Departure   ROUTINE UA WITH MICROSCOPIC REFLEX TO CULTURE - Abnormal       Result Value    Color Urine Light Yellow      Appearance Urine Turbid (*)     Glucose Urine Negative      Bilirubin Urine Negative      Ketones Urine Negative      Specific Gravity Urine 1.014      Blood Urine 0.06 mg/dL (*)     pH Urine 7.5 (*)     Protein Albumin Urine 200 (*)     Urobilinogen Urine <2.0      Nitrite Urine Negative      Leukocyte Esterase Urine 500 Duglas/uL (*)     Bacteria Urine Moderate (*)     WBC Clumps Urine Present (*)     Mucus Urine Present (*)     RBC Urine 4 (*)     WBC Urine 36 (*)     Squamous Epithelials Urine 3 (*)    BASIC METABOLIC PANEL - Abnormal    Sodium 136      Potassium 3.9      Chloride 103      Carbon Dioxide (CO2) 21 (*)     Anion Gap 12      Urea Nitrogen 10.0      Creatinine 1.00 (*)     GFR Estimate 75      Calcium 10.2 (*)     Glucose 163 (*)    CBC WITH PLATELETS AND DIFFERENTIAL - Abnormal    WBC Count 8.8      RBC Count 4.23      Hemoglobin 12.6      Hematocrit 38.3      MCV 91      MCH 29.8      MCHC 32.9      RDW 14.2      Platelet Count 241      % Neutrophils 67      % Lymphocytes 15      % Monocytes 6      Mids % (Monos, Eos, Basos)        % Eosinophils 11      % Basophils 0      % Immature Granulocytes 1      NRBCs per 100 WBC 0       Absolute Neutrophils 5.9      Absolute Lymphocytes 1.4      Absolute Monocytes 0.5      Mids Abs (Monos, Eos, Basos)        Absolute Eosinophils 1.0 (*)     Absolute Basophils 0.0      Absolute Immature Granulocytes 0.0      Absolute NRBCs 0.0     URINE CULTURE       RADIOLOGY:  Reviewed all pertinent imaging. Please see official radiology report.  Abd/pelvis CT no contrast - Stone Protocol   Final Result   IMPRESSION:       1.  Overall stable appearance of the right kidney with percutaneous nephrostomy tube in place and punctate nonobstructing stones measuring 1 to 3 mm. Dilated cystic spaces in the medulla of the right upper/interpolar kidney may represent dilated calyces    or peripelvic cysts. Additional peripheral cystic lesions in the superior pole may represent simple cortical cysts versus calyceal diverticula. If definitive evaluation is needed, CT urogram would be most helpful.      2.  Multiple small patchy groundglass opacities throughout the visualized lung bases are significantly improved from prior study.                    EKG:          PROCEDURES:         I, Audrey Welch, am serving as a scribe to document services personally performed by Dr. Lunog based on my observation and the provider's statements to me. I, Jose D Luong MD attest that Audrey Welch is acting in a scribe capacity, has observed my performance of the services and has documented them in accordance with my direction.    Jose D Luong M.D.  Emergency Medicine  [facility] EMERGENCY DEPARTMENT  [employer] [facility] [id number] [phone]  Dept: [phone]      Jose D Luong MD  10/06/23 0415

## 2023-10-06 NOTE — Clinical Note
Jacqueline Pappas was seen and treated in our emergency department on 10/6/2023.  She may return to work on 10/08/2023.       If you have any questions or concerns, please don't hesitate to call.      Jose D Luong MD

## 2023-10-06 NOTE — ED NOTES
The patient reports cough and sob for 3 weeks. Cough produces yellow sputum. She reports 2 days of N/V/D. She reports 2 days of tightness in her chest.

## 2023-10-06 NOTE — ED TRIAGE NOTES
Triage Assessment       Row Name 10/06/23 0220       Triage Assessment (Adult)    Airway WDL WDL       Respiratory WDL    Respiratory WDL X;cough    Cough Frequency frequent    Cough Type congested;nonproductive  yellow       Skin Circulation/Temperature WDL    Skin Circulation/Temperature WDL WDL       Cardiac WDL    Cardiac WDL X  slightly tachycardic       Peripheral/Neurovascular WDL    Peripheral Neurovascular WDL WDL       Cognitive/Neuro/Behavioral WDL    Cognitive/Neuro/Behavioral WDL WDL

## 2023-10-07 LAB — BACTERIA UR CULT: NORMAL

## 2023-10-15 ENCOUNTER — APPOINTMENT (OUTPATIENT)
Dept: CT IMAGING | Facility: HOSPITAL | Age: 38
End: 2023-10-15
Attending: EMERGENCY MEDICINE
Payer: COMMERCIAL

## 2023-10-15 ENCOUNTER — HOSPITAL ENCOUNTER (EMERGENCY)
Facility: HOSPITAL | Age: 38
Discharge: HOME | End: 2023-10-15
Attending: EMERGENCY MEDICINE | Admitting: EMERGENCY MEDICINE
Payer: COMMERCIAL

## 2023-10-15 VITALS
DIASTOLIC BLOOD PRESSURE: 57 MMHG | BODY MASS INDEX: 37.3 KG/M2 | HEIGHT: 60 IN | OXYGEN SATURATION: 95 % | HEART RATE: 103 BPM | SYSTOLIC BLOOD PRESSURE: 100 MMHG | TEMPERATURE: 99.1 F | WEIGHT: 190 LBS | RESPIRATION RATE: 18 BRPM

## 2023-10-15 DIAGNOSIS — J98.01 BRONCHOSPASM: ICD-10-CM

## 2023-10-15 DIAGNOSIS — J18.9 BILATERAL UPPER LOBE COMMUNITY ACQUIRED PNEUMONIA: ICD-10-CM

## 2023-10-15 DIAGNOSIS — R09.02 HYPOXEMIA: ICD-10-CM

## 2023-10-15 LAB
ANION GAP SERPL CALCULATED.3IONS-SCNC: 12 MMOL/L (ref 7–15)
BASO+EOS+MONOS # BLD AUTO: NORMAL 10*3/UL
BASO+EOS+MONOS NFR BLD AUTO: NORMAL %
BASOPHILS # BLD AUTO: 0.1 10E3/UL (ref 0–0.2)
BASOPHILS NFR BLD AUTO: 1 %
BUN SERPL-MCNC: 14.9 MG/DL (ref 6–20)
CALCIUM SERPL-MCNC: 10.4 MG/DL (ref 8.6–10)
CHLORIDE SERPL-SCNC: 106 MMOL/L (ref 98–107)
CREAT SERPL-MCNC: 1.03 MG/DL (ref 0.51–0.95)
DEPRECATED HCO3 PLAS-SCNC: 20 MMOL/L (ref 22–29)
EGFRCR SERPLBLD CKD-EPI 2021: 72 ML/MIN/1.73M2
EOSINOPHIL # BLD AUTO: 0.7 10E3/UL (ref 0–0.7)
EOSINOPHIL NFR BLD AUTO: 8 %
ERYTHROCYTE [DISTWIDTH] IN BLOOD BY AUTOMATED COUNT: 13.5 % (ref 10–15)
FLUAV RNA SPEC QL NAA+PROBE: NEGATIVE
FLUBV RNA RESP QL NAA+PROBE: NEGATIVE
GLUCOSE SERPL-MCNC: 96 MG/DL (ref 70–99)
HCG SERPL QL: NEGATIVE
HCT VFR BLD AUTO: 38.7 % (ref 35–47)
HGB BLD-MCNC: 12.8 G/DL (ref 11.7–15.7)
IMM GRANULOCYTES # BLD: 0.1 10E3/UL
IMM GRANULOCYTES NFR BLD: 1 %
LYMPHOCYTES # BLD AUTO: 1.7 10E3/UL (ref 0.8–5.3)
LYMPHOCYTES NFR BLD AUTO: 19 %
MCH RBC QN AUTO: 29.5 PG (ref 26.5–33)
MCHC RBC AUTO-ENTMCNC: 33.1 G/DL (ref 31.5–36.5)
MCV RBC AUTO: 89 FL (ref 78–100)
MONOCYTES # BLD AUTO: 0.4 10E3/UL (ref 0–1.3)
MONOCYTES NFR BLD AUTO: 5 %
NEUTROPHILS # BLD AUTO: 6.2 10E3/UL (ref 1.6–8.3)
NEUTROPHILS NFR BLD AUTO: 66 %
NRBC # BLD AUTO: 0 10E3/UL
NRBC BLD AUTO-RTO: 0 /100
PLATELET # BLD AUTO: 211 10E3/UL (ref 150–450)
POTASSIUM SERPL-SCNC: 3.9 MMOL/L (ref 3.4–5.3)
PROCALCITONIN SERPL IA-MCNC: 0.05 NG/ML
RBC # BLD AUTO: 4.34 10E6/UL (ref 3.8–5.2)
RSV RNA SPEC NAA+PROBE: NEGATIVE
SARS-COV-2 RNA RESP QL NAA+PROBE: NEGATIVE
SODIUM SERPL-SCNC: 138 MMOL/L (ref 135–145)
WBC # BLD AUTO: 9.2 10E3/UL (ref 4–11)

## 2023-10-15 PROCEDURE — 258N000003 HC RX IP 258 OP 636: Performed by: EMERGENCY MEDICINE

## 2023-10-15 PROCEDURE — 87637 SARSCOV2&INF A&B&RSV AMP PRB: CPT | Performed by: EMERGENCY MEDICINE

## 2023-10-15 PROCEDURE — 85025 COMPLETE CBC W/AUTO DIFF WBC: CPT | Performed by: EMERGENCY MEDICINE

## 2023-10-15 PROCEDURE — 250N000011 HC RX IP 250 OP 636: Mod: JZ | Performed by: EMERGENCY MEDICINE

## 2023-10-15 PROCEDURE — 250N000013 HC RX MED GY IP 250 OP 250 PS 637: Performed by: EMERGENCY MEDICINE

## 2023-10-15 PROCEDURE — 36415 COLL VENOUS BLD VENIPUNCTURE: CPT | Performed by: EMERGENCY MEDICINE

## 2023-10-15 PROCEDURE — 84703 CHORIONIC GONADOTROPIN ASSAY: CPT | Performed by: EMERGENCY MEDICINE

## 2023-10-15 PROCEDURE — 96375 TX/PRO/DX INJ NEW DRUG ADDON: CPT

## 2023-10-15 PROCEDURE — 96365 THER/PROPH/DIAG IV INF INIT: CPT

## 2023-10-15 PROCEDURE — 80048 BASIC METABOLIC PNL TOTAL CA: CPT | Performed by: EMERGENCY MEDICINE

## 2023-10-15 PROCEDURE — 96361 HYDRATE IV INFUSION ADD-ON: CPT

## 2023-10-15 PROCEDURE — 84145 PROCALCITONIN (PCT): CPT | Performed by: EMERGENCY MEDICINE

## 2023-10-15 PROCEDURE — 99285 EMERGENCY DEPT VISIT HI MDM: CPT | Mod: 25

## 2023-10-15 PROCEDURE — 71250 CT THORAX DX C-: CPT

## 2023-10-15 PROCEDURE — 250N000009 HC RX 250: Performed by: EMERGENCY MEDICINE

## 2023-10-15 PROCEDURE — 96367 TX/PROPH/DG ADDL SEQ IV INF: CPT

## 2023-10-15 PROCEDURE — 94640 AIRWAY INHALATION TREATMENT: CPT

## 2023-10-15 PROCEDURE — 87040 BLOOD CULTURE FOR BACTERIA: CPT | Performed by: EMERGENCY MEDICINE

## 2023-10-15 RX ORDER — BENZONATATE 100 MG/1
200 CAPSULE ORAL ONCE
Status: COMPLETED | OUTPATIENT
Start: 2023-10-15 | End: 2023-10-15

## 2023-10-15 RX ORDER — IPRATROPIUM BROMIDE AND ALBUTEROL SULFATE 2.5; .5 MG/3ML; MG/3ML
3 SOLUTION RESPIRATORY (INHALATION) ONCE
Status: COMPLETED | OUTPATIENT
Start: 2023-10-15 | End: 2023-10-15

## 2023-10-15 RX ORDER — ALBUTEROL SULFATE 0.83 MG/ML
2.5 SOLUTION RESPIRATORY (INHALATION)
Status: DISCONTINUED | OUTPATIENT
Start: 2023-10-15 | End: 2023-10-15 | Stop reason: HOSPADM

## 2023-10-15 RX ORDER — METHYLPREDNISOLONE SODIUM SUCCINATE 125 MG/2ML
125 INJECTION, POWDER, LYOPHILIZED, FOR SOLUTION INTRAMUSCULAR; INTRAVENOUS ONCE
Status: COMPLETED | OUTPATIENT
Start: 2023-10-15 | End: 2023-10-15

## 2023-10-15 RX ORDER — PREDNISONE 20 MG/1
20 TABLET ORAL DAILY
Qty: 12 TABLET | Refills: 0 | Status: SHIPPED | OUTPATIENT
Start: 2023-10-15 | End: 2023-10-15

## 2023-10-15 RX ORDER — CEFDINIR 300 MG/1
300 CAPSULE ORAL 2 TIMES DAILY
Qty: 20 CAPSULE | Refills: 0 | Status: SHIPPED | OUTPATIENT
Start: 2023-10-15 | End: 2023-10-25

## 2023-10-15 RX ORDER — ONDANSETRON 2 MG/ML
4 INJECTION INTRAMUSCULAR; INTRAVENOUS EVERY 30 MIN PRN
Status: DISCONTINUED | OUTPATIENT
Start: 2023-10-15 | End: 2023-10-15 | Stop reason: HOSPADM

## 2023-10-15 RX ORDER — CEFTRIAXONE 1 G/1
1 INJECTION, POWDER, FOR SOLUTION INTRAMUSCULAR; INTRAVENOUS ONCE
Status: COMPLETED | OUTPATIENT
Start: 2023-10-15 | End: 2023-10-15

## 2023-10-15 RX ORDER — PREDNISONE 20 MG/1
20 TABLET ORAL DAILY
Qty: 12 TABLET | Refills: 0 | Status: SHIPPED | OUTPATIENT
Start: 2023-10-15 | End: 2023-10-27

## 2023-10-15 RX ORDER — IPRATROPIUM BROMIDE AND ALBUTEROL SULFATE 2.5; .5 MG/3ML; MG/3ML
1 SOLUTION RESPIRATORY (INHALATION) EVERY 6 HOURS PRN
Qty: 90 ML | Refills: 0 | Status: SHIPPED | OUTPATIENT
Start: 2023-10-15 | End: 2023-10-31

## 2023-10-15 RX ADMIN — BENZONATATE 200 MG: 100 CAPSULE ORAL at 04:17

## 2023-10-15 RX ADMIN — IPRATROPIUM BROMIDE AND ALBUTEROL SULFATE 3 ML: .5; 3 SOLUTION RESPIRATORY (INHALATION) at 04:22

## 2023-10-15 RX ADMIN — AZITHROMYCIN MONOHYDRATE 500 MG: 500 INJECTION, POWDER, LYOPHILIZED, FOR SOLUTION INTRAVENOUS at 08:04

## 2023-10-15 RX ADMIN — METHYLPREDNISOLONE SODIUM SUCCINATE 125 MG: 125 INJECTION, POWDER, FOR SOLUTION INTRAMUSCULAR; INTRAVENOUS at 04:20

## 2023-10-15 RX ADMIN — SODIUM CHLORIDE 1000 ML: 9 INJECTION, SOLUTION INTRAVENOUS at 04:17

## 2023-10-15 RX ADMIN — CEFTRIAXONE SODIUM 1 G: 1 INJECTION, POWDER, FOR SOLUTION INTRAMUSCULAR; INTRAVENOUS at 06:39

## 2023-10-15 ASSESSMENT — ACTIVITIES OF DAILY LIVING (ADL)
ADLS_ACUITY_SCORE: 35

## 2023-10-15 ASSESSMENT — ENCOUNTER SYMPTOMS
NAUSEA: 0
COUGH: 1
VOMITING: 0
SHORTNESS OF BREATH: 1

## 2023-10-15 NOTE — ED NOTES
EMERGENCY DEPARTMENT SIGN OUT NOTE        ED COURSE AND MEDICAL DECISION MAKING  Patient was signed out to me by Dr Balta Ko at 0700    In brief, Jacqueline Pappas is a 38 year old female who initially presented with cough.     At time of sign out, disposition was pending clinical reassessment.    ED Course as of 10/15/23 0814   Sun Oct 15, 2023   0651 Pt signed out to me by overnight ED MD with cough. Pt s/p omnicef with PNA diagnosis, improved after admission, with new cough again in October and recently treated with antibiotic therapy 10/7/2023 [facility]. She has continued cough, sat 90% RA, given steroids/duonebs/tessalon perles and temporarily improves, then slowly with decreasing sat again. WBC normal, procalcitonin 0.05, hospitalist Laverne had recommended continued observation in ED to see if sat would remain up after further nebs, will reassess again at 8am, he instructs admit to daytime MD if sat doesn't stay adequately up after nebs above 90%   0813 Sat 96% RA and patient resting without supplemental o2 on my reassessment, no wheeze right now, amenable to plan to discharge to continue her home meds with Rx steroid, omnicef and neb therapy. Patient discharged after being provided with extensive anticipatory guidance and given return precautions, importance of PMD follow-up emphasized.          FINAL IMPRESSION    1. Bilateral upper lobe community acquired pneumonia    2. Hypoxemia    3. Bronchospasm        ED MEDS  Medications   ondansetron (ZOFRAN) injection 4 mg (has no administration in time range)   azithromycin (ZITHROMAX) 500 mg in sodium chloride 0.9 % 250 mL intermittent infusion (500 mg Intravenous $New Bag 10/15/23 0804)   albuterol (PROVENTIL) neb solution 2.5 mg (has no administration in time range)   ipratropium - albuterol 0.5 mg/2.5 mg/3 mL (DUONEB) neb solution 3 mL (3 mLs Nebulization $Given 10/15/23 2722)   benzonatate (TESSALON) capsule 200 mg (200 mg Oral $Given 10/15/23 8481)    ipratropium - albuterol 0.5 mg/2.5 mg/3 mL (DUONEB) neb solution 3 mL (3 mLs Nebulization $Given 10/15/23 0422)   sodium chloride 0.9% BOLUS 1,000 mL (0 mLs Intravenous Stopped 10/15/23 0520)   methylPREDNISolone sodium succinate (solu-MEDROL) injection 125 mg (125 mg Intravenous $Given 10/15/23 0420)   cefTRIAXone (ROCEPHIN) 1 g vial to attach to  mL bag for ADULTS or NS 50 mL bag for PEDS (0 g Intravenous Stopped 10/15/23 0800)       LAB  Labs Ordered and Resulted from Time of ED Arrival to Time of ED Departure   BASIC METABOLIC PANEL - Abnormal       Result Value    Sodium 138      Potassium 3.9      Chloride 106      Carbon Dioxide (CO2) 20 (*)     Anion Gap 12      Urea Nitrogen 14.9      Creatinine 1.03 (*)     GFR Estimate 72      Calcium 10.4 (*)     Glucose 96     PROCALCITONIN - Abnormal    Procalcitonin 0.05 (*)    HCG QUALITATIVE PREGNANCY - Normal    hCG Serum Qualitative Negative     INFLUENZA A/B, RSV, & SARS-COV2 PCR - Normal    Influenza A PCR Negative      Influenza B PCR Negative      RSV PCR Negative      SARS CoV2 PCR Negative     CBC WITH PLATELETS AND DIFFERENTIAL    WBC Count 9.2      RBC Count 4.34      Hemoglobin 12.8      Hematocrit 38.7      MCV 89      MCH 29.5      MCHC 33.1      RDW 13.5      Platelet Count 211      % Neutrophils 66      % Lymphocytes 19      % Monocytes 5      Mids % (Monos, Eos, Basos)        % Eosinophils 8      % Basophils 1      % Immature Granulocytes 1      NRBCs per 100 WBC 0      Absolute Neutrophils 6.2      Absolute Lymphocytes 1.7      Absolute Monocytes 0.4      Mids Abs (Monos, Eos, Basos)        Absolute Eosinophils 0.7      Absolute Basophils 0.1      Absolute Immature Granulocytes 0.1      Absolute NRBCs 0.0     BLOOD CULTURE   BLOOD CULTURE         RADIOLOGY    Chest CT w/o contrast   Final Result   IMPRESSION:    1.  Bilateral upper lobe predominant patchy groundglass pulmonary opacities, likely infectious.   2.  A few upper pole right kidney  stones.             DISCHARGE MEDS  New Prescriptions    CEFDINIR (OMNICEF) 300 MG CAPSULE    Take 1 capsule (300 mg) by mouth 2 times daily for 10 days    IPRATROPIUM - ALBUTEROL 0.5 MG/2.5 MG/3 ML (DUONEB) 0.5-2.5 (3) MG/3ML NEB SOLUTION    Take 1 vial (3 mLs) by nebulization every 6 hours as needed for shortness of breath, wheezing or cough    PREDNISONE (DELTASONE) 20 MG TABLET    Take 1 tablet (20 mg) by mouth daily 2 tabs daily for 3 days, then 1 tab daily for 4 days, then 1/2 tab daily for 4 days.       Ashanti Nevarez MD  [facility] EMERGENCY DEPARTMENT  [facility] [facility] [id number] [phone]       Ashanti Nevarez MD  10/15/23 0814

## 2023-10-15 NOTE — Clinical Note
Jacqueline Pappas was seen and treated in our emergency department on 10/15/2023.  She may return to work on 10/17/2023.       If you have any questions or concerns, please don't hesitate to call.      Ashanti Nevarez MD

## 2023-10-15 NOTE — ED PROVIDER NOTES
NAME: Jacqueline Pappas  AGE: 38 year old female  YOB: 1985  MRN: [id number]  EVALUATION DATE & TIME: No admission date for patient encounter.    PCP: Pao Montague    ED PROVIDER: Adi Ko M.D.      Chief Complaint   Patient presents with    Cough    Shortness of Breath     FINAL IMPRESSION:  1. Bilateral upper lobe community acquired pneumonia    2. Hypoxemia    3. Bronchospasm      MEDICAL DECISION MAKING:    3:45 AM I met with the patient, obtained history, performed an initial exam, and discussed options and plan for diagnostics and treatment here in the ED.   Patient was clinically assessed and consented to treatment. After assessment, medical decision making and workup were discussed with the patient. The patient was agreeable to plan for testing, workup, and treatment.  Pertinent Labs & Imaging studies reviewed. (See chart for details)  4:09 AM I checked on the patient.  6:00 AM I checked on the patient and discussed work-up findings thus far. She reports feeling better but her oxygen saturation keeps dropping down 89-90%.  6:18 AM patient discussed with hospitalist and patient's oxygenation borderline at this time.  After discussion of patient's possible pneumonia will swab for COVID given the groundglass findings and monitor patient in the ER.  Oxygenation now 91-92% and if continuing to improve over the next 1 to 2 hours staying above 90% patient could be discharged home with antibiotics otherwise may require admission.  Patient will be signed out at 7 AM pending continued observation in the ER.       Medical Decision Making    History:  Supplemental history from: Documented in chart, if applicable  External Record(s) reviewed: Documented in chart, if applicable. and Outpatient Record: [facility] on 10/07/2023    Work Up:  Chart documentation includes differential considered and any EKGs or imaging independently interpreted by provider, where specified.  In additional to work up  documented, I considered the following work up: Documented in chart, if applicable.    External consultation:  Discussion of management with another provider: Documented in chart, if applicable    Complicating factors:  Care impacted by chronic illness: N/A, Chronic Kidney Disease, and Smoking / Nicotine Use  Care affected by social determinants of health: Access to Medical Care    Disposition considerations: Admission considered. Patient was signed out to the oncoming physician, disposition pending.    Jacqueline Pappas is a 38 year old female who presents with cough and shortness of breath.   Differential diagnosis includes but not limited to bronchitis, pneumonia, COPD exacerbation, asthma exacerbation, pulmonary edema, pulmonary embolism.  Patient considered for multiple etiologies however with diminished breath sounds bilaterally and wheezing I suspect more so bronchitis or COPD/asthma exacerbation.  Patient does have history of smoking and most likely has chronic bronchitis.  She did have pneumonia in September which was treated inpatient.  Patient has since recovered however having return of cough as of October 1 she was diagnosed with bronchitis and then pneumonia on October 7.  X-ray from October 7 does report a possible infiltrate.  After discussion with patient we will try DuoNebs and a dose of Solu-Medrol.  She did not receive steroids when she was in the ER on October 7.  Tessalon Perles will be used with cough and patient will be given IV fluids along with check of CBC and creatinine.  Patient be sent for imaging as well and I discussed with her possible repeat x-ray which she reports has been back-and-forth with possible diagnosis.  Following discussion we discussed possible noncontrast CT scan of the lungs to evaluate whether there is infection or just atelectasis which would push more towards the diagnosis of chronic bronchitis.  With the DuoNebs patient's oxygenation remained stable at 92% improved  from 90% initially.  Patient's pulse was not elevated and given the lack of tachycardia and chest pain I have low suspicion for pulmonary embolism.  Awaiting labs and recheck of improvement after nebs and Solu-Medrol.  We will plan for CT scan without contrast to evaluate full extent of lung infection versus bronchitis.  CT scan showed bilateral groundglass patchy infiltrates.  Patient received Solu-Medrol and 2 nebulized treatments with oxygenation maintaining 90-91%.  She did have some dips initially to 88% however are now resting comfortably and she does report breathing is somewhat better along with cough which is improved following the nebulized treatments and Tessalon.  We discussed her CT scan and recent course of antibiotics.  Given that this could be viral I will swab for influenza and COVID.  As well patient will have cultures drawn and be started on ceftriaxone and azithromycin with possible discharge on Omnicef pending observation for stabilization of saturations.  Patient agreeable with this plan and patient will be signed out pending recheck of oxygenation over the next 1 to 2 hours for evaluation of required admission versus discharge home on broad-spectrum antibiotic.    0 minutes of critical care time    MEDICATIONS GIVEN IN THE EMERGENCY:  Medications   ondansetron (ZOFRAN) injection 4 mg (has no administration in time range)   cefTRIAXone (ROCEPHIN) 1 g vial to attach to  mL bag for ADULTS or NS 50 mL bag for PEDS (has no administration in time range)   azithromycin (ZITHROMAX) 500 mg in sodium chloride 0.9 % 250 mL intermittent infusion (has no administration in time range)   ipratropium - albuterol 0.5 mg/2.5 mg/3 mL (DUONEB) neb solution 3 mL (3 mLs Nebulization $Given 10/15/23 0422)   benzonatate (TESSALON) capsule 200 mg (200 mg Oral $Given 10/15/23 0417)   ipratropium - albuterol 0.5 mg/2.5 mg/3 mL (DUONEB) neb solution 3 mL (3 mLs Nebulization $Given 10/15/23 0422)   sodium chloride  0.9% BOLUS 1,000 mL (0 mLs Intravenous Stopped 10/15/23 9220)   methylPREDNISolone sodium succinate (solu-MEDROL) injection 125 mg (125 mg Intravenous $Given 10/15/23 0420)       NEW PRESCRIPTIONS STARTED AT TODAY'S ER VISIT:  New Prescriptions    No medications on file          =================================================================    HPI    Patient information was obtained from: The patient    Use of : N/A         Jacqueline Pappas is a 38 year old female with a past medical history of smoker, tobacco abuse, anemia, who presents to the ER via walk-in for an evaluation of shortness of breath.    The patient was diagnosed with pneumonia in September which improved with antibiotics and was discharged with oral antibiotics. She developed a cough in early October and was seen in the ER on 10/01/2023 and diagnosed with bronchitis. She tolerated neb solution well. She was prescribed cough suppressant. She presented to [facility] on 10/07/2023 and was found to have pneumonia. She was discharged and prescribed antibiotics. The patient thought she was prescribed steroids as well. She felt good Saturday morning, but notes her cough has progressively worsened since later on that day and continued to do so. She just finished her course of oral antibiotics.    Otherwise, the patient denied having chest pain, nausea, vomiting, fevers, and any other medical complaints or concerns at this time.    She was admitted at [facility] from 09/16/23-09/18/3 for pneumonia. She was treated with azithromycin and ceftriaxone and discharged on oral Cefdinir. She presented to [facility] ER again 10/01/23 with persistent cough and had a negative CXR. Symptoms were likely from bronchitis and she was discharged with Tessalon.    She presented to [facility] ED on 10/07/2023 for shortness of breath. She has been using nebulizer treatment twice per day with brief relief. Work-up revealed no leukocytosis. Hgb stable. Creatinine 1.02 with  normal BUN. CXR reveals small subtle airspace opacity within the left mid and lower lung and at the right lung bases represent pneumonia. She was discharged with Amoxicillin and Doxycycline.    REVIEW OF SYSTEMS   Review of Systems   Respiratory:  Positive for cough and shortness of breath.    Cardiovascular:  Negative for chest pain.   Gastrointestinal:  Negative for nausea and vomiting.   All other systems reviewed and are negative.     PAST MEDICAL HISTORY:  Past Medical History:   Diagnosis Date    Acute renal failure (H24)     Anemia     Anemia     Calculus of kidney     Congenital absence of left kidney     Congenital absence of one kidney     Depression     Depression     Hydronephrosis     Kidney stone     at age 29    Pyelonephritis     Pyelonephritis     Smoker     Ureteral stricture     UTI (urinary tract infection)     Wrist fracture     Left       PAST SURGICAL HISTORY:  Past Surgical History:   Procedure Laterality Date     SECTION       SECTION      x1    COMBINED CYSTOSCOPY, RETROGRADES, URETEROSCOPY, LASER HOLMIUM LITHOTRIPSY URETER(S), INSERT STENT Right 2016    Procedure: COMBINED CYSTOSCOPY, RETROGRADES, URETEROSCOPY, LASER HOLMIUM LITHOTRIPSY URETER(S), INSERT STENT;  Surgeon: Florentino Awad MD;  Location: [facility] OR    CYSTOSCOPY, URETEROSCOPY, COMBINED Right 2016    Procedure: COMBINED CYSTOSCOPY, URETEROSCOPY;  Surgeon: Florentino Awad MD;  Location: [facility] OR    IR NEPHROLITHOTOMY  2014    IR NEPHROSTOGRAM EXISITING ACCESS  10/18/2018    IR NEPHROSTOMY TUBE CHANGE RIGHT  2018    IR NEPHROSTOMY TUBE CHANGE RIGHT  2020    IR NEPHROSTOMY TUBE CHANGE RIGHT  2018    IR NEPHROSTOMY TUBE CHANGE RIGHT  2020    IR NEPHROSTOMY TUBE PLACEMENT RIGHT  2016    IR NEPHROSTOMY TUBE PLACEMENT RIGHT  2017    KIDNEY STONE SURGERY Right 2016    LASER HOLMIUM LITHOTRIPSY URETER(S), INSERT STENT, COMBINED Left 2016    Procedure: COMBINED  CYSTOSCOPY, URETEROSCOPY, LASER HOLMIUM LITHOTRIPSY URETER(S), INSERT STENT;  Surgeon: Florentino Awad MD;  Location: [facility] OR    LASER HOLMIUM NEPHROLITHOTOMY VIA PERCUTANEOUS NEPHROSTOMY Right 9/14/2016    Procedure: LASER HOLMIUM NEPHROLITHOTOMY VIA PERCUTANEOUS NEPHROSTOMY;  Surgeon: Florentino Awad MD;  Location: [facility] OR    NEPHROSTOMY W/ INTRODUCTION OF CATHETER Right     OTHER SURGICAL HISTORY      Mole removednasal    OTHER SURGICAL HISTORY Right     Cystoscopy, ureteroscopy, laser11/02/2014 x2 with ureteral stent insertion    PERCUTANEOUS NEPHROSTOMY  11/1/2016    Procedure: PERCUTANEOUS NEPHROSTOMY;  Surgeon: Florentino Awad MD;  Location: [facility] OR    WISDOM TOOTH EXTRACTION      ZZC REMOVAL OF KIDNEY STONE         CURRENT MEDICATIONS:      Current Facility-Administered Medications:     azithromycin (ZITHROMAX) 500 mg in sodium chloride 0.9 % 250 mL intermittent infusion, 500 mg, Intravenous, Once, Adi Ko MD    cefTRIAXone (ROCEPHIN) 1 g vial to attach to  mL bag for ADULTS or NS 50 mL bag for PEDS, 1 g, Intravenous, Once, Adi Ko MD    ondansetron (ZOFRAN) injection 4 mg, 4 mg, Intravenous, Q30 Min PRN, Adi Ko MD    Current Outpatient Medications:     acetaminophen (TYLENOL) 500 MG tablet, Take 500-1,000 mg by mouth every 6 hours as needed for mild pain, Disp: , Rfl:     albuterol (ACCUNEB) 1.25 MG/3ML neb solution, Take 1.25 mg by nebulization every 6 hours as needed for shortness of breath / dyspnea or wheezing, Disp: , Rfl:     ipratropium - albuterol 0.5 mg/2.5 mg/3 mL (DUONEB) 0.5-2.5 (3) MG/3ML neb solution, Take 1 vial (3 mLs) by nebulization every 6 hours as needed for shortness of breath, wheezing or cough, Disp: 180 mL, Rfl: 0    sertraline (ZOLOFT) 100 MG tablet, Take 100 mg by mouth At Bedtime, Disp: , Rfl:     ALLERGIES:  Allergies   Allergen Reactions    Desogestrel-Ethinyl Estradiol Angioedema and Swelling      Swelling of hands and feet per pt.      Penicillins Rash     As a child per mother; mother unsure if has tolerated another PCN since. Tolerated ampicillin 16    Sulfa Antibiotics Rash    Vancomycin Rash     FAMILY HISTORY:  Family History   Problem Relation Age of Onset    Anesthesia Reaction No family hx of     Malignant Hyperthermia No family hx of     Heart Disease Maternal Uncle         heart failure    Coronary Artery Disease Other     Heart Disease Maternal Grandmother         pacemaker    Gout Paternal Grandfather     Prostate Cancer Maternal Aunt         breast    Heart Disease Maternal Aunt         pacemaker    Heart Disease Maternal Aunt         pacemaker    Heart Disease Maternal Aunt         pacemaker     SOCIAL HISTORY:   Social History     Socioeconomic History    Marital status: Single   Tobacco Use    Smoking status: Every Day     Packs/day: 0.50     Years: 10.00     Additional pack years: 0.00     Total pack years: 5.00     Types: Cigarettes     Last attempt to quit: 2016     Years since quittin.0    Smokeless tobacco: Former     Quit date: 2016    Tobacco comments:     Hasn't smoked in a week due to breathing issues    Substance and Sexual Activity    Alcohol use: Not Currently     Comment: Alcoholic Drinks/day: occ    Drug use: No    Sexual activity: Not Currently     PHYSICAL EXAM:    Vitals: BP 96/61   Pulse 91   Temp 97.1  F (36.2  C) (Temporal)   Resp 21   Ht 1.524 m (5')   Wt 86.2 kg (190 lb)   LMP 2023 (Approximate)   SpO2 90%   BMI 37.11 kg/m     Physical Exam  Vitals and nursing note reviewed.   Constitutional:       General: She is not in acute distress.     Appearance: She is well-developed and normal weight. She is not ill-appearing or toxic-appearing.   HENT:      Head: Normocephalic.      Mouth/Throat:      Pharynx: Oropharynx is clear.      Comments: Dry mucous membranes    Neck:      Vascular: No JVD.   Cardiovascular:      Rate and Rhythm: Normal  rate and regular rhythm.      Heart sounds: Normal heart sounds.   Pulmonary:      Effort: Pulmonary effort is normal. No tachypnea or respiratory distress.      Breath sounds: Examination of the right-middle field reveals wheezing. Examination of the right-lower field reveals decreased breath sounds. Examination of the left-lower field reveals decreased breath sounds. Decreased breath sounds and wheezing present. No rhonchi or rales.   Chest:      Chest wall: No tenderness.   Abdominal:      Palpations: Abdomen is soft.      Tenderness: There is no abdominal tenderness.   Musculoskeletal:      Cervical back: Normal range of motion and neck supple.      Right lower leg: No edema.      Left lower leg: No edema.   Lymphadenopathy:      Cervical: Cervical adenopathy present.   Skin:     General: Skin is warm and dry.      Coloration: Skin is not cyanotic.   Neurological:      General: No focal deficit present.      Mental Status: She is alert.   Psychiatric:         Behavior: Behavior normal.        LAB:  All pertinent labs reviewed and interpreted.  Labs Ordered and Resulted from Time of ED Arrival to Time of ED Departure   BASIC METABOLIC PANEL - Abnormal       Result Value    Sodium 138      Potassium 3.9      Chloride 106      Carbon Dioxide (CO2) 20 (*)     Anion Gap 12      Urea Nitrogen 14.9      Creatinine 1.03 (*)     GFR Estimate 72      Calcium 10.4 (*)     Glucose 96     PROCALCITONIN - Abnormal    Procalcitonin 0.05 (*)    HCG QUALITATIVE PREGNANCY - Normal    hCG Serum Qualitative Negative     CBC WITH PLATELETS AND DIFFERENTIAL    WBC Count 9.2      RBC Count 4.34      Hemoglobin 12.8      Hematocrit 38.7      MCV 89      MCH 29.5      MCHC 33.1      RDW 13.5      Platelet Count 211      % Neutrophils 66      % Lymphocytes 19      % Monocytes 5      Mids % (Monos, Eos, Basos)        % Eosinophils 8      % Basophils 1      % Immature Granulocytes 1      NRBCs per 100 WBC 0      Absolute Neutrophils 6.2       Absolute Lymphocytes 1.7      Absolute Monocytes 0.4      Mids Abs (Monos, Eos, Basos)        Absolute Eosinophils 0.7      Absolute Basophils 0.1      Absolute Immature Granulocytes 0.1      Absolute NRBCs 0.0     INFLUENZA A/B, RSV, & SARS-COV2 PCR   BLOOD CULTURE   BLOOD CULTURE     RADIOLOGY:  Chest CT w/o contrast   Final Result   IMPRESSION:    1.  Bilateral upper lobe predominant patchy groundglass pulmonary opacities, likely infectious.   2.  A few upper pole right kidney stones.           PROCEDURES:   Procedures     I, Beka Wilson, am serving as a scribe to document services personally performed by Dr. Adi Ko  based on my observation and the provider's statements to me. I, Adi Ko MD attest that Beka Wilson is acting in a scribe capacity, has observed my performance of the services and has documented them in accordance with my direction.      Adi Ko M.D.  Emergency Medicine  [facility] Emergency Department       Adi Ko MD  10/15/23 0629

## 2023-10-15 NOTE — ED TRIAGE NOTES
Pt is here for unimproved coughing and shortness of breath. Pt was recently diagnosed with pneumonia and bronchitis, had just finished her abx on Friday.      Triage Assessment (Adult)       Row Name 10/15/23 0345          Triage Assessment    Airway WDL WDL        Respiratory WDL    Respiratory WDL X        Cardiac WDL    Cardiac WDL WDL

## 2023-10-15 NOTE — MEDICATION SCRIBE - ADMISSION MEDICATION HISTORY
Medication Scribe Admission Medication History    Admission medication history is complete. The information provided in this note is only as accurate as the sources available at the time of the update.    Information Source(s): Patient via in-person    Pertinent Information:     Changes made to PTA medication list:  Added: None  Deleted: None  Changed: None    Medication Affordability:  Not including over the counter (OTC) medications, was there a time in the past 3 months when you did not take your medications as prescribed because of cost?: No    Allergies reviewed with patient and updates made in EHR: yes    Medication History Completed By: Zacarias Holley 10/15/2023 6:27 AM    PTA Med List   Medication Sig Last Dose    acetaminophen (TYLENOL) 500 MG tablet Take 500-1,000 mg by mouth every 6 hours as needed for mild pain Unknown at prn    albuterol (ACCUNEB) 1.25 MG/3ML neb solution Take 1.25 mg by nebulization every 6 hours as needed for shortness of breath / dyspnea or wheezing Unknown at prn    ipratropium - albuterol 0.5 mg/2.5 mg/3 mL (DUONEB) 0.5-2.5 (3) MG/3ML neb solution Take 1 vial (3 mLs) by nebulization every 6 hours as needed for shortness of breath, wheezing or cough Unknown at prn    sertraline (ZOLOFT) 100 MG tablet Take 100 mg by mouth At Bedtime 10/13/2023 at

## 2023-10-20 LAB
BACTERIA BLD CULT: NO GROWTH
BACTERIA BLD CULT: NO GROWTH

## 2023-10-27 ENCOUNTER — HOSPITAL ENCOUNTER (EMERGENCY)
Facility: HOSPITAL | Age: 38
Discharge: HOME | End: 2023-10-27
Attending: EMERGENCY MEDICINE | Admitting: EMERGENCY MEDICINE
Payer: COMMERCIAL

## 2023-10-27 ENCOUNTER — APPOINTMENT (OUTPATIENT)
Dept: RADIOLOGY | Facility: HOSPITAL | Age: 38
End: 2023-10-27
Attending: EMERGENCY MEDICINE
Payer: COMMERCIAL

## 2023-10-27 VITALS
RESPIRATION RATE: 18 BRPM | TEMPERATURE: 98.9 F | OXYGEN SATURATION: 92 % | WEIGHT: 190 LBS | HEART RATE: 88 BPM | DIASTOLIC BLOOD PRESSURE: 73 MMHG | HEIGHT: 60 IN | BODY MASS INDEX: 37.3 KG/M2 | SYSTOLIC BLOOD PRESSURE: 141 MMHG

## 2023-10-27 DIAGNOSIS — R06.02 SOB (SHORTNESS OF BREATH): ICD-10-CM

## 2023-10-27 DIAGNOSIS — R05.3 CHRONIC COUGH: ICD-10-CM

## 2023-10-27 DIAGNOSIS — R06.02 SOB (SHORTNESS OF BREATH): Primary | ICD-10-CM

## 2023-10-27 DIAGNOSIS — R05.2 SUBACUTE COUGH: ICD-10-CM

## 2023-10-27 DIAGNOSIS — J98.4 PULMONARY SCARRING: ICD-10-CM

## 2023-10-27 PROCEDURE — 99283 EMERGENCY DEPT VISIT LOW MDM: CPT | Mod: 25

## 2023-10-27 PROCEDURE — 71046 X-RAY EXAM CHEST 2 VIEWS: CPT

## 2023-10-27 RX ORDER — PREDNISONE 5 MG/1
5 TABLET ORAL DAILY
Qty: 7 TABLET | Refills: 0 | Status: SHIPPED | OUTPATIENT
Start: 2023-10-27 | End: 2023-10-31

## 2023-10-27 ASSESSMENT — ACTIVITIES OF DAILY LIVING (ADL): ADLS_ACUITY_SCORE: 35

## 2023-10-27 ASSESSMENT — ENCOUNTER SYMPTOMS
LIGHT-HEADEDNESS: 0
PALPITATIONS: 0
FEVER: 0
CHILLS: 0
SHORTNESS OF BREATH: 1
COUGH: 1

## 2023-10-27 NOTE — ED TRIAGE NOTES
Pt arrives to triage ambulatory escorted to wheelchair endorsing x 1.5 months pneumonia of which has been on steroids and antibiotics for this and remains short of breath with cough    Did use inhaler PTA with no relief

## 2023-10-27 NOTE — ED PROVIDER NOTES
EMERGENCY DEPARTMENT ENCOUNTER      NAME: Jacqueline Pappas  AGE: 38 year old female  YOB: 1985  MRN: [id number]  EVALUATION DATE & TIME: 10/27/2023  1:52 AM    PCP: Pao Montague    ED PROVIDER: Payam Andrade MD    Chief Complaint   Patient presents with    Shortness of Breath     FINAL IMPRESSION:  1. SOB (shortness of breath)    2. Subacute cough    3. Pulmonary scarring      ED COURSE & MEDICAL DECISION MAKING:    Pertinent Labs & Imaging studies reviewed. (See chart for details)  Very pleasant 38-year-old female presenting the emergency department for evaluation of persistent shortness of breath and cough in the setting of a pneumonia diagnosis of currently admitted September.  Patient has been treated with multiple rounds of antibiotics and steroids.  Typically feeling improved on those medications but having escalated symptoms when they sees.  She just completed her taper dose of steroids a few days ago and now is presenting with shortness of breath and cough slightly worse than what she has been dealing with.  Patient reporting its been over 1.5 months since she first had onset of symptoms and she has been very slow to improve over time.  She has not seen any specialty doctor for the symptoms.  No history of underlying lung issues.  On examination patient did not appear to be in acute distress.  She was mildly hypertensive.  Oxygenation was 94% on room air.  Initial temperature afebrile.  She did not appear to be short of breath and her respiratory rate was normal.  Pulse at rest was normal.  I recommended the patient that we repeat her x-ray.  X-ray demonstrated resolution of improved infiltrates compared to previous imaging.  We ambulated the patient.  She did develop some mild tachycardia with ambulation O2 saturations maintained above 90% she was not overly dyspneic or in any severe respiratory distress or having difficulty with ambulation she did not appear near syncopal.  As soon  as she rested the heart rate corrected back to normal.  It seems that her symptomatology at this point is more likely secondary to inflammatory response in her lung or scarring secondary to the pneumonia that she was treated for September into October.  She just completed a prolonged steroid taper where she was ending on 10 mg prednisone dosages for multiple days.  Cessation of this medication did seem to correlate with now escalation to her symptomatology.  I did discuss with her the risks of prolonged steroid therapy.  I would not favor doing another burst steroid dosage but I think reasonable to consider a low-dose steroid for the next several days to help with her symptomatology and to bridge her to follow-up.  I think at this point it is indicated that the patient see pulmonology in follow-up given these persistent symptoms.  I do not see indications for repeat antibiotics at this time.  In addition I do not see indication for further laboratory testing or chest imaging.  I placed a referral for emergent pulmonary appointment request.  We will discharge her on 5 mg of prednisone daily for the next 7 days to bridge her to a follow-up appointment.  Patient already has albuterol inhalers and nebulization medication at home and that is helpful for her cough and her symptoms that she has been dealing with subacutely.  We will continue with those plans of care.  Reviewed the treatment plan with patient and provided a work note for discharge and reviewed return precautions prior to discharge.    Medical Decision Making    History:  Supplemental history from: Documented in chart, if applicable  External Record(s) reviewed: Documented in chart, if applicable. and Inpatient Record: Reviewed inpatient hospital admission note on September 16, 2023.  Reviewed emergency department notes from both [facility] and [facility] for the months of September and October    Work Up:  Chart documentation includes  differential considered and any EKGs or imaging independently interpreted by provider, where specified.  In additional to work up documented, I considered the following work up: Documented in chart, if applicable.    External consultation:  Discussion of management with another provider: Documented in chart, if applicable    Complicating factors:  Care impacted by chronic illness: Chronic Kidney Disease  Care affected by social determinants of health: N/A    Disposition considerations: Discharge. I prescribed additional prescription strength medication(s) as charted. N/A.           At the conclusion of the encounter I discussed the results of all of the tests and the disposition. The questions were answered. The patient or family acknowledged understanding and was agreeable with the care plan.     MEDICATIONS GIVEN IN THE EMERGENCY:  Medications - No data to display    NEW PRESCRIPTIONS STARTED AT TODAY'S ER VISIT  New Prescriptions    PREDNISONE (DELTASONE) 5 MG TABLET    Take 1 tablet (5 mg) by mouth daily for 7 days          =================================================================    HPI    Patient information was obtained from: Patient      Jacqueline Pappas is a 38 year old female with multiple medical issues presents to the emergency department for assessment of persistent cough and shortness of breath.  Patient first developed her cough and shortness of breath back in early September.  At that time she was diagnosed with pneumonia.  Completed a course of antibiotics and steroids.  Symptoms then escalated and she was seen again in the emergency department mid October.  Restarted on antibiotics and steroids based on CT scan imaging.  Patient completed the Omnicef antibiotic.  She just completed her steroid taper earlier this week.  She feels like her symptoms have not resolved to the extent that she expects.  She reports her not worsening and when she was on the medications they were improved but they  seemed worsened since discontinuing the prescriptions.  She reports a nonproductive cough.  She reports persistent shortness of breath.  Denies fever denies chills denies sore throat.  No leg pain or swelling.  No current chest pain.  No history of chronic lung issues per her report.  No history of asthma or COPD.  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  Patient denies additional symptoms.      REVIEW OF SYSTEMS   Review of Systems   Constitutional:  Negative for chills and fever.   Respiratory:  Positive for cough and shortness of breath.    Cardiovascular:  Negative for chest pain, palpitations and leg swelling.   Neurological:  Negative for syncope and light-headedness.   All other systems reviewed and are negative.     PAST MEDICAL HISTORY:  Past Medical History:   Diagnosis Date    Acute renal failure (H24)     Anemia     Anemia     Calculus of kidney     Congenital absence of left kidney     Congenital absence of one kidney     Depression     Depression     Hydronephrosis     Kidney stone     at age 29    Pyelonephritis     Pyelonephritis     Smoker     Ureteral stricture     UTI (urinary tract infection)     Wrist fracture     Left       PAST SURGICAL HISTORY:  Past Surgical History:   Procedure Laterality Date     SECTION       SECTION      x1    COMBINED CYSTOSCOPY, RETROGRADES, URETEROSCOPY, LASER HOLMIUM LITHOTRIPSY URETER(S), INSERT STENT Right 2016    Procedure: COMBINED CYSTOSCOPY, RETROGRADES, URETEROSCOPY, LASER HOLMIUM LITHOTRIPSY URETER(S), INSERT STENT;  Surgeon: Florentino Awad MD;  Location: [facility] OR    CYSTOSCOPY, URETEROSCOPY, COMBINED Right 2016    Procedure: COMBINED CYSTOSCOPY, URETEROSCOPY;  Surgeon: Florentino Awad MD;  Location: [facility] OR    IR NEPHROLITHOTOMY  2014    IR NEPHROSTOGRAM EXISITING ACCESS  10/18/2018    IR NEPHROSTOMY TUBE  CHANGE RIGHT  12/12/2018    IR NEPHROSTOMY TUBE CHANGE RIGHT  6/11/2020    IR NEPHROSTOMY TUBE CHANGE RIGHT  12/12/2018    IR NEPHROSTOMY TUBE CHANGE RIGHT  6/11/2020    IR NEPHROSTOMY TUBE PLACEMENT RIGHT  7/24/2016    IR NEPHROSTOMY TUBE PLACEMENT RIGHT  9/14/2017    KIDNEY STONE SURGERY Right 05/2016    LASER HOLMIUM LITHOTRIPSY URETER(S), INSERT STENT, COMBINED Left 11/1/2016    Procedure: COMBINED CYSTOSCOPY, URETEROSCOPY, LASER HOLMIUM LITHOTRIPSY URETER(S), INSERT STENT;  Surgeon: Florentino Awad MD;  Location: [facility] OR    LASER HOLMIUM NEPHROLITHOTOMY VIA PERCUTANEOUS NEPHROSTOMY Right 9/14/2016    Procedure: LASER HOLMIUM NEPHROLITHOTOMY VIA PERCUTANEOUS NEPHROSTOMY;  Surgeon: Florentino Awad MD;  Location: [facility] OR    NEPHROSTOMY W/ INTRODUCTION OF CATHETER Right     OTHER SURGICAL HISTORY      Mole removednasal    OTHER SURGICAL HISTORY Right     Cystoscopy, ureteroscopy, laser11/02/2014 x2 with ureteral stent insertion    PERCUTANEOUS NEPHROSTOMY  11/1/2016    Procedure: PERCUTANEOUS NEPHROSTOMY;  Surgeon: Florentino Awad MD;  Location: [facility] OR    WISDOM TOOTH EXTRACTION      ZZC REMOVAL OF KIDNEY STONE             CURRENT MEDICATIONS:    predniSONE (DELTASONE) 5 MG tablet  acetaminophen (TYLENOL) 500 MG tablet  albuterol (ACCUNEB) 1.25 MG/3ML neb solution  ipratropium - albuterol 0.5 mg/2.5 mg/3 mL (DUONEB) 0.5-2.5 (3) MG/3ML neb solution  ipratropium - albuterol 0.5 mg/2.5 mg/3 mL (DUONEB) 0.5-2.5 (3) MG/3ML neb solution  sertraline (ZOLOFT) 100 MG tablet        ALLERGIES:  Allergies   Allergen Reactions    Desogestrel-Ethinyl Estradiol Angioedema and Swelling     Swelling of hands and feet per pt.      Penicillins Rash     As a child per mother; mother unsure if has tolerated another PCN since. Tolerated ampicillin 9/20/16    Sulfa Antibiotics Rash    Vancomycin Rash       FAMILY HISTORY:  Family History   Problem Relation Age of Onset    Anesthesia Reaction No family hx of     Malignant  Hyperthermia No family hx of     Heart Disease Maternal Uncle         heart failure    Coronary Artery Disease Other     Heart Disease Maternal Grandmother         pacemaker    Gout Paternal Grandfather     Prostate Cancer Maternal Aunt         breast    Heart Disease Maternal Aunt         pacemaker    Heart Disease Maternal Aunt         pacemaker    Heart Disease Maternal Aunt         pacemaker       SOCIAL HISTORY:   Social History     Socioeconomic History    Marital status: Single   Tobacco Use    Smoking status: Every Day     Packs/day: 0.50     Years: 10.00     Additional pack years: 0.00     Total pack years: 5.00     Types: Cigarettes     Last attempt to quit: 2016     Years since quittin.0    Smokeless tobacco: Former     Quit date: 2016    Tobacco comments:     Hasn't smoked in a week due to breathing issues    Substance and Sexual Activity    Alcohol use: Not Currently     Comment: Alcoholic Drinks/day: occ    Drug use: No    Sexual activity: Not Currently       VITALS:  BP (!) 141/73   Pulse 88   Temp 98.9  F (37.2  C) (Oral)   Resp 18   Ht 1.524 m (5')   Wt 86.2 kg (190 lb)   LMP 2023 (Approximate)   SpO2 92%   BMI 37.11 kg/m      PHYSICAL EXAM    PHYSICAL EXAM    Constitutional: Well developed, Well nourished, NAD  HENT: Normocephalic, Atraumatic, Bilateral external ears normal, Oropharynx normal, mucous membranes moist, Nose normal. Neck-  Normal range of motion, No tenderness, Supple, No stridor.   Eyes: PERRL, EOMI, Conjunctiva normal, No discharge.   Respiratory: Normal breath sounds, No respiratory distress, No wheezing, Speaks full sentences easily.  Occasional nonproductive cough appreciated over the course the examination.  Overall there is good air entry throughout.  Cardiovascular: Normal heart rate, Regular rhythm, No murmurs Chest wall nontender.    GI:  Soft, No tenderness, No masses, No flank tenderness. No rebound or guarding.  : No cva tenderness there is a  nephrostomy tube on the right side insertion site appears appropriate  Musculoskeletal: 2+ DP pulses. No edema. No cyanosis. Good range of motion in all major joints. No tenderness to palpation. No tenderness of the CTLS spine.   Integument: Warm, Dry, No erythema, No rash. No petechiae.   Neurologic: Alert & oriented x 3, Normal motor function, Normal sensory function, No focal deficits noted.   Psychiatric: Affect normal, Judgment normal, Mood normal. Cooperative.       LAB:  All pertinent labs reviewed and interpreted.  Results for orders placed or performed during the hospital encounter of 10/27/23   Chest XR,  PA & LAT    Impression    IMPRESSION: Fine reticular/patchy opacities in the bilateral lower lung seen on prior study are either resolved or improved on the current study. Mild linear scarring is seen in the right upper lobe. No new consolidations or confluent airspace opacities.   Heart size and poorly vascularity are within normal limits. No pleural effusion or pneumothorax.       RADIOLOGY:  Reviewed all pertinent imaging. Please see official radiology report.  Chest XR,  PA & LAT   Final Result   IMPRESSION: Fine reticular/patchy opacities in the bilateral lower lung seen on prior study are either resolved or improved on the current study. Mild linear scarring is seen in the right upper lobe. No new consolidations or confluent airspace opacities.    Heart size and poorly vascularity are within normal limits. No pleural effusion or pneumothorax.        Payam Andrade MD  [facility] EMERGENCY DEPARTMENT  [address] [place] [address]  [phone]       Payam Andrade MD  10/27/23 0338

## 2023-10-27 NOTE — DISCHARGE INSTRUCTIONS
Your x-ray is improved compared to your most recent emergency department visit as well as your oxygenation.  We will need to continue to monitor your symptoms closely and if you have any escalation to your symptoms including increased shortness of breath or development of chest pain or additional concern please return to the emergency department for repeat assessment.  Going to prolong out your steroid taper with a low-dose of steroids as I think that this will help with some of your symptoms please take medication as prescribed.  At this point I do not see indication for repeat antibiotics you do not have a temperature here in the emergency department your x-ray is improved compared to your previous.  I have placed an emergency referral request to pulmonology recommend follow-up with them within 48 hours for assessment and to continue to develop your outpatient treatment plan

## 2023-10-27 NOTE — Clinical Note
Jacqueline Pappas was seen and treated in our emergency department on 10/27/2023.  She may return to work on 10/28/2023.       If you have any questions or concerns, please don't hesitate to call.      Payam Andrade MD

## 2023-10-31 ENCOUNTER — OFFICE VISIT (OUTPATIENT)
Dept: PULMONOLOGY | Facility: CLINIC | Age: 38
End: 2023-10-31
Payer: COMMERCIAL

## 2023-10-31 VITALS
HEIGHT: 60 IN | DIASTOLIC BLOOD PRESSURE: 80 MMHG | BODY MASS INDEX: 38.21 KG/M2 | WEIGHT: 194.6 LBS | HEART RATE: 109 BPM | SYSTOLIC BLOOD PRESSURE: 138 MMHG | OXYGEN SATURATION: 94 %

## 2023-10-31 DIAGNOSIS — R06.02 SOB (SHORTNESS OF BREATH): ICD-10-CM

## 2023-10-31 DIAGNOSIS — J45.41 MODERATE PERSISTENT ASTHMA WITH EXACERBATION: ICD-10-CM

## 2023-10-31 DIAGNOSIS — R05.2 SUBACUTE COUGH: ICD-10-CM

## 2023-10-31 DIAGNOSIS — F17.210 CIGARETTE NICOTINE DEPENDENCE WITHOUT COMPLICATION: ICD-10-CM

## 2023-10-31 DIAGNOSIS — J68.3 REACTIVE AIRWAYS DYSFUNCTION SYNDROME, MODERATE PERSISTENT, UNCOMPLICATED (H): Primary | ICD-10-CM

## 2023-10-31 DIAGNOSIS — J30.89 SEASONAL ALLERGIC RHINITIS DUE TO OTHER ALLERGIC TRIGGER: ICD-10-CM

## 2023-10-31 DIAGNOSIS — Z91.09 ENVIRONMENTAL ALLERGIES: ICD-10-CM

## 2023-10-31 PROCEDURE — 99406 BEHAV CHNG SMOKING 3-10 MIN: CPT | Performed by: NURSE PRACTITIONER

## 2023-10-31 PROCEDURE — 99204 OFFICE O/P NEW MOD 45 MIN: CPT | Mod: 25 | Performed by: NURSE PRACTITIONER

## 2023-10-31 RX ORDER — ALBUTEROL SULFATE 90 UG/1
2 AEROSOL, METERED RESPIRATORY (INHALATION) EVERY 6 HOURS PRN
Qty: 18 G | Refills: 4 | Status: ON HOLD | OUTPATIENT
Start: 2023-10-31 | End: 2023-11-06

## 2023-10-31 RX ORDER — BUDESONIDE AND FORMOTEROL FUMARATE DIHYDRATE 160; 4.5 UG/1; UG/1
2 AEROSOL RESPIRATORY (INHALATION) 2 TIMES DAILY
Qty: 6 G | Refills: 11 | Status: SHIPPED | OUTPATIENT
Start: 2023-10-31

## 2023-10-31 RX ORDER — AZELASTINE 1 MG/ML
1 SPRAY, METERED NASAL 2 TIMES DAILY
Qty: 30 ML | Refills: 11 | Status: SHIPPED | OUTPATIENT
Start: 2023-10-31

## 2023-10-31 RX ORDER — PREDNISONE 20 MG/1
20 TABLET ORAL DAILY
Qty: 7 TABLET | Refills: 0 | Status: ON HOLD | OUTPATIENT
Start: 2023-10-31 | End: 2023-11-06

## 2023-10-31 RX ORDER — IPRATROPIUM BROMIDE AND ALBUTEROL SULFATE 2.5; .5 MG/3ML; MG/3ML
1 SOLUTION RESPIRATORY (INHALATION) EVERY 6 HOURS PRN
Qty: 180 ML | Refills: 4 | Status: SHIPPED | OUTPATIENT
Start: 2023-10-31

## 2023-10-31 NOTE — PATIENT INSTRUCTIONS
It was a pleasure to see you in clinic today.   Here is what we discussed:    Start Symbicort two puffs twice daily, rinse/gargle after use.  Start Azelastine nasal spray once-twice daily, to help with sinus congestion/drainage due to allergies.  Have your blood drawn, I will message you with results.  We may be referring you back to an Allergist in the future.  Continue Albuterol every 4-6 hours as needed for shortness of breath or wheezing.  Call my nurse, Pramod ([phone]) with any change or worsening of your breathing.  You can start the prednisone I gave you in case of an exacerbation, 20mg x7 days.  Continue to work on smoking cessation. Good job on cutting back already!  We will repeat the chest CT 6 weeks from your previous scan.  Have the pulmonary function testing done before next visit with me.  Follow-up in one month.    Sonya Denise, CNP  Pulmonary Medicine  [facility]  [phone]

## 2023-10-31 NOTE — PROGRESS NOTES
Pulmonary Clinic Consultation          Assessment/Plan:     38 year old female with a history of congenital solitary kidney, ureteral stricture, kidney stones, chronic nephrostomy tube in place, tobacco abuse, recurrent UTI/pyelonephritis, depression  - presenting for evaluation of chronic cough and shortness of breath.      Reactive airways disease  Chronic cough  Shortness of breath  Environmental allergies  Allergic rhinitis  Current every day smoker, 0.5 PPD, presents today for evaluation of ongoing cough, shortness of breath for past two months.  She reports possible URI about two months ago, and has had ongoing respiratory symptoms and multiple ED visits since that time.  Rhinovirus positive in September.  She has had 3 courses of prednisone and has been treated with azithromycin, ceftriaxone, amoxicillin, doxycycline, and cefdinir x2.  Chest CT back in May shows normal lung parenchyma, and more recent CTs in September and October show patchy opacities bilaterally.  Most recent scan on 10/15 shows some possible mosaic attenuation indicating air trapping.  She does have a hx of environmental allergies, and has seen Allergist in past with weekly allergy injections.  Most likely her symptoms are due to post-viral reactive airways, with possibility of allergens causing exacerbating symptoms as well.  Plan:  - start Symbicort (budesonide/formoterol) 160/4.5 two puffs BID, rinse/gargle after use  - start Azelastine nasal spray BID  - continue Albuterol HFA PRN  - continue Duonebs PRN  - blood work today:  [place] Allergen Panel, CBC w/diff, IgE.  May need referral back to Allergist  - pulmonary function testing prior to next appt  - repeat chest CT to document resolution of opacities, 6 weeks from prior scan  - encourage smoking cessation, we discussed this x3 minutes.  She is motivated and working to reduce the amount of cigarettes, down to 2-3/day.  - she is UTD with pneumococcal vaccine.  due for COVID vaccine  and annual influenza vaccine, she declines these today.  - Action plan: prednisone 20mg x7 days.  I sent a prescription for this today and let her know to start this if she becomes ill/exacerbates again.  Also to increase duoneb usage.    Follow-up  - one month      Sonya Denise CNP  Pulmonary Medicine  [facility]  [phone]       CC:     Cough, SOB     HPI:     38 year old female with a history of congenital solitary kidney, ureteral stricture, kidney stones, chronic nephrostomy tube in place, tobacco abuse, recurrent UTI/pyelonephritis, depression  - presenting for evaluation of chronic cough and shortness of breath.      Two months ago, started having hard time breathing.  Felt like it started as an URI.  ED frequently since that time, multiple courses of prednisone and antibiotics.  Hospitalized x2 days.    Thought d/t bronchitis.  No real benefit from these interventions.  SOB is main concern, cough, sometimes dry, sometimes mucous.  Nighttime is the worst.  Up every couple hours doing neb treatment.  No concern for asthma when younger, no wheezing, no frequent illness.  Albuterol given awhile ago, hasn't used in a couple months, unsure if beneficial.  Father has Spiriva, she tried this for a couple days, no benefit.  Allergies to grass, animals, dust.  Was on weekly injections with allergist in past, in elementary school.  Smoking hx: started at age 19, 0.5 PPD, now smoking 3 cigarettes/day.  Denies vaping, marijuana use.  , sand spraying, possible inhalation of this.    Medical records reviewed for this visit include multiple ED provider notes, hospital discharge summary.     ROS:     A 12-system review was obtained and was negative with the exception of the symptoms endorsed in the HPI.       Medical history:       PMH:  Past Medical History:   Diagnosis Date    Acute renal failure (H24)     Anemia     Anemia     Calculus of kidney     Congenital  absence of left kidney     Congenital absence of one kidney     Depression     Depression     Hydronephrosis     Kidney stone     at age 29    Pyelonephritis     Pyelonephritis     Smoker     Ureteral stricture     UTI (urinary tract infection)     Wrist fracture     Left       PSH:  Past Surgical History:   Procedure Laterality Date     SECTION       SECTION      x1    COMBINED CYSTOSCOPY, RETROGRADES, URETEROSCOPY, LASER HOLMIUM LITHOTRIPSY URETER(S), INSERT STENT Right 2016    Procedure: COMBINED CYSTOSCOPY, RETROGRADES, URETEROSCOPY, LASER HOLMIUM LITHOTRIPSY URETER(S), INSERT STENT;  Surgeon: Florentino Awad MD;  Location: [facility] OR    CYSTOSCOPY, URETEROSCOPY, COMBINED Right 2016    Procedure: COMBINED CYSTOSCOPY, URETEROSCOPY;  Surgeon: Florentino Awad MD;  Location: [facility] OR    IR NEPHROLITHOTOMY  2014    IR NEPHROSTOGRAM EXISITING ACCESS  10/18/2018    IR NEPHROSTOMY TUBE CHANGE RIGHT  2018    IR NEPHROSTOMY TUBE CHANGE RIGHT  2020    IR NEPHROSTOMY TUBE CHANGE RIGHT  2018    IR NEPHROSTOMY TUBE CHANGE RIGHT  2020    IR NEPHROSTOMY TUBE PLACEMENT RIGHT  2016    IR NEPHROSTOMY TUBE PLACEMENT RIGHT  2017    KIDNEY STONE SURGERY Right 2016    LASER HOLMIUM LITHOTRIPSY URETER(S), INSERT STENT, COMBINED Left 2016    Procedure: COMBINED CYSTOSCOPY, URETEROSCOPY, LASER HOLMIUM LITHOTRIPSY URETER(S), INSERT STENT;  Surgeon: Florentino Awad MD;  Location: [facility] OR    LASER HOLMIUM NEPHROLITHOTOMY VIA PERCUTANEOUS NEPHROSTOMY Right 2016    Procedure: LASER HOLMIUM NEPHROLITHOTOMY VIA PERCUTANEOUS NEPHROSTOMY;  Surgeon: Florentino Awad MD;  Location: [facility] OR    NEPHROSTOMY W/ INTRODUCTION OF CATHETER Right     OTHER SURGICAL HISTORY      Mole removednasal    OTHER SURGICAL HISTORY Right     Cystoscopy, ureteroscopy, laser11/02/2014 x2 with ureteral stent insertion    PERCUTANEOUS NEPHROSTOMY  2016    Procedure: PERCUTANEOUS  NEPHROSTOMY;  Surgeon: Florentino Awad MD;  Location: [facility] OR    WISDOM TOOTH EXTRACTION      ZZC REMOVAL OF KIDNEY STONE           Allergies:  Allergies   Allergen Reactions    Desogestrel-Ethinyl Estradiol Angioedema and Swelling     Swelling of hands and feet per pt.      Penicillins Rash     As a child per mother; mother unsure if has tolerated another PCN since. Tolerated ampicillin 18    Sulfa Antibiotics Rash    Vancomycin Rash       Family Hx:  Family History   Problem Relation Age of Onset    Anesthesia Reaction No family hx of     Malignant Hyperthermia No family hx of     Heart Disease Maternal Uncle         heart failure    Coronary Artery Disease Other     Heart Disease Maternal Grandmother         pacemaker    Gout Paternal Grandfather     Prostate Cancer Maternal Aunt         breast    Heart Disease Maternal Aunt         pacemaker    Heart Disease Maternal Aunt         pacemaker    Heart Disease Maternal Aunt         pacemaker       Social Hx:  Social History     Socioeconomic History    Marital status: Single     Spouse name: Not on file    Number of children: Not on file    Years of education: Not on file    Highest education level: Not on file   Occupational History    Not on file   Tobacco Use    Smoking status: Every Day     Packs/day: 0.50     Years: 10.00     Additional pack years: 0.00     Total pack years: 5.00     Types: Cigarettes     Last attempt to quit: 2016     Years since quittin.1    Smokeless tobacco: Former     Quit date: 2016    Tobacco comments:     Hasn't smoked in a week due to breathing issues    Vaping Use    Vaping Use: Never used   Substance and Sexual Activity    Alcohol use: Not Currently     Comment: Alcoholic Drinks/day: occ    Drug use: No    Sexual activity: Not Currently   Other Topics Concern    Not on file   Social History Narrative    Not on file     Social Determinants of Health     Financial Resource Strain: Not on file   Food Insecurity:  Not on file   Transportation Needs: Not on file   Physical Activity: Not on file   Stress: Not on file   Social Connections: Not on file   Interpersonal Safety: Not on file   Housing Stability: Not on file       Current Meds:  Current Outpatient Medications   Medication Sig Dispense Refill    acetaminophen (TYLENOL) 500 MG tablet Take 500-1,000 mg by mouth every 6 hours as needed for mild pain      albuterol (ACCUNEB) 1.25 MG/3ML neb solution Take 1.25 mg by nebulization every 6 hours as needed for shortness of breath / dyspnea or wheezing      ipratropium - albuterol 0.5 mg/2.5 mg/3 mL (DUONEB) 0.5-2.5 (3) MG/3ML neb solution Take 1 vial (3 mLs) by nebulization every 6 hours as needed for shortness of breath, wheezing or cough 180 mL 0    sertraline (ZOLOFT) 100 MG tablet Take 100 mg by mouth At Bedtime      ipratropium - albuterol 0.5 mg/2.5 mg/3 mL (DUONEB) 0.5-2.5 (3) MG/3ML neb solution Take 1 vial (3 mLs) by nebulization every 6 hours as needed for shortness of breath, wheezing or cough 90 mL 0    predniSONE (DELTASONE) 5 MG tablet Take 1 tablet (5 mg) by mouth daily for 7 days 7 tablet 0          Physical Exam:     /80 (BP Location: Left arm, Patient Position: Chair, Cuff Size: Adult Large)   Pulse 109   Ht 1.524 m (5')   Wt 88.3 kg (194 lb 9.6 oz)   LMP 09/24/2023 (Approximate)   SpO2 94%   BMI 38.01 kg/m    Gen: adult female , appears in NAD  HEENT: clear conjunctivae, moist mucous membranes  CV: RRR, no M/G/R  Resp: expiratory wheezes throughout.  Respirations even and unlabored.  On RA.  Intermittent cough.  Skin: no apparent rashes on visible skin  Ext: no cyanosis, clubbing or edema  Neuro: alert and answering questions appropriately       Data:     Labs:  reviewed    Imaging studies:  I have personally reviewed all pertinent imaging studies and PFT results unless otherwise noted.    EXAM: XR CHEST 2 VIEWS  LOCATION: [facility]  DATE: 10/27/2023  INDICATION:  Shortness of breath, pneumonia since September, refractory to treatment, ? interval change on xray  COMPARISON: 10/07/2023                                                             IMPRESSION: Fine reticular/patchy opacities in the bilateral lower lung seen on prior study are either resolved or improved on the current study. Mild linear scarring is seen in the right upper lobe. No new consolidations or confluent airspace opacities.   Heart size and poorly vascularity are within normal limits. No pleural effusion or pneumothorax.      EXAM: CT CHEST Without CONTRAST  LOCATION: [facility]  DATE: 10/15/2023  FINDINGS:   LUNGS AND PLEURA: No pleural effusion or pneumothorax. Bilateral patchy groundglass pulmonary opacities most prominent in the upper lobes, likely infectious.  MEDIASTINUM/AXILLAE: No cardiomegaly or significant pericardial effusion. No significant mediastinal or hilar lymphadenopathy.  CORONARY ARTERY CALCIFICATION: None.  UPPER ABDOMEN: Limited evaluation of the upper abdomen due to lack of coverage and contrast. A few stones in the upper pole of the right kidney.  MUSCULOSKELETAL: No suspicious osseous lesion.                                                            IMPRESSION:   1.  Bilateral upper lobe predominant patchy groundglass pulmonary opacities, likely infectious.  2.  A few upper pole right kidney stones.

## 2023-11-01 ENCOUNTER — HOSPITAL ENCOUNTER (INPATIENT)
Facility: HOSPITAL | Age: 38
LOS: 4 days | Discharge: HOME | End: 2023-11-06
Attending: EMERGENCY MEDICINE | Admitting: FAMILY MEDICINE
Payer: COMMERCIAL

## 2023-11-01 DIAGNOSIS — J18.9 HOSPITAL-ACQUIRED PNEUMONIA: ICD-10-CM

## 2023-11-01 DIAGNOSIS — J18.9 ATYPICAL PNEUMONIA: Primary | ICD-10-CM
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
## 2023-11-01 DIAGNOSIS — J45.41 MODERATE PERSISTENT ASTHMA WITH EXACERBATION: ICD-10-CM

## 2023-11-01 DIAGNOSIS — R06.02 SOB (SHORTNESS OF BREATH): ICD-10-CM

## 2023-11-01 DIAGNOSIS — R52 BODY ACHES: ICD-10-CM

## 2023-11-01 DIAGNOSIS — N39.0 RECURRENT UTI: ICD-10-CM

## 2023-11-01 DIAGNOSIS — J68.3 REACTIVE AIRWAYS DYSFUNCTION SYNDROME, MODERATE PERSISTENT, UNCOMPLICATED (H): ICD-10-CM

## 2023-11-01 DIAGNOSIS — R05.1 ACUTE COUGH: ICD-10-CM

## 2023-11-01 LAB
ALBUMIN SERPL BCG-MCNC: 4 G/DL (ref 3.5–5.2)
ALBUMIN UR-MCNC: 100 MG/DL
ALP SERPL-CCNC: 76 U/L (ref 35–104)
ALT SERPL W P-5'-P-CCNC: 18 U/L (ref 0–50)
AMORPH CRY #/AREA URNS HPF: ABNORMAL /HPF
ANION GAP SERPL CALCULATED.3IONS-SCNC: 10 MMOL/L (ref 7–15)
APPEARANCE UR: ABNORMAL
AST SERPL W P-5'-P-CCNC: 23 U/L (ref 0–45)
BACTERIA #/AREA URNS HPF: ABNORMAL /HPF
BASOPHILS # BLD AUTO: 0 10E3/UL (ref 0–0.2)
BASOPHILS NFR BLD AUTO: 0 %
BILIRUB SERPL-MCNC: 0.8 MG/DL
BILIRUB UR QL STRIP: NEGATIVE
BUN SERPL-MCNC: 10.5 MG/DL (ref 6–20)
CALCIUM SERPL-MCNC: 10.4 MG/DL (ref 8.6–10)
CHLORIDE SERPL-SCNC: 101 MMOL/L (ref 98–107)
COLOR UR AUTO: ABNORMAL
CREAT SERPL-MCNC: 1.15 MG/DL (ref 0.51–0.95)
DEPRECATED HCO3 PLAS-SCNC: 24 MMOL/L (ref 22–29)
EGFRCR SERPLBLD CKD-EPI 2021: 63 ML/MIN/1.73M2
EOSINOPHIL # BLD AUTO: 0.6 10E3/UL (ref 0–0.7)
EOSINOPHIL NFR BLD AUTO: 5 %
ERYTHROCYTE [DISTWIDTH] IN BLOOD BY AUTOMATED COUNT: 14.6 % (ref 10–15)
GLUCOSE SERPL-MCNC: 114 MG/DL (ref 70–99)
GLUCOSE UR STRIP-MCNC: NEGATIVE MG/DL
HCG UR QL: NEGATIVE
HCT VFR BLD AUTO: 37.9 % (ref 35–47)
HGB BLD-MCNC: 12.3 G/DL (ref 11.7–15.7)
HGB UR QL STRIP: ABNORMAL
IMM GRANULOCYTES # BLD: 0.1 10E3/UL
IMM GRANULOCYTES NFR BLD: 1 %
KETONES UR STRIP-MCNC: NEGATIVE MG/DL
LEUKOCYTE ESTERASE UR QL STRIP: ABNORMAL
LYMPHOCYTES # BLD AUTO: 1.1 10E3/UL (ref 0.8–5.3)
LYMPHOCYTES NFR BLD AUTO: 9 %
MCH RBC QN AUTO: 29.7 PG (ref 26.5–33)
MCHC RBC AUTO-ENTMCNC: 32.5 G/DL (ref 31.5–36.5)
MCV RBC AUTO: 92 FL (ref 78–100)
MONOCYTES # BLD AUTO: 0.6 10E3/UL (ref 0–1.3)
MONOCYTES NFR BLD AUTO: 5 %
MUCOUS THREADS #/AREA URNS LPF: PRESENT /LPF
NEUTROPHILS # BLD AUTO: 10.1 10E3/UL (ref 1.6–8.3)
NEUTROPHILS NFR BLD AUTO: 80 %
NITRATE UR QL: NEGATIVE
NRBC # BLD AUTO: 0 10E3/UL
NRBC BLD AUTO-RTO: 0 /100
PH UR STRIP: 7.5 [PH] (ref 5–7)
PLATELET # BLD AUTO: 178 10E3/UL (ref 150–450)
POTASSIUM SERPL-SCNC: 3.7 MMOL/L (ref 3.4–5.3)
PROT SERPL-MCNC: 6.8 G/DL (ref 6.4–8.3)
RBC # BLD AUTO: 4.14 10E6/UL (ref 3.8–5.2)
RBC URINE: 27 /HPF
SODIUM SERPL-SCNC: 135 MMOL/L (ref 135–145)
SP GR UR STRIP: 1.01 (ref 1–1.03)
SQUAMOUS EPITHELIAL: <1 /HPF
UROBILINOGEN UR STRIP-MCNC: <2 MG/DL
WBC # BLD AUTO: 12.5 10E3/UL (ref 4–11)
WBC URINE: 28 /HPF

## 2023-11-01 PROCEDURE — 87040 BLOOD CULTURE FOR BACTERIA: CPT | Performed by: EMERGENCY MEDICINE

## 2023-11-01 PROCEDURE — 81001 URINALYSIS AUTO W/SCOPE: CPT | Performed by: EMERGENCY MEDICINE

## 2023-11-01 PROCEDURE — 84145 PROCALCITONIN (PCT): CPT | Performed by: EMERGENCY MEDICINE

## 2023-11-01 PROCEDURE — 36415 COLL VENOUS BLD VENIPUNCTURE: CPT | Performed by: STUDENT IN AN ORGANIZED HEALTH CARE EDUCATION/TRAINING PROGRAM

## 2023-11-01 PROCEDURE — 81025 URINE PREGNANCY TEST: CPT | Performed by: EMERGENCY MEDICINE

## 2023-11-01 PROCEDURE — 87086 URINE CULTURE/COLONY COUNT: CPT | Performed by: EMERGENCY MEDICINE

## 2023-11-01 PROCEDURE — 85025 COMPLETE CBC W/AUTO DIFF WBC: CPT | Performed by: STUDENT IN AN ORGANIZED HEALTH CARE EDUCATION/TRAINING PROGRAM

## 2023-11-01 PROCEDURE — 86140 C-REACTIVE PROTEIN: CPT | Performed by: EMERGENCY MEDICINE

## 2023-11-01 PROCEDURE — 87385 HISTOPLASMA CAPSUL AG IA: CPT | Performed by: INTERNAL MEDICINE

## 2023-11-01 PROCEDURE — 80053 COMPREHEN METABOLIC PANEL: CPT | Performed by: EMERGENCY MEDICINE

## 2023-11-01 PROCEDURE — 87637 SARSCOV2&INF A&B&RSV AMP PRB: CPT | Performed by: EMERGENCY MEDICINE

## 2023-11-01 PROCEDURE — 85025 COMPLETE CBC W/AUTO DIFF WBC: CPT | Performed by: EMERGENCY MEDICINE

## 2023-11-01 ASSESSMENT — ENCOUNTER SYMPTOMS
FEVER: 1
SHORTNESS OF BREATH: 0
NAUSEA: 0
FLANK PAIN: 1
TROUBLE SWALLOWING: 0
DIARRHEA: 1
CHILLS: 1
ABDOMINAL PAIN: 0
FATIGUE: 1
VOMITING: 0

## 2023-11-01 ASSESSMENT — ACTIVITIES OF DAILY LIVING (ADL): ADLS_ACUITY_SCORE: 33

## 2023-11-02 ENCOUNTER — APPOINTMENT (OUTPATIENT)
Dept: RADIOLOGY | Facility: HOSPITAL | Age: 38
End: 2023-11-02
Attending: EMERGENCY MEDICINE
Payer: COMMERCIAL

## 2023-11-02 ENCOUNTER — APPOINTMENT (OUTPATIENT)
Dept: ULTRASOUND IMAGING | Facility: HOSPITAL | Age: 38
End: 2023-11-02
Attending: EMERGENCY MEDICINE
Payer: COMMERCIAL

## 2023-11-02 PROBLEM — J18.9 HOSPITAL-ACQUIRED PNEUMONIA: Status: ACTIVE | Noted: 2023-11-02

## 2023-11-02 PROBLEM — R52 BODY ACHES: Status: ACTIVE | Noted: 2023-11-02

## 2023-11-02 PROBLEM — R05.1 ACUTE COUGH: Status: ACTIVE | Noted: 2023-11-02
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
## 2023-11-02 LAB
ANION GAP SERPL CALCULATED.3IONS-SCNC: 7 MMOL/L (ref 7–15)
BASOPHILS # BLD AUTO: 0 10E3/UL (ref 0–0.2)
BASOPHILS NFR BLD AUTO: 0 %
BUN SERPL-MCNC: 9.4 MG/DL (ref 6–20)
C PNEUM DNA SPEC QL NAA+PROBE: NOT DETECTED
CALCIUM SERPL-MCNC: 9.8 MG/DL (ref 8.6–10)
CALCIUM, IONIZED MEASURED: 1.28 MMOL/L (ref 1.11–1.3)
CHLORIDE SERPL-SCNC: 107 MMOL/L (ref 98–107)
CREAT SERPL-MCNC: 0.99 MG/DL (ref 0.51–0.95)
CRP SERPL-MCNC: 104.9 MG/L
DEPRECATED HCO3 PLAS-SCNC: 24 MMOL/L (ref 22–29)
EGFRCR SERPLBLD CKD-EPI 2021: 75 ML/MIN/1.73M2
EOSINOPHIL # BLD AUTO: 0.7 10E3/UL (ref 0–0.7)
EOSINOPHIL NFR BLD AUTO: 7 %
ERYTHROCYTE [DISTWIDTH] IN BLOOD BY AUTOMATED COUNT: 14.6 % (ref 10–15)
FLUAV H1 2009 PAND RNA SPEC QL NAA+PROBE: NOT DETECTED
FLUAV H1 RNA SPEC QL NAA+PROBE: NOT DETECTED
FLUAV H3 RNA SPEC QL NAA+PROBE: NOT DETECTED
FLUAV RNA SPEC QL NAA+PROBE: NEGATIVE
FLUAV RNA SPEC QL NAA+PROBE: NOT DETECTED
FLUBV RNA RESP QL NAA+PROBE: NEGATIVE
FLUBV RNA SPEC QL NAA+PROBE: NOT DETECTED
GLUCOSE SERPL-MCNC: 100 MG/DL (ref 70–99)
HADV DNA SPEC QL NAA+PROBE: NOT DETECTED
HCOV PNL SPEC NAA+PROBE: NOT DETECTED
HCT VFR BLD AUTO: 34.8 % (ref 35–47)
HGB BLD-MCNC: 11 G/DL (ref 11.7–15.7)
HMPV RNA SPEC QL NAA+PROBE: NOT DETECTED
HOLD SPECIMEN: NORMAL
HPIV1 RNA SPEC QL NAA+PROBE: NOT DETECTED
HPIV2 RNA SPEC QL NAA+PROBE: NOT DETECTED
HPIV3 RNA SPEC QL NAA+PROBE: NOT DETECTED
HPIV4 RNA SPEC QL NAA+PROBE: NOT DETECTED
IMM GRANULOCYTES # BLD: 0.1 10E3/UL
IMM GRANULOCYTES NFR BLD: 1 %
ION CA PH 7.4: 1.23 MMOL/L (ref 1.11–1.3)
LACTATE SERPL-SCNC: 0.4 MMOL/L (ref 0.7–2)
LACTATE SERPL-SCNC: 1.6 MMOL/L (ref 0.7–2)
LYMPHOCYTES # BLD AUTO: 1.3 10E3/UL (ref 0.8–5.3)
LYMPHOCYTES NFR BLD AUTO: 14 %
M PNEUMO DNA SPEC QL NAA+PROBE: NOT DETECTED
MCH RBC QN AUTO: 29.3 PG (ref 26.5–33)
MCHC RBC AUTO-ENTMCNC: 31.6 G/DL (ref 31.5–36.5)
MCV RBC AUTO: 93 FL (ref 78–100)
MONOCYTES # BLD AUTO: 0.6 10E3/UL (ref 0–1.3)
MONOCYTES NFR BLD AUTO: 6 %
NEUTROPHILS # BLD AUTO: 6.3 10E3/UL (ref 1.6–8.3)
NEUTROPHILS NFR BLD AUTO: 72 %
NRBC # BLD AUTO: 0 10E3/UL
NRBC BLD AUTO-RTO: 0 /100
PH: 7.33 (ref 7.35–7.45)
PLATELET # BLD AUTO: 163 10E3/UL (ref 150–450)
POTASSIUM SERPL-SCNC: 4.1 MMOL/L (ref 3.4–5.3)
PROCALCITONIN SERPL IA-MCNC: 0.22 NG/ML
RBC # BLD AUTO: 3.75 10E6/UL (ref 3.8–5.2)
RSV RNA SPEC NAA+PROBE: NEGATIVE
RSV RNA SPEC QL NAA+PROBE: NOT DETECTED
RSV RNA SPEC QL NAA+PROBE: NOT DETECTED
RV+EV RNA SPEC QL NAA+PROBE: NOT DETECTED
SARS-COV-2 RNA RESP QL NAA+PROBE: NEGATIVE
SODIUM SERPL-SCNC: 138 MMOL/L (ref 135–145)
WBC # BLD AUTO: 8.9 10E3/UL (ref 4–11)

## 2023-11-02 PROCEDURE — 83605 ASSAY OF LACTIC ACID: CPT | Performed by: INTERNAL MEDICINE

## 2023-11-02 PROCEDURE — 85025 COMPLETE CBC W/AUTO DIFF WBC: CPT | Performed by: FAMILY MEDICINE

## 2023-11-02 PROCEDURE — 250N000009 HC RX 250: Performed by: FAMILY MEDICINE

## 2023-11-02 PROCEDURE — 96365 THER/PROPH/DIAG IV INF INIT: CPT

## 2023-11-02 PROCEDURE — 82310 ASSAY OF CALCIUM: CPT | Performed by: FAMILY MEDICINE

## 2023-11-02 PROCEDURE — 87486 CHLMYD PNEUM DNA AMP PROBE: CPT | Performed by: INTERNAL MEDICINE

## 2023-11-02 PROCEDURE — 250N000013 HC RX MED GY IP 250 OP 250 PS 637: Performed by: FAMILY MEDICINE

## 2023-11-02 PROCEDURE — 99285 EMERGENCY DEPT VISIT HI MDM: CPT | Mod: 25

## 2023-11-02 PROCEDURE — 96376 TX/PRO/DX INJ SAME DRUG ADON: CPT

## 2023-11-02 PROCEDURE — 96361 HYDRATE IV INFUSION ADD-ON: CPT

## 2023-11-02 PROCEDURE — 87633 RESP VIRUS 12-25 TARGETS: CPT | Performed by: INTERNAL MEDICINE

## 2023-11-02 PROCEDURE — G0378 HOSPITAL OBSERVATION PER HR: HCPCS

## 2023-11-02 PROCEDURE — 250N000011 HC RX IP 250 OP 636: Performed by: EMERGENCY MEDICINE

## 2023-11-02 PROCEDURE — 250N000011 HC RX IP 250 OP 636: Performed by: FAMILY MEDICINE

## 2023-11-02 PROCEDURE — 87205 SMEAR GRAM STAIN: CPT | Performed by: FAMILY MEDICINE

## 2023-11-02 PROCEDURE — 94640 AIRWAY INHALATION TREATMENT: CPT | Mod: 76

## 2023-11-02 PROCEDURE — 99223 1ST HOSP IP/OBS HIGH 75: CPT | Performed by: FAMILY MEDICINE

## 2023-11-02 PROCEDURE — 82330 ASSAY OF CALCIUM: CPT | Performed by: FAMILY MEDICINE

## 2023-11-02 PROCEDURE — 36415 COLL VENOUS BLD VENIPUNCTURE: CPT | Performed by: FAMILY MEDICINE

## 2023-11-02 PROCEDURE — 250N000012 HC RX MED GY IP 250 OP 636 PS 637: Performed by: INTERNAL MEDICINE

## 2023-11-02 PROCEDURE — 36415 COLL VENOUS BLD VENIPUNCTURE: CPT | Performed by: INTERNAL MEDICINE

## 2023-11-02 PROCEDURE — 99222 1ST HOSP IP/OBS MODERATE 55: CPT | Performed by: INTERNAL MEDICINE

## 2023-11-02 PROCEDURE — 999N000157 HC STATISTIC RCP TIME EA 10 MIN

## 2023-11-02 PROCEDURE — 258N000003 HC RX IP 258 OP 636: Performed by: EMERGENCY MEDICINE

## 2023-11-02 PROCEDURE — 250N000013 HC RX MED GY IP 250 OP 250 PS 637: Performed by: INTERNAL MEDICINE

## 2023-11-02 PROCEDURE — 87449 NOS EACH ORGANISM AG IA: CPT | Performed by: INTERNAL MEDICINE

## 2023-11-02 PROCEDURE — 250N000011 HC RX IP 250 OP 636: Performed by: INTERNAL MEDICINE

## 2023-11-02 PROCEDURE — 87040 BLOOD CULTURE FOR BACTERIA: CPT | Performed by: EMERGENCY MEDICINE

## 2023-11-02 PROCEDURE — 120N000001 HC R&B MED SURG/OB

## 2023-11-02 PROCEDURE — 96366 THER/PROPH/DIAG IV INF ADDON: CPT

## 2023-11-02 PROCEDURE — 258N000003 HC RX IP 258 OP 636: Performed by: INTERNAL MEDICINE

## 2023-11-02 PROCEDURE — 272N000202 HC AEROBIKA WITH MANOMETER

## 2023-11-02 PROCEDURE — 99207 PR APP CREDIT; MD BILLING SHARED VISIT: CPT | Performed by: INTERNAL MEDICINE

## 2023-11-02 PROCEDURE — 83605 ASSAY OF LACTIC ACID: CPT | Performed by: EMERGENCY MEDICINE

## 2023-11-02 PROCEDURE — 250N000013 HC RX MED GY IP 250 OP 250 PS 637: Performed by: EMERGENCY MEDICINE

## 2023-11-02 PROCEDURE — 99204 OFFICE O/P NEW MOD 45 MIN: CPT | Performed by: INTERNAL MEDICINE

## 2023-11-02 PROCEDURE — 71046 X-RAY EXAM CHEST 2 VIEWS: CPT

## 2023-11-02 PROCEDURE — 36415 COLL VENOUS BLD VENIPUNCTURE: CPT | Performed by: EMERGENCY MEDICINE

## 2023-11-02 PROCEDURE — 96375 TX/PRO/DX INJ NEW DRUG ADDON: CPT

## 2023-11-02 PROCEDURE — 87389 HIV-1 AG W/HIV-1&-2 AB AG IA: CPT | Performed by: INTERNAL MEDICINE

## 2023-11-02 PROCEDURE — 76770 US EXAM ABDO BACK WALL COMP: CPT

## 2023-11-02 RX ORDER — ACETAMINOPHEN 325 MG/1
650 TABLET ORAL ONCE
Status: COMPLETED | OUTPATIENT
Start: 2023-11-02 | End: 2023-11-02

## 2023-11-02 RX ORDER — IPRATROPIUM BROMIDE AND ALBUTEROL SULFATE 2.5; .5 MG/3ML; MG/3ML
3 SOLUTION RESPIRATORY (INHALATION)
Status: DISCONTINUED | OUTPATIENT
Start: 2023-11-02 | End: 2023-11-05

## 2023-11-02 RX ORDER — OXYCODONE HYDROCHLORIDE 5 MG/1
5 TABLET ORAL EVERY 6 HOURS PRN
Status: DISCONTINUED | OUTPATIENT
Start: 2023-11-02 | End: 2023-11-02

## 2023-11-02 RX ORDER — NALOXONE HYDROCHLORIDE 0.4 MG/ML
0.2 INJECTION, SOLUTION INTRAMUSCULAR; INTRAVENOUS; SUBCUTANEOUS
Status: DISCONTINUED | OUTPATIENT
Start: 2023-11-02 | End: 2023-11-06 | Stop reason: HOSPADM

## 2023-11-02 RX ORDER — ONDANSETRON 2 MG/ML
4 INJECTION INTRAMUSCULAR; INTRAVENOUS EVERY 6 HOURS PRN
Status: DISCONTINUED | OUTPATIENT
Start: 2023-11-02 | End: 2023-11-06 | Stop reason: HOSPADM

## 2023-11-02 RX ORDER — HYDROMORPHONE HYDROCHLORIDE 1 MG/ML
0.5 INJECTION, SOLUTION INTRAMUSCULAR; INTRAVENOUS; SUBCUTANEOUS
Status: DISCONTINUED | OUTPATIENT
Start: 2023-11-02 | End: 2023-11-02

## 2023-11-02 RX ORDER — GUAIFENESIN 600 MG/1
600 TABLET, EXTENDED RELEASE ORAL 2 TIMES DAILY
Status: DISCONTINUED | OUTPATIENT
Start: 2023-11-02 | End: 2023-11-06 | Stop reason: HOSPADM

## 2023-11-02 RX ORDER — NALOXONE HYDROCHLORIDE 0.4 MG/ML
0.4 INJECTION, SOLUTION INTRAMUSCULAR; INTRAVENOUS; SUBCUTANEOUS
Status: DISCONTINUED | OUTPATIENT
Start: 2023-11-02 | End: 2023-11-06 | Stop reason: HOSPADM

## 2023-11-02 RX ORDER — IPRATROPIUM BROMIDE AND ALBUTEROL SULFATE 2.5; .5 MG/3ML; MG/3ML
3 SOLUTION RESPIRATORY (INHALATION) ONCE
Status: DISCONTINUED | OUTPATIENT
Start: 2023-11-02 | End: 2023-11-02

## 2023-11-02 RX ORDER — TRAMADOL HYDROCHLORIDE 50 MG/1
50 TABLET ORAL EVERY 6 HOURS PRN
Status: DISCONTINUED | OUTPATIENT
Start: 2023-11-02 | End: 2023-11-03

## 2023-11-02 RX ORDER — CEFEPIME HYDROCHLORIDE 2 G/1
2 INJECTION, POWDER, FOR SOLUTION INTRAVENOUS ONCE
Status: COMPLETED | OUTPATIENT
Start: 2023-11-02 | End: 2023-11-02

## 2023-11-02 RX ORDER — ONDANSETRON 4 MG/1
4 TABLET, ORALLY DISINTEGRATING ORAL EVERY 6 HOURS PRN
Status: DISCONTINUED | OUTPATIENT
Start: 2023-11-02 | End: 2023-11-06 | Stop reason: HOSPADM

## 2023-11-02 RX ORDER — IPRATROPIUM BROMIDE AND ALBUTEROL SULFATE 2.5; .5 MG/3ML; MG/3ML
3 SOLUTION RESPIRATORY (INHALATION) 4 TIMES DAILY
Status: DISCONTINUED | OUTPATIENT
Start: 2023-11-02 | End: 2023-11-02

## 2023-11-02 RX ORDER — PREDNISONE 20 MG/1
40 TABLET ORAL DAILY
Status: DISCONTINUED | OUTPATIENT
Start: 2023-11-02 | End: 2023-11-03

## 2023-11-02 RX ORDER — CEFEPIME HYDROCHLORIDE 2 G/1
2 INJECTION, POWDER, FOR SOLUTION INTRAVENOUS EVERY 8 HOURS
Qty: 262.5 ML | Refills: 0 | Status: DISCONTINUED | OUTPATIENT
Start: 2023-11-02 | End: 2023-11-05

## 2023-11-02 RX ORDER — ACETAMINOPHEN 325 MG/1
650 TABLET ORAL EVERY 6 HOURS PRN
Status: DISCONTINUED | OUTPATIENT
Start: 2023-11-02 | End: 2023-11-06 | Stop reason: HOSPADM

## 2023-11-02 RX ORDER — HYDROMORPHONE HYDROCHLORIDE 1 MG/ML
0.5 INJECTION, SOLUTION INTRAMUSCULAR; INTRAVENOUS; SUBCUTANEOUS EVERY 4 HOURS PRN
Status: DISCONTINUED | OUTPATIENT
Start: 2023-11-02 | End: 2023-11-03

## 2023-11-02 RX ORDER — ALBUTEROL SULFATE 0.83 MG/ML
2.5 SOLUTION RESPIRATORY (INHALATION)
Status: DISCONTINUED | OUTPATIENT
Start: 2023-11-02 | End: 2023-11-06 | Stop reason: HOSPADM

## 2023-11-02 RX ORDER — ACETAMINOPHEN 325 MG/1
650 TABLET ORAL 3 TIMES DAILY
Status: COMPLETED | OUTPATIENT
Start: 2023-11-02 | End: 2023-11-03

## 2023-11-02 RX ORDER — BENZONATATE 100 MG/1
100 CAPSULE ORAL ONCE
Status: COMPLETED | OUTPATIENT
Start: 2023-11-02 | End: 2023-11-02

## 2023-11-02 RX ORDER — SERTRALINE HYDROCHLORIDE 100 MG/1
100 TABLET, FILM COATED ORAL AT BEDTIME
Status: DISCONTINUED | OUTPATIENT
Start: 2023-11-02 | End: 2023-11-06 | Stop reason: HOSPADM

## 2023-11-02 RX ORDER — NICOTINE 21 MG/24HR
1 PATCH, TRANSDERMAL 24 HOURS TRANSDERMAL DAILY
Status: DISCONTINUED | OUTPATIENT
Start: 2023-11-02 | End: 2023-11-06 | Stop reason: HOSPADM

## 2023-11-02 RX ADMIN — HYDROMORPHONE HYDROCHLORIDE 0.5 MG: 1 INJECTION, SOLUTION INTRAMUSCULAR; INTRAVENOUS; SUBCUTANEOUS at 18:05

## 2023-11-02 RX ADMIN — SODIUM CHLORIDE 1000 ML: 9 INJECTION, SOLUTION INTRAVENOUS at 00:48

## 2023-11-02 RX ADMIN — TRAMADOL HYDROCHLORIDE 50 MG: 50 TABLET, COATED ORAL at 21:21

## 2023-11-02 RX ADMIN — HYDROMORPHONE HYDROCHLORIDE 0.5 MG: 1 INJECTION, SOLUTION INTRAMUSCULAR; INTRAVENOUS; SUBCUTANEOUS at 03:41

## 2023-11-02 RX ADMIN — HYDROMORPHONE HYDROCHLORIDE 0.5 MG: 1 INJECTION, SOLUTION INTRAMUSCULAR; INTRAVENOUS; SUBCUTANEOUS at 23:58

## 2023-11-02 RX ADMIN — IPRATROPIUM BROMIDE AND ALBUTEROL SULFATE 3 ML: .5; 3 SOLUTION RESPIRATORY (INHALATION) at 08:25

## 2023-11-02 RX ADMIN — IPRATROPIUM BROMIDE AND ALBUTEROL SULFATE 3 ML: .5; 3 SOLUTION RESPIRATORY (INHALATION) at 19:51

## 2023-11-02 RX ADMIN — HYDROMORPHONE HYDROCHLORIDE 0.5 MG: 1 INJECTION, SOLUTION INTRAMUSCULAR; INTRAVENOUS; SUBCUTANEOUS at 09:59

## 2023-11-02 RX ADMIN — CEFEPIME HYDROCHLORIDE 2 G: 2 INJECTION, POWDER, FOR SOLUTION INTRAVENOUS at 10:06

## 2023-11-02 RX ADMIN — HYDROMORPHONE HYDROCHLORIDE 0.5 MG: 1 INJECTION, SOLUTION INTRAMUSCULAR; INTRAVENOUS; SUBCUTANEOUS at 06:50

## 2023-11-02 RX ADMIN — IPRATROPIUM BROMIDE AND ALBUTEROL SULFATE 3 ML: .5; 3 SOLUTION RESPIRATORY (INHALATION) at 12:00

## 2023-11-02 RX ADMIN — SERTRALINE HYDROCHLORIDE 100 MG: 100 TABLET ORAL at 21:10

## 2023-11-02 RX ADMIN — ONDANSETRON 4 MG: 2 INJECTION INTRAMUSCULAR; INTRAVENOUS at 03:42

## 2023-11-02 RX ADMIN — ACETAMINOPHEN 650 MG: 325 TABLET ORAL at 02:44

## 2023-11-02 RX ADMIN — ACETAMINOPHEN 650 MG: 325 TABLET ORAL at 15:41

## 2023-11-02 RX ADMIN — AZITHROMYCIN MONOHYDRATE 500 MG: 500 INJECTION, POWDER, LYOPHILIZED, FOR SOLUTION INTRAVENOUS at 21:10

## 2023-11-02 RX ADMIN — PREDNISONE 40 MG: 20 TABLET ORAL at 21:10

## 2023-11-02 RX ADMIN — IPRATROPIUM BROMIDE AND ALBUTEROL SULFATE 3 ML: .5; 3 SOLUTION RESPIRATORY (INHALATION) at 15:58

## 2023-11-02 RX ADMIN — ACETAMINOPHEN 650 MG: 325 TABLET ORAL at 21:10

## 2023-11-02 RX ADMIN — IPRATROPIUM BROMIDE AND ALBUTEROL SULFATE 3 ML: .5; 3 SOLUTION RESPIRATORY (INHALATION) at 05:26

## 2023-11-02 RX ADMIN — BENZONATATE 100 MG: 100 CAPSULE ORAL at 01:44

## 2023-11-02 RX ADMIN — NICOTINE 1 PATCH: 14 PATCH, EXTENDED RELEASE TRANSDERMAL at 08:16

## 2023-11-02 RX ADMIN — HYDROMORPHONE HYDROCHLORIDE 0.5 MG: 1 INJECTION, SOLUTION INTRAMUSCULAR; INTRAVENOUS; SUBCUTANEOUS at 13:20

## 2023-11-02 RX ADMIN — CEFEPIME HYDROCHLORIDE 2 G: 2 INJECTION, POWDER, FOR SOLUTION INTRAVENOUS at 17:52

## 2023-11-02 RX ADMIN — GUAIFENESIN 600 MG: 600 TABLET ORAL at 21:10

## 2023-11-02 RX ADMIN — CEFEPIME HYDROCHLORIDE 2 G: 2 INJECTION, POWDER, FOR SOLUTION INTRAVENOUS at 02:46

## 2023-11-02 ASSESSMENT — ACTIVITIES OF DAILY LIVING (ADL)
ADLS_ACUITY_SCORE: 35
DEPENDENT_IADLS:: INDEPENDENT
ADLS_ACUITY_SCORE: 35

## 2023-11-02 ASSESSMENT — ENCOUNTER SYMPTOMS: COUGH: 1

## 2023-11-02 NOTE — PROGRESS NOTES
[facility]    Medicine Progress Note - Hospitalist Service    Date of Admission:  11/1/2023    Assessment & Plan   Jacqueline Pappas is a 38 year old female with PMH significant for Congential Solitary Kidney, recurrent UTIs, kidney stones, ureteral strictures s/p right nephrostomy tube in place, recent hospitalization on 9/28/2023 for community-acquired pneumonia and recurrent UTI who presented to ED for evaluation of ongoing productive cough for since last 2 months and body ache, possible fever since last 2 days.  In ED x-ray suggestive of pneumonia.  Patient admitted for further management.    Acute hypoxic respiratory failure due to pneumonia;  Hospital-acquired pneumonia;  History of tobacco use disorder; quit 1 month ago  -- On admission, CXR reported new patchy infiltrates bilaterally consistent with pneumonia, right slightly worse than left  --On admission, mildly elevated procalcitonin and mild leukocytosis  -- Continue IV cefepime.  Further infective work-up per ID in process  --Continue DuoNeb, incentive spirometry and flutter valve  -- Personally discussed with pulmonologist  --On 2 L nasal cannula, titrate O2 as able    Recurrent history of recurrent UTI;  Right nephrostomy tube in place;  -- Abnormal UA but collected from nephrostomy tube  --US renal with no hydronephrosis  --Patient reported nephrostomy tube last exchanged was on 6/2023 at [facility].  Patient reported now having new insurance that does not cover [facility].  IR consulted for nephrostomy tube exchange  --Patient is working on finding new urology through PCP.      Mild hypercalcemia likely due to; dehydration  --Improved with IV fluid    Tobacco use disorder;  --Reported quit 1 month ago since recent hospitalization on 9/2023.  Congratulated on smoking cessation.            Diet: Combination Diet Regular Diet Adult    DVT Prophylaxis: Pneumatic Compression Devices and Ambulate every shift  Farr Catheter: Not  present  Lines: None     Cardiac Monitoring: ACTIVE order. Indication: hypoxia  Code Status: Full Code      Clinically Significant Risk Factors Present on Admission           # Hypercalcemia: Highest Ca = 10.4 mg/dL in last 2 days, will monitor as appropriate          # Acute Respiratory Failure: Documented O2 saturation < 91%.  Continue supplemental oxygen as needed     # Obesity: Estimated body mass index is 37.11 kg/m  as calculated from the following:    Height as of this encounter: 1.524 m (5').    Weight as of this encounter: 86.2 kg (190 lb).              Disposition Plan      Expected Discharge Date: 11/04/2023      Destination: home              Narinder Salcedo MD  Hospitalist Service  [facility]  Securely message with [employer] (more info)  Text page via [employer] Paging/Directory   ______________________________________________________________________    Interval History   Patient is new to me.  Patient seen and examined.  Notes, labs, imaging report personally reviewed.  Patient complaining of ongoing cough sometimes dry and sometimes productive with yellow sputum.  Also complaining of generalized weakness.  Complaining of nausea but no vomiting.  Complaining of right flank pain at nephrostomy site.  Patient reported she is due for nephrostomy tube change.  Patient reported she had exchanged in June 2023 at [facility] but now her insurance does not cover to go to [facility].  Reported she is working on finding  new urologist through PCP.  Discussed with nursing staffs  Discussed with pulmonologist.  Discussed with urology team who saw patient on last hospitalization at [facility], will consult IR for nephrostomy tube exchange    Physical Exam   Vital Signs: Temp: 98.5  F (36.9  C) Temp src: Oral BP: 137/60 Pulse: 91   Resp: 17 SpO2: 100 % O2 Device: Nasal cannula Oxygen Delivery: 2 LPM  Weight: 190 lbs 0 oz      General: Not in obvious distress.  HEENT: Normocephalic, supple  neck  Chest: Decreased but clear to auscultation bilateral anteriorly, no wheezing  Heart: S1S2 normal, regular  Abdomen: Soft. NT, ND. Bowel sounds- active.  Right flank nephrostomy tube in place no surrounding erythema or discharge around the tube  Extremities: No legs swelling  Neuro: alert and awake, grossly non-focal        Medical Decision Making             Data     I have personally reviewed the following data over the past 24 hrs:    8.9  \   11.0 (L)   / 163     138 107 9.4 /  100 (H)   4.1 24 0.99 (H) \     ALT: 18 AST: 23 AP: 76 TBILI: 0.8   ALB: 4.0 TOT PROTEIN: 6.8 LIPASE: N/A     Procal: 0.22 (H) CRP: 104.90 (H) Lactic Acid: 1.6         Imaging results reviewed over the past 24 hrs:   Recent Results (from the past 24 hour(s))   US Renal Complete Non-Vascular    Narrative    EXAM: US RENAL COMPLETE NON-VASCULAR  LOCATION: [facility]  DATE: 11/2/2023    INDICATION: Right flank pain; nephrostomy tube in place.  COMPARISON: CT abdomen/pelvis 10/06/2023; ultrasound 01/01/2023.  TECHNIQUE: Routine Bilateral Renal and Bladder Ultrasound.    FINDINGS:    RIGHT KIDNEY: 9.5 cm. Multiple cystic-appearing space, similar to the previous examination. This may reflect accommodation parapelvic cysts, simple parenchymal cysts, or calyceal diverticula. No hydronephrosis. Percutaneous nephrostomy catheter is   partially visualized.    LEFT KIDNEY: Not visualized, likely due to known severe atrophy.    BLADDER: Normal.      Impression    IMPRESSION:    No hydronephrosis.   Chest XR,  PA & LAT    Narrative    EXAM: XR CHEST 2 VIEWS  LOCATION: [facility]  DATE: 11/2/2023    INDICATION: cough  COMPARISON: 10/27/2023      Impression    IMPRESSION: Heart size normal. New patchy infiltrate present bilaterally consistent with pneumonia, right lung slightly worse than left.

## 2023-11-02 NOTE — CONSULTS
[facility] Pulmonary/Critical Care Consult Team Note    Jacqueline Pappas,  1985, MRN [id number]  Admitting Dx: Hospital-acquired pneumonia [J18.9, Y95]  Body aches [R52]  Acute cough [R05.1]  Date / Time of Admission:  2023 10:25 PM    Intake/Output last 3 shifts:  I/O last 3 completed shifts:  In: 300 [P.O.:300]  Out: -     Assessment/Plan: Jacqueline Pappas is a 38 year old female with PMHx of Recurrent UTIs, kidney stones, with cough, fever and infiltrate on CXR.    Pneumonia  - agree with antibiotic  - will add azithromycin  - Histo, blasto, legionella, strep, resp culture, viral panel  - nebs QID    Likely COPD with smoking history  - would give course of prednisone for likely COPD exacerbation    Tobacco Use  - smoking cessation    Medical Care Time excluding procedures and family discussions greater than: 1 Hour    Risk Factors Present on Admission:  Clinically Significant Risk Factors Present on Admission           # Hypercalcemia: Highest Ca = 10.4 mg/dL in last 2 days, will monitor as appropriate          # Acute Respiratory Failure: Documented O2 saturation < 91%.  Continue supplemental oxygen as needed     # Obesity: Estimated body mass index is 37.11 kg/m  as calculated from the following:    Height as of this encounter: 1.524 m (5').    Weight as of this encounter: 86.2 kg (190 lb).                    Lisa Hebert DO  Pulmonary and Critical Care Attending  pgr [phone]    Allergies   Allergen Reactions    Desogestrel-Ethinyl Estradiol Angioedema and Swelling     Swelling of hands and feet per pt.      Penicillins Rash     As a child per mother; mother unsure if has tolerated another PCN since. Tolerated ampicillin 16    Sulfa Antibiotics Rash    Vancomycin Rash       Meds: See MAR    Physical Exam:  /62 (BP Location: Left arm)   Pulse 110   Temp 98.2  F (36.8  C) (Oral)   Resp 19   Ht 1.524 m (5')   Wt 86.2 kg (190 lb)   LMP 2023 (Approximate)   SpO2 92%   BMI  37.11 kg/m    Intake/Output this shift:  No intake/output data recorded.  GEN: sitting up in bed, NAD  HEENT: NC in place  CVS: regular rhythm, no murmurs  RESP: trace end exp wheezing, no rhonchi  ABD: Soft, No abdominal pain with palpation, no guarding, no rigidity  EXT: Warm, well perfused, no edema  NEURO: moving all extremities, nonfocal  PSYCH: pleasant    Pertinent Labs: Latest lab results in EHR personally reviewed.   CMP  Recent Labs   Lab 11/02/23  0640 11/01/23  2314    135   POTASSIUM 4.1 3.7   CHLORIDE 107 101   CO2 24 24   ANIONGAP 7 10   * 114*   BUN 9.4 10.5   CR 0.99* 1.15*   GFRESTIMATED 75 63   LUKAS 9.8 10.4*   PROTTOTAL  --  6.8   ALBUMIN  --  4.0   BILITOTAL  --  0.8   ALKPHOS  --  76   AST  --  23   ALT  --  18     CBC  Recent Labs   Lab 11/02/23  0640 11/01/23  2314   WBC 8.9 12.5*   RBC 3.75* 4.14   HGB 11.0* 12.3   HCT 34.8* 37.9   MCV 93 92   MCH 29.3 29.7   MCHC 31.6 32.5   RDW 14.6 14.6    178     INRNo lab results found in last 7 days.  Arterial Blood Gas  Recent Labs   Lab 11/02/23  0640   PH 7.33*       Cultures: not yet available.    Imaging: personally reviewed.   Results for orders placed or performed during the hospital encounter of 11/01/23   US Renal Complete Non-Vascular    Narrative    EXAM: US RENAL COMPLETE NON-VASCULAR  LOCATION: [facility]  DATE: 11/2/2023    INDICATION: Right flank pain; nephrostomy tube in place.  COMPARISON: CT abdomen/pelvis 10/06/2023; ultrasound 01/01/2023.  TECHNIQUE: Routine Bilateral Renal and Bladder Ultrasound.    FINDINGS:    RIGHT KIDNEY: 9.5 cm. Multiple cystic-appearing space, similar to the previous examination. This may reflect accommodation parapelvic cysts, simple parenchymal cysts, or calyceal diverticula. No hydronephrosis. Percutaneous nephrostomy catheter is   partially visualized.    LEFT KIDNEY: Not visualized, likely due to known severe atrophy.    BLADDER: Normal.      Impression     IMPRESSION:    No hydronephrosis.   Chest XR,  PA & LAT    Narrative    EXAM: XR CHEST 2 VIEWS  LOCATION: [facility]  DATE: 11/2/2023    INDICATION: cough  COMPARISON: 10/27/2023      Impression    IMPRESSION: Heart size normal. New patchy infiltrate present bilaterally consistent with pneumonia, right lung slightly worse than left.       Patient Active Problem List   Diagnosis    Anemia    Congenital absence of one kidney    Depression    Calculus of kidney    Pyelonephritis    Solitary kidney, congenital    Hypotension    Stricture or kinking of ureter    Ureteral stricture    Abdominal pain    Acute interstitial nephritis    Attention to nephrostomy (H)    Diarrhea, unspecified type    Elevated blood pressure    Family planning    Hematuria    Hypokalemia    Hypomagnesemia    Leukocytosis, unspecified type    Bilious vomiting with nausea    Nephrostomy complication (H24)    Morbid obesity with BMI of 40.0-44.9, adult (H)    Other artificial openings of urinary tract status (H)    Perinephric hematoma    Postoperative anemia due to acute blood loss    Recurrent major depressive disorder, in partial remission (H24)    Recurrent UTI    Renal colic on right side    Right flank pain, chronic    SIRS (systemic inflammatory response syndrome) (H)    Serum calcium elevated    Sinus bradycardia    Smoker    Thrombocytopenia (H24)    Urinary tract infection    Nausea and vomiting, unspecified vomiting type    Nausea    Non-intractable vomiting with nausea    Acute pyelonephritis    GT (acute kidney injury) (H24)    Other hydronephrosis    Congenital renal agenesis and dysgenesis    Renal agenesis    Renal agenesis, unilateral    Depressive disorder    Obesity, Class II, BMI 35-39.9    Artificial opening status    Tobacco abuse    Crossing vessel and stricture of ureter without hydronephrosis    Right flank pain    Hypoxia    Atypical pneumonia    Hospital-acquired pneumonia    Body aches    Acute  cough         Surgical History  She  has a past surgical history that includes  section; REMOVAL OF KIDNEY STONE; Combined Cystoscopy, Retrogrades, Ureteroscopy, Laser Holmium Lithotripsy Ureter(S), Insert Stent (Right, 2016); Cystoscopy, ureteroscopy, combined (Right, 2016); Laser holmium nephrolithotomy via percutaneous nephrostomy (Right, 2016); Laser holmium lithotripsy ureter(s), insert stent, combined (Left, 2016); Percutaneous nephrostomy (2016); IR Nephrolithotomy (2014); IR Nephrostomy Tube Placement Right (2016); IR Nephrostomy Tube Placement Right (2017); IR Nephrostogram Exisiting Access (10/18/2018); IR Nephrostomy Tube Change Right (2018); IR Nephrostomy Tube Change Right (2020);  Section; other surgical history; [place] Tooth Extraction; other surgical history (Right); Kidney Stone Surgery (Right, 2016); Nephrostomy W/ Introduction Of Catheter (Right); Ir Nephrostomy Tube Change Right (2018); and Ir Nephrostomy Tube Change Right (2020).    Family History  Reviewed, and family history includes Coronary Artery Disease in an other family member; Gout in her paternal grandfather; Heart Disease in her maternal aunt, maternal aunt, maternal aunt, maternal grandmother, and maternal uncle; Prostate Cancer in her maternal aunt.    Social History  Reviewed, and she  reports that she has been smoking cigarettes. She has a 5.00 pack-year smoking history. She quit smokeless tobacco use about 7 years ago. She reports that she does not currently use alcohol. She reports that she does not use drugs.    Allergies  Allergies   Allergen Reactions    Desogestrel-Ethinyl Estradiol Angioedema and Swelling     Swelling of hands and feet per pt.      Penicillins Rash     As a child per mother; mother unsure if has tolerated another PCN since. Tolerated ampicillin 16    Sulfa Antibiotics Rash    Vancomycin Rash              Lisa Hebert,  DO  Pulmonary and Critical Care Attending  pgr [phone]    Securely message with the Vocera Web Console (learn more here)

## 2023-11-02 NOTE — CONSULTS
Consultation - Infectious Disease  Jacqueline Pappas,  1985, MRN [id number]    Admitting Dx: Hospital-acquired pneumonia [J18.9, Y95]    PCP: Pao Montague, [phone]   Code status:  Full Code       Extended Emergency Contact Information  Primary Emergency Contact: Jonathan Pappas   [facility]  Mobile Phone: [phone]  Relation: Father  Secondary Emergency Contact: YANE PAPPAS   [place]  Mobile Phone: [phone]  Relation: Sister       ASSESSMENT   Ongoing dyspnea and cough for 2 months. Chest imaging has shown patchy ground glass opacities since 23. Current Chest film shows new opacities that were not apparent on CXR 10/27, raises possibility of superimposed bacterial infection. Agree with cefepime for now. GGO can be from PJP (seems low risk as she is presumed immunocompetent), viral, or hypersensitivity reaction.   Dysuria, flank pain, in presence of percutaneous nephrostomy tube.   Congratulated on smoking cessation  Antibiotic allergies -- penicillin as a child, but has tolerated amoxicillin since; sulfa, vancomycin.      PLAN   Agree with cefepime  Sputum culture if possible  Can repeat respiratory panel  Histo, blasto testing  Check HIV  Pulmonary to see    Krzysztof Black MD  [facility]  [phone] clinic  [facility] paging  ______________________________________________________________________        Reason For Consult: Pneumonia     HPI    We have been requested by Dr. Wells to evaluate Jacqueline Pappas who is a 38 year old year old female for the above.  She has a PMH significant for Congential Solitary Kidney, recurrent UTIs, kidney stones, ureteral strictures with Nephrostomy tube in place and recent hospitalization last month for Community Acquired Pneumonia who presented yesterday to ED due to cough and body aches. The body aches began 10/31, subjective fever with sweats. Also noted change in urine odor a week ago, then since 10/26 with  dysuria, R flank pain.     She saw pulmonary in clinic 10/31/23, note is reviewed. Prescribed prednisone, steroid inhaler, nasal spray. 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 She has had courses of prednisone with antibiotics. Respiratory panel positive for rhinovirus/enterovirus in September. Denies known sick contacts, lives with her son. She previously lived with her father who smokes, but she now has her own place. She quit smoking since last hospital stay, although had a few cigarettes in the meantime.     Has had ongoing cough a dyspnea for 2 months. Felt a little better after last hospital stay. Cough more productive this past week. Pain in chest from coughing. Nebs at home seemed to help breathing for a couple of hours only.         She has had multiple X rays at CT of chest in the past 3 months  CTA chest 8/18/23:  Nonspecific bronchiolitis with bronchial wall thickening and pulmonary air trapping. No pneumonic infiltrate or pleural effusion.   9/14 CT chest:Patchy groundglass opacities throughout the lungs, likely infectious/inflammatory.   9/17 abd CT with lower lungs: Patchy groundglass opacities in the lung bases, likely infectious/inflammatory.   10/6 abd CT with lower lungs: Multiple small patchy groundglass opacities throughout the visualized lung bases are significantly improved from prior study.   CT chest 10/15/23:   Bilateral upper lobe predominant patchy groundglass pulmonary opacities, likely infectious.   CXR 10/27: Fine reticular/patchy opacities in the bilateral lower lung seen on prior study are either resolved or improved on the current study. Mild linear scarring is seen in the right upper lobe. No new consolidations or confluent airspace opacities.   Heart size and poorly vascularity are within normal limits. No pleural effusion or pneumothorax.  11/1 CXR: New patchy infiltrate present bilaterally  consistent with pneumonia, right lung slightly worse than left.     Antibiotics past 3 months:  Ceftri, azithro  --> cefdinir  10/6 cephalexin  10/7 amox, doxy  Ceftri, azithro 10/15 --> cefdinir       Medical History  Past Medical History:   Diagnosis Date    Acute renal failure (H24)     Anemia     Anemia     Calculus of kidney     Congenital absence of left kidney     Congenital absence of one kidney     Depression     Depression     Hydronephrosis     Kidney stone     at age 29    Pyelonephritis     Pyelonephritis     Smoker     Ureteral stricture     UTI (urinary tract infection)     Wrist fracture     Left    Surgical History  She  has a past surgical history that includes  section; REMOVAL OF KIDNEY STONE; Combined Cystoscopy, Retrogrades, Ureteroscopy, Laser Holmium Lithotripsy Ureter(S), Insert Stent (Right, 2016); Cystoscopy, ureteroscopy, combined (Right, 2016); Laser holmium nephrolithotomy via percutaneous nephrostomy (Right, 2016); Laser holmium lithotripsy ureter(s), insert stent, combined (Left, 2016); Percutaneous nephrostomy (2016); IR Nephrolithotomy (2014); IR Nephrostomy Tube Placement Right (2016); IR Nephrostomy Tube Placement Right (2017); IR Nephrostogram Exisiting Access (10/18/2018); IR Nephrostomy Tube Change Right (2018); IR Nephrostomy Tube Change Right (2020);  Section; other surgical history; [place] Tooth Extraction; other surgical history (Right); Kidney Stone Surgery (Right, 2016); Nephrostomy W/ Introduction Of Catheter (Right); Ir Nephrostomy Tube Change Right (2018); and Ir Nephrostomy Tube Change Right (2020).   Social History  Reviewed, and she  reports that she has been smoking cigarettes. She has a 5.00 pack-year smoking history. She quit smokeless tobacco use about 7 years ago. She reports that she does not currently use alcohol. She reports that she does not use drugs.    Allergies  Allergies   Allergen Reactions    Desogestrel-Ethinyl Estradiol Angioedema and Swelling     Swelling of hands and feet per pt.      Penicillins Rash     As a child per mother; mother unsure if has tolerated another PCN since. Tolerated ampicillin 9/20/16    Sulfa Antibiotics Rash    Vancomycin Rash    Family History  family history includes Coronary Artery Disease in an other family member; Gout in her paternal grandfather; Heart Disease in her maternal aunt, maternal aunt, maternal aunt, maternal grandmother, and maternal uncle; Prostate Cancer in her maternal aunt.     Psychosocial Needs  Social History     Social History Narrative    Not on file     Additional psychosocial needs reviewed per nursing assessment.     No travel, never out of the country  No pets.     Prior to Admission Medications   (Not in a hospital admission)         Anti-infectives: cefepime 11/2-  Cultures: blood and urine pending  Imaging: reviewed images          Review of Systems:  All other systems negative in detail except what is noted above. Physical Exam:  Temp:  [98.2  F (36.8  C)-99.1  F (37.3  C)] 98.2  F (36.8  C)  Pulse:  [] 93  Resp:  [18-28] 28  BP: ()/(52-70) 108/55  FiO2 (%):  [28 %] 28 %  SpO2:  [87 %-98 %] 98 %    GENERAL:  In no acute distress, is alert and oriented to person, place, time. O2 NC.   EYES: No conjunctival injection, pupils equally reactive to light, extra-ocular movement intact  HEAD, EARS, NOSE, MOUTH, AND THROAT: Nontraumatic, mouth without oral ulcers.   RESPIRATORY: mild rhonchi and trace wheeze.  CARDIOVASCULAR: Regular rate and rhythm, normal S1 and S2, no murmurs, rubs, or gallops.  ABDOMEN: Soft, mild tenderness over bladder.  Normal bowel sounds.  R CVA tenderness. Perc neph tube without dressing in place, on right.   MUSCULOSKELETAL: No synovitis  SKIN/HAIR/NAILS: No rashes, no signs of peripheral emboli. Tattoos.   NEUROLOGIC: Grossly intact.       Pertinent Labs          Recent Labs   Lab 11/02/23  0640 11/01/23  2314    135   CO2 24 24   BUN 9.4 10.5       Lab Results   Component Value Date    ALT 18 11/01/2023    AST 23 11/01/2023    ALKPHOS 76 11/01/2023          CRP Inflammation   Date Value Ref Range Status   11/01/2023 104.90 (H) <5.00 mg/L Final           Pertinent Radiology  Radiology Results: Reviewed  Chest XR,  PA & LAT    Result Date: 11/2/2023  EXAM: XR CHEST 2 VIEWS LOCATION: [facility] DATE: 11/2/2023 INDICATION: cough COMPARISON: 10/27/2023     IMPRESSION: Heart size normal. New patchy infiltrate present bilaterally consistent with pneumonia, right lung slightly worse than left.    US Renal Complete Non-Vascular    Result Date: 11/2/2023  EXAM: US RENAL COMPLETE NON-VASCULAR LOCATION: [facility] DATE: 11/2/2023 INDICATION: Right flank pain; nephrostomy tube in place. COMPARISON: CT abdomen/pelvis 10/06/2023; ultrasound 01/01/2023. TECHNIQUE: Routine Bilateral Renal and Bladder Ultrasound. FINDINGS: RIGHT KIDNEY: 9.5 cm. Multiple cystic-appearing space, similar to the previous examination. This may reflect accommodation parapelvic cysts, simple parenchymal cysts, or calyceal diverticula. No hydronephrosis. Percutaneous nephrostomy catheter is partially visualized. LEFT KIDNEY: Not visualized, likely due to known severe atrophy. BLADDER: Normal.     IMPRESSION: No hydronephrosis.    XR External Imaging Chest    Result Date: 10/30/2023  Images were obtained from an external facility.  Click PACS Images hyperlink to view images.  Textual results have been scanned into the media tab.    Chest XR,  PA & LAT    Result Date: 10/27/2023  EXAM: XR CHEST 2 VIEWS LOCATION: [facility] DATE: 10/27/2023 INDICATION: Shortness of breath, pneumonia since September, refractory to treatment, ? interval change on xray COMPARISON: 10/07/2023     IMPRESSION: Fine reticular/patchy opacities in the bilateral  lower lung seen on prior study are either resolved or improved on the current study. Mild linear scarring is seen in the right upper lobe. No new consolidations or confluent airspace opacities.  Heart size and poorly vascularity are within normal limits. No pleural effusion or pneumothorax.    Chest CT w/o contrast    Result Date: 10/15/2023  EXAM: CT CHEST Without CONTRAST LOCATION: [facility] DATE: 10/15/2023 INDICATION: History of pneumonia 09/18 recovered but returned with PNA 09/07, some improvement, now worsening. COMPARISON: Chest x-ray on 10/07/2023. TECHNIQUE: CT chest without IV contrast. Multiplanar reformats were obtained. Dose reduction techniques were used. CONTRAST: None. FINDINGS: LUNGS AND PLEURA: No pleural effusion or pneumothorax. Bilateral patchy groundglass pulmonary opacities most prominent in the upper lobes, likely infectious. MEDIASTINUM/AXILLAE: No cardiomegaly or significant pericardial effusion. No significant mediastinal or hilar lymphadenopathy. CORONARY ARTERY CALCIFICATION: None. UPPER ABDOMEN: Limited evaluation of the upper abdomen due to lack of coverage and contrast. A few stones in the upper pole of the right kidney. MUSCULOSKELETAL: No suspicious osseous lesion.     IMPRESSION: 1.  Bilateral upper lobe predominant patchy groundglass pulmonary opacities, likely infectious. 2.  A few upper pole right kidney stones.     XR CHEST 2 VIEWS PA AND LATERAL    Result Date: 10/7/2023  For Patients: As a result of the 21st Century Cures Act, medical imaging exams and procedure reports are released immediately into your electronic medical record. You may view this report before your referring provider. If you have questions, please contact your health care provider. EXAM: XR CHEST 2 VIEWS PA AND LATERAL LOCATION: [facility] DATE: 10/7/2023 INDICATION: SOB COMPARISON: 10/1/2023    Heart is normal in size. Small subtle airspace opacity within the left mid  and lower lung and at the right lung bases represent pneumonia. No effusion.    Abd/pelvis CT no contrast - Stone Protocol    Result Date: 10/6/2023  EXAM: CT ABDOMEN PELVIS W/O CONTRAST LOCATION: [facility] DATE: 10/6/2023 INDICATION: Right flank pain COMPARISON: 09/17/2023 TECHNIQUE: CT scan of the abdomen and pelvis was performed without IV contrast. Multiplanar reformats were obtained. Dose reduction techniques were used. CONTRAST: None. FINDINGS: LOWER CHEST: Multiple small patchy groundglass opacities are seen throughout the visualized lung bases, decreased from prior study. No new confluent consolidation or pleural effusion. HEPATOBILIARY: Normal. PANCREAS: Normal. SPLEEN: Normal. ADRENAL GLANDS: Normal. KIDNEYS/BLADDER: A right percutaneous nephrostomy tube is again seen in stable position. Multiple punctate nonobstructing stones measuring 1 to 3 mm again seen, unchanged. Cystic spaces in the right upper and right mid pole renal medulla may represent dilated calyces, difficult to evaluate without intravenous contrast but unchanged from prior study. Small peripheral cystic lesions in the superior pole of the right kidney are stable, either represent simple cyst or calyceal diverticulum. Left kidney is  severely atrophic. Urinary bladder is normal. BOWEL: Normal, including the appendix. LYMPH NODES: Normal. VASCULATURE: Unremarkable. PELVIC ORGANS: Normal. MUSCULOSKELETAL: Normal.     IMPRESSION: 1.  Overall stable appearance of the right kidney with percutaneous nephrostomy tube in place and punctate nonobstructing stones measuring 1 to 3 mm. Dilated cystic spaces in the medulla of the right upper/interpolar kidney may represent dilated calyces or peripelvic cysts. Additional peripheral cystic lesions in the superior pole may represent simple cortical cysts versus calyceal diverticula. If definitive evaluation is needed, CT urogram would be most helpful. 2.  Multiple small patchy  groundglass opacities throughout the visualized lung bases are significantly improved from prior study.     Chest XR,  PA & LAT    Result Date: 10/1/2023  EXAM: XR CHEST 2 VIEWS LOCATION: [facility] DATE: 10/1/2023 INDICATION: cough COMPARISON: 9/17/2023     IMPRESSION: Negative chest.

## 2023-11-02 NOTE — ED PROVIDER NOTES
EMERGENCY DEPARTMENT ENCOUNTER      NAME: Jacqueline Pappas  AGE: 38 year old female  YOB: 1985  MRN: [id number]  EVALUATION DATE & TIME: 11/1/2023 10:25 PM    PCP: Pao Montague    ED PROVIDER: Susanna Crowley M.D.        Chief Complaint   Patient presents with    Chills    Generalized Body Aches    Nausea         FINAL IMPRESSION:    1. Hospital-acquired pneumonia    2. Acute cough    3. Body aches            MEDICAL DECISION MAKING:    Jacqueline Pappas is a 38 year old female with history of right-sided nephrostomy tube, UTIs, frequent ER visits, congenital absence of 1 kidney, depression, kidney stones, pyelonephritis, who presents to the ER with complaints of concerns for UTI.  Patient presents complaining of diffuse body aches and right flank pain.  Initially did not report any cough but has had a cough here in the ER my talk with her more she states that she has had a cough.  Chest x-ray suggested bilateral pneumonia the patient states that she has been dealing with pneumonia now for several months that will not completely go away.  Patient requiring a little bit of oxygen and not able to get her onto room air and therefore an appropriate for discharge home.  Plan at this time is admission to the hospitalist service.  She will go to medical telemetry.  We will put her on cefepime at the request of the hospitalist.  Hospitalist will review the patient to decide if she needs any CT imaging given her extensive history of CT imaging.  Patient aware of this plan and agrees.  All of her questions have been answered.      ED COURSE:  11:44 PM  I met with the patient to gather history and perform my exam. ED course and treatment discussed.    2:25 AM  Patient is requiring anywhere from 1 to 2 L nasal cannula oxygen.  We have not been able to successfully wean her off the oxygen.  Chest x-ray shows bilateral pneumonia.  Certainly this could be causing her symptoms.  Ultrasound of the kidneys does  not show any obvious hydronephrosis or any obvious abscess.  Urinalysis indeterminate and always looks like this and therefore culture has been sent.  Lactic acid normal though WBC mildly elevated.  She is afebrile here.  Plan at this time is admission to the hospitalist service.  Discussed with hospitalist and the plan is to start her on cefepime and hospitalist will add on other antibiotics that they feel necessary.  They will evaluate the patient and decide if the patient requires any CT imaging given her history of significant number of CT scans.  Right now patient hemodynamically stable.  COVID and influenza are negative.    I do not think that this represents rib fractures, allergic reaction, COPD exacerbation, asthma exacerbation, CHF, myocarditis, pericarditis, endocarditis, ACS, PE, ruptured AAA, pneumothorax, aortic dissection, bowel obstruction, or other such etiologies at this time.    At the conclusion of the encounter I discussed the results of all of the tests and the disposition. Their questions were answered. The patient (and any family present) acknowledged understanding and were agreeable with the care plan.      CONSULTANTS:  none        MEDICATIONS GIVEN IN THE EMERGENCY:  Medications   acetaminophen (TYLENOL) tablet 650 mg (has no administration in time range)   ceFEPIme (MAXIPIME) 2 g vial to attach to  mL bag for ADULTS or 50 mL bag for PEDS (has no administration in time range)   sodium chloride 0.9% BOLUS 1,000 mL (1,000 mLs Intravenous $New Bag 11/2/23 0048)   benzonatate (TESSALON) capsule 100 mg (100 mg Oral $Given 11/2/23 0144)           NEW PRESCRIPTIONS STARTED AT TODAY'S ER VISIT     Medication List      There are no discharge medications for this visit.             CONDITION:  stable        DISPOSITION:  Med tele as accepted by Dr. Bernstein, hospitalist    "      =================================================================  =================================================================  TRIAGE ASSESSMENT:  Pt concerned about flu or kidney infection.  Pt has hx of nephrostomy tube, and only one functioning kidney.  Pt has low grade temp in triage.  Pt is alert and oriented.      ED Triage Vitals [11/01/23 2221]   Enc Vitals Group      /66      Pulse 119      Resp 20      Temp 99.1  F (37.3  C)      Temp src Temporal      SpO2 92 %      Weight 86.2 kg (190 lb)      Height 1.524 m (5')          ================================================================  ================================================================    HPI    Patient information was obtained from: patient    Use of Intrepreter: N/A      Jacqueline EULA Pappas is a 38 year old female with history of right-sided nephrostomy tube, UTIs, frequent ER visits, congenital absence of 1 kidney, depression, kidney stones, pyelonephritis, who presents to the ER with complaints of concerns for UTI.    She reports subjective fevers and chills, body aches, and over not feeling well.  She reports \"right flank pain \".  She states she has had a nephrostomy tube now for many years.  She has had some diarrhea but denies any vomiting.  Otherwise denies any chest pain, shortness of breath.    Chart review shows that patient has had greater than 10 CT scans in the past 12 months.  He has had 19 urine cultures in the past 1 year only 1 has really shown being positive for UTI.  All the rest have had urinalysis suggestive of UTI but cultures have been unremarkable.      REVIEW OF SYSTEMS  Review of Systems   Constitutional:  Positive for chills, fatigue and fever.   HENT:  Negative for trouble swallowing.    Respiratory:  Positive for cough. Negative for shortness of breath.    Cardiovascular:  Negative for chest pain.   Gastrointestinal:  Positive for diarrhea. Negative for abdominal pain, nausea and vomiting. "   Genitourinary:  Positive for flank pain.   All other systems reviewed and are negative.        PAST MEDICAL HISTORY:  Past Medical History:   Diagnosis Date    Acute renal failure (H24)     Anemia     Anemia     Calculus of kidney     Congenital absence of left kidney     Congenital absence of one kidney     Depression     Depression     Hydronephrosis     Kidney stone     at age 29    Pyelonephritis     Pyelonephritis     Smoker     Ureteral stricture     UTI (urinary tract infection)     Wrist fracture     Left         PAST SURGICAL HISTORY:  Past Surgical History:   Procedure Laterality Date     SECTION       SECTION      x1    COMBINED CYSTOSCOPY, RETROGRADES, URETEROSCOPY, LASER HOLMIUM LITHOTRIPSY URETER(S), INSERT STENT Right 2016    Procedure: COMBINED CYSTOSCOPY, RETROGRADES, URETEROSCOPY, LASER HOLMIUM LITHOTRIPSY URETER(S), INSERT STENT;  Surgeon: Florentino Awad MD;  Location: [facility] OR    CYSTOSCOPY, URETEROSCOPY, COMBINED Right 2016    Procedure: COMBINED CYSTOSCOPY, URETEROSCOPY;  Surgeon: Florentino Awad MD;  Location: [facility] OR    IR NEPHROLITHOTOMY  2014    IR NEPHROSTOGRAM EXISITING ACCESS  10/18/2018    IR NEPHROSTOMY TUBE CHANGE RIGHT  2018    IR NEPHROSTOMY TUBE CHANGE RIGHT  2020    IR NEPHROSTOMY TUBE CHANGE RIGHT  2018    IR NEPHROSTOMY TUBE CHANGE RIGHT  2020    IR NEPHROSTOMY TUBE PLACEMENT RIGHT  2016    IR NEPHROSTOMY TUBE PLACEMENT RIGHT  2017    KIDNEY STONE SURGERY Right 2016    LASER HOLMIUM LITHOTRIPSY URETER(S), INSERT STENT, COMBINED Left 2016    Procedure: COMBINED CYSTOSCOPY, URETEROSCOPY, LASER HOLMIUM LITHOTRIPSY URETER(S), INSERT STENT;  Surgeon: Florentino Awad MD;  Location: [facility] OR    LASER HOLMIUM NEPHROLITHOTOMY VIA PERCUTANEOUS NEPHROSTOMY Right 2016    Procedure: LASER HOLMIUM NEPHROLITHOTOMY VIA PERCUTANEOUS NEPHROSTOMY;  Surgeon: Florentino Awad MD;  Location: [facility] OR     NEPHROSTOMY W/ INTRODUCTION OF CATHETER Right     OTHER SURGICAL HISTORY      Mole removednasal    OTHER SURGICAL HISTORY Right     Cystoscopy, ureteroscopy, laser11/02/2014 x2 with ureteral stent insertion    PERCUTANEOUS NEPHROSTOMY  11/1/2016    Procedure: PERCUTANEOUS NEPHROSTOMY;  Surgeon: Florentino Awad MD;  Location: [facility] OR    WISDOM TOOTH EXTRACTION      ZZC REMOVAL OF KIDNEY STONE           CURRENT MEDICATIONS:    Prior to Admission medications    Medication Sig Start Date End Date Taking? Authorizing Provider   acetaminophen (TYLENOL) 500 MG tablet Take 500-1,000 mg by mouth every 6 hours as needed for mild pain    Unknown, Entered By History   albuterol (ACCUNEB) 1.25 MG/3ML neb solution Take 1.25 mg by nebulization every 6 hours as needed for shortness of breath / dyspnea or wheezing    Reported, Patient   albuterol (PROAIR HFA/PROVENTIL HFA/VENTOLIN HFA) 108 (90 Base) MCG/ACT inhaler Inhale 2 puffs into the lungs every 6 hours as needed for shortness of breath, wheezing or cough 10/31/23   Sonya Denise NP   azelastine (ASTELIN) 0.1 % nasal spray Spray 1 spray into both nostrils 2 times daily 10/31/23   Sonya Denise NP   budesonide-formoterol (SYMBICORT) 160-4.5 MCG/ACT Inhaler Inhale 2 puffs into the lungs 2 times daily 10/31/23   Sonya Denise NP   ipratropium - albuterol 0.5 mg/2.5 mg/3 mL (DUONEB) 0.5-2.5 (3) MG/3ML neb solution Take 1 vial (3 mLs) by nebulization every 6 hours as needed for shortness of breath, wheezing or cough 10/31/23   Sonya Denise NP   predniSONE (DELTASONE) 20 MG tablet Take 1 tablet (20 mg) by mouth daily for 7 days 10/31/23 11/7/23  Sonya Denise NP   sertraline (ZOLOFT) 100 MG tablet Take 100 mg by mouth At Bedtime 8/18/21   Unknown, Entered By History         ALLERGIES:  Allergies   Allergen Reactions    Desogestrel-Ethinyl Estradiol Angioedema and Swelling     Swelling of hands and feet per pt.      Penicillins  Rash     As a child per mother; mother unsure if has tolerated another PCN since. Tolerated ampicillin 18    Sulfa Antibiotics Rash    Vancomycin Rash         FAMILY HISTORY:  Family History   Problem Relation Age of Onset    Anesthesia Reaction No family hx of     Malignant Hyperthermia No family hx of     Heart Disease Maternal Uncle         heart failure    Coronary Artery Disease Other     Heart Disease Maternal Grandmother         pacemaker    Gout Paternal Grandfather     Prostate Cancer Maternal Aunt         breast    Heart Disease Maternal Aunt         pacemaker    Heart Disease Maternal Aunt         pacemaker    Heart Disease Maternal Aunt         pacemaker         SOCIAL HISTORY:  Social History     Socioeconomic History    Marital status: Single   Tobacco Use    Smoking status: Every Day     Packs/day: 0.50     Years: 10.00     Additional pack years: 0.00     Total pack years: 5.00     Types: Cigarettes     Last attempt to quit: 2016     Years since quittin.1    Smokeless tobacco: Former     Quit date: 2016    Tobacco comments:     Hasn't smoked in a week due to breathing issues    Vaping Use    Vaping Use: Never used   Substance and Sexual Activity    Alcohol use: Not Currently     Comment: Alcoholic Drinks/day: occ    Drug use: No    Sexual activity: Not Currently         VITALS:  Patient Vitals for the past 24 hrs:   BP Temp Temp src Pulse Resp SpO2 Height Weight   23 121/61 -- -- -- -- -- -- --   23 -- -- -- 98 -- 92 % -- --   23 -- -- -- 97 -- 92 % -- --   23 -- -- -- 99 -- (!) 89 % -- --   23 -- -- -- 94 -- 90 % -- --   23 -- -- -- 95 -- 93 % -- --   23 117/59 -- -- 94 -- 94 % -- --   23 116/55 -- -- 102 -- 95 % -- --   23 121/70 -- -- 102 -- 93 % -- --   23 123/62 -- -- 110 -- 94 % -- --   23 -- -- -- 104 -- 95 % -- --   23 -- -- -- 114 -- (!) 87 % --  --   11/02/23 0030 (!) 141/64 -- -- -- -- -- -- --   11/02/23 0015 115/65 -- -- 109 -- 92 % -- --   11/02/23 0007 117/65 98.8  F (37.1  C) Oral 104 18 93 % -- --   11/01/23 2221 135/66 99.1  F (37.3  C) Temporal 119 20 92 % 1.524 m (5') 86.2 kg (190 lb)       Wt Readings from Last 3 Encounters:   11/01/23 86.2 kg (190 lb)   10/31/23 88.3 kg (194 lb 9.6 oz)   10/27/23 86.2 kg (190 lb)       Estimated Creatinine Clearance: 66 mL/min (A) (based on SCr of 1.15 mg/dL (H)).    PHYSICAL EXAM    Constitutional:  Well developed, Well nourished, NAD, GCS 15  HENT:  Normocephalic, Atraumatic, Bilateral external ears normal,  Nose normal. Neck- Supple, No stridor.   Eyes:  PERRL, EOMI, Conjunctiva normal, No discharge.  Respiratory:  Normal breath sounds, No respiratory distress, No wheezing, Speaks full sentences easily. No cough.   Cardiovascular:  Normal heart rate, Regular rhythm,  No rubs, No gallops.   GI:  No excessive obesity.  Bowel sounds normal, Soft, No tenderness, No masses, No flank tenderness. No rebound or guarding.   : deferred  Musculoskeletal:  No cyanosis, No clubbing. Good range of motion in all major joints. No major deformities noted.   Integument:  Warm, Dry, No erythema, No rash.  No petechiae.   Neurologic:  Alert & oriented x 3  Psychiatric:  Affect normal, Cooperative         LAB:  All pertinent labs reviewed and interpreted.  Recent Results (from the past 24 hour(s))   Comprehensive metabolic panel    Collection Time: 11/01/23 11:14 PM   Result Value Ref Range    Sodium 135 135 - 145 mmol/L    Potassium 3.7 3.4 - 5.3 mmol/L    Carbon Dioxide (CO2) 24 22 - 29 mmol/L    Anion Gap 10 7 - 15 mmol/L    Urea Nitrogen 10.5 6.0 - 20.0 mg/dL    Creatinine 1.15 (H) 0.51 - 0.95 mg/dL    GFR Estimate 63 >60 mL/min/1.73m2    Calcium 10.4 (H) 8.6 - 10.0 mg/dL    Chloride 101 98 - 107 mmol/L    Glucose 114 (H) 70 - 99 mg/dL    Alkaline Phosphatase 76 35 - 104 U/L    AST 23 0 - 45 U/L    ALT 18 0 - 50 U/L     Protein Total 6.8 6.4 - 8.3 g/dL    Albumin 4.0 3.5 - 5.2 g/dL    Bilirubin Total 0.8 <=1.2 mg/dL   CBC with platelets and differential    Collection Time: 11/01/23 11:14 PM   Result Value Ref Range    WBC Count 12.5 (H) 4.0 - 11.0 10e3/uL    RBC Count 4.14 3.80 - 5.20 10e6/uL    Hemoglobin 12.3 11.7 - 15.7 g/dL    Hematocrit 37.9 35.0 - 47.0 %    MCV 92 78 - 100 fL    MCH 29.7 26.5 - 33.0 pg    MCHC 32.5 31.5 - 36.5 g/dL    RDW 14.6 10.0 - 15.0 %    Platelet Count 178 150 - 450 10e3/uL    % Neutrophils 80 %    % Lymphocytes 9 %    % Monocytes 5 %    % Eosinophils 5 %    % Basophils 0 %    % Immature Granulocytes 1 %    NRBCs per 100 WBC 0 <1 /100    Absolute Neutrophils 10.1 (H) 1.6 - 8.3 10e3/uL    Absolute Lymphocytes 1.1 0.8 - 5.3 10e3/uL    Absolute Monocytes 0.6 0.0 - 1.3 10e3/uL    Absolute Eosinophils 0.6 0.0 - 0.7 10e3/uL    Absolute Basophils 0.0 0.0 - 0.2 10e3/uL    Absolute Immature Granulocytes 0.1 <=0.4 10e3/uL    Absolute NRBCs 0.0 10e3/uL   Extra Blood Culture Bottle    Collection Time: 11/01/23 11:14 PM   Result Value Ref Range    Hold Specimen JIC    Extra Red Top Tube    Collection Time: 11/01/23 11:14 PM   Result Value Ref Range    Hold Specimen JIC    Extra Green Top (Lithium Heparin) Tube    Collection Time: 11/01/23 11:14 PM   Result Value Ref Range    Hold Specimen JIC    Extra Purple Top Tube    Collection Time: 11/01/23 11:14 PM   Result Value Ref Range    Hold Specimen JIC    Procalcitonin    Collection Time: 11/01/23 11:14 PM   Result Value Ref Range    Procalcitonin 0.22 (H) <0.05 ng/mL   CRP inflammation    Collection Time: 11/01/23 11:14 PM   Result Value Ref Range    CRP Inflammation 104.90 (H) <5.00 mg/L   Symptomatic Influenza A/B, RSV, & SARS-CoV2 PCR (COVID-19) Nasopharyngeal    Collection Time: 11/01/23 11:32 PM    Specimen: Nasopharyngeal; Swab   Result Value Ref Range    Influenza A PCR Negative Negative    Influenza B PCR Negative Negative    RSV PCR Negative Negative    SARS  "CoV2 PCR Negative Negative   UA with Microscopic reflex to Culture    Collection Time: 11/01/23 11:33 PM    Specimen: Nephrostomy, Right; Urine   Result Value Ref Range    Color Urine Light Yellow Colorless, Straw, Light Yellow, Yellow    Appearance Urine Turbid (A) Clear    Glucose Urine Negative Negative mg/dL    Bilirubin Urine Negative Negative    Ketones Urine Negative Negative mg/dL    Specific Gravity Urine 1.006 1.001 - 1.030    Blood Urine >1.0 mg/dL (A) Negative    pH Urine 7.5 (H) 5.0 - 7.0    Protein Albumin Urine 100 (A) Negative mg/dL    Urobilinogen Urine <2.0 <2.0 mg/dL    Nitrite Urine Negative Negative    Leukocyte Esterase Urine 500 Duglas/uL (A) Negative    Bacteria Urine Few (A) None Seen /HPF    Mucus Urine Present (A) None Seen /LPF    Amorphous Crystals Urine Few (A) None Seen /HPF    RBC Urine 27 (H) <=2 /HPF    WBC Urine 28 (H) <=5 /HPF    Squamous Epithelials Urine <1 <=1 /HPF   HCG qualitative urine    Collection Time: 11/01/23 11:33 PM   Result Value Ref Range    hCG Urine Qualitative Negative Negative   Lactic acid whole blood    Collection Time: 11/02/23 12:38 AM   Result Value Ref Range    Lactic Acid 1.6 0.7 - 2.0 mmol/L       No results found for: \"ABORH\"        RADIOLOGY:  Reviewed all pertinent imaging. Please see official radiology report.    Chest XR,  PA & LAT   Final Result   IMPRESSION: Heart size normal. New patchy infiltrate present bilaterally consistent with pneumonia, right lung slightly worse than left.      US Renal Complete Non-Vascular   Final Result   IMPRESSION:      No hydronephrosis.            EKG:    none      PROCEDURES:  none    Medical Decision Making    History:  Supplemental history from: Documented in chart, if applicable  External Record(s) reviewed: Documented in chart, if applicable.    Work Up:  Chart documentation includes differential considered and any EKGs or imaging independently interpreted by provider, where specified.  In additional to work up " documented, I considered the following work up: Documented in chart, if applicable.    External consultation:  Discussion of management with another provider: Documented in chart, if applicable    Complicating factors:  Care impacted by chronic illness: Chronic Kidney Disease and Chronic Pain  Care affected by social determinants of health: Access to Medical Care    Disposition considerations: Admit.        Susanna Crowley M.D. [facility]  Emergency Medicine and Medical Toxicology  [facility] EMERGENCY DEPARTMENT  [address] [place] [id number]  [phone]  Dept: [phone]           Susanna Crowley MD  11/02/23 0229

## 2023-11-02 NOTE — CONSULTS
Care Management Initial Consult    General Information  Assessment completed with: Patient,    Type of CM/SW Visit: Initial Assessment    Primary Care Provider verified and updated as needed: Yes   Readmission within the last 30 days: other (see comments) (multiple ED visits)         Advance Care Planning: Advance Care Planning Reviewed: no concerns identified          Communication Assessment  Patient's communication style: spoken language (English or Bilingual)             Cognitive  Cognitive/Neuro/Behavioral: WDL                      Living Environment:   People in home: child(todd), dependent  10 yo son  Current living Arrangements: apartment      Able to return to prior arrangements: yes  Living Arrangement Comments: Lives on 3rd floor with no elevator access    Family/Social Support:  Care provided by: self  Provides care for: no one  Marital Status: Single  Sibling(s), Parent(s)          Description of Support System: Supportive, Involved         Current Resources:   Patient receiving home care services: No     Community Resources: None  Equipment currently used at home:    Supplies currently used at home: Nebulizer tubing      Does the patient's insurance plan have a 3 day qualifying hospital stay waiver?  No    Lifestyle & Psychosocial Needs:  Social Determinants of Health     Food Insecurity: Not on file   Depression: Not on file   Housing Stability: Not on file   Tobacco Use: High Risk (10/31/2023)    Patient History     Smoking Tobacco Use: Every Day     Smokeless Tobacco Use: Former     Passive Exposure: Not on file   Financial Resource Strain: Not on file   Alcohol Use: Not on file   Transportation Needs: Not on file   Physical Activity: Not on file   Interpersonal Safety: Not on file   Stress: Not on file   Social Connections: Not on file       Functional Status:  Prior to admission patient needed assistance:   Dependent ADLs:: Independent  Dependent IADLs:: Independent                Additional  Information:  CM met with patient in patient's room.  Introduced self and identified role.   Patient lives with her son in an apartment on the third floor with no elevator access.  Patient reports difficultly getting up the stairs with her pneumonia.  She has no trouble with the stairs when she is not sick.  She is independent with I/ADLs and drives.  No home care or community services.   Patient anticipates returning home at discharge.     CM will continue to monitor medical progression and aid in discharge planning.    Laura Bennett RN

## 2023-11-02 NOTE — PLAN OF CARE
Goal Outcome Evaluation:      Plan of Care Reviewed With: patient          Outcome Evaluation: pt has had pain right flank with relief partial with iv dilaudid. noted IR consult pending for nephrostomy tube exchange. sats drop to 89% when she falls asleep, is receiving oxygen 3 liters nc per RT

## 2023-11-02 NOTE — ED TRIAGE NOTES
Pt concerned about flu or kidney infection.  Pt has hx of nephrostomy tube, and only one functioning kidney.  Pt has low grade temp in triage.  Pt is alert and oriented.     Triage Assessment (Adult)       Row Name 11/01/23 9719          Triage Assessment    Airway WDL WDL        Respiratory WDL    Respiratory WDL WDL        Skin Circulation/Temperature WDL    Skin Circulation/Temperature WDL WDL        Cardiac WDL    Cardiac WDL WDL        Peripheral/Neurovascular WDL    Peripheral Neurovascular WDL WDL        Cognitive/Neuro/Behavioral WDL    Cognitive/Neuro/Behavioral WDL WDL

## 2023-11-02 NOTE — PROVIDER NOTIFICATION
RCAT Treatment Plan    Patient Score: 10  Patient Acuity: 4    Clinical Indication for Therapy: prevent atelectasis    Therapy Ordered: Duo Neeb QID, Flutter Valve QID     Assessment Summary: Pt is a 38 year old female with PMH significant for Congential Solitary Kidney, recurrent UTI and admitted 11/2/2023 with Acute Hypoxemic Respiratory failure, Hospital Acquired Pneumonia and UTI. Pt is  currently on 2LNC and able to maintain adequate saturations. BS: coarse/ exp wheezes and decreased @ bases. Pt does have congested strong productive cough. Flutter valve provided and instructed. Will continue current plan of care and will re-evaluate RCAT in 72 hrs per respiratory protocol.     Orin Tran, RT  11/2/2023

## 2023-11-02 NOTE — PROGRESS NOTES
Pt has had right sided flank pain with reported decrease in urine output from usual. Reported she had an exchange done here in past, but is overdue for an exchange. Recently changed to [email or web address] system but has not had opportunity to connect with [facility] primary care for referral for exchange since her insurance changed from [facility]. Has received dilaudid iv ~ q3 hrs with some relief. Lungs decreased, remains on 3 liters oxygen

## 2023-11-02 NOTE — MEDICATION SCRIBE - ADMISSION MEDICATION HISTORY
Medication Scribe Admission Medication History    Admission medication history is complete. The information provided in this note is only as accurate as the sources available at the time of the update.    Information Source(s): Patient via in-person    Pertinent Information:     Changes made to PTA medication list:  Added: None  Deleted: None  Changed: None    Medication Affordability:       Allergies reviewed with patient and updates made in EHR: yes    Medication History Completed By: Zacarias Holley 11/2/2023 2:49 AM    PTA Med List   Medication Sig Last Dose    acetaminophen (TYLENOL) 500 MG tablet Take 500-1,000 mg by mouth every 6 hours as needed for mild pain Unknown at prn    albuterol (ACCUNEB) 1.25 MG/3ML neb solution Take 1.25 mg by nebulization every 6 hours as needed for shortness of breath / dyspnea or wheezing Unknown at prn    albuterol (PROAIR HFA/PROVENTIL HFA/VENTOLIN HFA) 108 (90 Base) MCG/ACT inhaler Inhale 2 puffs into the lungs every 6 hours as needed for shortness of breath, wheezing or cough Unknown at prn    azelastine (ASTELIN) 0.1 % nasal spray Spray 1 spray into both nostrils 2 times daily Unknown at new RX    budesonide-formoterol (SYMBICORT) 160-4.5 MCG/ACT Inhaler Inhale 2 puffs into the lungs 2 times daily Unknown at new Rx    ipratropium - albuterol 0.5 mg/2.5 mg/3 mL (DUONEB) 0.5-2.5 (3) MG/3ML neb solution Take 1 vial (3 mLs) by nebulization every 6 hours as needed for shortness of breath, wheezing or cough Unknown at new Rx    predniSONE (DELTASONE) 20 MG tablet Take 1 tablet (20 mg) by mouth daily for 7 days Unknown at new Rx    sertraline (ZOLOFT) 100 MG tablet Take 100 mg by mouth At Bedtime 10/31/2023 at

## 2023-11-02 NOTE — UTILIZATION REVIEW
Admission Status; Secondary Review Determination   Under the authority of the Utilization Management Committee, the utilization review process indicated a secondary review on Jacqueline Pappas. The review outcome is based on review of the medical records, discussions with staff, and applying clinical experience noted on the date of the review.   (x) Inpatient Status Appropriate - This patient's medical care is consistent with medical management for inpatient care and reasonable inpatient medical practice.     RATIONALE FOR DETERMINATION   Jacqueline Pappas  is a 38 yr old female who has had ongoing cough and dyspnea x 2 months.  Imaging with [facility] since 9/14/23 and new opacities now evident on 10/27.  ID consulting.  IV cefepime continues with several other studies sent.  HIV testing.  Oxygen titration---per RD note this AM requiring 2L NC for adequate saturations.    At the time of admission with the information available to the attending physician more than 2 nights Hospital complex care was anticipated, based on patient risk of adverse outcome if treated as outpatient and complex care required. Inpatient admission is appropriate based on the Medicare guidelines.   The information on this document is developed by the utilization review team in order for the business office to ensure compliance. This only denotes the appropriateness of proper admission status and does not reflect the quality of care rendered.   The definitions of Inpatient Status and Observation Status used in making the determination above are those provided in the CMS Coverage Manual, Chapter 1 and Chapter 6, section 70.4.   Sincerely,   Mellisa Donahue MD  Utilization Review  Physician Advisor  [facility]

## 2023-11-02 NOTE — ED NOTES
[facility] ED Handoff Report    ED Chief Complaint: Chills    ED Diagnosis:  (J18.9,  Y95) Hospital-acquired pneumonia  Comment:   Plan:     (R05.1) Acute cough  Comment:   Plan:     (R52) Body aches  Comment:   Plan:        PMH:    Past Medical History:   Diagnosis Date    Acute renal failure (H24)     Anemia     Anemia     Calculus of kidney     Congenital absence of left kidney     Congenital absence of one kidney     Depression     Depression     Hydronephrosis     Kidney stone     at age 29    Pyelonephritis     Pyelonephritis     Smoker     Ureteral stricture     UTI (urinary tract infection)     Wrist fracture     Left        Code Status:  Full Code     Falls Risk: No Band: Not applicable    Current Living Situation/Residence: lives with a significant other     Elimination Status: Continent: Yes     Activity Level: Independent    Patients Preferred Language:  English     Needed: No    Vital Signs:  /57 (BP Location: Left arm, Patient Position: Semi-Castellon's)   Pulse 98   Temp 98  F (36.7  C) (Oral)   Resp 19   Ht 1.524 m (5')   Wt 86.2 kg (190 lb)   LMP 09/24/2023 (Approximate)   SpO2 99%   BMI 37.11 kg/m       Cardiac Rhythm: ST    Pain Score: 6/10    Is the Patient Confused:  No    Last Food or Drink: 11/02/23 at 1000    Focused Assessment:      Tests Performed: Done: Labs, imaging    Treatments Provided:  Pain medication, Nebs    Family Dynamics/Concerns: No    Family Updated On Visitor Policy: Yes    Plan of Care Communicated to Family: Yes    Who Was Updated about Plan of Care: Patient    Belongings Checklist Done and Signed by Patient: Yes    Medications sent with patient: NA    Covid: symptomatic, negative    Additional Information: LAURENCE    RN: Adi Soria RN   11/2/2023 4:46 PM

## 2023-11-02 NOTE — H&P
[facility]    History and Physical - Hospitalist Service       Date of Admission:  11/2/2023    Assessment & Plan      Jacqueline Pappas is a 38 year old female with PMH significant for Congential Solitary Kidney, recurrent UTIs, kidney stones, ureteral strictures with Nephrostomy tube in place and recent hospitalization last month for Community Acquired Pneumonia who is admitted on 11/2/2023 with Acute Hypoxemic Respiratory failure, Hospital Acquired Pneumonia and UTI.    # Acute Hypoxemic Respiratory Failure- Admit patient. Hospital Acquired PNA. On empiric Cefepime. Add duonebs. Patient says that she has had PNA x 2 months. Consult Pulmonology. Continue supplemental O2. Monitor vitals closely.    # Hospital Acquired Pneumonia- New patchy infiltrates R>L seen on chest xray. Empiric Cefepime. Duonebs and supplemental O2. Check Sputum cultures. Patient says that she has had pneumonia for the last 2 months. She was hospitalized at [facility] last month for Community Acquired Pneumonia. ID and Pulmonology consults ordered.    # UTI- On Cefepime. No hydronephrosis on renal ultrasound. Follow up urine culture.    # Hypercalcemia- Check Ionized Calcium.    # Diarrhea- Electrolytes are WNL. Consider stool studies if patient has diarrhea during this hospitalization.    # Tobacco use- Smoking cessation counseled. Patient says that she has cut down over the past 2 months. She agrees with trial of nicotine patch.        Diet: Regular diet  DVT Prophylaxis: Pneumatic Compression Devices  Farr Catheter: Not present  Lines: None     Cardiac Monitoring: None  Code Status:  Full Code    Clinically Significant Risk Factors Present on Admission           # Hypercalcemia: Highest Ca = 10.4 mg/dL in last 2 days, will monitor as appropriate             # Obesity: Estimated body mass index is 37.11 kg/m  as calculated from the following:    Height as of this encounter: 1.524 m (5').    Weight as of this  encounter: 86.2 kg (190 lb).              Disposition Plan   Anticipate greater than 2 midnight Inpatient hospital stay.       Lanie Larson MD  Hospitalist Service  [facility]  Securely message with [employer] (more info)  Text page via [employer] Paging/Directory     ______________________________________________________________________    Chief Complaint   Cough, body aches    History is obtained from the patient, ED Physician and chart review.    History of Present Illness   Jacqueline Pappas is a 38 year old female with PMH significant for Congential Solitary Kidney, recurrent UTIs, kidney stones, ureteral strictures with Nephrostomy tube in place and recent hospitalization last month for Community Acquired Pneumonia who presents to ED due to cough and body aches. The body aches began yesterday.  Associated symptoms include Right flank pain, fever, chills, nausea, intermittent diarrhea, coughing, wheezing, shortness of breath that she says has been present for the past 2 months due to recent PNA. Lastly, patient reports foul smelling urine in urostomy bag for the past few days. She denies chest pain, palpitations, vomiting, abdominal pain. Patient appears tired, but otherwise in no acute distress.      Past Medical History    Past Medical History:   Diagnosis Date    Acute renal failure (H24)     Anemia     Anemia     Calculus of kidney     Congenital absence of left kidney     Congenital absence of one kidney     Depression     Depression     Hydronephrosis     Kidney stone     at age 29    Pyelonephritis     Pyelonephritis     Smoker     Ureteral stricture     UTI (urinary tract infection)     Wrist fracture     Left       Past Surgical History   Past Surgical History:   Procedure Laterality Date     SECTION       SECTION      x1    COMBINED CYSTOSCOPY, RETROGRADES, URETEROSCOPY, LASER HOLMIUM LITHOTRIPSY URETER(S), INSERT STENT Right 2016    Procedure: COMBINED  CYSTOSCOPY, RETROGRADES, URETEROSCOPY, LASER HOLMIUM LITHOTRIPSY URETER(S), INSERT STENT;  Surgeon: Florentino Awad MD;  Location: [facility]    CYSTOSCOPY, URETEROSCOPY, COMBINED Right 9/14/2016    Procedure: COMBINED CYSTOSCOPY, URETEROSCOPY;  Surgeon: Florentino Awad MD;  Location: [facility] OR    IR NEPHROLITHOTOMY  12/11/2014    IR NEPHROSTOGRAM EXISITING ACCESS  10/18/2018    IR NEPHROSTOMY TUBE CHANGE RIGHT  12/12/2018    IR NEPHROSTOMY TUBE CHANGE RIGHT  6/11/2020    IR NEPHROSTOMY TUBE CHANGE RIGHT  12/12/2018    IR NEPHROSTOMY TUBE CHANGE RIGHT  6/11/2020    IR NEPHROSTOMY TUBE PLACEMENT RIGHT  7/24/2016    IR NEPHROSTOMY TUBE PLACEMENT RIGHT  9/14/2017    KIDNEY STONE SURGERY Right 05/2016    LASER HOLMIUM LITHOTRIPSY URETER(S), INSERT STENT, COMBINED Left 11/1/2016    Procedure: COMBINED CYSTOSCOPY, URETEROSCOPY, LASER HOLMIUM LITHOTRIPSY URETER(S), INSERT STENT;  Surgeon: Florentino Awad MD;  Location: [facility] OR    LASER HOLMIUM NEPHROLITHOTOMY VIA PERCUTANEOUS NEPHROSTOMY Right 9/14/2016    Procedure: LASER HOLMIUM NEPHROLITHOTOMY VIA PERCUTANEOUS NEPHROSTOMY;  Surgeon: Florentino Awad MD;  Location: [facility] OR    NEPHROSTOMY W/ INTRODUCTION OF CATHETER Right     OTHER SURGICAL HISTORY      Mole removednasal    OTHER SURGICAL HISTORY Right     Cystoscopy, ureteroscopy, laser11/02/2014 x2 with ureteral stent insertion    PERCUTANEOUS NEPHROSTOMY  11/1/2016    Procedure: PERCUTANEOUS NEPHROSTOMY;  Surgeon: Florentino Awad MD;  Location: [facility] OR    WISDOM TOOTH EXTRACTION      ZZC REMOVAL OF KIDNEY STONE         Prior to Admission Medications   Prior to Admission Medications   Prescriptions Last Dose Informant Patient Reported? Taking?   acetaminophen (TYLENOL) 500 MG tablet  Self Yes No   Sig: Take 500-1,000 mg by mouth every 6 hours as needed for mild pain   albuterol (ACCUNEB) 1.25 MG/3ML neb solution  Self Yes No   Sig: Take 1.25 mg by nebulization every 6 hours as needed for shortness of  breath / dyspnea or wheezing   albuterol (PROAIR HFA/PROVENTIL HFA/VENTOLIN HFA) 108 (90 Base) MCG/ACT inhaler   No No   Sig: Inhale 2 puffs into the lungs every 6 hours as needed for shortness of breath, wheezing or cough   azelastine (ASTELIN) 0.1 % nasal spray   No No   Sig: Spray 1 spray into both nostrils 2 times daily   budesonide-formoterol (SYMBICORT) 160-4.5 MCG/ACT Inhaler   No No   Sig: Inhale 2 puffs into the lungs 2 times daily   ipratropium - albuterol 0.5 mg/2.5 mg/3 mL (DUONEB) 0.5-2.5 (3) MG/3ML neb solution   No No   Sig: Take 1 vial (3 mLs) by nebulization every 6 hours as needed for shortness of breath, wheezing or cough   predniSONE (DELTASONE) 20 MG tablet   No No   Sig: Take 1 tablet (20 mg) by mouth daily for 7 days   sertraline (ZOLOFT) 100 MG tablet  Self Yes No   Sig: Take 100 mg by mouth At Bedtime      Facility-Administered Medications: None        Review of Systems    The 10 point Review of Systems is negative other than noted in the HPI.    Social History   I have reviewed this patient's social history and updated it with pertinent information if needed.  Social History     Tobacco Use    Smoking status: Every Day     Packs/day: 0.50     Years: 10.00     Additional pack years: 0.00     Total pack years: 5.00     Types: Cigarettes     Last attempt to quit: 2016     Years since quittin.1    Smokeless tobacco: Former     Quit date: 2016    Tobacco comments:     Hasn't smoked in a week due to breathing issues    Vaping Use    Vaping Use: Never used   Substance Use Topics    Alcohol use: Not Currently     Comment: Alcoholic Drinks/day: occ    Drug use: No         Family History   I have reviewed this patient's family history and updated it with pertinent information if needed.  Family History   Problem Relation Age of Onset    Anesthesia Reaction No family hx of     Malignant Hyperthermia No family hx of     Heart Disease Maternal Uncle         heart failure    Coronary Artery  Disease Other     Heart Disease Maternal Grandmother         pacemaker    Gout Paternal Grandfather     Prostate Cancer Maternal Aunt         breast    Heart Disease Maternal Aunt         pacemaker    Heart Disease Maternal Aunt         pacemaker    Heart Disease Maternal Aunt         pacemaker        Physical Exam   Vital Signs: Temp: 98.8  F (37.1  C) Temp src: Oral BP: 121/61 Pulse: 98   Resp: 18 SpO2: 92 % O2 Device: Nasal cannula Oxygen Delivery: 1 LPM  Weight: 190 lbs 0 oz    General Appearance: AAOx3, NAD, appears tired  Respiratory: Scattered rhonchi and wheezes, diminished breath sounds in bases  Cardiovascular: Regular  rate, S1S2  GI: +BS, soft, NT, ND,   : Right CVA tenderness. + Nephrostomy tube in place   Skin: No rash, multiple tattoos  Other: No pedal edema     Medical Decision Making       55 MINUTES SPENT BY ME on the date of service doing chart review, history, exam, documentation & further activities per the note.      Data     I have personally reviewed the following data over the past 24 hrs:    12.5 (H)  \   12.3   / 178     135 101 10.5 /  114 (H)   3.7 24 1.15 (H) \     ALT: 18 AST: 23 AP: 76 TBILI: 0.8   ALB: 4.0 TOT PROTEIN: 6.8 LIPASE: N/A     Procal: 0.22 (H) CRP: 104.90 (H) Lactic Acid: 1.6         Imaging results reviewed over the past 24 hrs:   Recent Results (from the past 24 hour(s))   US Renal Complete Non-Vascular    Narrative    EXAM: US RENAL COMPLETE NON-VASCULAR  LOCATION: [facility]  DATE: 11/2/2023    INDICATION: Right flank pain; nephrostomy tube in place.  COMPARISON: CT abdomen/pelvis 10/06/2023; ultrasound 01/01/2023.  TECHNIQUE: Routine Bilateral Renal and Bladder Ultrasound.    FINDINGS:    RIGHT KIDNEY: 9.5 cm. Multiple cystic-appearing space, similar to the previous examination. This may reflect accommodation parapelvic cysts, simple parenchymal cysts, or calyceal diverticula. No hydronephrosis. Percutaneous nephrostomy catheter is    partially visualized.    LEFT KIDNEY: Not visualized, likely due to known severe atrophy.    BLADDER: Normal.      Impression    IMPRESSION:    No hydronephrosis.   Chest XR,  PA & LAT    Narrative    EXAM: XR CHEST 2 VIEWS  LOCATION: [facility]  DATE: 11/2/2023    INDICATION: cough  COMPARISON: 10/27/2023      Impression    IMPRESSION: Heart size normal. New patchy infiltrate present bilaterally consistent with pneumonia, right lung slightly worse than left.

## 2023-11-03 ENCOUNTER — APPOINTMENT (OUTPATIENT)
Dept: INTERVENTIONAL RADIOLOGY/VASCULAR | Facility: HOSPITAL | Age: 38
End: 2023-11-03
Attending: PHYSICIAN ASSISTANT
Payer: COMMERCIAL

## 2023-11-03 LAB
BACTERIA SPT CULT: NORMAL
C PNEUM DNA SPEC QL NAA+PROBE: NOT DETECTED
FLUAV H1 2009 PAND RNA SPEC QL NAA+PROBE: NOT DETECTED
FLUAV H1 RNA SPEC QL NAA+PROBE: NOT DETECTED
FLUAV H3 RNA SPEC QL NAA+PROBE: NOT DETECTED
FLUAV RNA SPEC QL NAA+PROBE: NOT DETECTED
FLUBV RNA SPEC QL NAA+PROBE: NOT DETECTED
GRAM STAIN RESULT: NORMAL
HADV DNA SPEC QL NAA+PROBE: NOT DETECTED
HCOV PNL SPEC NAA+PROBE: NOT DETECTED
HIV 1+2 AB+HIV1 P24 AG SERPL QL IA: NONREACTIVE
HMPV RNA SPEC QL NAA+PROBE: NOT DETECTED
HPIV1 RNA SPEC QL NAA+PROBE: NOT DETECTED
HPIV2 RNA SPEC QL NAA+PROBE: NOT DETECTED
HPIV3 RNA SPEC QL NAA+PROBE: NOT DETECTED
HPIV4 RNA SPEC QL NAA+PROBE: NOT DETECTED
L PNEUMO1 AG UR QL IA: NEGATIVE
M PNEUMO DNA SPEC QL NAA+PROBE: NOT DETECTED
RSV RNA SPEC QL NAA+PROBE: NOT DETECTED
RSV RNA SPEC QL NAA+PROBE: NOT DETECTED
RV+EV RNA SPEC QL NAA+PROBE: NOT DETECTED
S PNEUM AG SPEC QL: NEGATIVE

## 2023-11-03 PROCEDURE — 86612 BLASTOMYCES ANTIBODY: CPT | Performed by: INTERNAL MEDICINE

## 2023-11-03 PROCEDURE — 999N000157 HC STATISTIC RCP TIME EA 10 MIN

## 2023-11-03 PROCEDURE — 87070 CULTURE OTHR SPECIMN AEROBIC: CPT | Performed by: INTERNAL MEDICINE

## 2023-11-03 PROCEDURE — 99233 SBSQ HOSP IP/OBS HIGH 50: CPT | Performed by: INTERNAL MEDICINE

## 2023-11-03 PROCEDURE — 99152 MOD SED SAME PHYS/QHP 5/>YRS: CPT

## 2023-11-03 PROCEDURE — 120N000001 HC R&B MED SURG/OB

## 2023-11-03 PROCEDURE — C1729 CATH, DRAINAGE: HCPCS

## 2023-11-03 PROCEDURE — 99232 SBSQ HOSP IP/OBS MODERATE 35: CPT | Performed by: INTERNAL MEDICINE

## 2023-11-03 PROCEDURE — 250N000013 HC RX MED GY IP 250 OP 250 PS 637: Performed by: INTERNAL MEDICINE

## 2023-11-03 PROCEDURE — 250N000009 HC RX 250: Performed by: FAMILY MEDICINE

## 2023-11-03 PROCEDURE — 87899 AGENT NOS ASSAY W/OPTIC: CPT | Performed by: INTERNAL MEDICINE

## 2023-11-03 PROCEDURE — 50435 EXCHANGE NEPHROSTOMY CATH: CPT

## 2023-11-03 PROCEDURE — 250N000011 HC RX IP 250 OP 636: Performed by: INTERNAL MEDICINE

## 2023-11-03 PROCEDURE — 250N000013 HC RX MED GY IP 250 OP 250 PS 637: Performed by: FAMILY MEDICINE

## 2023-11-03 PROCEDURE — 87581 M.PNEUMON DNA AMP PROBE: CPT | Performed by: INTERNAL MEDICINE

## 2023-11-03 PROCEDURE — 87205 SMEAR GRAM STAIN: CPT | Performed by: INTERNAL MEDICINE

## 2023-11-03 PROCEDURE — 250N000012 HC RX MED GY IP 250 OP 636 PS 637: Performed by: INTERNAL MEDICINE

## 2023-11-03 PROCEDURE — 36415 COLL VENOUS BLD VENIPUNCTURE: CPT | Performed by: INTERNAL MEDICINE

## 2023-11-03 PROCEDURE — 258N000003 HC RX IP 258 OP 636: Performed by: INTERNAL MEDICINE

## 2023-11-03 PROCEDURE — 250N000011 HC RX IP 250 OP 636: Performed by: FAMILY MEDICINE

## 2023-11-03 PROCEDURE — 0T25X0Z CHANGE DRAINAGE DEVICE IN KIDNEY, EXTERNAL APPROACH: ICD-10-PCS | Performed by: RADIOLOGY

## 2023-11-03 PROCEDURE — 94799 UNLISTED PULMONARY SVC/PX: CPT

## 2023-11-03 PROCEDURE — 250N000011 HC RX IP 250 OP 636: Mod: JZ | Performed by: INTERNAL MEDICINE

## 2023-11-03 PROCEDURE — 94640 AIRWAY INHALATION TREATMENT: CPT | Mod: 76

## 2023-11-03 PROCEDURE — 250N000011 HC RX IP 250 OP 636: Mod: JZ | Performed by: PHYSICIAN ASSISTANT

## 2023-11-03 PROCEDURE — 250N000009 HC RX 250: Performed by: PHYSICIAN ASSISTANT

## 2023-11-03 PROCEDURE — 87449 NOS EACH ORGANISM AG IA: CPT | Performed by: INTERNAL MEDICINE

## 2023-11-03 PROCEDURE — 258N000001 HC RX 258: Performed by: INTERNAL MEDICINE

## 2023-11-03 PROCEDURE — 94640 AIRWAY INHALATION TREATMENT: CPT

## 2023-11-03 PROCEDURE — C1769 GUIDE WIRE: HCPCS

## 2023-11-03 RX ORDER — PREDNISONE 20 MG/1
40 TABLET ORAL DAILY
Qty: 6 TABLET | Refills: 0 | Status: COMPLETED | OUTPATIENT
Start: 2023-11-04 | End: 2023-11-06

## 2023-11-03 RX ORDER — PREDNISONE 5 MG/1
10 TABLET ORAL DAILY
Status: DISCONTINUED | OUTPATIENT
Start: 2023-11-10 | End: 2023-11-03

## 2023-11-03 RX ORDER — NALOXONE HYDROCHLORIDE 0.4 MG/ML
0.2 INJECTION, SOLUTION INTRAMUSCULAR; INTRAVENOUS; SUBCUTANEOUS
Status: DISCONTINUED | OUTPATIENT
Start: 2023-11-03 | End: 2023-11-03

## 2023-11-03 RX ORDER — FLUMAZENIL 0.1 MG/ML
0.2 INJECTION, SOLUTION INTRAVENOUS
Status: DISCONTINUED | OUTPATIENT
Start: 2023-11-03 | End: 2023-11-03

## 2023-11-03 RX ORDER — HYDROMORPHONE HCL IN WATER/PF 6 MG/30 ML
0.4 PATIENT CONTROLLED ANALGESIA SYRINGE INTRAVENOUS EVERY 4 HOURS PRN
Status: DISCONTINUED | OUTPATIENT
Start: 2023-11-03 | End: 2023-11-03

## 2023-11-03 RX ORDER — HYDROMORPHONE HCL IN WATER/PF 6 MG/30 ML
0.2 PATIENT CONTROLLED ANALGESIA SYRINGE INTRAVENOUS EVERY 4 HOURS PRN
Status: DISCONTINUED | OUTPATIENT
Start: 2023-11-03 | End: 2023-11-03

## 2023-11-03 RX ORDER — ONDANSETRON 2 MG/ML
4 INJECTION INTRAMUSCULAR; INTRAVENOUS
Status: DISCONTINUED | OUTPATIENT
Start: 2023-11-03 | End: 2023-11-03

## 2023-11-03 RX ORDER — PREDNISONE 20 MG/1
40 TABLET ORAL DAILY
Status: DISCONTINUED | OUTPATIENT
Start: 2023-11-03 | End: 2023-11-03

## 2023-11-03 RX ORDER — HYDROMORPHONE HCL IN WATER/PF 6 MG/30 ML
0.2 PATIENT CONTROLLED ANALGESIA SYRINGE INTRAVENOUS EVERY 4 HOURS PRN
Status: DISCONTINUED | OUTPATIENT
Start: 2023-11-03 | End: 2023-11-05

## 2023-11-03 RX ORDER — PREDNISONE 20 MG/1
20 TABLET ORAL DAILY
Qty: 4 TABLET | Refills: 0 | Status: DISCONTINUED | OUTPATIENT
Start: 2023-11-11 | End: 2023-11-06 | Stop reason: HOSPADM

## 2023-11-03 RX ORDER — DEXTROSE MONOHYDRATE, SODIUM CHLORIDE, AND POTASSIUM CHLORIDE 50; 1.49; 9 G/1000ML; G/1000ML; G/1000ML
INJECTION, SOLUTION INTRAVENOUS CONTINUOUS
Status: DISPENSED | OUTPATIENT
Start: 2023-11-03 | End: 2023-11-03

## 2023-11-03 RX ORDER — NALOXONE HYDROCHLORIDE 0.4 MG/ML
0.4 INJECTION, SOLUTION INTRAMUSCULAR; INTRAVENOUS; SUBCUTANEOUS
Status: DISCONTINUED | OUTPATIENT
Start: 2023-11-03 | End: 2023-11-03

## 2023-11-03 RX ORDER — PREDNISONE 5 MG/1
10 TABLET ORAL DAILY
Qty: 8 TABLET | Refills: 0 | Status: DISCONTINUED | OUTPATIENT
Start: 2023-11-15 | End: 2023-11-06 | Stop reason: HOSPADM

## 2023-11-03 RX ORDER — HYDROMORPHONE HYDROCHLORIDE 2 MG/1
2 TABLET ORAL EVERY 6 HOURS PRN
Status: DISCONTINUED | OUTPATIENT
Start: 2023-11-03 | End: 2023-11-06 | Stop reason: HOSPADM

## 2023-11-03 RX ORDER — FENTANYL CITRATE 50 UG/ML
25-50 INJECTION, SOLUTION INTRAMUSCULAR; INTRAVENOUS EVERY 5 MIN PRN
Status: DISCONTINUED | OUTPATIENT
Start: 2023-11-03 | End: 2023-11-03

## 2023-11-03 RX ORDER — IBUPROFEN 200 MG
400 TABLET ORAL EVERY 6 HOURS PRN
Status: DISCONTINUED | OUTPATIENT
Start: 2023-11-03 | End: 2023-11-03

## 2023-11-03 RX ORDER — OXYCODONE HYDROCHLORIDE 5 MG/1
5 TABLET ORAL EVERY 6 HOURS PRN
Status: DISCONTINUED | OUTPATIENT
Start: 2023-11-03 | End: 2023-11-03

## 2023-11-03 RX ADMIN — ACETAMINOPHEN 650 MG: 325 TABLET ORAL at 08:38

## 2023-11-03 RX ADMIN — ACETAMINOPHEN 650 MG: 325 TABLET ORAL at 19:09

## 2023-11-03 RX ADMIN — POTASSIUM CHLORIDE, DEXTROSE MONOHYDRATE AND SODIUM CHLORIDE: 150; 5; 900 INJECTION, SOLUTION INTRAVENOUS at 08:38

## 2023-11-03 RX ADMIN — FENTANYL CITRATE 50 MCG: 50 INJECTION, SOLUTION INTRAMUSCULAR; INTRAVENOUS at 15:02

## 2023-11-03 RX ADMIN — LIDOCAINE HYDROCHLORIDE 20 ML: 10 INJECTION, SOLUTION INFILTRATION; PERINEURAL at 15:15

## 2023-11-03 RX ADMIN — IPRATROPIUM BROMIDE AND ALBUTEROL SULFATE 3 ML: .5; 3 SOLUTION RESPIRATORY (INHALATION) at 20:41

## 2023-11-03 RX ADMIN — SERTRALINE HYDROCHLORIDE 100 MG: 100 TABLET ORAL at 21:00

## 2023-11-03 RX ADMIN — FENTANYL CITRATE 50 MCG: 50 INJECTION, SOLUTION INTRAMUSCULAR; INTRAVENOUS at 14:58

## 2023-11-03 RX ADMIN — CEFEPIME HYDROCHLORIDE 2 G: 2 INJECTION, POWDER, FOR SOLUTION INTRAVENOUS at 18:19

## 2023-11-03 RX ADMIN — CEFEPIME HYDROCHLORIDE 2 G: 2 INJECTION, POWDER, FOR SOLUTION INTRAVENOUS at 09:38

## 2023-11-03 RX ADMIN — ACETAMINOPHEN 650 MG: 325 TABLET ORAL at 22:11

## 2023-11-03 RX ADMIN — GUAIFENESIN 600 MG: 600 TABLET ORAL at 21:01

## 2023-11-03 RX ADMIN — HYDROMORPHONE HYDROCHLORIDE 0.4 MG: 0.2 INJECTION, SOLUTION INTRAMUSCULAR; INTRAVENOUS; SUBCUTANEOUS at 11:23

## 2023-11-03 RX ADMIN — PREDNISONE 40 MG: 20 TABLET ORAL at 08:38

## 2023-11-03 RX ADMIN — AZITHROMYCIN MONOHYDRATE 500 MG: 500 INJECTION, POWDER, LYOPHILIZED, FOR SOLUTION INTRAVENOUS at 21:00

## 2023-11-03 RX ADMIN — CEFEPIME HYDROCHLORIDE 2 G: 2 INJECTION, POWDER, FOR SOLUTION INTRAVENOUS at 01:44

## 2023-11-03 RX ADMIN — GUAIFENESIN 600 MG: 600 TABLET ORAL at 08:38

## 2023-11-03 RX ADMIN — TRAMADOL HYDROCHLORIDE 50 MG: 50 TABLET, COATED ORAL at 08:38

## 2023-11-03 RX ADMIN — HYDROMORPHONE HYDROCHLORIDE 1 MG: 2 TABLET ORAL at 15:41

## 2023-11-03 RX ADMIN — IPRATROPIUM BROMIDE AND ALBUTEROL SULFATE 3 ML: .5; 3 SOLUTION RESPIRATORY (INHALATION) at 11:28

## 2023-11-03 RX ADMIN — IPRATROPIUM BROMIDE AND ALBUTEROL SULFATE 3 ML: .5; 3 SOLUTION RESPIRATORY (INHALATION) at 07:55

## 2023-11-03 RX ADMIN — IPRATROPIUM BROMIDE AND ALBUTEROL SULFATE 3 ML: .5; 3 SOLUTION RESPIRATORY (INHALATION) at 16:13

## 2023-11-03 RX ADMIN — MIDAZOLAM HYDROCHLORIDE 2 MG: 1 INJECTION, SOLUTION INTRAMUSCULAR; INTRAVENOUS at 14:56

## 2023-11-03 RX ADMIN — MIDAZOLAM HYDROCHLORIDE 1 MG: 1 INJECTION, SOLUTION INTRAMUSCULAR; INTRAVENOUS at 15:00

## 2023-11-03 ASSESSMENT — ACTIVITIES OF DAILY LIVING (ADL)
ADLS_ACUITY_SCORE: 35

## 2023-11-03 NOTE — PROGRESS NOTES
INFECTIOUS DISEASE FOLLOW UP NOTE      ASSESSMENT:  Ongoing dyspnea and cough for 2 months. Chest imaging has shown patchy ground glass opacities since 23. Current Chest film shows new opacities that were not apparent on CXR 10/27, raises possibility of superimposed bacterial infection.  GGO can be from PJP (seems low risk as she is presumed immunocompetent), viral, or hypersensitivity reaction. On cefepime, azithromycin.   Dysuria, flank pain, in presence of percutaneous nephrostomy tube. UC with staph lugdunensis, staph aureus, mixed christie.  Congratulated on smoking cessation  Antibiotic allergies -- penicillin as a child, but has tolerated amoxicillin since; sulfa, vancomycin.     PLAN:  Cefepime and azithromcyin  Await sputum culture (no need to repeat), respiratory panel, antigens    Krzysztof Black MD  [facility]  [phone] clinic  [employer] paging    ______________________________________________________________________    SUBJECTIVE / INTERVAL HISTORY: a little better, less dyspnea on exertion, cough now mainly with breathing treatments. Temps ok. Tolerating antibiotics.  Still with back pain.     ROS: All other systems negative except as listed above.        OBJECTIVE:  BP 97/60 (BP Location: Left arm)   Pulse 85   Temp 97.9  F (36.6  C) (Oral)   Resp 18   Ht 1.524 m (5')   Wt 86.2 kg (190 lb)   LMP 2023 (Approximate)   SpO2 91%   BMI 37.11 kg/m    FiO2 (%): 28 %    Vital Signs  Temp: 97.9  F (36.6  C)  Temp src: Oral  Resp: 18  Pulse: 85  Pulse Rate Source: Monitor  BP: 97/60  BP Location: Left arm    Temp (24hrs), Av.2  F (36.8  C), Min:97.9  F (36.6  C), Max:98.5  F (36.9  C)      GEN: No acute distress.  O2 NC.  RESPIRATORY:  Normal breathing pattern. Mild rhonchi.  CARDIOVASCULAR:  Regular rate and rhythm. Normal S1 and S2. No murmur, click, gallop or rub.   ABDOMEN:  Soft, normal bowel sounds, non-tender, no masses  CVA tender  R perc neph  tube  EXTREMITIES: No edema.  SKIN/HAIR/NAILS:  No rashes  IV: peripheral        Antibiotics:  Cefepime 11/2-  Azithromycin 11/2-    Pertinent labs:    Recent Labs   Lab 11/02/23  0640 11/01/23  2314   WBC 8.9 12.5*   HGB 11.0* 12.3   HCT 34.8* 37.9    178        Recent Labs   Lab 11/02/23  0640 11/01/23  2314    135   CO2 24 24   BUN 9.4 10.5        Lab Results   Component Value Date    CRP 0.3 10/04/2022    CRP 0.4 08/30/2022    CRP 6.0 (H) 07/22/2022         Lab Results   Component Value Date    ALT 18 11/01/2023    AST 23 11/01/2023    ALKPHOS 76 11/01/2023         MICROBIOLOGY DATA:  11/1 and 11/2 blood cultures negative  [facility] staph lug, s aureus, mixed christie  Sputum culture usual christie so far  Antigens pending    RADIOLOGY:  Chest XR,  PA & LAT    Result Date: 11/2/2023  EXAM: XR CHEST 2 VIEWS LOCATION: [facility] DATE: 11/2/2023 INDICATION: cough COMPARISON: 10/27/2023     IMPRESSION: Heart size normal. New patchy infiltrate present bilaterally consistent with pneumonia, right lung slightly worse than left.    US Renal Complete Non-Vascular    Result Date: 11/2/2023  EXAM: US RENAL COMPLETE NON-VASCULAR LOCATION: [facility] DATE: 11/2/2023 INDICATION: Right flank pain; nephrostomy tube in place. COMPARISON: CT abdomen/pelvis 10/06/2023; ultrasound 01/01/2023. TECHNIQUE: Routine Bilateral Renal and Bladder Ultrasound. FINDINGS: RIGHT KIDNEY: 9.5 cm. Multiple cystic-appearing space, similar to the previous examination. This may reflect accommodation parapelvic cysts, simple parenchymal cysts, or calyceal diverticula. No hydronephrosis. Percutaneous nephrostomy catheter is partially visualized. LEFT KIDNEY: Not visualized, likely due to known severe atrophy. BLADDER: Normal.     IMPRESSION: No hydronephrosis.    XR External Imaging Chest    Result Date: 10/30/2023  Images were obtained from an external facility.  Click PACS Images hyperlink to view  images.  Textual results have been scanned into the media tab.    Chest XR,  PA & LAT    Result Date: 10/27/2023  EXAM: XR CHEST 2 VIEWS LOCATION: [facility] DATE: 10/27/2023 INDICATION: Shortness of breath, pneumonia since September, refractory to treatment, ? interval change on xray COMPARISON: 10/07/2023     IMPRESSION: Fine reticular/patchy opacities in the bilateral lower lung seen on prior study are either resolved or improved on the current study. Mild linear scarring is seen in the right upper lobe. No new consolidations or confluent airspace opacities.  Heart size and poorly vascularity are within normal limits. No pleural effusion or pneumothorax.    Chest CT w/o contrast    Result Date: 10/15/2023  EXAM: CT CHEST Without CONTRAST LOCATION: [facility] DATE: 10/15/2023 INDICATION: History of pneumonia 09/18 recovered but returned with PNA 09/07, some improvement, now worsening. COMPARISON: Chest x-ray on 10/07/2023. TECHNIQUE: CT chest without IV contrast. Multiplanar reformats were obtained. Dose reduction techniques were used. CONTRAST: None. FINDINGS: LUNGS AND PLEURA: No pleural effusion or pneumothorax. Bilateral patchy groundglass pulmonary opacities most prominent in the upper lobes, likely infectious. MEDIASTINUM/AXILLAE: No cardiomegaly or significant pericardial effusion. No significant mediastinal or hilar lymphadenopathy. CORONARY ARTERY CALCIFICATION: None. UPPER ABDOMEN: Limited evaluation of the upper abdomen due to lack of coverage and contrast. A few stones in the upper pole of the right kidney. MUSCULOSKELETAL: No suspicious osseous lesion.     IMPRESSION: 1.  Bilateral upper lobe predominant patchy groundglass pulmonary opacities, likely infectious. 2.  A few upper pole right kidney stones.     XR CHEST 2 VIEWS PA AND LATERAL    Result Date: 10/7/2023  For Patients: As a result of the 21st Century Cures Act, medical imaging exams and  procedure reports are released immediately into your electronic medical record. You may view this report before your referring provider. If you have questions, please contact your health care provider. EXAM: XR CHEST 2 VIEWS PA AND LATERAL LOCATION: [facility] DATE: 10/7/2023 INDICATION: SOB COMPARISON: 10/1/2023    Heart is normal in size. Small subtle airspace opacity within the left mid and lower lung and at the right lung bases represent pneumonia. No effusion.    Abd/pelvis CT no contrast - Stone Protocol    Result Date: 10/6/2023  EXAM: CT ABDOMEN PELVIS W/O CONTRAST LOCATION: [facility] DATE: 10/6/2023 INDICATION: Right flank pain COMPARISON: 09/17/2023 TECHNIQUE: CT scan of the abdomen and pelvis was performed without IV contrast. Multiplanar reformats were obtained. Dose reduction techniques were used. CONTRAST: None. FINDINGS: LOWER CHEST: Multiple small patchy groundglass opacities are seen throughout the visualized lung bases, decreased from prior study. No new confluent consolidation or pleural effusion. HEPATOBILIARY: Normal. PANCREAS: Normal. SPLEEN: Normal. ADRENAL GLANDS: Normal. KIDNEYS/BLADDER: A right percutaneous nephrostomy tube is again seen in stable position. Multiple punctate nonobstructing stones measuring 1 to 3 mm again seen, unchanged. Cystic spaces in the right upper and right mid pole renal medulla may represent dilated calyces, difficult to evaluate without intravenous contrast but unchanged from prior study. Small peripheral cystic lesions in the superior pole of the right kidney are stable, either represent simple cyst or calyceal diverticulum. Left kidney is  severely atrophic. Urinary bladder is normal. BOWEL: Normal, including the appendix. LYMPH NODES: Normal. VASCULATURE: Unremarkable. PELVIC ORGANS: Normal. MUSCULOSKELETAL: Normal.     IMPRESSION: 1.  Overall stable appearance of the right kidney with percutaneous nephrostomy tube in place  and punctate nonobstructing stones measuring 1 to 3 mm. Dilated cystic spaces in the medulla of the right upper/interpolar kidney may represent dilated calyces or peripelvic cysts. Additional peripheral cystic lesions in the superior pole may represent simple cortical cysts versus calyceal diverticula. If definitive evaluation is needed, CT urogram would be most helpful. 2.  Multiple small patchy groundglass opacities throughout the visualized lung bases are significantly improved from prior study.     Chest XR,  PA & LAT    Result Date: 10/1/2023  EXAM: XR CHEST 2 VIEWS LOCATION: [facility] DATE: 10/1/2023 INDICATION: cough COMPARISON: 9/17/2023     IMPRESSION: Negative chest.

## 2023-11-03 NOTE — PROGRESS NOTES
[facility]    Medicine Progress Note - Hospitalist Service    Date of Admission:  11/1/2023    Assessment & Plan   Jacqueline Pappas is a 38 year old female with PMH significant for Congential Solitary Kidney, recurrent UTIs, kidney stones, ureteral strictures s/p right nephrostomy tube in place, recent hospitalization on 9/28/2023 for community-acquired pneumonia and recurrent UTI who presented to ED for evaluation of ongoing productive cough for since last 2 months and body ache, possible fever since last 2 days.  In ED x-ray suggestive of pneumonia.  Patient admitted for further management.    Acute hypoxic respiratory failure due to pneumonia;  Hospital-acquired pneumonia;  History of tobacco use disorder; quit 1 month ago  Probable COPD with exacerbation;  -- On admission, CXR reported new patchy infiltrates bilaterally consistent with pneumonia, right slightly worse than left  --On admission, mildly elevated procalcitonin and mild leukocytosis  -- Continue IV cefepime.  Added azithromycin.  --Continue DuoNeb, short course prednisone.  --Incentive spirometry and flutter valve  --On 2 L nasal cannula, titrate O2 as able  -- Appreciated ID and pulmonary input.    Recurrent history of recurrent UTI;  Right nephrostomy tube in place;  -- Abnormal UA but collected from nephrostomy tube  --US renal with no hydronephrosis  --Patient reported nephrostomy tube last exchanged was on 6/2023 at [facility].  Patient reported now having new insurance that does not cover [facility].  IR consulted for nephrostomy tube exchange  --Patient is working on finding new urology through PCP.      Mild hypercalcemia likely due to; dehydration  --Improved with IV fluid    Tobacco use disorder;  --Reported quit 1 month ago since recent hospitalization on 9/2023.  Congratulated on smoking cessation.            Diet: NPO per Anesthesia Guidelines for Procedure/Surgery Except for: Meds    DVT Prophylaxis: Pneumatic  Compression Devices and Ambulate every shift  Farr Catheter: Not present  Lines: None     Cardiac Monitoring: ACTIVE order. Indication: hypoxia  Code Status: Full Code      Clinically Significant Risk Factors Present on Admission           # Hypercalcemia: Highest Ca = 10.4 mg/dL in last 2 days, will monitor as appropriate             # Obesity: Estimated body mass index is 37.11 kg/m  as calculated from the following:    Height as of this encounter: 1.524 m (5').    Weight as of this encounter: 86.2 kg (190 lb).              Disposition Plan      Expected Discharge Date: 11/04/2023    Discharge Delays: IV Medication - consider oral or Home Infusion  Oxygen Needs - Arrange Home O2  Destination: home              Narinder Salcedo MD  Hospitalist Service  [facility]  Securely message with [employer] (more info)  Text page via [employer] Paging/Directory   ______________________________________________________________________    Interval History   Patient seen and examined.  Notes, labs, imaging report personally reviewed.  Overnight issues with right flank pain and received IV Dilaudid.  This morning patient reported breathing is stable, still has ongoing cough.  Denied feeling fever or chills.  Did not complain flank pain to me.  Patient reported she still makes urine.  Received message from IR yesterday, reported they would like to have patient on 24 hours of antibiotics before the procedure, anticipate nephrostomy tube exchange today, will keep patient n.p.o., keep on IV fluid while NPO.  Discussed with nursing staffs.    Physical Exam   Vital Signs: Temp: 97.9  F (36.6  C) Temp src: Oral BP: 97/60 Pulse: 80   Resp: 18 SpO2: 94 % O2 Device: Nasal cannula Oxygen Delivery: 2 LPM  Weight: 190 lbs 0 oz      General: Not in obvious distress.  HEENT: Normocephalic, supple neck  Chest: Decreased breath clear to auscultation bilateral anteriorly, no wheezing  Heart: S1S2 normal, regular  Abdomen: Soft. NT, ND.  Bowel sounds- active.  Extremities: No legs swelling  Neuro: alert and awake, grossly non-focal        Medical Decision Making             Data     I have personally reviewed the following data over the past 24 hrs:    Procal: N/A CRP: N/A Lactic Acid: 0.4 (L)         Imaging results reviewed over the past 24 hrs:   No results found for this or any previous visit (from the past 24 hour(s)).

## 2023-11-03 NOTE — PROGRESS NOTES
Patient remains on 2 lpm nasal cannula with oxygen saturation in the low to mid 90s. Duoneb given with flutter valve inline as scheduled without adverse effects. BBS coarse crackles. Expiratory wheezes throughout RML and RLL this morning. Strong, productive cough. Moderate thick yellow/tan secretions.     Argenis Reynolds, RT

## 2023-11-03 NOTE — PROGRESS NOTES
Patient Name: Jacqueline Pappas  Medical Record Number: [id number]  Today's Date: 11/3/2023    Procedure: nepht tube change   Proceduralist: Dr Ashley    Sedation medications administered: 3 mg midazolam and 100 mcg fentanyl   Sedation time: 15 minutes    Pt transported back to room 408 awake and alert without complaint post neph tube exchange.  Report given to receiving ELOISA Villanueva without questions or concerns.

## 2023-11-03 NOTE — PROGRESS NOTES
Care Management Follow Up    Length of Stay (days): 1    Expected Discharge Date: 11/04/2023     Concerns to be Addressed: no discharge needs identified     Patient plan of care discussed at interdisciplinary rounds: Yes    Anticipated Discharge Disposition:  home     Anticipated Discharge Services:  TBD  Anticipated Discharge DME:  TBD    Patient/family educated on Medicare website which has current facility and service quality ratings:  NA  Education Provided on the Discharge Plan:  per care team  Patient/Family in Agreement with the Plan:  per care team    Referrals Placed by CM/SW:  NA  Private pay costs discussed: Not applicable    Additional Information:  CM received call from [facility] Restricted Recipient Program (RRP).  Patient has restricted hospital, providers, and pharmacy.  RRP needs to add the name of the discharging provider to patient's list in order for her medications to be filled at her restricted pharmacy.   CM discussed with MD Salcedo.  MD feels that patient will be here until Sunday at the earliest, and maybe even Monday.  Her provider on Sunday will be Dr. Sanchez.    CM called RRP and added Dr. Sanchez to the list so Dr. Sanchez can order discharge medications to be filled at restricted pharmacy.  Dr. Sanchez is added to the RRP list from 11/4-11/6.  RRP: [phone] or [phone]   RRP is not open on the weekends.     Restricted Pharmacy:  [facility]  [address] [place] [address]    Laura Bennett RN

## 2023-11-03 NOTE — CONSULTS
Interventional Radiology - Pre-Procedure Note:  Inpatient - [facility]  11/03/2023       Procedure Requested: R PNT Exchange  Requested by: Narinder Salcedo MD    Brief HPI: Jacqueline Pappas is a 38 year old female with PMH significant for Congential Solitary Kidney, recurrent UTIs, kidney stones, ureteral strictures s/p right nephrostomy tube in place, recent hospitalization on 9/28/2023 for community-acquired pneumonia and recurrent UTI who presented to ED for evaluation of ongoing productive cough for since last 2 months and body ache, possible fever since last 2 days.  In ED x-ray suggestive of pneumonia.    Patient reported nephrostomy tube last exchanged was on 6/2023 at [facility].  Patient reported now having new insurance that does not cover [facility]. Requesting PNT exchange.       NPO: midnight.  ANTICOAGULANTS: none  ANTIBIOTICS: cefepime 2g IV q 8hrs - started yesterday.    ALLERGIES  Allergies   Allergen Reactions    Desogestrel-Ethinyl Estradiol Angioedema and Swelling     Swelling of hands and feet per pt.      Penicillins Rash     As a child per mother; mother unsure if has tolerated another PCN since. Tolerated ampicillin 9/20/16    Sulfa Antibiotics Rash    Vancomycin Rash         LABS:  INR   Date Value Ref Range Status   12/30/2018 0.94 0.90 - 1.10 Final   09/16/2016 1.82 (H) 0.86 - 1.14 Final      Hemoglobin   Date Value Ref Range Status   11/02/2023 11.0 (L) 11.7 - 15.7 g/dL Final   11/02/2016 10.1 (L) 11.7 - 15.7 g/dL Final     Platelet Count   Date Value Ref Range Status   11/02/2023 163 150 - 450 10e3/uL Final   11/02/2016 180 150 - 450 10e9/L Final     Creatinine   Date Value Ref Range Status   11/02/2023 0.99 (H) 0.51 - 0.95 mg/dL Final   11/02/2016 1.04 0.52 - 1.04 mg/dL Final     Potassium   Date Value Ref Range Status   11/02/2023 4.1 3.4 - 5.3 mmol/L Final   08/18/2023 4.2 3.5 - 5.0 mmol/L Final   11/02/2016 4.5 3.4 - 5.3 mmol/L Final         EXAM:  BP 97/60  (BP Location: Left arm)   Pulse 80   Temp 97.9  F (36.6  C) (Oral)   Resp 18   Ht 1.524 m (5')   Wt 86.2 kg (190 lb)   LMP 09/24/2023 (Approximate)   SpO2 94%   BMI 37.11 kg/m    General:  Stable.  In no acute distress.    Neuro:  A&O x 3. Moves all extremities equally.  Resp:  Lungs clear to auscultation bilaterally.  Cardio:  S1S2 and reg, without murmur, clicks or rubs  Flank: R PNT to gravity bag with yellow urine noted in bag. Bag and tubing discolored blackish.    Pre-Sedation Assessment:  Mallampati Airway Classification:  II - Faucial pillars and soft palate may be seen, but uvula is masked by the base of the tongue  Previous reaction to anesthesia/sedation:  No  Sedation plan based on assessment: Moderate (conscious) sedation  ASA Classification: Class 2 - MILD SYSTEMIC DISEASE, NO ACUTE PROBLEMS, NO FUNCTIONAL LIMITATIONS.   Code Status: Full Code intra procedure      ASSESSMENT/PLAN:   Congential Solitary Kidney, recurrent UTIs, kidney stones, ureteral strictures s/p chronic right nephrostomy tube in place, last exchanged 6/2023 at [place].    R PNT exchange with sedation    Procedural education reviewed with patient in detail including, but not limited to risks, benefits and alternatives with understanding verbalized by patient.    Total time spent on the date of the encounter: 35 minutes.    Pao Willett PA-C  Interventional Radiology

## 2023-11-03 NOTE — PLAN OF CARE
Problem: Adult Inpatient Plan of Care  Goal: Plan of Care Review  Description: The Plan of Care Review/Shift note should be completed every shift.  The Outcome Evaluation is a brief statement about your assessment that the patient is improving, declining, or no change.  This information will be displayed automatically on your shift  note.  Outcome: Progressing     Problem: Adult Inpatient Plan of Care  Goal: Optimal Comfort and Wellbeing  Outcome: Progressing  Intervention: Monitor Pain and Promote Comfort  Recent Flowsheet Documentation  Taken 11/2/2023 2353 by Daisha Garcia, RN  Pain Management Interventions: medication (see MAR)     Problem: Infection  Goal: Absence of Infection Signs and Symptoms  Outcome: Progressing     Problem: Pain Acute  Goal: Optimal Pain Control and Function  Outcome: Progressing  Intervention: Develop Pain Management Plan  Recent Flowsheet Documentation  Taken 11/2/2023 2357 by Daisha Garcia, RN  Pain Management Interventions: medication (see MAR)   Goal Outcome Evaluation:  Pt is alert and oriented x4. VSS. O2 sat 92% at 2LPM.Comfortable in bed. C/o rt flank pain 8/10 rate, pt requested Dilaudid IV 0.5 mg, effective. Urine sample sent to lab. Pt had infrequest dry cough, unable to get sputum sample. Slept between cares.

## 2023-11-03 NOTE — PLAN OF CARE
Goal Outcome Evaluation:     Pt is A&Ox4, able to make needs known, up in room indep. Pt NPO for neph tube replacement later today. Reports pain in rt flank, received prn tramadol x1 and IV dilaudid x1 which was effective. Pt refuses scheduled tylenol. Urine in neph tube with odor, pt also voids in BR. LS diminished with exp wheezes, occasional productive cough, reports some SOB at rest and with activity, sats low-mid 90's on 2L O2. Receiving scheduled nebs and steroids, continues on IV abx, VSS  Problem: Adult Inpatient Plan of Care  Goal: Optimal Comfort and Wellbeing  Outcome: Progressing  Intervention: Monitor Pain and Promote Comfort  Recent Flowsheet Documentation  Taken 11/3/2023 0838 by Guerline Christian RN  Pain Management Interventions:   medication (see MAR)   breathing exercises   distraction   emotional support   pillow support provided   repositioned   relaxation techniques promoted  Intervention: Provide Person-Centered Care  Recent Flowsheet Documentation  Taken 11/3/2023 0854 by Guerline Christian RN  Trust Relationship/Rapport:   care explained   emotional support provided   questions answered     Problem: Gas Exchange Impaired  Goal: Optimal Gas Exchange  Outcome: Progressing  Intervention: Optimize Oxygenation and Ventilation  Recent Flowsheet Documentation  Taken 11/3/2023 0854 by Guerline Christian, RN  Head of Bed (HOB) Positioning: HOB at 30-45 degrees     Problem: Infection  Goal: Absence of Infection Signs and Symptoms  Outcome: Progressing     Problem: Pain Acute  Goal: Optimal Pain Control and Function  Outcome: Progressing  Intervention: Develop Pain Management Plan  Recent Flowsheet Documentation  Taken 11/3/2023 0838 by Guerline Christian RN  Pain Management Interventions:   medication (see MAR)   breathing exercises   distraction   emotional support   pillow support provided   repositioned   relaxation techniques promoted     Problem: Adult Inpatient Plan of Care  Goal: Absence of  Hospital-Acquired Illness or Injury  Intervention: Identify and Manage Fall Risk  Recent Flowsheet Documentation  Taken 11/3/2023 0854 by Guerline Christian, RN  Safety Promotion/Fall Prevention:   nonskid shoes/slippers when out of bed   patient and family education  Intervention: Prevent Skin Injury  Recent Flowsheet Documentation  Taken 11/3/2023 0854 by Guerline Christian, RN  Body Position: position changed independently

## 2023-11-03 NOTE — PROGRESS NOTES
PULMONARY MEDICINE PROGRESS NOTE  11/3/2023    Admit Date: 11/1/2023  CODE: Full Code    Reason for Consult: acute respiratory failure with hypoxia, abnormal chest CT    Assessment/Plan:   38 year old female with a history of active tobacco dependence, congenital solitary right kidney, recurrent UTIs, nephrolithiasis, ureteral strictures sp right nephrostomy tube, hospitalization for 2 nights in September with dyspnea, cough, rhinovirus, and patchy bilateral pulmonary opacities, now presents to ED on 11/1 with body aches, right flank pain, productive cough, chest CT shows persistent/progressive bilateral patchy ground glass opacities and mosaic attenuation, on empiric antibiotics and systemic steroids, underwent right nephrostomy tube exchange on 11/3.    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 The CT findings would certainly fit with this diagnosis. This increases the urgency of quitting smoking, and will taper prednisone and plan repeat chest CT in one month. She has scheduled complete pulmonary function testing and pulmonary clinic follow-up already scheduled for November 30.    Plan:  - tobacco cessation essential, especially with possible RB-ILD  - nicotine 14-mg patch and as-needed nicotine gum  - prednisone taper  - can continue nebulized bronchodilators as inpatient  - can continue budesonide-formoterol outpatient, 160-4.5 mcg two inhalations via holding chamber BID; rinse/gargle/spit water after use  - taper oxygen  - empiric antibiotics; ID following  - repeat chest CT in one month  - has PFT and pulmonary clinic follow-up scheduled  - will follow    Robert Canales MD  Pulmonary and Critical Care Medicine  [facility]  [facility]  Vocera  Office [phone]  Pager [phone]  he/him                                                                                                                                                        SUBJECTIVE/INTERVAL HISTORY   Breathing getting better, still coughing up some sputum. Right nephrostomy changed today. She is committed to quitting smoking.                                                                                                                                                      Exam/Data:     Vitals  /62 (BP Location: Left arm)   Pulse 91   Temp 98.7  F (37.1  C) (Oral)   Resp 21   Ht 1.524 m (5')   Wt 86.2 kg (190 lb)   LMP 09/24/2023 (Approximate)   SpO2 95%   BMI 37.11 kg/m       I/O last 3 completed shifts:  In: 390 [P.O.:390]  Out: 2280 [Urine:2280]  Weight change:   [unfilled]  EXAM:  /62 (BP Location: Left arm)   Pulse 91   Temp 98.7  F (37.1  C) (Oral)   Resp 21   Ht 1.524 m (5')   Wt 86.2 kg (190 lb)   LMP 09/24/2023 (Approximate)   SpO2 95%   BMI 37.11 kg/m      Intake/Output last 3 shifts:  I/O last 3 completed shifts:  In: 390 [P.O.:390]  Out: 2280 [Urine:2280]  Intake/Output this shift:  No intake/output data recorded.    Physical Exam  Gen: alert, oriented, no distress  HEENT: NT, no TALI  CV: tachycardic, regular, no m/g/r  Resp: faint crackles right base  Abd: soft, nontender, BS+, right nephrostomy changed today, nontender  Skin: no rashes or lesions  Ext: no edema  Neuro: PERRL, nonfocal exam    ROS: A complete 10-system review of systems was obtained and is negative with the exception of what is noted in the history of present illness.    Medications:      dextrose 5% and 0.9% NaCl + KCL 20 mEq/L 100 mL/hr at 11/03/23 1537      acetaminophen  650 mg Oral TID    azithromycin  500 mg Intravenous Q24H    ceFEPIme  2 g Intravenous Q8H    guaiFENesin  600 mg Oral BID    ipratropium - albuterol 0.5 mg/2.5 mg/3 mL  3 mL  Nebulization 4x daily    nicotine  1 patch Transdermal Daily    nicotine   Transdermal Q8H    predniSONE  40 mg Oral Daily    sertraline  100 mg Oral At Bedtime         DATA  All laboratory and radiology has been personally reviewed by myself today.  Recent Labs   Lab 11/02/23  0640   WBC 8.9   HGB 11.0*   HCT 34.8*        Recent Labs   Lab 11/02/23  0640 11/01/23  2314    135   CO2 24 24   BUN 9.4 10.5   ALKPHOS  --  76   ALT  --  18   AST  --  23         Imaging:  Chest CT (10/15/23):  - images directly reviewed, formal interpretation follows:  FINDINGS:   LUNGS AND PLEURA: No pleural effusion or pneumothorax. Bilateral patchy groundglass pulmonary opacities most prominent in the upper lobes, likely infectious.     MEDIASTINUM/AXILLAE: No cardiomegaly or significant pericardial effusion. No significant mediastinal or hilar lymphadenopathy.     CORONARY ARTERY CALCIFICATION: None.     UPPER ABDOMEN: Limited evaluation of the upper abdomen due to lack of coverage and contrast. A few stones in the upper pole of the right kidney.     MUSCULOSKELETAL: No suspicious osseous lesion.                                                                      IMPRESSION:   1.  Bilateral upper lobe predominant patchy groundglass pulmonary opacities, likely infectious.  2.  A few upper pole right kidney stones.    CXR (11/2/23):  - images directly reviewed, formal interpretation follows:  IMPRESSION: Heart size normal. New patchy infiltrate present bilaterally consistent with pneumonia, right lung slightly worse than left.     Pulmonary Function Testing  none

## 2023-11-03 NOTE — PRE-PROCEDURE
GENERAL PRE-PROCEDURE:   Procedure:  R PNT exchange  Date/Time:  11/3/2023 1:12 PM    Written consent obtained?: Yes    Risks and benefits: Risks, benefits and alternatives were discussed    Consent given by:  Patient  Patient states understanding of procedure being performed: Yes    Patient's understanding of procedure matches consent: Yes    Procedure consent matches procedure scheduled: Yes    Expected level of sedation:  Moderate  Appropriately NPO:  Yes  ASA Class:  2  Mallampati  :  Grade 2- soft palate, base of uvula, tonsillar pillars, and portion of posterior pharyngeal wall visible  Lungs:  Lungs clear with good breath sounds bilaterally  Heart:  Normal heart sounds and rate  History & Physical reviewed:  History and physical reviewed and no updates needed  Statement of review:  I have reviewed the lab findings, diagnostic data, medications, and the plan for sedation

## 2023-11-03 NOTE — PLAN OF CARE
Problem: Pain Acute  Goal: Optimal Pain Control and Function  Outcome: Progressing  Intervention: Develop Pain Management Plan  Recent Flowsheet Documentation  Taken 11/2/2023 2121 by Yoselyn Mahajan RN  Pain Management Interventions: medication (see MAR)  Taken 11/2/2023 1759 by Yoselyn Mahajan RN  Pain Management Interventions: medication (see MAR)     Problem: Infection  Goal: Absence of Infection Signs and Symptoms  Outcome: Progressing     Problem: Gas Exchange Impaired  Goal: Optimal Gas Exchange  Outcome: Progressing  Intervention: Optimize Oxygenation and Ventilation  Recent Flowsheet Documentation  Taken 11/2/2023 1759 by Yoselyn Mahajan RN  Head of Bed (HOB) Positioning: HOB at 30-45 degrees     Goal Outcome Evaluation:      Pt presented to the hospital on 11/01 with an ongoing cough and shortness of breath. Chest x-ray suggestive of recurrent pneumonia. Pt reports recent hospitalization in September for the same. O2 sats 92% on 2 liters of oxygen via NC.  Lungs are diminished throughout with expiratory wheeze. Reports ;dyspnea with exertion. Independent with ambulation. Infrequent productive cough. On scheduled nebs and initiated oral steroids. Pulmonology consulting. Sputum and urine collection pending. Receiving abx in the form of Azithromycin and Maxipime. ID consulting. Heart rate is tachy in the 100's. Pt is complaining of right flank pain. Has chronic right nephrostomy tube. Urine is clear. Hx of congenital solitary kidney. IR consulted for nephrostomy tube exchange. Received scheduled Tylenol along with prn IV Dilaudid at 1805 and prn Tramadol at 2115. MD notified. Tolerating a regular diet. Appetite is adequate. VS stable.

## 2023-11-03 NOTE — IR NOTE
Interventional Radiology Post-Procedure Note   ?   Brief Procedure Note:   Patient name: Jacqueline Pappas  Pt MRN:[id number]   Date of procedure: 11/3/2023     Procedure(s): Image guided percutaneous nephrostomy tube exchange  Sedation method: Moderate sedation was employed. The patient was monitored by an interventional radiology nurse at all times throughout the procedure under my direct guidance.  Pre Procedure Diagnosis: Chronic nephrostomy drainage due to obstruction needing routine exchange  Post Procedure Diagnosis: Same  Indications: Routine exchange    ?   Specimen(s) removed: None   Additional studies ordered: None  Drains: 8 Fr locking pigtail nephrostomy catheter  Estimated Blood Loss: Minimal  Complications: None  Vascular closure method: N/A    Findings/Notes/Comments: Successful exchange of right percutaneous nephrostomy.   ?   Please see dictation in PACS or under the Imaging tab in [employer] for detailed procedure note.     Ceferino Ashley M.D.   Vascular and Interventional Radiology   Pager: [phone]   After Hours / Scheduling: [phone]     11/3/2023  3:11 PM

## 2023-11-04 LAB
1,3 BETA GLUCAN SER-MCNC: <31 PG/ML
ANION GAP SERPL CALCULATED.3IONS-SCNC: 8 MMOL/L (ref 7–15)
BACTERIA SPT CULT: NORMAL
BUN SERPL-MCNC: 14.8 MG/DL (ref 6–20)
CALCIUM SERPL-MCNC: 10.2 MG/DL (ref 8.6–10)
CHLORIDE SERPL-SCNC: 109 MMOL/L (ref 98–107)
CREAT SERPL-MCNC: 0.96 MG/DL (ref 0.51–0.95)
DEPRECATED HCO3 PLAS-SCNC: 20 MMOL/L (ref 22–29)
EGFRCR SERPLBLD CKD-EPI 2021: 78 ML/MIN/1.73M2
GLUCOSE SERPL-MCNC: 105 MG/DL (ref 70–99)
GRAM STAIN RESULT: NORMAL
HOLD SPECIMEN: NORMAL
OBSERVATION IMP: NEGATIVE
POTASSIUM SERPL-SCNC: 4.2 MMOL/L (ref 3.4–5.3)
SODIUM SERPL-SCNC: 137 MMOL/L (ref 135–145)

## 2023-11-04 PROCEDURE — 250N000012 HC RX MED GY IP 250 OP 636 PS 637: Performed by: INTERNAL MEDICINE

## 2023-11-04 PROCEDURE — 999N000127 HC STATISTIC PERIPHERAL IV START W US GUIDANCE

## 2023-11-04 PROCEDURE — 99232 SBSQ HOSP IP/OBS MODERATE 35: CPT | Performed by: INTERNAL MEDICINE

## 2023-11-04 PROCEDURE — 82310 ASSAY OF CALCIUM: CPT | Performed by: INTERNAL MEDICINE

## 2023-11-04 PROCEDURE — 250N000009 HC RX 250: Performed by: FAMILY MEDICINE

## 2023-11-04 PROCEDURE — 36415 COLL VENOUS BLD VENIPUNCTURE: CPT | Performed by: INTERNAL MEDICINE

## 2023-11-04 PROCEDURE — 250N000013 HC RX MED GY IP 250 OP 250 PS 637: Performed by: HOSPITALIST

## 2023-11-04 PROCEDURE — 999N000157 HC STATISTIC RCP TIME EA 10 MIN

## 2023-11-04 PROCEDURE — 250N000013 HC RX MED GY IP 250 OP 250 PS 637: Performed by: INTERNAL MEDICINE

## 2023-11-04 PROCEDURE — 94640 AIRWAY INHALATION TREATMENT: CPT

## 2023-11-04 PROCEDURE — 120N000001 HC R&B MED SURG/OB

## 2023-11-04 PROCEDURE — 94640 AIRWAY INHALATION TREATMENT: CPT | Mod: 76

## 2023-11-04 PROCEDURE — 250N000011 HC RX IP 250 OP 636: Performed by: FAMILY MEDICINE

## 2023-11-04 PROCEDURE — 250N000013 HC RX MED GY IP 250 OP 250 PS 637: Performed by: FAMILY MEDICINE

## 2023-11-04 RX ORDER — DOXYCYCLINE 100 MG/1
100 CAPSULE ORAL EVERY 12 HOURS SCHEDULED
Status: DISCONTINUED | OUTPATIENT
Start: 2023-11-04 | End: 2023-11-06 | Stop reason: HOSPADM

## 2023-11-04 RX ADMIN — CEFEPIME HYDROCHLORIDE 2 G: 2 INJECTION, POWDER, FOR SOLUTION INTRAVENOUS at 02:44

## 2023-11-04 RX ADMIN — IPRATROPIUM BROMIDE AND ALBUTEROL SULFATE 3 ML: .5; 3 SOLUTION RESPIRATORY (INHALATION) at 15:55

## 2023-11-04 RX ADMIN — IPRATROPIUM BROMIDE AND ALBUTEROL SULFATE 3 ML: .5; 3 SOLUTION RESPIRATORY (INHALATION) at 08:45

## 2023-11-04 RX ADMIN — GUAIFENESIN 600 MG: 600 TABLET ORAL at 08:53

## 2023-11-04 RX ADMIN — DOXYCYCLINE 100 MG: 100 CAPSULE ORAL at 22:06

## 2023-11-04 RX ADMIN — SERTRALINE HYDROCHLORIDE 100 MG: 100 TABLET ORAL at 21:01

## 2023-11-04 RX ADMIN — CEFEPIME HYDROCHLORIDE 2 G: 2 INJECTION, POWDER, FOR SOLUTION INTRAVENOUS at 18:14

## 2023-11-04 RX ADMIN — IPRATROPIUM BROMIDE AND ALBUTEROL SULFATE 3 ML: .5; 3 SOLUTION RESPIRATORY (INHALATION) at 19:29

## 2023-11-04 RX ADMIN — NICOTINE 1 PATCH: 14 PATCH, EXTENDED RELEASE TRANSDERMAL at 08:53

## 2023-11-04 RX ADMIN — HYDROMORPHONE HYDROCHLORIDE 2 MG: 2 TABLET ORAL at 21:06

## 2023-11-04 RX ADMIN — GUAIFENESIN 600 MG: 600 TABLET ORAL at 21:02

## 2023-11-04 RX ADMIN — CEFEPIME HYDROCHLORIDE 2 G: 2 INJECTION, POWDER, FOR SOLUTION INTRAVENOUS at 09:47

## 2023-11-04 RX ADMIN — PREDNISONE 40 MG: 20 TABLET ORAL at 08:52

## 2023-11-04 RX ADMIN — HYDROMORPHONE HYDROCHLORIDE 2 MG: 2 TABLET ORAL at 12:54

## 2023-11-04 ASSESSMENT — ACTIVITIES OF DAILY LIVING (ADL)
ADLS_ACUITY_SCORE: 18
ADLS_ACUITY_SCORE: 35
ADLS_ACUITY_SCORE: 35
ADLS_ACUITY_SCORE: 18
ADLS_ACUITY_SCORE: 35
ADLS_ACUITY_SCORE: 35
ADLS_ACUITY_SCORE: 18
ADLS_ACUITY_SCORE: 35
ADLS_ACUITY_SCORE: 35
ADLS_ACUITY_SCORE: 18

## 2023-11-04 NOTE — PLAN OF CARE
"  Problem: Adult Inpatient Plan of Care  Goal: Plan of Care Review  Description: The Plan of Care Review/Shift note should be completed every shift.  The Outcome Evaluation is a brief statement about your assessment that the patient is improving, declining, or no change.  This information will be displayed automatically on your shift  note.  11/4/2023 0517 by Rubia Jackson RN  Outcome: Progressing  11/3/2023 2220 by Rubia Jackson RN  Outcome: Progressing  Goal: Patient-Specific Goal (Individualized)  Description: You can add care plan individualizations to a care plan. Examples of Individualization might be:  \"Parent requests to be called daily at 9am for status\", \"I have a hard time hearing out of my right ear\", or \"Do not touch me to wake me up as it startles  me\".  11/4/2023 0517 by Rubia Jackson RN  Outcome: Progressing  11/3/2023 2220 by Rubia Jackson RN  Outcome: Progressing  Goal: Absence of Hospital-Acquired Illness or Injury  11/4/2023 0517 by Rubia Jackson RN  Outcome: Progressing  11/3/2023 2220 by Rubia Jackson RN  Outcome: Progressing  Intervention: Identify and Manage Fall Risk  Recent Flowsheet Documentation  Taken 11/4/2023 0140 by Rubia Jackson RN  Safety Promotion/Fall Prevention: clutter free environment maintained  Intervention: Prevent Infection  Recent Flowsheet Documentation  Taken 11/4/2023 0140 by Rubia Jackson RN  Infection Prevention: rest/sleep promoted  Goal: Optimal Comfort and Wellbeing  11/4/2023 0517 by Rubia Jackson RN  Outcome: Progressing  11/3/2023 2220 by Rubia Jackson RN  Outcome: Progressing  Intervention: Monitor Pain and Promote Comfort  Recent Flowsheet Documentation  Taken 11/4/2023 0140 by Rubia Jackson RN  Pain Management Interventions: medication (see MAR)  Taken 11/3/2023 1630 by Rubia Jackson RN  Pain Management Interventions: medication (see MAR)  Intervention: Provide Person-Centered Care  Recent Flowsheet Documentation  Taken " 11/3/2023 1630 by Rubia Jackson RN  Trust Relationship/Rapport: care explained  Goal: Readiness for Transition of Care  11/4/2023 0517 by Rubia Jackson, RN  Outcome: Progressing  11/3/2023 2220 by Rubia Jackson, RN  Outcome: Progressing   Goal Outcome Evaluation:       Alert,slept most of the shift,  appear comfortable in bed,  nephrostomy tube dressing clean, dry, intact and patent, pain managed with Tylenol and oral dilaudid. IV zosyn infused and no concern noted.

## 2023-11-04 NOTE — PROGRESS NOTES
INFECTIOUS DISEASE FOLLOW UP NOTE      ASSESSMENT:  Ongoing dyspnea and cough for 2 months. Chest imaging has shown patchy ground glass opacities since 9/14/23. Current Chest film shows new opacities that were not apparent on CXR 10/27, raises possibility of superimposed bacterial infection.  GGO can be from PJP (seems low risk as she is presumed immunocompetent), viral, or hypersensitivity reaction. On cefepime, azithromycin. Respiratory virus panel negative. Sputum usual christie so far. Pulmonary suspects tobacco-related respiratory bronchiolitis   Dysuria, flank pain, in presence of percutaneous nephrostomy tube. UC with staph lugdunensis, staph aureus, mixed christie.  Congratulated on smoking cessation  Antibiotic allergies -- penicillin as a child, but has tolerated amoxicillin since; sulfa, vancomycin.     PLAN:  Cefepime and azithromcyin  Likely oral antibiotics next 1-2 days    Krzysztof Black MD  [facility]  [phone] clinic  [employer] paging    ______________________________________________________________________    SUBJECTIVE / INTERVAL HISTORY: doing ok. Still coughs but improved. Normal temp. Urinary symptoms improved. Neph tube changed yesterday.    ROS: All other systems negative except as listed above.        OBJECTIVE:  /73 (BP Location: Left arm)   Pulse 73   Temp 97.9  F (36.6  C) (Oral)   Resp 18   Ht 1.524 m (5')   Wt 86.2 kg (190 lb)   LMP 09/24/2023 (Approximate)   SpO2 93%   BMI 37.11 kg/m    FiO2 (%): 28 %        GEN: No acute distress.  O2 NC.  RESPIRATORY:  Normal breathing pattern. Mild rhonchi.  CARDIOVASCULAR:  Regular rate and rhythm. Normal S1 and S2. No murmur, click, gallop or rub.   ABDOMEN:  Soft, normal bowel sounds, non-tender, no masses  CVA tender  R perc neph tube  EXTREMITIES: No edema.  SKIN/HAIR/NAILS:  No rashes  IV: peripheral        Antibiotics:  Cefepime 11/2-  Azithromycin 11/2-    Pertinent labs:    Recent Labs   Lab  11/02/23  0640 11/01/23  2314   WBC 8.9 12.5*   HGB 11.0* 12.3   HCT 34.8* 37.9    178        Recent Labs   Lab 11/04/23  0545 11/02/23  0640 11/01/23  2314    138 135   CO2 20* 24 24   BUN 14.8 9.4 10.5        Lab Results   Component Value Date    CRP 0.3 10/04/2022    CRP 0.4 08/30/2022    CRP 6.0 (H) 07/22/2022         Lab Results   Component Value Date    ALT 18 11/01/2023    AST 23 11/01/2023    ALKPHOS 76 11/01/2023         MICROBIOLOGY DATA:  11/1 and 11/2 blood cultures negative  UC staph lug, s aureus, mixed christie  Sputum culture usual christie so far  Antigens pending  Resp virus panel negative (x2)    RADIOLOGY:  IR Nephrostomy Tube Change Right    Result Date: 11/3/2023  [facility] LOCATION: [facility] DATE: 11/3/2023 PROCEDURE: NEPHROSTOMY TUBE EXCHANGE ATTENDING: Ceferino Ashley MD. INDICATION: 38-year-old female with a history of congenital solitary kidney and ureteral strictures status post right nephrostomy tube placement. Patient presents for routine exchange. CONSENT: The risks, benefits, and alternatives of nephrostomy tube exchange were discussed with the patient in detail. All questions were answered. Informed consent was given to proceed with the procedure. MODERATE SEDATION: IV fentanyl and Versed were administered for sedation.  During the timeout, immediately prior to the administration of medications, the patient was reassessed for adequacy to receive conscious sedation. Under physician supervision, Versed and fentanyl were administered for moderate sedation. Pulse oximetry, heart rate and blood pressure were continuously monitored by an independent trained observer. The physician spent 15 minutes of face-to-face sedation time with the patient. ADDITIONAL MEDICATIONS: See EMR FLUOROSCOPIC TIME: 1.1 minutes. AIR KERMA:  12 mGy. CONTRAST: 5 mL UNIVERSAL PRECAUTIONS: The procedure was performed utilizing maximum sterile barrier technique. Prior to the  start of the procedure, a standard pause for patient safety was performed with site marking as indicated. COMPLICATIONS: No immediate complications. PROCEDURE:  A  image was obtained. A nephrostogram was performed through the previously placed right nephrostomy tube. Under direct fluoroscopic visualization, the previous tube was removed over a wire and exchange was made for a new 8 Citizen of [place] nephrostomy. The loop was locked within the renal pelvis, and the catheter was sutured to the skin. A post placement nephrostogram was performed. FINDINGS:  The initial  image shows the previously placed right nephrostomy tube. At the completion of the study, the new nephrostomy loop lies within the renal pelvis and controls the urinary system well.     IMPRESSION:  Successful right nephrostomy exchange, as discussed above.     Chest XR,  PA & LAT    Result Date: 11/2/2023  EXAM: XR CHEST 2 VIEWS LOCATION: [facility] DATE: 11/2/2023 INDICATION: cough COMPARISON: 10/27/2023     IMPRESSION: Heart size normal. New patchy infiltrate present bilaterally consistent with pneumonia, right lung slightly worse than left.    US Renal Complete Non-Vascular    Result Date: 11/2/2023  EXAM: US RENAL COMPLETE NON-VASCULAR LOCATION: [facility] DATE: 11/2/2023 INDICATION: Right flank pain; nephrostomy tube in place. COMPARISON: CT abdomen/pelvis 10/06/2023; ultrasound 01/01/2023. TECHNIQUE: Routine Bilateral Renal and Bladder Ultrasound. FINDINGS: RIGHT KIDNEY: 9.5 cm. Multiple cystic-appearing space, similar to the previous examination. This may reflect accommodation parapelvic cysts, simple parenchymal cysts, or calyceal diverticula. No hydronephrosis. Percutaneous nephrostomy catheter is partially visualized. LEFT KIDNEY: Not visualized, likely due to known severe atrophy. BLADDER: Normal.     IMPRESSION: No hydronephrosis.    XR External Imaging Chest    Result Date: 10/30/2023  Images  were obtained from an external facility.  Click PACS Images hyperlink to view images.  Textual results have been scanned into the media tab.    Chest XR,  PA & LAT    Result Date: 10/27/2023  EXAM: XR CHEST 2 VIEWS LOCATION: [facility] DATE: 10/27/2023 INDICATION: Shortness of breath, pneumonia since September, refractory to treatment, ? interval change on xray COMPARISON: 10/07/2023     IMPRESSION: Fine reticular/patchy opacities in the bilateral lower lung seen on prior study are either resolved or improved on the current study. Mild linear scarring is seen in the right upper lobe. No new consolidations or confluent airspace opacities.  Heart size and poorly vascularity are within normal limits. No pleural effusion or pneumothorax.    Chest CT w/o contrast    Result Date: 10/15/2023  EXAM: CT CHEST Without CONTRAST LOCATION: [facility] DATE: 10/15/2023 INDICATION: History of pneumonia 09/18 recovered but returned with PNA 09/07, some improvement, now worsening. COMPARISON: Chest x-ray on 10/07/2023. TECHNIQUE: CT chest without IV contrast. Multiplanar reformats were obtained. Dose reduction techniques were used. CONTRAST: None. FINDINGS: LUNGS AND PLEURA: No pleural effusion or pneumothorax. Bilateral patchy groundglass pulmonary opacities most prominent in the upper lobes, likely infectious. MEDIASTINUM/AXILLAE: No cardiomegaly or significant pericardial effusion. No significant mediastinal or hilar lymphadenopathy. CORONARY ARTERY CALCIFICATION: None. UPPER ABDOMEN: Limited evaluation of the upper abdomen due to lack of coverage and contrast. A few stones in the upper pole of the right kidney. MUSCULOSKELETAL: No suspicious osseous lesion.     IMPRESSION: 1.  Bilateral upper lobe predominant patchy groundglass pulmonary opacities, likely infectious. 2.  A few upper pole right kidney stones.     XR CHEST 2 VIEWS PA AND LATERAL    Result Date: 10/7/2023  For  Patients: As a result of the 21st Century Cures Act, medical imaging exams and procedure reports are released immediately into your electronic medical record. You may view this report before your referring provider. If you have questions, please contact your health care provider. EXAM: XR CHEST 2 VIEWS PA AND LATERAL LOCATION: [facility] MEDICAL IMAGING DATE: 10/7/2023 INDICATION: SOB COMPARISON: 10/1/2023    Heart is normal in size. Small subtle airspace opacity within the left mid and lower lung and at the right lung bases represent pneumonia. No effusion.    Abd/pelvis CT no contrast - Stone Protocol    Result Date: 10/6/2023  EXAM: CT ABDOMEN PELVIS W/O CONTRAST LOCATION: [facility] DATE: 10/6/2023 INDICATION: Right flank pain COMPARISON: 09/17/2023 TECHNIQUE: CT scan of the abdomen and pelvis was performed without IV contrast. Multiplanar reformats were obtained. Dose reduction techniques were used. CONTRAST: None. FINDINGS: LOWER CHEST: Multiple small patchy groundglass opacities are seen throughout the visualized lung bases, decreased from prior study. No new confluent consolidation or pleural effusion. HEPATOBILIARY: Normal. PANCREAS: Normal. SPLEEN: Normal. ADRENAL GLANDS: Normal. KIDNEYS/BLADDER: A right percutaneous nephrostomy tube is again seen in stable position. Multiple punctate nonobstructing stones measuring 1 to 3 mm again seen, unchanged. Cystic spaces in the right upper and right mid pole renal medulla may represent dilated calyces, difficult to evaluate without intravenous contrast but unchanged from prior study. Small peripheral cystic lesions in the superior pole of the right kidney are stable, either represent simple cyst or calyceal diverticulum. Left kidney is  severely atrophic. Urinary bladder is normal. BOWEL: Normal, including the appendix. LYMPH NODES: Normal. VASCULATURE: Unremarkable. PELVIC ORGANS: Normal. MUSCULOSKELETAL: Normal.     IMPRESSION: 1.  Overall  stable appearance of the right kidney with percutaneous nephrostomy tube in place and punctate nonobstructing stones measuring 1 to 3 mm. Dilated cystic spaces in the medulla of the right upper/interpolar kidney may represent dilated calyces or peripelvic cysts. Additional peripheral cystic lesions in the superior pole may represent simple cortical cysts versus calyceal diverticula. If definitive evaluation is needed, CT urogram would be most helpful. 2.  Multiple small patchy groundglass opacities throughout the visualized lung bases are significantly improved from prior study.     Chest XR,  PA & LAT    Result Date: 10/1/2023  EXAM: XR CHEST 2 VIEWS LOCATION: [facility] DATE: 10/1/2023 INDICATION: cough COMPARISON: 9/17/2023     IMPRESSION: Negative chest.

## 2023-11-04 NOTE — PROGRESS NOTES
[facility]    Medicine Progress Note - Hospitalist Service    Date of Admission:  11/1/2023    Assessment & Plan   Jacqueline Pappas is a 38 year old female with PMH significant for Congential Solitary Kidney, recurrent UTIs, kidney stones, ureteral strictures s/p right nephrostomy tube in place, recent hospitalization on 9/28/2023 for community-acquired pneumonia and recurrent UTI who presented to ED for evaluation of ongoing productive cough for since last 2 months and body ache, possible fever since last 2 days.  In ED x-ray suggestive of pneumonia.  Patient admitted for further management.    Acute hypoxic respiratory failure due to pneumonia;  Hospital-acquired pneumonia;  History of tobacco use disorder; quit 1 month ago  Probable COPD with exacerbation;  -- On admission, CXR reported new patchy infiltrates bilaterally consistent with pneumonia, right slightly worse than left  --On admission, mildly elevated procalcitonin and mild leukocytosis  -- Continue IV cefepime.  Added azithromycin by pulmonary.  --Continue DuoNeb.  Tapering course of prednisone as instructed by pulmonary  --Incentive spirometry and flutter valve  --On 2 L nasal cannula, titrate O2 as able  -- Appreciated ID and pulmonary input.    Recurrent history of recurrent UTI;  Right nephrostomy tube in place;  -- Abnormal UA but collected from nephrostomy tube.  UCx positive for staph, sticky note left for ID provider.  --US renal with no hydronephrosis  --Patient reported nephrostomy tube last exchanged was on 6/2023 at [facility].  Patient reported now having new insurance that does not cover [facility].  IR consulted and they exchanged nephrostomy tube on 11/3.  -- Patient seen by [facility] on recent hospitalization at [facility].  Discussed with patient and also [facility] team and they are willing to follow patient as outpatient.  Please place referral to [facility] on discharge    Tobacco use  disorder;  --Reported quit 1 month ago since recent hospitalization on 9/2023.  Congratulated on smoking cessation.              Diet: Regular Diet Adult    DVT Prophylaxis: Pneumatic Compression Devices and Ambulate every shift  Farr Catheter: Not present  Lines: None     Cardiac Monitoring: None  Code Status: Full Code      Clinically Significant Risk Factors           # Hypercalcemia: Highest Ca = 10.2 mg/dL in last 2 days, will monitor as appropriate               # Obesity: Estimated body mass index is 37.11 kg/m  as calculated from the following:    Height as of this encounter: 1.524 m (5').    Weight as of this encounter: 86.2 kg (190 lb)., PRESENT ON ADMISSION            Disposition Plan      Expected Discharge Date: 11/05/2023    Discharge Delays: IV Medication - consider oral or Home Infusion  Oxygen Needs - Arrange Home O2  Destination: home              Narinder Salcedo MD  Hospitalist Service  [facility]  Securely message with [employer] (more info)  Text page via [employer] Paging/Directory   ______________________________________________________________________    Interval History   Patient seen and examined.  Notes, labs, imaging report personally reviewed.  No acute issues reported overnight.  Patient reported to still some shortness of breath and productive cough with yellow sputum.  However, denied worsening and denied feeling fever or chills.  Denied abdomen pain, nausea, vomiting.  Patient encouraged to ambulate frequently.  Discussed with patient's nurse and respiratory therapist at bedside.    Physical Exam   Vital Signs: Temp: 97.9  F (36.6  C) Temp src: Oral BP: 121/73 Pulse: 73   Resp: 18 SpO2: 99 % O2 Device: Nasal cannula Oxygen Delivery: 2 LPM  Weight: 190 lbs 0 oz      General: Not in obvious distress.  HEENT: Normocephalic, supple neck  Chest: Decreased but clear to auscultation bilateral anteriorly, no wheezing  Heart: S1S2 normal, regular  Abdomen: Soft. NT, ND. Bowel  sounds- active.  Nephrostomy tube in place with no surrounding erythema or discharge  Extremities: No legs swelling  Neuro: alert and awake, grossly non-focal        Medical Decision Making             Data     I have personally reviewed the following data over the past 24 hrs:    N/A  \   N/A   / N/A     137 109 (H) 14.8 /  105 (H)   4.2 20 (L) 0.96 (H) \       Imaging results reviewed over the past 24 hrs:   Recent Results (from the past 24 hour(s))   IR Nephrostomy Tube Change Right    Narrative    [facility]  LOCATION: [facility]  DATE: 11/3/2023    PROCEDURE: NEPHROSTOMY TUBE EXCHANGE    ATTENDING: Ceferino Ashley MD.    INDICATION: 38-year-old female with a history of congenital solitary kidney and ureteral strictures status post right nephrostomy tube placement. Patient presents for routine exchange.    CONSENT: The risks, benefits, and alternatives of nephrostomy tube exchange were discussed with the patient in detail. All questions were answered. Informed consent was given to proceed with the procedure.    MODERATE SEDATION: IV fentanyl and Versed were administered for sedation.  During the timeout, immediately prior to the administration of medications, the patient was reassessed for adequacy to receive conscious sedation. Under physician supervision,   Versed and fentanyl were administered for moderate sedation. Pulse oximetry, heart rate and blood pressure were continuously monitored by an independent trained observer. The physician spent 15 minutes of face-to-face sedation time with the patient.    ADDITIONAL MEDICATIONS: See EMR    FLUOROSCOPIC TIME: 1.1 minutes.    AIR KERMA:  12 mGy.    CONTRAST: 5 mL    UNIVERSAL PRECAUTIONS: The procedure was performed utilizing maximum sterile barrier technique. Prior to the start of the procedure, a standard pause for patient safety was performed with site marking as indicated.    COMPLICATIONS: No immediate  complications.    PROCEDURE:    A  image was obtained. A nephrostogram was performed through the previously placed right nephrostomy tube. Under direct fluoroscopic visualization, the previous tube was removed over a wire and exchange was made for a new 8 [place] nephrostomy. The   loop was locked within the renal pelvis, and the catheter was sutured to the skin. A post placement nephrostogram was performed.    FINDINGS:    The initial  image shows the previously placed right nephrostomy tube. At the completion of the study, the new nephrostomy loop lies within the renal pelvis and controls the urinary system well.      Impression    IMPRESSION:    Successful right nephrostomy exchange, as discussed above.

## 2023-11-04 NOTE — PLAN OF CARE
Problem: Adult Inpatient Plan of Care  Goal: Optimal Comfort and Wellbeing  Intervention: Monitor Pain and Promote Comfort  Recent Flowsheet Documentation  Taken 11/4/2023 0853 by Flora Gayle, RN  Pain Management Interventions: declines     Problem: Gas Exchange Impaired  Goal: Optimal Gas Exchange  Intervention: Optimize Oxygenation and Ventilation  Recent Flowsheet Documentation  Taken 11/4/2023 0853 by Flora Gayle, RN  Head of Bed (HOB) Positioning: HOB at 30 degrees   Goal Outcome Evaluation:  Patient A&O x4  Ambulated in hallway x2 this shift. Appears and reports feeling stable.  On 1L NC most of shift and has a productive cough. Around 1400 patient on RA and saturations above 95%.  PIV remains c/d/I  Eating and voiding well.  Minimal pain reported. PO Dilaudid x1 for flank pain, treatment was effective.   Nephrostomy tube dressing reinforced.

## 2023-11-04 NOTE — PROGRESS NOTES
RESPIRATORY CARE NOTE     Patient was on 2 lpm and was titrated to room air at rest and had an Sp02 of 94%. They received Duoneb x2.   Breath sounds prior to treatment where diminished with faint crackles at the bases and post treatment minimal increase in aeration.  Patient perceives moderate improvement, patient tolerated it well.    Patient has a strong cough that is productive. Sputum is creamy yellow.    They have completed aerobika and IS therapy and demonstrated good technique. IS attained 1500ml.     RT will continue to assess and monitor cardiopulmonary status.     Susanna Uriarte, RT

## 2023-11-04 NOTE — PLAN OF CARE
"  Problem: Adult Inpatient Plan of Care  Goal: Plan of Care Review  Description: The Plan of Care Review/Shift note should be completed every shift.  The Outcome Evaluation is a brief statement about your assessment that the patient is improving, declining, or no change.  This information will be displayed automatically on your shift  note.  Outcome: Progressing  Goal: Patient-Specific Goal (Individualized)  Description: You can add care plan individualizations to a care plan. Examples of Individualization might be:  \"Parent requests to be called daily at 9am for status\", \"I have a hard time hearing out of my right ear\", or \"Do not touch me to wake me up as it startles  me\".  Outcome: Progressing  Goal: Absence of Hospital-Acquired Illness or Injury  Outcome: Progressing  Goal: Optimal Comfort and Wellbeing  Outcome: Progressing  Intervention: Monitor Pain and Promote Comfort  Recent Flowsheet Documentation  Taken 11/3/2023 1630 by Rubia Jackson, RN  Pain Management Interventions: medication (see MAR)  Intervention: Provide Person-Centered Care  Recent Flowsheet Documentation  Taken 11/3/2023 1630 by Rubia Jackson, RN  Trust Relationship/Rapport: care explained  Goal: Readiness for Transition of Care  Outcome: Progressing   Goal Outcome Evaluation:       Pt is alert and oriented X 4, independence in room, nephrostomy tube change, dressing clean, dry and intact and patent, output 125ml clear yellow color. Pt got PO dilaudid for pain to back and was effective. IV abx infusing.                 "

## 2023-11-04 NOTE — PROGRESS NOTES
PULMONARY MEDICINE PROGRESS NOTE  11/3/2023    Admit Date: 11/1/2023  CODE: Full Code    Reason for Consult: acute respiratory failure with hypoxia, abnormal chest CT    Assessment/Plan:   38 year old female with a history of active tobacco dependence, congenital solitary right kidney, recurrent UTIs, nephrolithiasis, ureteral strictures sp right nephrostomy tube, hospitalization for 2 nights in September with dyspnea, cough, rhinovirus, and patchy bilateral pulmonary opacities, now presents to ED on 11/1 with body aches, right flank pain, productive cough, chest CT shows persistent/progressive bilateral patchy ground glass opacities and mosaic attenuation, on empiric antibiotics and systemic steroids, underwent right nephrostomy tube exchange on 11/3.    Tobacco dependence, acute respiratory failure with hypoxia, productive cough, dyspnea, abnormal chest CT: Infectious pneumonitis is possible, but with her tobacco use and the pattern and persistence of changes on CT, including small patchy GGOs and mosaic attenuation, the DDx includes tobacco-related respiratory bronchiolitis (RB-ILD). The CT findings would certainly fit with this diagnosis. This increases the urgency of quitting smoking, and will taper prednisone and plan repeat chest CT in one month. She has scheduled complete pulmonary function testing and pulmonary clinic follow-up already scheduled for November 30.    Plan:  - tobacco cessation essential, especially with possible RB-ILD  - nicotine 14-mg patch and as-needed nicotine gum  - prednisone taper ordered.  - can continue nebulized bronchodilators as inpatient  - can continue budesonide-formoterol outpatient, 160-4.5 mcg two inhalations via holding chamber BID; rinse/gargle/spit water after use  - taper oxygen  - empiric antibiotics; ID following  - repeat chest CT in one month  - has PFT and pulmonary clinic follow-up scheduled  - will follow    Dary Botello MD  Pulmonary and Critical Care  (P)  [phone]                                                                                                                                                          SUBJECTIVE/INTERVAL HISTORY   Feeling better.                                                                                                                                                      Exam/Data:     Vitals  /71 (BP Location: Left arm)   Pulse 92   Temp 98.4  F (36.9  C) (Oral)   Resp 18   Ht 1.524 m (5')   Wt 86.2 kg (190 lb)   LMP 09/24/2023 (Approximate)   SpO2 95%   BMI 37.11 kg/m       I/O last 3 completed shifts:  In: 567 [P.O.:567]  Out: 1575 [Urine:1575]  Weight change:   EXAM:  /71 (BP Location: Left arm)   Pulse 92   Temp 98.4  F (36.9  C) (Oral)   Resp 18   Ht 1.524 m (5')   Wt 86.2 kg (190 lb)   LMP 09/24/2023 (Approximate)   SpO2 95%   BMI 37.11 kg/m      Intake/Output last 3 shifts:  I/O last 3 completed shifts:  In: 567 [P.O.:567]  Out: 1575 [Urine:1575]  Intake/Output this shift:  I/O this shift:  In: 600 [P.O.:600]  Out: 525 [Urine:525]    Physical Exam  HENT:      Mouth/Throat:      Mouth: Mucous membranes are moist.   Cardiovascular:      Comments: Sinus tachycardia.  S1 and S2 audible.  Pulmonary:      Effort: Pulmonary effort is normal.   Abdominal:      General: Abdomen is flat.      Palpations: Abdomen is soft.   Skin:     General: Skin is warm.   Neurological:      General: No focal deficit present.      Mental Status: She is alert.           ROS: A complete 10-system review of systems was obtained and is negative with the exception of what is noted in the history of present illness.    Medications:          azithromycin  500 mg Intravenous Q24H    ceFEPIme  2 g Intravenous Q8H    guaiFENesin  600 mg Oral BID    ipratropium - albuterol 0.5 mg/2.5 mg/3 mL  3 mL Nebulization 4x daily    nicotine  1 patch Transdermal Daily    nicotine   Transdermal Q8H    predniSONE  40 mg Oral Daily    Followed  by    [START ON 11/7/2023] predniSONE  30 mg Oral Daily    Followed by    [START ON 11/11/2023] predniSONE  20 mg Oral Daily    Followed by    [START ON 11/15/2023] predniSONE  10 mg Oral Daily    sertraline  100 mg Oral At Bedtime         DATA  All laboratory and radiology has been personally reviewed by myself today.  Recent Labs   Lab 11/02/23  0640   WBC 8.9   HGB 11.0*   HCT 34.8*        Recent Labs   Lab 11/04/23  0545 11/02/23  0640 11/01/23  2314    138 135   CO2 20* 24 24   BUN 14.8 9.4 10.5   ALKPHOS  --   --  76   ALT  --   --  18   AST  --   --  23         Imaging:  Chest CT (10/15/23):  - images directly reviewed, formal interpretation follows:  FINDINGS:   LUNGS AND PLEURA: No pleural effusion or pneumothorax. Bilateral patchy groundglass pulmonary opacities most prominent in the upper lobes, likely infectious.     MEDIASTINUM/AXILLAE: No cardiomegaly or significant pericardial effusion. No significant mediastinal or hilar lymphadenopathy.     CORONARY ARTERY CALCIFICATION: None.     UPPER ABDOMEN: Limited evaluation of the upper abdomen due to lack of coverage and contrast. A few stones in the upper pole of the right kidney.     MUSCULOSKELETAL: No suspicious osseous lesion.                                                                      IMPRESSION:   1.  Bilateral upper lobe predominant patchy groundglass pulmonary opacities, likely infectious.  2.  A few upper pole right kidney stones.    CXR (11/2/23):  - images directly reviewed, formal interpretation follows:  IMPRESSION: Heart size normal. New patchy infiltrate present bilaterally consistent with pneumonia, right lung slightly worse than left.     Pulmonary Function Testing  none

## 2023-11-05 LAB
BACTERIA UR CULT: ABNORMAL
H CAPSUL AG UR QL IA: NOT DETECTED
H CAPSUL AG UR-MCNC: NOT DETECTED NG/ML

## 2023-11-05 PROCEDURE — 250N000013 HC RX MED GY IP 250 OP 250 PS 637: Performed by: INTERNAL MEDICINE

## 2023-11-05 PROCEDURE — 250N000009 HC RX 250: Performed by: FAMILY MEDICINE

## 2023-11-05 PROCEDURE — 99232 SBSQ HOSP IP/OBS MODERATE 35: CPT | Performed by: INTERNAL MEDICINE

## 2023-11-05 PROCEDURE — 250N000012 HC RX MED GY IP 250 OP 636 PS 637: Performed by: INTERNAL MEDICINE

## 2023-11-05 PROCEDURE — 94799 UNLISTED PULMONARY SVC/PX: CPT

## 2023-11-05 PROCEDURE — 250N000013 HC RX MED GY IP 250 OP 250 PS 637: Performed by: HOSPITALIST

## 2023-11-05 PROCEDURE — 250N000011 HC RX IP 250 OP 636: Performed by: FAMILY MEDICINE

## 2023-11-05 PROCEDURE — 94640 AIRWAY INHALATION TREATMENT: CPT

## 2023-11-05 PROCEDURE — 250N000013 HC RX MED GY IP 250 OP 250 PS 637: Performed by: FAMILY MEDICINE

## 2023-11-05 PROCEDURE — 120N000001 HC R&B MED SURG/OB

## 2023-11-05 PROCEDURE — 999N000157 HC STATISTIC RCP TIME EA 10 MIN

## 2023-11-05 RX ORDER — IPRATROPIUM BROMIDE AND ALBUTEROL SULFATE 2.5; .5 MG/3ML; MG/3ML
3 SOLUTION RESPIRATORY (INHALATION) EVERY 4 HOURS PRN
Status: DISCONTINUED | OUTPATIENT
Start: 2023-11-05 | End: 2023-11-06 | Stop reason: HOSPADM

## 2023-11-05 RX ORDER — POLYETHYLENE GLYCOL 3350 17 G/17G
17 POWDER, FOR SOLUTION ORAL DAILY PRN
Status: DISCONTINUED | OUTPATIENT
Start: 2023-11-05 | End: 2023-11-06 | Stop reason: HOSPADM

## 2023-11-05 RX ORDER — AMOXICILLIN 250 MG
1 CAPSULE ORAL 2 TIMES DAILY
Status: DISCONTINUED | OUTPATIENT
Start: 2023-11-05 | End: 2023-11-06 | Stop reason: HOSPADM

## 2023-11-05 RX ORDER — INHALER, ASSIST DEVICES
1 SPACER (EA) MISCELLANEOUS ONCE
Status: COMPLETED | OUTPATIENT
Start: 2023-11-05 | End: 2023-11-05

## 2023-11-05 RX ORDER — ALBUTEROL SULFATE 90 UG/1
2 AEROSOL, METERED RESPIRATORY (INHALATION)
Status: DISCONTINUED | OUTPATIENT
Start: 2023-11-05 | End: 2023-11-06 | Stop reason: HOSPADM

## 2023-11-05 RX ADMIN — NICOTINE 1 PATCH: 14 PATCH, EXTENDED RELEASE TRANSDERMAL at 09:01

## 2023-11-05 RX ADMIN — GUAIFENESIN 600 MG: 600 TABLET ORAL at 21:27

## 2023-11-05 RX ADMIN — DOXYCYCLINE 100 MG: 100 CAPSULE ORAL at 21:27

## 2023-11-05 RX ADMIN — ALBUTEROL SULFATE 2 PUFF: 90 AEROSOL, METERED RESPIRATORY (INHALATION) at 21:27

## 2023-11-05 RX ADMIN — SERTRALINE HYDROCHLORIDE 100 MG: 100 TABLET ORAL at 21:27

## 2023-11-05 RX ADMIN — IPRATROPIUM BROMIDE AND ALBUTEROL SULFATE 3 ML: .5; 3 SOLUTION RESPIRATORY (INHALATION) at 09:08

## 2023-11-05 RX ADMIN — GUAIFENESIN 600 MG: 600 TABLET ORAL at 09:02

## 2023-11-05 RX ADMIN — CEFEPIME HYDROCHLORIDE 2 G: 2 INJECTION, POWDER, FOR SOLUTION INTRAVENOUS at 01:39

## 2023-11-05 RX ADMIN — CEFEPIME HYDROCHLORIDE 2 G: 2 INJECTION, POWDER, FOR SOLUTION INTRAVENOUS at 10:28

## 2023-11-05 RX ADMIN — Medication 1 EACH: at 13:01

## 2023-11-05 RX ADMIN — PREDNISONE 40 MG: 20 TABLET ORAL at 09:02

## 2023-11-05 RX ADMIN — ALBUTEROL SULFATE 2 PUFF: 90 AEROSOL, METERED RESPIRATORY (INHALATION) at 13:01

## 2023-11-05 RX ADMIN — ALBUTEROL SULFATE 2 PUFF: 90 AEROSOL, METERED RESPIRATORY (INHALATION) at 17:35

## 2023-11-05 RX ADMIN — DOXYCYCLINE 100 MG: 100 CAPSULE ORAL at 09:02

## 2023-11-05 ASSESSMENT — ACTIVITIES OF DAILY LIVING (ADL)
ADLS_ACUITY_SCORE: 18

## 2023-11-05 NOTE — PLAN OF CARE
Goal Outcome Evaluation:     Pt is A&Ox4, up indep. Pt reported mild pain in rt flank, pain meds offered but pt declined. Pt is ambulating  in hallway. Eating/drinking well, good urine output. Nephrostomy intact and patent. Pt denied SOB, still has congested productive cough, LS diminished with wheezes, sats low 90's on RA, continues on abs for UTI and pneumonia. VSS.   Problem: Adult Inpatient Plan of Care  Goal: Optimal Comfort and Wellbeing  Outcome: Progressing  Intervention: Monitor Pain and Promote Comfort  Recent Flowsheet Documentation  Taken 11/5/2023 0848 by Guerline Christian RN  Pain Management Interventions:   medication offered but refused   ambulation/increased activity   breathing exercises   distraction   emotional support   repositioned  Intervention: Provide Person-Centered Care  Recent Flowsheet Documentation  Taken 11/5/2023 0849 by Guerline Christian RN  Trust Relationship/Rapport:   care explained   choices provided   questions encouraged   questions answered   thoughts/feelings acknowledged     Problem: Gas Exchange Impaired  Goal: Optimal Gas Exchange  Outcome: Progressing     Problem: Infection  Goal: Absence of Infection Signs and Symptoms  Outcome: Progressing  Intervention: Prevent or Manage Infection  Recent Flowsheet Documentation  Taken 11/5/2023 0849 by Guerline Christian RN  Isolation Precautions: contact precautions maintained     Problem: Pain Acute  Goal: Optimal Pain Control and Function  Outcome: Progressing  Intervention: Develop Pain Management Plan  Recent Flowsheet Documentation  Taken 11/5/2023 0848 by Guerline Christian RN  Pain Management Interventions:   medication offered but refused   ambulation/increased activity   breathing exercises   distraction   emotional support   repositioned     Problem: Adult Inpatient Plan of Care  Goal: Absence of Hospital-Acquired Illness or Injury  Intervention: Identify and Manage Fall Risk  Recent Flowsheet Documentation  Taken 11/5/2023 0849  by Guerline Christian, RN  Safety Promotion/Fall Prevention:   patient and family education   nonskid shoes/slippers when out of bed  Intervention: Prevent Skin Injury  Recent Flowsheet Documentation  Taken 11/5/2023 0849 by Guerline Christian, RN  Body Position: position changed independently

## 2023-11-05 NOTE — PROGRESS NOTES
PULMONARY MEDICINE PROGRESS NOTE  11/3/2023    Admit Date: 11/1/2023  CODE: Full Code    Reason for Consult: acute respiratory failure with hypoxia, abnormal chest CT    Assessment/Plan:   38 year old female with a history of active tobacco dependence, congenital solitary right kidney, recurrent UTIs, nephrolithiasis, ureteral strictures sp right nephrostomy tube, hospitalization for 2 nights in September with dyspnea, cough, rhinovirus, and patchy bilateral pulmonary opacities, now presents to ED on 11/1 with body aches, right flank pain, productive cough, chest CT shows persistent/progressive bilateral patchy ground glass opacities and mosaic attenuation, on empiric antibiotics and systemic steroids, underwent right nephrostomy tube exchange on 11/3.    Tobacco dependence, acute respiratory failure with hypoxia, productive cough, dyspnea, abnormal chest CT: Infectious pneumonitis is possible, but with her tobacco use and the pattern and persistence of changes on CT, including small patchy GGOs and mosaic attenuation, the DDx includes tobacco-related respiratory bronchiolitis (RB-ILD). The CT findings would certainly fit with this diagnosis. This increases the urgency of quitting smoking, and will taper prednisone and plan repeat chest CT in one month. She has scheduled complete pulmonary function testing and pulmonary clinic follow-up already scheduled for November 30.    Plan:  - tobacco cessation essential, especially with possible RB-ILD  - nicotine 14-mg patch and as-needed nicotine gum  - prednisone taper ordered.  - can continue nebulized bronchodilators as inpatient  - can continue budesonide-formoterol outpatient, 160-4.5 mcg two inhalations via holding chamber BID; rinse/gargle/spit water after use  - Defer to ID for duration of antibiotics.  - taper oxygen  - empiric antibiotics; ID following  - repeat chest CT in one month  - has PFT and pulmonary clinic follow-up scheduled  - We will sign off. Thank  you for involving us in the care of this patient.    Dary Botello MD  Pulmonary and Critical Care  (P) [phone]                                                                                                                                                          SUBJECTIVE/INTERVAL HISTORY   Feeling better.                                                                                                                                                      Exam/Data:     Vitals  /69 (BP Location: Right arm)   Pulse 82   Temp 98.2  F (36.8  C) (Oral)   Resp 18   Ht 1.524 m (5')   Wt 86.2 kg (190 lb)   LMP 09/24/2023 (Approximate)   SpO2 93%   BMI 37.11 kg/m       I/O last 3 completed shifts:  In: 600 [P.O.:600]  Out: 750 [Urine:750]  Weight change:   EXAM:  /69 (BP Location: Right arm)   Pulse 82   Temp 98.2  F (36.8  C) (Oral)   Resp 18   Ht 1.524 m (5')   Wt 86.2 kg (190 lb)   LMP 09/24/2023 (Approximate)   SpO2 93%   BMI 37.11 kg/m      Intake/Output last 3 shifts:  I/O last 3 completed shifts:  In: 600 [P.O.:600]  Out: 750 [Urine:750]  Intake/Output this shift:  I/O this shift:  In: 200 [P.O.:200]  Out: -     Physical Exam  HENT:      Mouth/Throat:      Mouth: Mucous membranes are moist.   Cardiovascular:      Comments: Sinus tachycardia.  S1 and S2 audible.  Pulmonary:      Effort: Pulmonary effort is normal.   Abdominal:      General: Abdomen is flat.      Palpations: Abdomen is soft.   Skin:     General: Skin is warm.   Neurological:      General: No focal deficit present.      Mental Status: She is alert.           ROS: A complete 10-system review of systems was obtained and is negative with the exception of what is noted in the history of present illness.    Medications:          aerochamber  1 each Inhalation Once    albuterol  2 puff Inhalation 4x daily    doxycycline monohydrate  100 mg Oral Q12H [facility] (08/20)    guaiFENesin  600 mg Oral BID    nicotine  1 patch  Transdermal Daily    nicotine   Transdermal Q8H    predniSONE  40 mg Oral Daily    Followed by    [START ON 11/7/2023] predniSONE  30 mg Oral Daily    Followed by    [START ON 11/11/2023] predniSONE  20 mg Oral Daily    Followed by    [START ON 11/15/2023] predniSONE  10 mg Oral Daily    senna-docusate  1 tablet Oral BID    sertraline  100 mg Oral At Bedtime         DATA  All laboratory and radiology has been personally reviewed by myself today.  Recent Labs   Lab 11/02/23  0640   WBC 8.9   HGB 11.0*   HCT 34.8*        Recent Labs   Lab 11/04/23  0545 11/02/23  0640 11/01/23  2314    138 135   CO2 20* 24 24   BUN 14.8 9.4 10.5   ALKPHOS  --   --  76   ALT  --   --  18   AST  --   --  23         Imaging:  Chest CT (10/15/23):  - images directly reviewed, formal interpretation follows:  FINDINGS:   LUNGS AND PLEURA: No pleural effusion or pneumothorax. Bilateral patchy groundglass pulmonary opacities most prominent in the upper lobes, likely infectious.     MEDIASTINUM/AXILLAE: No cardiomegaly or significant pericardial effusion. No significant mediastinal or hilar lymphadenopathy.     CORONARY ARTERY CALCIFICATION: None.     UPPER ABDOMEN: Limited evaluation of the upper abdomen due to lack of coverage and contrast. A few stones in the upper pole of the right kidney.     MUSCULOSKELETAL: No suspicious osseous lesion.                                                                      IMPRESSION:   1.  Bilateral upper lobe predominant patchy groundglass pulmonary opacities, likely infectious.  2.  A few upper pole right kidney stones.    CXR (11/2/23):  - images directly reviewed, formal interpretation follows:  IMPRESSION: Heart size normal. New patchy infiltrate present bilaterally consistent with pneumonia, right lung slightly worse than left.     Pulmonary Function Testing  none

## 2023-11-05 NOTE — TREATMENT PLAN
Respiratory Treatment Plan     Patient Score: 5  Patient Acuity: 4    Clinical Indication for Therapy: productive cough    Therapy Ordered: Flutter and IS Q1- duoneb QID      History:   -Smoking History: current smoker   -Home Medications: na   -Home Oxygen: na   -INESSA Hx na      Assessment Summary: Oxygen needs have decreased and she is currently on room air. She continues to work on her IS and flutter valve with good technique along with ambulating the hallway. She has a strong cough with yellow/white creamy secretions that are easy to mobilize. Will transitioned to inhaler as she has found some relief with the duoneb. Pre and post treatment this am had no change in aeration or onset of wheezing.         Susanna Uriarte, RT  11/5/2023

## 2023-11-05 NOTE — PROGRESS NOTES
CC Note:    Urine cx growing MRSA. Pt currently on cefepime and Azithromycin. Continue Cefepime and stop Azithromycin. Added doxycycline instead.    Simona Mcdonnell MD  Hospitalist.

## 2023-11-05 NOTE — PLAN OF CARE
Problem: Infection  Goal: Absence of Infection Signs and Symptoms  Outcome: Progressing  Intervention: Prevent or Manage Infection  Recent Flowsheet Documentation  Taken 11/5/2023 0130 by Keiry Montague RN  Isolation Precautions: contact precautions maintained     Problem: Pain Acute  Goal: Optimal Pain Control and Function  Outcome: Progressing   Goal Outcome Evaluation:         VSS  IV Maxipime administered to treat pneumonia  Started oral doxycycline to treat UTI  Urine culture+ for MRSA; Contact precautions  Right nephrostomy tube dressing CDI; pt manages independently  No pain reported

## 2023-11-05 NOTE — PROGRESS NOTES
[facility]    Medicine Progress Note - Hospitalist Service    Date of Admission:  11/1/2023    Assessment & Plan   Jacqueline Pappas is a 38 year old female with PMH significant for Congential Solitary Kidney, recurrent UTIs, kidney stones, ureteral strictures s/p right nephrostomy tube in place, recent hospitalization on 9/28/2023 for community-acquired pneumonia and recurrent UTI who presented to ED for evaluation of ongoing productive cough for since last 2 months and body ache, possible fever since last 2 days.  In ED x-ray suggestive of pneumonia.  Patient admitted for further management.    Acute hypoxic respiratory failure due to pneumonia;  Hospital-acquired pneumonia;  History of tobacco use disorder; quit 1 month ago  Probable COPD with exacerbation;  -- On admission, CXR reported new patchy infiltrates bilaterally consistent with pneumonia, right slightly worse than left  --On admission, mildly elevated procalcitonin and mild leukocytosis  -- On IV cefepime, overnight azithromycin changed to doxycycline given UCx positive for MRSA  --Continue DuoNeb.  Incentive spirometry and flutter valve  --Tapering course of prednisone as instructed by pulmonary  --On 1.5 L nasal cannula, titrate O2 as able  -- Appreciated ID and pulmonary input.    Recurrent history of recurrent UTI;  Right nephrostomy tube in place;  -- Abnormal UA but collected from nephrostomy tube.  UCx positive for MRSA.  Placement discussed with ID provider.  Staph, sticky note left for ID provider.  --US renal with no hydronephrosis  --Patient reported nephrostomy tube last exchanged was on 6/2023 at [facility].  Patient reported now having new insurance that does not cover [facility].  IR consulted and they exchanged nephrostomy tube on 11/3.  -- Patient seen by [facility] on recent hospitalization at [facility].  Discussed with patient and also [facility] team and they are willing to follow patient as  outpatient.  Please place referral to [facility] on discharge    Tobacco use disorder;  --Reported quit 1 month ago since recent hospitalization on 9/2023.  Congratulated on smoking cessation.            Diet: Regular Diet Adult    DVT Prophylaxis: Pneumatic Compression Devices and Ambulate every shift  Farr Catheter: Not present  Lines: None     Cardiac Monitoring: None  Code Status: Full Code      Clinically Significant Risk Factors           # Hypercalcemia: Highest Ca = 10.2 mg/dL in last 2 days, will monitor as appropriate               # Obesity: Estimated body mass index is 37.11 kg/m  as calculated from the following:    Height as of this encounter: 1.524 m (5').    Weight as of this encounter: 86.2 kg (190 lb)., PRESENT ON ADMISSION            Disposition Plan      Expected Discharge Date: 11/06/2023    Discharge Delays: IV Medication - consider oral or Home Infusion  Oxygen Needs - Arrange Home O2  Destination: home              Narinder Salcedo MD  Hospitalist Service  [facility]  Securely message with Urgent Group (more info)  Text page via [employer] Paging/Directory   ______________________________________________________________________    Interval History   Patient seen and examined.  Notes, labs, imaging report personally reviewed.  Patient reported to still ongoing cough but no sputum is more white than yellow.  Reported breathing stable to improving.  Denied other new concerning complaints.  Discussed with nursing staffs.  Discussed with ID provider.    Physical Exam   Vital Signs: Temp: 98.2  F (36.8  C) Temp src: Oral BP: 119/69 Pulse: 82   Resp: 18 SpO2: 93 % O2 Device: None (Room air) Oxygen Delivery: 1.5 LPM  Weight: 190 lbs 0 oz      General: Not in obvious distress.  HEENT: Normocephalic, supple neck  Chest: Decreased breath clear to auscultation bilateral anteriorly, no wheezing  Heart: S1S2 normal, regular  Abdomen: Soft. NT, ND. Bowel sounds- active.  Extremities: No legs  swelling  Neuro: alert and awake, grossly non-focal        Medical Decision Making             Data         Imaging results reviewed over the past 24 hrs:   No results found for this or any previous visit (from the past 24 hour(s)).

## 2023-11-05 NOTE — PROGRESS NOTES
RESPIRATORY CARE NOTE     Patient was on room air and had an Sp02 of 93%. They received Duoneb x1.   Breath sounds prior to treatment where clear at the apice, fine crackle lower left and post treatment no change. Transition to inhaler as patient can complete it and is appropriate for the transition.      Patient has a strong cough that is productive.    They have completed aerobika IS therapy and demonstrated good technique.         Susanna Uriarte, RT

## 2023-11-05 NOTE — PLAN OF CARE
Problem: Pain Acute  Goal: Optimal Pain Control and Function  Outcome: Progressing  Intervention: Develop Pain Management Plan  Recent Flowsheet Documentation  Taken 11/4/2023 2106 by Yoselyn Mahajan RN  Pain Management Interventions: medication (see MAR)  Taken 11/4/2023 1714 by Yoselyn Mahajan RN  Pain Management Interventions: declines     Problem: Infection  Goal: Absence of Infection Signs and Symptoms  Outcome: Progressing     Problem: Gas Exchange Impaired  Goal: Optimal Gas Exchange  Outcome: Progressing     Goal Outcome Evaluation:        Pt presented to the hospital on 11/01 with an ongoing cough and shortness of breath. Chest x-ray suggestive of recurrent pneumonia. Pt reports recent hospitalization in September for the same. Pulmonology consulting. CT suggestive of ILD in a smoker. Wearing Nicotine patch. Pt weaned to RA today. Pt is maintaining O2 sats >92. Reports shortness of breath with exertion. Infrequent cough. Lungs are diminished throughout with some expiratory wheezes. On scheduled nebs and Prednisone taper.  Independent with ambulation and encouraged. Pt has a right nephrostomy tube, which was exchanged yesterday. History of congenital right solitary kidney. Dressing is clean, dry and intact. Urine is yellow/straw colored. Urine culture came back positive for MRSA. MD notified and abx changed from Azithromycin to Doxycycline. She continues on IV Maxipime  ID consulting. Contact precautions initiated. Pt continues to report pain in the right flank. Received prn oral Dilaudid 2 mg at 2105, which was effective. Tolerating a regular diet. Appetite is adequate. VS stable.

## 2023-11-05 NOTE — PROGRESS NOTES
INFECTIOUS DISEASE FOLLOW UP NOTE      ASSESSMENT:  Ongoing dyspnea and cough for 2 months. Chest imaging has shown patchy ground glass opacities since 9/14/23. Current Chest film shows new opacities that were not apparent on CXR 10/27, raises possibility of superimposed bacterial infection.  GGO can be from PJP (seems low risk as she is presumed immunocompetent), viral, or hypersensitivity reaction. On cefepime, azithromycin-->doxy. Respiratory virus panel negative. Sputum usual christie Pulmonary suspects tobacco-related respiratory bronchiolitis. Respiratory status improved.   Dysuria, flank pain, in presence of percutaneous nephrostomy tube. UC with staph lugdunensis, MRSA, mixed christie. Back pain improved, dysuria resolved -- started doxycycline 11/4 PM -- not sure if this doxy has already treated the UTI or if the MRSA is colonization.   Congratulated on smoking cessation  Antibiotic allergies -- penicillin as a child, but has tolerated amoxicillin since; sulfa, vancomycin allergies too.     PLAN:  Doxycycline 10 days  ID plan in place. I will sign off. Please call if questions.     Krzysztof Black MD  [facility]  [phone] clinic  [facility] p[facility]    ______________________________________________________________________    SUBJECTIVE / INTERVAL HISTORY: UC returned positive for MRSA -- started on doxycycline last night.     Breathing better. On room air at rest.     Back pain improved. Dysuria resolved.     Discussed results.     ROS: All other systems negative except as listed above.        OBJECTIVE:  /69 (BP Location: Right arm)   Pulse 82   Temp 98.2  F (36.8  C) (Oral)   Resp 18   Ht 1.524 m (5')   Wt 86.2 kg (190 lb)   LMP 09/24/2023 (Approximate)   SpO2 93%   BMI 37.11 kg/m    FiO2 (%): 28 %        GEN: No acute distress.  Room air  RESPIRATORY:  Normal breathing pattern.   CARDIOVASCULAR:  Regular rate and rhythm.   ABDOMEN:  Soft, normal bowel sounds, non-tender,  no masses  R perc neph tube  EXTREMITIES: No edema.  SKIN/HAIR/NAILS:  No rashes  IV: peripheral        Antibiotics:  Cefepime 11/2-  Doxy 11/4-  Azithromycin 11/2-3    Pertinent labs:    Recent Labs   Lab 11/02/23  0640 11/01/23  2314   WBC 8.9 12.5*   HGB 11.0* 12.3   HCT 34.8* 37.9    178        Recent Labs   Lab 11/04/23  0545 11/02/23  0640 11/01/23  2314    138 135   CO2 20* 24 24   BUN 14.8 9.4 10.5        Lab Results   Component Value Date    CRP 0.3 10/04/2022    CRP 0.4 08/30/2022    CRP 6.0 (H) 07/22/2022         Lab Results   Component Value Date    ALT 18 11/01/2023    AST 23 11/01/2023    ALKPHOS 76 11/01/2023         MICROBIOLOGY DATA:  11/1 and 11/2 blood cultures negative  UC staph lug, MRSA, mixed christie  Sputum culture usual christie  Antigens pending  Resp virus panel negative (x2)    RADIOLOGY:  IR Nephrostomy Tube Change Right    Result Date: 11/3/2023  [place] [facility] LOCATION: [facility] DATE: 11/3/2023 PROCEDURE: NEPHROSTOMY TUBE EXCHANGE ATTENDING: Ceferino Ashley MD. INDICATION: 38-year-old female with a history of congenital solitary kidney and ureteral strictures status post right nephrostomy tube placement. Patient presents for routine exchange. CONSENT: The risks, benefits, and alternatives of nephrostomy tube exchange were discussed with the patient in detail. All questions were answered. Informed consent was given to proceed with the procedure. MODERATE SEDATION: IV fentanyl and Versed were administered for sedation.  During the timeout, immediately prior to the administration of medications, the patient was reassessed for adequacy to receive conscious sedation. Under physician supervision, Versed and fentanyl were administered for moderate sedation. Pulse oximetry, heart rate and blood pressure were continuously monitored by an independent trained observer. The physician spent 15 minutes of face-to-face sedation time with the patient. ADDITIONAL  MEDICATIONS: See EMR FLUOROSCOPIC TIME: 1.1 minutes. AIR KERMA:  12 mGy. CONTRAST: 5 mL UNIVERSAL PRECAUTIONS: The procedure was performed utilizing maximum sterile barrier technique. Prior to the start of the procedure, a standard pause for patient safety was performed with site marking as indicated. COMPLICATIONS: No immediate complications. PROCEDURE:  A  image was obtained. A nephrostogram was performed through the previously placed right nephrostomy tube. Under direct fluoroscopic visualization, the previous tube was removed over a wire and exchange was made for a new 8 [place] nephrostomy. The loop was locked within the renal pelvis, and the catheter was sutured to the skin. A post placement nephrostogram was performed. FINDINGS:  The initial  image shows the previously placed right nephrostomy tube. At the completion of the study, the new nephrostomy loop lies within the renal pelvis and controls the urinary system well.     IMPRESSION:  Successful right nephrostomy exchange, as discussed above.     Chest XR,  PA & LAT    Result Date: 11/2/2023  EXAM: XR CHEST 2 VIEWS LOCATION: [facility] DATE: 11/2/2023 INDICATION: cough COMPARISON: 10/27/2023     IMPRESSION: Heart size normal. New patchy infiltrate present bilaterally consistent with pneumonia, right lung slightly worse than left.    US Renal Complete Non-Vascular    Result Date: 11/2/2023  EXAM: US RENAL COMPLETE NON-VASCULAR LOCATION: [facility] DATE: 11/2/2023 INDICATION: Right flank pain; nephrostomy tube in place. COMPARISON: CT abdomen/pelvis 10/06/2023; ultrasound 01/01/2023. TECHNIQUE: Routine Bilateral Renal and Bladder Ultrasound. FINDINGS: RIGHT KIDNEY: 9.5 cm. Multiple cystic-appearing space, similar to the previous examination. This may reflect accommodation parapelvic cysts, simple parenchymal cysts, or calyceal diverticula. No hydronephrosis. Percutaneous nephrostomy catheter is  partially visualized. LEFT KIDNEY: Not visualized, likely due to known severe atrophy. BLADDER: Normal.     IMPRESSION: No hydronephrosis.    XR External Imaging Chest    Result Date: 10/30/2023  Images were obtained from an external facility.  Click PACS Images hyperlink to view images.  Textual results have been scanned into the media tab.    Chest XR,  PA & LAT    Result Date: 10/27/2023  EXAM: XR CHEST 2 VIEWS LOCATION: [facility] DATE: 10/27/2023 INDICATION: Shortness of breath, pneumonia since September, refractory to treatment, ? interval change on xray COMPARISON: 10/07/2023     IMPRESSION: Fine reticular/patchy opacities in the bilateral lower lung seen on prior study are either resolved or improved on the current study. Mild linear scarring is seen in the right upper lobe. No new consolidations or confluent airspace opacities.  Heart size and poorly vascularity are within normal limits. No pleural effusion or pneumothorax.    Chest CT w/o contrast    Result Date: 10/15/2023  EXAM: CT CHEST Without CONTRAST LOCATION: [facility] DATE: 10/15/2023 INDICATION: History of pneumonia 09/18 recovered but returned with PNA 09/07, some improvement, now worsening. COMPARISON: Chest x-ray on 10/07/2023. TECHNIQUE: CT chest without IV contrast. Multiplanar reformats were obtained. Dose reduction techniques were used. CONTRAST: None. FINDINGS: LUNGS AND PLEURA: No pleural effusion or pneumothorax. Bilateral patchy groundglass pulmonary opacities most prominent in the upper lobes, likely infectious. MEDIASTINUM/AXILLAE: No cardiomegaly or significant pericardial effusion. No significant mediastinal or hilar lymphadenopathy. CORONARY ARTERY CALCIFICATION: None. UPPER ABDOMEN: Limited evaluation of the upper abdomen due to lack of coverage and contrast. A few stones in the upper pole of the right kidney. MUSCULOSKELETAL: No suspicious osseous lesion.     IMPRESSION: 1.   Bilateral upper lobe predominant patchy groundglass pulmonary opacities, likely infectious. 2.  A few upper pole right kidney stones.     XR CHEST 2 VIEWS PA AND LATERAL    Result Date: 10/7/2023  For Patients: As a result of the 21st Century Cures Act, medical imaging exams and procedure reports are released immediately into your electronic medical record. You may view this report before your referring provider. If you have questions, please contact your health care provider. EXAM: XR CHEST 2 VIEWS PA AND LATERAL LOCATION: [facility] DATE: 10/7/2023 INDICATION: SOB COMPARISON: 10/1/2023    Heart is normal in size. Small subtle airspace opacity within the left mid and lower lung and at the right lung bases represent pneumonia. No effusion.    Abd/pelvis CT no contrast - Stone Protocol    Result Date: 10/6/2023  EXAM: CT ABDOMEN PELVIS W/O CONTRAST LOCATION: [facility] DATE: 10/6/2023 INDICATION: Right flank pain COMPARISON: 09/17/2023 TECHNIQUE: CT scan of the abdomen and pelvis was performed without IV contrast. Multiplanar reformats were obtained. Dose reduction techniques were used. CONTRAST: None. FINDINGS: LOWER CHEST: Multiple small patchy groundglass opacities are seen throughout the visualized lung bases, decreased from prior study. No new confluent consolidation or pleural effusion. HEPATOBILIARY: Normal. PANCREAS: Normal. SPLEEN: Normal. ADRENAL GLANDS: Normal. KIDNEYS/BLADDER: A right percutaneous nephrostomy tube is again seen in stable position. Multiple punctate nonobstructing stones measuring 1 to 3 mm again seen, unchanged. Cystic spaces in the right upper and right mid pole renal medulla may represent dilated calyces, difficult to evaluate without intravenous contrast but unchanged from prior study. Small peripheral cystic lesions in the superior pole of the right kidney are stable, either represent simple cyst or calyceal diverticulum. Left kidney is  severely  atrophic. Urinary bladder is normal. BOWEL: Normal, including the appendix. LYMPH NODES: Normal. VASCULATURE: Unremarkable. PELVIC ORGANS: Normal. MUSCULOSKELETAL: Normal.     IMPRESSION: 1.  Overall stable appearance of the right kidney with percutaneous nephrostomy tube in place and punctate nonobstructing stones measuring 1 to 3 mm. Dilated cystic spaces in the medulla of the right upper/interpolar kidney may represent dilated calyces or peripelvic cysts. Additional peripheral cystic lesions in the superior pole may represent simple cortical cysts versus calyceal diverticula. If definitive evaluation is needed, CT urogram would be most helpful. 2.  Multiple small patchy groundglass opacities throughout the visualized lung bases are significantly improved from prior study.     Chest XR,  PA & LAT    Result Date: 10/1/2023  EXAM: XR CHEST 2 VIEWS LOCATION: [facility] DATE: 10/1/2023 INDICATION: cough COMPARISON: 9/17/2023     IMPRESSION: Negative chest.

## 2023-11-06 VITALS
BODY MASS INDEX: 37.3 KG/M2 | DIASTOLIC BLOOD PRESSURE: 77 MMHG | SYSTOLIC BLOOD PRESSURE: 118 MMHG | TEMPERATURE: 98 F | OXYGEN SATURATION: 94 % | RESPIRATION RATE: 18 BRPM | HEART RATE: 104 BPM | WEIGHT: 190 LBS | HEIGHT: 60 IN

## 2023-11-06 LAB
ASPERGILLUS AB TITR SER CF: NORMAL {TITER}
B DERMAT AB SER-ACNC: 0.2 IV
COCCIDIOIDES AB TITR SER CF: NORMAL {TITER}
H CAPSUL MYC AB TITR SER CF: NORMAL {TITER}
H CAPSUL YST AB TITR SER CF: NORMAL {TITER}
SCANNED LAB RESULT: NORMAL

## 2023-11-06 PROCEDURE — 250N000013 HC RX MED GY IP 250 OP 250 PS 637: Performed by: HOSPITALIST

## 2023-11-06 PROCEDURE — 250N000012 HC RX MED GY IP 250 OP 636 PS 637: Performed by: INTERNAL MEDICINE

## 2023-11-06 PROCEDURE — 250N000013 HC RX MED GY IP 250 OP 250 PS 637: Performed by: INTERNAL MEDICINE

## 2023-11-06 PROCEDURE — 250N000013 HC RX MED GY IP 250 OP 250 PS 637: Performed by: FAMILY MEDICINE

## 2023-11-06 PROCEDURE — 99239 HOSP IP/OBS DSCHRG MGMT >30: CPT | Performed by: FAMILY MEDICINE

## 2023-11-06 RX ORDER — PREDNISONE 10 MG/1
TABLET ORAL
Qty: 24 TABLET | Refills: 0 | Status: SHIPPED | OUTPATIENT
Start: 2023-11-07 | End: 2023-11-19

## 2023-11-06 RX ORDER — ALBUTEROL SULFATE 90 UG/1
2 AEROSOL, METERED RESPIRATORY (INHALATION) EVERY 6 HOURS
Qty: 8 G | Refills: 0 | Status: SHIPPED | OUTPATIENT
Start: 2023-11-06

## 2023-11-06 RX ORDER — DOXYCYCLINE 100 MG/1
100 CAPSULE ORAL EVERY 12 HOURS
Qty: 14 CAPSULE | Refills: 0 | Status: SHIPPED | OUTPATIENT
Start: 2023-11-06 | End: 2023-11-13

## 2023-11-06 RX ORDER — NICOTINE 21 MG/24HR
1 PATCH, TRANSDERMAL 24 HOURS TRANSDERMAL DAILY
Qty: 21 PATCH | Refills: 0 | Status: SHIPPED | OUTPATIENT
Start: 2023-11-07 | End: 2023-11-28

## 2023-11-06 RX ORDER — GUAIFENESIN 600 MG/1
600 TABLET, EXTENDED RELEASE ORAL 2 TIMES DAILY
Qty: 20 TABLET | Refills: 0 | Status: SHIPPED | OUTPATIENT
Start: 2023-11-06 | End: 2023-11-16

## 2023-11-06 RX ADMIN — GUAIFENESIN 600 MG: 600 TABLET ORAL at 08:14

## 2023-11-06 RX ADMIN — NICOTINE 1 PATCH: 14 PATCH, EXTENDED RELEASE TRANSDERMAL at 08:13

## 2023-11-06 RX ADMIN — ALBUTEROL SULFATE 2 PUFF: 90 AEROSOL, METERED RESPIRATORY (INHALATION) at 08:15

## 2023-11-06 RX ADMIN — PREDNISONE 40 MG: 20 TABLET ORAL at 08:14

## 2023-11-06 RX ADMIN — DOXYCYCLINE 100 MG: 100 CAPSULE ORAL at 08:14

## 2023-11-06 ASSESSMENT — ACTIVITIES OF DAILY LIVING (ADL)
ADLS_ACUITY_SCORE: 18

## 2023-11-06 NOTE — PROGRESS NOTES
Care Management Discharge Note    Discharge Date: 11/06/2023       Discharge Disposition: Home    Discharge Services:      Discharge DME:      Discharge Transportation: family or friend will provide    Private pay costs discussed: Not applicable    Does the patient's insurance plan have a 3 day qualifying hospital stay waiver?  No    PAS Confirmation Code:    Patient/family educated on Medicare website which has current facility and service quality ratings:      Education Provided on the Discharge Plan:    Persons Notified of Discharge Plans: patient   Patient/Family in Agreement with the Plan:      Handoff Referral Completed: Yes    Additional Information:  See below     Layla Griffith RN        Received a message from Adriana with restricted recipient program. Says Dr Chucky Sanchez has been added to list for prescribers upon discharge. Need to call [phone] if Dr Sanchez is not the discharging prescriber     9:08 AM  Updated Adriana that Dr Shea will be discharging provider     Scripts must be sent to   [facility] [place] [place] [facility]:  [facility]  [address] [place] [address]

## 2023-11-06 NOTE — PLAN OF CARE
Problem: Pain Acute  Goal: Optimal Pain Control and Function  Outcome: Progressing   Goal Outcome Evaluation:    No issues overnight; appears to have slept well  No c/o pain or discomfort  Right nephrostomy tube managed by pt; dressing CDI  Oral doxycycline to treat UTI and pneumonia     Independent in room

## 2023-11-06 NOTE — PLAN OF CARE
Goal Outcome Evaluation:     Pt A&O, up indep. Eating/drinking well. No pain reported this shift. Denied SOB, LS diminished, productive cough, sats low 90's on RA. Bm this afternoon. Good urine output, nephrostomy patent and intact. VSS      Problem: Adult Inpatient Plan of Care  Goal: Optimal Comfort and Wellbeing  11/5/2023 2316 by Guerline Christian RN  Outcome: Progressing  11/5/2023 1508 by Guerline Christian RN  Outcome: Progressing  Intervention: Monitor Pain and Promote Comfort  Recent Flowsheet Documentation  Taken 11/5/2023 1527 by Guerline Christian RN  Pain Management Interventions:   medication offered but refused   repositioned   relaxation techniques promoted   pillow support provided   emotional support   breathing exercises  Intervention: Provide Person-Centered Care  Recent Flowsheet Documentation  Taken 11/5/2023 1558 by Guerline Christian RN  Trust Relationship/Rapport:   care explained   choices provided   questions encouraged   questions answered   thoughts/feelings acknowledged     Problem: Gas Exchange Impaired  Goal: Optimal Gas Exchange  11/5/2023 2316 by Guerline Christian RN  Outcome: Progressing  11/5/2023 1508 by Guerline Christian RN  Outcome: Progressing     Problem: Infection  Goal: Absence of Infection Signs and Symptoms  11/5/2023 2316 by Guerline Christian RN  Outcome: Progressing  11/5/2023 1508 by Guerline Christian RN  Outcome: Progressing  Intervention: Prevent or Manage Infection  Recent Flowsheet Documentation  Taken 11/5/2023 1558 by Guerline Christian RN  Isolation Precautions: contact precautions maintained     Problem: Pain Acute  Goal: Optimal Pain Control and Function  11/5/2023 2316 by Guerline Christian RN  Outcome: Progressing  11/5/2023 1508 by Guerline Christian RN  Outcome: Progressing  Intervention: Develop Pain Management Plan  Recent Flowsheet Documentation  Taken 11/5/2023 1527 by Guerline Christian RN  Pain Management Interventions:   medication offered but refused   repositioned    relaxation techniques promoted   pillow support provided   emotional support   breathing exercises     Problem: Adult Inpatient Plan of Care  Goal: Absence of Hospital-Acquired Illness or Injury  Intervention: Identify and Manage Fall Risk  Recent Flowsheet Documentation  Taken 11/5/2023 1558 by Guerline Christian, RN  Safety Promotion/Fall Prevention:   patient and family education   nonskid shoes/slippers when out of bed  Intervention: Prevent Skin Injury  Recent Flowsheet Documentation  Taken 11/5/2023 1558 by Guerline Christian, RN  Body Position: position changed independently

## 2023-11-06 NOTE — DISCHARGE SUMMARY
[facility]  Hospitalist Discharge Summary      Date of Admission:  11/1/2023  Date of Discharge:  11/6/2023  1:00 PM  Discharging Provider: Opal Shea MD  Discharge Service: Hospitalist Service    Discharge Diagnoses     Acute hypoxic respiratory failure, resolved  Tobacco related respiratory bronchiolitis; plan repeat chest CT in 1 month with PFTs and pulmonary clinic follow-up  Congenital solitary kidney  Recurrent UTI  Chronic right nephrostomy tube, nephrostomy tube exchanged 11/3/2023  Tobacco use disorder; currently in remission    Clinically Significant Risk Factors     # Obesity: Estimated body mass index is 37.11 kg/m  as calculated from the following:    Height as of this encounter: 1.524 m (5').    Weight as of this encounter: 86.2 kg (190 lb).       Follow-ups Needed After Discharge   Follow-up Appointments     Follow-up and recommended labs and tests       Follow up with primary care provider, Pao Montague, within 7 days for   hospital follow- up.      ---You need repeat chest CT in one month  - Follow up for PFT and pulmonary clinic as already scheduled  ---Follow up with [facility] urology for ongoing urology care                  Discharge Disposition   Discharged to home  Condition at discharge: Stable    Hospital Course   Jacqueline Pappas is a 38 year old female with PMH significant for Congential Solitary Kidney, recurrent UTIs, kidney stones, ureteral strictures s/p right nephrostomy tube in place, recent hospitalization on 9/28/2023 for community-acquired pneumonia and recurrent UTI who presented to ED for evaluation of ongoing productive cough for since last 2 months and body ache, possible fever since last 2 days.  In ED x-ray suggestive of pneumonia.  Patient admitted for further management.  She was treated with IV cefepime and azithromycin.  After urine culture showed MRSA changed to doxycycline.  Due to abnormal CT scan seen by pulmonology with recommendations for  tobacco cessation, prednisone taper, continuing budesonide/formoterol as an outpatient along with albuterol, planning recurrent chest CT in a month with PFTs and pulmonary clinic follow-up.  She remained stable was weaned off of oxygen while here.  Seen by pulmonology and ID.  [place] stable for home discharge and discharged home in good condition with detailed hospital course per    Acute hypoxic respiratory failure due to pneumonia;  Hospital-acquired pneumonia;  History of tobacco use disorder; quit 1 month ago  Probable COPD with exacerbation;  -- On admission, CXR reported new patchy infiltrates bilaterally consistent with pneumonia, right slightly worse than left  --On admission, mildly elevated procalcitonin and mild leukocytosis  -- On IV cefepime, overnight azithromycin changed to doxycycline given UCx positive for MRSA  --Continue DuoNeb.  Incentive spirometry and flutter valve  --Tapering course of prednisone as instructed by pulmonary  --On 1.5 L nasal cannula, titrate O2 as able  -- Appreciated ID and pulmonary input.    Recurrent history of recurrent UTI;  Right nephrostomy tube in place;  -- Abnormal UA but collected from nephrostomy tube.  UCx positive for MRSA.  Placement discussed with ID provider.  Staph, sticky note left for ID provider.  --US renal with no hydronephrosis  --Patient reported nephrostomy tube last exchanged was on 6/2023 at [facility].  Patient reported now having new insurance that does not cover [facility].  IR consulted and they exchanged nephrostomy tube on 11/3.  -- Patient seen by [facility] on recent hospitalization at [facility].  Confirmed with them that she can follow-up with them and will have intake soon        Consultations This Hospital Stay   PULMONARY IP CONSULT  INFECTIOUS DISEASES IP CONSULT  CARE MANAGEMENT / SOCIAL WORK IP CONSULT  INTERVENTIONAL RADIOLOGY ADULT/PEDS IP CONSULT    Code Status   Prior    Time Spent on this Encounter   I, Opal Shea MD,  personally saw the patient today and spent greater than 30 minutes discharging this patient.       Opal Shea MD  [address] [address]  Phone: [phone]  Fax: [phone]  ______________________________________________________________________    Physical Exam   Vital Signs: Temp: 98  F (36.7  C) Temp src: Oral BP: 118/77 Pulse: 104   Resp: 18 SpO2: 94 % O2 Device: None (Room air)    Weight: 190 lbs 0 oz  General Appearance: Pleasant female no apparent distress  Respiratory: Clear to auscultation bilaterally  Cardiovascular: Regular rate and rhythm without murmurs rubs or gallops  GI: Soft and nontender without hepatosplenomegaly  Skin: No significant lower extremity edema  Other: Neuro grossly intact without focal deficits appreciated       Primary Care Physician   Pao Montague    Discharge Orders      Reason for your hospital stay    Tobacco related bronchiolitis - nephrostomy tube     Follow-up and recommended labs and tests     Follow up with primary care provider, Pao Montague, within 7 days for hospital follow- up.      ---You need repeat chest CT in one month  - Follow up for PFT and pulmonary clinic as already scheduled  ---Follow up with [facility] for ongoing urology care     Activity    Your activity upon discharge: activity as tolerated     Diet    Follow this diet upon discharge: Orders Placed This Encounter      Regular Diet Adult       Significant Results and Procedures   Most Recent 3 CBC's:  Recent Labs   Lab Test 11/02/23  0640 11/01/23  2314 10/15/23  0410   WBC 8.9 12.5* 9.2   HGB 11.0* 12.3 12.8   MCV 93 92 89    178 211     Most Recent 3 BMP's:  Recent Labs   Lab Test 11/04/23  0545 11/02/23  0640 11/01/23  2314    138 135   POTASSIUM 4.2 4.1 3.7   CHLORIDE 109* 107 101   CO2 20* 24 24   BUN 14.8 9.4 10.5   CR 0.96* 0.99* 1.15*   ANIONGAP 8 7 10   LUKAS 10.2* 9.8 10.4*   * 100* 114*     7-Day Micro  Results       Collected Updated Procedure Result Status      11/03/2023 0538 11/06/2023 1646 Fungal Antibodies with Reflex to Blastomyces dermatitidis Antibodies by Immunodiffusion [[id number]]   Blood from Arm, Left    Final result Component Value Units   Blastomyces Celia by EIA 0.2 IV   INTERPRETIVE INFORMATION: Blastomyces Antibodies EIA, SER    0.9 IV or less.......Negative  1.0-1.4 IV...........Equivocal   1.5 IV or greater....Positive   Aspergillus Antibody by CF <1:8    INTERPRETIVE INFORMATION: Aspergillus Antibodies by CF     A titer of 1:8 or greater suggests Aspergillus infection or   allergy.  Cross-reactions with dimorphic fungi are not   unusual within the genus Aspergillus.  Performed By: [facility]  [address] [place] [address]  : Rojelio Kemp MD, PhD  CLIA Number: [id number]   Coccidioides Antibody by CF <1:2    INTERPRETIVE INFORMATION: Coccidioides Ab by Complement   Fixation (CF)    Any titer suggests past or current infection. However,   greater than 30 percent of cases with chronic residual   pulmonary disease have negative Complement Fixation (CF)   tests. Titers of less than 1:32 (even as low as 1:2) may   indicate past infection or self-limited disease;   anticoccidiodal CF antibody titers in excess of 1:16 may   indicate disseminated infection. CF serology may be used to   follow therapy. Antibody in CSF is considered diagnostic   for coccidioidal meningitis, although 10 percent of   patients with coccidioidal meningitis will not have   antibody in CSF.   Histoplasma Mycelia, CF <1:8    INTERPRETIVE INFORMATION: Histoplasma Mycelia Antibodies                            by CF  A titer of 1:8 or greater is generally considered   presumptive evidence of histoplasmosis. A titer of 1:32 or   greater or rising titers indicate strong presumptive   evidence of histoplasmosis. Cross reactions, usually at   lower titers, may occur with other fungal  diseases.   Histoplasma Yeast, CF <1:8    INTERPRETIVE INFORMATION: Histoplasma Yeast Antibodies                             by CF  A titer of 1:8 or greater is generally considered   presumptive evidence of histoplasmosis. A titer of 1:32 or   greater or rising titers indicate strong presumptive   evidence of histoplasmosis. Cross reactions, usually at   lower titers, may occur with other fungal diseases.            11/03/2023 0538 11/05/2023 0045 1,3 Beta D glucan fungitell [[id number]]   Blood from Arm, Left    Final result Component Value Units   (1,3)-Beta-D-Glucan <31 pg/mL   B-D GLUCAN INTERPRETATION (1,3) Negative    INTERPRETIVE INFORMATION: (1,3)-beta-D-glucan (Fungitell)      Less than 31 pg/mL ................... Negative    31-59 pg/mL .......................... Negative    60-79 pg/mL .......................... Indeterminate    Greater than or equal to 80 pg/mL .... Positive    The Fungitell test is indicated for presumptive diagnosis   of fungal infection and should be used in conjunction with   other diagnostic procedures. This test does not detect   certain fungal species such as Cryptococcus, which produce   very low levels of (1,3)-beta-D-glucan. This test will not   detect the zygomycetes, such as Absidia, Mucor, and   Rhizopus, which are not known to produce   (1,3)-beta-D-glucan. In addition, the yeast phase of   Blastomyces dermatitidis produces little   (1,3)-beta-D-glucan and may not be detected by the assay.  Performed By: [facility]  [address] [place] [id number]  : Rojelio Kemp MD, PhD  CLIA Number: [id number]            11/03/2023 0441 11/03/2023 1657 Respiratory Panel PCR [[id number]]    Swab from Bronchus    Final result Component Value   Adenovirus Not Detected   Coronavirus Not Detected   This test detects Coronavirus 229E, HKU1, NL63 and OC43 but does not distinguish between them. It does not detect MERS ( Respiratory  Syndrome), SARS (Severe Acute Respiratory Syndrome) or 2019-nCoV (Novel 2019) Coronavirus.   Human Metapneumovirus Not Detected   Human Rhin/Enterovirus Not Detected   Influenza A Not Detected   Influenza A, H1 Not Detected   Influenza A 2009 H1N1 Not Detected   Influenza A, H3 Not Detected   Influenza B Not Detected   Parainfluenza Virus 1 Not Detected   Parainfluenza Virus 2 Not Detected   Parainfluenza Virus 3 Not Detected   Parainfluenza Virus 4 Not Detected   Respiratory Syncytial Virus A Not Detected   Respiratory Syncytial Virus B Not Detected   Chlamydia Pneumoniae Not Detected   Mycoplasma Pneumoniae Not Detected            11/03/2023 0402 11/03/2023 1359 Respiratory Aerobic Bacterial Culture with Gram Stain [[id number]]   Sputum from Expectorate    Final result Component Value   Culture >10 Squamous epithelial cells/low power field indicates oral contamination. Please recollect.   Gram Stain Result >10 Squamous epithelial cells/low power field    <25 PMNs/low power field    4+ Mixed christie               11/03/2023 0155 11/03/2023 1457 Streptococcus pneumoniae antigen [[id number]]   Urine, Midstream    Final result Component Value   Streptococcus pneumoniae antigen Negative   A negative Streptococcus pneumoniae antigen result does not rule out infection with Streptococcus pneumoniae.            11/03/2023 0155 11/03/2023 1456 Legionella pneumophila antigen urine [[id number]]   Urine, Midstream    Final result Component Value   Legionella pneumophila serogroup 1 urinary antigen Negative   Suggests no recent or current infection. Infection due to Legionella cannot be ruled out, since other serogroups and species may cause disease, antigen may not be present in urine in early infection, and the level of antigen present in the urine may be below detectable limits of the test.            11/02/2023 1641 11/06/2023 0815 Blastomyces Agn Quant EIA Non Blood [[id number]]   Urine, Clean Catch    Final result  Component Value   See Scanned Result BLASTOMYCES AGN QUANT EIA NON BLOOD-Scanned            11/02/2023 1601 11/02/2023 2058 Respiratory Panel PCR [[id number]]    Swab from Nasopharyngeal    Final result Component Value   Adenovirus Not Detected   Coronavirus Not Detected   This test detects Coronavirus 229E, HKU1, NL63 and OC43 but does not distinguish between them. It does not detect MERS ( Respiratory Syndrome), SARS (Severe Acute Respiratory Syndrome) or 2019-nCoV (Novel 2019) Coronavirus.   Human Metapneumovirus Not Detected   Human Rhin/Enterovirus Not Detected   Influenza A Not Detected   Influenza A, H1 Not Detected   Influenza A 2009 H1N1 Not Detected   Influenza A, H3 Not Detected   Influenza B Not Detected   Parainfluenza Virus 1 Not Detected   Parainfluenza Virus 2 Not Detected   Parainfluenza Virus 3 Not Detected   Parainfluenza Virus 4 Not Detected   Respiratory Syncytial Virus A Not Detected   Respiratory Syncytial Virus B Not Detected   Chlamydia Pneumoniae Not Detected   Mycoplasma Pneumoniae Not Detected            11/02/2023 1546 11/04/2023 0810 Respiratory Aerobic Bacterial Culture [[id number]]   Sputum from Expectorate    Final result Component Value   Culture 2+ Normal christie   Gram Stain Result <10 Squamous epithelial cells/low power field    >25 PMNs/low power field    2+ Mixed christie               11/02/2023 0640 11/06/2023 0916 Blood Culture Arm, Right [[id number]]   Blood from Arm, Right    Preliminary result Component Value   Culture No growth after 4 days  [P]                11/01/2023 2333 11/05/2023 1113 Urine Culture [[id number]]    (Abnormal)   Urine from Nephrostomy, Right    Final result Component Value   Culture 50,000-100,000 CFU/mL Staphylococcus lugdunensis    >100,000 CFU/mL Urogenital christie    10,000-50,000 CFU/mL Staphylococcus aureus MRSA        Susceptibility        Staphylococcus lugdunensis      GEMMA      Daptomycin <=0.12 ug/mL Susceptible       Doxycycline <=0.5 ug/mL Susceptible      Gentamicin <=0.5 ug/mL Susceptible      Nitrofurantoin <=16 ug/mL Susceptible      Oxacillin >=4 ug/mL Resistant  [1]       Tetracycline <=1 ug/mL Susceptible      Trimethoprim/Sulfamethoxazole <=0.5/9.5 ug/mL Susceptible      Vancomycin <=0.5 ug/mL Susceptible                   [1]  Oxacillin susceptible isolates are susceptible to cephalosporins (example: cefazolin and cephalexin) and beta lactam combination agents. Oxacillin resistant isolates are resistant to these agents.              Susceptibility        Staphylococcus aureus MRSA      GEMMA      Daptomycin 1 ug/mL Susceptible      Doxycycline <=0.5 ug/mL Susceptible      Gentamicin <=0.5 ug/mL Susceptible      Linezolid 2 ug/mL Susceptible      Nitrofurantoin <=16 ug/mL Susceptible      Oxacillin >=4 ug/mL Resistant  [1]       Tetracycline <=1 ug/mL Susceptible      Trimethoprim/Sulfamethoxazole <=0.5/9.5 ug/mL Susceptible      Vancomycin 1 ug/mL Susceptible                   [1]  Oxacillin susceptible isolates are susceptible to cephalosporins (example: cefazolin and cephalexin) and beta lactam combination agents. Oxacillin resistant isolates are resistant to these agents.              Susceptibility Comments       Staphylococcus lugdunensis            Staphylococcus aureus MRSA    MRSA requires contact precautions.               11/01/2023 2333 11/05/2023 0423 Histoplasma Galactomannan Antigen Quant by EIA, Urine [[id number]]   Urine from Nephrostomy, Right    Final result Component Value Units   Histoplasma Galactomannan Ag Quant, Urn Not Detected ng/mL   Histoplasma Galactomannan Ag Interp, Urn Not Detected    INTERPRETIVE DATA: Histoplasma Galactomannan Antigen                     Quantitative by EIA, Urine  Less than 0.4 ng/ml = Not Detected  0.4-0.7 ng/mL = Detected (below the limit of quantification)  0.8-24.0 ng/mL = Detected  Greater than 24.0 ng/mL = Detected (above the limit of   quantification)    The  quantitative range of this assay is 0.8-24.0 ng/mL.   Antigen concentrations between 0.4-.07 or >24.0 ng/mL fall   outside the linear range of the assay and cannot be   accurately quantified.    This EIA test should be used in conjunction with other   diagnostic procedures, including microbiological culture,   histological examination of biopsy samples, and/or   radiographic evidence, to aid in the diagnosis of   histoplasmosis.    This test was developed and its performance characteristics   determined by [email or web address]. It has not been cleared or   approved by the U.S. Food and Drug Administration. This   test was performed in a CLIA-certified laboratory and is   intended for clinical purposes.  Performed By: [email or web address]  [address] [place] [id number]  : Rojelio Kemp MD, PhD  CLIA Number: [id number]            11/01/2023 2332 11/02/2023 0026 Symptomatic Influenza A/B, RSV, & SARS-CoV2 PCR (COVID-19) Nasopharyngeal [[id number]]    Swab from Nasopharyngeal    Final result Component Value   Influenza A PCR Negative   Influenza B PCR Negative   RSV PCR Negative   SARS CoV2 PCR Negative   NEGATIVE: SARS-CoV-2 (COVID-19) RNA not detected, presumed negative.            11/01/2023 2314 11/06/2023 0916 Blood Culture Peripheral Blood [[id number]]   Peripheral Blood    Preliminary result Component Value   Culture No growth after 4 days  [P]                    ,   Results for orders placed or performed during the hospital encounter of 11/01/23   US Renal Complete Non-Vascular    Narrative    EXAM: US RENAL COMPLETE NON-VASCULAR  LOCATION: [facility]  DATE: 11/2/2023    INDICATION: Right flank pain; nephrostomy tube in place.  COMPARISON: CT abdomen/pelvis 10/06/2023; ultrasound 01/01/2023.  TECHNIQUE: Routine Bilateral Renal and Bladder Ultrasound.    FINDINGS:    RIGHT KIDNEY: 9.5 cm. Multiple cystic-appearing space, similar to the previous  examination. This may reflect accommodation parapelvic cysts, simple parenchymal cysts, or calyceal diverticula. No hydronephrosis. Percutaneous nephrostomy catheter is   partially visualized.    LEFT KIDNEY: Not visualized, likely due to known severe atrophy.    BLADDER: Normal.      Impression    IMPRESSION:    No hydronephrosis.   Chest XR,  PA & LAT    Narrative    EXAM: XR CHEST 2 VIEWS  LOCATION: [facility]  DATE: 11/2/2023    INDICATION: cough  COMPARISON: 10/27/2023      Impression    IMPRESSION: Heart size normal. New patchy infiltrate present bilaterally consistent with pneumonia, right lung slightly worse than left.   IR Nephrostomy Tube Change Right    Narrative    [facility]  LOCATION: [facility]  DATE: 11/3/2023    PROCEDURE: NEPHROSTOMY TUBE EXCHANGE    ATTENDING: Ceferino Ashley MD.    INDICATION: 38-year-old female with a history of congenital solitary kidney and ureteral strictures status post right nephrostomy tube placement. Patient presents for routine exchange.    CONSENT: The risks, benefits, and alternatives of nephrostomy tube exchange were discussed with the patient in detail. All questions were answered. Informed consent was given to proceed with the procedure.    MODERATE SEDATION: IV fentanyl and Versed were administered for sedation.  During the timeout, immediately prior to the administration of medications, the patient was reassessed for adequacy to receive conscious sedation. Under physician supervision,   Versed and fentanyl were administered for moderate sedation. Pulse oximetry, heart rate and blood pressure were continuously monitored by an independent trained observer. The physician spent 15 minutes of face-to-face sedation time with the patient.    ADDITIONAL MEDICATIONS: See EMR    FLUOROSCOPIC TIME: 1.1 minutes.    AIR KERMA:  12 mGy.    CONTRAST: 5 mL    UNIVERSAL PRECAUTIONS: The procedure was performed utilizing maximum  sterile barrier technique. Prior to the start of the procedure, a standard pause for patient safety was performed with site marking as indicated.    COMPLICATIONS: No immediate complications.    PROCEDURE:    A  image was obtained. A nephrostogram was performed through the previously placed right nephrostomy tube. Under direct fluoroscopic visualization, the previous tube was removed over a wire and exchange was made for a new 8 [place] nephrostomy. The   loop was locked within the renal pelvis, and the catheter was sutured to the skin. A post placement nephrostogram was performed.    FINDINGS:    The initial  image shows the previously placed right nephrostomy tube. At the completion of the study, the new nephrostomy loop lies within the renal pelvis and controls the urinary system well.      Impression    IMPRESSION:    Successful right nephrostomy exchange, as discussed above.         Discharge Medications   Discharge Medication List as of 11/6/2023 12:19 PM        START taking these medications    Details   doxycycline monohydrate (MONODOX) 100 MG capsule Take 1 capsule (100 mg) by mouth every 12 hours for 7 days, Disp-14 capsule, R-0, E-Prescribe      guaiFENesin (MUCINEX) 600 MG 12 hr tablet Take 1 tablet (600 mg) by mouth 2 times daily for 10 days, Disp-20 tablet, R-0, E-Prescribe      nicotine (NICODERM CQ) 14 MG/24HR 24 hr patch Place 1 patch onto the skin daily for 21 days, Disp-21 patch, R-0, E-Prescribe           CONTINUE these medications which have CHANGED    Details   albuterol (PROAIR HFA/PROVENTIL HFA/VENTOLIN HFA) 108 (90 Base) MCG/ACT inhaler Inhale 2 puffs into the lungs every 6 hours, Disp-8 g, R-0, E-PrescribePharmacy may dispense brand covered by insurance (Proair, or proventil or ventolin or generic albuterol inhaler) Take scheduled for the next 5 days and then prn      predniSONE (DELTASONE) 10 MG tablet Take 3 tablets (30 mg) by mouth daily for 4 days, THEN 2 tablets (20 mg) daily  for 4 days, THEN 1 tablet (10 mg) daily for 4 days., Disp-24 tablet, R-0, E-Prescribe           CONTINUE these medications which have NOT CHANGED    Details   acetaminophen (TYLENOL) 500 MG tablet Take 500-1,000 mg by mouth every 6 hours as needed for mild pain, Historical      albuterol (ACCUNEB) 1.25 MG/3ML neb solution Take 1.25 mg by nebulization every 6 hours as needed for shortness of breath / dyspnea or wheezing, Historical      azelastine (ASTELIN) 0.1 % nasal spray Spray 1 spray into both nostrils 2 times daily, Disp-30 mL, R-11, E-Prescribe      budesonide-formoterol (SYMBICORT) 160-4.5 MCG/ACT Inhaler Inhale 2 puffs into the lungs 2 times daily, Disp-6 g, R-11, E-Prescribe      ipratropium - albuterol 0.5 mg/2.5 mg/3 mL (DUONEB) 0.5-2.5 (3) MG/3ML neb solution Take 1 vial (3 mLs) by nebulization every 6 hours as needed for shortness of breath, wheezing or cough, Disp-180 mL, R-4, E-Prescribe      sertraline (ZOLOFT) 100 MG tablet Take 100 mg by mouth At Bedtime, Historical           Allergies   Allergies   Allergen Reactions    Desogestrel-Ethinyl Estradiol Angioedema and Swelling     Swelling of hands and feet per pt.      Penicillins Rash     As a child per mother; mother unsure if has tolerated another PCN since. Tolerated ampicillin 9/20/16    Sulfa Antibiotics Rash    Vancomycin Rash

## 2023-11-06 NOTE — PLAN OF CARE
Problem: Adult Inpatient Plan of Care  Goal: Optimal Comfort and Wellbeing  Outcome: Adequate for Care Transition  Intervention: Provide Person-Centered Care  Recent Flowsheet Documentation  Taken 11/6/2023 0818 by Constantino Brunson, RN  Trust Relationship/Rapport:   care explained   choices provided   questions encouraged   questions answered   thoughts/feelings acknowledged   Goal Outcome Evaluation:       Pt is alert and oriented x4. Pt is tolerating room air well. No reports of pain noted. Pt was able to walk in the hallway independently. Lung sounds diminished. Pt to discharge home via personal vehicle driven by dad.   Constantino Brunson RN  11/6/2023  12:45 PM

## 2023-11-07 LAB
BACTERIA BLD CULT: NO GROWTH
BACTERIA BLD CULT: NO GROWTH

## 2023-11-08 ENCOUNTER — PATIENT OUTREACH (OUTPATIENT)
Dept: CARE COORDINATION | Facility: CLINIC | Age: 38
End: 2023-11-08
Payer: COMMERCIAL

## 2023-11-08 NOTE — PROGRESS NOTES
Connected Care Resource Center Contact  [facility]/Voicemail     Clinical Data: Post-Discharge Outreach     Outreach attempted x 2.  Left message on patient's voicemail, providing [facility]'s central phone number of [phone] ([phone]) for questions/concerns and/or to schedule an appt with an [facility] provider, if they do not have a PCP.      Plan:  [facility] will do no further outreaches at this time.       BAHMAN Vega  [facility], [facility]    *Connected Care Resource Team does NOT follow patient ongoing. Referrals are identified based on internal discharge reports and the outreach is to ensure patient has an understanding of their discharge instructions.

## 2023-11-29 ENCOUNTER — TELEPHONE (OUTPATIENT)
Dept: PULMONOLOGY | Facility: CLINIC | Age: 38
End: 2023-11-29

## 2023-12-06 ENCOUNTER — MEDICAL CORRESPONDENCE (OUTPATIENT)
Dept: HEALTH INFORMATION MANAGEMENT | Facility: CLINIC | Age: 38
End: 2023-12-06

## 2023-12-27 ENCOUNTER — APPOINTMENT (OUTPATIENT)
Dept: CT IMAGING | Facility: HOSPITAL | Age: 38
End: 2023-12-27
Attending: STUDENT IN AN ORGANIZED HEALTH CARE EDUCATION/TRAINING PROGRAM
Payer: COMMERCIAL

## 2023-12-27 ENCOUNTER — HOSPITAL ENCOUNTER (EMERGENCY)
Facility: HOSPITAL | Age: 38
Discharge: HOME | End: 2023-12-27
Attending: STUDENT IN AN ORGANIZED HEALTH CARE EDUCATION/TRAINING PROGRAM | Admitting: STUDENT IN AN ORGANIZED HEALTH CARE EDUCATION/TRAINING PROGRAM
Payer: COMMERCIAL

## 2023-12-27 VITALS
HEART RATE: 74 BPM | BODY MASS INDEX: 36.32 KG/M2 | HEIGHT: 60 IN | RESPIRATION RATE: 16 BRPM | TEMPERATURE: 98.3 F | WEIGHT: 185 LBS | SYSTOLIC BLOOD PRESSURE: 120 MMHG | OXYGEN SATURATION: 98 % | DIASTOLIC BLOOD PRESSURE: 76 MMHG

## 2023-12-27 DIAGNOSIS — N10 ACUTE PYELONEPHRITIS: ICD-10-CM

## 2023-12-27 LAB
ACANTHOCYTES BLD QL SMEAR: NORMAL
ALBUMIN SERPL BCG-MCNC: 4.1 G/DL (ref 3.5–5.2)
ALBUMIN UR-MCNC: 100 MG/DL
ALP SERPL-CCNC: 79 U/L (ref 40–150)
ALT SERPL W P-5'-P-CCNC: 14 U/L (ref 0–50)
ANION GAP SERPL CALCULATED.3IONS-SCNC: 12 MMOL/L (ref 7–15)
APPEARANCE UR: ABNORMAL
AST SERPL W P-5'-P-CCNC: 13 U/L (ref 0–45)
AUER BODIES BLD QL SMEAR: NORMAL
BACTERIA #/AREA URNS HPF: ABNORMAL /HPF
BASO STIPL BLD QL SMEAR: NORMAL
BASOPHILS # BLD AUTO: 0 10E3/UL (ref 0–0.2)
BASOPHILS NFR BLD AUTO: 0 %
BILIRUB DIRECT SERPL-MCNC: <0.2 MG/DL (ref 0–0.3)
BILIRUB SERPL-MCNC: 1.1 MG/DL
BILIRUB UR QL STRIP: NEGATIVE
BITE CELLS BLD QL SMEAR: NORMAL
BLISTER CELLS BLD QL SMEAR: NORMAL
BUN SERPL-MCNC: 16.6 MG/DL (ref 6–20)
BURR CELLS BLD QL SMEAR: NORMAL
CALCIUM SERPL-MCNC: 9.9 MG/DL (ref 8.6–10)
CHLORIDE SERPL-SCNC: 102 MMOL/L (ref 98–107)
COLOR UR AUTO: ABNORMAL
CREAT SERPL-MCNC: 1.03 MG/DL (ref 0.51–0.95)
DACRYOCYTES BLD QL SMEAR: NORMAL
DEPRECATED HCO3 PLAS-SCNC: 22 MMOL/L (ref 22–29)
EGFRCR SERPLBLD CKD-EPI 2021: 72 ML/MIN/1.73M2
ELLIPTOCYTES BLD QL SMEAR: NORMAL
EOSINOPHIL # BLD AUTO: 0.1 10E3/UL (ref 0–0.7)
EOSINOPHIL NFR BLD AUTO: 1 %
ERYTHROCYTE [DISTWIDTH] IN BLOOD BY AUTOMATED COUNT: 13.7 % (ref 10–15)
FLUAV RNA SPEC QL NAA+PROBE: NEGATIVE
FLUBV RNA RESP QL NAA+PROBE: NEGATIVE
FRAGMENTS BLD QL SMEAR: NORMAL
GLUCOSE SERPL-MCNC: 158 MG/DL (ref 70–99)
GLUCOSE UR STRIP-MCNC: NEGATIVE MG/DL
HCT VFR BLD AUTO: 43.6 % (ref 35–47)
HGB BLD-MCNC: 14.6 G/DL (ref 11.7–15.7)
HGB C CRYSTALS: NORMAL
HGB UR QL STRIP: ABNORMAL
HOWELL-JOLLY BOD BLD QL SMEAR: NORMAL
IMM GRANULOCYTES # BLD: 0 10E3/UL
IMM GRANULOCYTES NFR BLD: 1 %
KETONES UR STRIP-MCNC: NEGATIVE MG/DL
LACTATE SERPL-SCNC: 1 MMOL/L (ref 0.7–2)
LEUKOCYTE ESTERASE UR QL STRIP: ABNORMAL
LIPASE SERPL-CCNC: 13 U/L (ref 13–60)
LYMPHOCYTES # BLD AUTO: 0.2 10E3/UL (ref 0.8–5.3)
LYMPHOCYTES NFR BLD AUTO: 3 %
MCH RBC QN AUTO: 29.1 PG (ref 26.5–33)
MCHC RBC AUTO-ENTMCNC: 33.5 G/DL (ref 31.5–36.5)
MCV RBC AUTO: 87 FL (ref 78–100)
MONOCYTES # BLD AUTO: 0.2 10E3/UL (ref 0–1.3)
MONOCYTES NFR BLD AUTO: 4 %
MUCOUS THREADS #/AREA URNS LPF: PRESENT /LPF
NEUTROPHILS # BLD AUTO: 5.5 10E3/UL (ref 1.6–8.3)
NEUTROPHILS NFR BLD AUTO: 91 %
NEUTS HYPERSEG BLD QL SMEAR: NORMAL
NITRATE UR QL: POSITIVE
NRBC # BLD AUTO: 0 10E3/UL
NRBC BLD AUTO-RTO: 0 /100
PH UR STRIP: 6 [PH] (ref 5–7)
PLAT MORPH BLD: NORMAL
PLATELET # BLD AUTO: 167 10E3/UL (ref 150–450)
POLYCHROMASIA BLD QL SMEAR: NORMAL
POTASSIUM SERPL-SCNC: 4 MMOL/L (ref 3.4–5.3)
PROT SERPL-MCNC: 6.8 G/DL (ref 6.4–8.3)
RBC # BLD AUTO: 5.02 10E6/UL (ref 3.8–5.2)
RBC AGGLUT BLD QL: NORMAL
RBC MORPH BLD: NORMAL
RBC URINE: 28 /HPF
ROULEAUX BLD QL SMEAR: NORMAL
RSV RNA SPEC NAA+PROBE: NEGATIVE
SARS-COV-2 RNA RESP QL NAA+PROBE: NEGATIVE
SICKLE CELLS BLD QL SMEAR: NORMAL
SMUDGE CELLS BLD QL SMEAR: NORMAL
SODIUM SERPL-SCNC: 136 MMOL/L (ref 135–145)
SP GR UR STRIP: 1.02 (ref 1–1.03)
SPHEROCYTES BLD QL SMEAR: NORMAL
SQUAMOUS EPITHELIAL: 2 /HPF
STOMATOCYTES BLD QL SMEAR: NORMAL
TARGETS BLD QL SMEAR: NORMAL
TOXIC GRANULES BLD QL SMEAR: NORMAL
UROBILINOGEN UR STRIP-MCNC: <2 MG/DL
VARIANT LYMPHS BLD QL SMEAR: NORMAL
WBC # BLD AUTO: 6 10E3/UL (ref 4–11)
WBC CLUMPS #/AREA URNS HPF: PRESENT /HPF
WBC URINE: 0 /HPF

## 2023-12-27 PROCEDURE — 81001 URINALYSIS AUTO W/SCOPE: CPT | Performed by: STUDENT IN AN ORGANIZED HEALTH CARE EDUCATION/TRAINING PROGRAM

## 2023-12-27 PROCEDURE — 83690 ASSAY OF LIPASE: CPT | Performed by: STUDENT IN AN ORGANIZED HEALTH CARE EDUCATION/TRAINING PROGRAM

## 2023-12-27 PROCEDURE — 80053 COMPREHEN METABOLIC PANEL: CPT | Performed by: STUDENT IN AN ORGANIZED HEALTH CARE EDUCATION/TRAINING PROGRAM

## 2023-12-27 PROCEDURE — 87637 SARSCOV2&INF A&B&RSV AMP PRB: CPT | Performed by: STUDENT IN AN ORGANIZED HEALTH CARE EDUCATION/TRAINING PROGRAM

## 2023-12-27 PROCEDURE — 36415 COLL VENOUS BLD VENIPUNCTURE: CPT | Performed by: STUDENT IN AN ORGANIZED HEALTH CARE EDUCATION/TRAINING PROGRAM

## 2023-12-27 PROCEDURE — 250N000013 HC RX MED GY IP 250 OP 250 PS 637: Performed by: STUDENT IN AN ORGANIZED HEALTH CARE EDUCATION/TRAINING PROGRAM

## 2023-12-27 PROCEDURE — 99285 EMERGENCY DEPT VISIT HI MDM: CPT | Mod: 25

## 2023-12-27 PROCEDURE — 250N000011 HC RX IP 250 OP 636: Mod: JZ | Performed by: STUDENT IN AN ORGANIZED HEALTH CARE EDUCATION/TRAINING PROGRAM

## 2023-12-27 PROCEDURE — 96375 TX/PRO/DX INJ NEW DRUG ADDON: CPT

## 2023-12-27 PROCEDURE — 250N000011 HC RX IP 250 OP 636: Performed by: STUDENT IN AN ORGANIZED HEALTH CARE EDUCATION/TRAINING PROGRAM

## 2023-12-27 PROCEDURE — 82248 BILIRUBIN DIRECT: CPT | Performed by: STUDENT IN AN ORGANIZED HEALTH CARE EDUCATION/TRAINING PROGRAM

## 2023-12-27 PROCEDURE — 85025 COMPLETE CBC W/AUTO DIFF WBC: CPT | Performed by: STUDENT IN AN ORGANIZED HEALTH CARE EDUCATION/TRAINING PROGRAM

## 2023-12-27 PROCEDURE — 96374 THER/PROPH/DIAG INJ IV PUSH: CPT | Mod: 59

## 2023-12-27 PROCEDURE — 96361 HYDRATE IV INFUSION ADD-ON: CPT

## 2023-12-27 PROCEDURE — 74177 CT ABD & PELVIS W/CONTRAST: CPT

## 2023-12-27 PROCEDURE — 87086 URINE CULTURE/COLONY COUNT: CPT | Performed by: STUDENT IN AN ORGANIZED HEALTH CARE EDUCATION/TRAINING PROGRAM

## 2023-12-27 PROCEDURE — 96376 TX/PRO/DX INJ SAME DRUG ADON: CPT

## 2023-12-27 PROCEDURE — 258N000003 HC RX IP 258 OP 636: Performed by: STUDENT IN AN ORGANIZED HEALTH CARE EDUCATION/TRAINING PROGRAM

## 2023-12-27 PROCEDURE — 83605 ASSAY OF LACTIC ACID: CPT | Performed by: STUDENT IN AN ORGANIZED HEALTH CARE EDUCATION/TRAINING PROGRAM

## 2023-12-27 RX ORDER — DOXYCYCLINE 100 MG/1
100 CAPSULE ORAL 2 TIMES DAILY
Qty: 28 CAPSULE | Refills: 0 | Status: SHIPPED | OUTPATIENT
Start: 2023-12-27 | End: 2024-01-10

## 2023-12-27 RX ORDER — ONDANSETRON 4 MG/1
4 TABLET, ORALLY DISINTEGRATING ORAL EVERY 8 HOURS PRN
Qty: 15 TABLET | Refills: 0 | Status: SHIPPED | OUTPATIENT
Start: 2023-12-27 | End: 2024-01-01

## 2023-12-27 RX ORDER — IOPAMIDOL 755 MG/ML
80 INJECTION, SOLUTION INTRAVASCULAR ONCE
Status: COMPLETED | OUTPATIENT
Start: 2023-12-27 | End: 2023-12-27

## 2023-12-27 RX ORDER — ONDANSETRON 2 MG/ML
4 INJECTION INTRAMUSCULAR; INTRAVENOUS EVERY 30 MIN PRN
Status: DISCONTINUED | OUTPATIENT
Start: 2023-12-27 | End: 2023-12-27 | Stop reason: HOSPADM

## 2023-12-27 RX ORDER — HYDROMORPHONE HYDROCHLORIDE 1 MG/ML
0.5 INJECTION, SOLUTION INTRAMUSCULAR; INTRAVENOUS; SUBCUTANEOUS
Status: DISCONTINUED | OUTPATIENT
Start: 2023-12-27 | End: 2023-12-27 | Stop reason: HOSPADM

## 2023-12-27 RX ORDER — DOXYCYCLINE 100 MG/1
100 CAPSULE ORAL ONCE
Status: COMPLETED | OUTPATIENT
Start: 2023-12-27 | End: 2023-12-27

## 2023-12-27 RX ORDER — OXYCODONE HYDROCHLORIDE 5 MG/1
5 TABLET ORAL ONCE
Status: COMPLETED | OUTPATIENT
Start: 2023-12-27 | End: 2023-12-27

## 2023-12-27 RX ORDER — OXYCODONE HYDROCHLORIDE 5 MG/1
5 TABLET ORAL EVERY 6 HOURS PRN
Qty: 12 TABLET | Refills: 0 | Status: SHIPPED | OUTPATIENT
Start: 2023-12-27 | End: 2023-12-30

## 2023-12-27 RX ADMIN — HYDROMORPHONE HYDROCHLORIDE 0.5 MG: 1 INJECTION, SOLUTION INTRAMUSCULAR; INTRAVENOUS; SUBCUTANEOUS at 02:58

## 2023-12-27 RX ADMIN — ONDANSETRON 4 MG: 2 INJECTION INTRAMUSCULAR; INTRAVENOUS at 02:59

## 2023-12-27 RX ADMIN — DOXYCYCLINE 100 MG: 100 CAPSULE ORAL at 04:12

## 2023-12-27 RX ADMIN — SODIUM CHLORIDE, POTASSIUM CHLORIDE, SODIUM LACTATE AND CALCIUM CHLORIDE 1000 ML: 600; 310; 30; 20 INJECTION, SOLUTION INTRAVENOUS at 02:57

## 2023-12-27 RX ADMIN — IOPAMIDOL 80 ML: 755 INJECTION, SOLUTION INTRAVENOUS at 03:50

## 2023-12-27 RX ADMIN — HYDROMORPHONE HYDROCHLORIDE 0.5 MG: 1 INJECTION, SOLUTION INTRAMUSCULAR; INTRAVENOUS; SUBCUTANEOUS at 03:55

## 2023-12-27 RX ADMIN — OXYCODONE HYDROCHLORIDE 5 MG: 5 TABLET ORAL at 04:46

## 2023-12-27 ASSESSMENT — ACTIVITIES OF DAILY LIVING (ADL): ADLS_ACUITY_SCORE: 35

## 2023-12-27 NOTE — DISCHARGE INSTRUCTIONS
Call your nephrologist to follow-up your symptoms.    Return to the emergency department if your symptoms worsen, if you develop fever, inability to urinate, pus coming out of your nephrostomy tube.

## 2023-12-27 NOTE — Clinical Note
Jacqueline Pappas was seen and treated in our emergency department on 12/27/2023.  She may return to work on 12/29/2023.       If you have any questions or concerns, please don't hesitate to call.      Rojelio Quach MD

## 2023-12-27 NOTE — ED PROVIDER NOTES
EMERGENCY DEPARTMENT ENCOUNTER      NAME: Jacqueline Pappas  AGE: 38 year old female  YOB: 1985  MRN: [id number]  EVALUATION DATE & TIME: 12/27/2023  2:27 AM    PCP: Pao Montague    ED PROVIDER: Rojelio Quach MD      Chief Complaint   Patient presents with    Flank Pain    Nausea & Vomiting         FINAL IMPRESSION:  1. Acute pyelonephritis          ED COURSE & MEDICAL DECISION MAKING:    Pertinent Labs & Imaging studies reviewed. (See chart for details)  38 year old female presents to the Emergency Department for evaluation of flank pain, nausea/vomiting    ED Course as of 12/27/23 0439   Wed Dec 27, 2023   0244 Patient is a 38-year-old female with a past medical history significant for solitary kidney on the right side with chronic indwelling nephrostomy tube due to partial obstruction, and was last exchanged in November.  She presents with chills, nausea/vomiting, right abdominal pain and flank pain, tachycardia.  The nephrostomy tube site appears clean, dry and no evidence of infection on the surface.  There is yellow urine in the nephrostomy bag.  She has tenderness to palpation of the entire right side of her abdomen, and no CVA tenderness.  Differential diagnosis includes pyelonephritis, kidney stone, appendicitis, viral illness.  Plan to obtain CT scan, pain control, labs and urine.  The patient receives a urinalysis in clinic from both her nephrostomy bag as well as from a spontaneously voiding, so we will perform that today as well.   0341 No electrolyte abnormalities or kidney injury.  No pancreatitis or transaminitis.  No leukocytosis or anemia.  No lactic acidosis.  Pending viral swabs, urinalysis, CT scan     Viral swabs negative.  CT scan does not show obstruction.  Urinalysis shows nitrite positive infection, and review of the chart shows that her last culture was susceptible to doxycycline.  Patient given a dose here in the emergency department.  Her symptoms have vastly  improved.  Discussed with [facility], who had seen her at a prior date while she was hospitalized, and had asked whether the nephrostomy tube needs to be exchanged as a foreign body while an infection is ongoing.  They did not deem this necessary, and otherwise she is well-appearing and does not require hospitalization at this time.  Will discharge with a prescription for doxycycline to treat acute pyelonephritis, Zofran for nausea, oxycodone for pain.  Encouraged outpatient follow-up with her urology team.  Provided return precautions.    Medical Decision Making    History:  Supplemental history from: Documented in chart, if applicable  External Record(s) reviewed: Documented in chart, if applicable.    Work Up:  Chart documentation includes differential considered and any EKGs or imaging independently interpreted by provider, where specified.  In additional to work up documented, I considered the following work up: Documented in chart, if applicable.    External consultation:  Discussion of management with another provider: Documented in chart, if applicable    Complicating factors:  Care impacted by chronic illness: Mental Health, Smoking / Nicotine Use, and Other: s/p nephrostomy  Care affected by social determinants of health: N/A    Disposition considerations: Discharge. I prescribed additional prescription strength medication(s) as charted. See documentation for any additional details.        At the conclusion of the encounter I discussed the results of all of the tests and the disposition. The questions were answered. The patient or family acknowledged understanding and was agreeable with the care plan.     0 minutes of critical care time     MEDICATIONS GIVEN IN THE EMERGENCY:  Medications   ondansetron (ZOFRAN) injection 4 mg (4 mg Intravenous $Given 12/27/23 2253)   HYDROmorphone (PF) (DILAUDID) injection 0.5 mg (0.5 mg Intravenous $Given 12/27/23 3477)   lactated ringers BOLUS 1,000 mL (0 mLs  Intravenous Stopped 23 1565)   iopamidol (ISOVUE-370) solution 80 mL (80 mLs Intravenous $Given 23 4057)   doxycycline monohydrate (MONODOX) capsule 100 mg (100 mg Oral $Given 23 6236)       NEW PRESCRIPTIONS STARTED AT TODAY'S ER VISIT  New Prescriptions    DOXYCYCLINE HYCLATE (VIBRAMYCIN) 100 MG CAPSULE    Take 1 capsule (100 mg) by mouth 2 times daily for 14 days    ONDANSETRON (ZOFRAN ODT) 4 MG ODT TAB    Take 1 tablet (4 mg) by mouth every 8 hours as needed for nausea    OXYCODONE (ROXICODONE) 5 MG TABLET    Take 1 tablet (5 mg) by mouth every 6 hours as needed for breakthrough pain          =================================================================    HPI    Patient information was obtained from: patient     Use of : N/A         Jacqueline Pappas is a 38 year old female with a pertinent history of congenital absence of one kidney, s/p nephrostomy, perinephric hematoma, SIRS, chronic right flank pain, kidney stones, UTI, who presents to this ED by walk in for evaluation of abdominal pain.    The patient reports she has been having mid abdominal pain with associated nausea, vomiting, and diarrhea. She also endorses generalized myalgias, right flank pain, and subjective fever. She has not measured her fever but feels like she has one. Notes slightly malodorous vaginal discharge. She is currently on antibiotics for bacterial vaginosis. She has a history of kidney stones, s/p nephrostomy placement. States her symptoms do not feel similar to previous kidney stone. Nephrostomy tube last replaced in 2023. History of  but no other abdominal surgeries. Her sister has had similar symptoms recently. No cough, rhinorrhea, sore throat, bloody stool, melena, vaginal bleeding, rash, or any other complaints at this time.       PAST MEDICAL HISTORY:  Past Medical History:   Diagnosis Date    Acute renal failure (H24)     Anemia     Anemia     Calculus of kidney     Congenital  absence of left kidney     Congenital absence of one kidney     Depression     Depression     Hydronephrosis     Kidney stone     at age 29    Pyelonephritis     Pyelonephritis     Smoker     Ureteral stricture     UTI (urinary tract infection)     Wrist fracture     Left       PAST SURGICAL HISTORY:  Past Surgical History:   Procedure Laterality Date     SECTION       SECTION      x1    COMBINED CYSTOSCOPY, RETROGRADES, URETEROSCOPY, LASER HOLMIUM LITHOTRIPSY URETER(S), INSERT STENT Right 2016    Procedure: COMBINED CYSTOSCOPY, RETROGRADES, URETEROSCOPY, LASER HOLMIUM LITHOTRIPSY URETER(S), INSERT STENT;  Surgeon: Florentino Awad MD;  Location: [facility] OR    CYSTOSCOPY, URETEROSCOPY, COMBINED Right 2016    Procedure: COMBINED CYSTOSCOPY, URETEROSCOPY;  Surgeon: Florentino Awad MD;  Location: [facility] OR    IR NEPHROLITHOTOMY  2014    IR NEPHROSTOGRAM EXISITING ACCESS  10/18/2018    IR NEPHROSTOMY TUBE CHANGE RIGHT  2018    IR NEPHROSTOMY TUBE CHANGE RIGHT  2020    IR NEPHROSTOMY TUBE CHANGE RIGHT  2018    IR NEPHROSTOMY TUBE CHANGE RIGHT  2020    IR NEPHROSTOMY TUBE CHANGE RIGHT  11/3/2023    IR NEPHROSTOMY TUBE PLACEMENT RIGHT  2016    IR NEPHROSTOMY TUBE PLACEMENT RIGHT  2017    KIDNEY STONE SURGERY Right 2016    LASER HOLMIUM LITHOTRIPSY URETER(S), INSERT STENT, COMBINED Left 2016    Procedure: COMBINED CYSTOSCOPY, URETEROSCOPY, LASER HOLMIUM LITHOTRIPSY URETER(S), INSERT STENT;  Surgeon: Florentino Awad MD;  Location: [facility] OR    LASER HOLMIUM NEPHROLITHOTOMY VIA PERCUTANEOUS NEPHROSTOMY Right 2016    Procedure: LASER HOLMIUM NEPHROLITHOTOMY VIA PERCUTANEOUS NEPHROSTOMY;  Surgeon: Florentino Awad MD;  Location: [facility] OR    NEPHROSTOMY W/ INTRODUCTION OF CATHETER Right     OTHER SURGICAL HISTORY      Mole removednasal    OTHER SURGICAL HISTORY Right     Cystoscopy, ureteroscopy, laser11/02/2014 x2 with ureteral stent insertion     PERCUTANEOUS NEPHROSTOMY  2016    Procedure: PERCUTANEOUS NEPHROSTOMY;  Surgeon: Florentino Awad MD;  Location: [facility] OR    WISDOM TOOTH EXTRACTION      ZZC REMOVAL OF KIDNEY STONE             CURRENT MEDICATIONS:    doxycycline hyclate (VIBRAMYCIN) 100 MG capsule  ondansetron (ZOFRAN ODT) 4 MG ODT tab  oxyCODONE (ROXICODONE) 5 MG tablet  acetaminophen (TYLENOL) 500 MG tablet  albuterol (ACCUNEB) 1.25 MG/3ML neb solution  albuterol (PROAIR HFA/PROVENTIL HFA/VENTOLIN HFA) 108 (90 Base) MCG/ACT inhaler  azelastine (ASTELIN) 0.1 % nasal spray  budesonide-formoterol (SYMBICORT) 160-4.5 MCG/ACT Inhaler  ipratropium - albuterol 0.5 mg/2.5 mg/3 mL (DUONEB) 0.5-2.5 (3) MG/3ML neb solution  sertraline (ZOLOFT) 100 MG tablet        ALLERGIES:  Allergies   Allergen Reactions    Desogestrel-Ethinyl Estradiol Angioedema and Swelling     Swelling of hands and feet per pt.      Penicillins Rash     As a child per mother; mother unsure if has tolerated another PCN since. Tolerated ampicillin 16    Sulfa Antibiotics Rash    Vancomycin Rash       FAMILY HISTORY:  Family History   Problem Relation Age of Onset    Anesthesia Reaction No family hx of     Malignant Hyperthermia No family hx of     Heart Disease Maternal Uncle         heart failure    Coronary Artery Disease Other     Heart Disease Maternal Grandmother         pacemaker    Gout Paternal Grandfather     Prostate Cancer Maternal Aunt         breast    Heart Disease Maternal Aunt         pacemaker    Heart Disease Maternal Aunt         pacemaker    Heart Disease Maternal Aunt         pacemaker       SOCIAL HISTORY:   Social History     Socioeconomic History    Marital status: Single   Tobacco Use    Smoking status: Every Day     Packs/day: 0.50     Years: 10.00     Additional pack years: 0.00     Total pack years: 5.00     Types: Cigarettes     Last attempt to quit: 2016     Years since quittin.2    Smokeless tobacco: Former     Quit date: 2016     Tobacco comments:     Hasn't smoked in a week due to breathing issues    Vaping Use    Vaping Use: Never used   Substance and Sexual Activity    Alcohol use: Not Currently     Comment: Alcoholic Drinks/day: occ    Drug use: No    Sexual activity: Not Currently       VITALS:  /70   Pulse 96   Temp 98  F (36.7  C) (Temporal)   Resp 16   Ht 1.524 m (5')   Wt 83.9 kg (185 lb)   LMP 12/06/2023   SpO2 94%   BMI 36.13 kg/m      PHYSICAL EXAM    Physical Exam  Vitals and nursing note reviewed.   Constitutional:       General: She is not in acute distress.     Appearance: Normal appearance. She is normal weight. She is not ill-appearing.   HENT:      Head: Normocephalic and atraumatic.      Nose: Nose normal.      Mouth/Throat:      Mouth: Mucous membranes are moist.      Pharynx: Oropharynx is clear.   Eyes:      Extraocular Movements: Extraocular movements intact.      Conjunctiva/sclera: Conjunctivae normal.      Pupils: Pupils are equal, round, and reactive to light.   Cardiovascular:      Rate and Rhythm: Normal rate and regular rhythm.      Pulses: Normal pulses.      Heart sounds: Normal heart sounds. No murmur heard.  Pulmonary:      Effort: Pulmonary effort is normal. No respiratory distress.      Breath sounds: Normal breath sounds.   Abdominal:      General: Abdomen is flat. There is no distension.      Palpations: Abdomen is soft.      Tenderness: There is abdominal tenderness (R sided). There is no right CVA tenderness, left CVA tenderness, guarding or rebound.      Hernia: No hernia is present.      Comments: R flank has nephrostomy tube; skin is dry without erythema / warmth / fluctuance / drainage. Urine in bag is yellow without sediment.   Musculoskeletal:         General: Normal range of motion.      Cervical back: Normal range of motion.      Right lower leg: No edema.      Left lower leg: No edema.   Skin:     General: Skin is warm and dry.      Capillary Refill: Capillary refill takes less  than 2 seconds.      Coloration: Skin is not jaundiced or pale.   Neurological:      General: No focal deficit present.      Mental Status: She is alert and oriented to person, place, and time. Mental status is at baseline.   Psychiatric:         Mood and Affect: Mood normal.         Behavior: Behavior normal.         Thought Content: Thought content normal.         Judgment: Judgment normal.            LAB:  All pertinent labs reviewed and interpreted.  Results for orders placed or performed during the hospital encounter of 12/27/23   CT Abdomen Pelvis w Contrast    Impression    IMPRESSION:   1.  No significant change in appearance of the right kidney with a percutaneous nephrostomy tube in the lower pole. There is again caliectasis and multiple renal cortical cysts. There are several small right intrarenal stones. No ureteral stone.  2.  Stable splenomegaly.       Basic metabolic panel   Result Value Ref Range    Sodium 136 135 - 145 mmol/L    Potassium 4.0 3.4 - 5.3 mmol/L    Chloride 102 98 - 107 mmol/L    Carbon Dioxide (CO2) 22 22 - 29 mmol/L    Anion Gap 12 7 - 15 mmol/L    Urea Nitrogen 16.6 6.0 - 20.0 mg/dL    Creatinine 1.03 (H) 0.51 - 0.95 mg/dL    GFR Estimate 72 >60 mL/min/1.73m2    Calcium 9.9 8.6 - 10.0 mg/dL    Glucose 158 (H) 70 - 99 mg/dL   Hepatic function panel   Result Value Ref Range    Protein Total 6.8 6.4 - 8.3 g/dL    Albumin 4.1 3.5 - 5.2 g/dL    Bilirubin Total 1.1 <=1.2 mg/dL    Alkaline Phosphatase 79 40 - 150 U/L    AST 13 0 - 45 U/L    ALT 14 0 - 50 U/L    Bilirubin Direct <0.20 0.00 - 0.30 mg/dL   Result Value Ref Range    Lipase 13 13 - 60 U/L   Symptomatic Influenza A/B, RSV, & SARS-CoV2 PCR (COVID-19) Nasopharyngeal    Specimen: Nasopharyngeal; Swab   Result Value Ref Range    Influenza A PCR Negative Negative    Influenza B PCR Negative Negative    RSV PCR Negative Negative    SARS CoV2 PCR Negative Negative   UA with Microscopic reflex to Culture    Specimen: Urine, Clean Catch    Result Value Ref Range    Color Urine Orange (A) Colorless, Straw, Light Yellow, Yellow    Appearance Urine Turbid (A) Clear    Glucose Urine Negative Negative mg/dL    Bilirubin Urine Negative Negative    Ketones Urine Negative Negative mg/dL    Specific Gravity Urine 1.022 1.001 - 1.030    Blood Urine >1.0 mg/dL (A) Negative    pH Urine 6.0 5.0 - 7.0    Protein Albumin Urine 100 (A) Negative mg/dL    Urobilinogen Urine <2.0 <2.0 mg/dL    Nitrite Urine Positive (A) Negative    Leukocyte Esterase Urine 500 Duglas/uL (A) Negative    Bacteria Urine Many (A) None Seen /HPF    WBC Clumps Urine Present (A) None Seen /HPF    Mucus Urine Present (A) None Seen /LPF    RBC Urine 28 (H) <=2 /HPF    WBC Urine 0 <=5 /HPF    Squamous Epithelials Urine 2 (H) <=1 /HPF   Lactic acid whole blood   Result Value Ref Range    Lactic Acid 1.0 0.7 - 2.0 mmol/L   CBC with platelets and differential   Result Value Ref Range    WBC Count 6.0 4.0 - 11.0 10e3/uL    RBC Count 5.02 3.80 - 5.20 10e6/uL    Hemoglobin 14.6 11.7 - 15.7 g/dL    Hematocrit 43.6 35.0 - 47.0 %    MCV 87 78 - 100 fL    MCH 29.1 26.5 - 33.0 pg    MCHC 33.5 31.5 - 36.5 g/dL    RDW 13.7 10.0 - 15.0 %    Platelet Count 167 150 - 450 10e3/uL    % Neutrophils 91 %    % Lymphocytes 3 %    % Monocytes 4 %    % Eosinophils 1 %    % Basophils 0 %    % Immature Granulocytes 1 %    NRBCs per 100 WBC 0 <1 /100    Absolute Neutrophils 5.5 1.6 - 8.3 10e3/uL    Absolute Lymphocytes 0.2 (L) 0.8 - 5.3 10e3/uL    Absolute Monocytes 0.2 0.0 - 1.3 10e3/uL    Absolute Eosinophils 0.1 0.0 - 0.7 10e3/uL    Absolute Basophils 0.0 0.0 - 0.2 10e3/uL    Absolute Immature Granulocytes 0.0 <=0.4 10e3/uL    Absolute NRBCs 0.0 10e3/uL   RBC and Platelet Morphology   Result Value Ref Range    Platelet Assessment  Automated Count Confirmed. Platelet morphology is normal.     Automated Count Confirmed. Platelet morphology is normal.    Acanthocytes      Bailee Rods      Basophilic Stippling      Bite Cells       Blister Cells      Rip Cells      Elliptocytes      Hgb C Crystals      Zaldivar-Jolly Bodies      Hypersegmented Neutrophils      Polychromasia      RBC agglutination      RBC Fragments      Reactive Lymphocytes      Rouleaux      Sickle Cells      Smudge Cells      Spherocytes      Stomatocytes      Target Cells      Teardrop Cells      Toxic Neutrophils      RBC Morphology Confirmed RBC Indices        RADIOLOGY:  Reviewed all pertinent imaging. Please see official radiology report.  CT Abdomen Pelvis w Contrast   Final Result   IMPRESSION:    1.  No significant change in appearance of the right kidney with a percutaneous nephrostomy tube in the lower pole. There is again caliectasis and multiple renal cortical cysts. There are several small right intrarenal stones. No ureteral stone.   2.  Stable splenomegaly.                PROCEDURES:   None      [facility] Documentation:   CMS Diagnoses:               I, Angélica Kline, am serving as a scribe to document services personally performed by Angélica Kline based on my observation and the provider's statements to me. I, Rojelio Quach MD, attest that Angélica Kline is acting in a scribe capacity, has observed my performance of the services and has documented them in accordance with my direction.    Rojelio Quach MD  [facility] EMERGENCY DEPARTMENT  [address] [address]  [phone]       Rojelio Quach MD  12/27/23 2349

## 2023-12-28 LAB — BACTERIA UR CULT: NORMAL

## 2024-01-20 ENCOUNTER — HOSPITAL ENCOUNTER (EMERGENCY)
Facility: HOSPITAL | Age: 39
Discharge: HOME | End: 2024-01-21
Attending: EMERGENCY MEDICINE | Admitting: EMERGENCY MEDICINE

## 2024-01-20 DIAGNOSIS — S06.0X1A CONCUSSION WITH LOSS OF CONSCIOUSNESS OF 30 MINUTES OR LESS, INITIAL ENCOUNTER: ICD-10-CM
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
## 2024-01-20 PROCEDURE — 99284 EMERGENCY DEPT VISIT MOD MDM: CPT | Mod: 25

## 2024-01-21 ENCOUNTER — APPOINTMENT (OUTPATIENT)
Dept: CT IMAGING | Facility: HOSPITAL | Age: 39
End: 2024-01-21
Attending: EMERGENCY MEDICINE

## 2024-01-21 VITALS
WEIGHT: 199.2 LBS | BODY MASS INDEX: 38.9 KG/M2 | TEMPERATURE: 97.8 F | SYSTOLIC BLOOD PRESSURE: 117 MMHG | OXYGEN SATURATION: 98 % | RESPIRATION RATE: 20 BRPM | HEART RATE: 74 BPM | DIASTOLIC BLOOD PRESSURE: 60 MMHG

## 2024-01-21 PROCEDURE — 250N000011 HC RX IP 250 OP 636: Performed by: EMERGENCY MEDICINE

## 2024-01-21 PROCEDURE — 70450 CT HEAD/BRAIN W/O DYE: CPT

## 2024-01-21 PROCEDURE — 250N000013 HC RX MED GY IP 250 OP 250 PS 637: Performed by: EMERGENCY MEDICINE

## 2024-01-21 RX ORDER — ACETAMINOPHEN 325 MG/1
650 TABLET ORAL ONCE
Status: COMPLETED | OUTPATIENT
Start: 2024-01-21 | End: 2024-01-21

## 2024-01-21 RX ORDER — ONDANSETRON 4 MG/1
4 TABLET, ORALLY DISINTEGRATING ORAL ONCE
Status: COMPLETED | OUTPATIENT
Start: 2024-01-21 | End: 2024-01-21

## 2024-01-21 RX ADMIN — ONDANSETRON 4 MG: 4 TABLET, ORALLY DISINTEGRATING ORAL at 00:25

## 2024-01-21 RX ADMIN — ACETAMINOPHEN 650 MG: 325 TABLET ORAL at 00:25

## 2024-01-21 ASSESSMENT — ACTIVITIES OF DAILY LIVING (ADL): ADLS_ACUITY_SCORE: 33

## 2024-01-21 ASSESSMENT — VISUAL ACUITY
OD: 20/40;WITHOUT CORRECTIVE LENSES
OS: 20/40;WITHOUT CORRECTIVE LENSES
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
## 2024-01-25 ENCOUNTER — MYC MEDICAL ADVICE (OUTPATIENT)
Dept: INTERVENTIONAL RADIOLOGY/VASCULAR | Facility: CLINIC | Age: 39
End: 2024-01-25
Payer: COMMERCIAL

## 2024-01-30 NOTE — H&P
Interventional Radiology - Pre-Procedure Note:  Outpatient - [facility]  02/05/2024     Procedure Requested: RIGHT percutaneous NEPHROSTOMY tube EXCHANGE  Requested by: CLIFFORD FAJARDO    History and Physical Reviewed: H&P documented within 30 days (by Crystal KRUSE on 1/20/2024).  I have personally reviewed the patient's medical history and have updated the medical record as necessary.    Brief HPI: Jacqueline Pappas is a 38 year old female PMH congenital solitary kidney, recurrent UTI, nephrolithiasis, ureteral strictures s/p RIGHT PNT placement.       Last routine exchange 11/3/2023, per below.  Utilizes sedation for exchanges.    Denies fever, chills or back and flank pain.  No changes to UOP including volume, content, or clarity.  Denies hematuria.      She is followed by [facility], but has transferred care to this system.  She has not established care with a urologist in the [facility].  She reports she typically get abx with PNT exchanges- Cipro 400mg IV x1.     Presents for RIGHT percutaneous NEPHROSTOMY tube EXCHANGE       IMAGING:  Narrative & Impression   [facility]  LOCATION: [facility]  DATE: 11/3/2023     PROCEDURE: NEPHROSTOMY TUBE EXCHANGE     ATTENDING: Ceferino Ashley MD.     INDICATION: 38-year-old female with a history of congenital solitary kidney and ureteral strictures status post right nephrostomy tube placement. Patient presents for routine exchange.     CONSENT: The risks, benefits, and alternatives of nephrostomy tube exchange were discussed with the patient in detail. All questions were answered. Informed consent was given to proceed with the procedure.     MODERATE SEDATION: IV fentanyl and Versed were administered for sedation.  During the timeout, immediately prior to the administration of medications, the patient was reassessed for adequacy to receive conscious sedation. Under physician supervision,   Versed and fentanyl  were administered for moderate sedation. Pulse oximetry, heart rate and blood pressure were continuously monitored by an independent trained observer. The physician spent 15 minutes of face-to-face sedation time with the patient.     ADDITIONAL MEDICATIONS: See EMR     FLUOROSCOPIC TIME: 1.1 minutes.     AIR KERMA:  12 mGy.     CONTRAST: 5 mL     UNIVERSAL PRECAUTIONS: The procedure was performed utilizing maximum sterile barrier technique. Prior to the start of the procedure, a standard pause for patient safety was performed with site marking as indicated.     COMPLICATIONS: No immediate complications.     PROCEDURE:    A  image was obtained. A nephrostogram was performed through the previously placed right nephrostomy tube. Under direct fluoroscopic visualization, the previous tube was removed over a wire and exchange was made for a new 8 Romansh nephrostomy. The   loop was locked within the renal pelvis, and the catheter was sutured to the skin. A post placement nephrostogram was performed.     FINDINGS:    The initial  image shows the previously placed right nephrostomy tube. At the completion of the study, the new nephrostomy loop lies within the renal pelvis and controls the urinary system well.                                                                      IMPRESSION:    Successful right nephrostomy exchange, as discussed above.          NPO: MN   ANTICOAGULANTS: None  ANTIBIOTICS: Antibiotics not clinically indicated for IR procedure, per review of current clinical practice guidelines     ALLERGIES  Allergies   Allergen Reactions    Desogestrel-Ethinyl Estradiol Angioedema and Swelling     Swelling of hands and feet per pt.      Penicillins Rash     As a child per mother; mother unsure if has tolerated another PCN since. Tolerated ampicillin 9/20/16    Sulfa Antibiotics Rash    Vancomycin Rash         LABS:  INR   Date Value Ref Range Status   12/30/2018 0.94 0.90 - 1.10 Final   09/16/2016  1.82 (H) 0.86 - 1.14 Final      Hemoglobin   Date Value Ref Range Status   12/27/2023 14.6 11.7 - 15.7 g/dL Final   11/02/2016 10.1 (L) 11.7 - 15.7 g/dL Final     Platelet Count   Date Value Ref Range Status   12/27/2023 167 150 - 450 10e3/uL Final   11/02/2016 180 150 - 450 10e9/L Final     Creatinine   Date Value Ref Range Status   12/27/2023 1.03 (H) 0.51 - 0.95 mg/dL Final   11/02/2016 1.04 0.52 - 1.04 mg/dL Final     Potassium   Date Value Ref Range Status   12/27/2023 4.0 3.4 - 5.3 mmol/L Final   08/18/2023 4.2 3.5 - 5.0 mmol/L Final   11/02/2016 4.5 3.4 - 5.3 mmol/L Final     UPT/HCG: NEGATIVE    EXAM:  /74 (BP Location: Left arm)   Pulse 107   Temp 98.6  F (37  C) (Oral)   Resp 16   Ht 1.524 m (5')   Wt 90.3 kg (199 lb)   LMP 12/06/2023   SpO2 99%   BMI 38.86 kg/m    General:  Stable.  In no acute distress.    Neuro:  A&O x 3. Moves all extremities equally.  Resp:  Lungs clear to auscultation bilaterally.  Cardio:  S1S2 and reg, without murmur, clicks or rubs  Abdomen:  Soft, non-distended, non-tender, positive bowel sounds.    R PNT: Drain in place.  Insertion site c/d/i.  No stay stitch.  Dressing c/d/i.  Drainage bag with yellow urine         Pre-Sedation Assessment:  Mallampati Airway Classification:  I - Faucial pillars, soft palate, and uvula are visible  Previous reaction to anesthesia/sedation:  No  Sedation plan based on assessment: Moderate (conscious) sedation  ASA Classification: Class 3 - SEVERE SYSTEMIC DISEASE, DEFINITE FUNCTIONAL LIMITATIONS.   Code Status: FULL CODE      ASSESSMENT/PLAN:   # Congenital solitary kidney, ureteral stricture    RIGHT percutaneous NEPHROSTOMY tube EXCHANGE, with sedation    - Antibiotics not clinically indicated for IR procedure, per review of current clinical practice guidelines.  However, pt gets Cipro for routine exchanges per her POC at [facility] - ordered  - Labs reviewed, acceptable to proceed with IR procedure, per clinical practice  guidelines   - Routine exchange in 3 months      Procedural education reviewed with patient/family in detail including, but not limited to risks, benefits and alternatives with understanding verbalized by patient/family.    Total time spent on the date of the encounter: 30 minutes.      DAGOBERTO KAMINSKI NP  Interventional Radiology

## 2024-02-05 ENCOUNTER — HOSPITAL ENCOUNTER (OUTPATIENT)
Dept: INTERVENTIONAL RADIOLOGY/VASCULAR | Facility: HOSPITAL | Age: 39
Discharge: HOME | End: 2024-02-05
Attending: NURSE PRACTITIONER | Admitting: RADIOLOGY
Payer: COMMERCIAL

## 2024-02-05 VITALS
RESPIRATION RATE: 16 BRPM | WEIGHT: 199 LBS | HEIGHT: 60 IN | HEART RATE: 68 BPM | DIASTOLIC BLOOD PRESSURE: 62 MMHG | TEMPERATURE: 97.7 F | BODY MASS INDEX: 39.07 KG/M2 | OXYGEN SATURATION: 97 % | SYSTOLIC BLOOD PRESSURE: 108 MMHG

## 2024-02-05 DIAGNOSIS — N13.5 STRICTURE OR KINKING OF URETER: ICD-10-CM

## 2024-02-05 DIAGNOSIS — Z93.6 NEPHROSTOMY STATUS (H): Primary | ICD-10-CM

## 2024-02-05 LAB — HCG UR QL: NEGATIVE

## 2024-02-05 PROCEDURE — 99152 MOD SED SAME PHYS/QHP 5/>YRS: CPT

## 2024-02-05 PROCEDURE — 250N000009 HC RX 250: Performed by: NURSE PRACTITIONER

## 2024-02-05 PROCEDURE — 81025 URINE PREGNANCY TEST: CPT | Performed by: NURSE PRACTITIONER

## 2024-02-05 PROCEDURE — C1769 GUIDE WIRE: HCPCS

## 2024-02-05 PROCEDURE — 250N000011 HC RX IP 250 OP 636: Performed by: NURSE PRACTITIONER

## 2024-02-05 PROCEDURE — C1729 CATH, DRAINAGE: HCPCS

## 2024-02-05 RX ORDER — FENTANYL CITRATE 50 UG/ML
25-50 INJECTION, SOLUTION INTRAMUSCULAR; INTRAVENOUS EVERY 5 MIN PRN
Status: DISCONTINUED | OUTPATIENT
Start: 2024-02-05 | End: 2024-02-06 | Stop reason: HOSPADM

## 2024-02-05 RX ORDER — CIPROFLOXACIN 2 MG/ML
400 INJECTION, SOLUTION INTRAVENOUS
Status: COMPLETED | OUTPATIENT
Start: 2024-02-05 | End: 2024-02-05

## 2024-02-05 RX ORDER — ONDANSETRON 2 MG/ML
4 INJECTION INTRAMUSCULAR; INTRAVENOUS
Status: DISCONTINUED | OUTPATIENT
Start: 2024-02-05 | End: 2024-02-06 | Stop reason: HOSPADM

## 2024-02-05 RX ORDER — CIPROFLOXACIN 500 MG/5ML
400 KIT ORAL
Status: DISCONTINUED | OUTPATIENT
Start: 2024-02-05 | End: 2024-02-05

## 2024-02-05 RX ORDER — NALOXONE HYDROCHLORIDE 0.4 MG/ML
0.4 INJECTION, SOLUTION INTRAMUSCULAR; INTRAVENOUS; SUBCUTANEOUS
Status: DISCONTINUED | OUTPATIENT
Start: 2024-02-05 | End: 2024-02-06 | Stop reason: HOSPADM

## 2024-02-05 RX ORDER — FLUMAZENIL 0.1 MG/ML
0.2 INJECTION, SOLUTION INTRAVENOUS
Status: DISCONTINUED | OUTPATIENT
Start: 2024-02-05 | End: 2024-02-06 | Stop reason: HOSPADM

## 2024-02-05 RX ORDER — NALOXONE HYDROCHLORIDE 0.4 MG/ML
0.2 INJECTION, SOLUTION INTRAMUSCULAR; INTRAVENOUS; SUBCUTANEOUS
Status: DISCONTINUED | OUTPATIENT
Start: 2024-02-05 | End: 2024-02-06 | Stop reason: HOSPADM

## 2024-02-05 RX ORDER — LIDOCAINE 40 MG/G
CREAM TOPICAL
Status: DISCONTINUED | OUTPATIENT
Start: 2024-02-05 | End: 2024-02-06 | Stop reason: HOSPADM

## 2024-02-05 RX ADMIN — LIDOCAINE HYDROCHLORIDE 20 ML: 10 INJECTION, SOLUTION INFILTRATION; PERINEURAL at 13:01

## 2024-02-05 RX ADMIN — CIPROFLOXACIN 400 MG: 400 INJECTION, SOLUTION INTRAVENOUS at 12:41

## 2024-02-05 RX ADMIN — MIDAZOLAM HYDROCHLORIDE 2 MG: 1 INJECTION, SOLUTION INTRAMUSCULAR; INTRAVENOUS at 12:54

## 2024-02-05 RX ADMIN — FENTANYL CITRATE 50 MCG: 50 INJECTION, SOLUTION INTRAMUSCULAR; INTRAVENOUS at 12:56

## 2024-02-05 RX ADMIN — MIDAZOLAM HYDROCHLORIDE 1 MG: 1 INJECTION, SOLUTION INTRAMUSCULAR; INTRAVENOUS at 12:58

## 2024-02-05 RX ADMIN — FENTANYL CITRATE 50 MCG: 50 INJECTION, SOLUTION INTRAMUSCULAR; INTRAVENOUS at 13:00

## 2024-02-05 NOTE — PRE-PROCEDURE
GENERAL PRE-PROCEDURE:   Procedure:  R PNT exchange  Date/Time:  2/5/2024 11:30 AM    Written consent obtained?: Yes    Risks and benefits: Risks, benefits and alternatives were discussed    Consent given by:  Patient  Patient states understanding of procedure being performed: Yes    Patient's understanding of procedure matches consent: Yes    Procedure consent matches procedure scheduled: Yes    Expected level of sedation:  Moderate  Appropriately NPO:  Yes  ASA Class:  4  Mallampati  :  Grade 2- soft palate, base of uvula, tonsillar pillars, and portion of posterior pharyngeal wall visible  Lungs:  Lungs clear with good breath sounds bilaterally  Heart:  Normal heart sounds and rate  History & Physical reviewed:  History and physical reviewed and no updates needed  Statement of review:  I have reviewed the lab findings, diagnostic data, medications, and the plan for sedation

## 2024-02-05 NOTE — DISCHARGE INSTRUCTIONS
Percutaneous Nephrostomy Tube (PNT) Exchange/Check Discharge Instructions:  You had your nephrostomy tube checked or exchanged today. Please refer to the below instructions following your check/exchange:    Care Instructions:  - If you received sedation for your procedure, do not drive or operate heavy machinery for the rest of the day.  - Rest today if your tube was exchanged. Avoid strenuous activity and heavy lifting for the rest of the day. Return to your normal activities as you tolerate tomorrow.  - Keep the nephrostomy tube site clean and dry.   - You may shower, but do not submerge the nephrostomy tube site in water (avoid tub baths, Jacuzzis, hot tubs and pools).  - If you have a gauze dressing, change the gauze and tape dressing as needed to keep site clean and dry. If you have a securement device, leave in place until your next exchange.  - Clean around nephrostomy tubes exit sites with soap and water, pat and apply new split gauze around the tube at the exit site.  - Urine going into the bag may be red with blood for the first few days.  - Keep the drainage bag lower than your kidney to keep urine from backing up.  - Inspect the tube often for kinks.  - Empty your drainage bag when it is approximately half way fluid. Follow below instructions for emptying bag:  - Clean hands well with soap and water.  - Place a container near the outlet valve of the drainage bag.  - To open the valve:  - If you have a [facility] leg bag: Twist the blue valve at the bottom of the bag while holding the valve over your container to empty bag. Re-twist the valve closed on the drainage bag once complete.  - If you have a [place] leg bag: Turn the lever downward while holding the valve over your container to empty the bag. Turn the valve upward to close once complete.  - Discard drainage into toilet once urine drained.    Follow-Up:  - Routine (every 2-3 months) nephrostomy tube exchange is recommended. Contact [facility]  IR Outpatient Scheduling at [phone] to schedule exchanges.    Contact [facility] RN Line at [phone] if you experience the following:  - Nephrostomy tube(s) stops draining urine.  - Nephrostomy tube(s) site is leaking urine.  - Extreme pain at the nephrostomy tube site.  - Nephrostomy tube appears to be falling out or has fallen out, becomes clogged or breaks.    Seek Medical Evaluation for the following:  - Fever (greater than 101 F (38.3C)).  - Purulent (yellow/green/foul smelling) drainage from nephrostomy tube exit sites.  - Significant bleeding from nephrostomy tube site(s).  - Significant or worsening pain at nephrostomy tube exit site(s) or back.

## 2024-02-05 NOTE — PROCEDURES
[facility]    Procedure: Right nephrostomy tube exchange    Date/Time: 2/5/2024 1:02 PM    Performed by: Florentino Hartley MD  Authorized by: Florentino Hartley MD      UNIVERSAL PROTOCOL   Site Marked: NA  Prior Images Obtained and Reviewed:  Yes  Required items: Required blood products, implants, devices and special equipment available    Patient identity confirmed:  Verbally with patient, arm band, provided demographic data and hospital-assigned identification number  Patient was reevaluated immediately before administering moderate or deep sedation or anesthesia  Confirmation Checklist:  Patient's identity using two indicators, relevant allergies, procedure was appropriate and matched the consent or emergent situation and correct equipment/implants were available  Time out: Immediately prior to the procedure a time out was called    Universal Protocol: the Joint Commission Universal Protocol was followed    Preparation: Patient was prepped and draped in usual sterile fashion       ANESTHESIA    Anesthesia:  Local infiltration  Local Anesthetic:  Lidocaine 1% without epinephrine      SEDATION  Patient Sedated: Yes    Sedation Type:  Moderate (conscious) sedation  Vital signs: Vital signs monitored during sedation    See dictated procedure note for full details.  Findings: Successful right nephrostomy tube exchange.    Specimens: none    Complications: None    Condition: Stable      PROCEDURE    Patient Tolerance:  Patient tolerated the procedure well with no immediate complications  Length of time physician/provider present for 1:1 monitoring during sedation: 15    Jan Hartley MD  Interventional Radiology

## 2024-02-08 ENCOUNTER — TRANSCRIBE ORDERS (OUTPATIENT)
Dept: OTHER | Age: 39
End: 2024-02-08

## 2024-02-08 DIAGNOSIS — Q60.2 CONGENITAL RENAL AGENESIS AND DYSGENESIS: Primary | ICD-10-CM

## 2024-02-08 DIAGNOSIS — Z93.6 NEPHROSTOMY STATUS (H): ICD-10-CM

## 2024-02-08 DIAGNOSIS — N20.0 KIDNEY STONE: ICD-10-CM

## 2024-02-08 DIAGNOSIS — Q60.5 CONGENITAL RENAL AGENESIS AND DYSGENESIS: Primary | ICD-10-CM

## 2024-02-08 DIAGNOSIS — N39.0 RECURRENT UTI: ICD-10-CM

## 2024-02-19 NOTE — TELEPHONE ENCOUNTER
MEDICAL RECORDS REQUEST   [facility]  Urology Clinic  [address] [place] [address]  PHONE: [phone]  Fax: [phone]        FUTURE VISIT INFORMATION                                                   Jacqueline Pappas, : 1985 scheduled for future visit at [facility]    APPOINTMENT INFORMATION:  Date: 2024  Provider:  Krzysztof Saunders MD  Reason for Visit/Diagnosis: ongoing management of nephrostomy tube, hx Congenital renal agenesis and dysgenesis, kidney stones.    REFERRAL INFORMATION:  Referring provider:  Pao Montague @ [employer] [employer]      RECORDS REQUESTED FOR VISIT                                                     NOTES  STATUS/DETAILS   OFFICE NOTE from referring provider  yes, 2023 -- Pao Montague @ [facility]   OFFICE NOTE from other specialist  yes   DISCHARGE REPORT from the ER  yes   OPERATIVE REPORT  yes   MEDICATION LIST  yes   LABS     URINALYSIS (UA)  yes   IMAGES  yes, 2024, 2023, 06/15/2023 -- IR NEPHROSTOMY  2023, 10/06/2023, 2023, 2023, 2023 -- CT ABD PELVIS  2023, 10/27/2023, 10/07/2023, 10/01/2023, 2023, 09/10/2023 -- XR CHEST  2023 -- US RENAL  10/15/2023, 2023 -- CT CHEST  2023 -- XR ABD     PRE-VISIT CHECKLIST      Joint diagnostic appointment coordinated correctly          (ensure right order & amount of time) Yes   RECORD COLLECTION COMPLETE Yes

## 2024-02-28 ENCOUNTER — PRE VISIT (OUTPATIENT)
Dept: UROLOGY | Facility: CLINIC | Age: 39
End: 2024-02-28
Payer: COMMERCIAL

## 2024-02-28 ENCOUNTER — HOSPITAL ENCOUNTER (EMERGENCY)
Facility: HOSPITAL | Age: 39
Discharge: HOME | End: 2024-02-29
Attending: EMERGENCY MEDICINE | Admitting: EMERGENCY MEDICINE
Payer: COMMERCIAL

## 2024-02-28 ENCOUNTER — APPOINTMENT (OUTPATIENT)
Dept: CT IMAGING | Facility: HOSPITAL | Age: 39
End: 2024-02-28
Attending: EMERGENCY MEDICINE
Payer: COMMERCIAL

## 2024-02-28 DIAGNOSIS — R10.9 ACUTE RIGHT FLANK PAIN: ICD-10-CM

## 2024-02-28 LAB
ALBUMIN SERPL BCG-MCNC: 4.1 G/DL (ref 3.5–5.2)
ALBUMIN UR-MCNC: 100 MG/DL
ALP SERPL-CCNC: 87 U/L (ref 40–150)
ALT SERPL W P-5'-P-CCNC: 16 U/L (ref 0–50)
ANION GAP SERPL CALCULATED.3IONS-SCNC: 11 MMOL/L (ref 7–15)
APPEARANCE UR: CLEAR
AST SERPL W P-5'-P-CCNC: 22 U/L (ref 0–45)
BACTERIA #/AREA URNS HPF: ABNORMAL /HPF
BASOPHILS # BLD AUTO: 0.1 10E3/UL (ref 0–0.2)
BASOPHILS NFR BLD AUTO: 1 %
BILIRUB SERPL-MCNC: 0.6 MG/DL
BILIRUB UR QL STRIP: NEGATIVE
BUN SERPL-MCNC: 8 MG/DL (ref 6–20)
CALCIUM SERPL-MCNC: 9.8 MG/DL (ref 8.6–10)
CHLORIDE SERPL-SCNC: 104 MMOL/L (ref 98–107)
COLOR UR AUTO: ABNORMAL
CREAT SERPL-MCNC: 0.95 MG/DL (ref 0.51–0.95)
DEPRECATED HCO3 PLAS-SCNC: 22 MMOL/L (ref 22–29)
EGFRCR SERPLBLD CKD-EPI 2021: 79 ML/MIN/1.73M2
EOSINOPHIL # BLD AUTO: 0.4 10E3/UL (ref 0–0.7)
EOSINOPHIL NFR BLD AUTO: 4 %
ERYTHROCYTE [DISTWIDTH] IN BLOOD BY AUTOMATED COUNT: 14.3 % (ref 10–15)
GLUCOSE SERPL-MCNC: 87 MG/DL (ref 70–99)
GLUCOSE UR STRIP-MCNC: NEGATIVE MG/DL
HCG UR QL: NEGATIVE
HCT VFR BLD AUTO: 45.4 % (ref 35–47)
HGB BLD-MCNC: 15 G/DL (ref 11.7–15.7)
HGB UR QL STRIP: ABNORMAL
HOLD SPECIMEN: NORMAL
HOLD SPECIMEN: NORMAL
IMM GRANULOCYTES # BLD: 0 10E3/UL
IMM GRANULOCYTES NFR BLD: 0 %
KETONES UR STRIP-MCNC: NEGATIVE MG/DL
LEUKOCYTE ESTERASE UR QL STRIP: ABNORMAL
LYMPHOCYTES # BLD AUTO: 2 10E3/UL (ref 0.8–5.3)
LYMPHOCYTES NFR BLD AUTO: 21 %
MCH RBC QN AUTO: 29 PG (ref 26.5–33)
MCHC RBC AUTO-ENTMCNC: 33 G/DL (ref 31.5–36.5)
MCV RBC AUTO: 88 FL (ref 78–100)
MONOCYTES # BLD AUTO: 0.5 10E3/UL (ref 0–1.3)
MONOCYTES NFR BLD AUTO: 6 %
MUCOUS THREADS #/AREA URNS LPF: PRESENT /LPF
NEUTROPHILS # BLD AUTO: 6.5 10E3/UL (ref 1.6–8.3)
NEUTROPHILS NFR BLD AUTO: 68 %
NITRATE UR QL: NEGATIVE
NRBC # BLD AUTO: 0 10E3/UL
NRBC BLD AUTO-RTO: 0 /100
PH UR STRIP: 7 [PH] (ref 5–7)
PLATELET # BLD AUTO: NORMAL 10*3/UL
POTASSIUM SERPL-SCNC: 3.9 MMOL/L (ref 3.4–5.3)
PROT SERPL-MCNC: 7 G/DL (ref 6.4–8.3)
RBC # BLD AUTO: 5.18 10E6/UL (ref 3.8–5.2)
RBC URINE: 121 /HPF
SODIUM SERPL-SCNC: 137 MMOL/L (ref 135–145)
SP GR UR STRIP: 1.02 (ref 1–1.03)
SQUAMOUS EPITHELIAL: <1 /HPF
UROBILINOGEN UR STRIP-MCNC: <2 MG/DL
WBC # BLD AUTO: 9.5 10E3/UL (ref 4–11)
WBC URINE: 3 /HPF

## 2024-02-28 PROCEDURE — 80053 COMPREHEN METABOLIC PANEL: CPT | Performed by: STUDENT IN AN ORGANIZED HEALTH CARE EDUCATION/TRAINING PROGRAM

## 2024-02-28 PROCEDURE — 81001 URINALYSIS AUTO W/SCOPE: CPT | Performed by: EMERGENCY MEDICINE

## 2024-02-28 PROCEDURE — 80053 COMPREHEN METABOLIC PANEL: CPT | Performed by: EMERGENCY MEDICINE

## 2024-02-28 PROCEDURE — 36415 COLL VENOUS BLD VENIPUNCTURE: CPT | Performed by: STUDENT IN AN ORGANIZED HEALTH CARE EDUCATION/TRAINING PROGRAM

## 2024-02-28 PROCEDURE — 87086 URINE CULTURE/COLONY COUNT: CPT | Performed by: EMERGENCY MEDICINE

## 2024-02-28 PROCEDURE — 81025 URINE PREGNANCY TEST: CPT | Performed by: STUDENT IN AN ORGANIZED HEALTH CARE EDUCATION/TRAINING PROGRAM

## 2024-02-28 PROCEDURE — 96361 HYDRATE IV INFUSION ADD-ON: CPT

## 2024-02-28 PROCEDURE — 99285 EMERGENCY DEPT VISIT HI MDM: CPT | Mod: 25

## 2024-02-28 PROCEDURE — 81001 URINALYSIS AUTO W/SCOPE: CPT | Performed by: STUDENT IN AN ORGANIZED HEALTH CARE EDUCATION/TRAINING PROGRAM

## 2024-02-28 PROCEDURE — 74177 CT ABD & PELVIS W/CONTRAST: CPT

## 2024-02-28 PROCEDURE — 250N000011 HC RX IP 250 OP 636: Performed by: EMERGENCY MEDICINE

## 2024-02-28 PROCEDURE — 81025 URINE PREGNANCY TEST: CPT | Performed by: EMERGENCY MEDICINE

## 2024-02-28 PROCEDURE — 85041 AUTOMATED RBC COUNT: CPT | Performed by: EMERGENCY MEDICINE

## 2024-02-28 PROCEDURE — 258N000003 HC RX IP 258 OP 636: Performed by: EMERGENCY MEDICINE

## 2024-02-28 PROCEDURE — 250N000013 HC RX MED GY IP 250 OP 250 PS 637: Performed by: EMERGENCY MEDICINE

## 2024-02-28 PROCEDURE — 96374 THER/PROPH/DIAG INJ IV PUSH: CPT | Mod: 59

## 2024-02-28 RX ORDER — OXYCODONE HYDROCHLORIDE 5 MG/1
5 TABLET ORAL ONCE
Status: COMPLETED | OUTPATIENT
Start: 2024-02-28 | End: 2024-02-28

## 2024-02-28 RX ORDER — IOPAMIDOL 755 MG/ML
84 INJECTION, SOLUTION INTRAVASCULAR ONCE
Status: COMPLETED | OUTPATIENT
Start: 2024-02-28 | End: 2024-02-28

## 2024-02-28 RX ORDER — ACETAMINOPHEN 325 MG/1
975 TABLET ORAL ONCE
Status: COMPLETED | OUTPATIENT
Start: 2024-02-28 | End: 2024-02-28

## 2024-02-28 RX ORDER — ONDANSETRON 2 MG/ML
4 INJECTION INTRAMUSCULAR; INTRAVENOUS ONCE
Status: COMPLETED | OUTPATIENT
Start: 2024-02-28 | End: 2024-02-28

## 2024-02-28 RX ADMIN — ACETAMINOPHEN 975 MG: 325 TABLET ORAL at 23:43

## 2024-02-28 RX ADMIN — ONDANSETRON 4 MG: 2 INJECTION INTRAMUSCULAR; INTRAVENOUS at 21:42

## 2024-02-28 RX ADMIN — IOPAMIDOL 84 ML: 755 INJECTION, SOLUTION INTRAVENOUS at 23:05

## 2024-02-28 RX ADMIN — SODIUM CHLORIDE 1000 ML: 9 INJECTION, SOLUTION INTRAVENOUS at 21:41

## 2024-02-28 RX ADMIN — OXYCODONE HYDROCHLORIDE 5 MG: 5 TABLET ORAL at 21:41

## 2024-02-28 ASSESSMENT — COLUMBIA-SUICIDE SEVERITY RATING SCALE - C-SSRS
2. HAVE YOU ACTUALLY HAD ANY THOUGHTS OF KILLING YOURSELF IN THE PAST MONTH?: NO
1. IN THE PAST MONTH, HAVE YOU WISHED YOU WERE DEAD OR WISHED YOU COULD GO TO SLEEP AND NOT WAKE UP?: NO
6. HAVE YOU EVER DONE ANYTHING, STARTED TO DO ANYTHING, OR PREPARED TO DO ANYTHING TO END YOUR LIFE?: NO

## 2024-02-28 ASSESSMENT — ACTIVITIES OF DAILY LIVING (ADL)
ADLS_ACUITY_SCORE: 37
ADLS_ACUITY_SCORE: 37
ADLS_ACUITY_SCORE: 35

## 2024-02-28 NOTE — TELEPHONE ENCOUNTER
Reason for visit:   Nephrostomy tube management    Relevant information:   Chronic nephrostomy tube management (right)  Congenital singular kidney  Hx chronic obstruction of ureter    Records/imaging/labs/orders:   Available in Epic, CE    Pt called:   Salma Posadas LPN  2/28/2024  3:17 PM

## 2024-02-29 VITALS
WEIGHT: 196.87 LBS | RESPIRATION RATE: 20 BRPM | TEMPERATURE: 98.1 F | SYSTOLIC BLOOD PRESSURE: 94 MMHG | OXYGEN SATURATION: 93 % | DIASTOLIC BLOOD PRESSURE: 51 MMHG | BODY MASS INDEX: 38.65 KG/M2 | HEART RATE: 71 BPM | HEIGHT: 60 IN

## 2024-02-29 NOTE — ED NOTES
Patient discharged at 0041 to home; family providing transportation. Patient provided with all discharge paperwork and educational material. All questions answered to patient's satisfaction.

## 2024-02-29 NOTE — ED TRIAGE NOTES
Ptp resents to ED with c/o R flank pain, body aches, and nausea since yesterday morning. Took 1000 mg tylenol at 1300.    Pt only has R kidney and has a neph tube.      Triage Assessment (Adult)       Row Name 02/28/24 2022          Triage Assessment    Airway WDL WDL        Respiratory WDL    Respiratory WDL WDL        Cardiac WDL    Cardiac WDL WDL

## 2024-02-29 NOTE — ED PROVIDER NOTES
EMERGENCY DEPARTMENT ENCOUNTER      NAME: Jacqueline Pappas  AGE: 38 year old female  YOB: 1985  MRN: [id number]  EVALUATION DATE & TIME: 2/28/2024  8:25 PM    PCP: Pao Montague    ED PROVIDER: Hillary Mendoza DO      Chief Complaint   Patient presents with    Flank Pain         FINAL IMPRESSION:  1. Acute right flank pain        ED COURSE & MEDICAL DECISION MAKING:    Pertinent Labs & Imaging studies reviewed. (See chart for details)  9:20 PM I met the patient and performed my initial interview and exam.  11:54 PM Discussed plan for discharge.     38 year old female presents to the Emergency Department for evaluation of right flank pain, nausea vomiting diarrhea.  She also reports chills.  Symptoms seem to start yesterday.  Patient with history of congenital absence of one kidney, chronic right flank pain, chronic indwelling right sided nephrostomy tube due to partial obstruction.  Well-known to this department.  Frequent urinalysis which are suggestive of infection but with no growth on culture.  Urinalysis today has some blood but does not show signs of infection.  She is not pregnant.  No leukocytosis on labs.  Electrolytes unremarkable.  She does have some blood in the tubing of her nephrostomy tube which she reports is new.  I think it is reasonable to obtain a CT imaging to further evaluate possible cause of her pain.  Patient has a care plan that was well reviewed.  She was given fluids, ondansetron and oral pain medications.  CT imaging showed stable appearance of the abdomen pelvis, nephrostomy tube in satisfactory position.  I discussed with patient.  Encouraged her to use over-the-counter pain medication and close follow-up.    At the conclusion of the encounter I discussed the results of all of the tests and the disposition. The questions were answered. The patient or family acknowledged understanding and was agreeable with the care plan.     Medical Decision Making  Obtained  supplemental history:Supplemental history obtained?: No  Reviewed external records: External records reviewed?: Documented in chart  Care impacted by chronic illness:Chronic Pain  Care significantly affected by social determinants of health:N/A  Did you consider but not order tests?: Work up considered but not performed and documented in chart, if applicable  Did you interpret images independently?: Independent interpretation of ECG and images noted in documentation, when applicable.  Consultation discussion with other provider:Did you involve another provider (consultant, , pharmacy, etc.)?: No  Discharge. No recommendations on prescription strength medication(s). See documentation for any additional details.    0 minutes of critical care time     MEDICATIONS GIVEN IN THE EMERGENCY:  Medications   sodium chloride 0.9% BOLUS 1,000 mL (0 mLs Intravenous Stopped 2/28/24 2339)   oxyCODONE (ROXICODONE) tablet 5 mg (5 mg Oral $Given 2/28/24 2141)   ondansetron (ZOFRAN) injection 4 mg (4 mg Intravenous $Given 2/28/24 2142)   acetaminophen (TYLENOL) tablet 975 mg (975 mg Oral $Given 2/28/24 2343)   iopamidol (ISOVUE-370) solution 84 mL (84 mLs Intravenous $Given 2/28/24 2305)       NEW PRESCRIPTIONS STARTED AT TODAY'S ER VISIT  New Prescriptions    No medications on file        =================================================================    HPI    Patient information was obtained from: Patient    Use of : N/A     Jacqueline Pappas is a 38 year old female with a pertinent history of congenital absence of one kidney, s/p nephrostomy, perinephric hematoma, SIRS, chronic right flank pain, kidney stones, and frequent UTI who presents to this ED by private car for evaluation of flank pain.    Patient reports that she developed this right sided flank pain yesterday that worsened today. She also complains of accompanying nausea without emesis, chills, and poor appetite. No cough, rash, or sore throat. No known sick  contacts. Attempted Tylenol earlier today without significant relief. She notes that she typically gets blood in her tube right after exchanges, however, admits that there is some blood in the tube which is a little unusual this far out.    Per chart review, patient had her right nephrostomy tube exchanged by [facility] IR on , no perioperative complications and discharged in stable condition.      REVIEW OF SYSTEMS   Per HPI    PAST MEDICAL HISTORY:  Past Medical History:   Diagnosis Date    Acute renal failure (H24)     Anemia     Anemia     Calculus of kidney     Congenital absence of left kidney     Congenital absence of one kidney     Depression     Depression     Hydronephrosis     Kidney stone     at age 29    Pyelonephritis     Pyelonephritis     Smoker     Ureteral stricture     UTI (urinary tract infection)     Wrist fracture     Left       PAST SURGICAL HISTORY:  Past Surgical History:   Procedure Laterality Date     SECTION       SECTION      x1    COMBINED CYSTOSCOPY, RETROGRADES, URETEROSCOPY, LASER HOLMIUM LITHOTRIPSY URETER(S), INSERT STENT Right 2016    Procedure: COMBINED CYSTOSCOPY, RETROGRADES, URETEROSCOPY, LASER HOLMIUM LITHOTRIPSY URETER(S), INSERT STENT;  Surgeon: Florentino Awad MD;  [facility]    CYSTOSCOPY, URETEROSCOPY, COMBINED Right 2016    Procedure: COMBINED CYSTOSCOPY, URETEROSCOPY;  Surgeon: Florentino Awad MD;  [facility] OR    IR NEPHROLITHOTOMY  2014    IR NEPHROSTOGRAM EXISITING ACCESS  10/18/2018    IR NEPHROSTOMY TUBE CHANGE RIGHT  2018    IR NEPHROSTOMY TUBE CHANGE RIGHT  2020    IR NEPHROSTOMY TUBE CHANGE RIGHT  2018    IR NEPHROSTOMY TUBE CHANGE RIGHT  2020    IR NEPHROSTOMY TUBE CHANGE RIGHT  11/3/2023    IR NEPHROSTOMY TUBE CHANGE RIGHT  2024    IR NEPHROSTOMY TUBE PLACEMENT RIGHT  2016    IR NEPHROSTOMY TUBE PLACEMENT RIGHT  2017    KIDNEY STONE SURGERY Right 2016    LASER  HOLMIUM LITHOTRIPSY URETER(S), INSERT STENT, COMBINED Left 11/1/2016    Procedure: COMBINED CYSTOSCOPY, URETEROSCOPY, LASER HOLMIUM LITHOTRIPSY URETER(S), INSERT STENT;  Surgeon: Florentino Awad MD;  [facility] OR    LASER HOLMIUM NEPHROLITHOTOMY VIA PERCUTANEOUS NEPHROSTOMY Right 9/14/2016    Procedure: LASER HOLMIUM NEPHROLITHOTOMY VIA PERCUTANEOUS NEPHROSTOMY;  Surgeon: Florentino Awad MD;  Location: [facility] OR    NEPHROSTOMY W/ INTRODUCTION OF CATHETER Right     OTHER SURGICAL HISTORY      Mole removednasal    OTHER SURGICAL HISTORY Right     Cystoscopy, ureteroscopy, laser11/02/2014 x2 with ureteral stent insertion    PERCUTANEOUS NEPHROSTOMY  11/1/2016    Procedure: PERCUTANEOUS NEPHROSTOMY;  Surgeon: Florentino Awad MD;  Location: [facility] OR    WISDOM TOOTH EXTRACTION      ZZC REMOVAL OF KIDNEY STONE         CURRENT MEDICATIONS:    acetaminophen (TYLENOL) 500 MG tablet  albuterol (ACCUNEB) 1.25 MG/3ML neb solution  albuterol (PROAIR HFA/PROVENTIL HFA/VENTOLIN HFA) 108 (90 Base) MCG/ACT inhaler  azelastine (ASTELIN) 0.1 % nasal spray  budesonide-formoterol (SYMBICORT) 160-4.5 MCG/ACT Inhaler  ipratropium - albuterol 0.5 mg/2.5 mg/3 mL (DUONEB) 0.5-2.5 (3) MG/3ML neb solution  sertraline (ZOLOFT) 100 MG tablet         ALLERGIES:  Allergies   Allergen Reactions    Desogestrel-Ethinyl Estradiol Angioedema and Swelling     Swelling of hands and feet per pt.      Penicillins Rash     As a child per mother; mother unsure if has tolerated another PCN since. Tolerated ampicillin 9/20/16    Sulfa Antibiotics Rash    Vancomycin Rash       FAMILY HISTORY:  Family History   Problem Relation Age of Onset    Anesthesia Reaction No family hx of     Malignant Hyperthermia No family hx of     Heart Disease Maternal Uncle         heart failure    Coronary Artery Disease Other     Heart Disease Maternal Grandmother         pacemaker    Gout Paternal Grandfather     Prostate Cancer Maternal Aunt         breast    Heart  Disease Maternal Aunt         pacemaker    Heart Disease Maternal Aunt         pacemaker    Heart Disease Maternal Aunt         pacemaker       SOCIAL HISTORY:   Social History     Socioeconomic History    Marital status: Single   Tobacco Use    Smoking status: Every Day     Packs/day: 0.50     Years: 10.00     Additional pack years: 0.00     Total pack years: 5.00     Types: Cigarettes     Last attempt to quit: 2016     Years since quittin.4    Smokeless tobacco: Former     Quit date: 2016    Tobacco comments:     Hasn't smoked in a week due to breathing issues    Vaping Use    Vaping Use: Never used   Substance and Sexual Activity    Alcohol use: Not Currently     Comment: Alcoholic Drinks/day: occ    Drug use: No    Sexual activity: Not Currently       VITALS:  /55 (BP Location: Left arm, Patient Position: Semi-Castellon's)   Pulse 64   Temp 98.1  F (36.7  C) (Oral)   Resp 20   Ht 1.524 m (5')   Wt 89.3 kg (196 lb 13.9 oz)   LMP 2024   SpO2 96%   BMI 38.45 kg/m      PHYSICAL EXAM    Physical Exam  Constitutional:       General: She is not in acute distress.  HENT:      Head: Normocephalic and atraumatic.      Mouth/Throat:      Pharynx: Oropharynx is clear.   Eyes:      Pupils: Pupils are equal, round, and reactive to light.   Cardiovascular:      Rate and Rhythm: Normal rate and regular rhythm.      Pulses: Normal pulses.      Heart sounds: Normal heart sounds.   Pulmonary:      Effort: Pulmonary effort is normal.      Breath sounds: Normal breath sounds.   Abdominal:      General: Abdomen is flat. Bowel sounds are normal.      Palpations: Abdomen is soft.      Comments: Right CVAT. Right nephrostomy tube in place with trace amount of blood in tubing.    Musculoskeletal:         General: Normal range of motion.   Skin:     General: Skin is warm and dry.      Capillary Refill: Capillary refill takes less than 2 seconds.   Neurological:      General: No focal deficit present.       Mental Status: She is alert and oriented to person, place, and time.          LAB:  All pertinent labs reviewed and interpreted.  Labs Ordered and Resulted from Time of ED Arrival to Time of ED Departure   ROUTINE UA WITH MICROSCOPIC REFLEX TO CULTURE - Abnormal       Result Value    Color Urine Light Yellow      Appearance Urine Clear      Glucose Urine Negative      Bilirubin Urine Negative      Ketones Urine Negative      Specific Gravity Urine 1.018      Blood Urine 0.2 mg/dL (*)     pH Urine 7.0      Protein Albumin Urine 100 (*)     Urobilinogen Urine <2.0      Nitrite Urine Negative      Leukocyte Esterase Urine 250 Duglas/uL (*)     Bacteria Urine Few (*)     Mucus Urine Present (*)     RBC Urine 121 (*)     WBC Urine 3      Squamous Epithelials Urine <1     COMPREHENSIVE METABOLIC PANEL - Normal    Sodium 137      Potassium 3.9      Carbon Dioxide (CO2) 22      Anion Gap 11      Urea Nitrogen 8.0      Creatinine 0.95      GFR Estimate 79      Calcium 9.8      Chloride 104      Glucose 87      Alkaline Phosphatase 87      AST 22      ALT 16      Protein Total 7.0      Albumin 4.1      Bilirubin Total 0.6     HCG QUALITATIVE URINE - Normal    hCG Urine Qualitative Negative     CBC WITH PLATELETS AND DIFFERENTIAL    WBC Count 9.5      RBC Count 5.18      Hemoglobin 15.0      Hematocrit 45.4      MCV 88      MCH 29.0      MCHC 33.0      RDW 14.3      Platelet Count        % Neutrophils 68      % Lymphocytes 21      % Monocytes 6      % Eosinophils 4      % Basophils 1      % Immature Granulocytes 0      NRBCs per 100 WBC 0      Absolute Neutrophils 6.5      Absolute Lymphocytes 2.0      Absolute Monocytes 0.5      Absolute Eosinophils 0.4      Absolute Basophils 0.1      Absolute Immature Granulocytes 0.0      Absolute NRBCs 0.0     URINE CULTURE     RADIOLOGY:  Reviewed all pertinent imaging. Please see official radiology report.  CT Abdomen Pelvis w Contrast   Final Result   IMPRESSION:    1.  Stable appearance  of the abdomen and pelvis.   2.  Right percutaneous nephrostomy tube in satisfactory position. No change in caliectasis and multiple small nonobstructing stones of the right kidney.   3.  Severe chronic atrophy of the left kidney.        I, Solitario Logan, am serving as a scribe to document services personally performed by Dr. Hillary Mendoza based on my observation and the provider's statements to me. I, Hillary Mendoza, DO attest that Solitario Logan is acting in a scribe capacity, has observed my performance of the services and has documented them in accordance with my direction.    Hillary Mendoza DO  Emergency Medicine  [facility] EMERGENCY DEPARTMENT  [address] [place] [id number] [phone]  Dept: [phone]       Hillary Mendoza DO  02/29/24 0253

## 2024-02-29 NOTE — ED NOTES
Pt c/o of fever, chills, nausea, loose stools and right flank pain. Pt says her neph tube was changed here on 2/5/24. Says these symptoms feel similar to previous UTI and kidney infections.

## 2024-03-01 LAB — BACTERIA UR CULT: NORMAL

## 2024-03-01 NOTE — RESULT ENCOUNTER NOTE
Final urine culture report is negative.  Adult: Negative urine culture parameters per protocol: Any # urogenital christie, single or mixed   [facility] Emergency Dept discharge antibiotic prescribed (If applicable): None  Treatment recommendations per [facility] ED Lab Result Urine Culture protocol: No change in plan of care.     Carlos Eduardo Harvey RN

## 2024-03-05 ENCOUNTER — VIRTUAL VISIT (OUTPATIENT)
Dept: UROLOGY | Facility: CLINIC | Age: 39
End: 2024-03-05
Payer: COMMERCIAL

## 2024-03-05 ENCOUNTER — PRE VISIT (OUTPATIENT)
Dept: UROLOGY | Facility: CLINIC | Age: 39
End: 2024-03-05

## 2024-03-05 DIAGNOSIS — Q60.2 CONGENITAL RENAL AGENESIS AND DYSGENESIS: ICD-10-CM

## 2024-03-05 DIAGNOSIS — Q60.5 CONGENITAL RENAL AGENESIS AND DYSGENESIS: ICD-10-CM

## 2024-03-05 DIAGNOSIS — N39.0 RECURRENT UTI: ICD-10-CM

## 2024-03-05 DIAGNOSIS — Z93.6 NEPHROSTOMY STATUS (H): ICD-10-CM

## 2024-03-05 DIAGNOSIS — N20.0 KIDNEY STONE: ICD-10-CM

## 2024-03-05 PROCEDURE — 99203 OFFICE O/P NEW LOW 30 MIN: CPT | Mod: 95 | Performed by: UROLOGY

## 2024-03-05 ASSESSMENT — PAIN SCALES - GENERAL: PAINLEVEL: NO PAIN (0)

## 2024-03-05 NOTE — NURSING NOTE
Pt stated they were unsure about the exact restrictions she has. Pt states that talking to her PCP, she was told if her PCP sends a referral for the appt, then pt's coverage should not be an issue. Pt will contact her coverage company and PCP for more details       Is the patient currently in the state of [place]? YES    Visit mode:VIDEO    If the visit is dropped, the patient can be reconnected by: VIDEO VISIT: Text to cell phone:   Telephone Information:   Mobile [phone]       Will anyone else be joining the visit? NO  (If patient encounters technical issues they should call [phone])    How would you like to obtain your AVS? MyChart    Are changes needed to the allergy or medication list? No    Reason for visit: Consult ([facility])    Bonnie FERRARA

## 2024-03-05 NOTE — LETTER
3/5/2024       RE: Jacqueline Pappas  [address]  [address]  [place] [address]     Dear Colleague,    Thank you for referring your patient, Jacqueline Pappas, to the [facility] [place] at [facility]. Please see a copy of my visit note below.          UROLOGY OUTPATIENT VISIT      Chief Complaint:   Right nephrostomy tube      Synopsis    Jacqueline Pappsa is a very pleasant AGE: 38 year old year old person    She has a highly complex urologic situation.  She used to have her care with us but has not been seen since 2016 with Dr. Awad.  She has a solitary right kidney with an obstructed lower pole infundibulum that is previously been attempted to be recannulized to connect to the main collecting system of the kidney but has been found to have complete stenosis of the infundibulum in her lower pole is managed with a nephrostomy tube.  She has been seen at the [facility] since 2016 for routine nephrostomy tube changes to her right lower pole obstructed calyx.  She has nephrostomy tube changes every 3 months.  She does have occasional infections and obstructive episodes leading to emergency room visits for unscheduled tube changes.    She is doing well at the moment without any active signs of pain or infection.    She has been offered an attempted reconstructive surgery but given the high risk nature of this procedure and the potential consequences of renal functional loss she is elected to manage things for the foreseeable future with just the nephrostomy tube.    Reason for the visit today is that she recently changed insurance and her doctors at the [facility] are no longer in network.    Her last nephrostomy tube change was 1 month ago         Medications     Current Outpatient Medications   Medication    acetaminophen (TYLENOL) 500 MG tablet    albuterol (ACCUNEB) 1.25 MG/3ML neb solution    albuterol (PROAIR HFA/PROVENTIL HFA/VENTOLIN  HFA) 108 (90 Base) MCG/ACT inhaler    azelastine (ASTELIN) 0.1 % nasal spray    budesonide-formoterol (SYMBICORT) 160-4.5 MCG/ACT Inhaler    ipratropium - albuterol 0.5 mg/2.5 mg/3 mL (DUONEB) 0.5-2.5 (3) MG/3ML neb solution    sertraline (ZOLOFT) 100 MG tablet     No current facility-administered medications for this visit.         The following  distinct labs were reviewed    I personally reviewed all applicable laboratory data and went over findings with patient  Significant for:    CBC RESULTS:  Recent Labs   Lab Test 02/28/24 2038 12/27/23 0252 11/02/23  0640 11/01/23  2314 10/15/23  0410   WBC 9.5 6.0 8.9 12.5* 9.2   HGB 15.0 14.6 11.0* 12.3 12.8   PLT  --  167 163 178 211        BMP RESULTS:  Recent Labs   Lab Test 02/28/24 2038 12/27/23 0252 11/04/23  0545 11/02/23  0640 10/07/21  0103 06/16/21  2104 06/10/21  0016 06/05/21  0054 05/02/21  0320    136 137 138   < > 136 137 135* 142   POTASSIUM 3.9 4.0 4.2 4.1   < > 4.1 3.6 4.0 3.7   CHLORIDE 104 102 109* 107   < > 105 107 106 106   CO2 22 22 20* 24   < > 22 20* 20* 24   ANIONGAP 11 12 8 7   < > 9 10 9 12   GLC 87 158* 105* 100*   < > 113 97 94 101   BUN 8.0 16.6 14.8 9.4   < > 8 13 14 11   CR 0.95 1.03* 0.96* 0.99*   < > 0.84 0.91 1.03 0.88   GFRESTIMATED 79 72 78 75   < > >60 >60 >60 >60   GFRESTBLACK  --   --   --   --   --  >60 >60 >60 >60    < > = values in this interval not displayed.       CALCIUM RESULTS:  Recent Labs   Lab Test 02/28/24 2038 12/27/23 0252 11/04/23  0545 11/02/23  0640   LUKAS 9.8 9.9 10.2* 9.8       PTH RESULTS:  Recent Labs   Lab Test 08/23/16  1809   PTHI 45       HGB A1C RESULTS:  Lab Results   Component Value Date    A1C 5.0 09/17/2023       UA RESULTS:   Recent Labs   Lab Test 02/28/24  2035 12/27/23  0345 11/01/23  2333   SG 1.018 1.022 1.006   URINEPH 7.0 6.0 7.5*   NITRITE Negative Positive* Negative   RBCU 121* 28* 27*   WBCU 3 0 28*     Recent Imaging Report    I personally reviewed all applicable imaging and went  over the below findings with patient.    Results for orders placed or performed during the hospital encounter of 02/28/24   CT Abdomen Pelvis w Contrast    Narrative    EXAM: CT ABDOMEN PELVIS W CONTRAST  LOCATION: [facility]  DATE: 2/28/2024    INDICATION: Right flank pain, hematuria  COMPARISON: CT abdomen and pelvis 12/27/2023. Right nephrostomy tube placement 02/05/2024.  TECHNIQUE: CT scan of the abdomen and pelvis was performed following injection of IV contrast. Multiplanar reformats were obtained. Dose reduction techniques were used.  CONTRAST: ISOVUE 370 84ML    FINDINGS:   LOWER CHEST: Mild bibasilar atelectasis.    HEPATOBILIARY: Normal.    PANCREAS: Normal.    SPLEEN: Stable splenomegaly measuring 15 cm.    ADRENAL GLANDS: Normal.    KIDNEYS/BLADDER: Right percutaneous nephrostomy tube in satisfactory position. Stable moderate caliectasis of the right kidney in addition to small benign cysts not warranting follow-up. Multiple nonobstructing right intrarenal stone measuring 1 - 4 mm   in size are not significantly changed in size or number compared to 12/27/2023. Normal caliber right ureter. No ureteral stone visualized. Severely atrophic left kidney. Normal bladder.    BOWEL: Normal.    LYMPH NODES: Normal.    VASCULATURE: Unremarkable.    PELVIC ORGANS: Normal.    MUSCULOSKELETAL: Normal.      Impression    IMPRESSION:   1.  Stable appearance of the abdomen and pelvis.  2.  Right percutaneous nephrostomy tube in satisfactory position. No change in caliectasis and multiple small nonobstructing stones of the right kidney.  3.  Severe chronic atrophy of the left kidney.          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 Location): Home    Distant Location (provider location):  On-site  Platform used for Video Visit: Charlene      Again, thank you for allowing me to participate in the care of your patient.      Sincerely,    Krzysztof Saunders MD

## 2024-03-05 NOTE — PROGRESS NOTES
UROLOGY OUTPATIENT VISIT      Chief Complaint:   Right nephrostomy tube      Synopsis    Jacqueline Pappas is a very pleasant AGE: 38 year old year old person    She has a highly complex urologic situation.  She used to have her care with us but has not been seen since 2016 with Dr. Awad.  She has a solitary right kidney with an obstructed lower pole infundibulum that is previously been attempted to be recannulized to connect to the main collecting system of the kidney but has been found to have complete stenosis of the infundibulum in her lower pole is managed with a nephrostomy tube.  She has been seen at the [facility] since 2016 for routine nephrostomy tube changes to her right lower pole obstructed calyx.  She has nephrostomy tube changes every 3 months.  She does have occasional infections and obstructive episodes leading to emergency room visits for unscheduled tube changes.    She is doing well at the moment without any active signs of pain or infection.    She has been offered an attempted reconstructive surgery but given the high risk nature of this procedure and the potential consequences of renal functional loss she is elected to manage things for the foreseeable future with just the nephrostomy tube.    Reason for the visit today is that she recently changed insurance and her doctors at the [facility] are no longer in network.    Her last nephrostomy tube change was 1 month ago         Medications     Current Outpatient Medications   Medication    acetaminophen (TYLENOL) 500 MG tablet    albuterol (ACCUNEB) 1.25 MG/3ML neb solution    albuterol (PROAIR HFA/PROVENTIL HFA/VENTOLIN HFA) 108 (90 Base) MCG/ACT inhaler    azelastine (ASTELIN) 0.1 % nasal spray    budesonide-formoterol (SYMBICORT) 160-4.5 MCG/ACT Inhaler    ipratropium - albuterol 0.5 mg/2.5 mg/3 mL (DUONEB) 0.5-2.5 (3) MG/3ML neb solution    sertraline (ZOLOFT) 100 MG tablet     No current facility-administered medications for this  visit.         The following  distinct labs were reviewed    I personally reviewed all applicable laboratory data and went over findings with patient  Significant for:    CBC RESULTS:  Recent Labs   Lab Test 02/28/24 2038 12/27/23 0252 11/02/23  0640 11/01/23  2314 10/15/23  0410   WBC 9.5 6.0 8.9 12.5* 9.2   HGB 15.0 14.6 11.0* 12.3 12.8   PLT  --  167 163 178 211        BMP RESULTS:  Recent Labs   Lab Test 02/28/24 2038 12/27/23 0252 11/04/23  0545 11/02/23  0640 10/07/21  0103 06/16/21  2104 06/10/21  0016 06/05/21  0054 05/02/21  0320    136 137 138   < > 136 137 135* 142   POTASSIUM 3.9 4.0 4.2 4.1   < > 4.1 3.6 4.0 3.7   CHLORIDE 104 102 109* 107   < > 105 107 106 106   CO2 22 22 20* 24   < > 22 20* 20* 24   ANIONGAP 11 12 8 7   < > 9 10 9 12   GLC 87 158* 105* 100*   < > 113 97 94 101   BUN 8.0 16.6 14.8 9.4   < > 8 13 14 11   CR 0.95 1.03* 0.96* 0.99*   < > 0.84 0.91 1.03 0.88   GFRESTIMATED 79 72 78 75   < > >60 >60 >60 >60   GFRESTBLACK  --   --   --   --   --  >60 >60 >60 >60    < > = values in this interval not displayed.       CALCIUM RESULTS:  Recent Labs   Lab Test 02/28/24 2038 12/27/23 0252 11/04/23  0545 11/02/23  0640   LUKAS 9.8 9.9 10.2* 9.8       PTH RESULTS:  Recent Labs   Lab Test 08/23/16  1809   PTHI 45       HGB A1C RESULTS:  Lab Results   Component Value Date    A1C 5.0 09/17/2023       UA RESULTS:   Recent Labs   Lab Test 02/28/24 2035 12/27/23  0345 11/01/23  2333   SG 1.018 1.022 1.006   URINEPH 7.0 6.0 7.5*   NITRITE Negative Positive* Negative   RBCU 121* 28* 27*   WBCU 3 0 28*     Recent Imaging Report    I personally reviewed all applicable imaging and went over the below findings with patient.    Results for orders placed or performed during the hospital encounter of 02/28/24   CT Abdomen Pelvis w Contrast    Narrative    EXAM: CT ABDOMEN PELVIS W CONTRAST  LOCATION: [facility]  DATE: 2/28/2024    INDICATION: Right flank pain,  hematuria  COMPARISON: CT abdomen and pelvis 12/27/2023. Right nephrostomy tube placement 02/05/2024.  TECHNIQUE: CT scan of the abdomen and pelvis was performed following injection of IV contrast. Multiplanar reformats were obtained. Dose reduction techniques were used.  CONTRAST: ISOVUE 370 84ML    FINDINGS:   LOWER CHEST: Mild bibasilar atelectasis.    HEPATOBILIARY: Normal.    PANCREAS: Normal.    SPLEEN: Stable splenomegaly measuring 15 cm.    ADRENAL GLANDS: Normal.    KIDNEYS/BLADDER: Right percutaneous nephrostomy tube in satisfactory position. Stable moderate caliectasis of the right kidney in addition to small benign cysts not warranting follow-up. Multiple nonobstructing right intrarenal stone measuring 1 - 4 mm   in size are not significantly changed in size or number compared to 12/27/2023. Normal caliber right ureter. No ureteral stone visualized. Severely atrophic left kidney. Normal bladder.    BOWEL: Normal.    LYMPH NODES: Normal.    VASCULATURE: Unremarkable.    PELVIC ORGANS: Normal.    MUSCULOSKELETAL: Normal.      Impression    IMPRESSION:   1.  Stable appearance of the abdomen and pelvis.  2.  Right percutaneous nephrostomy tube in satisfactory position. No change in caliectasis and multiple small nonobstructing stones of the right kidney.  3.  Severe chronic atrophy of the left kidney.          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 Location): Home    Distant Location (provider location):  On-site  Platform used for Video Visit: Charlene

## 2024-03-25 PROCEDURE — 99285 EMERGENCY DEPT VISIT HI MDM: CPT | Mod: 25

## 2024-03-25 PROCEDURE — 96374 THER/PROPH/DIAG INJ IV PUSH: CPT | Mod: 59

## 2024-03-25 ASSESSMENT — COLUMBIA-SUICIDE SEVERITY RATING SCALE - C-SSRS
6. HAVE YOU EVER DONE ANYTHING, STARTED TO DO ANYTHING, OR PREPARED TO DO ANYTHING TO END YOUR LIFE?: NO
2. HAVE YOU ACTUALLY HAD ANY THOUGHTS OF KILLING YOURSELF IN THE PAST MONTH?: NO
1. IN THE PAST MONTH, HAVE YOU WISHED YOU WERE DEAD OR WISHED YOU COULD GO TO SLEEP AND NOT WAKE UP?: NO

## 2024-03-26 ENCOUNTER — HOSPITAL ENCOUNTER (EMERGENCY)
Facility: HOSPITAL | Age: 39
Discharge: HOME | End: 2024-03-26
Attending: STUDENT IN AN ORGANIZED HEALTH CARE EDUCATION/TRAINING PROGRAM | Admitting: STUDENT IN AN ORGANIZED HEALTH CARE EDUCATION/TRAINING PROGRAM
Payer: COMMERCIAL

## 2024-03-26 ENCOUNTER — APPOINTMENT (OUTPATIENT)
Dept: CT IMAGING | Facility: HOSPITAL | Age: 39
End: 2024-03-26
Attending: STUDENT IN AN ORGANIZED HEALTH CARE EDUCATION/TRAINING PROGRAM
Payer: COMMERCIAL

## 2024-03-26 VITALS
OXYGEN SATURATION: 98 % | BODY MASS INDEX: 36.32 KG/M2 | WEIGHT: 185 LBS | DIASTOLIC BLOOD PRESSURE: 61 MMHG | RESPIRATION RATE: 16 BRPM | TEMPERATURE: 98.6 F | SYSTOLIC BLOOD PRESSURE: 103 MMHG | HEART RATE: 61 BPM | HEIGHT: 60 IN

## 2024-03-26 DIAGNOSIS — R10.9 ACUTE RIGHT FLANK PAIN: ICD-10-CM

## 2024-03-26 LAB
ANION GAP SERPL CALCULATED.3IONS-SCNC: 8 MMOL/L (ref 7–15)
BASOPHILS # BLD AUTO: 0.1 10E3/UL (ref 0–0.2)
BASOPHILS NFR BLD AUTO: 1 %
BUN SERPL-MCNC: 10.6 MG/DL (ref 6–20)
CALCIUM SERPL-MCNC: 10 MG/DL (ref 8.6–10)
CHLORIDE SERPL-SCNC: 103 MMOL/L (ref 98–107)
CREAT SERPL-MCNC: 0.96 MG/DL (ref 0.51–0.95)
DEPRECATED HCO3 PLAS-SCNC: 23 MMOL/L (ref 22–29)
EGFRCR SERPLBLD CKD-EPI 2021: 78 ML/MIN/1.73M2
EOSINOPHIL # BLD AUTO: 0.3 10E3/UL (ref 0–0.7)
EOSINOPHIL NFR BLD AUTO: 4 %
ERYTHROCYTE [DISTWIDTH] IN BLOOD BY AUTOMATED COUNT: 14.4 % (ref 10–15)
GLUCOSE SERPL-MCNC: 85 MG/DL (ref 70–99)
HCT VFR BLD AUTO: 40.7 % (ref 35–47)
HGB BLD-MCNC: 13.7 G/DL (ref 11.7–15.7)
IMM GRANULOCYTES # BLD: 0 10E3/UL
IMM GRANULOCYTES NFR BLD: 1 %
LYMPHOCYTES # BLD AUTO: 2.1 10E3/UL (ref 0.8–5.3)
LYMPHOCYTES NFR BLD AUTO: 25 %
MCH RBC QN AUTO: 29.4 PG (ref 26.5–33)
MCHC RBC AUTO-ENTMCNC: 33.7 G/DL (ref 31.5–36.5)
MCV RBC AUTO: 87 FL (ref 78–100)
MONOCYTES # BLD AUTO: 0.6 10E3/UL (ref 0–1.3)
MONOCYTES NFR BLD AUTO: 7 %
NEUTROPHILS # BLD AUTO: 5.4 10E3/UL (ref 1.6–8.3)
NEUTROPHILS NFR BLD AUTO: 62 %
NRBC # BLD AUTO: 0 10E3/UL
NRBC BLD AUTO-RTO: 0 /100
PLATELET # BLD AUTO: 195 10E3/UL (ref 150–450)
POTASSIUM SERPL-SCNC: 3.6 MMOL/L (ref 3.4–5.3)
RBC # BLD AUTO: 4.66 10E6/UL (ref 3.8–5.2)
SODIUM SERPL-SCNC: 134 MMOL/L (ref 135–145)
WBC # BLD AUTO: 8.5 10E3/UL (ref 4–11)

## 2024-03-26 PROCEDURE — 74177 CT ABD & PELVIS W/CONTRAST: CPT

## 2024-03-26 PROCEDURE — 80048 BASIC METABOLIC PNL TOTAL CA: CPT | Performed by: STUDENT IN AN ORGANIZED HEALTH CARE EDUCATION/TRAINING PROGRAM

## 2024-03-26 PROCEDURE — 250N000011 HC RX IP 250 OP 636: Performed by: STUDENT IN AN ORGANIZED HEALTH CARE EDUCATION/TRAINING PROGRAM

## 2024-03-26 PROCEDURE — 85025 COMPLETE CBC W/AUTO DIFF WBC: CPT | Performed by: STUDENT IN AN ORGANIZED HEALTH CARE EDUCATION/TRAINING PROGRAM

## 2024-03-26 PROCEDURE — 36415 COLL VENOUS BLD VENIPUNCTURE: CPT | Performed by: STUDENT IN AN ORGANIZED HEALTH CARE EDUCATION/TRAINING PROGRAM

## 2024-03-26 RX ORDER — IOPAMIDOL 755 MG/ML
80 INJECTION, SOLUTION INTRAVASCULAR ONCE
Status: COMPLETED | OUTPATIENT
Start: 2024-03-26 | End: 2024-03-26

## 2024-03-26 RX ORDER — HYDROMORPHONE HYDROCHLORIDE 1 MG/ML
0.5 INJECTION, SOLUTION INTRAMUSCULAR; INTRAVENOUS; SUBCUTANEOUS
Status: DISCONTINUED | OUTPATIENT
Start: 2024-03-26 | End: 2024-03-26 | Stop reason: HOSPADM

## 2024-03-26 RX ADMIN — HYDROMORPHONE HYDROCHLORIDE 0.5 MG: 1 INJECTION, SOLUTION INTRAMUSCULAR; INTRAVENOUS; SUBCUTANEOUS at 01:26

## 2024-03-26 RX ADMIN — IOPAMIDOL 80 ML: 755 INJECTION, SOLUTION INTRAVENOUS at 01:59

## 2024-03-26 ASSESSMENT — ACTIVITIES OF DAILY LIVING (ADL): ADLS_ACUITY_SCORE: 37

## 2024-03-26 NOTE — ED PROVIDER NOTES
EMERGENCY DEPARTMENT ENCOUNTER      NAME: Jacqueline Pappas  AGE: 38 year old female  YOB: 1985  MRN: [id number]  EVALUATION DATE & TIME: No admission date for patient encounter.    PCP: Pao Montague    ED PROVIDER: Rojelio Quach MD      Chief Complaint   Patient presents with    Flank Pain         FINAL IMPRESSION:  1. Acute right flank pain          ED COURSE & MEDICAL DECISION MAKING:    Pertinent Labs & Imaging studies reviewed. (See chart for details)  38 year old female presents to the Emergency Department for evaluation of flank pain    ED Course as of 03/26/24 0449   Mon Mar 25, 2024   2344 Patient is a 38-year-old female with past medical history significant for solitary kidney, and a nephrostomy tube in her right flank who is concerned that it might have been pulled loose today when she stood up from the couch.  She has tenderness to palpation over her right flank, there is yellow urine in the bag, and she is unsure how much of this has been since she had it tugged on today.  She has no abdominal pain or tenderness.  Vitals normal.  Will obtain CT to evaluate placement of the tube.   Tue Mar 26, 2024   0049 No electrolyte abnormalities or kidney injury.  No leukocytosis or anemia.   0222 Nephrostomy tube in appropriate location.  Will discharge at this time.       11:38 PM I introduced myself to the patient, obtained patient history, performed a physical exam, and discussed plan for ED workup including potential diagnostic laboratory/imaging studies and interventions.    Medical Decision Making  Obtained supplemental history:Supplemental history obtained?: No  Reviewed external records: External records reviewed?: No  Care impacted by chronic illness:N/A  Care significantly affected by social determinants of health:N/A  Did you consider but not order tests?: Work up considered but not performed and documented in chart, if applicable  Did you interpret images independently?:  Independent interpretation of ECG and images noted in documentation, when applicable.  Consultation discussion with other provider:Did you involve another provider (consultant, , pharmacy, etc.)?: No  Discharge. No recommendations on prescription strength medication(s). See documentation for any additional details.      At the conclusion of the encounter I discussed the results of all of the tests and the disposition. The questions were answered. The patient or family acknowledged understanding and was agreeable with the care plan.     0 minutes of critical care time     MEDICATIONS GIVEN IN THE EMERGENCY:  Medications   iopamidol (ISOVUE-370) solution 80 mL (80 mLs Intravenous $Given 3/26/24 0159)       NEW PRESCRIPTIONS STARTED AT TODAY'S ER VISIT  Discharge Medication List as of 3/26/2024  2:43 AM             =================================================================    HPI    Patient information was obtained from: Patient    Use of : N/A      Jacqueline Pappas is a 38 year old female with a pertinent history of solitary right kidney with nephrostomy tube in place who presents to this ED by walk in for evaluation of a nephrostomy tube concern.    The patient has a nephrostomy tube in her right flank. This evening she fell asleep on the couch. While she was asleep the nephrostomy tube bag became caught between the cushions. When she sat up she felt a tug on the tube. She is concerned the tube may have became dislodged. She is having some pain at the tube insertion site. She has not noticed any change in output since the tube as tugged on.    Chart Review: Patient currently follows with [facility], Dr. Saunders.  Has solitary right kidney with obstructive lower pole infundibulum for which she previously followed at [facility].  Currently has right nephrostomy tube in place.      PAST MEDICAL HISTORY:  Past Medical History:   Diagnosis Date    Acute renal failure (H24)     Anemia      Anemia     Calculus of kidney     Congenital absence of left kidney     Congenital absence of one kidney     Depression     Depression     Hydronephrosis     Kidney stone     at age 29    Pyelonephritis     Pyelonephritis     Smoker     Ureteral stricture     UTI (urinary tract infection)     Wrist fracture     Left       PAST SURGICAL HISTORY:  Past Surgical History:   Procedure Laterality Date     SECTION       SECTION      x1    COMBINED CYSTOSCOPY, RETROGRADES, URETEROSCOPY, LASER HOLMIUM LITHOTRIPSY URETER(S), INSERT STENT Right 2016    Procedure: COMBINED CYSTOSCOPY, RETROGRADES, URETEROSCOPY, LASER HOLMIUM LITHOTRIPSY URETER(S), INSERT STENT;  Surgeon: Florentino Awad MD;  Location: [facility] OR    CYSTOSCOPY, URETEROSCOPY, COMBINED Right 2016    Procedure: COMBINED CYSTOSCOPY, URETEROSCOPY;  Surgeon: Florentino Awad MD;  Location: [facility] OR    IR NEPHROLITHOTOMY  2014    IR NEPHROSTOGRAM EXISITING ACCESS  10/18/2018    IR NEPHROSTOMY TUBE CHANGE RIGHT  2018    IR NEPHROSTOMY TUBE CHANGE RIGHT  2020    IR NEPHROSTOMY TUBE CHANGE RIGHT  2018    IR NEPHROSTOMY TUBE CHANGE RIGHT  2020    IR NEPHROSTOMY TUBE CHANGE RIGHT  11/3/2023    IR NEPHROSTOMY TUBE CHANGE RIGHT  2024    IR NEPHROSTOMY TUBE PLACEMENT RIGHT  2016    IR NEPHROSTOMY TUBE PLACEMENT RIGHT  2017    KIDNEY STONE SURGERY Right 2016    LASER HOLMIUM LITHOTRIPSY URETER(S), INSERT STENT, COMBINED Left 2016    Procedure: COMBINED CYSTOSCOPY, URETEROSCOPY, LASER HOLMIUM LITHOTRIPSY URETER(S), INSERT STENT;  Surgeon: Florentino Awad MD;  Location: [facility] OR    LASER HOLMIUM NEPHROLITHOTOMY VIA PERCUTANEOUS NEPHROSTOMY Right 2016    Procedure: LASER HOLMIUM NEPHROLITHOTOMY VIA PERCUTANEOUS NEPHROSTOMY;  Surgeon: Florentino Awad MD;  Location: [facility] OR    NEPHROSTOMY W/ INTRODUCTION OF CATHETER Right     OTHER SURGICAL HISTORY      Mole removednasal    OTHER SURGICAL  HISTORY Right     Cystoscopy, ureteroscopy, laser11/02/2014 x2 with ureteral stent insertion    PERCUTANEOUS NEPHROSTOMY  2016    Procedure: PERCUTANEOUS NEPHROSTOMY;  Surgeon: Florentino Awad MD;  Location: [facility] OR    WISDOM TOOTH EXTRACTION      ZZC REMOVAL OF KIDNEY STONE             CURRENT MEDICATIONS:    acetaminophen (TYLENOL) 500 MG tablet  albuterol (ACCUNEB) 1.25 MG/3ML neb solution  albuterol (PROAIR HFA/PROVENTIL HFA/VENTOLIN HFA) 108 (90 Base) MCG/ACT inhaler  azelastine (ASTELIN) 0.1 % nasal spray  budesonide-formoterol (SYMBICORT) 160-4.5 MCG/ACT Inhaler  ipratropium - albuterol 0.5 mg/2.5 mg/3 mL (DUONEB) 0.5-2.5 (3) MG/3ML neb solution  sertraline (ZOLOFT) 100 MG tablet        ALLERGIES:  Allergies   Allergen Reactions    Desogestrel-Ethinyl Estradiol Angioedema and Swelling     Swelling of hands and feet per pt.      Penicillins Rash     As a child per mother; mother unsure if has tolerated another PCN since. Tolerated ampicillin 18    Sulfa Antibiotics Rash    Vancomycin Rash       FAMILY HISTORY:  Family History   Problem Relation Age of Onset    Anesthesia Reaction No family hx of     Malignant Hyperthermia No family hx of     Heart Disease Maternal Uncle         heart failure    Coronary Artery Disease Other     Heart Disease Maternal Grandmother         pacemaker    Gout Paternal Grandfather     Prostate Cancer Maternal Aunt         breast    Heart Disease Maternal Aunt         pacemaker    Heart Disease Maternal Aunt         pacemaker    Heart Disease Maternal Aunt         pacemaker       SOCIAL HISTORY:   Social History     Socioeconomic History    Marital status: Single   Tobacco Use    Smoking status: Every Day     Packs/day: 0.50     Years: 10.00     Additional pack years: 0.00     Total pack years: 5.00     Types: Cigarettes     Last attempt to quit: 2016     Years since quittin.5    Smokeless tobacco: Former     Quit date: 2016    Tobacco comments:     Hasn't  smoked in a week due to breathing issues    Vaping Use    Vaping Use: Never used   Substance and Sexual Activity    Alcohol use: Not Currently     Comment: Alcoholic Drinks/day: occ    Drug use: No    Sexual activity: Not Currently       VITALS:  /61   Pulse 61   Temp 98.6  F (37  C)   Resp 16   Ht 1.524 m (5')   Wt 83.9 kg (185 lb)   LMP 03/24/2024   SpO2 98%   BMI 36.13 kg/m      PHYSICAL EXAM    Physical Exam  Vitals and nursing note reviewed.   Constitutional:       General: She is not in acute distress.     Appearance: Normal appearance. She is normal weight. She is not ill-appearing.   HENT:      Head: Normocephalic and atraumatic.      Nose: Nose normal.      Mouth/Throat:      Mouth: Mucous membranes are moist.      Pharynx: Oropharynx is clear.   Eyes:      Extraocular Movements: Extraocular movements intact.      Conjunctiva/sclera: Conjunctivae normal.      Pupils: Pupils are equal, round, and reactive to light.   Cardiovascular:      Rate and Rhythm: Normal rate and regular rhythm.      Pulses: Normal pulses.      Heart sounds: Normal heart sounds. No murmur heard.  Pulmonary:      Effort: Pulmonary effort is normal. No respiratory distress.      Breath sounds: Normal breath sounds.   Abdominal:      General: Abdomen is flat. There is no distension.      Palpations: Abdomen is soft.      Tenderness: There is no abdominal tenderness. There is right CVA tenderness (R flank nephrostomy tube site c/d/i, no fluctuance or erythema or purulence).   Musculoskeletal:         General: Normal range of motion.      Cervical back: Normal range of motion.      Right lower leg: No edema.      Left lower leg: No edema.   Skin:     General: Skin is warm and dry.      Capillary Refill: Capillary refill takes less than 2 seconds.      Coloration: Skin is not jaundiced.   Neurological:      General: No focal deficit present.      Mental Status: She is alert and oriented to person, place, and time. Mental  status is at baseline.   Psychiatric:         Mood and Affect: Mood normal.         Behavior: Behavior normal.         Thought Content: Thought content normal.         Judgment: Judgment normal.            LAB:  All pertinent labs reviewed and interpreted.  Results for orders placed or performed during the hospital encounter of 03/26/24   CT Abdomen Pelvis w Contrast    Impression    IMPRESSION:     1.  No change in position of right percutaneous nephrostomy tube, again coils in the right lower pole renal collecting system, with persistent caliectasis and nonobstructing stones in the upper pole/interpolar right kidney.    2.  Severe atrophy of the left kidney, unchanged.   Basic metabolic panel   Result Value Ref Range    Sodium 134 (L) 135 - 145 mmol/L    Potassium 3.6 3.4 - 5.3 mmol/L    Chloride 103 98 - 107 mmol/L    Carbon Dioxide (CO2) 23 22 - 29 mmol/L    Anion Gap 8 7 - 15 mmol/L    Urea Nitrogen 10.6 6.0 - 20.0 mg/dL    Creatinine 0.96 (H) 0.51 - 0.95 mg/dL    GFR Estimate 78 >60 mL/min/1.73m2    Calcium 10.0 8.6 - 10.0 mg/dL    Glucose 85 70 - 99 mg/dL   CBC with platelets and differential   Result Value Ref Range    WBC Count 8.5 4.0 - 11.0 10e3/uL    RBC Count 4.66 3.80 - 5.20 10e6/uL    Hemoglobin 13.7 11.7 - 15.7 g/dL    Hematocrit 40.7 35.0 - 47.0 %    MCV 87 78 - 100 fL    MCH 29.4 26.5 - 33.0 pg    MCHC 33.7 31.5 - 36.5 g/dL    RDW 14.4 10.0 - 15.0 %    Platelet Count 195 150 - 450 10e3/uL    % Neutrophils 62 %    % Lymphocytes 25 %    % Monocytes 7 %    % Eosinophils 4 %    % Basophils 1 %    % Immature Granulocytes 1 %    NRBCs per 100 WBC 0 <1 /100    Absolute Neutrophils 5.4 1.6 - 8.3 10e3/uL    Absolute Lymphocytes 2.1 0.8 - 5.3 10e3/uL    Absolute Monocytes 0.6 0.0 - 1.3 10e3/uL    Absolute Eosinophils 0.3 0.0 - 0.7 10e3/uL    Absolute Basophils 0.1 0.0 - 0.2 10e3/uL    Absolute Immature Granulocytes 0.0 <=0.4 10e3/uL    Absolute NRBCs 0.0 10e3/uL       RADIOLOGY:  Reviewed all pertinent  imaging. Please see official radiology report.  CT Abdomen Pelvis w Contrast   Final Result   IMPRESSION:       1.  No change in position of right percutaneous nephrostomy tube, again coils in the right lower pole renal collecting system, with persistent caliectasis and nonobstructing stones in the upper pole/interpolar right kidney.      2.  Severe atrophy of the left kidney, unchanged.            I, Jan Larsen, am serving as a scribe to document services personally performed by Rojelio Quach MD based on my observation and the provider's statements to me. I, Rojelio Quach MD, attest that Jan Larsen  is acting in a scribe capacity, has observed my performance of the services and has documented them in accordance with my direction.    Rojelio Quach MD  [facility] EMERGENCY DEPARTMENT  [address] [place] [id number]  [phone]     Rojelio Quach MD  03/26/24 0449

## 2024-03-26 NOTE — ED TRIAGE NOTES
Patient with right nephrostomy tube.  States she was taking a nap and her bag fell and got caught causing it to pull from the site.  Patient states having increased right flank pain since the incident around 1700.  Still has some output to her bag at this time.

## 2024-04-20 ENCOUNTER — HOSPITAL ENCOUNTER (EMERGENCY)
Facility: HOSPITAL | Age: 39
Discharge: HOME | End: 2024-04-21
Attending: EMERGENCY MEDICINE | Admitting: EMERGENCY MEDICINE
Payer: COMMERCIAL

## 2024-04-20 ENCOUNTER — APPOINTMENT (OUTPATIENT)
Dept: CT IMAGING | Facility: HOSPITAL | Age: 39
End: 2024-04-20
Attending: EMERGENCY MEDICINE
Payer: COMMERCIAL

## 2024-04-20 DIAGNOSIS — Z93.6 NEPHROSTOMY STATUS (H): ICD-10-CM

## 2024-04-20 DIAGNOSIS — R10.9 RIGHT FLANK PAIN: ICD-10-CM

## 2024-04-20 DIAGNOSIS — Q60.0 CONGENITAL ABSENCE OF ONE KIDNEY: ICD-10-CM

## 2024-04-20 LAB
ALBUMIN UR-MCNC: 300 MG/DL
ANION GAP SERPL CALCULATED.3IONS-SCNC: 11 MMOL/L (ref 7–15)
APPEARANCE UR: ABNORMAL
BASOPHILS # BLD AUTO: 0 10E3/UL (ref 0–0.2)
BASOPHILS NFR BLD AUTO: 1 %
BILIRUB UR QL STRIP: NEGATIVE
BUN SERPL-MCNC: 8.1 MG/DL (ref 6–20)
CALCIUM SERPL-MCNC: 10.4 MG/DL (ref 8.6–10)
CHLORIDE SERPL-SCNC: 102 MMOL/L (ref 98–107)
COLOR UR AUTO: ABNORMAL
CREAT SERPL-MCNC: 0.85 MG/DL (ref 0.51–0.95)
DEPRECATED HCO3 PLAS-SCNC: 24 MMOL/L (ref 22–29)
EGFRCR SERPLBLD CKD-EPI 2021: 90 ML/MIN/1.73M2
EOSINOPHIL # BLD AUTO: 0.4 10E3/UL (ref 0–0.7)
EOSINOPHIL NFR BLD AUTO: 4 %
ERYTHROCYTE [DISTWIDTH] IN BLOOD BY AUTOMATED COUNT: 14.7 % (ref 10–15)
GLUCOSE SERPL-MCNC: 101 MG/DL (ref 70–99)
GLUCOSE UR STRIP-MCNC: NEGATIVE MG/DL
HCG UR QL: NEGATIVE
HCT VFR BLD AUTO: 41.5 % (ref 35–47)
HGB BLD-MCNC: 13.6 G/DL (ref 11.7–15.7)
HGB UR QL STRIP: ABNORMAL
IMM GRANULOCYTES # BLD: 0 10E3/UL
IMM GRANULOCYTES NFR BLD: 0 %
KETONES UR STRIP-MCNC: NEGATIVE MG/DL
LEUKOCYTE ESTERASE UR QL STRIP: ABNORMAL
LYMPHOCYTES # BLD AUTO: 2 10E3/UL (ref 0.8–5.3)
LYMPHOCYTES NFR BLD AUTO: 22 %
MCH RBC QN AUTO: 29.2 PG (ref 26.5–33)
MCHC RBC AUTO-ENTMCNC: 32.8 G/DL (ref 31.5–36.5)
MCV RBC AUTO: 89 FL (ref 78–100)
MONOCYTES # BLD AUTO: 0.5 10E3/UL (ref 0–1.3)
MONOCYTES NFR BLD AUTO: 6 %
MUCOUS THREADS #/AREA URNS LPF: PRESENT /LPF
NEUTROPHILS # BLD AUTO: 6 10E3/UL (ref 1.6–8.3)
NEUTROPHILS NFR BLD AUTO: 67 %
NITRATE UR QL: POSITIVE
NRBC # BLD AUTO: 0 10E3/UL
NRBC BLD AUTO-RTO: 0 /100
PH UR STRIP: 7 [PH] (ref 5–7)
PLATELET # BLD AUTO: 195 10E3/UL (ref 150–450)
POTASSIUM SERPL-SCNC: 3.5 MMOL/L (ref 3.4–5.3)
RBC # BLD AUTO: 4.66 10E6/UL (ref 3.8–5.2)
RBC URINE: 47 /HPF
SODIUM SERPL-SCNC: 137 MMOL/L (ref 135–145)
SP GR UR STRIP: 1.01 (ref 1–1.03)
UROBILINOGEN UR STRIP-MCNC: <2 MG/DL
WBC # BLD AUTO: 8.9 10E3/UL (ref 4–11)
WBC CLUMPS #/AREA URNS HPF: PRESENT /HPF
WBC URINE: >182 /HPF
YEAST #/AREA URNS HPF: ABNORMAL /HPF

## 2024-04-20 PROCEDURE — 99285 EMERGENCY DEPT VISIT HI MDM: CPT | Mod: 25

## 2024-04-20 PROCEDURE — 81025 URINE PREGNANCY TEST: CPT | Performed by: EMERGENCY MEDICINE

## 2024-04-20 PROCEDURE — 87086 URINE CULTURE/COLONY COUNT: CPT | Performed by: EMERGENCY MEDICINE

## 2024-04-20 PROCEDURE — 36415 COLL VENOUS BLD VENIPUNCTURE: CPT | Performed by: EMERGENCY MEDICINE

## 2024-04-20 PROCEDURE — 81001 URINALYSIS AUTO W/SCOPE: CPT | Performed by: EMERGENCY MEDICINE

## 2024-04-20 PROCEDURE — 250N000011 HC RX IP 250 OP 636: Performed by: EMERGENCY MEDICINE

## 2024-04-20 PROCEDURE — 96375 TX/PRO/DX INJ NEW DRUG ADDON: CPT

## 2024-04-20 PROCEDURE — 250N000013 HC RX MED GY IP 250 OP 250 PS 637: Performed by: EMERGENCY MEDICINE

## 2024-04-20 PROCEDURE — 96374 THER/PROPH/DIAG INJ IV PUSH: CPT | Mod: 59

## 2024-04-20 PROCEDURE — 87186 SC STD MICRODIL/AGAR DIL: CPT | Performed by: EMERGENCY MEDICINE

## 2024-04-20 PROCEDURE — 85025 COMPLETE CBC W/AUTO DIFF WBC: CPT | Performed by: EMERGENCY MEDICINE

## 2024-04-20 PROCEDURE — 74177 CT ABD & PELVIS W/CONTRAST: CPT

## 2024-04-20 PROCEDURE — 80048 BASIC METABOLIC PNL TOTAL CA: CPT | Performed by: EMERGENCY MEDICINE

## 2024-04-20 RX ORDER — IOPAMIDOL 755 MG/ML
75 INJECTION, SOLUTION INTRAVASCULAR ONCE
Status: COMPLETED | OUTPATIENT
Start: 2024-04-21 | End: 2024-04-20

## 2024-04-20 RX ORDER — HYDROCODONE BITARTRATE AND ACETAMINOPHEN 5; 325 MG/1; MG/1
1 TABLET ORAL ONCE
Status: COMPLETED | OUTPATIENT
Start: 2024-04-21 | End: 2024-04-20

## 2024-04-20 RX ORDER — MORPHINE SULFATE 4 MG/ML
4 INJECTION, SOLUTION INTRAMUSCULAR; INTRAVENOUS ONCE
Status: COMPLETED | OUTPATIENT
Start: 2024-04-20 | End: 2024-04-20

## 2024-04-20 RX ORDER — ONDANSETRON 2 MG/ML
4 INJECTION INTRAMUSCULAR; INTRAVENOUS ONCE
Status: COMPLETED | OUTPATIENT
Start: 2024-04-20 | End: 2024-04-20

## 2024-04-20 RX ADMIN — ONDANSETRON 4 MG: 2 INJECTION INTRAMUSCULAR; INTRAVENOUS at 22:29

## 2024-04-20 RX ADMIN — IOPAMIDOL 75 ML: 755 INJECTION, SOLUTION INTRAVENOUS at 23:53

## 2024-04-20 RX ADMIN — HYDROCODONE BITARTRATE AND ACETAMINOPHEN 1 TABLET: 5; 325 TABLET ORAL at 23:45

## 2024-04-20 RX ADMIN — MORPHINE SULFATE 4 MG: 4 INJECTION, SOLUTION INTRAMUSCULAR; INTRAVENOUS at 22:29

## 2024-04-20 ASSESSMENT — COLUMBIA-SUICIDE SEVERITY RATING SCALE - C-SSRS
1. IN THE PAST MONTH, HAVE YOU WISHED YOU WERE DEAD OR WISHED YOU COULD GO TO SLEEP AND NOT WAKE UP?: NO
6. HAVE YOU EVER DONE ANYTHING, STARTED TO DO ANYTHING, OR PREPARED TO DO ANYTHING TO END YOUR LIFE?: NO
2. HAVE YOU ACTUALLY HAD ANY THOUGHTS OF KILLING YOURSELF IN THE PAST MONTH?: NO

## 2024-04-20 ASSESSMENT — ACTIVITIES OF DAILY LIVING (ADL)
ADLS_ACUITY_SCORE: 35
ADLS_ACUITY_SCORE: 37

## 2024-04-21 VITALS
RESPIRATION RATE: 18 BRPM | OXYGEN SATURATION: 92 % | HEIGHT: 60 IN | BODY MASS INDEX: 36.91 KG/M2 | TEMPERATURE: 98.6 F | DIASTOLIC BLOOD PRESSURE: 58 MMHG | SYSTOLIC BLOOD PRESSURE: 101 MMHG | HEART RATE: 62 BPM | WEIGHT: 188 LBS

## 2024-04-21 ASSESSMENT — ACTIVITIES OF DAILY LIVING (ADL)
ADLS_ACUITY_SCORE: 37
ADLS_ACUITY_SCORE: 37

## 2024-04-21 NOTE — ED NOTES
Pt is a/o x4, c/o pain 9/10 to neph tube site: morphine iv given and was zofran iv given for nausea, ua collected from neph tube, no puss, redness, or swelling noted on neph tube site, and VSS.

## 2024-04-21 NOTE — ED TRIAGE NOTES
Repots R side flank pain for past couple of days. Has only R kidney with neph tube. Also noticing puss around neph tube site. Endorsing chills.      Triage Assessment (Adult)       Row Name 04/20/24 3026          Triage Assessment    Airway WDL WDL        Respiratory WDL    Respiratory WDL WDL        Cardiac WDL    Cardiac WDL WDL

## 2024-04-21 NOTE — ED PROVIDER NOTES
EMERGENCY DEPARTMENT ENCOUNTER      NAME: Jacqueline Pappas  AGE: 39 year old female  YOB: 1985  MRN: [id number]  EVALUATION DATE & TIME: No admission date for patient encounter.    PCP: Pao Montague    ED PROVIDER: Florentino Maldonado D.O.      Chief Complaint   Patient presents with    Flank Pain       FINAL IMPRESSION:  Right flank pain    ED COURSE & MEDICAL DECISION MAKIN:47 PM I met with the patient to gather history and to perform my initial exam. I discussed the plan for care while in the Emergency Department.  1:15 AM patient care turned over to Dr. Olson pending discussion with Urology         Pertinent Labs & Imaging studies reviewed. (See chart for details)  39 year old female presents to the Emergency Department for evaluation of right flank pain.  Patient does have chronic nephrostomy tube in place.  Has been cultured multiple times in the past with negative cultures despite lab findings that are concerning for infection.  Return today with complaint of the recurrent right flank pain, and she did describe some chills.  Initial differential does include pyelonephritis, abscess of the flank wall, intra-abdominal infection, other acute pathology.  Lab testing does show a significant finding on urine with nitrate positive urine and white blood cells, however as previously mentioned the patient has had multiple lab tests like this with negative cultures in the past.  CT imaging does not show any evidence of inflammation, abscess, or acute process.  At time of turnover awaiting discussion with urology of whether we should be admitting him to have her nephrostomy tube change, or if need to await culture results.  If urology recommends discharge, I believe this would be appropriate.    Medical Decision Making  Obtained supplemental history:Supplemental history obtained?: No  Reviewed external records: External records reviewed?: Documented in chart and Other: Last 3 Emergency Department  visit notes  Care impacted by chronic illness:Chronic Pain  Care significantly affected by social determinants of health:N/A  Did you consider but not order tests?: Work up considered but not performed and documented in chart, if applicable  Did you interpret images independently?: Independent interpretation of ECG and images noted in documentation, when applicable.  Consultation discussion with other provider:Did you involve another provider (consultant, , pharmacy, etc.)?: No  Admission considered. Patient was signed out to the oncoming physician, disposition pending.    At the conclusion of the encounter I discussed the results of all of the tests and the disposition. The questions were answered. The patient or family acknowledged understanding and was agreeable with the care plan.      HPI    Patient information was obtained from: The patient     Use of : N/A        Jacqueline Pappas is a 39 year old female who presents for evaluation of flank pain.     The patient has had right sided flank pain with chills for the last couple days. She has only her right kidney and a nephrostomy tube. She reports that she has seen some puss drain out of the nephrostomy site. She denies any fever.    REVIEW OF SYSTEMS  Constitutional:  Denies fever, weight loss or weakness. Positive for fever.  Eyes:  No pain, discharge, redness  HENT:  Denies sore throat, ear pain, congestion  Respiratory: No SOB, wheeze or cough  Cardiovascular:  No CP, palpitations  GI:  Denies abdominal pain, nausea, vomiting, diarrhea, Positive for flank pain.   : Denies dysuria, hematuria  Musculoskeletal:  Denies any new muscle/joint pain, swelling or loss of function.  Skin:  Denies rash, pallor  Neurologic:  Denies headache, focal weakness or sensory changes  Lymph: Denies swollen nodes    All other systems negative unless noted in HPI.    PAST MEDICAL HISTORY:  Past Medical History:   Diagnosis Date    Acute renal failure (H24)     Anemia      Anemia     Calculus of kidney     Congenital absence of left kidney     Congenital absence of one kidney     Depression     Depression     Hydronephrosis     Kidney stone     at age 29    Pyelonephritis     Pyelonephritis     Smoker     Ureteral stricture     UTI (urinary tract infection)     Wrist fracture     Left       PAST SURGICAL HISTORY:  Past Surgical History:   Procedure Laterality Date     SECTION       SECTION      x1    COMBINED CYSTOSCOPY, RETROGRADES, URETEROSCOPY, LASER HOLMIUM LITHOTRIPSY URETER(S), INSERT STENT Right 2016    Procedure: COMBINED CYSTOSCOPY, RETROGRADES, URETEROSCOPY, LASER HOLMIUM LITHOTRIPSY URETER(S), INSERT STENT;  Surgeon: Florentino Awad MD;  [facility]: [facility]    CYSTOSCOPY, URETEROSCOPY, COMBINED Right 2016    Procedure: COMBINED CYSTOSCOPY, URETEROSCOPY;  Surgeon: Florentino Awad MD;  Location: [facility] OR    IR NEPHROLITHOTOMY  2014    IR NEPHROSTOGRAM EXISITING ACCESS  10/18/2018    IR NEPHROSTOMY TUBE CHANGE RIGHT  2018    IR NEPHROSTOMY TUBE CHANGE RIGHT  2020    IR NEPHROSTOMY TUBE CHANGE RIGHT  2018    IR NEPHROSTOMY TUBE CHANGE RIGHT  2020    IR NEPHROSTOMY TUBE CHANGE RIGHT  11/3/2023    IR NEPHROSTOMY TUBE CHANGE RIGHT  2024    IR NEPHROSTOMY TUBE PLACEMENT RIGHT  2016    IR NEPHROSTOMY TUBE PLACEMENT RIGHT  2017    KIDNEY STONE SURGERY Right 2016    LASER HOLMIUM LITHOTRIPSY URETER(S), INSERT STENT, COMBINED Left 2016    Procedure: COMBINED CYSTOSCOPY, URETEROSCOPY, LASER HOLMIUM LITHOTRIPSY URETER(S), INSERT STENT;  Surgeon: Florentino Awad MD;  Location: [facility] OR    LASER HOLMIUM NEPHROLITHOTOMY VIA PERCUTANEOUS NEPHROSTOMY Right 2016    Procedure: LASER HOLMIUM NEPHROLITHOTOMY VIA PERCUTANEOUS NEPHROSTOMY;  Surgeon: Florentino Awad MD;  Location: [facility] OR    NEPHROSTOMY W/ INTRODUCTION OF CATHETER Right     OTHER SURGICAL HISTORY      Mole removednasal    OTHER SURGICAL  HISTORY Right     Cystoscopy, ureteroscopy, laser11/02/2014 x2 with ureteral stent insertion    PERCUTANEOUS NEPHROSTOMY  11/1/2016    Procedure: PERCUTANEOUS NEPHROSTOMY;  Surgeon: Florentino Awad MD;  Location: [facility] OR    WISDOM TOOTH EXTRACTION      ZZC REMOVAL OF KIDNEY STONE           CURRENT MEDICATIONS:    No current facility-administered medications for this encounter.     Current Outpatient Medications   Medication Sig Dispense Refill    acetaminophen (TYLENOL) 500 MG tablet Take 500-1,000 mg by mouth every 6 hours as needed for mild pain      albuterol (ACCUNEB) 1.25 MG/3ML neb solution Take 1.25 mg by nebulization every 6 hours as needed for shortness of breath / dyspnea or wheezing      albuterol (PROAIR HFA/PROVENTIL HFA/VENTOLIN HFA) 108 (90 Base) MCG/ACT inhaler Inhale 2 puffs into the lungs every 6 hours 8 g 0    azelastine (ASTELIN) 0.1 % nasal spray Spray 1 spray into both nostrils 2 times daily 30 mL 11    budesonide-formoterol (SYMBICORT) 160-4.5 MCG/ACT Inhaler Inhale 2 puffs into the lungs 2 times daily 6 g 11    ipratropium - albuterol 0.5 mg/2.5 mg/3 mL (DUONEB) 0.5-2.5 (3) MG/3ML neb solution Take 1 vial (3 mLs) by nebulization every 6 hours as needed for shortness of breath, wheezing or cough 180 mL 4    sertraline (ZOLOFT) 100 MG tablet Take 100 mg by mouth At Bedtime           ALLERGIES:  Allergies   Allergen Reactions    Desogestrel-Ethinyl Estradiol Angioedema and Swelling     Swelling of hands and feet per pt.      Penicillins Rash     As a child per mother; mother unsure if has tolerated another PCN since. Tolerated ampicillin 9/20/16    Sulfa Antibiotics Rash    Vancomycin Rash       FAMILY HISTORY:  Family History   Problem Relation Age of Onset    Anesthesia Reaction No family hx of     Malignant Hyperthermia No family hx of     Heart Disease Maternal Uncle         heart failure    Coronary Artery Disease Other     Heart Disease Maternal Grandmother         pacemaker    Gout  Paternal Grandfather     Prostate Cancer Maternal Aunt         breast    Heart Disease Maternal Aunt         pacemaker    Heart Disease Maternal Aunt         pacemaker    Heart Disease Maternal Aunt         pacemaker       SOCIAL HISTORY:  Social History     Socioeconomic History    Marital status: Single   Tobacco Use    Smoking status: Every Day     Current packs/day: 0.00     Average packs/day: 0.5 packs/day for 10.0 years (5.0 ttl pk-yrs)     Types: Cigarettes     Start date: 2006     Last attempt to quit: 2016     Years since quittin.5    Smokeless tobacco: Former     Quit date: 2016    Tobacco comments:     Hasn't smoked in a week due to breathing issues    Vaping Use    Vaping status: Never Used   Substance and Sexual Activity    Alcohol use: Not Currently     Comment: Alcoholic Drinks/day: occ    Drug use: No    Sexual activity: Not Currently     Social Determinants of Health      [facility]    Financial Resource Strain    [facility]    Social Connections       VITALS:  Patient Vitals for the past 24 hrs:   BP Temp Temp src Pulse Resp SpO2 Height Weight   24 0202 101/58 -- -- 62 -- 92 % -- --   24 0127 104/65 -- -- 76 -- 93 % -- --   24 0059 108/55 -- -- 68 -- 96 % -- --   24 0029 111/58 -- -- 74 -- 93 % -- --   24 0009 105/56 -- -- 79 -- 96 % -- --   24 2330 114/58 -- -- 65 -- 95 % -- --   24 2301 113/58 -- -- 69 -- 97 % -- --   24 2231 116/74 -- -- 67 -- 96 % -- --   24 2145 139/75 98.6  F (37  C) Temporal 83 18 98 % 1.524 m (5') 85.3 kg (188 lb)       PHYSICAL EXAM    VITAL SIGNS: /58   Pulse 62   Temp 98.6  F (37  C) (Temporal)   Resp 18   Ht 1.524 m (5')   Wt 85.3 kg (188 lb)   LMP 2024   SpO2 92%   BMI 36.72 kg/m      General  Appearance: Well-appearing, well-nourished, no acute distress   Head:  Normocephalic, without obvious abnormality, atraumatic  Eyes:  PERRL, conjunctiva/corneas clear, EOM's intact,  ENT:  Lips, mucosa, and tongue normal, membranes are moist without pallor  Neck:  Normal ROM, symmetrical, trachea midline    Chest:  No tenderness or deformity, no crepitus  Cardio:  Regular rate and rhythm, no murmur, rub or gallop, 2+ pulses symmetric in all extremities  Pulm:  Clear to auscultation bilaterally, respirations unlabored,  Back:  ROM normal, no CVA tenderness, no spinal tenderness, no paraspinal tenderness, No erythema or purulent drainage from nephrostomy site on the right   Abdomen:  Soft, non-tender, no rebound or guarding.  Musculoskeletal: Full ROM, no edema, no cyanosis, good ROM of major joints  Integument:  Warm, Dry, No erythema, No rash.    Neurologic:  Alert & oriented.  No focal deficits appreciated.  Ambulatory.  Psychiatric:  Affect normal, Judgment normal, Mood normal.      LABS  Results for orders placed or performed during the hospital encounter of 04/20/24 (from the past 24 hour(s))   CBC with platelets + differential    Narrative    The following orders were created for panel order CBC with platelets + differential.  Procedure                               Abnormality         Status                     ---------                               -----------         ------                     CBC with platelets and d...[[id number]]                      Final result                 Please view results for these tests on the individual orders.   Basic metabolic panel   Result Value Ref Range    Sodium 137 135 - 145 mmol/L    Potassium 3.5 3.4 - 5.3 mmol/L    Chloride 102 98 - 107 mmol/L    Carbon Dioxide (CO2) 24 22 - 29 mmol/L    Anion Gap 11 7 - 15 mmol/L    Urea Nitrogen 8.1 6.0 - 20.0 mg/dL    Creatinine 0.85 0.51 - 0.95 mg/dL    GFR Estimate 90 >60 mL/min/1.73m2    Calcium 10.4 (H) 8.6 - 10.0 mg/dL     Glucose 101 (H) 70 - 99 mg/dL   CBC with platelets and differential   Result Value Ref Range    WBC Count 8.9 4.0 - 11.0 10e3/uL    RBC Count 4.66 3.80 - 5.20 10e6/uL    Hemoglobin 13.6 11.7 - 15.7 g/dL    Hematocrit 41.5 35.0 - 47.0 %    MCV 89 78 - 100 fL    MCH 29.2 26.5 - 33.0 pg    MCHC 32.8 31.5 - 36.5 g/dL    RDW 14.7 10.0 - 15.0 %    Platelet Count 195 150 - 450 10e3/uL    % Neutrophils 67 %    % Lymphocytes 22 %    % Monocytes 6 %    % Eosinophils 4 %    % Basophils 1 %    % Immature Granulocytes 0 %    NRBCs per 100 WBC 0 <1 /100    Absolute Neutrophils 6.0 1.6 - 8.3 10e3/uL    Absolute Lymphocytes 2.0 0.8 - 5.3 10e3/uL    Absolute Monocytes 0.5 0.0 - 1.3 10e3/uL    Absolute Eosinophils 0.4 0.0 - 0.7 10e3/uL    Absolute Basophils 0.0 0.0 - 0.2 10e3/uL    Absolute Immature Granulocytes 0.0 <=0.4 10e3/uL    Absolute NRBCs 0.0 10e3/uL   UA with Microscopic reflex to Culture    Specimen: Urine, Catheter   Result Value Ref Range    Color Urine Light Yellow Colorless, Straw, Light Yellow, Yellow    Appearance Urine Turbid (A) Clear    Glucose Urine Negative Negative mg/dL    Bilirubin Urine Negative Negative    Ketones Urine Negative Negative mg/dL    Specific Gravity Urine 1.010 1.001 - 1.030    Blood Urine 0.5 mg/dL (A) Negative    pH Urine 7.0 5.0 - 7.0    Protein Albumin Urine 300 (A) Negative mg/dL    Urobilinogen Urine <2.0 <2.0 mg/dL    Nitrite Urine Positive (A) Negative    Leukocyte Esterase Urine 500 Duglas/uL (A) Negative    WBC Clumps Urine Present (A) None Seen /HPF    Budding Yeast Urine Moderate (A) None Seen /HPF    Mucus Urine Present (A) None Seen /LPF    RBC Urine 47 (H) <=2 /HPF    WBC Urine >182 (H) <=5 /HPF    Narrative    Urine Culture ordered based on laboratory criteria   HCG qualitative urine   Result Value Ref Range    hCG Urine Qualitative Negative Negative   Urine Culture    Specimen: Urine, Catheter   Result Value Ref Range    Culture Culture in progress    CT Abdomen Pelvis w Contrast     Narrative    EXAM: CT ABDOMEN PELVIS W CONTRAST  LOCATION: [facility]  DATE: 4/20/2024    INDICATION: Right flank pain with nephrostomy tube in place, concern for purulent drainage around the outside of the tube. Evaluate for malfunction of the tube and potential for inflammation or abscess.  COMPARISON: 03/26/2024.  TECHNIQUE: CT scan of the abdomen and pelvis was performed following injection of IV contrast. Multiplanar reformats were obtained. Dose reduction techniques were used.  CONTRAST: 75 ml Isovue 370.    FINDINGS:   LOWER CHEST: Atelectasis.    HEPATOBILIARY: Normal.    PANCREAS: Normal.    SPLEEN: Splenomegaly.    ADRENAL GLANDS: Normal.    KIDNEYS/BLADDER: Marked atrophy of the left kidney. Compensatory hypertrophy of the right kidney. Stable percutaneous nephrostomy in the lower pole collecting system. Unchanged cystic change in the right kidney. Stable small right intrarenal stones. No   ureteric stone. No ureterectasis.    BOWEL: Normal.    LYMPH NODES: Normal.    VASCULATURE: Normal.    PELVIC ORGANS: Normal.    MUSCULOSKELETAL: Normal.      Impression    IMPRESSION:   1.  No significant change in appearance of the right kidney with multiple intrarenal stones. Cystic change throughout the right kidney with an indwelling lower right renal percutaneous nephrostomy. No ureteric stone.    2.  Marked atrophy of the left kidney.    3.  Splenomegaly.         RADIOLOGY  CT Abdomen Pelvis w Contrast   Final Result   IMPRESSION:    1.  No significant change in appearance of the right kidney with multiple intrarenal stones. Cystic change throughout the right kidney with an indwelling lower right renal percutaneous nephrostomy. No ureteric stone.      2.  Marked atrophy of the left kidney.      3.  Splenomegaly.               MEDICATIONS GIVEN IN THE EMERGENCY:  Medications   morphine (PF) injection 4 mg (4 mg Intravenous $Given 4/20/24 2229)   ondansetron (ZOFRAN) injection 4 mg (4  mg Intravenous $Given 4/20/24 2220)   HYDROcodone-acetaminophen (NORCO) 5-325 MG per tablet 1 tablet (1 tablet Oral $Given 4/20/24 5415)   iopamidol (ISOVUE-370) solution 75 mL (75 mLs Intravenous $Given 4/20/24 1665)       NEW PRESCRIPTIONS STARTED AT TODAY'S ER VISIT  Discharge Medication List as of 4/21/2024  2:23 AM           I, Arabella Cleveland, am serving as a scribe to document services personally performed by Florentino Maldonado D.O., based on my observations and the provider's statements to me.  I, Florentino Maldonado D.O., attest that Arabella Cleveland is acting in a scribe capacity, has observed my performance of the services and has documented them in accordance with my direction.     Florentino Maldonado D.O.  Emergency Medicine  [facility] EMERGENCY DEPARTMENT  [address] [place] [id number]  [phone]  Dept: [phone]       Florentino Maldonado,   04/21/24 8428

## 2024-04-21 NOTE — ED NOTES
EMERGENCY DEPARTMENT SIGN OUT NOTE        ED COURSE AND MEDICAL DECISION MAKING  Patient was signed out to me by Dr Florentino Maldonado at 1:22 AM.    In brief, Jacqueline Pappas is a 39 year old female who initially presented with a couple days of right-sided flank pain and chills. Patient has solitary right kidney pain with nephrostomy tube in place.     At time of sign out, disposition was pending urology consult.    2:16 AM care discussed with Dr. Lopez who feels that the nephrostomy tube does not need to be switched out pending urine culture.  As patient has no tachycardia, fever, leukocytosis, or other systemic signs of infection, will defer antibiotics for now pending culture as patient has frequent urinalysis consistent with infection with negative cultures likely secondary to colonization.    FINAL IMPRESSION    No diagnosis found.    ED MEDS  Medications   morphine (PF) injection 4 mg (4 mg Intravenous $Given 4/20/24 2229)   ondansetron (ZOFRAN) injection 4 mg (4 mg Intravenous $Given 4/20/24 2229)   HYDROcodone-acetaminophen (NORCO) 5-325 MG per tablet 1 tablet (1 tablet Oral $Given 4/20/24 1135)   iopamidol (ISOVUE-370) solution 75 mL (75 mLs Intravenous $Given 4/20/24 0439)       LAB  Labs Ordered and Resulted from Time of ED Arrival to Time of ED Departure   BASIC METABOLIC PANEL - Abnormal       Result Value    Sodium 137      Potassium 3.5      Chloride 102      Carbon Dioxide (CO2) 24      Anion Gap 11      Urea Nitrogen 8.1      Creatinine 0.85      GFR Estimate 90      Calcium 10.4 (*)     Glucose 101 (*)    ROUTINE UA WITH MICROSCOPIC REFLEX TO CULTURE - Abnormal    Color Urine Light Yellow      Appearance Urine Turbid (*)     Glucose Urine Negative      Bilirubin Urine Negative      Ketones Urine Negative      Specific Gravity Urine 1.010      Blood Urine 0.5 mg/dL (*)     pH Urine 7.0      Protein Albumin Urine 300 (*)     Urobilinogen Urine <2.0      Nitrite Urine Positive (*)     Leukocyte Esterase  Urine 500 Duglas/uL (*)     WBC Clumps Urine Present (*)     Budding Yeast Urine Moderate (*)     Mucus Urine Present (*)     RBC Urine 47 (*)     WBC Urine >182 (*)    HCG QUALITATIVE URINE - Normal    hCG Urine Qualitative Negative     CBC WITH PLATELETS AND DIFFERENTIAL    WBC Count 8.9      RBC Count 4.66      Hemoglobin 13.6      Hematocrit 41.5      MCV 89      MCH 29.2      MCHC 32.8      RDW 14.7      Platelet Count 195      % Neutrophils 67      % Lymphocytes 22      % Monocytes 6      % Eosinophils 4      % Basophils 1      % Immature Granulocytes 0      NRBCs per 100 WBC 0      Absolute Neutrophils 6.0      Absolute Lymphocytes 2.0      Absolute Monocytes 0.5      Absolute Eosinophils 0.4      Absolute Basophils 0.0      Absolute Immature Granulocytes 0.0      Absolute NRBCs 0.0     URINE CULTURE       RADIOLOGY    CT Abdomen Pelvis w Contrast   Final Result   IMPRESSION:    1.  No significant change in appearance of the right kidney with multiple intrarenal stones. Cystic change throughout the right kidney with an indwelling lower right renal percutaneous nephrostomy. No ureteric stone.      2.  Marked atrophy of the left kidney.      3.  Splenomegaly.          DISCHARGE MEDS  New Prescriptions    No medications on file         I, Jan Larsen, am serving as a scribe to document services personally performed by Dr. Ramana Olson based on my observations and the provider's statements to me.  I, Ramana Olson MD, attest that Jan Larsen is acting in a scribe capacity, has observed my performance of the services and has documented them in accordance with my direction.      Ramana Olson MD  [facility] EMERGENCY DEPARTMENT  [address] [place] [id number] [phone]     Ramana Olson MD  04/21/24 0216

## 2024-04-22 LAB — BACTERIA UR CULT: ABNORMAL

## 2024-04-23 ENCOUNTER — TELEPHONE (OUTPATIENT)
Dept: NURSING | Facility: CLINIC | Age: 39
End: 2024-04-23
Payer: COMMERCIAL

## 2024-04-23 NOTE — LETTER
April 23, 2024        Jacqueline Pappas  [address]   [place] [address]          Dear Jacqueline Pappas:    You were seen in the [facility] Emergency Department at [facility] on 4/20/2024.  We are unable to reach you by phone, so we are sending you this letter.     It is important that you call [facility] Emergency Department lab result nurse at [phone], as we have information to relay to you AND/OR we MAY have to make some changes in your treatment.    Best time to call back is between 9AM and 5:30PM, 7 days a week.      Sincerely,     [facility] Emergency Department Lab Result RN  [phone]

## 2024-04-23 NOTE — TELEPHONE ENCOUNTER
[facility]    Reason for call: Lab Result Notification     Lab Result (including Rx patient on, if applicable).  If culture, copy of lab report at bottom.  Lab Result: See below    No antibiotics prescribed    Creatinine Level (mg/dl)   Creatinine   Date Value Ref Range Status   04/20/2024 0.85 0.51 - 0.95 mg/dL Final   11/02/2016 1.04 0.52 - 1.04 mg/dL Final    Creatinine clearance (ml/min), if applicable    Serum creatinine: 0.85 mg/dL 04/20/24 2225  Estimated creatinine clearance: 87.8 mL/min     Patient's current Symptoms:     Patient calling back at 1350: States that she continues with pus draining from her nephrostomy tube, chills  and abdominal pain. Patient stated that her PCP has to prescribe her antibiotics because of the type of insurance she has. Patient stated she will call her PCP with [facility] and have them prescribe antibiotics.     Unable to assess. Left a voice mail and will send through [employer].       RN Recommendations/Instructions per [facility] ED lab result protocol:   [facility] ED lab result protocol utilized: Urine Culture      MARCIE GIRON, ELOISA

## 2024-04-25 ENCOUNTER — HOSPITAL ENCOUNTER (EMERGENCY)
Facility: HOSPITAL | Age: 39
Discharge: HOME | End: 2024-04-25
Attending: EMERGENCY MEDICINE | Admitting: EMERGENCY MEDICINE
Payer: COMMERCIAL

## 2024-04-25 VITALS
WEIGHT: 188.49 LBS | BODY MASS INDEX: 37.01 KG/M2 | RESPIRATION RATE: 16 BRPM | OXYGEN SATURATION: 98 % | HEIGHT: 60 IN | SYSTOLIC BLOOD PRESSURE: 108 MMHG | DIASTOLIC BLOOD PRESSURE: 60 MMHG | TEMPERATURE: 96.9 F | HEART RATE: 61 BPM

## 2024-04-25 DIAGNOSIS — N12 PYELONEPHRITIS: ICD-10-CM

## 2024-04-25 DIAGNOSIS — R10.9 FLANK PAIN: ICD-10-CM

## 2024-04-25 LAB
ALBUMIN SERPL BCG-MCNC: 4.1 G/DL (ref 3.5–5.2)
ALBUMIN UR-MCNC: 70 MG/DL
ALP SERPL-CCNC: 80 U/L (ref 40–150)
ALT SERPL W P-5'-P-CCNC: 12 U/L (ref 0–50)
ANION GAP SERPL CALCULATED.3IONS-SCNC: 9 MMOL/L (ref 7–15)
APPEARANCE UR: ABNORMAL
AST SERPL W P-5'-P-CCNC: 11 U/L (ref 0–45)
BACTERIA #/AREA URNS HPF: ABNORMAL /HPF
BASOPHILS # BLD AUTO: 0 10E3/UL (ref 0–0.2)
BASOPHILS NFR BLD AUTO: 0 %
BILIRUB SERPL-MCNC: 0.2 MG/DL
BILIRUB UR QL STRIP: NEGATIVE
BUN SERPL-MCNC: 13.3 MG/DL (ref 6–20)
CALCIUM SERPL-MCNC: 10.2 MG/DL (ref 8.6–10)
CHLORIDE SERPL-SCNC: 103 MMOL/L (ref 98–107)
COLOR UR AUTO: ABNORMAL
CREAT SERPL-MCNC: 0.91 MG/DL (ref 0.51–0.95)
DEPRECATED HCO3 PLAS-SCNC: 25 MMOL/L (ref 22–29)
EGFRCR SERPLBLD CKD-EPI 2021: 83 ML/MIN/1.73M2
EOSINOPHIL # BLD AUTO: 0.1 10E3/UL (ref 0–0.7)
EOSINOPHIL NFR BLD AUTO: 1 %
ERYTHROCYTE [DISTWIDTH] IN BLOOD BY AUTOMATED COUNT: 14.6 % (ref 10–15)
GLUCOSE SERPL-MCNC: 79 MG/DL (ref 70–99)
GLUCOSE UR STRIP-MCNC: NEGATIVE MG/DL
GRANULAR CAST: 6 /LPF
HCG UR QL: NEGATIVE
HCT VFR BLD AUTO: 40.1 % (ref 35–47)
HGB BLD-MCNC: 13.2 G/DL (ref 11.7–15.7)
HGB UR QL STRIP: ABNORMAL
HOLD SPECIMEN: NORMAL
HYALINE CASTS: 2 /LPF
IMM GRANULOCYTES # BLD: 0.1 10E3/UL
IMM GRANULOCYTES NFR BLD: 1 %
KETONES UR STRIP-MCNC: NEGATIVE MG/DL
LEUKOCYTE ESTERASE UR QL STRIP: ABNORMAL
LYMPHOCYTES # BLD AUTO: 1.3 10E3/UL (ref 0.8–5.3)
LYMPHOCYTES NFR BLD AUTO: 14 %
MCH RBC QN AUTO: 29.6 PG (ref 26.5–33)
MCHC RBC AUTO-ENTMCNC: 32.9 G/DL (ref 31.5–36.5)
MCV RBC AUTO: 90 FL (ref 78–100)
MONOCYTES # BLD AUTO: 0.6 10E3/UL (ref 0–1.3)
MONOCYTES NFR BLD AUTO: 6 %
NEUTROPHILS # BLD AUTO: 7.6 10E3/UL (ref 1.6–8.3)
NEUTROPHILS NFR BLD AUTO: 78 %
NITRATE UR QL: POSITIVE
NRBC # BLD AUTO: 0 10E3/UL
NRBC BLD AUTO-RTO: 0 /100
PH UR STRIP: 7 [PH] (ref 5–7)
PLATELET # BLD AUTO: 204 10E3/UL (ref 150–450)
POTASSIUM SERPL-SCNC: 3.7 MMOL/L (ref 3.4–5.3)
PROT SERPL-MCNC: 6.9 G/DL (ref 6.4–8.3)
RBC # BLD AUTO: 4.46 10E6/UL (ref 3.8–5.2)
RBC URINE: 22 /HPF
SODIUM SERPL-SCNC: 137 MMOL/L (ref 135–145)
SP GR UR STRIP: 1.02 (ref 1–1.03)
SQUAMOUS EPITHELIAL: 2 /HPF
UROBILINOGEN UR STRIP-MCNC: <2 MG/DL
WBC # BLD AUTO: 9.7 10E3/UL (ref 4–11)
WBC URINE: 8 /HPF

## 2024-04-25 PROCEDURE — 82962 GLUCOSE BLOOD TEST: CPT

## 2024-04-25 PROCEDURE — 81003 URINALYSIS AUTO W/O SCOPE: CPT | Performed by: EMERGENCY MEDICINE

## 2024-04-25 PROCEDURE — 99284 EMERGENCY DEPT VISIT MOD MDM: CPT | Mod: 25

## 2024-04-25 PROCEDURE — 84155 ASSAY OF PROTEIN SERUM: CPT | Performed by: EMERGENCY MEDICINE

## 2024-04-25 PROCEDURE — 82565 ASSAY OF CREATININE: CPT | Performed by: EMERGENCY MEDICINE

## 2024-04-25 PROCEDURE — 85025 COMPLETE CBC W/AUTO DIFF WBC: CPT | Performed by: EMERGENCY MEDICINE

## 2024-04-25 PROCEDURE — 96361 HYDRATE IV INFUSION ADD-ON: CPT

## 2024-04-25 PROCEDURE — 96374 THER/PROPH/DIAG INJ IV PUSH: CPT

## 2024-04-25 PROCEDURE — 87086 URINE CULTURE/COLONY COUNT: CPT | Performed by: EMERGENCY MEDICINE

## 2024-04-25 PROCEDURE — 250N000013 HC RX MED GY IP 250 OP 250 PS 637: Performed by: EMERGENCY MEDICINE

## 2024-04-25 PROCEDURE — 258N000003 HC RX IP 258 OP 636: Performed by: EMERGENCY MEDICINE

## 2024-04-25 PROCEDURE — 81025 URINE PREGNANCY TEST: CPT | Performed by: EMERGENCY MEDICINE

## 2024-04-25 PROCEDURE — 36415 COLL VENOUS BLD VENIPUNCTURE: CPT | Performed by: EMERGENCY MEDICINE

## 2024-04-25 PROCEDURE — 250N000011 HC RX IP 250 OP 636: Performed by: EMERGENCY MEDICINE

## 2024-04-25 RX ORDER — ONDANSETRON 4 MG/1
4 TABLET, ORALLY DISINTEGRATING ORAL ONCE
Status: COMPLETED | OUTPATIENT
Start: 2024-04-25 | End: 2024-04-25

## 2024-04-25 RX ORDER — ONDANSETRON 2 MG/ML
4 INJECTION INTRAMUSCULAR; INTRAVENOUS
Status: DISCONTINUED | OUTPATIENT
Start: 2024-04-25 | End: 2024-04-25 | Stop reason: HOSPADM

## 2024-04-25 RX ORDER — KETOROLAC TROMETHAMINE 15 MG/ML
15 INJECTION, SOLUTION INTRAMUSCULAR; INTRAVENOUS ONCE
Status: COMPLETED | OUTPATIENT
Start: 2024-04-25 | End: 2024-04-25

## 2024-04-25 RX ORDER — LIDOCAINE 4 G/G
1 PATCH TOPICAL ONCE
Status: DISCONTINUED | OUTPATIENT
Start: 2024-04-25 | End: 2024-04-25 | Stop reason: HOSPADM

## 2024-04-25 RX ADMIN — SODIUM CHLORIDE 1000 ML: 9 INJECTION, SOLUTION INTRAVENOUS at 11:31

## 2024-04-25 RX ADMIN — ONDANSETRON 4 MG: 4 TABLET, ORALLY DISINTEGRATING ORAL at 11:28

## 2024-04-25 RX ADMIN — LIDOCAINE 1 PATCH: 4 PATCH TOPICAL at 13:22

## 2024-04-25 RX ADMIN — KETOROLAC TROMETHAMINE 15 MG: 15 INJECTION, SOLUTION INTRAMUSCULAR; INTRAVENOUS at 11:31

## 2024-04-25 ASSESSMENT — ACTIVITIES OF DAILY LIVING (ADL)
ADLS_ACUITY_SCORE: 37
ADLS_ACUITY_SCORE: 37

## 2024-04-25 NOTE — DISCHARGE INSTRUCTIONS
Thank you for choosing [facility] for your care. It was a pleasure taking care of you today in our Emergency Department.       Instructions from your doctor today:  Emergency Department (ED) testing is focused on the potential causes of your symptoms that are the most dangerous possibilities, and cannot cover every possibility. Based on the evaluation, it was deemed sufficiently safe to discharge and continue management through the clinics. Thus, follow-up is very important to assess for improvement/worsening, potential further testing, and potential treatment adjustments. If you were given opioid pain medications or other medications that can make you drowsy while in the ED, you should not drive for at least several hours and not until you feel completely back to normal.     Please make an appointment to follow up with:  - Your Primary Care Provider in 2-4 days and your Urologist as scheduled  - If you do not have a primary care provider, you can be seen in follow-up and establish care by calling any of the clinics below:     - [facility] (phone: [phone])     - Primary Care / [facility] (phone: [phone])   - Have your clinic provider review the results from today's visit with you again, including any potential follow-up or additional testing that may be needed based on the results. Occasionally, incidental findings are found on later review by radiologists that may need follow-up.       If you have continued worsening symptoms, please return to the emergency department.

## 2024-04-25 NOTE — ED PROVIDER NOTES
EMERGENCY DEPARTMENT ENCOUNTER      NAME: Jacqueline Pappas  AGE: 39 year old female  YOB: 1985  MRN: [id number]  EVALUATION DATE & TIME: No admission date for patient encounter.    PCP: Pao Montague    ED PROVIDER: Pao Aldrich MD      Chief Complaint   Patient presents with    Flank Pain         FINAL IMPRESSION:  1. Flank pain          ED COURSE & MEDICAL DECISION MAKING:    Pertinent Labs & Imaging studies reviewed. (See chart for details)  39 year old female presents to the Emergency Department for evaluation of right flank pain.  Patient has only 1 kidney, and a history of chronic right flank pain with pyelonephritis, status post nephrostomy tube placement.  Patient was evaluated here 4 days ago with emergency department, and at that time urology decided not to replace the urostomy tube.  Given patient's recent diagnosis of MRSA urinary tract infection, and patient started on antibiotics yesterday, I believe pain could still be due to her pyelonephritis.  The nephrostomy tube site looks clean dry and intact, and urine is clear.  Patient stated her symptoms are significantly improving since she started antibiotics yesterday, however now she has a new cough, nausea vomiting and diarrhea.      Patient declined COVID, RSV, influenza testing.   CBC was completely normal.  CMP showed mild hypercalcemia but this is improved from last check of 10.2.  Urinalysis showed continued urinary tract infection, but improvement from previous check.  UPT was negative.    Discussed risks and benefits of IV analgesia and antiemetics.  Patient received Zofran, normal saline, as well as Toradol emergency department.  Although patient is on sertraline, she is only receiving 1 dose of Toradol here.  We also discussed option of a lidocaine patch.  Patient agreed to the patch.  We discussed with nursing how we would avoid it being near the nephrostomy tube placement area.    Patient discharged in good condition with  questions answered. She was given [facility] discharge instructions and follow-up recommendations. Patient will return to the ED if symptoms worsen or she develops any of the concerning signs/symptoms as outlined in the EPIC d/c instructions. Otherwise she will follow up in clinic as recommended in the d/c instructions.         10:57 AM I met with the patient, obtained history, performed an initial exam, and discussed options and plan for diagnostics and treatment here in the ED.   1:14 PM Rechecked and updated patient who is okay with the lidocaine patch. Patient declines covid test.    At the conclusion of the encounter I discussed the results of all of the tests and the disposition. The questions were answered. The patient or family acknowledged understanding and was agreeable with the care plan.         Medical Decision Making  Obtained supplemental history:Supplemental history obtained?: No  Reviewed external records: External records reviewed?: Documented in chart, Outpatient Record: Recent ER visits and workup as mentioned above, Prior Labs: Recent urinalysis CMP urine cultures from last 2 visits, and Prior Imaging: Patient's most recent CT scans  Care impacted by chronic illness:Other: Absence of 1 kidney at birth, chronic and recurrent urinary tract infection/pyelonephritis electrolyte abnormalities  Care significantly affected by social determinants of health:N/A  Did you consider but not order tests?: Work up considered but not performed and documented in chart, if applicable    Consultation discussion with other provider:Did you involve another provider (consultant, , pharmacy, etc.)?: No  Discharge. I recommended the patient continue their current prescription strength medication(s): abx for UTI. See documentation for any additional details.      MEDICATIONS GIVEN IN THE EMERGENCY:  Medications   sodium chloride 0.9% BOLUS 1,000 mL (has no administration in time range)   ondansetron (ZOFRAN) injection 4 mg  (has no administration in time range)   ketorolac (TORADOL) injection 15 mg (has no administration in time range)   ondansetron (ZOFRAN ODT) ODT tab 4 mg (4 mg Oral $Given 4/25/24 1124)       NEW PRESCRIPTIONS STARTED AT TODAY'S ER VISIT  New Prescriptions    No medications on file          =================================================================    HPI    Patient information was obtained from: patient     Use of : N/A         Jacqueline Pappas is a 39 year old female with a pertinent history of anemia, pyelonephritis, kidney stone, ureteral stricture, congenital absence of one kidney, depression, nephrostomy, and obesity who presents to this ED by walking for evaluation of flank pain.    Patient reports she was seen here 4 days ago, diagnosed with MRSA, and started on antibiotics. Her nephrostomy was not changed. She had been having pus around the site of her nephrostomy but this has been better since starting antibiotics. Patient continues to have right flank pain and also complains of diarrhea for the past 4-5 days (2-3 times daily) and vomiting since last night (3 total times). She has a productive cough with shortness of breath and a headache as well. Her son is sick with viral symptoms at home. Patient has taken tylenol without relief.  Patient denies any sore throat, recent travel, ear pain, rash, dysuria, bloody stool, or recent change in medication.    Chart Review: 4/23/24 E-visit with [facility]. Urine culture had grown MRSA. Started 100 mg doxycycline twice daily for 7 days.  4/20/24-4/21/24 ED visit at [facility] for right flank pain. CT unremarkable. Urine culture started.      REVIEW OF SYSTEMS   Review of Systems 10 point ROS negative with exception of those listed in the HPI.      PAST MEDICAL HISTORY:  Past Medical History:   Diagnosis Date    Acute renal failure (H24)     Anemia     Anemia     Calculus of kidney     Congenital absence of left kidney      Congenital absence of one kidney     Depression     Depression     Hydronephrosis     Kidney stone     at age 29    Pyelonephritis     Pyelonephritis     Smoker     Ureteral stricture     UTI (urinary tract infection)     Wrist fracture     Left       PAST SURGICAL HISTORY:  Past Surgical History:   Procedure Laterality Date     SECTION       SECTION      x1    COMBINED CYSTOSCOPY, RETROGRADES, URETEROSCOPY, LASER HOLMIUM LITHOTRIPSY URETER(S), INSERT STENT Right 2016    Procedure: COMBINED CYSTOSCOPY, RETROGRADES, URETEROSCOPY, LASER HOLMIUM LITHOTRIPSY URETER(S), INSERT STENT;  Surgeon: Florentino Awad MD;  Location: [facility] OR    CYSTOSCOPY, URETEROSCOPY, COMBINED Right 2016    Procedure: COMBINED CYSTOSCOPY, URETEROSCOPY;  Surgeon: Florentino Awad MD;  Location: [facility] OR    IR NEPHROLITHOTOMY  2014    IR NEPHROSTOGRAM EXISITING ACCESS  10/18/2018    IR NEPHROSTOMY TUBE CHANGE RIGHT  2018    IR NEPHROSTOMY TUBE CHANGE RIGHT  2020    IR NEPHROSTOMY TUBE CHANGE RIGHT  2018    IR NEPHROSTOMY TUBE CHANGE RIGHT  2020    IR NEPHROSTOMY TUBE CHANGE RIGHT  11/3/2023    IR NEPHROSTOMY TUBE CHANGE RIGHT  2024    IR NEPHROSTOMY TUBE PLACEMENT RIGHT  2016    IR NEPHROSTOMY TUBE PLACEMENT RIGHT  2017    KIDNEY STONE SURGERY Right 2016    LASER HOLMIUM LITHOTRIPSY URETER(S), INSERT STENT, COMBINED Left 2016    Procedure: COMBINED CYSTOSCOPY, URETEROSCOPY, LASER HOLMIUM LITHOTRIPSY URETER(S), INSERT STENT;  Surgeon: Florentino Awad MD;  Location: [facility] OR    LASER HOLMIUM NEPHROLITHOTOMY VIA PERCUTANEOUS NEPHROSTOMY Right 2016    Procedure: LASER HOLMIUM NEPHROLITHOTOMY VIA PERCUTANEOUS NEPHROSTOMY;  Surgeon: Florentino Awad MD;  Location: [facility] OR    NEPHROSTOMY W/ INTRODUCTION OF CATHETER Right     OTHER SURGICAL HISTORY      Mole removednasal    OTHER SURGICAL HISTORY Right     Cystoscopy, ureteroscopy, laser11/02/2014 x2 with ureteral  stent insertion    PERCUTANEOUS NEPHROSTOMY  2016    Procedure: PERCUTANEOUS NEPHROSTOMY;  Surgeon: Florentino Awad MD;  Location: [facility] OR    WISDOM TOOTH EXTRACTION      ZZC REMOVAL OF KIDNEY STONE             CURRENT MEDICATIONS:    acetaminophen (TYLENOL) 500 MG tablet  albuterol (ACCUNEB) 1.25 MG/3ML neb solution  albuterol (PROAIR HFA/PROVENTIL HFA/VENTOLIN HFA) 108 (90 Base) MCG/ACT inhaler  azelastine (ASTELIN) 0.1 % nasal spray  budesonide-formoterol (SYMBICORT) 160-4.5 MCG/ACT Inhaler  ipratropium - albuterol 0.5 mg/2.5 mg/3 mL (DUONEB) 0.5-2.5 (3) MG/3ML neb solution  sertraline (ZOLOFT) 100 MG tablet        ALLERGIES:  Allergies   Allergen Reactions    Desogestrel-Ethinyl Estradiol Angioedema and Swelling     Swelling of hands and feet per pt.      Penicillins Rash     As a child per mother; mother unsure if has tolerated another PCN since. Tolerated ampicillin 16    Sulfa Antibiotics Rash    Vancomycin Rash       FAMILY HISTORY:  Family History   Problem Relation Age of Onset    Anesthesia Reaction No family hx of     Malignant Hyperthermia No family hx of     Heart Disease Maternal Uncle         heart failure    Coronary Artery Disease Other     Heart Disease Maternal Grandmother         pacemaker    Gout Paternal Grandfather     Prostate Cancer Maternal Aunt         breast    Heart Disease Maternal Aunt         pacemaker    Heart Disease Maternal Aunt         pacemaker    Heart Disease Maternal Aunt         pacemaker       SOCIAL HISTORY:   Social History     Socioeconomic History    Marital status: Single   Tobacco Use    Smoking status: Every Day     Current packs/day: 0.00     Average packs/day: 0.5 packs/day for 10.0 years (5.0 ttl pk-yrs)     Types: Cigarettes     Start date: 2006     Last attempt to quit: 2016     Years since quittin.5    Smokeless tobacco: Former     Quit date: 2016    Tobacco comments:     Hasn't smoked in a week due to breathing issues    Vaping  Use    Vaping status: Never Used   Substance and Sexual Activity    Alcohol use: Not Currently     Comment: Alcoholic Drinks/day: occ    Drug use: No    Sexual activity: Not Currently     Social Determinants of Health      [facility]    Financial Resource Strain    [facility]    Social Connections       VITALS:  /89   Pulse 85   Temp 96.9  F (36.1  C) (Temporal)   Resp 16   Ht 1.524 m (5')   Wt 85.5 kg (188 lb 7.9 oz)   LMP 04/13/2024   SpO2 97%   BMI 36.81 kg/m      PHYSICAL EXAM    General: Alert, lying on the bed in NAD  Neuro: Awake alert; moving extremities x 4 face symterical  Eyes: sclera nonicteric conjunctiva clear  Mouth: mmm  Heart: RRR  Lungs:  CTA bilaterally  Abdomen: soft  non tenderness to palpation no rebound; no guarding +BS  Back: right CVA tenderness, nephrostomy tube looks clean dry and intact  Extremities: no pedal edema         LAB:  All pertinent labs reviewed and interpreted.       RADIOLOGY:  Reviewed all pertinent imaging. Please see official radiology report.  No orders to display         IKomal, am serving as a scribe to document services personally performed by Pao Aldrich MD based on my observation and the provider's statements to me. I, Pao Aldrich MD, attest that Komal Martinez is acting in a scribe capacity, has observed my performance of the services and has documented them in accordance with my direction.    Pao Aldrich MD  [facility] EMERGENCY DEPARTMENT  [address] [place] [id number]  [phone]       Pao Aldrich MD  05/02/24 2103

## 2024-04-25 NOTE — ED TRIAGE NOTES
Pt presents with c/o right flank pain (here for same yesterday). Urine tested + for MRSA and told to come back on Tuesday (already taking antibiotics). Here for increased pain, cough, n/v/d. Has Care Plan in place     Triage Assessment (Adult)       Row Name 04/25/24 1029          Triage Assessment    Airway WDL WDL        Respiratory WDL    Respiratory WDL cough     Cough Frequency frequent     Cough Type dry        Skin Circulation/Temperature WDL    Skin Circulation/Temperature WDL WDL        Cardiac WDL    Cardiac WDL WDL        Peripheral/Neurovascular WDL    Peripheral Neurovascular WDL WDL        Cognitive/Neuro/Behavioral WDL    Cognitive/Neuro/Behavioral WDL WDL

## 2024-04-25 NOTE — ED NOTES
Writer provided warm blanket to patient, patient not thrilled about Lido patch, wants to speak to physician.  Writer informed Dr. Aldrich.

## 2024-04-26 LAB — GLUCOSE BLDC GLUCOMTR-MCNC: 82 MG/DL (ref 70–99)

## 2024-04-27 LAB — BACTERIA UR CULT: ABNORMAL

## 2024-05-15 ENCOUNTER — MYC MEDICAL ADVICE (OUTPATIENT)
Dept: INTERVENTIONAL RADIOLOGY/VASCULAR | Facility: CLINIC | Age: 39
End: 2024-05-15
Payer: COMMERCIAL

## 2024-05-17 NOTE — TELEPHONE ENCOUNTER
Detailed Voicemail message left with request for patient to contact Interventional Radiology Department and acknowledge understanding of pre-procdure instructions that were provided in anticipation of procedure scheduled 5/20/24.      IR contact number provided in [facility].    Beatris DUMONT  Interventional Radiology RN   [phone]

## 2024-05-17 NOTE — PROGRESS NOTES
Interventional Radiology Pre-Procedure Assessment  Outpatient - [facility] - 05/20/24    Procedure requested: R. PNT exchange  Requesting provider: Krzysztof Saunders MD    Brief HPI  Jacqueline Pappas is a 39 year old female with a pertinent past medical history of congenital solitary kidney with recurrent UTIs, nephrolithiasis and ureteral strictures managed by a chronic right percutaneous nephrostomy tube who presents today for a routine three month exchange.    She has been followed by [facility], but has recently transferred care to this system. She has not established care with a Urologist in the [facility].  Reportedly receives Cipro 400 mg IVPB with exchanges.     NPO since Midnight  ANTICOAGULANT(S): None  ANTIBIOTIC(S): Cipro pre-procedure; order signed and held.    IMAGING  2/5/2024 NEPHROSTOMY TUBE EXCHANGE  PROCEDURE: A  image was obtained which demonstrated the right PCN tube to be in expected location. A nephrostogram was performed through the nephrostomy tube which demonstrated mild hydronephrosis. Under direct fluoroscopic visualization, the   previous tube was removed over a wire and exchange was made for a new 10 English nephrostomy. The loop was locked within the renal pelvis. A post placement nephrostogram was performed which demonstrated the nephrostomy loop to lie within the renal pelvis. The nephrostomy tube was secured to the skin.    IMPRESSION:    Successful right nephrostomy exchange.    PLAN:  1. Patient to be recovered and discharged from IR.  2. Right PCN to gravity drainage.  3. No flushing.   4. Follow-up with IR in 3 months for routine PCN exchange.    ALLERGIES  Allergies   Allergen Reactions    Desogestrel-Ethinyl Estradiol Angioedema and Swelling     Swelling of hands and feet per pt.      Penicillins Rash     As a child per mother; mother unsure if has tolerated another PCN since. Tolerated ampicillin 9/20/16    Sulfa Antibiotics Rash     Vancomycin Rash     LABORATORY VALUES  INR   Date Value Ref Range Status   12/30/2018 0.94 0.90 - 1.10 Final   09/16/2016 1.82 (H) 0.86 - 1.14 Final      Hemoglobin   Date Value Ref Range Status   04/25/2024 13.2 11.7 - 15.7 g/dL Final   11/02/2016 10.1 (L) 11.7 - 15.7 g/dL Final     Platelet Count   Date Value Ref Range Status   04/25/2024 204 150 - 450 10e3/uL Final   11/02/2016 180 150 - 450 10e9/L Final     Creatinine   Date Value Ref Range Status   04/25/2024 0.91 0.51 - 0.95 mg/dL Final   11/02/2016 1.04 0.52 - 1.04 mg/dL Final     Potassium   Date Value Ref Range Status   04/25/2024 3.7 3.4 - 5.3 mmol/L Final   08/18/2023 4.2 3.5 - 5.0 mmol/L Final   11/02/2016 4.5 3.4 - 5.3 mmol/L Final     EXAM  LMP 04/13/2024    Vital signs:  Temp: 97.9  F (36.6  C)   BP: 129/58 Pulse: 66   Resp: 16 SpO2: 100 % O2 Device: None (Room air)        Estimated body mass index is 36.81 kg/m  as calculated from the following:    Height as of 4/25/24: 1.524 m (5').    Weight as of 4/25/24: 85.5 kg (188 lb 7.9 oz).    General: No apparent acute distress.  Respiratory: Normal depth and regular rhythm. Clear and equal throughout without adventitious sounds.  Cardiovascular: S1 & S2; regular rate and rhythm.  Urinary: right PNT intact to gravity drainage with yellow urine noted in collection bag; no leakage noted.  Neurological: Alert and oriented. Thoughts coherent; speech clear and logical.  Psychiatric: Appropriate mood and affect.    PRE-SEDATION ASSESSMENT  Mallampati Airway Classification:  I - Faucial pillars, soft palate, and uvula are visible  Previous reaction to anesthesia/sedation:  No  Sedation plan based on assessment: starting with fentanyl only, but may have Moderate (conscious) sedation  ASA Classification: Class 2 - MILD SYSTEMIC DISEASE, NO ACUTE PROBLEMS, NO FUNCTIONAL LIMITATIONS.  Code Status: contineu patient's current code status (Full code) intraprocedure, per discussion with patient.    ASSESSMENT  39 year old  female with a chronic right percutaneous nephrostomy tube who presents today for a routine three month exchange. Willing to try exchange with fentanyl; moderate sedation as needed.     Denies fever, chills, nausea, vomiting, back and/or flank pain. Denies changes in catheter output including urine volume, color, content and/or clarity.  Denies leaking around catheter insertion site.    PLAN  Congenital solitary kidney  Recurrent UTIs  Nephrolithiasis  Ureteral strictures  - Right nephrostogram with percutaneous nephrostomy tube exchange and sedation.  - Procedural education reviewed with patient in detail including but not limited to risks, benefits and alternatives. Patient verbalized understanding.      Total time spent on the date of the encounter is 35 minutes.    Note comprised by: Lizz Marley CNP    Exam and procedure discussion completed by:   Krista Fields CNP  Interventional Radiology

## 2024-05-20 ENCOUNTER — HOSPITAL ENCOUNTER (OUTPATIENT)
Dept: INTERVENTIONAL RADIOLOGY/VASCULAR | Facility: HOSPITAL | Age: 39
Discharge: HOME | End: 2024-05-20
Attending: UROLOGY | Admitting: RADIOLOGY
Payer: COMMERCIAL

## 2024-05-20 VITALS
DIASTOLIC BLOOD PRESSURE: 63 MMHG | TEMPERATURE: 97.9 F | RESPIRATION RATE: 16 BRPM | SYSTOLIC BLOOD PRESSURE: 132 MMHG | OXYGEN SATURATION: 98 % | HEART RATE: 63 BPM

## 2024-05-20 DIAGNOSIS — Z93.6 NEPHROSTOMY STATUS (H): ICD-10-CM

## 2024-05-20 LAB — HCG UR QL: NEGATIVE

## 2024-05-20 PROCEDURE — 250N000011 HC RX IP 250 OP 636: Performed by: NURSE PRACTITIONER

## 2024-05-20 PROCEDURE — C1729 CATH, DRAINAGE: HCPCS

## 2024-05-20 PROCEDURE — 81025 URINE PREGNANCY TEST: CPT | Performed by: NURSE PRACTITIONER

## 2024-05-20 PROCEDURE — 50435 EXCHANGE NEPHROSTOMY CATH: CPT

## 2024-05-20 PROCEDURE — C1769 GUIDE WIRE: HCPCS

## 2024-05-20 RX ORDER — CIPROFLOXACIN 2 MG/ML
400 INJECTION, SOLUTION INTRAVENOUS ONCE
Qty: 200 ML | Refills: 0 | Status: COMPLETED | OUTPATIENT
Start: 2024-05-20 | End: 2024-05-20

## 2024-05-20 RX ORDER — NALOXONE HYDROCHLORIDE 0.4 MG/ML
0.4 INJECTION, SOLUTION INTRAMUSCULAR; INTRAVENOUS; SUBCUTANEOUS
Status: DISCONTINUED | OUTPATIENT
Start: 2024-05-20 | End: 2024-05-21 | Stop reason: HOSPADM

## 2024-05-20 RX ORDER — ONDANSETRON 2 MG/ML
4 INJECTION INTRAMUSCULAR; INTRAVENOUS
Status: DISCONTINUED | OUTPATIENT
Start: 2024-05-20 | End: 2024-05-21 | Stop reason: HOSPADM

## 2024-05-20 RX ORDER — FLUMAZENIL 0.1 MG/ML
0.2 INJECTION, SOLUTION INTRAVENOUS
Status: DISCONTINUED | OUTPATIENT
Start: 2024-05-20 | End: 2024-05-21 | Stop reason: HOSPADM

## 2024-05-20 RX ORDER — LIDOCAINE 40 MG/G
CREAM TOPICAL
Status: DISCONTINUED | OUTPATIENT
Start: 2024-05-20 | End: 2024-05-21 | Stop reason: HOSPADM

## 2024-05-20 RX ORDER — FENTANYL CITRATE 50 UG/ML
25-50 INJECTION, SOLUTION INTRAMUSCULAR; INTRAVENOUS EVERY 5 MIN PRN
Status: DISCONTINUED | OUTPATIENT
Start: 2024-05-20 | End: 2024-05-21 | Stop reason: HOSPADM

## 2024-05-20 RX ORDER — NALOXONE HYDROCHLORIDE 0.4 MG/ML
0.2 INJECTION, SOLUTION INTRAMUSCULAR; INTRAVENOUS; SUBCUTANEOUS
Status: DISCONTINUED | OUTPATIENT
Start: 2024-05-20 | End: 2024-05-21 | Stop reason: HOSPADM

## 2024-05-20 RX ADMIN — FENTANYL CITRATE 50 MCG: 50 INJECTION, SOLUTION INTRAMUSCULAR; INTRAVENOUS at 14:08

## 2024-05-20 RX ADMIN — CIPROFLOXACIN 400 MG: 400 INJECTION, SOLUTION INTRAVENOUS at 13:28

## 2024-05-20 RX ADMIN — FENTANYL CITRATE 50 MCG: 50 INJECTION, SOLUTION INTRAMUSCULAR; INTRAVENOUS at 14:13

## 2024-05-20 NOTE — PROCEDURES
Interventional Radiology Post-Procedure Note   ?   Brief Procedure Note:   Patient name: Jacqueline Pappas  Pt MRN:[id number]   Date of procedure: 5/20/2024     Procedure(s): Image guided percutaneous nephrostomy tube exchange  Sedation method: Moderate sedation was note employed. IV fentanyl was administered for patient comfort.  Pre Procedure Diagnosis: Chronic nephrostomy drainage due to obstruction needing routine exchange  Post Procedure Diagnosis: Same  Indications: Routine exchange    ?   Specimen(s) removed: None   Additional studies ordered: None  Drains: 8 Fr locking pigtail nephrostomy catheter  Estimated Blood Loss: Minimal  Complications: None  Vascular closure method: N/A    Findings/Notes/Comments: Successful exchange of right percutaneous nephrostomy.   ?   Please see dictation in PACS or under the Imaging tab in [facility] for detailed procedure note.     Ceferino Ashley M.D.   Vascular and Interventional Radiology   Pager: [phone]   After Hours / Scheduling: [phone]     5/20/2024  2:15 PM

## 2024-05-20 NOTE — PRE-PROCEDURE
GENERAL PRE-PROCEDURE:   Procedure:  RightPNT exchange  Date/Time:  5/20/2024 1:06 PM    Written consent obtained?: Yes    Risks and benefits: Risks, benefits and alternatives were discussed    Consent given by:  Patient  Patient states understanding of procedure being performed: Yes    Patient's understanding of procedure matches consent: Yes    Procedure consent matches procedure scheduled: Yes    Expected level of sedation:  Moderate (start with just fentanyl)  Appropriately NPO:  Yes  ASA Class:  2  Mallampati  :  Grade 1- soft palate, uvula, tonsillar pillars, and posterior pharyngeal wall visible  Lungs:  Lungs clear with good breath sounds bilaterally  Heart:  Normal heart sounds and rate  History & Physical reviewed:  History and physical reviewed and no updates needed  Statement of review:  I have reviewed the lab findings, diagnostic data, medications, and the plan for sedation

## 2024-06-08 ENCOUNTER — HOSPITAL ENCOUNTER (EMERGENCY)
Facility: HOSPITAL | Age: 39
Discharge: HOME | End: 2024-06-08
Attending: STUDENT IN AN ORGANIZED HEALTH CARE EDUCATION/TRAINING PROGRAM | Admitting: STUDENT IN AN ORGANIZED HEALTH CARE EDUCATION/TRAINING PROGRAM
Payer: COMMERCIAL

## 2024-06-08 VITALS
SYSTOLIC BLOOD PRESSURE: 142 MMHG | OXYGEN SATURATION: 95 % | DIASTOLIC BLOOD PRESSURE: 97 MMHG | RESPIRATION RATE: 16 BRPM | TEMPERATURE: 98.4 F | HEART RATE: 84 BPM

## 2024-06-08 DIAGNOSIS — R19.7 DIARRHEA OF PRESUMED INFECTIOUS ORIGIN: ICD-10-CM

## 2024-06-08 PROCEDURE — 99283 EMERGENCY DEPT VISIT LOW MDM: CPT

## 2024-06-08 RX ORDER — CIPROFLOXACIN 500 MG/1
500 TABLET, FILM COATED ORAL 2 TIMES DAILY
Qty: 10 TABLET | Refills: 0 | Status: SHIPPED | OUTPATIENT
Start: 2024-06-08 | End: 2024-06-13

## 2024-06-08 ASSESSMENT — COLUMBIA-SUICIDE SEVERITY RATING SCALE - C-SSRS
6. HAVE YOU EVER DONE ANYTHING, STARTED TO DO ANYTHING, OR PREPARED TO DO ANYTHING TO END YOUR LIFE?: NO
1. IN THE PAST MONTH, HAVE YOU WISHED YOU WERE DEAD OR WISHED YOU COULD GO TO SLEEP AND NOT WAKE UP?: NO
2. HAVE YOU ACTUALLY HAD ANY THOUGHTS OF KILLING YOURSELF IN THE PAST MONTH?: NO

## 2024-06-08 NOTE — Clinical Note
Jacqueline Pappas was seen and treated in our emergency department on 6/8/2024.  She may return to work on 06/10/2024.       If you have any questions or concerns, please don't hesitate to call.      Rojelio Quach MD

## 2024-06-08 NOTE — ED TRIAGE NOTES
The patient reports x3 weeks of diarrhea after eating. She also reports x 3 days of  right flank pain. She has a nephrostomy tube. She also reports nausea but no vomiting.

## 2024-06-08 NOTE — DISCHARGE INSTRUCTIONS
Given the duration of your symptoms, it is suspected that the diarrhea cause is an infection, so we are providing an antibiotic for you.    Please return to the emergency department if your symptoms worsen.

## 2024-06-08 NOTE — ED PROVIDER NOTES
EMERGENCY DEPARTMENT ENCOUNTER      NAME: Jacqueline Pappas  AGE: 39 year old female  YOB: 1985  MRN: [id number]  EVALUATION DATE & TIME: No admission date for patient encounter.    PCP: Pao Montague    ED PROVIDER: Rojelio Quach MD      Chief Complaint   Patient presents with    Flank Pain         FINAL IMPRESSION:  1. Diarrhea of presumed infectious origin          ED COURSE & MEDICAL DECISION MAKING:    Pertinent Labs & Imaging studies reviewed. (See chart for details)  39 year old female presents to the Emergency Department for evaluation of flank pain, diarrhea.    Patient has had 1 month of diarrhea that is predominantly brought on with eating.  This occurred after she returned from traveling to [place].  No fevers, no abdominal tenderness.  No urinary symptoms.  She does have right flank pain, but this is a chronic issue with her nephrostomy in place and I have seen her previously for this.  Low suspicion for acute pathology regarding this as she is well-appearing otherwise.  Given the 1 month duration of diarrhea, suspect infectious etiology, and will provide ciprofloxacin 500 mg twice daily for 5 days, and recommended follow-up with her PCP for potential stool testing, as she is unable to provide 1 in the emergency department today.  Return precautions provided in discharge.         Medical Decision Making  Obtained supplemental history:Supplemental history obtained?: No  Reviewed external records: External records reviewed?: No  Care impacted by chronic illness:N/A  Care significantly affected by social determinants of health:N/A  Did you consider but not order tests?: Work up considered but not performed and documented in chart, if applicable  Did you interpret images independently?: Independent interpretation of ECG and images noted in documentation, when applicable.  Consultation discussion with other provider:Did you involve another provider (consultant, , pharmacy, etc.)?:  No  Discharge. I prescribed additional prescription strength medication(s) as charted. See documentation for any additional details.        At the conclusion of the encounter I discussed the results of all of the tests and the disposition. The questions were answered. The patient or family acknowledged understanding and was agreeable with the care plan.     0 minutes of critical care time     MEDICATIONS GIVEN IN THE EMERGENCY:  Medications - No data to display    NEW PRESCRIPTIONS STARTED AT TODAY'S ER VISIT  Discharge Medication List as of 6/8/2024  2:19 AM        START taking these medications    Details   ciprofloxacin (CIPRO) 500 MG tablet Take 1 tablet (500 mg) by mouth 2 times daily for 5 days, Disp-10 tablet, R-0, Local Print                =================================================================    HPI    Patient information was obtained from: patient    Use of : N/A        Jacqueline Pappas is a 39 year old female with a pertinent history of pyelnonephritis s/p chronic nephrostomy tube placement who presents to this ED for evaluation of diarrhea, right flank pain.  The patient states that her right flank pain is unchanged from usual, but she is most concerned about the continued diarrhea she has been having for 1 month.  This began after she returned from [place], and she has not tried anything for her symptoms, but it has not been improving.  Denies fevers, bloody stool, dysuria, hematuria.  No abdominal pain.      PAST MEDICAL HISTORY:  Past Medical History:   Diagnosis Date    Acute renal failure (H24)     Anemia     Anemia     Calculus of kidney     Congenital absence of left kidney     Congenital absence of one kidney     Depression     Depression     Hydronephrosis     Kidney stone     at age 29    Pyelonephritis     Pyelonephritis     Smoker     Ureteral stricture     UTI (urinary tract infection)     Wrist fracture     Left       PAST SURGICAL HISTORY:  Past Surgical History:    Procedure Laterality Date     SECTION       SECTION      x1    COMBINED CYSTOSCOPY, RETROGRADES, URETEROSCOPY, LASER HOLMIUM LITHOTRIPSY URETER(S), INSERT STENT Right 2016    Procedure: COMBINED CYSTOSCOPY, RETROGRADES, URETEROSCOPY, LASER HOLMIUM LITHOTRIPSY URETER(S), INSERT STENT;  Surgeon: Florentino Awad MD;  Location: [facility]    CYSTOSCOPY, URETEROSCOPY, COMBINED Right 2016    Procedure: COMBINED CYSTOSCOPY, URETEROSCOPY;  Surgeon: Florentino Awad MD;  Location: [facility] OR    IR NEPHROLITHOTOMY  2014    IR NEPHROSTOGRAM EXISITING ACCESS  10/18/2018    IR NEPHROSTOMY TUBE CHANGE RIGHT  2018    IR NEPHROSTOMY TUBE CHANGE RIGHT  2020    IR NEPHROSTOMY TUBE CHANGE RIGHT  2018    IR NEPHROSTOMY TUBE CHANGE RIGHT  2020    IR NEPHROSTOMY TUBE CHANGE RIGHT  11/3/2023    IR NEPHROSTOMY TUBE CHANGE RIGHT  2024    IR NEPHROSTOMY TUBE CHANGE RIGHT  2024    IR NEPHROSTOMY TUBE PLACEMENT RIGHT  2016    IR NEPHROSTOMY TUBE PLACEMENT RIGHT  2017    KIDNEY STONE SURGERY Right 2016    LASER HOLMIUM LITHOTRIPSY URETER(S), INSERT STENT, COMBINED Left 2016    Procedure: COMBINED CYSTOSCOPY, URETEROSCOPY, LASER HOLMIUM LITHOTRIPSY URETER(S), INSERT STENT;  Surgeon: Florentino Awad MD;  Location: [facility] OR    LASER HOLMIUM NEPHROLITHOTOMY VIA PERCUTANEOUS NEPHROSTOMY Right 2016    Procedure: LASER HOLMIUM NEPHROLITHOTOMY VIA PERCUTANEOUS NEPHROSTOMY;  Surgeon: Florentino Awad MD;  Location: [facility] OR    NEPHROSTOMY W/ INTRODUCTION OF CATHETER Right     OTHER SURGICAL HISTORY      Mole removednasal    OTHER SURGICAL HISTORY Right     Cystoscopy, ureteroscopy, laser11/02/2014 x2 with ureteral stent insertion    PERCUTANEOUS NEPHROSTOMY  2016    Procedure: PERCUTANEOUS NEPHROSTOMY;  Surgeon: Florentino Awad MD;  Location: [facility] OR    WISDOM TOOTH EXTRACTION      ZZC REMOVAL OF KIDNEY STONE             CURRENT MEDICATIONS:     acetaminophen (TYLENOL) 500 MG tablet  albuterol (ACCUNEB) 1.25 MG/3ML neb solution  albuterol (PROAIR HFA/PROVENTIL HFA/VENTOLIN HFA) 108 (90 Base) MCG/ACT inhaler  azelastine (ASTELIN) 0.1 % nasal spray  budesonide-formoterol (SYMBICORT) 160-4.5 MCG/ACT Inhaler  ciprofloxacin (CIPRO) 500 MG tablet  ipratropium - albuterol 0.5 mg/2.5 mg/3 mL (DUONEB) 0.5-2.5 (3) MG/3ML neb solution  sertraline (ZOLOFT) 100 MG tablet        ALLERGIES:  Allergies   Allergen Reactions    Desogestrel-Ethinyl Estradiol Angioedema and Swelling     Swelling of hands and feet per pt.      Penicillins Rash     As a child per mother; mother unsure if has tolerated another PCN since. Tolerated ampicillin 16    Sulfa Antibiotics Rash    Vancomycin Rash       FAMILY HISTORY:  Family History   Problem Relation Age of Onset    Anesthesia Reaction No family hx of     Malignant Hyperthermia No family hx of     Heart Disease Maternal Uncle         heart failure    Coronary Artery Disease Other     Heart Disease Maternal Grandmother         pacemaker    Gout Paternal Grandfather     Prostate Cancer Maternal Aunt         breast    Heart Disease Maternal Aunt         pacemaker    Heart Disease Maternal Aunt         pacemaker    Heart Disease Maternal Aunt         pacemaker       SOCIAL HISTORY:   Social History     Socioeconomic History    Marital status: Single   Tobacco Use    Smoking status: Every Day     Current packs/day: 0.00     Average packs/day: 0.5 packs/day for 10.0 years (5.0 ttl pk-yrs)     Types: Cigarettes     Start date: 2006     Last attempt to quit: 2016     Years since quittin.7    Smokeless tobacco: Former     Quit date: 2016    Tobacco comments:     Hasn't smoked in a week due to breathing issues    Vaping Use    Vaping status: Never Used   Substance and Sexual Activity    Alcohol use: Not Currently     Comment: Alcoholic Drinks/day: occ    Drug use: No    Sexual activity: Not Currently     Social  Determinants of Health      [facility]    Financial Resource Strain    [facility] [employer] [facility]    Social Connections       VITALS:  BP (!) 142/97   Pulse 84   Temp 98.4  F (36.9  C) (Oral)   Resp 16   LMP 06/03/2024 (Approximate)   SpO2 95%     PHYSICAL EXAM    Physical Exam  Vitals and nursing note reviewed.   Constitutional:       General: She is not in acute distress.     Appearance: Normal appearance. She is normal weight. She is not ill-appearing.   HENT:      Head: Normocephalic and atraumatic.      Nose: Nose normal.      Mouth/Throat:      Mouth: Mucous membranes are moist.      Pharynx: Oropharynx is clear.   Eyes:      Extraocular Movements: Extraocular movements intact.      Conjunctiva/sclera: Conjunctivae normal.      Pupils: Pupils are equal, round, and reactive to light.   Cardiovascular:      Rate and Rhythm: Normal rate and regular rhythm.      Pulses: Normal pulses.      Heart sounds: Normal heart sounds. No murmur heard.  Pulmonary:      Effort: Pulmonary effort is normal. No respiratory distress.      Breath sounds: Normal breath sounds.   Abdominal:      General: Abdomen is flat. There is no distension.      Palpations: Abdomen is soft.      Tenderness: There is no abdominal tenderness. There is no right CVA tenderness or left CVA tenderness.   Musculoskeletal:         General: Normal range of motion.      Cervical back: Normal range of motion.      Right lower leg: No edema.      Left lower leg: No edema.   Skin:     General: Skin is warm and dry.      Capillary Refill: Capillary refill takes less than 2 seconds.   Neurological:      General: No focal deficit present.      Mental Status: She is alert and oriented to person, place, and time. Mental status is at baseline.   Psychiatric:         Mood and Affect: Mood  normal.         Behavior: Behavior normal.         Thought Content: Thought content normal.         Judgment: Judgment normal.            LAB:  All pertinent labs reviewed and interpreted.       RADIOLOGY:  Reviewed all pertinent imaging. Please see official radiology report.  No orders to display       PROCEDURES:   None      [employer] Documentation:   CMS Diagnoses:               Rojelio Quach MD  [facility] EMERGENCY DEPARTMENT  [address] [place] [id number]  [phone]       Rojelio Quach MD  06/08/24 0227

## 2024-06-24 ENCOUNTER — APPOINTMENT (OUTPATIENT)
Dept: ULTRASOUND IMAGING | Facility: HOSPITAL | Age: 39
End: 2024-06-24
Attending: FAMILY MEDICINE
Payer: COMMERCIAL

## 2024-06-24 ENCOUNTER — HOSPITAL ENCOUNTER (EMERGENCY)
Facility: HOSPITAL | Age: 39
Discharge: HOME | End: 2024-06-25
Attending: FAMILY MEDICINE | Admitting: FAMILY MEDICINE
Payer: COMMERCIAL

## 2024-06-24 DIAGNOSIS — R10.9 RIGHT FLANK PAIN, CHRONIC: ICD-10-CM

## 2024-06-24 DIAGNOSIS — Q60.0 CONGENITAL ABSENCE OF ONE KIDNEY: ICD-10-CM

## 2024-06-24 DIAGNOSIS — G89.29 RIGHT FLANK PAIN, CHRONIC: ICD-10-CM

## 2024-06-24 DIAGNOSIS — Z93.6 NEPHROSTOMY STATUS (H): ICD-10-CM

## 2024-06-24 LAB
ALBUMIN UR-MCNC: 200 MG/DL
ANION GAP SERPL CALCULATED.3IONS-SCNC: 11 MMOL/L (ref 7–15)
APPEARANCE UR: ABNORMAL
BACTERIA #/AREA URNS HPF: ABNORMAL /HPF
BASOPHILS # BLD AUTO: 0.1 10E3/UL (ref 0–0.2)
BASOPHILS NFR BLD AUTO: 1 %
BILIRUB UR QL STRIP: NEGATIVE
BUN SERPL-MCNC: 11.4 MG/DL (ref 6–20)
CALCIUM SERPL-MCNC: 9.7 MG/DL (ref 8.6–10)
CHLORIDE SERPL-SCNC: 104 MMOL/L (ref 98–107)
COLOR UR AUTO: ABNORMAL
CREAT SERPL-MCNC: 1.01 MG/DL (ref 0.51–0.95)
CRP SERPL-MCNC: 5 MG/L
DEPRECATED HCO3 PLAS-SCNC: 21 MMOL/L (ref 22–29)
EGFRCR SERPLBLD CKD-EPI 2021: 73 ML/MIN/1.73M2
EOSINOPHIL # BLD AUTO: 0.2 10E3/UL (ref 0–0.7)
EOSINOPHIL NFR BLD AUTO: 3 %
ERYTHROCYTE [DISTWIDTH] IN BLOOD BY AUTOMATED COUNT: 13.5 % (ref 10–15)
GLUCOSE SERPL-MCNC: 114 MG/DL (ref 70–99)
GLUCOSE UR STRIP-MCNC: NEGATIVE MG/DL
HCT VFR BLD AUTO: 38.8 % (ref 35–47)
HGB BLD-MCNC: 13.3 G/DL (ref 11.7–15.7)
HGB UR QL STRIP: ABNORMAL
IMM GRANULOCYTES # BLD: 0.1 10E3/UL
IMM GRANULOCYTES NFR BLD: 1 %
KETONES UR STRIP-MCNC: NEGATIVE MG/DL
LEUKOCYTE ESTERASE UR QL STRIP: ABNORMAL
LYMPHOCYTES # BLD AUTO: 1.8 10E3/UL (ref 0.8–5.3)
LYMPHOCYTES NFR BLD AUTO: 21 %
MCH RBC QN AUTO: 30.9 PG (ref 26.5–33)
MCHC RBC AUTO-ENTMCNC: 34.3 G/DL (ref 31.5–36.5)
MCV RBC AUTO: 90 FL (ref 78–100)
MONOCYTES # BLD AUTO: 0.5 10E3/UL (ref 0–1.3)
MONOCYTES NFR BLD AUTO: 6 %
MUCOUS THREADS #/AREA URNS LPF: PRESENT /LPF
NEUTROPHILS # BLD AUTO: 5.9 10E3/UL (ref 1.6–8.3)
NEUTROPHILS NFR BLD AUTO: 69 %
NITRATE UR QL: POSITIVE
NRBC # BLD AUTO: 0 10E3/UL
NRBC BLD AUTO-RTO: 0 /100
PH UR STRIP: 7.5 [PH] (ref 5–7)
PLATELET # BLD AUTO: 169 10E3/UL (ref 150–450)
POTASSIUM SERPL-SCNC: 3.9 MMOL/L (ref 3.4–5.3)
RBC # BLD AUTO: 4.3 10E6/UL (ref 3.8–5.2)
RBC URINE: 25 /HPF
SODIUM SERPL-SCNC: 136 MMOL/L (ref 135–145)
SP GR UR STRIP: 1.01 (ref 1–1.03)
SQUAMOUS EPITHELIAL: 1 /HPF
UROBILINOGEN UR STRIP-MCNC: <2 MG/DL
WBC # BLD AUTO: 8.5 10E3/UL (ref 4–11)
WBC URINE: 12 /HPF

## 2024-06-24 PROCEDURE — 80048 BASIC METABOLIC PNL TOTAL CA: CPT | Performed by: EMERGENCY MEDICINE

## 2024-06-24 PROCEDURE — 81001 URINALYSIS AUTO W/SCOPE: CPT | Performed by: EMERGENCY MEDICINE

## 2024-06-24 PROCEDURE — 96361 HYDRATE IV INFUSION ADD-ON: CPT

## 2024-06-24 PROCEDURE — 250N000011 HC RX IP 250 OP 636: Mod: JZ | Performed by: FAMILY MEDICINE

## 2024-06-24 PROCEDURE — 87086 URINE CULTURE/COLONY COUNT: CPT | Performed by: EMERGENCY MEDICINE

## 2024-06-24 PROCEDURE — 86140 C-REACTIVE PROTEIN: CPT | Performed by: EMERGENCY MEDICINE

## 2024-06-24 PROCEDURE — 36415 COLL VENOUS BLD VENIPUNCTURE: CPT | Performed by: EMERGENCY MEDICINE

## 2024-06-24 PROCEDURE — 99285 EMERGENCY DEPT VISIT HI MDM: CPT | Mod: 25

## 2024-06-24 PROCEDURE — 85025 COMPLETE CBC W/AUTO DIFF WBC: CPT | Performed by: EMERGENCY MEDICINE

## 2024-06-24 PROCEDURE — 76775 US EXAM ABDO BACK WALL LIM: CPT

## 2024-06-24 PROCEDURE — 96375 TX/PRO/DX INJ NEW DRUG ADDON: CPT

## 2024-06-24 PROCEDURE — 250N000013 HC RX MED GY IP 250 OP 250 PS 637: Performed by: FAMILY MEDICINE

## 2024-06-24 PROCEDURE — 258N000003 HC RX IP 258 OP 636: Mod: JZ | Performed by: FAMILY MEDICINE

## 2024-06-24 PROCEDURE — 96374 THER/PROPH/DIAG INJ IV PUSH: CPT

## 2024-06-24 RX ORDER — ONDANSETRON 2 MG/ML
4 INJECTION INTRAMUSCULAR; INTRAVENOUS ONCE
Status: COMPLETED | OUTPATIENT
Start: 2024-06-24 | End: 2024-06-24

## 2024-06-24 RX ORDER — HYDROCODONE BITARTRATE AND ACETAMINOPHEN 5; 325 MG/1; MG/1
1 TABLET ORAL ONCE
Status: COMPLETED | OUTPATIENT
Start: 2024-06-24 | End: 2024-06-24

## 2024-06-24 RX ORDER — KETOROLAC TROMETHAMINE 15 MG/ML
15 INJECTION, SOLUTION INTRAMUSCULAR; INTRAVENOUS ONCE
Status: COMPLETED | OUTPATIENT
Start: 2024-06-24 | End: 2024-06-24

## 2024-06-24 RX ADMIN — KETOROLAC TROMETHAMINE 15 MG: 15 INJECTION, SOLUTION INTRAMUSCULAR; INTRAVENOUS at 21:37

## 2024-06-24 RX ADMIN — HYDROCODONE BITARTRATE AND ACETAMINOPHEN 1 TABLET: 5; 325 TABLET ORAL at 22:28

## 2024-06-24 RX ADMIN — SODIUM CHLORIDE 500 ML: 9 INJECTION, SOLUTION INTRAVENOUS at 21:36

## 2024-06-24 RX ADMIN — ONDANSETRON 4 MG: 2 INJECTION INTRAMUSCULAR; INTRAVENOUS at 21:37

## 2024-06-24 ASSESSMENT — ENCOUNTER SYMPTOMS
DIARRHEA: 1
VOMITING: 0
FLANK PAIN: 1

## 2024-06-24 ASSESSMENT — ACTIVITIES OF DAILY LIVING (ADL)
ADLS_ACUITY_SCORE: 37

## 2024-06-25 VITALS
RESPIRATION RATE: 18 BRPM | BODY MASS INDEX: 35.85 KG/M2 | HEIGHT: 60 IN | DIASTOLIC BLOOD PRESSURE: 70 MMHG | SYSTOLIC BLOOD PRESSURE: 121 MMHG | HEART RATE: 66 BPM | OXYGEN SATURATION: 98 % | TEMPERATURE: 98.4 F | WEIGHT: 182.6 LBS

## 2024-06-25 NOTE — ED PROVIDER NOTES
EMERGENCY DEPARTMENT ENCOUNTER      NAME: Jacqueline Pappas  AGE: 39 year old female  YOB: 1985  MRN: [id number]  EVALUATION DATE & TIME: 6/24/2024  8:44 PM    PCP: Pao Montague    ED PROVIDER: Ramana Olson M.D.    Chief Complaint   Patient presents with    Flank Pain       FINAL IMPRESSION:  1. Congenital absence of one kidney    2. Right flank pain, chronic    3. Nephrostomy status (H)      ED COURSE & MEDICAL DECISION MAKING:    Pertinent Labs & Imaging studies independently interpreted by me. (See chart for details)  Reviewed patient care plan for limited narcotics, no discharge prescriptions for narcotics, and limit antibiotics unless patient has systemic signs of infection.  Reviewed most recent emergency department visit from June 8 when patient was seen with diarrhea.    ED Course as of 06/25/24 0220 Mon Jun 24, 2024 2112 Labs independently interpreted by me demonstrate normal CBC, urinalysis nitrite positive with many bacteria, however only 12 white blood cells.  This is consistent with several recent urinalysis which were culture negative, including several with that were nitrite positive but subsequently culture negative.   2116 I met with the patient to gather history and perform my exam. ED course and treatment discussed.    2123 Seen and examined, presents with flank pain for the last couple of days.  Says she has had some diarrhea although this has been an intermittent problem.  Says there was increased sediment in her urostomy bag, today the urine is clear with a trace amount of sediment.  No fever, tachycardia, or other signs of infection.  Creatinine is stable for the patient.  CRP minimally elevated.  Renal ultrasound is pending.  If this is negative, plan to discharge with outpatient follow-up.  Urine culture is in process.   2130 Patient requesting pain medication, reviewed care plan and Zofran and Toradol ordered.  Would recommend judicious use of Toradol for this  patient given her unilateral kidney, however kidney function today is good and patient will be given IV fluids.   2302 Reviewed renal ultrasound which demonstrates multiple cysts and calculi, no hydronephrosis.   2328 Reviewed radiology interpretation of CT scan which does not demonstrate any obstruction of the right kidney.  Patient is stable for discharge with outpatient follow-up.   2344 I updated the patient with lab and imaging results and discussed the plan for discharge.        At the conclusion of the encounter I discussed the results of all of the tests and the disposition. The questions were answered. The patient or family acknowledged understanding and was agreeable with the care plan.     Medical Decision Making  Obtained supplemental history:Supplemental history obtained?: No  Reviewed external records: External records reviewed?: Documented in chart  Care impacted by chronic illness:Other: unilateral kidney, nephrostomy  Care significantly affected by social determinants of health:Access to Affordable Health Care  Did you consider but not order tests?: Work up considered but not performed and documented in chart, if applicable  Did you interpret images independently?: Independent interpretation of ECG and images noted in documentation, when applicable.  Consultation discussion with other provider:Did you involve another provider (consultant, , pharmacy, etc.)?: No  Discharge. I prescribed additional prescription strength medication(s) as charted. See documentation for any additional details.      MEDICATIONS GIVEN IN THE EMERGENCY:  Medications   ondansetron (ZOFRAN) injection 4 mg (4 mg Intravenous $Given 6/24/24 2137)   ketorolac (TORADOL) injection 15 mg (15 mg Intravenous $Given 6/24/24 2137)   sodium chloride 0.9% BOLUS 500 mL (0 mLs Intravenous Stopped 6/24/24 2229)   HYDROcodone-acetaminophen (NORCO) 5-325 MG per tablet 1 tablet (1 tablet Oral $Given 6/24/24 2228)       NEW PRESCRIPTIONS  "STARTED AT TODAY'S ER VISIT  Discharge Medication List as of 2024 12:03 AM          =================================================================    HPI    Patient information was obtained from: Patient       Jacqueline Pappas is a 39 year old female with a pertinent history of congenital solitary right kidney, right nephrostomy tube, kidney stones, pyelonephritis, recurrent UTI, tobacco abuse who presents to this ED via walk-in for evaluation of flank pain    Per chart review: The patient presented to this ED on 2024 for flank pain and diarrhea.  Patient prescribed ciprofloxacin for diarrhea of infectious origin and recommended outpatient follow-up for stool testing as she was unable to provide one in the emergency department.    The patient reports a \"couple days\" ago she developed right flank pain.  Patient reports she has a right nephrostomy tube and it is still draining, but noticed \"a bunch of pus\" in the bag last night. Patient also reports vomiting \"a couple days ago\", but denies any episodes since.  Patient reports after discharge (see chart review) her diarrhea improved, but then it returned.    REVIEW OF SYSTEMS   Review of Systems   Gastrointestinal:  Positive for diarrhea. Negative for vomiting (resolved).   Genitourinary:  Positive for flank pain (right).      All other systems reviewed and negative    PAST MEDICAL HISTORY:  Past Medical History:   Diagnosis Date    Acute renal failure (H24)     Anemia     Anemia     Calculus of kidney     Congenital absence of left kidney     Congenital absence of one kidney     Depression     Depression     Hydronephrosis     Kidney stone     at age 29    Pyelonephritis     Pyelonephritis     Smoker     Ureteral stricture     UTI (urinary tract infection)     Wrist fracture     Left       PAST SURGICAL HISTORY:  Past Surgical History:   Procedure Laterality Date     SECTION       SECTION      x1    COMBINED CYSTOSCOPY, RETROGRADES, " URETEROSCOPY, LASER HOLMIUM LITHOTRIPSY URETER(S), INSERT STENT Right 8/30/2016    Procedure: COMBINED CYSTOSCOPY, RETROGRADES, URETEROSCOPY, LASER HOLMIUM LITHOTRIPSY URETER(S), INSERT STENT;  Surgeon: Florentino Awad MD;  Location: [facility] OR    CYSTOSCOPY, URETEROSCOPY, COMBINED Right 9/14/2016    Procedure: COMBINED CYSTOSCOPY, URETEROSCOPY;  Surgeon: Florentino Awad MD;  Location: [facility] OR    IR NEPHROLITHOTOMY  12/11/2014    IR NEPHROSTOGRAM EXISITING ACCESS  10/18/2018    IR NEPHROSTOMY TUBE CHANGE RIGHT  12/12/2018    IR NEPHROSTOMY TUBE CHANGE RIGHT  6/11/2020    IR NEPHROSTOMY TUBE CHANGE RIGHT  12/12/2018    IR NEPHROSTOMY TUBE CHANGE RIGHT  6/11/2020    IR NEPHROSTOMY TUBE CHANGE RIGHT  11/3/2023    IR NEPHROSTOMY TUBE CHANGE RIGHT  2/5/2024    IR NEPHROSTOMY TUBE CHANGE RIGHT  5/20/2024    IR NEPHROSTOMY TUBE PLACEMENT RIGHT  7/24/2016    IR NEPHROSTOMY TUBE PLACEMENT RIGHT  9/14/2017    KIDNEY STONE SURGERY Right 05/2016    LASER HOLMIUM LITHOTRIPSY URETER(S), INSERT STENT, COMBINED Left 11/1/2016    Procedure: COMBINED CYSTOSCOPY, URETEROSCOPY, LASER HOLMIUM LITHOTRIPSY URETER(S), INSERT STENT;  Surgeon: Florentino Awad MD;  Location: [facility] OR    LASER HOLMIUM NEPHROLITHOTOMY VIA PERCUTANEOUS NEPHROSTOMY Right 9/14/2016    Procedure: LASER HOLMIUM NEPHROLITHOTOMY VIA PERCUTANEOUS NEPHROSTOMY;  Surgeon: Florentino Awad MD;  Location: [facility] OR    NEPHROSTOMY W/ INTRODUCTION OF CATHETER Right     OTHER SURGICAL HISTORY      Mole removednasal    OTHER SURGICAL HISTORY Right     Cystoscopy, ureteroscopy, laser11/02/2014 x2 with ureteral stent insertion    PERCUTANEOUS NEPHROSTOMY  11/1/2016    Procedure: PERCUTANEOUS NEPHROSTOMY;  Surgeon: Florentino Awad MD;  Location: [facility] OR    WISDOM TOOTH EXTRACTION      ZZC REMOVAL OF KIDNEY STONE         CURRENT MEDICATIONS:    No current facility-administered medications for this encounter.     Current Outpatient Medications   Medication Sig Dispense  Refill    acetaminophen (TYLENOL) 500 MG tablet Take 500-1,000 mg by mouth every 6 hours as needed for mild pain      albuterol (ACCUNEB) 1.25 MG/3ML neb solution Take 1.25 mg by nebulization every 6 hours as needed for shortness of breath / dyspnea or wheezing      albuterol (PROAIR HFA/PROVENTIL HFA/VENTOLIN HFA) 108 (90 Base) MCG/ACT inhaler Inhale 2 puffs into the lungs every 6 hours 8 g 0    azelastine (ASTELIN) 0.1 % nasal spray Spray 1 spray into both nostrils 2 times daily 30 mL 11    budesonide-formoterol (SYMBICORT) 160-4.5 MCG/ACT Inhaler Inhale 2 puffs into the lungs 2 times daily 6 g 11    ipratropium - albuterol 0.5 mg/2.5 mg/3 mL (DUONEB) 0.5-2.5 (3) MG/3ML neb solution Take 1 vial (3 mLs) by nebulization every 6 hours as needed for shortness of breath, wheezing or cough 180 mL 4    sertraline (ZOLOFT) 100 MG tablet Take 100 mg by mouth At Bedtime         ALLERGIES:  Allergies   Allergen Reactions    Desogestrel-Ethinyl Estradiol Angioedema and Swelling     Swelling of hands and feet per pt.      Penicillins Rash     As a child per mother; mother unsure if has tolerated another PCN since. Tolerated ampicillin 9/20/16    Sulfa Antibiotics Rash    Vancomycin Rash       FAMILY HISTORY:  Family History   Problem Relation Age of Onset    Anesthesia Reaction No family hx of     Malignant Hyperthermia No family hx of     Heart Disease Maternal Uncle         heart failure    Coronary Artery Disease Other     Heart Disease Maternal Grandmother         pacemaker    Gout Paternal Grandfather     Prostate Cancer Maternal Aunt         breast    Heart Disease Maternal Aunt         pacemaker    Heart Disease Maternal Aunt         pacemaker    Heart Disease Maternal Aunt         pacemaker       SOCIAL HISTORY:   Social History     Socioeconomic History    Marital status: Single   Tobacco Use    Smoking status: Every Day     Current packs/day: 0.00     Average packs/day: 0.5 packs/day for 10.0 years (5.0 ttl pk-yrs)      Types: Cigarettes     Start date: 2006     Last attempt to quit: 2016     Years since quittin.7    Smokeless tobacco: Former     Quit date: 2016    Tobacco comments:     Hasn't smoked in a week due to breathing issues    Vaping Use    Vaping status: Never Used   Substance and Sexual Activity    Alcohol use: Not Currently     Comment: Alcoholic Drinks/day: occ    Drug use: No    Sexual activity: Not Currently     Social Determinants of Health      [facility] [place] [facility]    Financial Resource Strain    Received from [facility]    Social Connections       VITALS:  /70   Pulse 66   Temp 98.4  F (36.9  C) (Oral)   Resp 18   Ht 1.524 m (5')   Wt 82.8 kg (182 lb 9.6 oz)   LMP 2024 (Approximate)   SpO2 98%   BMI 35.66 kg/m      PHYSICAL EXAM:  Physical Exam  Vitals and nursing note reviewed.   Constitutional:       Appearance: Normal appearance.   HENT:      Head: Normocephalic and atraumatic.      Right Ear: External ear normal.      Left Ear: External ear normal.      Nose: Nose normal.      Mouth/Throat:      Mouth: Mucous membranes are moist.   Eyes:      Extraocular Movements: Extraocular movements intact.      Conjunctiva/sclera: Conjunctivae normal.      Pupils: Pupils are equal, round, and reactive to light.   Cardiovascular:      Rate and Rhythm: Normal rate and regular rhythm.   Pulmonary:      Effort: Pulmonary effort is normal.      Breath sounds: Normal breath sounds. No wheezing or rales.   Abdominal:      General: Abdomen is flat. There is no distension.      Palpations: Abdomen is soft.      Tenderness: There is no abdominal tenderness. There is no guarding.   Genitourinary:     Comments: Clear urine in nephrostomy bag  Musculoskeletal:         General: Normal range of motion.      Cervical back: Normal  range of motion and neck supple.      Right lower leg: No edema.      Left lower leg: No edema.   Lymphadenopathy:      Cervical: No cervical adenopathy.   Skin:     General: Skin is warm and dry.   Neurological:      General: No focal deficit present.      Mental Status: She is alert and oriented to person, place, and time. Mental status is at baseline.      Comments: No gross focal neurologic deficits   Psychiatric:         Mood and Affect: Mood normal.         Behavior: Behavior normal.         Thought Content: Thought content normal.          LAB:  All pertinent labs reviewed and interpreted.  Results for orders placed or performed during the hospital encounter of 06/24/24   US Kidney Right    Impression    IMPRESSION:    1. No evidence for obstruction of the right urinary tract.      2. There are cysts, nonobstructive stones, and the percutaneous nephrostomy tube in the right kidney.   Basic metabolic panel   Result Value Ref Range    Sodium 136 135 - 145 mmol/L    Potassium 3.9 3.4 - 5.3 mmol/L    Chloride 104 98 - 107 mmol/L    Carbon Dioxide (CO2) 21 (L) 22 - 29 mmol/L    Anion Gap 11 7 - 15 mmol/L    Urea Nitrogen 11.4 6.0 - 20.0 mg/dL    Creatinine 1.01 (H) 0.51 - 0.95 mg/dL    GFR Estimate 73 >60 mL/min/1.73m2    Calcium 9.7 8.6 - 10.0 mg/dL    Glucose 114 (H) 70 - 99 mg/dL   UA with Microscopic reflex to Culture    Specimen: Urine, Clean Catch   Result Value Ref Range    Color Urine Light Yellow Colorless, Straw, Light Yellow, Yellow    Appearance Urine Turbid (A) Clear    Glucose Urine Negative Negative mg/dL    Bilirubin Urine Negative Negative    Ketones Urine Negative Negative mg/dL    Specific Gravity Urine 1.014 1.001 - 1.030    Blood Urine 0.1 mg/dL (A) Negative    pH Urine 7.5 (H) 5.0 - 7.0    Protein Albumin Urine 200 (A) Negative mg/dL    Urobilinogen Urine <2.0 <2.0 mg/dL    Nitrite Urine Positive (A) Negative    Leukocyte Esterase Urine 500 Duglas/uL (A) Negative    Bacteria Urine Many (A) None  Seen /HPF    Mucus Urine Present (A) None Seen /LPF    RBC Urine 25 (H) <=2 /HPF    WBC Urine 12 (H) <=5 /HPF    Squamous Epithelials Urine 1 <=1 /HPF   Result Value Ref Range    CRP Inflammation 5.00 (H) <5.00 mg/L   CBC with platelets and differential   Result Value Ref Range    WBC Count 8.5 4.0 - 11.0 10e3/uL    RBC Count 4.30 3.80 - 5.20 10e6/uL    Hemoglobin 13.3 11.7 - 15.7 g/dL    Hematocrit 38.8 35.0 - 47.0 %    MCV 90 78 - 100 fL    MCH 30.9 26.5 - 33.0 pg    MCHC 34.3 31.5 - 36.5 g/dL    RDW 13.5 10.0 - 15.0 %    Platelet Count 169 150 - 450 10e3/uL    % Neutrophils 69 %    % Lymphocytes 21 %    % Monocytes 6 %    % Eosinophils 3 %    % Basophils 1 %    % Immature Granulocytes 1 %    NRBCs per 100 WBC 0 <1 /100    Absolute Neutrophils 5.9 1.6 - 8.3 10e3/uL    Absolute Lymphocytes 1.8 0.8 - 5.3 10e3/uL    Absolute Monocytes 0.5 0.0 - 1.3 10e3/uL    Absolute Eosinophils 0.2 0.0 - 0.7 10e3/uL    Absolute Basophils 0.1 0.0 - 0.2 10e3/uL    Absolute Immature Granulocytes 0.1 <=0.4 10e3/uL    Absolute NRBCs 0.0 10e3/uL       RADIOLOGY:  Reviewed all pertinent imaging. Please see official radiology report.  US Kidney Right   Final Result   IMPRESSION:      1. No evidence for obstruction of the right urinary tract.         2. There are cysts, nonobstructive stones, and the percutaneous nephrostomy tube in the right kidney.          I, Deborah Key, am serving as a scribe to document services personally performed by Dr. Olson based on my observation and the provider's statements to me. I, Ramana Olson MD attest that Deborah Key is acting in a scribe capacity, has observed my performance of the services and has documented them in accordance with my direction.    Ramana Olson M.D.  Emergency Medicine  [facility] EMERGENCY DEPARTMENT  [address] [place] [address]  [phone]  Dept: [phone]       Ramana Olson,  MD  06/25/24 0221

## 2024-06-25 NOTE — ED TRIAGE NOTES
Right flank pain for last couple of days. Only has one kidney and has nephrostomy tube on that side. Denies fever or chills. Took tylenol without relief. States cloudiness from nephro tube     Triage Assessment (Adult)       Row Name 06/24/24 2014          Triage Assessment    Airway WDL WDL

## 2024-06-26 LAB — BACTERIA UR CULT: NORMAL

## 2024-06-26 NOTE — RESULT ENCOUNTER NOTE
Final urine culture report is negative.  Adult: Negative urine culture parameters per protocol: Any # urogenital christie, single or mixed   [facility] Emergency Dept discharge antibiotic prescribed (If applicable): None  Treatment recommendations per [facility] ED Lab Result Urine Culture protocol: No change in plan of care.

## 2024-07-01 ENCOUNTER — HOSPITAL ENCOUNTER (EMERGENCY)
Facility: HOSPITAL | Age: 39
Discharge: HOME | End: 2024-07-01
Payer: COMMERCIAL

## 2024-07-01 VITALS
SYSTOLIC BLOOD PRESSURE: 106 MMHG | OXYGEN SATURATION: 97 % | HEART RATE: 73 BPM | DIASTOLIC BLOOD PRESSURE: 62 MMHG | HEIGHT: 60 IN | BODY MASS INDEX: 35.34 KG/M2 | RESPIRATION RATE: 16 BRPM | WEIGHT: 180 LBS | TEMPERATURE: 98.4 F

## 2024-07-01 DIAGNOSIS — R11.2 NAUSEA, VOMITING AND DIARRHEA: ICD-10-CM

## 2024-07-01 DIAGNOSIS — R19.7 NAUSEA, VOMITING AND DIARRHEA: ICD-10-CM

## 2024-07-01 LAB
ALBUMIN SERPL BCG-MCNC: 4 G/DL (ref 3.5–5.2)
ALP SERPL-CCNC: 92 U/L (ref 40–150)
ALT SERPL W P-5'-P-CCNC: 14 U/L (ref 0–50)
ANION GAP SERPL CALCULATED.3IONS-SCNC: 9 MMOL/L (ref 7–15)
AST SERPL W P-5'-P-CCNC: 14 U/L (ref 0–45)
BASOPHILS # BLD AUTO: 0 10E3/UL (ref 0–0.2)
BASOPHILS NFR BLD AUTO: 0 %
BILIRUB DIRECT SERPL-MCNC: <0.2 MG/DL (ref 0–0.3)
BILIRUB SERPL-MCNC: 0.6 MG/DL
BUN SERPL-MCNC: 11 MG/DL (ref 6–20)
CALCIUM SERPL-MCNC: 9.9 MG/DL (ref 8.6–10)
CHLORIDE SERPL-SCNC: 106 MMOL/L (ref 98–107)
CREAT SERPL-MCNC: 1 MG/DL (ref 0.51–0.95)
DEPRECATED HCO3 PLAS-SCNC: 22 MMOL/L (ref 22–29)
EGFRCR SERPLBLD CKD-EPI 2021: 74 ML/MIN/1.73M2
EOSINOPHIL # BLD AUTO: 0.1 10E3/UL (ref 0–0.7)
EOSINOPHIL NFR BLD AUTO: 2 %
ERYTHROCYTE [DISTWIDTH] IN BLOOD BY AUTOMATED COUNT: 13.6 % (ref 10–15)
FLUAV RNA SPEC QL NAA+PROBE: NEGATIVE
FLUBV RNA RESP QL NAA+PROBE: NEGATIVE
GLUCOSE BLDC GLUCOMTR-MCNC: 126 MG/DL (ref 70–99)
GLUCOSE SERPL-MCNC: 118 MG/DL (ref 70–99)
HCT VFR BLD AUTO: 44.1 % (ref 35–47)
HGB BLD-MCNC: 14.7 G/DL (ref 11.7–15.7)
IMM GRANULOCYTES # BLD: 0 10E3/UL
IMM GRANULOCYTES NFR BLD: 0 %
LIPASE SERPL-CCNC: 18 U/L (ref 13–60)
LYMPHOCYTES # BLD AUTO: 0.3 10E3/UL (ref 0.8–5.3)
LYMPHOCYTES NFR BLD AUTO: 3 %
MCH RBC QN AUTO: 31 PG (ref 26.5–33)
MCHC RBC AUTO-ENTMCNC: 33.3 G/DL (ref 31.5–36.5)
MCV RBC AUTO: 93 FL (ref 78–100)
MONOCYTES # BLD AUTO: 0.2 10E3/UL (ref 0–1.3)
MONOCYTES NFR BLD AUTO: 3 %
NEUTROPHILS # BLD AUTO: 7.5 10E3/UL (ref 1.6–8.3)
NEUTROPHILS NFR BLD AUTO: 92 %
NRBC # BLD AUTO: 0 10E3/UL
NRBC BLD AUTO-RTO: 0 /100
PLATELET # BLD AUTO: 146 10E3/UL (ref 150–450)
POTASSIUM SERPL-SCNC: 4.2 MMOL/L (ref 3.4–5.3)
PROT SERPL-MCNC: 6.7 G/DL (ref 6.4–8.3)
RBC # BLD AUTO: 4.74 10E6/UL (ref 3.8–5.2)
RSV RNA SPEC NAA+PROBE: NEGATIVE
SARS-COV-2 RNA RESP QL NAA+PROBE: NEGATIVE
SODIUM SERPL-SCNC: 137 MMOL/L (ref 135–145)
WBC # BLD AUTO: 8.1 10E3/UL (ref 4–11)

## 2024-07-01 PROCEDURE — 96374 THER/PROPH/DIAG INJ IV PUSH: CPT

## 2024-07-01 PROCEDURE — 96375 TX/PRO/DX INJ NEW DRUG ADDON: CPT

## 2024-07-01 PROCEDURE — 85025 COMPLETE CBC W/AUTO DIFF WBC: CPT

## 2024-07-01 PROCEDURE — 99284 EMERGENCY DEPT VISIT MOD MDM: CPT | Mod: 25

## 2024-07-01 PROCEDURE — 87637 SARSCOV2&INF A&B&RSV AMP PRB: CPT | Performed by: STUDENT IN AN ORGANIZED HEALTH CARE EDUCATION/TRAINING PROGRAM

## 2024-07-01 PROCEDURE — 82962 GLUCOSE BLOOD TEST: CPT

## 2024-07-01 PROCEDURE — 82248 BILIRUBIN DIRECT: CPT

## 2024-07-01 PROCEDURE — 36415 COLL VENOUS BLD VENIPUNCTURE: CPT

## 2024-07-01 PROCEDURE — 83690 ASSAY OF LIPASE: CPT

## 2024-07-01 PROCEDURE — 80053 COMPREHEN METABOLIC PANEL: CPT

## 2024-07-01 PROCEDURE — 96361 HYDRATE IV INFUSION ADD-ON: CPT

## 2024-07-01 PROCEDURE — 250N000011 HC RX IP 250 OP 636

## 2024-07-01 PROCEDURE — 250N000011 HC RX IP 250 OP 636: Performed by: STUDENT IN AN ORGANIZED HEALTH CARE EDUCATION/TRAINING PROGRAM

## 2024-07-01 PROCEDURE — 258N000003 HC RX IP 258 OP 636

## 2024-07-01 RX ORDER — ONDANSETRON 2 MG/ML
4 INJECTION INTRAMUSCULAR; INTRAVENOUS EVERY 30 MIN PRN
Status: DISCONTINUED | OUTPATIENT
Start: 2024-07-01 | End: 2024-07-01 | Stop reason: HOSPADM

## 2024-07-01 RX ORDER — ONDANSETRON 4 MG/1
4 TABLET, ORALLY DISINTEGRATING ORAL ONCE
Status: COMPLETED | OUTPATIENT
Start: 2024-07-01 | End: 2024-07-01

## 2024-07-01 RX ORDER — ONDANSETRON 4 MG/1
4 TABLET, ORALLY DISINTEGRATING ORAL EVERY 8 HOURS PRN
Qty: 10 TABLET | Refills: 0 | Status: SHIPPED | OUTPATIENT
Start: 2024-07-01 | End: 2024-07-04

## 2024-07-01 RX ORDER — DIPHENHYDRAMINE HYDROCHLORIDE 50 MG/ML
25 INJECTION INTRAMUSCULAR; INTRAVENOUS ONCE
Status: COMPLETED | OUTPATIENT
Start: 2024-07-01 | End: 2024-07-01

## 2024-07-01 RX ORDER — METOCLOPRAMIDE HYDROCHLORIDE 5 MG/ML
10 INJECTION INTRAMUSCULAR; INTRAVENOUS ONCE
Status: COMPLETED | OUTPATIENT
Start: 2024-07-01 | End: 2024-07-01

## 2024-07-01 RX ORDER — KETOROLAC TROMETHAMINE 15 MG/ML
10 INJECTION, SOLUTION INTRAMUSCULAR; INTRAVENOUS ONCE
Status: COMPLETED | OUTPATIENT
Start: 2024-07-01 | End: 2024-07-01

## 2024-07-01 RX ADMIN — KETOROLAC TROMETHAMINE 10 MG: 15 INJECTION, SOLUTION INTRAMUSCULAR; INTRAVENOUS at 13:11

## 2024-07-01 RX ADMIN — DIPHENHYDRAMINE HYDROCHLORIDE 25 MG: 50 INJECTION, SOLUTION INTRAMUSCULAR; INTRAVENOUS at 14:36

## 2024-07-01 RX ADMIN — METOCLOPRAMIDE 10 MG: 5 INJECTION, SOLUTION INTRAMUSCULAR; INTRAVENOUS at 14:39

## 2024-07-01 RX ADMIN — ONDANSETRON 4 MG: 4 TABLET, ORALLY DISINTEGRATING ORAL at 11:38

## 2024-07-01 RX ADMIN — SODIUM CHLORIDE 1000 ML: 9 INJECTION, SOLUTION INTRAVENOUS at 12:45

## 2024-07-01 RX ADMIN — ONDANSETRON 4 MG: 2 INJECTION INTRAMUSCULAR; INTRAVENOUS at 12:48

## 2024-07-01 ASSESSMENT — COLUMBIA-SUICIDE SEVERITY RATING SCALE - C-SSRS
1. IN THE PAST MONTH, HAVE YOU WISHED YOU WERE DEAD OR WISHED YOU COULD GO TO SLEEP AND NOT WAKE UP?: NO
2. HAVE YOU ACTUALLY HAD ANY THOUGHTS OF KILLING YOURSELF IN THE PAST MONTH?: NO
6. HAVE YOU EVER DONE ANYTHING, STARTED TO DO ANYTHING, OR PREPARED TO DO ANYTHING TO END YOUR LIFE?: NO

## 2024-07-01 ASSESSMENT — ACTIVITIES OF DAILY LIVING (ADL)
ADLS_ACUITY_SCORE: 37

## 2024-07-01 NOTE — ED TRIAGE NOTES
"[facility] and residents have been getting a \"stomach bug\" recently. Pt started feeling bad last pm and today started having n/v/d. Pt is aches all over and has a cough     Triage Assessment (Adult)       Row Name 07/01/24 1124          Triage Assessment    Airway WDL WDL        Respiratory WDL    Respiratory WDL WDL        Skin Circulation/Temperature WDL    Skin Circulation/Temperature WDL WDL        Cardiac WDL    Cardiac WDL WDL        Peripheral/Neurovascular WDL    Peripheral Neurovascular WDL WDL        Cognitive/Neuro/Behavioral WDL    Cognitive/Neuro/Behavioral WDL WDL                     "

## 2024-07-01 NOTE — DISCHARGE INSTRUCTIONS
You are seen emergency department for nausea, vomiting and diarrhea.  You likely have a stomach bug based on your recent sick contacts at the [facility].  I will be sending you home with antiemetics to help with symptom management over the next couple days.  You may not have an appetite which is completely normal however, please drink as much as you can including water and electrolyte drinks.  Additionally, you can take over-the-counter Tylenol and ibuprofen as necessary for the diffuse body aches.  If he feels or your symptoms are not improving over the next several days, please return to the emergency department.    For your pain we recommend trying;  Tylenol 1,000mg every 6 hours (as needed), up to 4,000mg/day  Ibuprofen 600 to 800mg every 6 hours (as needed), up to 3,200mg/day

## 2024-07-01 NOTE — ED PROVIDER NOTES
Emergency Department Encounter   NAME: Jacqueline Pappas ; AGE: 39 year old female ; YOB: 1985 ; MRN: [id number] ; PCP: Pao Montague   ED PROVIDER: Ernestine Alvarez PA-C    Evaluation Date & Time:   No admission date for patient encounter.    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     Vitals stable. On exam patient is ill-appearing and in acute distress.  Mucous membranes are moist.  Abdomen is soft but diffusely mildly tender.  No Yepez sign.  Bowel sounds are hyperactive.  No guarding or other peritoneal signs present. Nephrostomy tube present on the right side. Urine is yellow in color. Differentials include acute gastroenteritis, pancreatitis, cholecystitis, electrolyte abnormality, peritonitis, gastritis, cyclic vomiting.    Patient initially treated with p.o. Zofran in triage however, patient reports vomiting about 10 minutes following administration.  Will move forward with IV fluids and IV antiemetics.  Do not believe that CT abdomen pelvis is necessary at this time as exam is without any localized pain or any obvious peritoneal signs. CBC without leukocytosis. CMP is without evidence of acute kidney injury, hepatic dysfunction or emergent electrolyte abnormality.  Lipase 18, not concerning for pancreatitis.  Respiratory panel negative. Symptoms and work up most consistent with acute gastroenteritis.  Received nursing note reporting patient was experiencing 8 out of 10 diffuse body aches.  Provided patient with IV Toradol for symptom management.      Upon reevaluation, patient continues to endorse nausea and bodyaches despite  initial medication management.  Will move forward with IV Reglan and Benadryl.  Patient reports symptomatic relief with Reglan and Benadryl.  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  Recommended symptomatic management over the next couple days and provided a prescription for Zofran.  Patient expressed her understanding and feels comfortable discharge at this time.    Patient discharged in improved condition but instructed to return to the emergency department with any new or worsening of symptoms. Patient was educated on acute gastroenteritis and provided informational resources. Patient expressed understanding, feels comfortable, and is in agreement with this plan. All questions addressed prior to discharge.    History:  Supplemental history from: Family Member/Significant Other  External Record(s) reviewed: Documented in chart    Work Up:  Chart documentation includes differential considered and any EKGs or imaging independently interpreted by provider, where specified.  In additional to work up documented, I considered the following work up: Documented in chart, if applicable.    External consultation:  Discussion of management with another provider: Documented in chart, if applicable    Complicating factors:  Care impacted by chronic illness: See HPI.  Care affected by social determinants of health: N/A    Disposition considerations: Discharge. I prescribed additional prescription strength medication(s) as charted. See documentation for any additional details.    Chart Review: Reviewed ED note from 6/8/2024.  Patient presented with 1 month of diarrhea most commonly after eating.  Patient also endorses right flank pain however this is a chronic issue due to her nephrostomy placement.  Diarrhea suspected to be infectious etiology due to the length of symptoms.  Patient was discharged with ciprofloxacin  "and recommended follow-up with her PCP.    ED COURSE:  12:12PM I met and introduced myself to the patient. I gathered initial history and performed my physical exam. We discussed plan for initial workup.   3:34 PM I rechecked the patient and discussed results, discharge, follow up, and reasons to return to the ED.         At the conclusion of the encounter I discussed the results of all the tests and the disposition. The questions were answered. The patient or family acknowledged understanding and was agreeable with the care plan.    FINAL IMPRESSION:    ICD-10-CM    1. Nausea, vomiting and diarrhea  R11.2     R19.7             MEDICATIONS GIVEN IN THE EMERGENCY DEPARTMENT:  Medications   ondansetron (ZOFRAN) injection 4 mg (4 mg Intravenous $Given 7/1/24 1248)   ondansetron (ZOFRAN ODT) ODT tab 4 mg (4 mg Oral $Given 7/1/24 1138)   sodium chloride 0.9% BOLUS 1,000 mL (0 mLs Intravenous Stopped 7/1/24 1325)   ketorolac (TORADOL) injection 10 mg (10 mg Intravenous $Given 7/1/24 1311)   metoclopramide (REGLAN) injection 10 mg (10 mg Intravenous $Given 7/1/24 1439)   diphenhydrAMINE (BENADRYL) injection 25 mg (25 mg Intravenous $Given 7/1/24 1436)         NEW PRESCRIPTIONS STARTED AT TODAY'S ED VISIT:  New Prescriptions    ONDANSETRON (ZOFRAN ODT) 4 MG ODT TAB    Take 1 tablet (4 mg) by mouth every 8 hours as needed for nausea or vomiting         BRIEF HPI   Patient information was obtained from: Patient and Niece  Use of Intrepreter: N/A     Jacqueline Pappas is a 39 year old female with a pertinent history of 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  Patient reports her symptoms began abruptly last night.  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  Patient reports " taking Tylenol for abdominal pain with mild relief.  Patient adds that she has experienced 5 episodes of bilious and nonbilious vomiting since this morning with several episodes of watery diarrhea.  Patient denies hematochezia, hemoptysis or hematemesis.  Patient also denies fever, chills, cough, shortness of breath, chest pain, urinary symptoms, constipation.      REVIEW OF SYSTEMS:  Pertinent positive and negative symptoms per HPI.       MEDICAL HISTORY     Past Medical History:   Diagnosis Date    Acute renal failure (H24)     Anemia     Anemia     Calculus of kidney     Congenital absence of left kidney     Congenital absence of one kidney     Depression     Depression     Hydronephrosis     Kidney stone     Pyelonephritis     Pyelonephritis     Smoker     Ureteral stricture     UTI (urinary tract infection)     Wrist fracture        Past Surgical History:   Procedure Laterality Date     SECTION       SECTION      x1    COMBINED CYSTOSCOPY, RETROGRADES, URETEROSCOPY, LASER HOLMIUM LITHOTRIPSY URETER(S), INSERT STENT Right 2016    Procedure: COMBINED CYSTOSCOPY, RETROGRADES, URETEROSCOPY, LASER HOLMIUM LITHOTRIPSY URETER(S), INSERT STENT;  Surgeon: Florentino Awad MD;  Location: [facility] OR    CYSTOSCOPY, URETEROSCOPY, COMBINED Right 2016    Procedure: COMBINED CYSTOSCOPY, URETEROSCOPY;  Surgeon: Florentino Awad MD;  Location: [facility] OR    IR NEPHROLITHOTOMY  2014    IR NEPHROSTOGRAM EXISITING ACCESS  10/18/2018    IR NEPHROSTOMY TUBE CHANGE RIGHT  2018    IR NEPHROSTOMY TUBE CHANGE RIGHT  2020    IR NEPHROSTOMY TUBE CHANGE RIGHT  2018    IR NEPHROSTOMY TUBE CHANGE RIGHT  2020    IR NEPHROSTOMY TUBE CHANGE RIGHT  11/3/2023    IR NEPHROSTOMY TUBE CHANGE RIGHT  2024    IR NEPHROSTOMY TUBE CHANGE RIGHT  2024    IR NEPHROSTOMY TUBE PLACEMENT RIGHT  2016    IR NEPHROSTOMY TUBE PLACEMENT RIGHT  2017    KIDNEY STONE SURGERY Right 2016    LASER  HOLMIUM LITHOTRIPSY URETER(S), INSERT STENT, COMBINED Left 2016    Procedure: COMBINED CYSTOSCOPY, URETEROSCOPY, LASER HOLMIUM LITHOTRIPSY URETER(S), INSERT STENT;  Surgeon: Florentino Awad MD;  Location: [facility] OR    LASER HOLMIUM NEPHROLITHOTOMY VIA PERCUTANEOUS NEPHROSTOMY Right 2016    Procedure: LASER HOLMIUM NEPHROLITHOTOMY VIA PERCUTANEOUS NEPHROSTOMY;  Surgeon: Florentino Awad MD;  Location: [facility] OR    NEPHROSTOMY W/ INTRODUCTION OF CATHETER Right     OTHER SURGICAL HISTORY      Mole removednasal    OTHER SURGICAL HISTORY Right     Cystoscopy, ureteroscopy, laser11/02/2014 x2 with ureteral stent insertion    PERCUTANEOUS NEPHROSTOMY  2016    Procedure: PERCUTANEOUS NEPHROSTOMY;  Surgeon: Florentino Awad MD;  Location: [facility] OR    WISDOM TOOTH EXTRACTION      ZZC REMOVAL OF KIDNEY STONE         Family History   Problem Relation Age of Onset    Anesthesia Reaction No family hx of     Malignant Hyperthermia No family hx of     Heart Disease Maternal Uncle         heart failure    Coronary Artery Disease Other     Heart Disease Maternal Grandmother         pacemaker    Gout Paternal Grandfather     Prostate Cancer Maternal Aunt         breast    Heart Disease Maternal Aunt         pacemaker    Heart Disease Maternal Aunt         pacemaker    Heart Disease Maternal Aunt         pacemaker       Social History     Tobacco Use    Smoking status: Every Day     Current packs/day: 0.00     Average packs/day: 0.5 packs/day for 10.0 years (5.0 ttl pk-yrs)     Types: Cigarettes     Start date: 2006     Last attempt to quit: 2016     Years since quittin.7    Smokeless tobacco: Former     Quit date: 2016    Tobacco comments:     Hasn't smoked in a week due to breathing issues    Vaping Use    Vaping status: Never Used   Substance Use Topics    Alcohol use: Not Currently     Comment: Alcoholic Drinks/day: occ    Drug use: No       ondansetron (ZOFRAN ODT) 4 MG ODT tab  acetaminophen  (TYLENOL) 500 MG tablet  albuterol (ACCUNEB) 1.25 MG/3ML neb solution  albuterol (PROAIR HFA/PROVENTIL HFA/VENTOLIN HFA) 108 (90 Base) MCG/ACT inhaler  azelastine (ASTELIN) 0.1 % nasal spray  budesonide-formoterol (SYMBICORT) 160-4.5 MCG/ACT Inhaler  ipratropium - albuterol 0.5 mg/2.5 mg/3 mL (DUONEB) 0.5-2.5 (3) MG/3ML neb solution  sertraline (ZOLOFT) 100 MG tablet        PHYSICAL EXAM     First Vitals:  Patient Vitals for the past 24 hrs:   BP Temp Temp src Pulse Resp SpO2 Height Weight   07/01/24 1532 106/62 -- -- 73 -- 97 % -- --   07/01/24 1530 106/62 -- -- 74 -- 97 % -- --   07/01/24 1515 105/63 -- -- 75 -- 97 % -- --   07/01/24 1500 97/54 -- -- 78 -- 98 % -- --   07/01/24 1445 103/59 -- -- 79 -- 98 % -- --   07/01/24 1442 103/59 -- -- 75 -- 97 % 1.524 m (5') 81.6 kg (180 lb)   07/01/24 1430 112/63 -- -- 77 -- 95 % -- --   07/01/24 1415 102/57 -- -- 82 -- 94 % -- --   07/01/24 1400 108/60 -- -- 81 -- 94 % -- --   07/01/24 1345 113/63 -- -- 83 -- 94 % -- --   07/01/24 1330 107/59 -- -- 81 -- 95 % -- --   07/01/24 1315 118/66 -- -- 78 -- 98 % -- --   07/01/24 1300 112/59 -- -- 79 -- 100 % -- --   07/01/24 1245 130/62 -- -- 87 -- 98 % -- --   07/01/24 1123 125/78 98.4  F (36.9  C) Oral 94 16 96 % -- --       PHYSICAL EXAM:  Physical Exam  Vitals and nursing note reviewed.   Constitutional:       General: She is in acute distress.      Appearance: She is ill-appearing.   HENT:      Head: Normocephalic and atraumatic.      Mouth/Throat:      Mouth: Mucous membranes are moist.   Eyes:      Conjunctiva/sclera: Conjunctivae normal.   Cardiovascular:      Rate and Rhythm: Normal rate.   Pulmonary:      Effort: Pulmonary effort is normal.   Abdominal:      General: Abdomen is flat. Bowel sounds are increased. There is no distension.      Palpations: Abdomen is soft.      Tenderness: There is generalized abdominal tenderness. There is no guarding.   Skin:     General: Skin is warm and dry.   Neurological:      General:  No focal deficit present.      Mental Status: She is alert and oriented to person, place, and time.   Psychiatric:         Mood and Affect: Mood normal.          RESULTS     LAB:  All pertinent labs reviewed and interpreted  Labs Ordered and Resulted from Time of ED Arrival to Time of ED Departure   GLUCOSE BY METER - Abnormal       Result Value    GLUCOSE BY METER POCT 126 (*)    BASIC METABOLIC PANEL - Abnormal    Sodium 137      Potassium 4.2      Chloride 106      Carbon Dioxide (CO2) 22      Anion Gap 9      Urea Nitrogen 11.0      Creatinine 1.00 (*)     GFR Estimate 74      Calcium 9.9      Glucose 118 (*)    CBC WITH PLATELETS AND DIFFERENTIAL - Abnormal    WBC Count 8.1      RBC Count 4.74      Hemoglobin 14.7      Hematocrit 44.1      MCV 93      MCH 31.0      MCHC 33.3      RDW 13.6      Platelet Count 146 (*)     % Neutrophils 92      % Lymphocytes 3      % Monocytes 3      % Eosinophils 2      % Basophils 0      % Immature Granulocytes 0      NRBCs per 100 WBC 0      Absolute Neutrophils 7.5      Absolute Lymphocytes 0.3 (*)     Absolute Monocytes 0.2      Absolute Eosinophils 0.1      Absolute Basophils 0.0      Absolute Immature Granulocytes 0.0      Absolute NRBCs 0.0     INFLUENZA A/B, RSV, & SARS-COV2 PCR - Normal    Influenza A PCR Negative      Influenza B PCR Negative      RSV PCR Negative      SARS CoV2 PCR Negative     HEPATIC FUNCTION PANEL - Normal    Protein Total 6.7      Albumin 4.0      Bilirubin Total 0.6      Alkaline Phosphatase 92      AST 14      ALT 14      Bilirubin Direct <0.20     LIPASE - Normal    Lipase 18     GLUCOSE MONITOR NURSING POCT       RADIOLOGY:  No orders to display         Ernestine Alvarez PA-C  Emergency Medicine   [facility] EMERGENCY DEPARTMENT       Ernestine Alvarez PA-C  07/01/24 0219

## 2024-07-02 ENCOUNTER — HOSPITAL ENCOUNTER (EMERGENCY)
Facility: HOSPITAL | Age: 39
Discharge: HOME | End: 2024-07-02
Attending: EMERGENCY MEDICINE | Admitting: EMERGENCY MEDICINE
Payer: COMMERCIAL

## 2024-07-02 ENCOUNTER — NURSE TRIAGE (OUTPATIENT)
Dept: NURSING | Facility: CLINIC | Age: 39
End: 2024-07-02
Payer: COMMERCIAL

## 2024-07-02 VITALS
WEIGHT: 183.7 LBS | RESPIRATION RATE: 20 BRPM | DIASTOLIC BLOOD PRESSURE: 55 MMHG | OXYGEN SATURATION: 97 % | HEART RATE: 62 BPM | TEMPERATURE: 98 F | HEIGHT: 60 IN | SYSTOLIC BLOOD PRESSURE: 105 MMHG | BODY MASS INDEX: 36.06 KG/M2

## 2024-07-02 DIAGNOSIS — L50.9 HIVES: ICD-10-CM

## 2024-07-02 PROCEDURE — 99283 EMERGENCY DEPT VISIT LOW MDM: CPT

## 2024-07-02 PROCEDURE — 250N000012 HC RX MED GY IP 250 OP 636 PS 637

## 2024-07-02 RX ORDER — CETIRIZINE HYDROCHLORIDE 10 MG/1
20 TABLET ORAL DAILY
Qty: 10 TABLET | Refills: 0 | Status: SHIPPED | OUTPATIENT
Start: 2024-07-02 | End: 2024-07-07

## 2024-07-02 RX ORDER — PREDNISONE 20 MG/1
60 TABLET ORAL DAILY
Qty: 15 TABLET | Refills: 0 | Status: SHIPPED | OUTPATIENT
Start: 2024-07-02 | End: 2024-07-07

## 2024-07-02 RX ORDER — PREDNISONE 20 MG/1
60 TABLET ORAL ONCE
Status: COMPLETED | OUTPATIENT
Start: 2024-07-02 | End: 2024-07-02

## 2024-07-02 RX ADMIN — PREDNISONE 60 MG: 20 TABLET ORAL at 20:59

## 2024-07-02 ASSESSMENT — ACTIVITIES OF DAILY LIVING (ADL): ADLS_ACUITY_SCORE: 37

## 2024-07-03 NOTE — DISCHARGE INSTRUCTIONS
You are seen in the emergency department for a diffuse rash and itchiness.  We are unsure why this rash broke out however we will give you medications to treat it.  I am sending you home with a short course of steroids that you will take once daily for the next 5 days.  I also want you to take Zyrtec 20 mg daily along with Benadryl as needed for symptom management.  If you feel like your symptoms are not improving despite medication management, or you begin to experience shortness of breath or difficulty breathing please return to the emergency department for further workup.

## 2024-07-03 NOTE — TELEPHONE ENCOUNTER
For the past couple of days patient is having hives.  Patient is taking benadryl and is not working.  Patient does not know what the reaction is from.  Patient has taken a shower and this is not helping either.  Patient states that she is having some difficulty breathing.  Patient states that she will go in to be seen.    Reason for Disposition   Difficulty breathing or wheezing now    Additional Information   Negative: [1] Life-threatening reaction (anaphylaxis) in the past to similar substance (e.g., food, insect bite/sting, chemical, etc.) AND [2] < 2 hours since exposure    Protocols used: Hives-A-

## 2024-07-03 NOTE — ED PROVIDER NOTES
Emergency Department Encounter   NAME: Jacqueline Pappas ; AGE: 39 year old female ; YOB: 1985 ; MRN: [id number] ; PCP: Pao Montague   ED PROVIDER: Ernestine Alvarez PA-C    Evaluation Date & Time:   7/2/2024  8:17 PM    CHIEF COMPLAINT:  Hives and Shortness of Breath      IMPRESSION AND PLAN   MDM: Jacqueline Pappas is a 39 year old female with a pertinent history of anemia, congenital absence of kidney s/p nephrostomy tube, MDD, thrombocytopenia, tobacco use who presents to the ED by car for evaluation of pruritic hives diffusely throughout her body x 2 days, no new medications, detergents, soaps.  Itchiness continues despite Benadryl use.    Vitals stable, satting at 97% on room air. On exam patient is actively scratching where the hives are, most aggressively at her finger webs.  Slightly raised hives most noticeable on the right elbow and low back bilaterally.  No facial swelling, lip swelling or tongue swelling.  Cardiac and pulmonary exams are unremarkable.  Differentials include allergic reaction, anaphylaxis, angioedema, bedbugs, scabies.    Given the low severity of the patient's hives, lack of angioedema like symptoms and lack of respiratory distress, I do not believe that anaphylaxis or angioedema is the cause of the patient's symptoms.  Will provide patient with first dose of prednisone in the emergency department.  Discussed outpatient follow-up with patient who agrees with a short prescription of prednisone for home and daily Zyrtec and Benadryl for symptoms.  Informed patient that if her symptoms continue, she will likely need to follow-up with her PCP for further management.  Patient is amenable to this plan and feels comfortable discharge at this time.      Patient discharged in improved condition but instructed to return to the emergency department with any new or worsening of symptoms. Patient was educated on allergic reactions and provided informational resources. Patient expressed  understanding, feels comfortable, and is in agreement with this plan. All questions addressed prior to discharge.    Patient seen in conjunction with Dr. Licona.    History:  Supplemental history from: N/A  External Record(s) reviewed: Documented in chart    Work Up:  Chart documentation includes differential considered and any EKGs or imaging independently interpreted by provider, where specified.  In additional to work up documented, I considered the following work up: Documented in chart, if applicable.    External consultation:  Discussion of management with another provider: Documented in chart, if applicable    Complicating factors:  Care impacted by chronic illness: See HPI.  Care affected by social determinants of health: N/A    Disposition considerations: Discharge. I prescribed additional prescription strength medication(s) as charted. See documentation for any additional details.    Chart Review: Per chart review, yesterday (7/1/24), patient arrived at [facility] Emergency Department for nausea, vomiting, and diarrhea. Was given a prescription for Zofran.    ED COURSE:  8:27 I met and introduced myself to the patient. I gathered initial history and performed my physical exam. We discussed plan for initial workup.   8:40 PM I have staffed the patient with Dr. Licona, ED MD, who has evaluated the patient and agrees with all aspects of today's care.   9:25 PM I rechecked the patient and discussed results, discharge, follow up, and reasons to return to the ED.         At the conclusion of the encounter I discussed the results of all the tests and the disposition. The questions were answered. The patient or family acknowledged understanding and was agreeable with the care plan.    FINAL IMPRESSION:    ICD-10-CM    1. Hives  L50.9             MEDICATIONS GIVEN IN THE EMERGENCY DEPARTMENT:  Medications   predniSONE (DELTASONE) tablet 60 mg (60 mg Oral $Given 7/2/24 2059)         NEW  PRESCRIPTIONS STARTED AT TODAY'S ED VISIT:  New Prescriptions    CETIRIZINE (ZYRTEC) 10 MG TABLET    Take 2 tablets (20 mg) by mouth daily for 5 days    PREDNISONE (DELTASONE) 20 MG TABLET    Take 3 tablets (60 mg) by mouth daily for 5 days         BRIEF HPI   Patient information was obtained from: patient   Use of Intrepreter: N/A     Jacqueline Pappas is a 39 year old female with a pertinent history of nephrostomy tube, anemia, and renal agenesis who presents to the ED by self for evaluation of hives and shortness of breath.     Patient presents to ED with complaint of hives. She reported the hives started a few days ago and are not centralized to a specific part of the body. Patient reported hives are on the hands, legs, back, arms, and ankles. She reports hives feel itchy and the hives on the hands are painful. Patient reports swelling in her fingers which reduced with benadryl. Patient reports shortness of breath, cough, and reports a three minute period of chest pain the night before arrival.    Patient reports taking benadryl, the last time being before arrival (7 pm). She reported it only helped with the swelling in her fingers. Patient reports showering did not help with symptoms.    Patient denies taking any medicine that she is allergic to. Patient denies having previous rashes or hives. She denies wearing new clothes or materials, new soap, or new detergent. Patient denies having bed bugs or mites at locations she was sleeping.    Patient had diarrhea and vomiting yesterday which she attributed to a bug from work; symptoms have since subsided. Patient also denies pain or troubles with swallowing. She denied swelling in the lips or tongue.    REVIEW OF SYSTEMS:  Pertinent positive and negative symptoms per HPI.       MEDICAL HISTORY     Past Medical History:   Diagnosis Date    Acute renal failure (H24)     Anemia     Anemia     Calculus of kidney     Congenital absence of left kidney     Congenital absence  of one kidney     Depression     Depression     Hydronephrosis     Kidney stone     Pyelonephritis     Pyelonephritis     Smoker     Ureteral stricture     UTI (urinary tract infection)     Wrist fracture        Past Surgical History:   Procedure Laterality Date     SECTION       SECTION      x1    COMBINED CYSTOSCOPY, RETROGRADES, URETEROSCOPY, LASER HOLMIUM LITHOTRIPSY URETER(S), INSERT STENT Right 2016    Procedure: COMBINED CYSTOSCOPY, RETROGRADES, URETEROSCOPY, LASER HOLMIUM LITHOTRIPSY URETER(S), INSERT STENT;  Surgeon: Florentino Awad MD;  Location: [facility] OR    CYSTOSCOPY, URETEROSCOPY, COMBINED Right 2016    Procedure: COMBINED CYSTOSCOPY, URETEROSCOPY;  Surgeon: Florentino Awad MD;  Location: [facility] OR    IR NEPHROLITHOTOMY  2014    IR NEPHROSTOGRAM EXISITING ACCESS  10/18/2018    IR NEPHROSTOMY TUBE CHANGE RIGHT  2018    IR NEPHROSTOMY TUBE CHANGE RIGHT  2020    IR NEPHROSTOMY TUBE CHANGE RIGHT  2018    IR NEPHROSTOMY TUBE CHANGE RIGHT  2020    IR NEPHROSTOMY TUBE CHANGE RIGHT  11/3/2023    IR NEPHROSTOMY TUBE CHANGE RIGHT  2024    IR NEPHROSTOMY TUBE CHANGE RIGHT  2024    IR NEPHROSTOMY TUBE PLACEMENT RIGHT  2016    IR NEPHROSTOMY TUBE PLACEMENT RIGHT  2017    KIDNEY STONE SURGERY Right 2016    LASER HOLMIUM LITHOTRIPSY URETER(S), INSERT STENT, COMBINED Left 2016    Procedure: COMBINED CYSTOSCOPY, URETEROSCOPY, LASER HOLMIUM LITHOTRIPSY URETER(S), INSERT STENT;  Surgeon: Florentino Awad MD;  Location: [facility] OR    LASER HOLMIUM NEPHROLITHOTOMY VIA PERCUTANEOUS NEPHROSTOMY Right 2016    Procedure: LASER HOLMIUM NEPHROLITHOTOMY VIA PERCUTANEOUS NEPHROSTOMY;  Surgeon: Florentino Awad MD;  Location: [facility] OR    NEPHROSTOMY W/ INTRODUCTION OF CATHETER Right     OTHER SURGICAL HISTORY      Mole removednasal    OTHER SURGICAL HISTORY Right     Cystoscopy, ureteroscopy, laser11/02/2014 x2 with ureteral stent insertion     PERCUTANEOUS NEPHROSTOMY  2016    Procedure: PERCUTANEOUS NEPHROSTOMY;  Surgeon: Florentino Awad MD;  Location: [facility] OR    WISDOM TOOTH EXTRACTION      ZZC REMOVAL OF KIDNEY STONE         Family History   Problem Relation Age of Onset    Anesthesia Reaction No family hx of     Malignant Hyperthermia No family hx of     Heart Disease Maternal Uncle         heart failure    Coronary Artery Disease Other     Heart Disease Maternal Grandmother         pacemaker    Gout Paternal Grandfather     Prostate Cancer Maternal Aunt         breast    Heart Disease Maternal Aunt         pacemaker    Heart Disease Maternal Aunt         pacemaker    Heart Disease Maternal Aunt         pacemaker       Social History     Tobacco Use    Smoking status: Every Day     Current packs/day: 0.00     Average packs/day: 0.5 packs/day for 10.0 years (5.0 ttl pk-yrs)     Types: Cigarettes     Start date: 2006     Last attempt to quit: 2016     Years since quittin.7    Smokeless tobacco: Former     Quit date: 2016    Tobacco comments:     Hasn't smoked in a week due to breathing issues    Vaping Use    Vaping status: Never Used   Substance Use Topics    Alcohol use: Not Currently     Comment: Alcoholic Drinks/day: occ    Drug use: No       cetirizine (ZYRTEC) 10 MG tablet  predniSONE (DELTASONE) 20 MG tablet  acetaminophen (TYLENOL) 500 MG tablet  albuterol (ACCUNEB) 1.25 MG/3ML neb solution  albuterol (PROAIR HFA/PROVENTIL HFA/VENTOLIN HFA) 108 (90 Base) MCG/ACT inhaler  azelastine (ASTELIN) 0.1 % nasal spray  budesonide-formoterol (SYMBICORT) 160-4.5 MCG/ACT Inhaler  ipratropium - albuterol 0.5 mg/2.5 mg/3 mL (DUONEB) 0.5-2.5 (3) MG/3ML neb solution  ondansetron (ZOFRAN ODT) 4 MG ODT tab  sertraline (ZOLOFT) 100 MG tablet        PHYSICAL EXAM     First Vitals:  Patient Vitals for the past 24 hrs:   BP Temp Temp src Pulse Resp SpO2 Height Weight   24 2115 105/55 -- -- 62 20 97 % -- --   24 2045 119/71 --  -- 77 -- 97 % -- --   07/02/24 2012 136/73 98  F (36.7  C) Oral 82 20 96 % 1.524 m (5') 83.3 kg (183 lb 11.2 oz)       PHYSICAL EXAM:  Physical Exam  Vitals and nursing note reviewed.   Constitutional:       General: She is not in acute distress.     Appearance: She is normal weight.   HENT:      Head: Normocephalic and atraumatic.      Mouth/Throat:      Mouth: Mucous membranes are moist.      Pharynx: Oropharynx is clear. No oropharyngeal exudate or posterior oropharyngeal erythema.      Comments: No swelling of the lips or the tongue noted.  Eyes:      Conjunctiva/sclera: Conjunctivae normal.   Cardiovascular:      Rate and Rhythm: Normal rate and regular rhythm.      Heart sounds: Normal heart sounds.   Pulmonary:      Effort: Pulmonary effort is normal. No respiratory distress.      Breath sounds: Normal breath sounds. No wheezing.   Abdominal:      General: Abdomen is flat.   Musculoskeletal:         General: Normal range of motion.      Cervical back: Normal range of motion and neck supple.   Skin:     General: Skin is warm and dry.      Findings: Rash present. Rash is urticarial.      Comments: Pruritic slightly raised urticarial rash noted predominantly on the patient's right elbow and low back bilaterally.  Excoriation marks noted due to intense scratching.   Neurological:      General: No focal deficit present.      Mental Status: She is alert and oriented to person, place, and time.   Psychiatric:         Mood and Affect: Mood normal.          RESULTS     LAB:  All pertinent labs reviewed and interpreted  Labs Ordered and Resulted from Time of ED Arrival to Time of ED Departure - No data to display    RADIOLOGY:  No orders to display       Justo LORENZO, am serving as a scribe to document services personally performed by Ernestine Alvarez PA-C, based on my observation and the provider's statements to me. Ernestine LORENZO PA-C attest that Justo Mandujano is acting in a scribe capacity, has  observed my performance of the services and has documented them in accordance with my direction.       Ernestine Alvarez PA-C  Emergency Medicine   [facility] EMERGENCY DEPARTMENT      Ernestine Alvarez PA-C  07/02/24 2701

## 2024-07-03 NOTE — ED TRIAGE NOTES
Patient ambulates to triage by self.  Reports experiencing hives for last 2 days. Denies pain but endorses itching. Has tried benadryl. Last dose tonight at 7 pm.     Also states she's been experiencing shortness of breath starting this AM.  Noticing more coughing than usual. Smokes cigarettes.     Also states she had a bout of chest pain last night that lasted about 3-4 minutes.

## 2024-07-03 NOTE — ED PROVIDER NOTES
Emergency Department Midlevel Supervisory Note     I had a face to face encounter with this patient seen by the Advanced Practice Provider (SHARLA). I personally made/approved the management plan and take responsibility for the patient management. I personally saw patient and performed a substantive portion of the visit including all aspects of the medical decision making.     ED Course:  8:40 PM   Ernestine Alvarez PA-C staffed patient with me. I agree with their assessment and plan of management, and I will see the patient.  8:41 PM   I met with the patient to introduce myself, gather additional history, perform my initial exam, and discuss the plan.     Brief HPI:     Jacqueline Pappas is a 39 year old female who presents for evaluation of hives for the past 2 days that are pruritic. Denies current dyspnea, facial swelling, n/v, abd pain.  Pt taking benadryl with some improvement.  Denies known exposures, allergens or similar history. Denies new OTC or prescribed meds or new detergents/soaps.      I, Cj Cordero , am serving as a scribe to document services personally performed by Jose D Licona MD, based on my observations and the provider's statements to me.   I, Jose D Licona MD, attest that Cj Cordero  was acting in a scribe capacity, has observed my performance of the services and has documented them in accordance with my direction.    Brief Physical Exam: /71   Pulse 77   Temp 98  F (36.7  C) (Oral)   Resp 20   Ht 1.524 m (5')   Wt 83.3 kg (183 lb 11.2 oz)   LMP 07/02/2024 (Approximate)   SpO2 97%   BMI 35.88 kg/m    Constitutional:  Alert, in no acute distress  EYES: Conjunctivae clear  HENT:  Atraumatic, no facial/tongue swelling, swallowing without any difficulty  Respiratory:  Respirations even, unlabored, in no acute respiratory distress  Cardiovascular:  Regular rate and rhythm, good peripheral perfusion  GI: Soft, non-distended, non-tender  Musculoskeletal:  Moves all 4 extremities  equally, grossly symmetrical strength  Integument: Warm & dry. Rare, occasional hives seen on right elbow and low back.  No oral lesions.  Neurologic:  Alert & oriented, speech clear and fluent, no focal deficits noted  Psych: Normal mood and affect       MDM:  Pt seen in conjunction with SHARLA Ernestine Alvarez.  Pt here with pruritic rash for the past 2 days, which she didn't mention yesterday during different ED evaluation.  Pt taking  benadryl for her symptoms and currently just has a few scattered hives to right elbow and low back.  She has no respiratory difficulty or signs of anaphylaxis, quite well appearing and appropriate for PO prednisone with dose here, instructions on daily zyrtec, prn benadryl and outpatient follow up.  No indication for epinephrine, IV meds or other emergent intervention.       1. Hives        Consults:  none    Labs and Imaging:       I have reviewed the relevant laboratory studies above.    I independently interpreted the following imaging study(s):   none    EKG: I reviewed and independently interpreted the patient's EKG, with comments made as listed below. Please see scanned EKG for full report.   none    Procedures:  I was present for the key portions of procedures documented in SHARLA/midlevel note, see midlevel note for further details.    Jose D Licona [facility] EMERGENCY DEPARTMENT  [facility] [facility] [address] [phone]       Jose D Licona MD  07/02/24 2100

## 2024-07-11 ENCOUNTER — HOSPITAL ENCOUNTER (EMERGENCY)
Facility: HOSPITAL | Age: 39
Discharge: HOME | End: 2024-07-12
Admitting: STUDENT IN AN ORGANIZED HEALTH CARE EDUCATION/TRAINING PROGRAM
Payer: COMMERCIAL

## 2024-07-11 ENCOUNTER — APPOINTMENT (OUTPATIENT)
Dept: RADIOLOGY | Facility: HOSPITAL | Age: 39
End: 2024-07-11
Attending: EMERGENCY MEDICINE
Payer: COMMERCIAL

## 2024-07-11 DIAGNOSIS — J06.9 URI WITH COUGH AND CONGESTION: ICD-10-CM

## 2024-07-11 LAB
BASOPHILS # BLD AUTO: 0 10E3/UL (ref 0–0.2)
BASOPHILS NFR BLD AUTO: 0 %
D DIMER PPP FEU-MCNC: 1.73 UG/ML FEU (ref 0–0.5)
EOSINOPHIL # BLD AUTO: 0.4 10E3/UL (ref 0–0.7)
EOSINOPHIL NFR BLD AUTO: 4 %
ERYTHROCYTE [DISTWIDTH] IN BLOOD BY AUTOMATED COUNT: 13.2 % (ref 10–15)
FLUAV RNA SPEC QL NAA+PROBE: NEGATIVE
FLUBV RNA RESP QL NAA+PROBE: NEGATIVE
HCT VFR BLD AUTO: 37.7 % (ref 35–47)
HGB BLD-MCNC: 12.8 G/DL (ref 11.7–15.7)
IMM GRANULOCYTES # BLD: 0 10E3/UL
IMM GRANULOCYTES NFR BLD: 0 %
LYMPHOCYTES # BLD AUTO: 1.5 10E3/UL (ref 0.8–5.3)
LYMPHOCYTES NFR BLD AUTO: 14 %
MCH RBC QN AUTO: 31.2 PG (ref 26.5–33)
MCHC RBC AUTO-ENTMCNC: 34 G/DL (ref 31.5–36.5)
MCV RBC AUTO: 92 FL (ref 78–100)
MONOCYTES # BLD AUTO: 0.7 10E3/UL (ref 0–1.3)
MONOCYTES NFR BLD AUTO: 6 %
NEUTROPHILS # BLD AUTO: 8.2 10E3/UL (ref 1.6–8.3)
NEUTROPHILS NFR BLD AUTO: 76 %
NRBC # BLD AUTO: 0 10E3/UL
NRBC BLD AUTO-RTO: 0 /100
PLATELET # BLD AUTO: 168 10E3/UL (ref 150–450)
RBC # BLD AUTO: 4.1 10E6/UL (ref 3.8–5.2)
RSV RNA SPEC NAA+PROBE: NEGATIVE
SARS-COV-2 RNA RESP QL NAA+PROBE: NEGATIVE
WBC # BLD AUTO: 10.7 10E3/UL (ref 4–11)

## 2024-07-11 PROCEDURE — 93005 ELECTROCARDIOGRAM TRACING: CPT | Performed by: STUDENT IN AN ORGANIZED HEALTH CARE EDUCATION/TRAINING PROGRAM

## 2024-07-11 PROCEDURE — 250N000009 HC RX 250: Performed by: STUDENT IN AN ORGANIZED HEALTH CARE EDUCATION/TRAINING PROGRAM

## 2024-07-11 PROCEDURE — 94640 AIRWAY INHALATION TREATMENT: CPT

## 2024-07-11 PROCEDURE — 36415 COLL VENOUS BLD VENIPUNCTURE: CPT | Performed by: STUDENT IN AN ORGANIZED HEALTH CARE EDUCATION/TRAINING PROGRAM

## 2024-07-11 PROCEDURE — 99285 EMERGENCY DEPT VISIT HI MDM: CPT | Mod: 25

## 2024-07-11 PROCEDURE — 87637 SARSCOV2&INF A&B&RSV AMP PRB: CPT | Performed by: EMERGENCY MEDICINE

## 2024-07-11 PROCEDURE — 80048 BASIC METABOLIC PNL TOTAL CA: CPT | Performed by: STUDENT IN AN ORGANIZED HEALTH CARE EDUCATION/TRAINING PROGRAM

## 2024-07-11 PROCEDURE — 84145 PROCALCITONIN (PCT): CPT | Performed by: STUDENT IN AN ORGANIZED HEALTH CARE EDUCATION/TRAINING PROGRAM

## 2024-07-11 PROCEDURE — 71046 X-RAY EXAM CHEST 2 VIEWS: CPT

## 2024-07-11 PROCEDURE — 85379 FIBRIN DEGRADATION QUANT: CPT | Performed by: STUDENT IN AN ORGANIZED HEALTH CARE EDUCATION/TRAINING PROGRAM

## 2024-07-11 PROCEDURE — 85025 COMPLETE CBC W/AUTO DIFF WBC: CPT | Performed by: STUDENT IN AN ORGANIZED HEALTH CARE EDUCATION/TRAINING PROGRAM

## 2024-07-11 RX ORDER — ALBUTEROL SULFATE 0.83 MG/ML
5 SOLUTION RESPIRATORY (INHALATION) ONCE
Status: COMPLETED | OUTPATIENT
Start: 2024-07-11 | End: 2024-07-11

## 2024-07-11 RX ORDER — IPRATROPIUM BROMIDE AND ALBUTEROL SULFATE 2.5; .5 MG/3ML; MG/3ML
3 SOLUTION RESPIRATORY (INHALATION) ONCE
Status: COMPLETED | OUTPATIENT
Start: 2024-07-12 | End: 2024-07-11

## 2024-07-11 RX ORDER — BENZONATATE 100 MG/1
100 CAPSULE ORAL 3 TIMES DAILY PRN
Status: DISCONTINUED | OUTPATIENT
Start: 2024-07-11 | End: 2024-07-12 | Stop reason: HOSPADM

## 2024-07-11 RX ADMIN — ALBUTEROL SULFATE 5 MG: 2.5 SOLUTION RESPIRATORY (INHALATION) at 22:45

## 2024-07-11 RX ADMIN — IPRATROPIUM BROMIDE AND ALBUTEROL SULFATE 3 ML: .5; 3 SOLUTION RESPIRATORY (INHALATION) at 23:59

## 2024-07-11 ASSESSMENT — COLUMBIA-SUICIDE SEVERITY RATING SCALE - C-SSRS
2. HAVE YOU ACTUALLY HAD ANY THOUGHTS OF KILLING YOURSELF IN THE PAST MONTH?: NO
6. HAVE YOU EVER DONE ANYTHING, STARTED TO DO ANYTHING, OR PREPARED TO DO ANYTHING TO END YOUR LIFE?: NO
1. IN THE PAST MONTH, HAVE YOU WISHED YOU WERE DEAD OR WISHED YOU COULD GO TO SLEEP AND NOT WAKE UP?: NO

## 2024-07-11 ASSESSMENT — ACTIVITIES OF DAILY LIVING (ADL): ADLS_ACUITY_SCORE: 35

## 2024-07-12 ENCOUNTER — APPOINTMENT (OUTPATIENT)
Dept: CT IMAGING | Facility: HOSPITAL | Age: 39
End: 2024-07-12
Attending: STUDENT IN AN ORGANIZED HEALTH CARE EDUCATION/TRAINING PROGRAM
Payer: COMMERCIAL

## 2024-07-12 VITALS
DIASTOLIC BLOOD PRESSURE: 57 MMHG | HEART RATE: 107 BPM | BODY MASS INDEX: 35.34 KG/M2 | TEMPERATURE: 98.5 F | WEIGHT: 180 LBS | RESPIRATION RATE: 20 BRPM | OXYGEN SATURATION: 96 % | SYSTOLIC BLOOD PRESSURE: 120 MMHG | HEIGHT: 60 IN

## 2024-07-12 LAB
ANION GAP SERPL CALCULATED.3IONS-SCNC: 13 MMOL/L (ref 7–15)
BUN SERPL-MCNC: 12 MG/DL (ref 6–20)
CALCIUM SERPL-MCNC: 9.7 MG/DL (ref 8.6–10)
CHLORIDE SERPL-SCNC: 103 MMOL/L (ref 98–107)
CREAT SERPL-MCNC: 1.08 MG/DL (ref 0.51–0.95)
DEPRECATED HCO3 PLAS-SCNC: 20 MMOL/L (ref 22–29)
EGFRCR SERPLBLD CKD-EPI 2021: 68 ML/MIN/1.73M2
GLUCOSE SERPL-MCNC: 189 MG/DL (ref 70–99)
HCG SERPL QL: NEGATIVE
POTASSIUM SERPL-SCNC: 4.2 MMOL/L (ref 3.4–5.3)
PROCALCITONIN SERPL IA-MCNC: 0.06 NG/ML
SODIUM SERPL-SCNC: 136 MMOL/L (ref 135–145)

## 2024-07-12 PROCEDURE — 250N000011 HC RX IP 250 OP 636: Performed by: STUDENT IN AN ORGANIZED HEALTH CARE EDUCATION/TRAINING PROGRAM

## 2024-07-12 PROCEDURE — 36415 COLL VENOUS BLD VENIPUNCTURE: CPT | Performed by: STUDENT IN AN ORGANIZED HEALTH CARE EDUCATION/TRAINING PROGRAM

## 2024-07-12 PROCEDURE — 71275 CT ANGIOGRAPHY CHEST: CPT

## 2024-07-12 PROCEDURE — 84703 CHORIONIC GONADOTROPIN ASSAY: CPT | Performed by: STUDENT IN AN ORGANIZED HEALTH CARE EDUCATION/TRAINING PROGRAM

## 2024-07-12 RX ORDER — IOPAMIDOL 755 MG/ML
85 INJECTION, SOLUTION INTRAVASCULAR ONCE
Status: COMPLETED | OUTPATIENT
Start: 2024-07-12 | End: 2024-07-12

## 2024-07-12 RX ORDER — BENZONATATE 100 MG/1
100 CAPSULE ORAL 3 TIMES DAILY PRN
Qty: 10 CAPSULE | Refills: 0 | Status: SHIPPED | OUTPATIENT
Start: 2024-07-12

## 2024-07-12 RX ORDER — CEFDINIR 300 MG/1
300 CAPSULE ORAL 2 TIMES DAILY
Qty: 14 CAPSULE | Refills: 0 | Status: SHIPPED | OUTPATIENT
Start: 2024-07-12 | End: 2024-07-19

## 2024-07-12 RX ORDER — ALBUTEROL SULFATE 90 UG/1
2 AEROSOL, METERED RESPIRATORY (INHALATION) EVERY 4 HOURS PRN
Qty: 8 G | Refills: 0 | Status: SHIPPED | OUTPATIENT
Start: 2024-07-12

## 2024-07-12 RX ORDER — AZITHROMYCIN 250 MG/1
TABLET, FILM COATED ORAL
Qty: 6 TABLET | Refills: 0 | Status: SHIPPED | OUTPATIENT
Start: 2024-07-12 | End: 2024-07-17

## 2024-07-12 RX ADMIN — IOPAMIDOL 85 ML: 755 INJECTION, SOLUTION INTRAVENOUS at 01:02

## 2024-07-12 ASSESSMENT — ACTIVITIES OF DAILY LIVING (ADL): ADLS_ACUITY_SCORE: 37

## 2024-07-12 NOTE — DISCHARGE INSTRUCTIONS
You were seen in the ER today for cough, congestions, chest tightness, and shortness of breath.  Your COVID and influenza swabs are negative today.  You were given an albuterol inhaler to use as needed for help with shortness of breath, chest tightness, and coughing. I have also given you some Tessalon capsules to help with cough suppression.    Sure to get plenty of rest and drink plenty of fluids.    You may take Ibuprofen up to 400 mg by mouth every 4-6 hours as needed for pain. Do not exceed 2400 mg/day.  You may take Tylenol 325-1000 mg by mouth every 4-6 hours as needed for pain. Do not exceed 1000mg (1g) in 4 hours or 4 g/day from all sources.  You may combine Tylenol and Ibuprofen- up to 400 mg of ibuprofen and 1000 mg of Tylenol at the same time, up to 3 times a day for the pain    You were given 2 paper prescriptions for antibiotics.  I recommend watching your symptoms closely over the weekend.  If at any point you start to develop high fevers at home, start coughing up more sputum, or start to feel significantly more sick you can start the antibiotic course for pneumonia.  Call your primary care provider's office tomorrow and schedule a recheck for next week.    Return to the emergency department if you develop severe worsened cough with worsening sputum production, high fevers, or start to feel just generally much more sick

## 2024-07-12 NOTE — ED PROVIDER NOTES
Emergency Department Encounter   NAME: Jacqueline Pappas ; AGE: 39 year old female ; YOB: 1985 ; MRN: [id number] ; PCP: Pao Montague   ED PROVIDER: Candy Holder PA-C    Evaluation Date & Time:   No admission date for patient encounter.    CHIEF COMPLAINT:  Flu Symptoms and Shortness of Breath        Impression and Plan   FINAL IMPRESSION:    ICD-10-CM    1. URI with cough and congestion  J06.9           MDM:  Jacqueline is a 39 year old female with PMH of anemia, congenital solitary kidney, tobacco abuse, and recurrent UTIs presenting to the emergency department for evaluation of congestion, rhinorrhea, cough, chest tightness, and SOB.  She states her symptoms started as congestion and rhinorrhea about a week ago and gradually evolved to involve cough and today worsening chest tightness and shortness of breath.  Several people in her workplace have COVID.  She denies any history of asthma or other lung disease.  Denies any abdominal pain, nausea, or vomiting.  He has tried over-the-counter congestion medication as well as Tylenol and ibuprofen with some relief.  No history of VTE.  Denies any recent long travel, procedures, or OCP use. Of note, patient has a care plan in place.  This recommends limiting ED narcotics and not discharging patient with narcotic pain meds.    Vitals reviewed, she is afebrile with temp of 98.5 F without antipyretics on board. On exam she is resting comfortably, coughing frequently. Differential diagnosis includes but not limited to COVID, influenza, other viral infection, bronchitis, pneumonia, PE. On exam her lung sounds are clear to auscultation throughout, no stridor, wheezing, or rhonchi.  No accessory muscle use or increased work of breathing.  She is speaking in full sentences and tolerating her oral secretions. The rest of her physical exam is overall benign. Her COVID and influenza swab are negative.  XR finds bilateral perihilar and peribronchial are thickening  compatible with bronchitis.  There are no focal airspace opacities, pleural effusions, or pneumothorax.  Her D-dimer is elevated at 1.73 today so we will proceed with CT PE to rule out PE.  Does not show any evidence of pulmonary embolism, does show small multifocal areas of groundglass opacity scattered throughout the lungs.  The findings are nonspecific but could represent infection. Her CBC is without evidence of leukocytosis.  He is afebrile here today and her procalcitonin is within normal range. I have low suspicion for pneumonia or other bacterial cause for her symptoms that indicate need for antibiotics at this point.  Given this finding we will plan to discharge her with a paper prescription for antibiotics for pneumonia. I have given patient's strict instructions to watch her symptoms closely and only start these if her cough becomes more productive or she starts to develop high fevers over the weekend or start to feel significantly more sick.  She will follow-up with her primary care provider next week for recheck.  I have also given her an albuterol inhaler to use as needed at home for shortness of breath and help with coughing as well as Tessalon capsules.  She can continue to use Tylenol and ibuprofen as well as any other OTC meds as needed.        History:  Supplemental history from: N/A  External Record(s) reviewed: Documented in chart and Inpatient Record: [facility]  on 7/1/24    Work Up:  Chart documentation includes differential considered and any EKGs or imaging independently interpreted by provider, where specified.  In additional to work up documented, I considered the following work up: Documented in chart, if applicable.    External consultation:  Discussion of management with another provider: Documented in chart, if applicable    Complicating factors:  Care impacted by chronic illness: Smoking / Nicotine Use and Other: Hypotension and SIRS  Care affected by  social determinants of health: N/A    Disposition considerations: Discharge. I prescribed additional prescription strength medication(s) as charted. See documentation for any additional details.      ED COURSE:  9:52 PM  I met and introduced myself to the patient. I gathered initial history and performed my physical exam. We discussed plan for initial workup.   1:41 AM I rechecked the patient and discussed results, discharge, follow up, and reasons to return to the ED.     At the conclusion of the encounter I discussed the results of all the tests and the disposition. The questions were answered. The patient or family acknowledged understanding and was agreeable with the care plan.        MEDICATIONS GIVEN IN THE EMERGENCY DEPARTMENT:  Medications   benzonatate (TESSALON) capsule 100 mg (has no administration in time range)   albuterol (PROVENTIL) neb solution 5 mg (5 mg Nebulization $Given 7/11/24 3459)   ipratropium - albuterol 0.5 mg/2.5 mg/3 mL (DUONEB) neb solution 3 mL (3 mLs Nebulization $Given 7/11/24 9451)   iopamidol (ISOVUE-370) solution 85 mL (85 mLs Intravenous $Given 7/12/24 0102)         NEW PRESCRIPTIONS STARTED AT TODAY'S ED VISIT:  New Prescriptions    ALBUTEROL (PROAIR HFA) 108 (90 BASE) MCG/ACT INHALER    Inhale 2 puffs into the lungs every 4 hours as needed for shortness of breath or cough    AZITHROMYCIN (ZITHROMAX Z-SHAHIDA) 250 MG TABLET    Two tablets on the first day, then one tablet daily for the next 4 days    BENZONATATE (TESSALON) 100 MG CAPSULE    Take 1 capsule (100 mg) by mouth 3 times daily as needed for cough    CEFDINIR (OMNICEF) 300 MG CAPSULE    Take 1 capsule (300 mg) by mouth 2 times daily for 7 days         HPI   Patient information was obtained from: patient    Use of Intrepreter: N/A     Jacqueline Pappas is a 39 year old female with a pertinent history of smoking, hypotension and SIRS who presents to the ED by private vehicle for evaluation of flu symptoms and shortness of  breath.     Per patient for the last week she has been experiencing shortness of breath, cough, rhinorrhea, tightness in her chest, and congestion. 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 Endorses congestion medication works but after its effects wear off the symptoms come back.     Patient denies sore throat, history of asthma and fever.     Per chart review patient was seen on 24 at [facility] ED for nausea, vomiting, and diarrhea. Treated with zofran with no relief then given IV reglan and benadryl with relief. Vital were normal. mild abdominal tenderness. Discharged with no new medication.     Medical History     Past Medical History:   Diagnosis Date    Acute renal failure (H24)     Anemia     Anemia     Calculus of kidney     Congenital absence of left kidney     Congenital absence of one kidney     Depression     Depression     Hydronephrosis     Kidney stone     Pyelonephritis     Pyelonephritis     Smoker     Ureteral stricture     UTI (urinary tract infection)     Wrist fracture        Past Surgical History:   Procedure Laterality Date     SECTION       SECTION      x1    COMBINED CYSTOSCOPY, RETROGRADES, URETEROSCOPY, LASER HOLMIUM LITHOTRIPSY URETER(S), INSERT STENT Right 2016    Procedure: COMBINED CYSTOSCOPY, RETROGRADES, URETEROSCOPY, LASER HOLMIUM LITHOTRIPSY URETER(S), INSERT STENT;  Surgeon: Florentino Awad MD;  Location: [facility] OR    CYSTOSCOPY, URETEROSCOPY, COMBINED Right 2016    Procedure: COMBINED CYSTOSCOPY, URETEROSCOPY;  Surgeon: Florentino Awad MD;  Location: [facility] OR    IR NEPHROLITHOTOMY  2014    IR NEPHROSTOGRAM EXISITING ACCESS  10/18/2018    IR NEPHROSTOMY TUBE CHANGE RIGHT  2018    IR NEPHROSTOMY TUBE CHANGE RIGHT  2020    IR NEPHROSTOMY TUBE CHANGE RIGHT  2018    IR NEPHROSTOMY TUBE CHANGE RIGHT  2020    IR NEPHROSTOMY TUBE CHANGE RIGHT   11/3/2023    IR NEPHROSTOMY TUBE CHANGE RIGHT  2024    IR NEPHROSTOMY TUBE CHANGE RIGHT  2024    IR NEPHROSTOMY TUBE PLACEMENT RIGHT  2016    IR NEPHROSTOMY TUBE PLACEMENT RIGHT  2017    KIDNEY STONE SURGERY Right 2016    LASER HOLMIUM LITHOTRIPSY URETER(S), INSERT STENT, COMBINED Left 2016    Procedure: COMBINED CYSTOSCOPY, URETEROSCOPY, LASER HOLMIUM LITHOTRIPSY URETER(S), INSERT STENT;  Surgeon: Florentino Awad MD;  Location: [facility] OR    LASER HOLMIUM NEPHROLITHOTOMY VIA PERCUTANEOUS NEPHROSTOMY Right 2016    Procedure: LASER HOLMIUM NEPHROLITHOTOMY VIA PERCUTANEOUS NEPHROSTOMY;  Surgeon: Florentino Awad MD;  Location: [facility] OR    NEPHROSTOMY W/ INTRODUCTION OF CATHETER Right     OTHER SURGICAL HISTORY      Mole removednasal    OTHER SURGICAL HISTORY Right     Cystoscopy, ureteroscopy, laser11/02/2014 x2 with ureteral stent insertion    PERCUTANEOUS NEPHROSTOMY  2016    Procedure: PERCUTANEOUS NEPHROSTOMY;  Surgeon: Florentino Awad MD;  Location: [facility] OR    WISDOM TOOTH EXTRACTION      ZZC REMOVAL OF KIDNEY STONE         Family History   Problem Relation Age of Onset    Anesthesia Reaction No family hx of     Malignant Hyperthermia No family hx of     Heart Disease Maternal Uncle         heart failure    Coronary Artery Disease Other     Heart Disease Maternal Grandmother         pacemaker    Gout Paternal Grandfather     Prostate Cancer Maternal Aunt         breast    Heart Disease Maternal Aunt         pacemaker    Heart Disease Maternal Aunt         pacemaker    Heart Disease Maternal Aunt         pacemaker       Social History     Tobacco Use    Smoking status: Every Day     Current packs/day: 0.00     Average packs/day: 0.5 packs/day for 10.0 years (5.0 ttl pk-yrs)     Types: Cigarettes     Start date: 2006     Last attempt to quit: 2016     Years since quittin.8    Smokeless tobacco: Former     Quit date: 2016    Tobacco comments:     Hasn't  smoked in a week due to breathing issues    Vaping Use    Vaping status: Never Used   Substance Use Topics    Alcohol use: Not Currently     Comment: Alcoholic Drinks/day: occ    Drug use: No       albuterol (PROAIR HFA) 108 (90 Base) MCG/ACT inhaler  azithromycin (ZITHROMAX Z-SHAHIDA) 250 MG tablet  benzonatate (TESSALON) 100 MG capsule  cefdinir (OMNICEF) 300 MG capsule  acetaminophen (TYLENOL) 500 MG tablet  albuterol (ACCUNEB) 1.25 MG/3ML neb solution  albuterol (PROAIR HFA/PROVENTIL HFA/VENTOLIN HFA) 108 (90 Base) MCG/ACT inhaler  azelastine (ASTELIN) 0.1 % nasal spray  budesonide-formoterol (SYMBICORT) 160-4.5 MCG/ACT Inhaler  ipratropium - albuterol 0.5 mg/2.5 mg/3 mL (DUONEB) 0.5-2.5 (3) MG/3ML neb solution  sertraline (ZOLOFT) 100 MG tablet          Physical Exam     First Vitals:  Patient Vitals for the past 24 hrs:   BP Temp Temp src Pulse Resp SpO2 Height Weight   07/12/24 0140 -- -- -- 108 -- 95 % -- --   07/12/24 0125 120/57 -- -- 103 -- 95 % -- --   07/12/24 0030 (!) 147/68 -- -- 110 -- 95 % -- --   07/12/24 0017 -- -- -- 101 -- 96 % -- --   07/12/24 0002 (!) 147/68 -- -- 104 -- 98 % -- --   07/11/24 2347 (!) 130/90 -- -- 109 -- 94 % -- --   07/11/24 2337 -- -- -- 106 -- 92 % -- --   07/11/24 2327 121/83 -- -- -- -- -- -- --   07/11/24 1942 132/84 98.5  F (36.9  C) Temporal 90 20 96 % 1.524 m (5') 81.6 kg (180 lb)         PHYSICAL EXAM    General Appearance:  Alert, cooperative, no distress, appears stated age.  Nontoxic appearing.  Frequently coughing, no sputum production.  HENT: Normocephalic without obvious deformity, atraumatic. Mucous membranes moist. Posterior pharynx is not erythematous or edematous, no exudates. No tonsillar swelling. No uvular swelling or deviation. No abscess or swelling of the floor of the mouth. No tongue protrusion or drooling. No facial or neck swelling. External auditory canals without erythema, edema, or drainage. Tympanic membranes and clear and intact bilaterally  without erythema or bulging. Turbinates not erythematous or edematous.   Eyes: Conjunctiva clear, Lids normal. No discharge.   Respiratory: No distress. Lungs clear to ausculation bilaterally. No wheezes, rhonchi or stridor.  Speaking in full sentences.  No accessory muscle use or increased work of breathing.  Cardiovascular: Regular rate and rhythm, no murmur. Normal cap refill. No peripheral edema  GI: Abdomen soft, nontender  Musculoskeletal: Moving all extremities. No gross deformities  Integument: Warm, dry, no rashes or lesions  Neurologic: Alert and orientated x3. No focal deficits.  Psych: Normal mood and affect        Results     LAB:  All pertinent labs reviewed and interpreted  Labs Ordered and Resulted from Time of ED Arrival to Time of ED Departure   BASIC METABOLIC PANEL - Abnormal       Result Value    Sodium 136      Potassium 4.2      Chloride 103      Carbon Dioxide (CO2) 20 (*)     Anion Gap 13      Urea Nitrogen 12.0      Creatinine 1.08 (*)     GFR Estimate 68      Calcium 9.7      Glucose 189 (*)    D DIMER QUANTITATIVE - Abnormal    D-Dimer Quantitative 1.73 (*)    INFLUENZA A/B, RSV, & SARS-COV2 PCR - Normal    Influenza A PCR Negative      Influenza B PCR Negative      RSV PCR Negative      SARS CoV2 PCR Negative     PROCALCITONIN - Normal    Procalcitonin 0.06     HCG QUALITATIVE PREGNANCY - Normal    hCG Serum Qualitative Negative     CBC WITH PLATELETS AND DIFFERENTIAL    WBC Count 10.7      RBC Count 4.10      Hemoglobin 12.8      Hematocrit 37.7      MCV 92      MCH 31.2      MCHC 34.0      RDW 13.2      Platelet Count 168      % Neutrophils 76      % Lymphocytes 14      % Monocytes 6      % Eosinophils 4      % Basophils 0      % Immature Granulocytes 0      NRBCs per 100 WBC 0      Absolute Neutrophils 8.2      Absolute Lymphocytes 1.5      Absolute Monocytes 0.7      Absolute Eosinophils 0.4      Absolute Basophils 0.0      Absolute Immature Granulocytes 0.0      Absolute NRBCs 0.0          RADIOLOGY:  CT Chest Pulmonary Embolism w Contrast   Final Result   IMPRESSION:   1.  No pulmonary artery embolus.   2.  Small multifocal areas of groundglass opacity scattered throughout both lungs. Findings nonspecific but considerations include multifocal infection or alveolar hemorrhage, or less likely mild edema.   3.  No significant pleural fluid.      XR Chest 2 Views   Final Result   IMPRESSION:       Bilateral perihilar peribronchial thickening compatible with bronchitis and/or asthma. No focal airspace opacities. No pleural effusions or pneumothorax. Normal cardiac size.            ECG:  Performed at: 19:58    Impression: Sinus rhythm    Rate: 92  Rhythm: sinus  Axis: Normal  MN Interval: 116  QRS Interval: 78  QTc Interval: 422  ST Changes: none  Comparison: no significant change    EKG results reviewed and interpreted by Dr. Merlin ED MD.       PROCEDURES:  N/A      I, Cj Cordero , am serving as a scribe to document services personally performed by Candy Holder PA-C, based on my observation and the provider's statements to me. I, Candy Holder PA-C attest that Cj Cordero  is acting in a scribe capacity, has observed my performance of the services and has documented them in accordance with my direction.       Candy Holder PA-C   Emergency Medicine   [facility] EMERGENCY DEPARTMENT       Candy Holder PA-C  07/12/24 0141       Candy Holder PA-C  07/12/24 0141

## 2024-07-12 NOTE — ED TRIAGE NOTES
"Pt arrives to triage for SOB and flu like symptoms. She endorses a couple days of runny nose and cough with increasing difficulty to breathe. She reports feeling like when she takes a breath she feels like \"my chest is tightening\".         "

## 2024-07-15 LAB
ATRIAL RATE - MUSE: 72 BPM
DIASTOLIC BLOOD PRESSURE - MUSE: NORMAL MMHG
INTERPRETATION ECG - MUSE: NORMAL
P AXIS - MUSE: 43 DEGREES
PR INTERVAL - MUSE: 116 MS
QRS DURATION - MUSE: 78 MS
QT - MUSE: 386 MS
QTC - MUSE: 422 MS
R AXIS - MUSE: 57 DEGREES
SYSTOLIC BLOOD PRESSURE - MUSE: NORMAL MMHG
T AXIS - MUSE: 42 DEGREES
VENTRICULAR RATE- MUSE: 72 BPM

## 2024-08-07 ENCOUNTER — MYC MEDICAL ADVICE (OUTPATIENT)
Dept: INTERVENTIONAL RADIOLOGY/VASCULAR | Facility: CLINIC | Age: 39
End: 2024-08-07
Payer: COMMERCIAL

## 2024-08-16 NOTE — TELEPHONE ENCOUNTER
Detailed Voicemail message left with request for patient to contact Interventional Radiology Department and acknowledge understanding of pre-procedure instructions that were provided in anticipation of procedure scheduled 8/20/2024.      IR contact number provided in [facility].    Beatris DUMONT  Interventional Radiology RN   [phone]

## 2024-08-16 NOTE — PROGRESS NOTES
Interventional Radiology - History and Physical  Outpatient - [facility]  08/20/2024     Procedure Requested: RIGHT PNT exchange  Requesting Provider: Krista Fields APRN CNP     HPI: Jacqueline Pappas is a 39 year old female with a PMH of congenital solitary kidney with recurrent UTIs, nephrolithiasis, and ureteral strictures managed by a chronic RIGHT PNT who presents today for a routine RIGHT PNT exchange. She has been followed by [facility], but has recently transferred care to [facility]. She has established care with a Urologist at [facility]. Reportedly receives Cipro 400 mg IVPB and sedation with exchanges. Denies any leaking or other issues with the tube.     Imaging:   [facility]  LOCATION: [facility]  DATE: 5/20/2024     PROCEDURE: NEPHROSTOMY TUBE EXCHANGE     ATTENDING: Ceferino Ashley MD.     INDICATION: 39-year-old female presents for routine 3 month nephrostomy catheter exchange     CONSENT: The risks, benefits, and alternatives of nephrostomy tube exchange were discussed with the patient in detail. All questions were answered. Informed consent was given to proceed with the procedure.     MODERATE SEDATION: None. 100 mcg IV fentanyl was administered for patient comfort.     ADDITIONAL MEDICATIONS: 400 mg IV ciprofloxacin     FLUOROSCOPIC TIME: 0.8 minutes.     AIR KERMA:  7.4 mGy.     CONTRAST: 5 mL     UNIVERSAL PRECAUTIONS: The procedure was performed utilizing maximum sterile barrier technique. Prior to the start of the procedure, a standard pause for patient safety was performed with site marking as indicated.     COMPLICATIONS: No immediate complications.     PROCEDURE:    A  image was obtained. A nephrostogram was performed through the previously placed right nephrostomy tube. Under direct fluoroscopic visualization, the previous tube was removed over a wire and exchange was made for a new 8 [place] nephrostomy. The   loop was locked  within the renal pelvis, and the catheter was sutured to the skin. A post placement nephrostogram was performed.     FINDINGS:    The initial  image shows the previously placed right nephrostomy tube. At the completion of the study, the new nephrostomy loop lies within the renal pelvis and controls the urinary system well.                                                                      IMPRESSION:    Successful right nephrostomy exchange, as discussed above.     PLAN:  1. Patient to be recovered and discharged from IR.  2. right PCN to gravity drainage.  3. No flushing.  4. Follow-up with IR in 3 months for routine PCN exchange.    NPO Status: Midnight  Anticoagulation/Antiplatelets/Bleeding tendencies: None  Antibiotics: Ciprofloxacin 400 mg IV    Review of Systems: A comprehensive 10-point review of systems was performed. All systems were reviewed and negative with exception to those reported in the HPI.    PMH:  Past Medical History:   Diagnosis Date    Acute renal failure (H24)     Anemia     Anemia     Calculus of kidney     Congenital absence of left kidney     Congenital absence of one kidney     Depression     Depression     Hydronephrosis     Kidney stone     at age 29    Pyelonephritis     Pyelonephritis     Smoker     Ureteral stricture     UTI (urinary tract infection)     Wrist fracture     Left       Patient Active Problem List   Diagnosis    Anemia    Congenital absence of one kidney    Depression    Calculus of kidney    Pyelonephritis    Solitary kidney, congenital    Hypotension    Stricture or kinking of ureter    Ureteral stricture    Abdominal pain    Acute interstitial nephritis    Attention to nephrostomy (H)    Diarrhea, unspecified type    Elevated blood pressure    Family planning    Hematuria    Hypokalemia    Hypomagnesemia    Leukocytosis, unspecified type    Bilious vomiting with nausea    Nephrostomy complication (H24)    Morbid obesity with BMI of 40.0-44.9, adult (H)     Other artificial openings of urinary tract status (H)    Perinephric hematoma    Postoperative anemia due to acute blood loss    Recurrent major depressive disorder, in partial remission (H24)    Recurrent UTI    Renal colic on right side    Right flank pain, chronic    SIRS (systemic inflammatory response syndrome) (H)    Serum calcium elevated    Sinus bradycardia    Smoker    Thrombocytopenia (H24)    Urinary tract infection    Nausea and vomiting, unspecified vomiting type    Nausea    Non-intractable vomiting with nausea    Acute pyelonephritis    GT (acute kidney injury) (H24)    Other hydronephrosis    Congenital renal agenesis and dysgenesis    Renal agenesis    Renal agenesis, unilateral    Depressive disorder    Obesity, Class II, BMI 35-39.9    Artificial opening status    Tobacco abuse    Crossing vessel and stricture of ureter without hydronephrosis    Right flank pain    Hypoxia    Atypical pneumonia    Hospital-acquired pneumonia    Body aches    Acute cough       PSH:  Past Surgical History:   Procedure Laterality Date     SECTION       SECTION      x1    COMBINED CYSTOSCOPY, RETROGRADES, URETEROSCOPY, LASER HOLMIUM LITHOTRIPSY URETER(S), INSERT STENT Right 2016    Procedure: COMBINED CYSTOSCOPY, RETROGRADES, URETEROSCOPY, LASER HOLMIUM LITHOTRIPSY URETER(S), INSERT STENT;  Surgeon: Florentino Awad MD;  Location: [facility] OR    CYSTOSCOPY, URETEROSCOPY, COMBINED Right 2016    Procedure: COMBINED CYSTOSCOPY, URETEROSCOPY;  Surgeon: Florentino Awad MD;  Location: [facility] OR    IR NEPHROLITHOTOMY  2014    IR NEPHROSTOGRAM EXISITING ACCESS  10/18/2018    IR NEPHROSTOMY TUBE CHANGE RIGHT  2018    IR NEPHROSTOMY TUBE CHANGE RIGHT  2020    IR NEPHROSTOMY TUBE CHANGE RIGHT  2018    IR NEPHROSTOMY TUBE CHANGE RIGHT  2020    IR NEPHROSTOMY TUBE CHANGE RIGHT  11/3/2023    IR NEPHROSTOMY TUBE CHANGE RIGHT  2024    IR NEPHROSTOMY TUBE CHANGE RIGHT   5/20/2024    IR NEPHROSTOMY TUBE PLACEMENT RIGHT  7/24/2016    IR NEPHROSTOMY TUBE PLACEMENT RIGHT  9/14/2017    KIDNEY STONE SURGERY Right 05/2016    LASER HOLMIUM LITHOTRIPSY URETER(S), INSERT STENT, COMBINED Left 11/1/2016    Procedure: COMBINED CYSTOSCOPY, URETEROSCOPY, LASER HOLMIUM LITHOTRIPSY URETER(S), INSERT STENT;  Surgeon: Florentino Awad MD;  Location: [facility] OR    LASER HOLMIUM NEPHROLITHOTOMY VIA PERCUTANEOUS NEPHROSTOMY Right 9/14/2016    Procedure: LASER HOLMIUM NEPHROLITHOTOMY VIA PERCUTANEOUS NEPHROSTOMY;  Surgeon: Florentino Awad MD;  Location: [facility] OR    NEPHROSTOMY W/ INTRODUCTION OF CATHETER Right     OTHER SURGICAL HISTORY      Mole removednasal    OTHER SURGICAL HISTORY Right     Cystoscopy, ureteroscopy, laser11/02/2014 x2 with ureteral stent insertion    PERCUTANEOUS NEPHROSTOMY  11/1/2016    Procedure: PERCUTANEOUS NEPHROSTOMY;  Surgeon: Florentino Awad MD;  Location: [facility] OR    WISDOM TOOTH EXTRACTION      ZZC REMOVAL OF KIDNEY STONE         FAMILY Hx:  Family History   Problem Relation Age of Onset    Anesthesia Reaction No family hx of     Malignant Hyperthermia No family hx of     Heart Disease Maternal Uncle         heart failure    Coronary Artery Disease Other     Heart Disease Maternal Grandmother         pacemaker    Gout Paternal Grandfather     Prostate Cancer Maternal Aunt         breast    Heart Disease Maternal Aunt         pacemaker    Heart Disease Maternal Aunt         pacemaker    Heart Disease Maternal Aunt         pacemaker        SOCIAL Hx:  Social History     Socioeconomic History    Marital status: Single     Spouse name: None    Number of children: None    Years of education: None    Highest education level: None   Tobacco Use    Smoking status: Every Day     Current packs/day: 0.00     Average packs/day: 0.5 packs/day for 10.0 years (5.0 ttl pk-yrs)     Types: Cigarettes     Start date: 9/23/2006     Last attempt to quit: 9/23/2016     Years since  quittin.9    Smokeless tobacco: Former     Quit date: 2016    Tobacco comments:     Hasn't smoked in a week due to breathing issues    Vaping Use    Vaping status: Never Used   Substance and Sexual Activity    Alcohol use: Not Currently     Comment: Alcoholic Drinks/day: occ    Drug use: No    Sexual activity: Not Currently     Social Determinants of Health      Received [facility], [facility]    Financial Resource Strain    Received from [facility], [facility]    Social Connections        ALLERGIES:  Allergies   Allergen Reactions    Desogestrel-Ethinyl Estradiol Angioedema and Swelling     Swelling of hands and feet per pt.      Penicillins Rash     As a child per mother; mother unsure if has tolerated another PCN since. Tolerated ampicillin 16    Sulfa Antibiotics Rash    Vancomycin Rash       MEDICATIONS:  Current Outpatient Medications   Medication Sig Dispense Refill    albuterol (ACCUNEB) 1.25 MG/3ML neb solution Take 1.25 mg by nebulization every 6 hours as needed for shortness of breath / dyspnea or wheezing      albuterol (PROAIR HFA) 108 (90 Base) MCG/ACT inhaler Inhale 2 puffs into the lungs every 4 hours as needed for shortness of breath or cough 8 g 0    budesonide-formoterol (SYMBICORT) 160-4.5 MCG/ACT Inhaler Inhale 2 puffs into the lungs 2 times daily 6 g 11    ipratropium - albuterol 0.5 mg/2.5 mg/3 mL (DUONEB) 0.5-2.5 (3) MG/3ML neb solution Take 1 vial (3 mLs) by nebulization every 6 hours as needed for shortness of breath, wheezing or cough 180 mL 4    sertraline (ZOLOFT) 100 MG tablet Take 100 mg by mouth At Bedtime      acetaminophen (TYLENOL) 500 MG tablet Take 500-1,000 mg by mouth every 6 hours as needed for mild pain      albuterol (PROAIR HFA/PROVENTIL HFA/VENTOLIN HFA) 108 (90 Base) MCG/ACT inhaler Inhale 2 puffs into the lungs every 6  hours 8 g 0    azelastine (ASTELIN) 0.1 % nasal spray Spray 1 spray into both nostrils 2 times daily 30 mL 11    benzonatate (TESSALON) 100 MG capsule Take 1 capsule (100 mg) by mouth 3 times daily as needed for cough 10 capsule 0     Current Facility-Administered Medications   Medication Dose Route Frequency Provider Last Rate Last Admin    ciprofloxacin (CIPRO) infusion 400 mg  400 mg Intravenous Once Jennifer Rankin APRN CNP   400 mg at 08/20/24 1231    lidocaine (LMX4) cream   Topical Q1H PRN Jennifer Rankin APRN CNP        lidocaine 1 % 0.1-1 mL  0.1-1 mL Other Q1H PRN Jennifer Rankin APRN CNP        sodium chloride (PF) 0.9% PF flush 3 mL  3 mL Intracatheter Q8H Jennifer Rankin APRN CNP        sodium chloride (PF) 0.9% PF flush 3 mL  3 mL Intracatheter q1 min prn Jennifer Rankin APRN CNP             LABS:  INR   Date Value Ref Range Status   12/30/2018 0.94 0.90 - 1.10 Final   09/16/2016 1.82 (H) 0.86 - 1.14 Final      Hemoglobin   Date Value Ref Range Status   07/11/2024 12.8 11.7 - 15.7 g/dL Final   11/02/2016 10.1 (L) 11.7 - 15.7 g/dL Final     Platelet Count   Date Value Ref Range Status   07/11/2024 168 150 - 450 10e3/uL Final   11/02/2016 180 150 - 450 10e9/L Final     Creatinine   Date Value Ref Range Status   07/11/2024 1.08 (H) 0.51 - 0.95 mg/dL Final   11/02/2016 1.04 0.52 - 1.04 mg/dL Final     Potassium   Date Value Ref Range Status   07/11/2024 4.2 3.4 - 5.3 mmol/L Final   08/18/2023 4.2 3.5 - 5.0 mmol/L Final   11/02/2016 4.5 3.4 - 5.3 mmol/L Final         EXAM:  /76 (BP Location: Right arm)   Pulse 101   Temp 98.2  F (36.8  C) (Oral)   Resp 16   LMP 07/02/2024 (Approximate)   General: Stable. In no acute distress.    Neurologic: Alert and oriented x 3. No focal deficits.  Psychiatric: Appropriate mood and affect. Cooperative. Answering questions appropriately. Linear/coherent thought process.   Respiratory: Normal  respirations on room air. Lungs clear to auscultation bilaterally.  Cardiac: S1S2, regular rate and rhythm, without murmur, clicks or rubs.  Skin: Warm and dry. Without excoriations, ecchymosis, erythema, lesions or open sores. RIGHT PNT CDI, yellow urine noted in bag.      Pre-Sedation Assessment:  Mallampati Airway Classification:  I - Faucial pillars, soft palate, and uvula are visible  Previous reaction to anesthesia/sedation:  No  Sedation plan based on assessment: Moderate (conscious) sedation  ASA Classification: Class 3 - SEVERE SYSTEMIC DISEASE, DEFINITE FUNCTIONAL LIMITATIONS.   Code Status: FULL CODE      ASSESSMENT:  39 year old female with a PMH of congenital solitary kidney with recurrent UTIs, nephrolithiasis, and ureteral strictures managed by a chronic RIGHT PNT who presents today for a routine RIGHT PNT exchange.    PLAN:    RIGHT nephrostomy tube exchange with sedation.    Procedural education reviewed with patient/family in detail including, but not limited to risks, benefits and alternatives with understanding verbalized by patient/family.     Total time spent on the date of the encounter: 25 minutes.      ROSEMARY JOHN [facility]  Interventional Radiology  [phone]

## 2024-08-20 ENCOUNTER — HOSPITAL ENCOUNTER (OUTPATIENT)
Dept: INTERVENTIONAL RADIOLOGY/VASCULAR | Facility: HOSPITAL | Age: 39
Discharge: HOME | End: 2024-08-20
Attending: NURSE PRACTITIONER | Admitting: NURSE PRACTITIONER
Payer: COMMERCIAL

## 2024-08-20 VITALS
TEMPERATURE: 98.1 F | RESPIRATION RATE: 15 BRPM | HEART RATE: 75 BPM | SYSTOLIC BLOOD PRESSURE: 108 MMHG | DIASTOLIC BLOOD PRESSURE: 69 MMHG | OXYGEN SATURATION: 98 %

## 2024-08-20 DIAGNOSIS — N13.30 HYDRONEPHROSIS, UNSPECIFIED HYDRONEPHROSIS TYPE: Primary | ICD-10-CM

## 2024-08-20 DIAGNOSIS — Z93.6 NEPHROSTOMY STATUS (H): ICD-10-CM

## 2024-08-20 PROCEDURE — 999N000248 HC STATISTIC IV INSERT WITH US BY RN

## 2024-08-20 PROCEDURE — C1769 GUIDE WIRE: HCPCS

## 2024-08-20 PROCEDURE — 250N000011 HC RX IP 250 OP 636: Performed by: NURSE PRACTITIONER

## 2024-08-20 PROCEDURE — 99152 MOD SED SAME PHYS/QHP 5/>YRS: CPT

## 2024-08-20 PROCEDURE — 255N000002 HC RX 255 OP 636: Performed by: STUDENT IN AN ORGANIZED HEALTH CARE EDUCATION/TRAINING PROGRAM

## 2024-08-20 PROCEDURE — C1729 CATH, DRAINAGE: HCPCS

## 2024-08-20 RX ORDER — FLUMAZENIL 0.1 MG/ML
0.2 INJECTION, SOLUTION INTRAVENOUS
Status: DISCONTINUED | OUTPATIENT
Start: 2024-08-20 | End: 2024-08-21 | Stop reason: HOSPADM

## 2024-08-20 RX ORDER — FENTANYL CITRATE 50 UG/ML
25-50 INJECTION, SOLUTION INTRAMUSCULAR; INTRAVENOUS EVERY 5 MIN PRN
Status: DISCONTINUED | OUTPATIENT
Start: 2024-08-20 | End: 2024-08-21 | Stop reason: HOSPADM

## 2024-08-20 RX ORDER — LIDOCAINE 40 MG/G
CREAM TOPICAL
Status: DISCONTINUED | OUTPATIENT
Start: 2024-08-20 | End: 2024-08-21 | Stop reason: HOSPADM

## 2024-08-20 RX ORDER — NALOXONE HYDROCHLORIDE 0.4 MG/ML
0.2 INJECTION, SOLUTION INTRAMUSCULAR; INTRAVENOUS; SUBCUTANEOUS
Status: DISCONTINUED | OUTPATIENT
Start: 2024-08-20 | End: 2024-08-21 | Stop reason: HOSPADM

## 2024-08-20 RX ORDER — NALOXONE HYDROCHLORIDE 0.4 MG/ML
0.4 INJECTION, SOLUTION INTRAMUSCULAR; INTRAVENOUS; SUBCUTANEOUS
Status: DISCONTINUED | OUTPATIENT
Start: 2024-08-20 | End: 2024-08-21 | Stop reason: HOSPADM

## 2024-08-20 RX ORDER — CIPROFLOXACIN 2 MG/ML
400 INJECTION, SOLUTION INTRAVENOUS ONCE
Status: COMPLETED | OUTPATIENT
Start: 2024-08-20 | End: 2024-08-20

## 2024-08-20 RX ORDER — ONDANSETRON 2 MG/ML
4 INJECTION INTRAMUSCULAR; INTRAVENOUS
Status: DISCONTINUED | OUTPATIENT
Start: 2024-08-20 | End: 2024-08-21 | Stop reason: HOSPADM

## 2024-08-20 RX ADMIN — MIDAZOLAM HYDROCHLORIDE 1 MG: 1 INJECTION, SOLUTION INTRAMUSCULAR; INTRAVENOUS at 12:51

## 2024-08-20 RX ADMIN — IOHEXOL 10 ML: 350 INJECTION, SOLUTION INTRAVENOUS at 13:09

## 2024-08-20 RX ADMIN — CIPROFLOXACIN 400 MG: 2 INJECTION, SOLUTION INTRAVENOUS at 12:31

## 2024-08-20 RX ADMIN — FENTANYL CITRATE 50 MCG: 50 INJECTION, SOLUTION INTRAMUSCULAR; INTRAVENOUS at 13:01

## 2024-08-20 RX ADMIN — FENTANYL CITRATE 50 MCG: 50 INJECTION, SOLUTION INTRAMUSCULAR; INTRAVENOUS at 12:53

## 2024-08-20 NOTE — PROCEDURES
INTERVENTIONAL RADIOLOGY POST PROCEDURE NOTE    Procedure: Right nephrostomy tube exchange    Interventional Radiologist: Phoenix Wilks MD    Complications:  No immediate complications.    Blood loss: Minimal.    Brief Findings: Successful 8 Fr right nephrostomy tube exchange    Plan: Follow-up in 3 months for scheduled exchange    Please see dictation in PACS or under the Imaging tab in [facility] for detailed procedure note.     Phoenix Wilks MD  Interventional Radiology

## 2024-08-20 NOTE — PRE-PROCEDURE
GENERAL PRE-PROCEDURE:   Procedure:  Right PNT exchange  Date/Time:  8/20/2024 12:45 PM    Written consent obtained?: Yes    Risks and benefits: Risks, benefits and alternatives were discussed    Consent given by:  Patient  Patient states understanding of procedure being performed: Yes    Patient's understanding of procedure matches consent: Yes    Procedure consent matches procedure scheduled: Yes    Expected level of sedation:  Moderate  Appropriately NPO:  Yes  ASA Class:  3  Mallampati  :  Grade 1- soft palate, uvula, tonsillar pillars, and posterior pharyngeal wall visible  Lungs:  Lungs clear with good breath sounds bilaterally  Heart:  Normal heart sounds and rate  History & Physical reviewed:  Abbreviated history and physical done prior to moderate sedation  Statement of review:  I have reviewed the lab findings, diagnostic data, medications, and the plan for sedation

## 2024-08-20 NOTE — IR NOTE
Patient Name: Jacqueline Pappas  Medical Record Number: [id number]  Today's Date: 8/20/2024    Procedure:right nephrostomy tube exchange  Proceduralist: Dr rowan    Procedure Start: 1258  Procedure end: 1306  Sedation medications administered: 1 mg midazolam and 100 mcg fentanyl   Sedation time: 8 minutes

## 2024-09-02 NOTE — ED TRIAGE NOTES
Pt reports worsening pain to R flank over the past couple of days. Pt also reports frequent urination and foul odor. Pt states she has one kidney on the right side and has nephrostomy in place.     Triage Assessment     Row Name 07/09/22 1954       Triage Assessment (Adult)    Airway WDL WDL       Respiratory WDL    Respiratory WDL WDL       Skin Circulation/Temperature WDL    Skin Circulation/Temperature WDL WDL       Cardiac WDL    Cardiac WDL WDL       Peripheral/Neurovascular WDL    Peripheral Neurovascular WDL WDL       Cognitive/Neuro/Behavioral WDL    Cognitive/Neuro/Behavioral WDL WDL               No

## 2024-09-09 ENCOUNTER — MYC MEDICAL ADVICE (OUTPATIENT)
Dept: UROLOGY | Facility: CLINIC | Age: 39
End: 2024-09-09
Payer: COMMERCIAL

## 2024-09-09 NOTE — TELEPHONE ENCOUNTER
Left Voicemail (1st Attempt) and Sent Mychart (1st Attempt) for the patient to call back and schedule the following:    Appointment type: Return   Provider: Pilar Cheng   Return date: March 2025   Specialty phone number: [phone]  Additonal Notes: SHARLA follow-up in 1 year in person - per check out

## 2024-09-12 ENCOUNTER — APPOINTMENT (OUTPATIENT)
Dept: ULTRASOUND IMAGING | Facility: HOSPITAL | Age: 39
End: 2024-09-12
Attending: EMERGENCY MEDICINE
Payer: COMMERCIAL

## 2024-09-12 ENCOUNTER — HOSPITAL ENCOUNTER (EMERGENCY)
Facility: HOSPITAL | Age: 39
Discharge: HOME | End: 2024-09-12
Attending: EMERGENCY MEDICINE | Admitting: EMERGENCY MEDICINE
Payer: COMMERCIAL

## 2024-09-12 VITALS
TEMPERATURE: 97.3 F | OXYGEN SATURATION: 97 % | DIASTOLIC BLOOD PRESSURE: 71 MMHG | SYSTOLIC BLOOD PRESSURE: 120 MMHG | WEIGHT: 180 LBS | RESPIRATION RATE: 17 BRPM | BODY MASS INDEX: 35.15 KG/M2 | HEART RATE: 84 BPM

## 2024-09-12 DIAGNOSIS — N39.0 URINARY TRACT INFECTION ASSOCIATED WITH NEPHROSTOMY CATHETER, INITIAL ENCOUNTER (H): ICD-10-CM

## 2024-09-12 DIAGNOSIS — R10.9 RIGHT FLANK PAIN: ICD-10-CM

## 2024-09-12 DIAGNOSIS — T83.512A URINARY TRACT INFECTION ASSOCIATED WITH NEPHROSTOMY CATHETER, INITIAL ENCOUNTER (H): ICD-10-CM

## 2024-09-12 LAB
ALBUMIN UR-MCNC: 600 MG/DL
APPEARANCE UR: ABNORMAL
BACTERIA #/AREA URNS HPF: ABNORMAL /HPF
BILIRUB UR QL STRIP: NEGATIVE
CELLULAR CAST: 1 /LPF
COLOR UR AUTO: ABNORMAL
GLUCOSE UR STRIP-MCNC: NEGATIVE MG/DL
HGB UR QL STRIP: ABNORMAL
KETONES UR STRIP-MCNC: NEGATIVE MG/DL
LEUKOCYTE ESTERASE UR QL STRIP: ABNORMAL
MUCOUS THREADS #/AREA URNS LPF: PRESENT /LPF
NITRATE UR QL: POSITIVE
PH UR STRIP: 6.5 [PH] (ref 5–7)
RBC URINE: >182 /HPF
SP GR UR STRIP: 1.01 (ref 1–1.03)
SQUAMOUS EPITHELIAL: 1 /HPF
TRANSITIONAL EPI: 1 /HPF
UROBILINOGEN UR STRIP-MCNC: <2 MG/DL
WBC CLUMPS #/AREA URNS HPF: PRESENT /HPF
WBC URINE: >182 /HPF

## 2024-09-12 PROCEDURE — 250N000013 HC RX MED GY IP 250 OP 250 PS 637: Performed by: EMERGENCY MEDICINE

## 2024-09-12 PROCEDURE — 87186 SC STD MICRODIL/AGAR DIL: CPT | Performed by: EMERGENCY MEDICINE

## 2024-09-12 PROCEDURE — 81001 URINALYSIS AUTO W/SCOPE: CPT | Performed by: EMERGENCY MEDICINE

## 2024-09-12 PROCEDURE — 99284 EMERGENCY DEPT VISIT MOD MDM: CPT | Mod: 25

## 2024-09-12 PROCEDURE — 87086 URINE CULTURE/COLONY COUNT: CPT | Performed by: EMERGENCY MEDICINE

## 2024-09-12 PROCEDURE — 76775 US EXAM ABDO BACK WALL LIM: CPT

## 2024-09-12 RX ORDER — DOXYCYCLINE 100 MG/1
100 CAPSULE ORAL 2 TIMES DAILY
Qty: 28 CAPSULE | Refills: 0 | Status: SHIPPED | OUTPATIENT
Start: 2024-09-12 | End: 2024-09-26

## 2024-09-12 RX ORDER — DOXYCYCLINE 100 MG/1
100 CAPSULE ORAL ONCE
Status: COMPLETED | OUTPATIENT
Start: 2024-09-12 | End: 2024-09-12

## 2024-09-12 RX ORDER — HYDROCODONE BITARTRATE AND ACETAMINOPHEN 5; 325 MG/1; MG/1
1 TABLET ORAL ONCE
Status: COMPLETED | OUTPATIENT
Start: 2024-09-12 | End: 2024-09-12

## 2024-09-12 RX ORDER — DOXYCYCLINE 100 MG/1
100 CAPSULE ORAL EVERY 12 HOURS SCHEDULED
Status: DISCONTINUED | OUTPATIENT
Start: 2024-09-12 | End: 2024-09-12

## 2024-09-12 RX ADMIN — HYDROCODONE BITARTRATE AND ACETAMINOPHEN 1 TABLET: 5; 325 TABLET ORAL at 01:33

## 2024-09-12 RX ADMIN — DOXYCYCLINE HYCLATE 100 MG: 100 CAPSULE ORAL at 03:31

## 2024-09-12 ASSESSMENT — ACTIVITIES OF DAILY LIVING (ADL)
ADLS_ACUITY_SCORE: 37
ADLS_ACUITY_SCORE: 37

## 2024-09-12 NOTE — ED TRIAGE NOTES
Pt comes from home with right sided flank pain and blood found in nephrostomy. Pain started about 5 days ago with small amount of blood, pain and bloody output increased tonight. Patient woke to severe pain in right side. Alert and oriented, afebrile. VSS     Triage Assessment (Adult)       Row Name 09/12/24 0115          Triage Assessment    Airway WDL WDL        Respiratory WDL    Respiratory WDL WDL        Skin Circulation/Temperature WDL    Skin Circulation/Temperature WDL WDL        Cardiac WDL    Cardiac WDL WDL        Peripheral/Neurovascular WDL    Peripheral Neurovascular WDL WDL        Cognitive/Neuro/Behavioral WDL    Cognitive/Neuro/Behavioral WDL WDL

## 2024-09-12 NOTE — ED PROVIDER NOTES
EMERGENCY DEPARTMENT ENCOUNTER      NAME: Jacqueline Pappas  AGE: 39 year old female  YOB: 1985  MRN: [id number]  EVALUATION DATE & TIME: 9/12/2024  1:17 AM    PCP: Pao Montague    ED PROVIDER: Beto Medellin M.D.      Chief Complaint   Patient presents with    Flank Pain                FINAL IMPRESSION:  Right flank pain  UTI    ED COURSE & MEDICAL DECISION MAKING:    Pertinent Labs & Imaging studies reviewed. (See chart for details)  39 year old female presents to the Emergency Department for evaluation of right flank pain.  Patient reports just a severe achiness.  Onset was a few days ago when she noted some blood in her ureterostomy bag.  Patient with a history unilateral kidney with chronic nephrostomy tube.  Patient also with history of chronic pain issues with care plan.  Exam reveals well-nourished follow-up female and mild distress.  Vital signs unremarkable.  Ureterostomy site is unremarkable.  There is no erythema, discharge.  Urine is slightly pink-tinged suggesting hematuria.  Patient reporting severe discomfort.  Will proceed with oral pain management with hydrocodone.  Will also obtain ultrasound of the kidney to assess for hydronephrosis.  Urine analysis also being performed.  Given the absence of systemic findings no indications for additional laboratory evaluation or more advanced imaging such as CT.  However CT may be indicated if ultrasound with significant findings.. Patient appears non toxic with stable vitals signs. Overall exam is benign.    1:24 AM I met with the patient for the initial interview and physical examination. Discussed plan for treatment and workup in the ED.    3:07 AM.  Ultrasound with cystic kidney but no evidence of hydronephrosis.  Review of most recent CT imaging indicates cystic kidney findings are chronic.  Will discuss potential antibiotics with the patient.  UA tonight is similar to UA on 4/20/2024.  On 4/20/2024.  Patient grew out Staph aureus.  This  should be more likely with recent tube replacement.  Given discomfort and findings we will proceed with doxycycline.  Patient is agreeable.  At the conclusion of the encounter I discussed the results of all of the tests and the disposition. The questions were answered and return precautions provided. The patient or family acknowledged understanding and was agreeable with the care plan.           MEDICATIONS GIVEN IN THE EMERGENCY:  Medications   HYDROcodone-acetaminophen (NORCO) 5-325 MG per tablet 1 tablet (1 tablet Oral $Given 9/12/24 0133)       NEW PRESCRIPTIONS STARTED AT TODAY'S ER VISIT  Current Discharge Medication List        START taking these medications    Details   doxycycline hyclate (VIBRAMYCIN) 100 MG capsule Take 1 capsule (100 mg) by mouth 2 times daily for 14 days.  Qty: 28 capsule, Refills: 0                 =================================================================    HPI    Patient information was obtained from: patient     Use of Intrepreter: N/A         Jacqueline Pappas is a 39 year old female with a pertient medical history of pyelonephritis, uretal stricture, acute renal failure, calculus of kidney, and congenital absence of one kidney who presents to the ED for evaluation of flank pain.    Patient reports 5 days of right flank pain. Says that she has noticed blood in her nephrostomy bag and that the blood worsened tonight. Further mentions that her feet feel like they are going numb. Denies fever and chills.     Per chart review, patient was seen at [facility] Emergency Department on 7/11/2024 for recurrent URI with cough and congestion. There was low suspicion for pneumonia or other bacterial cause for her symptoms that indicated need for antibiotics at this point. Patient was given a paper prescription for antibiotics. Patient was discharged with albuterol inhaler, azithromycin, benzonatate, and cefdinir.       REVIEW OF SYSTEMS   Constitutional:  Denies fever,  chills  Respiratory:  Denies productive cough or increased work of breathing  Cardiovascular:  Denies chest pain, palpitations  GI:  Denies abdominal pain, nausea, vomiting, or change in bowel or bladder habits   Musculoskeletal:  Denies any new muscle/joint swelling, Positive for flank pain  Skin:  Denies rash   Neurologic:  Denies focal weakness  All systems negative except as marked.     PAST MEDICAL HISTORY:  Past Medical History:   Diagnosis Date    Acute renal failure (H24)     Anemia     Anemia     Calculus of kidney     Congenital absence of left kidney     Congenital absence of one kidney     Depression     Depression     Hydronephrosis     Kidney stone     at age 29    Pyelonephritis     Pyelonephritis     Smoker     Ureteral stricture     UTI (urinary tract infection)     Wrist fracture     Left       PAST SURGICAL HISTORY:  Past Surgical History:   Procedure Laterality Date     SECTION       SECTION      x1    COMBINED CYSTOSCOPY, RETROGRADES, URETEROSCOPY, LASER HOLMIUM LITHOTRIPSY URETER(S), INSERT STENT Right 2016    Procedure: COMBINED CYSTOSCOPY, RETROGRADES, URETEROSCOPY, LASER HOLMIUM LITHOTRIPSY URETER(S), INSERT STENT;  Surgeon: Florentino Awad MD;  Location: [facility] OR    CYSTOSCOPY, URETEROSCOPY, COMBINED Right 2016    Procedure: COMBINED CYSTOSCOPY, URETEROSCOPY;  Surgeon: Florentino Awad MD;  Location: [facility] OR    IR NEPHROLITHOTOMY  2014    IR NEPHROSTOGRAM EXISITING ACCESS  10/18/2018    IR NEPHROSTOMY TUBE CHANGE RIGHT  2018    IR NEPHROSTOMY TUBE CHANGE RIGHT  2020    IR NEPHROSTOMY TUBE CHANGE RIGHT  2018    IR NEPHROSTOMY TUBE CHANGE RIGHT  2020    IR NEPHROSTOMY TUBE CHANGE RIGHT  11/3/2023    IR NEPHROSTOMY TUBE CHANGE RIGHT  2024    IR NEPHROSTOMY TUBE CHANGE RIGHT  2024    IR NEPHROSTOMY TUBE CHANGE RIGHT  2024    IR NEPHROSTOMY TUBE PLACEMENT RIGHT  2016    IR NEPHROSTOMY TUBE PLACEMENT RIGHT  2017     KIDNEY STONE SURGERY Right 05/2016    LASER HOLMIUM LITHOTRIPSY URETER(S), INSERT STENT, COMBINED Left 11/1/2016    Procedure: COMBINED CYSTOSCOPY, URETEROSCOPY, LASER HOLMIUM LITHOTRIPSY URETER(S), INSERT STENT;  Surgeon: Florentino Awad MD;  Location: [facility] OR    LASER HOLMIUM NEPHROLITHOTOMY VIA PERCUTANEOUS NEPHROSTOMY Right 9/14/2016    Procedure: LASER HOLMIUM NEPHROLITHOTOMY VIA PERCUTANEOUS NEPHROSTOMY;  Surgeon: Florentino Awad MD;  Location: [facility] OR    NEPHROSTOMY W/ INTRODUCTION OF CATHETER Right     OTHER SURGICAL HISTORY      Mole removednasal    OTHER SURGICAL HISTORY Right     Cystoscopy, ureteroscopy, laser11/02/2014 x2 with ureteral stent insertion    PERCUTANEOUS NEPHROSTOMY  11/1/2016    Procedure: PERCUTANEOUS NEPHROSTOMY;  Surgeon: Florentino Awad MD;  Location: [facility] OR    WISDOM TOOTH EXTRACTION      ZZC REMOVAL OF KIDNEY STONE           CURRENT MEDICATIONS:    No current facility-administered medications for this encounter.    Current Outpatient Medications:     acetaminophen (TYLENOL) 500 MG tablet, Take 500-1,000 mg by mouth every 6 hours as needed for mild pain, Disp: , Rfl:     albuterol (ACCUNEB) 1.25 MG/3ML neb solution, Take 1.25 mg by nebulization every 6 hours as needed for shortness of breath / dyspnea or wheezing, Disp: , Rfl:     albuterol (PROAIR HFA) 108 (90 Base) MCG/ACT inhaler, Inhale 2 puffs into the lungs every 4 hours as needed for shortness of breath or cough, Disp: 8 g, Rfl: 0    albuterol (PROAIR HFA/PROVENTIL HFA/VENTOLIN HFA) 108 (90 Base) MCG/ACT inhaler, Inhale 2 puffs into the lungs every 6 hours, Disp: 8 g, Rfl: 0    azelastine (ASTELIN) 0.1 % nasal spray, Spray 1 spray into both nostrils 2 times daily, Disp: 30 mL, Rfl: 11    benzonatate (TESSALON) 100 MG capsule, Take 1 capsule (100 mg) by mouth 3 times daily as needed for cough, Disp: 10 capsule, Rfl: 0    budesonide-formoterol (SYMBICORT) 160-4.5 MCG/ACT Inhaler, Inhale 2 puffs into the lungs 2  times daily, Disp: 6 g, Rfl: 11    ipratropium - albuterol 0.5 mg/2.5 mg/3 mL (DUONEB) 0.5-2.5 (3) MG/3ML neb solution, Take 1 vial (3 mLs) by nebulization every 6 hours as needed for shortness of breath, wheezing or cough, Disp: 180 mL, Rfl: 4    sertraline (ZOLOFT) 100 MG tablet, Take 100 mg by mouth At Bedtime, Disp: , Rfl:     ALLERGIES:  Allergies   Allergen Reactions    Desogestrel-Ethinyl Estradiol Angioedema and Swelling     Swelling of hands and feet per pt.      Penicillins Rash     As a child per mother; mother unsure if has tolerated another PCN since. Tolerated ampicillin 18    Sulfa Antibiotics Rash    Vancomycin Rash       FAMILY HISTORY:  Family History   Problem Relation Age of Onset    Anesthesia Reaction No family hx of     Malignant Hyperthermia No family hx of     Heart Disease Maternal Uncle         heart failure    Coronary Artery Disease Other     Heart Disease Maternal Grandmother         pacemaker    Gout Paternal Grandfather     Prostate Cancer Maternal Aunt         breast    Heart Disease Maternal Aunt         pacemaker    Heart Disease Maternal Aunt         pacemaker    Heart Disease Maternal Aunt         pacemaker       SOCIAL HISTORY:   Social History     Socioeconomic History    Marital status: Single   Tobacco Use    Smoking status: Every Day     Current packs/day: 0.00     Average packs/day: 0.5 packs/day for 10.0 years (5.0 ttl pk-yrs)     Types: Cigarettes     Start date: 2006     Last attempt to quit: 2016     Years since quittin.9    Smokeless tobacco: Former     Quit date: 2016    Tobacco comments:     Hasn't smoked in a week due to breathing issues    Vaping Use    Vaping status: Never Used   Substance and Sexual Activity    Alcohol use: Not Currently     Comment: Alcoholic Drinks/day: occ    Drug use: No    Sexual activity: Not Currently     Social Determinants of Health      Received from [facility], [employer]  [facility] & [facility]    Financial Resource Strain    Received from [facility], [facility]    Social Connections       VITALS:  Patient Vitals for the past 24 hrs:   BP Temp Temp src Pulse Resp SpO2 Weight   09/12/24 0114 120/78 97.3  F (36.3  C) Temporal 83 18 94 % 81.6 kg (180 lb)        PHYSICAL EXAM    Constitutional:  Awake, alert, in no apparent distress  HENT:  Normocephalic, Atraumatic. Bilateral external ears normal. Oropharynx moist. Nose normal. Neck- Normal range of motion with no guarding, No midline cervical tenderness, Supple, No stridor.   Eyes:  PERRL, EOMI with no signs of entrapment, Conjunctiva normal, No discharge.   Respiratory:  Normal breath sounds, No respiratory distress, No wheezing.    Cardiovascular:  Normal heart rate, Normal rhythm, No appreciable rubs or gallops.   GI:  Soft, No tenderness, No distension, No palpable masses  Musculoskeletal:  No edema. Good range of motion in all major joints. Right nephrostomy which is normal at the stoma with no erythema and discharge. Mild upper quadrant CVA tenderness.   Integument:  Warm, Dry, No erythema, No rash.   Neurologic:  Alert & oriented, Normal motor function, Normal sensory function, No focal deficits noted.   Psychiatric:  Affect normal, Judgment normal, Mood normal.     LAB:  All pertinent labs reviewed and interpreted.     Results for orders placed or performed during the hospital encounter of 09/12/24   US Kidney Right     Status: None    Narrative    EXAM: US KIDNEY RIGHT  LOCATION: [facility]  DATE: 9/12/2024    INDICATION: Hematuria, nephrostomy tube  COMPARISON: None.  TECHNIQUE: Routine Right Renal and Bladder Ultrasound.    FINDINGS:    RIGHT KIDNEY: 9.6 x 6.5 x 6.8 cm. Multiple right renal cysts. Largest cyst measures 2.2 x 2.2 x 3.0 cm. 5 mm echogenic focus in the lower right kidney may represent stones. Percutaneous  nephrostomy. Otherwise, Normal without hydronephrosis or masses.     BLADDER: Debris within the urinary bladder. Urinary bladder are otherwise unremarkable.      Impression    IMPRESSION:  1.  5 mm echogenic focus in the lower right kidney may represent stones.  2.  Multiple right renal cysts.  3.  Debris within the urinary bladder.     UA with Microscopic reflex to Culture     Status: Abnormal    Specimen: Nephrostomy, Right; Urine   Result Value Ref Range    Color Urine Orange (A) Colorless, Straw, Light Yellow, Yellow    Appearance Urine Cloudy (A) Clear    Glucose Urine Negative Negative mg/dL    Bilirubin Urine Negative Negative    Ketones Urine Negative Negative mg/dL    Specific Gravity Urine 1.014 1.001 - 1.030    Blood Urine >1.0 mg/dL (A) Negative    pH Urine 6.5 5.0 - 7.0    Protein Albumin Urine 600 (A) Negative mg/dL    Urobilinogen Urine <2.0 <2.0 mg/dL    Nitrite Urine Positive (A) Negative    Leukocyte Esterase Urine 500 Duglas/uL (A) Negative    Bacteria Urine Few (A) None Seen /HPF    WBC Clumps Urine Present (A) None Seen /HPF    Mucus Urine Present (A) None Seen /LPF    RBC Urine >182 (H) <=2 /HPF    WBC Urine >182 (H) <=5 /HPF    Squamous Epithelials Urine 1 <=1 /HPF    Transitional Epithelials Urine 1 <=1 /HPF    Cellular Casts Urine 1 (H) None Seen /LPF    Narrative    Urine Culture ordered based on laboratory criteria        RADIOLOGY:  Reviewed all pertinent imaging. Please see official radiology report.  US Kidney Right    (Results Pending)       I, Suzette Ernst, am serving as a scribe to document services personally performed by Beto Medellin MD, based on my observation and the provider's statements to me. I, Beto Medellin MD attest that Suzette Ernst is acting in a scribe capacity, has observed my performance of the services and has documented them in accordance with my direction.    Beto Medellin M.D.  Emergency Medicine  [facility] EMERGENCY  DEPARTMENT      Beto Medellin MD  09/12/24 0312       Beto Medellin MD  09/12/24 0312

## 2024-09-15 ENCOUNTER — TELEPHONE (OUTPATIENT)
Dept: NURSING | Facility: CLINIC | Age: 39
End: 2024-09-15
Payer: COMMERCIAL

## 2024-09-15 LAB
BACTERIA UR CULT: ABNORMAL
BACTERIA UR CULT: ABNORMAL

## 2024-09-15 NOTE — TELEPHONE ENCOUNTER
[facility]    Reason for call: Lab Result Notification     Lab Result (including Rx patient on, if applicable).  If culture, copy of lab report at bottom.  Lab Result: Urine Culture - See Below    ED Rx: doxycycline hyclate (VIBRAMYCIN) 100 MG capsule - Take 1 capsule (100 mg) by mouth 2 times daily for 14 days (SUSCEPTIBLE)    Creatinine Level (mg/dl)   Creatinine   Date Value Ref Range Status   07/11/2024 1.08 (H) 0.51 - 0.95 mg/dL Final   11/02/2016 1.04 0.52 - 1.04 mg/dL Final    Creatinine clearance (ml/min), if applicable    Serum creatinine: 1.08 mg/dL (H) 07/11/24 2330  Estimated creatinine clearance: 67.4 mL/min (A)     ED Symptoms: Pain at urostomy site.     Current Symptoms: Unable to assess.     9/15/2024 @1705: Patient calling back. States she has restricted insurance so she cannot get the antibiotic until tomorrow.  She states she is still having pain but denies any other new or worsening symptoms. Care advice given per the urine culture protocol. Patient verbalized understanding.     RN Recommendations/Instructions per [place] ED lab result protocol:   [facility] ED lab result protocol utilized: Urine Culture    Unable to reach patient/caregiver.     Left voicemail message requesting a call back to [phone] between 9 a.m. and 5:30 p.m. for patient's ED/UC lab results.      Letter pended to be sent via [employer].       MARCIE GIRON RN

## 2024-09-15 NOTE — LETTER
September 15, 2024        Jacqueline Pappas  [address]   [place] [address]          Dear Jacqueline Pappas:    You were seen in the [facility] Emergency Department at [facility] on 9/12/2024.  We are unable to reach you by phone, so we are sending you this letter.     It is important that you call [facility] Emergency Department lab result nurse at [phone], as we have information to relay to you AND/OR we MAY have to make some changes in your treatment.    Best time to call back is between 9AM and 5:30PM, 7 days a week.      Sincerely,     [facility] Emergency Department Lab Result RN  [phone]

## 2024-09-17 ENCOUNTER — HOSPITAL ENCOUNTER (EMERGENCY)
Facility: HOSPITAL | Age: 39
Discharge: HOME | End: 2024-09-17
Attending: FAMILY MEDICINE | Admitting: FAMILY MEDICINE
Payer: COMMERCIAL

## 2024-09-17 VITALS
HEIGHT: 60 IN | HEART RATE: 80 BPM | TEMPERATURE: 97.9 F | OXYGEN SATURATION: 97 % | RESPIRATION RATE: 20 BRPM | WEIGHT: 176.6 LBS | DIASTOLIC BLOOD PRESSURE: 78 MMHG | SYSTOLIC BLOOD PRESSURE: 122 MMHG | BODY MASS INDEX: 34.67 KG/M2

## 2024-09-17 DIAGNOSIS — H01.114 ALLERGIC DERMATITIS OF BOTH UPPER EYELIDS: ICD-10-CM

## 2024-09-17 DIAGNOSIS — H01.111 ALLERGIC DERMATITIS OF BOTH UPPER EYELIDS: ICD-10-CM

## 2024-09-17 DIAGNOSIS — T78.40XA ALLERGIC REACTION, INITIAL ENCOUNTER: ICD-10-CM

## 2024-09-17 PROCEDURE — 99282 EMERGENCY DEPT VISIT SF MDM: CPT

## 2024-09-17 NOTE — ED TRIAGE NOTES
Patient awoke this morning with swollen eyes, does have hx of seasonal allergies  pt denies any breathing/swallowing problems.

## 2024-09-17 NOTE — ED PROVIDER NOTES
EMERGENCY DEPARTMENT ENCOUNTER      NAME: Jacqueline Pappas  AGE: 39 year old female  YOB: 1985  MRN: [id number]  EVALUATION DATE & TIME: 9/17/2024  7:31 AM    PCP: Pao Montague    ED PROVIDER: Ramana Olson M.D.    Chief Complaint   Patient presents with    Eye Problem       FINAL IMPRESSION:  1. Allergic reaction, initial encounter    2. Allergic dermatitis of both upper eyelids        ED COURSE & MEDICAL DECISION MAKING:    Pertinent Labs & Imaging studies independently interpreted by me. (See chart for details)  Reviewed most recent primary care visit from yesterday when patient was seen for follow-up urinary tract infection, patient with history of recurrent urinary tract infections, does have a care plan related to nephrostomy and urinary tract infections.    ED Course as of 09/17/24 0940   Tue Sep 17, 2024   0739 Patient seen and examined, woke with upper lid swelling bilaterally today.  No eye pain, no injury.  Has not taken anything for this on exam, mild edema of the upper lid bilaterally, no conjunctival injection, no redness or induration to suggest orbital cellulitis, no pain or nodule to suggest stye or hordeolum.  Symptoms are most consistent with allergic reaction.  Patient can take Zyrtec or Benadryl for this.  No breathing or swallowing difficulties, no cough or wheezing, no runny nose.         At the conclusion of the encounter I discussed the results of all of the tests and the disposition. The questions were answered. The patient or family acknowledged understanding and was agreeable with the care plan.     Medical Decision Making  Obtained supplemental history:Supplemental history obtained?: No  Reviewed external records: External records reviewed?: Documented in chart  Care impacted by chronic illness:Smoking / Nicotine Use and Other: Nephrostomy status  Care significantly affected by social determinants of health:Access to Affordable Health Care  Did you consider but  not order tests?: Work up considered but not performed and documented in chart, if applicable  Did you interpret images independently?: Independent interpretation of ECG and images noted in documentation, when applicable.  Consultation discussion with other provider:Did you involve another provider (consultant, , pharmacy, etc.)?: No  Discharge. I discussed a prescription for Benadryl and Zyrtec, but deferred after shared decision making discussion.. N/A.  Not Applicable      MEDICATIONS GIVEN IN THE EMERGENCY:  Medications - No data to display    NEW PRESCRIPTIONS STARTED AT TODAY'S ER VISIT  Discharge Medication List as of 2024  7:50 AM          =================================================================    HPI    Patient information was obtained from:       Jacqueline Pappas is a 39 year old female with a pertinent history of nephrostomy who presents to this ED for evaluation of eye swelling.  Patient woke up today and both eyes were swollen with some crusting on the right.  No pain, does feel little itchy.  No vision changes.  No cough, runny nose, breathing difficulty, swallowing difficulty, sore throat, rash.  Does not wear contacts, is not using eye make-up currently.      REVIEW OF SYSTEMS   Review of Systems   All other systems reviewed and negative    PAST MEDICAL HISTORY:  Past Medical History:   Diagnosis Date    Acute renal failure (H24)     Anemia     Anemia     Calculus of kidney     Congenital absence of left kidney     Congenital absence of one kidney     Depression     Depression     Hydronephrosis     Kidney stone     at age 29    Pyelonephritis     Pyelonephritis     Smoker     Ureteral stricture     UTI (urinary tract infection)     Wrist fracture     Left       PAST SURGICAL HISTORY:  Past Surgical History:   Procedure Laterality Date     SECTION       SECTION      x1    COMBINED CYSTOSCOPY, RETROGRADES, URETEROSCOPY, LASER HOLMIUM LITHOTRIPSY URETER(S), INSERT STENT  Right 8/30/2016    Procedure: COMBINED CYSTOSCOPY, RETROGRADES, URETEROSCOPY, LASER HOLMIUM LITHOTRIPSY URETER(S), INSERT STENT;  Surgeon: Florentino Awad MD;  Location: [facility]    CYSTOSCOPY, URETEROSCOPY, COMBINED Right 9/14/2016    Procedure: COMBINED CYSTOSCOPY, URETEROSCOPY;  Surgeon: Florentino Awad MD;  Location: [facility] OR    IR NEPHROLITHOTOMY  12/11/2014    IR NEPHROSTOGRAM EXISITING ACCESS  10/18/2018    IR NEPHROSTOMY TUBE CHANGE RIGHT  12/12/2018    IR NEPHROSTOMY TUBE CHANGE RIGHT  6/11/2020    IR NEPHROSTOMY TUBE CHANGE RIGHT  12/12/2018    IR NEPHROSTOMY TUBE CHANGE RIGHT  6/11/2020    IR NEPHROSTOMY TUBE CHANGE RIGHT  11/3/2023    IR NEPHROSTOMY TUBE CHANGE RIGHT  2/5/2024    IR NEPHROSTOMY TUBE CHANGE RIGHT  5/20/2024    IR NEPHROSTOMY TUBE CHANGE RIGHT  8/20/2024    IR NEPHROSTOMY TUBE PLACEMENT RIGHT  7/24/2016    IR NEPHROSTOMY TUBE PLACEMENT RIGHT  9/14/2017    KIDNEY STONE SURGERY Right 05/2016    LASER HOLMIUM LITHOTRIPSY URETER(S), INSERT STENT, COMBINED Left 11/1/2016    Procedure: COMBINED CYSTOSCOPY, URETEROSCOPY, LASER HOLMIUM LITHOTRIPSY URETER(S), INSERT STENT;  Surgeon: Florentino Awad MD;  Location: [facility] OR    LASER HOLMIUM NEPHROLITHOTOMY VIA PERCUTANEOUS NEPHROSTOMY Right 9/14/2016    Procedure: LASER HOLMIUM NEPHROLITHOTOMY VIA PERCUTANEOUS NEPHROSTOMY;  Surgeon: Florentino Awad MD;  Location: [facility] OR    NEPHROSTOMY W/ INTRODUCTION OF CATHETER Right     OTHER SURGICAL HISTORY      Mole removednasal    OTHER SURGICAL HISTORY Right     Cystoscopy, ureteroscopy, laser11/02/2014 x2 with ureteral stent insertion    PERCUTANEOUS NEPHROSTOMY  11/1/2016    Procedure: PERCUTANEOUS NEPHROSTOMY;  Surgeon: Florentino Awad MD;  Location: [facility] OR    WISDOM TOOTH EXTRACTION      ZZC REMOVAL OF KIDNEY STONE         CURRENT MEDICATIONS:    No current facility-administered medications for this encounter.     Current Outpatient Medications   Medication Sig Dispense Refill     acetaminophen (TYLENOL) 500 MG tablet Take 500-1,000 mg by mouth every 6 hours as needed for mild pain      albuterol (ACCUNEB) 1.25 MG/3ML neb solution Take 1.25 mg by nebulization every 6 hours as needed for shortness of breath / dyspnea or wheezing      albuterol (PROAIR HFA) 108 (90 Base) MCG/ACT inhaler Inhale 2 puffs into the lungs every 4 hours as needed for shortness of breath or cough 8 g 0    albuterol (PROAIR HFA/PROVENTIL HFA/VENTOLIN HFA) 108 (90 Base) MCG/ACT inhaler Inhale 2 puffs into the lungs every 6 hours 8 g 0    azelastine (ASTELIN) 0.1 % nasal spray Spray 1 spray into both nostrils 2 times daily 30 mL 11    benzonatate (TESSALON) 100 MG capsule Take 1 capsule (100 mg) by mouth 3 times daily as needed for cough 10 capsule 0    budesonide-formoterol (SYMBICORT) 160-4.5 MCG/ACT Inhaler Inhale 2 puffs into the lungs 2 times daily 6 g 11    doxycycline hyclate (VIBRAMYCIN) 100 MG capsule Take 1 capsule (100 mg) by mouth 2 times daily for 14 days. 28 capsule 0    ipratropium - albuterol 0.5 mg/2.5 mg/3 mL (DUONEB) 0.5-2.5 (3) MG/3ML neb solution Take 1 vial (3 mLs) by nebulization every 6 hours as needed for shortness of breath, wheezing or cough 180 mL 4    sertraline (ZOLOFT) 100 MG tablet Take 100 mg by mouth At Bedtime         ALLERGIES:  Allergies   Allergen Reactions    Desogestrel-Ethinyl Estradiol Angioedema and Swelling     Swelling of hands and feet per pt.      Penicillins Rash     As a child per mother; mother unsure if has tolerated another PCN since. Tolerated ampicillin 9/20/16    Sulfa Antibiotics Rash    Vancomycin Rash       FAMILY HISTORY:  Family History   Problem Relation Age of Onset    Anesthesia Reaction No family hx of     Malignant Hyperthermia No family hx of     Heart Disease Maternal Uncle         heart failure    Coronary Artery Disease Other     Heart Disease Maternal Grandmother         pacemaker    Gout Paternal Grandfather     Prostate Cancer Maternal Aunt          breast    Heart Disease Maternal Aunt         pacemaker    Heart Disease Maternal Aunt         pacemaker    Heart Disease Maternal Aunt         pacemaker       SOCIAL HISTORY:   Social History     Socioeconomic History    Marital status: Single   Tobacco Use    Smoking status: Every Day     Current packs/day: 0.00     Average packs/day: 0.5 packs/day for 10.0 years (5.0 ttl pk-yrs)     Types: Cigarettes     Start date: 2006     Last attempt to quit: 2016     Years since quittin.9    Smokeless tobacco: Former     Quit date: 2016    Tobacco comments:     Hasn't smoked in a week due to breathing issues    Vaping Use    Vaping status: Never Used   Substance and Sexual Activity    Alcohol use: Not Currently     Comment: Alcoholic Drinks/day: occ    Drug use: No    Sexual activity: Not Currently     Social Determinants of Health      Received from [employer] [facility] [employer]    Financial Resource Strain    Received from [employer] [employer] [facility] [employer]    Social Connections       VITALS:  /78   Pulse 80   Temp 97.9  F (36.6  C) (Temporal)   Resp 20   Ht 1.524 m (5')   Wt 80.1 kg (176 lb 9.6 oz)   LMP 2024   SpO2 97%   BMI 34.49 kg/m      PHYSICAL EXAM:  Physical Exam  Vitals and nursing note reviewed.   Constitutional:       Appearance: Normal appearance.   HENT:      Head: Normocephalic and atraumatic.      Right Ear: External ear normal.      Left Ear: External ear normal.      Nose: Nose normal.   Eyes:      Extraocular Movements: Extraocular movements intact.      Conjunctiva/sclera: Conjunctivae normal.      Pupils: Pupils are equal, round, and reactive to light.      Comments: Bilateral upper lid edema   Pulmonary:      Effort: Pulmonary effort is normal.   Musculoskeletal:         General: Normal range of motion.      Cervical back: Normal range of motion.       Right lower leg: No edema.      Left lower leg: No edema.   Skin:     General: Skin is warm and dry.   Neurological:      General: No focal deficit present.      Mental Status: She is alert and oriented to person, place, and time. Mental status is at baseline.   Psychiatric:         Mood and Affect: Mood normal.         Behavior: Behavior normal.         Thought Content: Thought content normal.       Ramana Olson M.D.  Emergency Medicine  [facility] EMERGENCY DEPARTMENT  [facility] [facility] [place] [address]  [phone]  Dept: [phone]       Ramana Olson MD  09/17/24 0917

## 2024-09-17 NOTE — DISCHARGE INSTRUCTIONS
Take Zyrtec 10 mg daily for 1 week    Take Benadryl 50 mg every 6 hours for 24 hours and then every 6 hours as needed    Apply cool packs to the eyelids 10 to 15 minutes at a time every 2-3 hours follow-up

## 2024-09-18 ENCOUNTER — HOSPITAL ENCOUNTER (EMERGENCY)
Facility: HOSPITAL | Age: 39
Discharge: HOME | End: 2024-09-18
Payer: COMMERCIAL

## 2024-09-18 VITALS
TEMPERATURE: 97.9 F | RESPIRATION RATE: 16 BRPM | HEIGHT: 60 IN | OXYGEN SATURATION: 98 % | WEIGHT: 175 LBS | SYSTOLIC BLOOD PRESSURE: 123 MMHG | DIASTOLIC BLOOD PRESSURE: 58 MMHG | BODY MASS INDEX: 34.36 KG/M2 | HEART RATE: 65 BPM

## 2024-09-18 DIAGNOSIS — R10.9 RIGHT FLANK PAIN: ICD-10-CM

## 2024-09-18 LAB
ALBUMIN UR-MCNC: 200 MG/DL
ANION GAP SERPL CALCULATED.3IONS-SCNC: 9 MMOL/L (ref 7–15)
APPEARANCE UR: ABNORMAL
BACTERIA #/AREA URNS HPF: ABNORMAL /HPF
BASOPHILS # BLD AUTO: 0 10E3/UL (ref 0–0.2)
BASOPHILS NFR BLD AUTO: 1 %
BILIRUB UR QL STRIP: NEGATIVE
BUN SERPL-MCNC: 8.3 MG/DL (ref 6–20)
CALCIUM SERPL-MCNC: 9.6 MG/DL (ref 8.8–10.4)
CHLORIDE SERPL-SCNC: 108 MMOL/L (ref 98–107)
COLOR UR AUTO: ABNORMAL
CREAT SERPL-MCNC: 0.92 MG/DL (ref 0.51–0.95)
EGFRCR SERPLBLD CKD-EPI 2021: 82 ML/MIN/1.73M2
EOSINOPHIL # BLD AUTO: 0.2 10E3/UL (ref 0–0.7)
EOSINOPHIL NFR BLD AUTO: 4 %
ERYTHROCYTE [DISTWIDTH] IN BLOOD BY AUTOMATED COUNT: 13.9 % (ref 10–15)
GLUCOSE SERPL-MCNC: 97 MG/DL (ref 70–99)
GLUCOSE UR STRIP-MCNC: NEGATIVE MG/DL
HCG SERPL QL: NEGATIVE
HCO3 SERPL-SCNC: 22 MMOL/L (ref 22–29)
HCT VFR BLD AUTO: 41.1 % (ref 35–47)
HGB BLD-MCNC: 13.6 G/DL (ref 11.7–15.7)
HGB UR QL STRIP: ABNORMAL
IMM GRANULOCYTES # BLD: 0 10E3/UL
IMM GRANULOCYTES NFR BLD: 1 %
KETONES UR STRIP-MCNC: NEGATIVE MG/DL
LEUKOCYTE ESTERASE UR QL STRIP: ABNORMAL
LYMPHOCYTES # BLD AUTO: 1.3 10E3/UL (ref 0.8–5.3)
LYMPHOCYTES NFR BLD AUTO: 22 %
MCH RBC QN AUTO: 30 PG (ref 26.5–33)
MCHC RBC AUTO-ENTMCNC: 33.1 G/DL (ref 31.5–36.5)
MCV RBC AUTO: 91 FL (ref 78–100)
MONOCYTES # BLD AUTO: 0.4 10E3/UL (ref 0–1.3)
MONOCYTES NFR BLD AUTO: 7 %
MUCOUS THREADS #/AREA URNS LPF: PRESENT /LPF
NEUTROPHILS # BLD AUTO: 3.9 10E3/UL (ref 1.6–8.3)
NEUTROPHILS NFR BLD AUTO: 66 %
NITRATE UR QL: NEGATIVE
NRBC # BLD AUTO: 0 10E3/UL
NRBC BLD AUTO-RTO: 0 /100
PH UR STRIP: 6.5 [PH] (ref 5–7)
PLATELET # BLD AUTO: 166 10E3/UL (ref 150–450)
POTASSIUM SERPL-SCNC: 3.7 MMOL/L (ref 3.4–5.3)
RBC # BLD AUTO: 4.53 10E6/UL (ref 3.8–5.2)
RBC URINE: 52 /HPF
SODIUM SERPL-SCNC: 139 MMOL/L (ref 135–145)
SP GR UR STRIP: 1.02 (ref 1–1.03)
SQUAMOUS EPITHELIAL: 1 /HPF
UROBILINOGEN UR STRIP-MCNC: <2 MG/DL
WBC # BLD AUTO: 5.9 10E3/UL (ref 4–11)
WBC URINE: 33 /HPF

## 2024-09-18 PROCEDURE — 85004 AUTOMATED DIFF WBC COUNT: CPT

## 2024-09-18 PROCEDURE — 81001 URINALYSIS AUTO W/SCOPE: CPT

## 2024-09-18 PROCEDURE — 87086 URINE CULTURE/COLONY COUNT: CPT

## 2024-09-18 PROCEDURE — 87186 SC STD MICRODIL/AGAR DIL: CPT

## 2024-09-18 PROCEDURE — 84703 CHORIONIC GONADOTROPIN ASSAY: CPT

## 2024-09-18 PROCEDURE — 96374 THER/PROPH/DIAG INJ IV PUSH: CPT

## 2024-09-18 PROCEDURE — 250N000011 HC RX IP 250 OP 636

## 2024-09-18 PROCEDURE — 36415 COLL VENOUS BLD VENIPUNCTURE: CPT

## 2024-09-18 PROCEDURE — 250N000013 HC RX MED GY IP 250 OP 250 PS 637

## 2024-09-18 PROCEDURE — 80048 BASIC METABOLIC PNL TOTAL CA: CPT

## 2024-09-18 PROCEDURE — 99284 EMERGENCY DEPT VISIT MOD MDM: CPT | Mod: 25

## 2024-09-18 RX ORDER — HYDROCODONE BITARTRATE AND ACETAMINOPHEN 5; 325 MG/1; MG/1
1 TABLET ORAL ONCE
Status: COMPLETED | OUTPATIENT
Start: 2024-09-18 | End: 2024-09-18

## 2024-09-18 RX ORDER — ONDANSETRON 2 MG/ML
4 INJECTION INTRAMUSCULAR; INTRAVENOUS ONCE
Status: COMPLETED | OUTPATIENT
Start: 2024-09-18 | End: 2024-09-18

## 2024-09-18 RX ADMIN — ONDANSETRON 4 MG: 2 INJECTION INTRAMUSCULAR; INTRAVENOUS at 04:38

## 2024-09-18 RX ADMIN — HYDROCODONE BITARTRATE AND ACETAMINOPHEN 1 TABLET: 5; 325 TABLET ORAL at 04:40

## 2024-09-18 ASSESSMENT — ACTIVITIES OF DAILY LIVING (ADL)
ADLS_ACUITY_SCORE: 37
ADLS_ACUITY_SCORE: 37

## 2024-09-18 NOTE — ED TRIAGE NOTES
Pt. Stated she is having right sided flank pain. She currently has a nephrostomy tube in the right side - does not have a left kidney. Began having right flank a week ago, UTI last week still taking ABX, had Tylenol around 2200 last night. Nausea in triage     Triage Assessment (Adult)       Row Name 09/18/24 0415          Triage Assessment    Airway WDL WDL        Respiratory WDL    Respiratory WDL WDL        Skin Circulation/Temperature WDL    Skin Circulation/Temperature WDL WDL        Cardiac WDL    Cardiac WDL WDL        Peripheral/Neurovascular WDL    Peripheral Neurovascular WDL WDL        Cognitive/Neuro/Behavioral WDL    Cognitive/Neuro/Behavioral WDL WDL

## 2024-09-18 NOTE — DISCHARGE INSTRUCTIONS
You have been evaluated in the Emergency Department today for abdominal pain. Your evaluation was not suggestive of any emergent condition requiring medical intervention at this time. However, some abdominal problems make take more time to appear. Therefore, it is important for you to watch for any new symptoms or worsening of your current condition.     Please follow up with your primary care physician as needed.    Return to the Emergency Department if you experience worsening pain, persistent fevers greater than 100.4, recurrent vomiting, blood in vomit, blood in stool, dark tarry stool, chest pain, difficulty breathing, or any other concerning symptoms.

## 2024-09-18 NOTE — ED PROVIDER NOTES
EMERGENCY DEPARTMENT ENCOUNTER      NAME: Jacqueline Pappas  AGE: 39 year old female  YOB: 1985  MRN: [id number]  EVALUATION DATE & TIME: No admission date for patient encounter.    PCP: Pao Montague    ED PROVIDER: Derrick Mccarthy MD      Chief Complaint   Patient presents with    Back Pain    Nausea, Vomiting, & Diarrhea    Flank Pain         FINAL IMPRESSION:  1. Right flank pain          ED COURSE & MEDICAL DECISION MAKING:    Pertinent Labs & Imaging studies reviewed. (See chart for details)  39 year old female presents to the Emergency Department for evaluation of right flank pain.  Differential diagnosis considered septic stone, UTI, pyelonephritis, sepsis, appendicitis, bowel obstruction, perforated viscus.     ED Course as of 09/19/24 0640   Wed Sep 18, 2024   0433 39-year-old female history of a solitary kidney and status post right nephrostomy tube presenting with right flank pain.  Patient states pain is constant and severe in the right flank.  Diagnosed with UTI and prescribed doxycycline.  On chart review she did have ,000 bacteria Of Staph epidermidis susceptible to doxycycline which she has been on.  She taken Tylenol without relief.  She is unable to take NSAIDs per her report given her solitary kidney.  Patient does have a care plan to avoid IV narcotics.  I discussed treatment plan which: Oral narcotics, Zofran as well as a lab workup.  Patient was not happy with oral narcotics and requesting IV narcotics.  Her abdomen is soft nontender without evidence of peritonitis.  Do not believe she requires a CT scan at this point.  She had a normal ultrasound of her renal system 6 days ago and do not believe she requires a repeat ultrasound at this time.  Her nephrostomy tube is following.  I will reassess need for imaging after symptomatic control and lab workup.  Currently is mildly tachycardic but this could be due to pain.  Does not appear clinically dehydrated.  She is  afebrile.   0543 UA is actually proving was previously nitrate positive and had significant WBCs.  She is on the appropriate antibiotics after reviewing urine culture.  This does reflex to urine culture.  Will follow-up on urine culture results.  Encouraged her to continue to take her antibiotics that are prescribed.    On reassessment her pain had improved.  Abdomen exam is benign.  Given her care plan I do not believe she requires outpatient narcotic pain medications.  This is exacerbation of her chronic flank pain which has been well-documented in the ED chart.  This was discussed with her at bedside.  Encouraged Tylenol follow-up.  Unfortunately patient is unable to take NSAIDs given her solitary kidney.  Will have her follow-up with her primary care provider.  Patient is agreeable with plan.       Not Applicable    I discussed the plan for discharge with the patient, and patient is agreeable. We discussed supportive cares at home and reasons for return to the ER including new or worsening symptoms - all questions and concerns addressed to the best of my ability. Strict return precautions discussed. Patient to be discharged by RN.    At the conclusion of the encounter I discussed the results of all of the tests and the disposition. The questions were answered. The patient or family acknowledged understanding and was agreeable with the care plan.     MEDICATIONS GIVEN IN THE EMERGENCY:  Medications   HYDROcodone-acetaminophen (NORCO) 5-325 MG per tablet 1 tablet (1 tablet Oral $Given 9/18/24 0440)   ondansetron (ZOFRAN) injection 4 mg (4 mg Intravenous $Given 9/18/24 0438)       NEW PRESCRIPTIONS STARTED AT TODAY'S ER VISIT  Discharge Medication List as of 9/18/2024  6:10 AM        Discharge Medication List as of 9/18/2024  6:10 AM          =================================================================    HPI    Patient information was obtained from: The patient    Use of : N/DURAN FORDE  Mian is a 39 year old female with a pertinent history of kidney stone, pyelonephritis, nephrostomy tube change right on 08/20/2024, tobacco abuse who presents to this ED for evaluation of back pain, nausea, vomiting, diarrhea, and flank pain.  Patient has for the last week she has had right flank pain that has been constant.  Denies dysuria.  She is taking Tylenol without relief.  She was seen on 9/12 and diagnosed with a UTI.  She is been on doxycycline since this time.  Denies fevers.  She was seen yesterday for an eye issue however did not not have abdominal or flank pain at that time.  Denies hematuria.      PHYSICAL EXAM    /58   Pulse 65   Temp 97.9  F (36.6  C) (Tympanic)   Resp 16   Ht 1.524 m (5')   Wt 79.4 kg (175 lb)   LMP 09/14/2024 (Exact Date)   SpO2 98%   BMI 34.18 kg/m    Constitutional: Well developed, well nourished.   Head: Normocephalic, atraumatic, mucous membranes moist, nose normal.   Neck: Supple, gross ROM intact.   Eyes: Pupils mid-range, sclera white.  Respiratory: Clear to auscultation bilaterally, no respiratory distress, no wheezing, speaks full sentences easily.  Cardiovascular:  tachycardic heart rate, regular rhythm, no murmurs. No lower extremity edema.   GI: Soft, no tenderness to deep palpation in all quadrants. nephrostomy tube in place in the right flank, clear /yellowish urine present  Musculoskeletal: Moving all 4 extremities intentionally and without pain. No obvious deformity.  Skin: Warm, dry, no rash.  Neurologic: Alert & oriented x 3, speech clear, moving all extremities spontaneously   Psychiatric: Affect normal, cooperative.       LAB:  All pertinent labs reviewed and interpreted.  Results for orders placed or performed during the hospital encounter of 09/18/24   Basic metabolic panel   Result Value Ref Range    Sodium 139 135 - 145 mmol/L    Potassium 3.7 3.4 - 5.3 mmol/L    Chloride 108 (H) 98 - 107 mmol/L    Carbon Dioxide (CO2) 22 22 - 29 mmol/L     Anion Gap 9 7 - 15 mmol/L    Urea Nitrogen 8.3 6.0 - 20.0 mg/dL    Creatinine 0.92 0.51 - 0.95 mg/dL    GFR Estimate 82 >60 mL/min/1.73m2    Calcium 9.6 8.8 - 10.4 mg/dL    Glucose 97 70 - 99 mg/dL   HCG qualitative Blood   Result Value Ref Range    hCG Serum Qualitative Negative Negative   UA with Microscopic reflex to Culture    Specimen: Urine, Midstream   Result Value Ref Range    Color Urine Light Yellow Colorless, Straw, Light Yellow, Yellow    Appearance Urine Turbid (A) Clear    Glucose Urine Negative Negative mg/dL    Bilirubin Urine Negative Negative    Ketones Urine Negative Negative mg/dL    Specific Gravity Urine 1.017 1.001 - 1.030    Blood Urine >1.0 mg/dL (A) Negative    pH Urine 6.5 5.0 - 7.0    Protein Albumin Urine 200 (A) Negative mg/dL    Urobilinogen Urine <2.0 <2.0 mg/dL    Nitrite Urine Negative Negative    Leukocyte Esterase Urine 500 Duglas/uL (A) Negative    Bacteria Urine Moderate (A) None Seen /HPF    Mucus Urine Present (A) None Seen /LPF    RBC Urine 52 (H) <=2 /HPF    WBC Urine 33 (H) <=5 /HPF    Squamous Epithelials Urine 1 <=1 /HPF   CBC with platelets and differential   Result Value Ref Range    WBC Count 5.9 4.0 - 11.0 10e3/uL    RBC Count 4.53 3.80 - 5.20 10e6/uL    Hemoglobin 13.6 11.7 - 15.7 g/dL    Hematocrit 41.1 35.0 - 47.0 %    MCV 91 78 - 100 fL    MCH 30.0 26.5 - 33.0 pg    MCHC 33.1 31.5 - 36.5 g/dL    RDW 13.9 10.0 - 15.0 %    Platelet Count 166 150 - 450 10e3/uL    % Neutrophils 66 %    % Lymphocytes 22 %    % Monocytes 7 %    % Eosinophils 4 %    % Basophils 1 %    % Immature Granulocytes 1 %    NRBCs per 100 WBC 0 <1 /100    Absolute Neutrophils 3.9 1.6 - 8.3 10e3/uL    Absolute Lymphocytes 1.3 0.8 - 5.3 10e3/uL    Absolute Monocytes 0.4 0.0 - 1.3 10e3/uL    Absolute Eosinophils 0.2 0.0 - 0.7 10e3/uL    Absolute Basophils 0.0 0.0 - 0.2 10e3/uL    Absolute Immature Granulocytes 0.0 <=0.4 10e3/uL    Absolute NRBCs 0.0 10e3/uL       RADIOLOGY:  Reviewed all pertinent  imaging. Please see official radiology report.  No orders to display           Derrick Mccarthy MD  [facility] EMERGENCY DEPARTMENT  [address] [address] [phone]   =================================================================    BILLING:  Data  Category 1  Non-ED record review, if applicable. External record reviewed: N/A     Clinical information was obtained from an independent historian. History was obtained from: Patient     The following testing was considered but ultimately not selected after discussion with patient/family: CT abdomen with IV contrast however after discussion regarding radiation patient preferred to  forego radiation.     Category 2  My independent interpretation of EKG, rhythm strip, radiology study: NA     Category 3  Discussion of management with other physician/healthcare provider/other source: N/A       Risk  Prescription medication was considered, but ultimately not given after discussion with patient/family: I considered ordering a prescription for narcotic pain medicine.  However, I feel patient's condition can be adequately treated with non-narcotic medications and that the risk of a narcotic pain medicine prescription outweighs the benefits.     Chronic conditions affecting care: Alcohol use and right nephrostomy tube     Care significantly affected by Social Determinants of Health: N/A     Consideration of Admission/Observation: Escalation of care including admission/observation was considered given the complexity and risk of the patient's presenting complaint, exam findings, and/or their underlying comorbidities. However, ultimately I feel the patient is safe for outpatient management with close follow up. Reasoning: Work-up reassuring, does not reveal any acute life/organ threatening processes, patient's symptoms well controlled upon reevaluation, reexamination is reassuring, vitals are stable, patient agreeable with discharge,  reliable for follow-up.       [employer] Documentation:   CMS Diagnoses:               I, Beka Wilson, am serving as a scribe to document services personally performed byDerrick Mccarthy MD based on my observation and the provider's statements to me. I, Derrick Mccarthy MD, attest that Beka Wilson is acting in a scribe capacity, has observed my performance of the services and has documented them in accordance with my direction.     Derrick Mccarthy MD  09/19/24 0600

## 2024-09-21 ENCOUNTER — TELEPHONE (OUTPATIENT)
Dept: NURSING | Facility: CLINIC | Age: 39
End: 2024-09-21
Payer: COMMERCIAL

## 2024-09-21 LAB
BACTERIA UR CULT: ABNORMAL
BACTERIA UR CULT: ABNORMAL

## 2024-09-21 NOTE — TELEPHONE ENCOUNTER
[facility]    Reason for call: Lab Result Notification     Lab Result (including Rx patient on, if applicable).  If culture, copy of lab report at bottom.  Lab Result: Final urine culture on 9/21/24 shows the presence of bacteria(s): 10,000 - 50,000 CFU/mL Staphylococcus epidermidis    [facility] Emergency Dept discharge antibiotic: None;  treated with Doxycycline (14 day Rx) on 9/12/24.  [Susceptible to doxy]  Recommendations in treatment per [facility] ED lab result Urine Culture protocol.     Allergies:       Allergies   Allergen Reactions    Desogestrel-Ethinyl Estradiol Angioedema and Swelling     Swelling of hands and feet per pt.      Penicillins Rash     As a child per mother; mother unsure if has tolerated another PCN since. Tolerated ampicillin 9/20/16    Sulfa Antibiotics Rash    Vancomycin Rash      HCG qual negative    Patient's current Symptoms:   At 11:56AM, Left voicemail message requesting a call back to [facility] ED Lab Result RN at [phone]. RN is available every day between 9 a.m. and 5:30 p.m.     RN Recommendations/Instructions per [facility] ED lab result protocol:   [facility] ED lab result protocol utilized: urine culture  Letter sent        Carlos Eduardo Harvey RN

## 2024-09-21 NOTE — LETTER
September 21, 2024        Jacqueline Pappas  [address]   [place] [address]          Dear Jacqueline Pappas:    You were seen in the [facility] at [facility] on 9/18/2024.  We are unable to reach you by phone, so we are sending you this letter.     Please call [facility] lab result nurse at [phone], as we have information to relay to you.    Best time to call back is between 9AM and 5:30PM, 7 days a week.      Sincerely,     [facility] Lab Result RN  [phone]

## 2024-11-18 ENCOUNTER — TELEPHONE (OUTPATIENT)
Dept: INTERVENTIONAL RADIOLOGY/VASCULAR | Facility: CLINIC | Age: 39
End: 2024-11-18
Payer: COMMERCIAL

## 2024-11-19 ENCOUNTER — HOSPITAL ENCOUNTER (OUTPATIENT)
Dept: INTERVENTIONAL RADIOLOGY/VASCULAR | Facility: CLINIC | Age: 39
Discharge: HOME | End: 2024-11-19
Admitting: RADIOLOGY
Payer: COMMERCIAL

## 2024-11-19 VITALS
DIASTOLIC BLOOD PRESSURE: 71 MMHG | SYSTOLIC BLOOD PRESSURE: 120 MMHG | TEMPERATURE: 98.7 F | RESPIRATION RATE: 18 BRPM | HEART RATE: 56 BPM | OXYGEN SATURATION: 98 %

## 2024-11-19 DIAGNOSIS — N13.30 HYDRONEPHROSIS, UNSPECIFIED HYDRONEPHROSIS TYPE: ICD-10-CM

## 2024-11-19 PROCEDURE — 50435 EXCHANGE NEPHROSTOMY CATH: CPT

## 2024-11-19 PROCEDURE — 99152 MOD SED SAME PHYS/QHP 5/>YRS: CPT

## 2024-11-19 PROCEDURE — 250N000011 HC RX IP 250 OP 636: Performed by: NURSE PRACTITIONER

## 2024-11-19 PROCEDURE — C1769 GUIDE WIRE: HCPCS

## 2024-11-19 PROCEDURE — 250N000011 HC RX IP 250 OP 636: Performed by: RADIOLOGY

## 2024-11-19 PROCEDURE — C1729 CATH, DRAINAGE: HCPCS

## 2024-11-19 RX ORDER — HEPARIN SODIUM 200 [USP'U]/100ML
1 INJECTION, SOLUTION INTRAVENOUS EVERY 5 MIN PRN
Status: DISCONTINUED | OUTPATIENT
Start: 2024-11-19 | End: 2024-11-20 | Stop reason: HOSPADM

## 2024-11-19 RX ORDER — NALOXONE HYDROCHLORIDE 0.4 MG/ML
0.2 INJECTION, SOLUTION INTRAMUSCULAR; INTRAVENOUS; SUBCUTANEOUS
Status: DISCONTINUED | OUTPATIENT
Start: 2024-11-19 | End: 2024-11-20 | Stop reason: HOSPADM

## 2024-11-19 RX ORDER — FLUMAZENIL 0.1 MG/ML
0.2 INJECTION, SOLUTION INTRAVENOUS
Status: DISCONTINUED | OUTPATIENT
Start: 2024-11-19 | End: 2024-11-20 | Stop reason: HOSPADM

## 2024-11-19 RX ORDER — NALOXONE HYDROCHLORIDE 0.4 MG/ML
0.4 INJECTION, SOLUTION INTRAMUSCULAR; INTRAVENOUS; SUBCUTANEOUS
Status: DISCONTINUED | OUTPATIENT
Start: 2024-11-19 | End: 2024-11-20 | Stop reason: HOSPADM

## 2024-11-19 RX ORDER — FENTANYL CITRATE 50 UG/ML
25-50 INJECTION, SOLUTION INTRAMUSCULAR; INTRAVENOUS EVERY 5 MIN PRN
Status: DISCONTINUED | OUTPATIENT
Start: 2024-11-19 | End: 2024-11-20 | Stop reason: HOSPADM

## 2024-11-19 RX ORDER — CIPROFLOXACIN 2 MG/ML
400 INJECTION, SOLUTION INTRAVENOUS
Status: COMPLETED | OUTPATIENT
Start: 2024-11-19 | End: 2024-11-19

## 2024-11-19 RX ORDER — LIDOCAINE 40 MG/G
CREAM TOPICAL
Status: DISCONTINUED | OUTPATIENT
Start: 2024-11-19 | End: 2024-11-20 | Stop reason: HOSPADM

## 2024-11-19 RX ADMIN — FENTANYL CITRATE 50 MCG: 50 INJECTION INTRAMUSCULAR; INTRAVENOUS at 13:59

## 2024-11-19 RX ADMIN — FENTANYL CITRATE 50 MCG: 50 INJECTION INTRAMUSCULAR; INTRAVENOUS at 14:05

## 2024-11-19 RX ADMIN — MIDAZOLAM HYDROCHLORIDE 1 MG: 1 INJECTION, SOLUTION INTRAMUSCULAR; INTRAVENOUS at 13:59

## 2024-11-19 RX ADMIN — CIPROFLOXACIN 400 MG: 400 INJECTION, SOLUTION INTRAVENOUS at 13:00

## 2024-11-19 NOTE — PROGRESS NOTES
Interventional Radiology - Pre Procedure Chart Review  Outpatient - [facility]  Nov 19, 2024      Procedure Requested: Right PNT exchange  Requested by: Krista Landeros CNP    HPI: 39 year old patient with history of with a PMH of congenital solitary kidney with recurrent UTIs, nephrolithiasis, and ureteral strictures managed by a chronic RIGHT PNT who presents today for a routine RIGHT PNT exchange. She has been previously followed by [facility], but has since transferred care to [facility]. She has established care with a Urologist at [facility]. Reportedly receives Cipro 400 mg IVPB and sedation with exchanges. Here for routine right PNT exchange.    Clinical notes reviewed    Pertinent medications reviewed:   Anticoagulation: none per chart review   Antibiotic: IV cipro ordered     Allergies   Allergen Reactions    Desogestrel-Ethinyl Estradiol Angioedema and Swelling     Swelling of hands and feet per pt.      Penicillins Rash     As a child per mother; mother unsure if has tolerated another PCN since. Tolerated ampicillin 9/20/16    Sulfa Antibiotics Rash    Vancomycin Rash       IMAGING:  [facility]  LOCATION: [facility]  DATE: 8/20/2024     PROCEDURE: NEPHROSTOMY TUBE EXCHANGE     ATTENDING: Phoenix Wilks MD.     INDICATION: 39-year-old woman with history of solitary kidney with recurrent UTIs, nephrolithiasis. Has 8 Khmer right nephrostomy tube in place, last exchanged 5/20/2024. Presents for scheduled exchange.     CONSENT: The risks, benefits, and alternatives of nephrostomy tube exchange were discussed with the patient in detail. All questions were answered. Informed consent was given to proceed with the procedure.     MODERATE SEDATION: Versed 1 mg IV; Fentanyl 100 mcg IV.  During the timeout, immediately prior to the administration of medications, the patient was reassessed for adequacy to receive conscious sedation. Under physician supervision,  Versed and fentanyl   were administered for moderate sedation. Pulse oximetry, heart rate and blood pressure were continuously monitored by an independent trained observer. The physician spent 8 minutes of face-to-face sedation time with the patient.     FLUOROSCOPIC TIME: 0.5 minutes.  AIR KERMA:  3 mGy.  CONTRAST: 10 mL into collecting system     UNIVERSAL PRECAUTIONS: The procedure was performed utilizing maximum sterile barrier technique. Prior to the start of the procedure, a standard pause for patient safety was performed with site marking as indicated.     COMPLICATIONS: No immediate complications.     PROCEDURE:  A  image was obtained which demonstrated the right nephrostomy tube to be in expected location. A nephrostogram was performed through the nephrostomy tube which demonstrated minimal hydronephrosis. Under direct fluoroscopic   visualization, the previous tube was removed over a wire and exchange was made for a new 8 Kinyarwanda nephrostomy tube. The loop was locked within the renal pelvis. A post placement nephrostogram was performed which demonstrated the nephrostomy loop to lie   within the renal pelvis.                                                                       IMPRESSION:    1. Successful right nephrostomy tube exchange.     PLAN:  1. Patient to be recovered and discharged from IR.  2. Right nephrostomy tube to gravity drainage.  3. Follow-up with IR in 3 months for routine PCN exchange.  4. Note that patient requests no lidocaine and no skin suture for nephrostomy exchanges.    EXAM: US KIDNEY RIGHT  LOCATION: [facility]  DATE: 9/12/2024     INDICATION: Hematuria, nephrostomy tube  COMPARISON: None.  TECHNIQUE: Routine Right Renal and Bladder Ultrasound.     FINDINGS:     RIGHT KIDNEY: 9.6 x 6.5 x 6.8 cm. Multiple right renal cysts. Largest cyst measures 2.2 x 2.2 x 3.0 cm. 5 mm echogenic focus in the lower right kidney may represent stones. Percutaneous  "nephrostomy. Otherwise, Normal without hydronephrosis or masses.      BLADDER: Debris within the urinary bladder. Urinary bladder are otherwise unremarkable.                                                                      IMPRESSION:  1.  5 mm echogenic focus in the lower right kidney may represent stones.  2.  Multiple right renal cysts.  3.  Debris within the urinary bladder.    EXAM:  There were no vitals taken for this visit.  Not assessed    LABS:  INR (no units)   Date Value   12/30/2018 0.94   09/16/2016 1.82 (H)       Lab Results   Component Value Date    WBC 5.9 09/18/2024    HGB 13.6 09/18/2024     09/18/2024       No results found for: \"CREATININE\"    No components found for: \"K\"      PLAN:  Planning for right PNT exchange with sedation in IR.     Radiologist to further review procedure with patient.    EMR reviewed. No formal assessment completed.     Total time: 15 minutes       ROSEMARY Devi CNP  Interventional Radiology    " [facility]

## 2024-11-19 NOTE — PRE-PROCEDURE
GENERAL PRE-PROCEDURE:   Procedure:  PCN exchange  Date/Time:  11/19/2024 1:23 PM    Verbal consent obtained?: Yes    Written consent obtained?: Yes    Risks and benefits: Risks, benefits and alternatives were discussed    Consent given by:  Patient  Patient states understanding of procedure being performed: Yes    Patient's understanding of procedure matches consent: Yes    Procedure consent matches procedure scheduled: Yes    Expected level of sedation:  Moderate  Appropriately NPO:  Yes  ASA Class:  2  Mallampati  :  Grade 2- soft palate, base of uvula, tonsillar pillars, and portion of posterior pharyngeal wall visible  Lungs:  Lungs clear with good breath sounds bilaterally  Heart:  Normal heart sounds and rate  History & Physical reviewed:  History and physical reviewed and no updates needed  Statement of review:  I have reviewed the lab findings, diagnostic data, medications, and the plan for sedation

## 2024-11-19 NOTE — DISCHARGE INSTRUCTIONS
Percutaneous Nephrostomy Tube (PNT) Exchange/Check Discharge Instructions:  You had your nephrostomy tube checked or exchanged today. Please refer to the below instructions following your check/exchange:    Care Instructions:  - If you received sedation for your procedure, do not drive or operate heavy machinery for the rest of the day.  - Rest today if your tube was exchanged. Avoid strenuous activity and heavy lifting for the rest of the day. Return to your normal activities as you tolerate tomorrow.  - Keep the nephrostomy tube site clean and dry.   - You may shower, but do not submerge the nephrostomy tube site in water (avoid tub baths, Jacuzzis, hot tubs and pools).  - If you have a gauze dressing, change the gauze and tape dressing as needed to keep site clean and dry. If you have a securement device, leave in place until your next exchange.  - Clean around nephrostomy tubes exit sites with soap and water, pat and apply new split gauze around the tube at the exit site.  - Urine going into the bag may be red with blood for the first few days.  - Keep the drainage bag lower than your kidney to keep urine from backing up.  - Inspect the tube often for kinks.  - Empty your drainage bag when it is approximately half way fluid. Follow below instructions for emptying bag:  - Clean hands well with soap and water.  - Place a container near the outlet valve of the drainage bag.  - To open the valve:  - If you have a [employer] [facility] leg bag: Twist the blue valve at the bottom of the bag while holding the valve over your container to empty bag. Re-twist the valve closed on the drainage bag once complete.  - If you have a [place] leg bag: Turn the lever downward while holding the valve over your container to empty the bag. Turn the valve upward to close once complete.  - Discard drainage into toilet once urine drained.    Follow-Up:  - Routine (every 2-3 months) nephrostomy tube exchange is recommended. Contact [facility] Outpatient Scheduling at [phone] to schedule exchanges.    Contact [facility] RN Line at [phone] if you experience the following:  - Nephrostomy tube(s) stops draining urine.  - Nephrostomy tube(s) site is leaking urine.  - Extreme pain at the nephrostomy tube site.  - Nephrostomy tube appears to be falling out or has fallen out, becomes clogged or breaks.    Seek Medical Evaluation for the following:  - Fever (greater than 101 F (38.3C)).  - Purulent (yellow/green/foul smelling) drainage from nephrostomy tube exit sites.  - Significant bleeding from nephrostomy tube site(s).  - Significant or worsening pain at nephrostomy tube exit site(s) or back.           * Recovery After Conscious Sedation (Adult)  We gave you medicine by vein to make you sleepy or relaxed during your procedure. This may have included both a pain medicine and sleeping medicine. Most of the effects have worn off. But you may still feel sleepy for the next 6 to 8 hours.  Home care  Follow these guidelines when you get home:  You may feel sleepy and clumsy and have poor balance for the next few hours.  A responsible adult should stay with you for the next 8 hours. This person should make sure your condition doesn t get worse.  Don't drink any alcohol for the next 24 hours.  Don't drive, operate dangerous machinery, make important business or personal decisions or sign legal documents during the next 24 hours.  You may vomit (throw up) if you eat too soon after the procedure. If this happens, drink small amounts of water, juice or clear broth. Wait to try solid food until you no longer have nausea (upset stomach).  Note: Your care team may tell you not to take any medicine by mouth for pain or sleep in the next 4 hours. These medicines may react with the medicines you had in the hospital. This could cause a much stronger response than usual.  Follow-up care  Follow up with your care team if you are not alert and back to your usual  level of activity within 12 hours.  When to seek medical advice  Call your care team right away if any of these occur:  You still feel sleepy or clumsy after 12 hours, or your sleepiness gets worse  Weakness or dizziness gets worse  Repeated vomiting  If you can't be woken up and someone is staying with you, they should call 911.  For informational purposes only. Not to replace the advice of your health care provider.  Copyright   2018 [employer]. All rights reserved.

## 2024-11-19 NOTE — PROCEDURES
[facility]    Procedure: IR Procedure Note    Date/Time: 11/19/2024 2:15 PM    Performed by: Ceferino Barillas MD  Authorized by: Ceferino Barillas MD  IR Fellow Physician:    Pre Procedure Diagnosis: chronic PCN, infundibular stricture  Post Procedure Diagnosis: same    UNIVERSAL PROTOCOL   Site Marked: Yes  Prior Images Obtained and Reviewed:  Yes  Required items: Required blood products, implants, devices and special equipment available    Patient identity confirmed:  Hospital-assigned identification number  Patient was reevaluated immediately before administering moderate or deep sedation or anesthesia  Confirmation Checklist:  Patient's identity using two indicators  Time out: Immediately prior to the procedure a time out was called    Universal Protocol: the Joint Commission Universal Protocol was followed    Preparation: Patient was prepped and draped in usual sterile fashion       ANESTHESIA    Anesthesia:  Local infiltration      SEDATION    Patient Sedated: No    Findings: Tolerate well    Specimens: none    Procedural Complications: None    Condition: Stable    Plan: Routine change 3 months.  PCN to gravity drainage at all times      PROCEDURE  Describe Procedure: Uncomplicated routine PCN exchange  Length of time physician/provider present for 1:1 monitoring during sedation:  0 min

## 2024-12-06 PROCEDURE — 99284 EMERGENCY DEPT VISIT MOD MDM: CPT | Mod: 25

## 2024-12-07 ENCOUNTER — APPOINTMENT (OUTPATIENT)
Dept: CT IMAGING | Facility: CLINIC | Age: 39
End: 2024-12-07
Attending: EMERGENCY MEDICINE
Payer: COMMERCIAL

## 2024-12-07 ENCOUNTER — HOSPITAL ENCOUNTER (EMERGENCY)
Facility: CLINIC | Age: 39
Discharge: HOME | End: 2024-12-07
Attending: EMERGENCY MEDICINE | Admitting: EMERGENCY MEDICINE
Payer: COMMERCIAL

## 2024-12-07 VITALS
DIASTOLIC BLOOD PRESSURE: 62 MMHG | HEART RATE: 70 BPM | BODY MASS INDEX: 32.85 KG/M2 | HEIGHT: 61 IN | TEMPERATURE: 98 F | SYSTOLIC BLOOD PRESSURE: 111 MMHG | WEIGHT: 174 LBS | OXYGEN SATURATION: 94 % | RESPIRATION RATE: 18 BRPM

## 2024-12-07 DIAGNOSIS — R10.9 FLANK PAIN: ICD-10-CM

## 2024-12-07 LAB
ALBUMIN UR-MCNC: 300 MG/DL
ALBUMIN UR-MCNC: NEGATIVE MG/DL
ANION GAP SERPL CALCULATED.3IONS-SCNC: 12 MMOL/L (ref 7–15)
APPEARANCE UR: ABNORMAL
APPEARANCE UR: CLEAR
BACTERIA #/AREA URNS HPF: ABNORMAL /HPF
BASOPHILS # BLD AUTO: 0 10E3/UL (ref 0–0.2)
BASOPHILS NFR BLD AUTO: 0 %
BILIRUB UR QL STRIP: NEGATIVE
BILIRUB UR QL STRIP: NEGATIVE
BUN SERPL-MCNC: 12.9 MG/DL (ref 6–20)
CALCIUM SERPL-MCNC: 10.2 MG/DL (ref 8.8–10.4)
CHLORIDE SERPL-SCNC: 101 MMOL/L (ref 98–107)
COLOR UR AUTO: ABNORMAL
COLOR UR AUTO: NORMAL
CREAT SERPL-MCNC: 1.04 MG/DL (ref 0.51–0.95)
EGFRCR SERPLBLD CKD-EPI 2021: 71 ML/MIN/1.73M2
EOSINOPHIL # BLD AUTO: 0.2 10E3/UL (ref 0–0.7)
EOSINOPHIL NFR BLD AUTO: 3 %
ERYTHROCYTE [DISTWIDTH] IN BLOOD BY AUTOMATED COUNT: 13 % (ref 10–15)
FLUAV RNA SPEC QL NAA+PROBE: NEGATIVE
FLUBV RNA RESP QL NAA+PROBE: NEGATIVE
GLUCOSE SERPL-MCNC: 94 MG/DL (ref 70–99)
GLUCOSE UR STRIP-MCNC: NEGATIVE MG/DL
GLUCOSE UR STRIP-MCNC: NEGATIVE MG/DL
HCO3 SERPL-SCNC: 23 MMOL/L (ref 22–29)
HCT VFR BLD AUTO: 36.9 % (ref 35–47)
HGB BLD-MCNC: 12.8 G/DL (ref 11.7–15.7)
HGB UR QL STRIP: ABNORMAL
HGB UR QL STRIP: NEGATIVE
HYALINE CASTS: 4 /LPF
IMM GRANULOCYTES # BLD: 0 10E3/UL
IMM GRANULOCYTES NFR BLD: 0 %
KETONES UR STRIP-MCNC: NEGATIVE MG/DL
KETONES UR STRIP-MCNC: NEGATIVE MG/DL
LEUKOCYTE ESTERASE UR QL STRIP: ABNORMAL
LEUKOCYTE ESTERASE UR QL STRIP: NEGATIVE
LYMPHOCYTES # BLD AUTO: 1.7 10E3/UL (ref 0.8–5.3)
LYMPHOCYTES NFR BLD AUTO: 23 %
MCH RBC QN AUTO: 30.7 PG (ref 26.5–33)
MCHC RBC AUTO-ENTMCNC: 34.7 G/DL (ref 31.5–36.5)
MCV RBC AUTO: 89 FL (ref 78–100)
MONOCYTES # BLD AUTO: 0.6 10E3/UL (ref 0–1.3)
MONOCYTES NFR BLD AUTO: 8 %
MUCOUS THREADS #/AREA URNS LPF: PRESENT /LPF
NEUTROPHILS # BLD AUTO: 5.1 10E3/UL (ref 1.6–8.3)
NEUTROPHILS NFR BLD AUTO: 66 %
NITRATE UR QL: NEGATIVE
NITRATE UR QL: NEGATIVE
NRBC # BLD AUTO: 0 10E3/UL
NRBC BLD AUTO-RTO: 0 /100
PH UR STRIP: 6.5 [PH] (ref 5–7)
PH UR STRIP: 7.5 [PH] (ref 5–7)
PLATELET # BLD AUTO: 168 10E3/UL (ref 150–450)
POTASSIUM SERPL-SCNC: 3.7 MMOL/L (ref 3.4–5.3)
RBC # BLD AUTO: 4.17 10E6/UL (ref 3.8–5.2)
RBC URINE: 1 /HPF
RBC URINE: 112 /HPF
RSV RNA SPEC NAA+PROBE: NEGATIVE
SARS-COV-2 RNA RESP QL NAA+PROBE: NEGATIVE
SODIUM SERPL-SCNC: 136 MMOL/L (ref 135–145)
SP GR UR STRIP: 1.01 (ref 1–1.03)
SP GR UR STRIP: 1.01 (ref 1–1.03)
SQUAMOUS EPITHELIAL: <1 /HPF
SQUAMOUS EPITHELIAL: <1 /HPF
UROBILINOGEN UR STRIP-MCNC: <2 MG/DL
UROBILINOGEN UR STRIP-MCNC: <2 MG/DL
WBC # BLD AUTO: 7.6 10E3/UL (ref 4–11)
WBC URINE: 1 /HPF
WBC URINE: 37 /HPF

## 2024-12-07 PROCEDURE — 80048 BASIC METABOLIC PNL TOTAL CA: CPT | Performed by: EMERGENCY MEDICINE

## 2024-12-07 PROCEDURE — 87637 SARSCOV2&INF A&B&RSV AMP PRB: CPT | Performed by: EMERGENCY MEDICINE

## 2024-12-07 PROCEDURE — 82565 ASSAY OF CREATININE: CPT | Performed by: EMERGENCY MEDICINE

## 2024-12-07 PROCEDURE — 87088 URINE BACTERIA CULTURE: CPT | Performed by: EMERGENCY MEDICINE

## 2024-12-07 PROCEDURE — 81001 URINALYSIS AUTO W/SCOPE: CPT | Performed by: EMERGENCY MEDICINE

## 2024-12-07 PROCEDURE — 82310 ASSAY OF CALCIUM: CPT | Performed by: EMERGENCY MEDICINE

## 2024-12-07 PROCEDURE — 250N000013 HC RX MED GY IP 250 OP 250 PS 637: Performed by: EMERGENCY MEDICINE

## 2024-12-07 PROCEDURE — 85041 AUTOMATED RBC COUNT: CPT | Performed by: EMERGENCY MEDICINE

## 2024-12-07 PROCEDURE — 85004 AUTOMATED DIFF WBC COUNT: CPT | Performed by: EMERGENCY MEDICINE

## 2024-12-07 PROCEDURE — 36415 COLL VENOUS BLD VENIPUNCTURE: CPT | Performed by: EMERGENCY MEDICINE

## 2024-12-07 PROCEDURE — 74176 CT ABD & PELVIS W/O CONTRAST: CPT

## 2024-12-07 RX ORDER — ACETAMINOPHEN 325 MG/1
650 TABLET ORAL ONCE
Status: COMPLETED | OUTPATIENT
Start: 2024-12-07 | End: 2024-12-07

## 2024-12-07 RX ADMIN — ACETAMINOPHEN 650 MG: 325 TABLET ORAL at 01:06

## 2024-12-07 ASSESSMENT — ACTIVITIES OF DAILY LIVING (ADL)
ADLS_ACUITY_SCORE: 48

## 2024-12-07 NOTE — ED TRIAGE NOTES
Pt was having back pain due to menses, yesterday morning moved to right flank.  Pt only has 1 kidney and has nephrostomy tube.  Drainage is fairly clear, small amount of blood noted.  Pt also developed generalized malaise and body aches and subjective chills.     Triage Assessment (Adult)       Row Name 12/06/24 1576          Triage Assessment    Airway WDL WDL        Respiratory WDL    Respiratory WDL WDL        Skin Circulation/Temperature WDL    Skin Circulation/Temperature WDL WDL        Cardiac WDL    Cardiac WDL WDL        Peripheral/Neurovascular WDL    Peripheral Neurovascular WDL WDL        Cognitive/Neuro/Behavioral WDL    Cognitive/Neuro/Behavioral WDL WDL

## 2024-12-07 NOTE — ED NOTES
Introduced self to patient. Whiteboard updated. Plan of care and length of time discussed with patient. Pain assessed.

## 2024-12-07 NOTE — ED PROVIDER NOTES
EMERGENCY DEPARTMENT ENCOUNTER      NAME: Jacqueline Pappas  AGE: 39 year old female  YOB: 1985  MRN: [id number]  EVALUATION DATE & TIME: No admission date for patient encounter.    PCP: Pao Montague    ED PROVIDER: Ranjit Rojas M.D.      Chief Complaint   Patient presents with    Flank Pain         FINAL IMPRESSION:  1. Flank pain          ED COURSE & MEDICAL DECISION MAKING:    Pertinent Labs & Imaging studies reviewed. (See chart for details)  39 year old female presents to the Emergency Department for evaluation of flank pain.  Patient has chronic flank pain.  History of single right-sided kidney in addition to nephrostomy tube.  Did do a CT scan.  No obstructing kidney stone found.  No other abnormalities to show of the cause of her pain.  Her urinalysis does show some white cells though not convincingly infected.  Has a history of chronicly positive UA's with negative culture.  I do not think she needs antibiotics at this time.  She is afebrile and no other abnormalities.  White count is normal.  Will hold off on antibiotics until culture comes back.  Patient feeling better here after Tylenol.  No signs of appendicitis or other bowel pathology.  No obstruction.  Patient feeling better and discharged home.  Will have her follow-up with her primary.  Return for worsening symptoms    12:29 AM I met with the patient to gather history and to perform my initial exam. I discussed the plan for care while in the Emergency Department.     At the conclusion of the encounter I discussed the results of all of the tests and the disposition. The questions were answered. The patient or family acknowledged understanding and was agreeable with the care plan.     Medical Decision Making  Obtained supplemental history:Supplemental history obtained?: Documented in chart  Reviewed external records: External records reviewed?: Documented in chart  Care impacted by chronic illness:Other: Congenital solitary right  kidney  Did you consider but not order tests?: Work up considered but not performed and documented in chart, if applicable  Did you interpret images independently?: Independent interpretation of ECG and images noted in documentation, when applicable.  Consultation discussion with other provider:Did you involve another provider (consultant, , pharmacy, etc.)?: No  Discharge. No recommendations on prescription strength medication(s). See documentation for any additional details.    MIPS: Not Applicable       MEDICATIONS GIVEN IN THE EMERGENCY:  Medications   acetaminophen (TYLENOL) tablet 650 mg (650 mg Oral $Given 12/7/24 0106)       NEW PRESCRIPTIONS STARTED AT TODAY'S ER VISIT  Discharge Medication List as of 12/7/2024  2:41 AM             =================================================================    HPI    Patient information was obtained from: Patient    Use of : N/A        Jacqueline Pappas is a 39 year old female with a pertinent history of congenital solitary right kidney, SIRS, tobacco abuse, who presents to this ED for evaluation of flank pain.     Patient reports back pain that started a couple days ago. Yesterday, she started developing right flank pain. Throughout the day, she developed soreness to bilateral legs. She also notes some diarrhea and cough. Patient has a solitary right kidney. She has a nephrostomy in place which has been draining. Patient also reports a history of kidney stones. Otherwise, she denies any fevers, nausea, vomiting, dysuria, shortness of breath, and recent sick contacts. Patient is currently on her menstrual period. No other complaints at this time.    PAST MEDICAL HISTORY:  Past Medical History:   Diagnosis Date    Acute renal failure (H)     Anemia     Anemia     Calculus of kidney     Congenital absence of left kidney     Congenital absence of one kidney     Depression     Depression     Hydronephrosis     Kidney stone     at age 29    Pyelonephritis      Pyelonephritis     Smoker     Ureteral stricture     UTI (urinary tract infection)     Wrist fracture     Left       PAST SURGICAL HISTORY:  Past Surgical History:   Procedure Laterality Date     SECTION       SECTION      x1    COMBINED CYSTOSCOPY, RETROGRADES, URETEROSCOPY, LASER HOLMIUM LITHOTRIPSY URETER(S), INSERT STENT Right 2016    Procedure: COMBINED CYSTOSCOPY, RETROGRADES, URETEROSCOPY, LASER HOLMIUM LITHOTRIPSY URETER(S), INSERT STENT;  Surgeon: Florentino Awad MD;  Location: [facility]    CYSTOSCOPY, URETEROSCOPY, COMBINED Right 2016    Procedure: COMBINED CYSTOSCOPY, URETEROSCOPY;  Surgeon: Florentino Awad MD;  Location: [facility] OR    IR NEPHROLITHOTOMY  2014    IR NEPHROSTOGRAM EXISITING ACCESS  10/18/2018    IR NEPHROSTOMY TUBE CHANGE RIGHT  2018    IR NEPHROSTOMY TUBE CHANGE RIGHT  2020    IR NEPHROSTOMY TUBE CHANGE RIGHT  2018    IR NEPHROSTOMY TUBE CHANGE RIGHT  2020    IR NEPHROSTOMY TUBE CHANGE RIGHT  11/3/2023    IR NEPHROSTOMY TUBE CHANGE RIGHT  2024    IR NEPHROSTOMY TUBE CHANGE RIGHT  2024    IR NEPHROSTOMY TUBE CHANGE RIGHT  2024    IR NEPHROSTOMY TUBE CHANGE RIGHT  2024    IR NEPHROSTOMY TUBE PLACEMENT RIGHT  2016    IR NEPHROSTOMY TUBE PLACEMENT RIGHT  2017    KIDNEY STONE SURGERY Right 2016    LASER HOLMIUM LITHOTRIPSY URETER(S), INSERT STENT, COMBINED Left 2016    Procedure: COMBINED CYSTOSCOPY, URETEROSCOPY, LASER HOLMIUM LITHOTRIPSY URETER(S), INSERT STENT;  Surgeon: Florentino Awad MD;  Location: [facility]    LASER HOLMIUM NEPHROLITHOTOMY VIA PERCUTANEOUS NEPHROSTOMY Right 2016    Procedure: LASER HOLMIUM NEPHROLITHOTOMY VIA PERCUTANEOUS NEPHROSTOMY;  Surgeon: Florentino Awad MD;  Location: [facility] OR    NEPHROSTOMY W/ INTRODUCTION OF CATHETER Right     OTHER SURGICAL HISTORY      Mole removednasal    OTHER SURGICAL HISTORY Right     Cystoscopy, ureteroscopy, laser11/02/2014 x2 with  ureteral stent insertion    PERCUTANEOUS NEPHROSTOMY  11/1/2016    Procedure: PERCUTANEOUS NEPHROSTOMY;  Surgeon: Florentino Awad MD;  Location: [facility] OR    WISDOM TOOTH EXTRACTION      ZZC REMOVAL OF KIDNEY STONE             CURRENT MEDICATIONS:    No current facility-administered medications for this encounter.     Current Outpatient Medications   Medication Sig Dispense Refill    acetaminophen (TYLENOL) 500 MG tablet Take 500-1,000 mg by mouth every 6 hours as needed for mild pain      albuterol (ACCUNEB) 1.25 MG/3ML neb solution Take 1.25 mg by nebulization every 6 hours as needed for shortness of breath / dyspnea or wheezing      albuterol (PROAIR HFA) 108 (90 Base) MCG/ACT inhaler Inhale 2 puffs into the lungs every 4 hours as needed for shortness of breath or cough 8 g 0    albuterol (PROAIR HFA/PROVENTIL HFA/VENTOLIN HFA) 108 (90 Base) MCG/ACT inhaler Inhale 2 puffs into the lungs every 6 hours 8 g 0    azelastine (ASTELIN) 0.1 % nasal spray Spray 1 spray into both nostrils 2 times daily 30 mL 11    benzonatate (TESSALON) 100 MG capsule Take 1 capsule (100 mg) by mouth 3 times daily as needed for cough 10 capsule 0    budesonide-formoterol (SYMBICORT) 160-4.5 MCG/ACT Inhaler Inhale 2 puffs into the lungs 2 times daily 6 g 11    ipratropium - albuterol 0.5 mg/2.5 mg/3 mL (DUONEB) 0.5-2.5 (3) MG/3ML neb solution Take 1 vial (3 mLs) by nebulization every 6 hours as needed for shortness of breath, wheezing or cough 180 mL 4    sertraline (ZOLOFT) 100 MG tablet Take 100 mg by mouth At Bedtime           ALLERGIES:  Allergies   Allergen Reactions    Desogestrel-Ethinyl Estradiol Angioedema and Swelling     Swelling of hands and feet per pt.      Penicillins Rash     As a child per mother; mother unsure if has tolerated another PCN since. Tolerated ampicillin 9/20/16    Sulfa Antibiotics Rash    Vancomycin Rash       FAMILY HISTORY:  Family History   Problem Relation Age of Onset    Anesthesia Reaction No family  "hx of     Malignant Hyperthermia No family hx of     Heart Disease Maternal Uncle         heart failure    Coronary Artery Disease Other     Heart Disease Maternal Grandmother         pacemaker    Gout Paternal Grandfather     Prostate Cancer Maternal Aunt         breast    Heart Disease Maternal Aunt         pacemaker    Heart Disease Maternal Aunt         pacemaker    Heart Disease Maternal Aunt         pacemaker       SOCIAL HISTORY:   Social History     Socioeconomic History    Marital status: Single   Tobacco Use    Smoking status: Every Day     Current packs/day: 0.00     Average packs/day: 0.5 packs/day for 10.0 years (5.0 ttl pk-yrs)     Types: Cigarettes     Start date: 2006     Last attempt to quit: 2016     Years since quittin.2    Smokeless tobacco: Former     Quit date: 2016    Tobacco comments:     Hasn't smoked in a week due to breathing issues    Vaping Use    Vaping status: Never Used   Substance and Sexual Activity    Alcohol use: Not Currently     Comment: Alcoholic Drinks/day: occ    Drug use: No    Sexual activity: Not Currently     Social Drivers of Health      Received from [facility]    Financial Resource Strain    Received from [facility]    Social Connections   Interpersonal Safety: Low Risk  (2024)    Interpersonal Safety     Do you feel physically and emotionally safe where you currently live?: Yes     Within the past 12 months, have you been hit, slapped, kicked or otherwise physically hurt by someone?: No     Within the past 12 months, have you been humiliated or emotionally abused in other ways by your partner or ex-partner?: No       VITALS:  /62   Pulse 70   Temp 98  F (36.7  C) (Oral)   Resp 18   Ht 1.549 m (5' 1\")   Wt 78.9 kg (174 lb)   LMP 2024   SpO2 94%   BMI 32.88 kg/m  "     PHYSICAL EXAM    Physical Exam  Vitals and nursing note reviewed.   Constitutional:       General: She is not in acute distress.     Appearance: She is not diaphoretic.   HENT:      Head: Atraumatic.      Mouth/Throat:      Pharynx: No oropharyngeal exudate.   Eyes:      General: No scleral icterus.     Pupils: Pupils are equal, round, and reactive to light.   Cardiovascular:      Rate and Rhythm: Normal rate and regular rhythm.      Heart sounds: Normal heart sounds.   Pulmonary:      Effort: No respiratory distress.      Breath sounds: Normal breath sounds.   Abdominal:      Palpations: Abdomen is soft.      Tenderness: There is no abdominal tenderness. There is no guarding or rebound. Negative signs include Yepez's sign.      Comments: Nephrostomy tube on the right.  Is draining well.  Site is clean dry and intact.  No signs of infection.   Musculoskeletal:         General: No tenderness.   Skin:     General: Skin is warm.      Findings: No rash.   Neurological:      General: No focal deficit present.      Mental Status: She is alert.           LAB:  All pertinent labs reviewed and interpreted.  Labs Ordered and Resulted from Time of ED Arrival to Time of ED Departure   ROUTINE UA WITH MICROSCOPIC REFLEX TO CULTURE - Abnormal       Result Value    Color Urine Light Yellow      Appearance Urine Turbid (*)     Glucose Urine Negative      Bilirubin Urine Negative      Ketones Urine Negative      Specific Gravity Urine 1.008      Blood Urine 1.0 mg/dL (*)     pH Urine 7.5 (*)     Protein Albumin Urine 300 (*)     Urobilinogen Urine <2.0      Nitrite Urine Negative      Leukocyte Esterase Urine 500 Duglas/uL (*)     Bacteria Urine Many (*)     Mucus Urine Present (*)     RBC Urine 112 (*)     WBC Urine 37 (*)     Squamous Epithelials Urine <1      Hyaline Casts Urine 4 (*)    BASIC METABOLIC PANEL - Abnormal    Sodium 136      Potassium 3.7      Chloride 101      Carbon Dioxide (CO2) 23      Anion Gap 12      Urea  Nitrogen 12.9      Creatinine 1.04 (*)     GFR Estimate 71      Calcium 10.2      Glucose 94     ROUTINE UA WITH MICROSCOPIC REFLEX TO CULTURE - Normal    Color Urine Light Yellow      Appearance Urine Clear      Glucose Urine Negative      Bilirubin Urine Negative      Ketones Urine Negative      Specific Gravity Urine 1.011      Blood Urine Negative      pH Urine 6.5      Protein Albumin Urine Negative      Urobilinogen Urine <2.0      Nitrite Urine Negative      Leukocyte Esterase Urine Negative      RBC Urine 1      WBC Urine 1      Squamous Epithelials Urine <1     INFLUENZA A/B, RSV AND SARS-COV2 PCR - Normal    Influenza A PCR Negative      Influenza B PCR Negative      RSV PCR Negative      SARS CoV2 PCR Negative     CBC WITH PLATELETS AND DIFFERENTIAL    WBC Count 7.6      RBC Count 4.17      Hemoglobin 12.8      Hematocrit 36.9      MCV 89      MCH 30.7      MCHC 34.7      RDW 13.0      Platelet Count 168      % Neutrophils 66      % Lymphocytes 23      % Monocytes 8      % Eosinophils 3      % Basophils 0      % Immature Granulocytes 0      NRBCs per 100 WBC 0      Absolute Neutrophils 5.1      Absolute Lymphocytes 1.7      Absolute Monocytes 0.6      Absolute Eosinophils 0.2      Absolute Basophils 0.0      Absolute Immature Granulocytes 0.0      Absolute NRBCs 0.0     URINE CULTURE       RADIOLOGY:  Reviewed all pertinent imaging. Please see official radiology report.  CT Abdomen Pelvis w/o Contrast   Final Result   IMPRESSION:    1.  Right percutaneous nephrostomy.   2.  Multiple right renal cysts and right renal calculi again seen.   3.  Underdistention of the bladder. Wall thickening not excluded. Correlate clinically for cystitis.                  I, Margarette Wheeler, am serving as a scribe to document services personally performed by Dr. Ranjit Rojas, based on my observation and the provider's statements to me. I, Ranjit Rojas MD attest that Margarette Wheeler is acting in a scribe capacity, has observed my  performance of the services and has documented them in accordance with my direction.    Ranjit Rojas M.D.  Emergency Medicine  [facility] EMERGENCY ROOM  [address] [address]  [phone]  Dept: [phone]       Ranjit Rojas MD  12/07/24 0608

## 2024-12-08 ENCOUNTER — TELEPHONE (OUTPATIENT)
Dept: NURSING | Facility: CLINIC | Age: 39
End: 2024-12-08
Payer: COMMERCIAL

## 2024-12-08 NOTE — TELEPHONE ENCOUNTER
[facility]     Reason for call: Lab Result Notification     Lab Result (including Rx patient on, if applicable).  If culture, copy of lab report at bottom.  Lab Result: urine culture - preliminary (see below)    No ED Rx    Creatinine Level (mg/dl)   Creatinine   Date Value Ref Range Status   12/07/2024 1.04 (H) 0.51 - 0.95 mg/dL Final   11/02/2016 1.04 0.52 - 1.04 mg/dL Final    Creatinine clearance (ml/min), if applicable    Serum creatinine: 1.04 mg/dL (H) 12/07/24 0120  Estimated creatinine clearance: 70.4 mL/min (A)     ED Symptoms: patient presented to [facility] ED on 12/7/2024 for evaluation of flank pain. Hx of single right kidney and nephrostomy tube.    RN Recommendations/Instructions per [facility] ED lab result protocol:   [facility] ED lab result protocol utilized: Urine Culture  Recommend Cefpodoxime if symptomatic, wait for final if asymptomatic    Unable to reach patient/caregiver.     Left voicemail message requesting a call back to [phone] between 9 a.m. and 5:30 p.m. for patient's ED/UC lab results.      Letter pended to be sent via [employer].         Amanda Miller RN

## 2024-12-08 NOTE — LETTER
December 8, 2024        Jacqueline Pappas  [address]   [place] [address]          Dear Jacqueline Pappas:    You were seen in the [facility] Emergency Department at [facility] on 12/7/2024.  We are unable to reach you by phone, so we are sending you this letter.     It is important that you call [facility] Emergency Department lab result nurse at [phone], as we have information to relay to you AND/OR we MAY have to make some changes in your treatment.    Best time to call back is between 9AM and 5:30PM, 7 days a week.      Sincerely,     [facility] Emergency Department Lab Result RN  [phone]

## 2024-12-09 LAB — BACTERIA UR CULT: ABNORMAL

## 2024-12-09 NOTE — TELEPHONE ENCOUNTER
[facility]     Reason for call: Lab Result Notification     Lab Result (including Rx patient on, if applicable).  If culture, copy of lab report at bottom.  Lab Result: Final urine culture on 12/7/24 shows the presence of bacteria(s): >100,000 CFU/ML Streptococcus agalactiae (Group B Streptococcus)   [facility] Emergency Dept discharge antibiotic: None      Creatinine Level (mg/dl)   Creatinine   Date Value Ref Range Status   12/07/2024 1.04 (H) 0.51 - 0.95 mg/dL Final   11/02/2016 1.04 0.52 - 1.04 mg/dL Final    Creatinine clearance (ml/min), if applicable    Serum creatinine: 1.04 mg/dL (H) 12/07/24 0120  Estimated creatinine clearance: 70.4 mL/min (A)     History of single right-sided kidney in addition to nephrostomy tube.      Allergies   Allergen Reactions    Desogestrel-Ethinyl Estradiol Angioedema and Swelling     Swelling of hands and feet per pt.      Penicillins Rash     As a child per mother; mother unsure if has tolerated another PCN since. Tolerated ampicillin 9/20/16    Sulfa Antibiotics Rash    Vancomycin Rash      Patient's current Symptoms:   12:30P, Left voicemail message requesting a call back to [facility] ED Lab Result RN at [phone]. RN is available every day between 9 a.m. and 5:30 p.m.   Letter sent via [employer] on 12/8/24    RN Recommendations/Instructions per [facility] ED lab result protocol:   [facility] ED lab result protocol utilized: urine cx  Recommendations in treatment per [facility] ED lab result Urine Culture protocol.     Per protocol, consider Cefpodoxime 200 mg PO BID for 7 days.  Await call back        Carlos Eduardo Harvey RN

## 2024-12-11 ENCOUNTER — MYC MEDICAL ADVICE (OUTPATIENT)
Dept: INTERVENTIONAL RADIOLOGY/VASCULAR | Facility: CLINIC | Age: 39
End: 2024-12-11
Payer: COMMERCIAL

## 2024-12-13 NOTE — PROGRESS NOTES
Interventional Radiology - Pre-Procedure Evaluation:  Outpatient - [facility]  12/16/2024     Procedure Requested: R nephrostomy change  Requested by:   Snehal Torres NP         History and Physical Reviewed: H&P documented within 30 days (by Ranjit Rojas MD  on 12/7/24). I have personally reviewed the patient's medical history and have updated the medical record as necessary.    HPI: Jacqueline Pappas is a 39 year old female history of with a PMH of congenital solitary kidney with recurrent UTIs, nephrolithiasis, and ureteral strictures managed by a chronic RIGHT PNT who presents today for a routine RIGHT PNT exchange. She has been previously followed by [facility], but has since transferred care to [facility]. She has established care with a Urologist at [facility]. Reportedly receives Cipro 400 mg IVPB and sedation with exchanges. Last exchange 11/19/24.    Recently seen in ED d/t pain from PNT and found to have UTI. Sent in for PNT exchange sooner than routine d/t recent infection. Typically on every 3 month exchange schedule per patient.    Per referral:  RIGHT PNT exchange- with sedation and abx (cipro 400mg IV x1) in 2-3 months      IMAGING:  [facility]  LOCATION: [facility]  DATE: 11/19/2024     PROCEDURE: NEPHROSTOMY TUBE EXCHANGE     INDICATION: 39-year-old woman with history of solitary right kidney with recurrent UTIs and resultant infundibular strictures. The obstructed lower pole of the kidney is managed with a chronic 8 [place] nephrostomy tube, last exchanged in August. She   presents for routine scheduled exchange with no acute concerns.     CONSENT: The risks, benefits, and alternatives of nephrostomy tube exchange were discussed with the patient in detail. All questions were answered. Informed consent was given to proceed with the procedure.     MODERATE SEDATION: Versed 1 mg IV; Fentanyl 100 mcg IV.  During the timeout, immediately  prior to the administration of medications, the patient was reassessed for adequacy to receive conscious sedation. Under physician supervision, Versed and fentanyl   were administered for moderate sedation. Pulse oximetry, heart rate and blood pressure were continuously monitored by an independent trained observer. The physician spent 5 minutes of face-to-face sedation time with the patient.     FLUOROSCOPIC TIME: 0.7 minutes.  AIR KERMA:  8 mGy.  CONTRAST: 10 mL into collecting system     UNIVERSAL PRECAUTIONS: The procedure was performed utilizing maximum sterile barrier technique. Prior to the start of the procedure, a standard pause for patient safety was performed with site marking as indicated.     COMPLICATIONS: No immediate complications.     PROCEDURE:  A  image was obtained which demonstrated the right nephrostomy tube to be in expected location. A nephrostogram was performed through the nephrostomy tube which demonstrated opacification of the lower pole calyx with no contrast   extending into the remainder of the collecting system or ureter. Under direct fluoroscopic visualization, the previous tube was removed over a wire and exchange was made for a new 8 Lithuanian nephrostomy tube. The loop was locked. A post placement   nephrostogram was performed which demonstrated the nephrostomy loop to lie in unchanged position within the obstructed lower pole calyx.                                                                       IMPRESSION:  Successful fluoroscopically guided exchange of a right nephrostomy tube draining an obstructed lower pole calyx, as detailed above.     Note that today's procedure was per patient request performed with no lidocaine and no skin suture.     Plan is for follow-up routine exchange in 3 months. Nephrostomy to be maintained to gravity drainage at all times.       NPO: since [place]  ANTICOAGULANTS/ANTIPLATELETS: none  ANTIBIOTICS: Cipro 400mg IV pre  "procedure    ALLERGIES:  Allergies   Allergen Reactions    Desogestrel-Ethinyl Estradiol Angioedema and Swelling     Swelling of hands and feet per pt.      Penicillins Rash     As a child per mother; mother unsure if has tolerated another PCN since. Tolerated ampicillin 9/20/16    Sulfa Antibiotics Rash    Vancomycin Rash         LABS:  INR   Date Value Ref Range Status   12/30/2018 0.94 0.90 - 1.10 Final   09/16/2016 1.82 (H) 0.86 - 1.14 Final      Hemoglobin   Date Value Ref Range Status   12/07/2024 12.8 11.7 - 15.7 g/dL Final   11/02/2016 10.1 (L) 11.7 - 15.7 g/dL Final     Platelet Count   Date Value Ref Range Status   12/07/2024 168 150 - 450 10e3/uL Final   11/02/2016 180 150 - 450 10e9/L Final     Creatinine   Date Value Ref Range Status   12/07/2024 1.04 (H) 0.51 - 0.95 mg/dL Final   11/02/2016 1.04 0.52 - 1.04 mg/dL Final     Potassium   Date Value Ref Range Status   12/07/2024 3.7 3.4 - 5.3 mmol/L Final   08/18/2023 4.2 3.5 - 5.0 mmol/L Final   11/02/2016 4.5 3.4 - 5.3 mmol/L Final       EXAM:  /68 (BP Location: Right arm)   Pulse 94   Temp 98.3  F (36.8  C) (Oral)   Resp 16   Ht 1.549 m (5' 1\")   Wt 78.9 kg (174 lb)   LMP 12/02/2024   SpO2 98%   BMI 32.88 kg/m    General: Stable. In no acute distress.    Neurologic: Alert and oriented x 3. No focal deficits.  Psychiatric: Appropriate mood and affect. Cooperative. Answering questions appropriately. Linear/coherent thought process.   Respiratory: Normal respirations on room air. Lungs clear to auscultation bilaterally.  Cardiac: S1S2, regular rate and rhythm, without murmur, clicks or rubs.  Drains(s)/Tube(s):   - Nephrostomy Tube: Right Flank drain to gravity drainage with yellow urine without sediment or debris output. Dressing clean, dry, and intact. Stitch present. No leaking appreciated from drain exit site. Drain site nontender with palpation.Tubing appears intact and patent. Draining well.       PRE-SEDATION ASSESSMENT:  Mallampati " Airway Classification:  I - Faucial pillars, soft palate, and uvula are visible  Previous reaction to anesthesia/sedation:  No  Sedation plan based on assessment: Moderate (conscious) sedation  ASA Classification: Class 2 - MILD SYSTEMIC DISEASE, NO ACUTE PROBLEMS, NO FUNCTIONAL LIMITATIONS.   Code Status: FULL CODE      ASSESSMENT/PLAN:   Congenital solitary kidney with recurrent UTIs, nephrolithiasis, and ureteral strictures managed by a chronic RIGHT PNT     Right nephrostomy tube exchange with sedation    Procedural education reviewed with patient in detail including, but not limited to risks, benefits and alternatives with understanding verbalized by patient.    Total time spent on the date of the encounter: 30 minutes.    ROSEMARY Smart CNP  Interventional Radiology

## 2024-12-16 ENCOUNTER — HOSPITAL ENCOUNTER (OUTPATIENT)
Dept: INTERVENTIONAL RADIOLOGY/VASCULAR | Facility: HOSPITAL | Age: 39
Discharge: HOME | End: 2024-12-16
Attending: NURSE PRACTITIONER | Admitting: RADIOLOGY
Payer: COMMERCIAL

## 2024-12-16 VITALS
HEART RATE: 65 BPM | TEMPERATURE: 97.8 F | DIASTOLIC BLOOD PRESSURE: 66 MMHG | BODY MASS INDEX: 32.85 KG/M2 | RESPIRATION RATE: 19 BRPM | WEIGHT: 174 LBS | HEIGHT: 61 IN | OXYGEN SATURATION: 96 % | SYSTOLIC BLOOD PRESSURE: 103 MMHG

## 2024-12-16 DIAGNOSIS — Z93.6 NEPHROSTOMY STATUS (H): ICD-10-CM

## 2024-12-16 DIAGNOSIS — Z93.6 NEPHROSTOMY STATUS (H): Primary | ICD-10-CM

## 2024-12-16 PROCEDURE — 99152 MOD SED SAME PHYS/QHP 5/>YRS: CPT

## 2024-12-16 PROCEDURE — 50435 EXCHANGE NEPHROSTOMY CATH: CPT

## 2024-12-16 PROCEDURE — 250N000009 HC RX 250: Performed by: NURSE PRACTITIONER

## 2024-12-16 PROCEDURE — 250N000011 HC RX IP 250 OP 636: Performed by: NURSE PRACTITIONER

## 2024-12-16 PROCEDURE — C1769 GUIDE WIRE: HCPCS

## 2024-12-16 PROCEDURE — C1729 CATH, DRAINAGE: HCPCS

## 2024-12-16 RX ORDER — NALOXONE HYDROCHLORIDE 0.4 MG/ML
0.2 INJECTION, SOLUTION INTRAMUSCULAR; INTRAVENOUS; SUBCUTANEOUS
Status: DISCONTINUED | OUTPATIENT
Start: 2024-12-16 | End: 2024-12-17 | Stop reason: HOSPADM

## 2024-12-16 RX ORDER — FENTANYL CITRATE 50 UG/ML
25-50 INJECTION, SOLUTION INTRAMUSCULAR; INTRAVENOUS EVERY 5 MIN PRN
Status: DISCONTINUED | OUTPATIENT
Start: 2024-12-16 | End: 2024-12-17 | Stop reason: HOSPADM

## 2024-12-16 RX ORDER — NALOXONE HYDROCHLORIDE 0.4 MG/ML
0.4 INJECTION, SOLUTION INTRAMUSCULAR; INTRAVENOUS; SUBCUTANEOUS
Status: DISCONTINUED | OUTPATIENT
Start: 2024-12-16 | End: 2024-12-17 | Stop reason: HOSPADM

## 2024-12-16 RX ORDER — ONDANSETRON 2 MG/ML
4 INJECTION INTRAMUSCULAR; INTRAVENOUS
Status: DISCONTINUED | OUTPATIENT
Start: 2024-12-16 | End: 2024-12-17 | Stop reason: HOSPADM

## 2024-12-16 RX ORDER — CIPROFLOXACIN 2 MG/ML
400 INJECTION, SOLUTION INTRAVENOUS ONCE
Status: COMPLETED | OUTPATIENT
Start: 2024-12-16 | End: 2024-12-16

## 2024-12-16 RX ORDER — FLUMAZENIL 0.1 MG/ML
0.2 INJECTION, SOLUTION INTRAVENOUS
Status: DISCONTINUED | OUTPATIENT
Start: 2024-12-16 | End: 2024-12-17 | Stop reason: HOSPADM

## 2024-12-16 RX ORDER — HEPARIN SODIUM 200 [USP'U]/100ML
1 INJECTION, SOLUTION INTRAVENOUS EVERY 5 MIN PRN
Status: DISCONTINUED | OUTPATIENT
Start: 2024-12-16 | End: 2024-12-17 | Stop reason: HOSPADM

## 2024-12-16 RX ORDER — LIDOCAINE 40 MG/G
CREAM TOPICAL
Status: DISCONTINUED | OUTPATIENT
Start: 2024-12-16 | End: 2024-12-17 | Stop reason: HOSPADM

## 2024-12-16 RX ADMIN — MIDAZOLAM HYDROCHLORIDE 2 MG: 1 INJECTION, SOLUTION INTRAMUSCULAR; INTRAVENOUS at 13:56

## 2024-12-16 RX ADMIN — FENTANYL CITRATE 50 MCG: 50 INJECTION, SOLUTION INTRAMUSCULAR; INTRAVENOUS at 14:04

## 2024-12-16 RX ADMIN — FENTANYL CITRATE 50 MCG: 50 INJECTION, SOLUTION INTRAMUSCULAR; INTRAVENOUS at 14:06

## 2024-12-16 RX ADMIN — LIDOCAINE HYDROCHLORIDE 10 ML: 10 INJECTION, SOLUTION INFILTRATION; PERINEURAL at 14:13

## 2024-12-16 RX ADMIN — CIPROFLOXACIN 400 MG: 2 INJECTION, SOLUTION INTRAVENOUS at 13:16

## 2024-12-16 NOTE — IR NOTE
Patient Name: Jcaqueline Pappas  Medical Record Number: [id number]  Today's Date: 12/16/2024    Procedure: PNT exchange  Proceduralist: Dr. Rg    Sedation medications administered: 2 mg midazolam and 100 mcg fentanyl   Sedation time: 10 minutes

## 2024-12-16 NOTE — PRE-PROCEDURE
GENERAL PRE-PROCEDURE:   Procedure:  Right PNT exchange  Date/Time:  12/16/2024 12:52 PM    Written consent obtained?: Yes    Risks and benefits: Risks, benefits and alternatives were discussed    Consent given by:  Patient  Patient states understanding of procedure being performed: Yes    Patient's understanding of procedure matches consent: Yes    Procedure consent matches procedure scheduled: Yes    Expected level of sedation:  Moderate  Appropriately NPO:  Yes  ASA Class:  3  Mallampati  :  Grade 1- soft palate, uvula, tonsillar pillars, and posterior pharyngeal wall visible  Lungs:  Lungs clear with good breath sounds bilaterally  Heart:  Normal heart sounds and rate  History & Physical reviewed:  History and physical reviewed and no updates needed  Statement of review:  I have reviewed the lab findings, diagnostic data, medications, and the plan for sedation

## 2025-01-10 ENCOUNTER — HOSPITAL ENCOUNTER (OUTPATIENT)
Facility: HOSPITAL | Age: 40
Setting detail: OBSERVATION
Discharge: HOME | End: 2025-01-11
Attending: EMERGENCY MEDICINE | Admitting: STUDENT IN AN ORGANIZED HEALTH CARE EDUCATION/TRAINING PROGRAM
Payer: COMMERCIAL

## 2025-01-10 DIAGNOSIS — T83.022A NEPHROSTOMY TUBE DISPLACED: ICD-10-CM

## 2025-01-10 DIAGNOSIS — R10.9 RIGHT FLANK PAIN: ICD-10-CM

## 2025-01-10 PROCEDURE — 99285 EMERGENCY DEPT VISIT HI MDM: CPT | Mod: 25

## 2025-01-10 ASSESSMENT — ENCOUNTER SYMPTOMS: FLANK PAIN: 1

## 2025-01-11 ENCOUNTER — APPOINTMENT (OUTPATIENT)
Dept: CT IMAGING | Facility: HOSPITAL | Age: 40
End: 2025-01-11
Attending: EMERGENCY MEDICINE
Payer: COMMERCIAL

## 2025-01-11 ENCOUNTER — APPOINTMENT (OUTPATIENT)
Dept: INTERVENTIONAL RADIOLOGY/VASCULAR | Facility: HOSPITAL | Age: 40
End: 2025-01-11
Attending: STUDENT IN AN ORGANIZED HEALTH CARE EDUCATION/TRAINING PROGRAM
Payer: COMMERCIAL

## 2025-01-11 VITALS
TEMPERATURE: 97.8 F | SYSTOLIC BLOOD PRESSURE: 112 MMHG | OXYGEN SATURATION: 93 % | HEIGHT: 61 IN | RESPIRATION RATE: 16 BRPM | HEART RATE: 57 BPM | WEIGHT: 176.5 LBS | DIASTOLIC BLOOD PRESSURE: 54 MMHG | BODY MASS INDEX: 33.32 KG/M2

## 2025-01-11 PROBLEM — R10.9 RIGHT FLANK PAIN: Status: ACTIVE | Noted: 2023-05-12

## 2025-01-11 PROBLEM — T83.022A NEPHROSTOMY TUBE DISPLACED: Status: ACTIVE | Noted: 2025-01-11

## 2025-01-11 LAB
ALBUMIN UR-MCNC: NEGATIVE MG/DL
ANION GAP SERPL CALCULATED.3IONS-SCNC: 11 MMOL/L (ref 7–15)
APPEARANCE UR: CLEAR
BASOPHILS # BLD AUTO: 0.1 10E3/UL (ref 0–0.2)
BASOPHILS NFR BLD AUTO: 1 %
BILIRUB UR QL STRIP: NEGATIVE
BUN SERPL-MCNC: 16.5 MG/DL (ref 6–20)
CALCIUM SERPL-MCNC: 10.9 MG/DL (ref 8.8–10.4)
CHLORIDE SERPL-SCNC: 102 MMOL/L (ref 98–107)
COLOR UR AUTO: NORMAL
CREAT SERPL-MCNC: 1.32 MG/DL (ref 0.51–0.95)
EGFRCR SERPLBLD CKD-EPI 2021: 53 ML/MIN/1.73M2
EOSINOPHIL # BLD AUTO: 0.3 10E3/UL (ref 0–0.7)
EOSINOPHIL NFR BLD AUTO: 3 %
ERYTHROCYTE [DISTWIDTH] IN BLOOD BY AUTOMATED COUNT: 13.2 % (ref 10–15)
GLUCOSE SERPL-MCNC: 93 MG/DL (ref 70–99)
GLUCOSE UR STRIP-MCNC: NEGATIVE MG/DL
HCG SERPL QL: NEGATIVE
HCO3 SERPL-SCNC: 23 MMOL/L (ref 22–29)
HCT VFR BLD AUTO: 39.5 % (ref 35–47)
HGB BLD-MCNC: 13.5 G/DL (ref 11.7–15.7)
HGB UR QL STRIP: NEGATIVE
IMM GRANULOCYTES # BLD: 0.1 10E3/UL
IMM GRANULOCYTES NFR BLD: 1 %
KETONES UR STRIP-MCNC: NEGATIVE MG/DL
LEUKOCYTE ESTERASE UR QL STRIP: NEGATIVE
LYMPHOCYTES # BLD AUTO: 2.3 10E3/UL (ref 0.8–5.3)
LYMPHOCYTES NFR BLD AUTO: 22 %
MCH RBC QN AUTO: 30.9 PG (ref 26.5–33)
MCHC RBC AUTO-ENTMCNC: 34.2 G/DL (ref 31.5–36.5)
MCV RBC AUTO: 90 FL (ref 78–100)
MONOCYTES # BLD AUTO: 0.7 10E3/UL (ref 0–1.3)
MONOCYTES NFR BLD AUTO: 6 %
NEUTROPHILS # BLD AUTO: 7.2 10E3/UL (ref 1.6–8.3)
NEUTROPHILS NFR BLD AUTO: 68 %
NITRATE UR QL: NEGATIVE
NRBC # BLD AUTO: 0 10E3/UL
NRBC BLD AUTO-RTO: 0 /100
PH UR STRIP: 6 [PH] (ref 5–7)
PLATELET # BLD AUTO: 179 10E3/UL (ref 150–450)
POTASSIUM SERPL-SCNC: 4 MMOL/L (ref 3.4–5.3)
RBC # BLD AUTO: 4.37 10E6/UL (ref 3.8–5.2)
RBC URINE: 0 /HPF
SODIUM SERPL-SCNC: 136 MMOL/L (ref 135–145)
SP GR UR STRIP: 1.01 (ref 1–1.03)
SQUAMOUS EPITHELIAL: <1 /HPF
UROBILINOGEN UR STRIP-MCNC: <2 MG/DL
WBC # BLD AUTO: 10.6 10E3/UL (ref 4–11)
WBC URINE: 1 /HPF

## 2025-01-11 PROCEDURE — 258N000003 HC RX IP 258 OP 636: Performed by: INTERNAL MEDICINE

## 2025-01-11 PROCEDURE — 36415 COLL VENOUS BLD VENIPUNCTURE: CPT | Performed by: EMERGENCY MEDICINE

## 2025-01-11 PROCEDURE — 250N000011 HC RX IP 250 OP 636: Performed by: EMERGENCY MEDICINE

## 2025-01-11 PROCEDURE — 74176 CT ABD & PELVIS W/O CONTRAST: CPT

## 2025-01-11 PROCEDURE — C1769 GUIDE WIRE: HCPCS

## 2025-01-11 PROCEDURE — 96374 THER/PROPH/DIAG INJ IV PUSH: CPT | Mod: 59

## 2025-01-11 PROCEDURE — 250N000013 HC RX MED GY IP 250 OP 250 PS 637: Performed by: EMERGENCY MEDICINE

## 2025-01-11 PROCEDURE — 50432 PLMT NEPHROSTOMY CATHETER: CPT

## 2025-01-11 PROCEDURE — 250N000013 HC RX MED GY IP 250 OP 250 PS 637: Performed by: HOSPITALIST

## 2025-01-11 PROCEDURE — 85018 HEMOGLOBIN: CPT | Performed by: EMERGENCY MEDICINE

## 2025-01-11 PROCEDURE — 85025 COMPLETE CBC W/AUTO DIFF WBC: CPT | Performed by: EMERGENCY MEDICINE

## 2025-01-11 PROCEDURE — 99207 PR NO BILLABLE SERVICE THIS VISIT: CPT | Performed by: INTERNAL MEDICINE

## 2025-01-11 PROCEDURE — 84703 CHORIONIC GONADOTROPIN ASSAY: CPT | Performed by: EMERGENCY MEDICINE

## 2025-01-11 PROCEDURE — 255N000002 HC RX 255 OP 636: Performed by: STUDENT IN AN ORGANIZED HEALTH CARE EDUCATION/TRAINING PROGRAM

## 2025-01-11 PROCEDURE — G0378 HOSPITAL OBSERVATION PER HR: HCPCS

## 2025-01-11 PROCEDURE — 99236 HOSP IP/OBS SAME DATE HI 85: CPT | Performed by: HOSPITALIST

## 2025-01-11 PROCEDURE — 96375 TX/PRO/DX INJ NEW DRUG ADDON: CPT | Mod: 59

## 2025-01-11 PROCEDURE — 80048 BASIC METABOLIC PNL TOTAL CA: CPT | Performed by: EMERGENCY MEDICINE

## 2025-01-11 PROCEDURE — 272N000116 HC CATH CR1

## 2025-01-11 PROCEDURE — 96376 TX/PRO/DX INJ SAME DRUG ADON: CPT

## 2025-01-11 PROCEDURE — 96361 HYDRATE IV INFUSION ADD-ON: CPT | Mod: 59

## 2025-01-11 PROCEDURE — 250N000011 HC RX IP 250 OP 636: Performed by: STUDENT IN AN ORGANIZED HEALTH CARE EDUCATION/TRAINING PROGRAM

## 2025-01-11 PROCEDURE — 81001 URINALYSIS AUTO W/SCOPE: CPT | Performed by: EMERGENCY MEDICINE

## 2025-01-11 PROCEDURE — 250N000013 HC RX MED GY IP 250 OP 250 PS 637: Performed by: INTERNAL MEDICINE

## 2025-01-11 PROCEDURE — 99152 MOD SED SAME PHYS/QHP 5/>YRS: CPT

## 2025-01-11 PROCEDURE — C1729 CATH, DRAINAGE: HCPCS

## 2025-01-11 PROCEDURE — 82310 ASSAY OF CALCIUM: CPT | Performed by: EMERGENCY MEDICINE

## 2025-01-11 RX ORDER — FENTANYL CITRATE 50 UG/ML
25-50 INJECTION, SOLUTION INTRAMUSCULAR; INTRAVENOUS EVERY 5 MIN PRN
Status: DISCONTINUED | OUTPATIENT
Start: 2025-01-11 | End: 2025-01-11 | Stop reason: HOSPADM

## 2025-01-11 RX ORDER — OXYCODONE HYDROCHLORIDE 5 MG/1
5 TABLET ORAL EVERY 4 HOURS PRN
Status: DISCONTINUED | OUTPATIENT
Start: 2025-01-11 | End: 2025-01-11 | Stop reason: HOSPADM

## 2025-01-11 RX ORDER — AMOXICILLIN 250 MG
1 CAPSULE ORAL 2 TIMES DAILY PRN
Status: DISCONTINUED | OUTPATIENT
Start: 2025-01-11 | End: 2025-01-11 | Stop reason: HOSPADM

## 2025-01-11 RX ORDER — ALBUTEROL SULFATE 1.25 MG/3ML
1.25 SOLUTION RESPIRATORY (INHALATION) EVERY 6 HOURS PRN
Status: DISCONTINUED | OUTPATIENT
Start: 2025-01-11 | End: 2025-01-11 | Stop reason: HOSPADM

## 2025-01-11 RX ORDER — ACETAMINOPHEN 500 MG
1000 TABLET ORAL EVERY 8 HOURS SCHEDULED
Status: DISCONTINUED | OUTPATIENT
Start: 2025-01-11 | End: 2025-01-11 | Stop reason: HOSPADM

## 2025-01-11 RX ORDER — ONDANSETRON 2 MG/ML
4 INJECTION INTRAMUSCULAR; INTRAVENOUS EVERY 6 HOURS PRN
Status: DISCONTINUED | OUTPATIENT
Start: 2025-01-11 | End: 2025-01-11 | Stop reason: HOSPADM

## 2025-01-11 RX ORDER — OXYCODONE HYDROCHLORIDE 5 MG/1
5 TABLET ORAL EVERY 4 HOURS PRN
Status: DISCONTINUED | OUTPATIENT
Start: 2025-01-11 | End: 2025-01-11

## 2025-01-11 RX ORDER — HYDROMORPHONE HYDROCHLORIDE 1 MG/ML
0.5 INJECTION, SOLUTION INTRAMUSCULAR; INTRAVENOUS; SUBCUTANEOUS EVERY 30 MIN PRN
Status: COMPLETED | OUTPATIENT
Start: 2025-01-11 | End: 2025-01-11

## 2025-01-11 RX ORDER — NALOXONE HYDROCHLORIDE 0.4 MG/ML
0.4 INJECTION, SOLUTION INTRAMUSCULAR; INTRAVENOUS; SUBCUTANEOUS
Status: DISCONTINUED | OUTPATIENT
Start: 2025-01-11 | End: 2025-01-11 | Stop reason: HOSPADM

## 2025-01-11 RX ORDER — ACETAMINOPHEN 500 MG
500 TABLET ORAL EVERY 8 HOURS PRN
Status: DISCONTINUED | OUTPATIENT
Start: 2025-01-11 | End: 2025-01-11 | Stop reason: HOSPADM

## 2025-01-11 RX ORDER — SERTRALINE HYDROCHLORIDE 100 MG/1
100 TABLET, FILM COATED ORAL AT BEDTIME
Status: DISCONTINUED | OUTPATIENT
Start: 2025-01-11 | End: 2025-01-11 | Stop reason: HOSPADM

## 2025-01-11 RX ORDER — ONDANSETRON 4 MG/1
4 TABLET, ORALLY DISINTEGRATING ORAL EVERY 6 HOURS PRN
Status: DISCONTINUED | OUTPATIENT
Start: 2025-01-11 | End: 2025-01-11 | Stop reason: HOSPADM

## 2025-01-11 RX ORDER — ONDANSETRON 2 MG/ML
4 INJECTION INTRAMUSCULAR; INTRAVENOUS
Status: DISCONTINUED | OUTPATIENT
Start: 2025-01-11 | End: 2025-01-11 | Stop reason: HOSPADM

## 2025-01-11 RX ORDER — NALOXONE HYDROCHLORIDE 0.4 MG/ML
0.2 INJECTION, SOLUTION INTRAMUSCULAR; INTRAVENOUS; SUBCUTANEOUS
Status: DISCONTINUED | OUTPATIENT
Start: 2025-01-11 | End: 2025-01-11 | Stop reason: HOSPADM

## 2025-01-11 RX ORDER — PROCHLORPERAZINE MALEATE 10 MG
10 TABLET ORAL EVERY 6 HOURS PRN
Status: DISCONTINUED | OUTPATIENT
Start: 2025-01-11 | End: 2025-01-11 | Stop reason: HOSPADM

## 2025-01-11 RX ORDER — AMOXICILLIN 250 MG
2 CAPSULE ORAL 2 TIMES DAILY PRN
Status: DISCONTINUED | OUTPATIENT
Start: 2025-01-11 | End: 2025-01-11 | Stop reason: HOSPADM

## 2025-01-11 RX ORDER — SODIUM CHLORIDE 9 MG/ML
INJECTION, SOLUTION INTRAVENOUS CONTINUOUS
Status: DISCONTINUED | OUTPATIENT
Start: 2025-01-11 | End: 2025-01-11 | Stop reason: HOSPADM

## 2025-01-11 RX ORDER — ONDANSETRON 2 MG/ML
4 INJECTION INTRAMUSCULAR; INTRAVENOUS EVERY 30 MIN PRN
Status: DISCONTINUED | OUTPATIENT
Start: 2025-01-11 | End: 2025-01-11

## 2025-01-11 RX ORDER — ALBUTEROL SULFATE 90 UG/1
2 INHALANT RESPIRATORY (INHALATION) EVERY 4 HOURS PRN
Status: DISCONTINUED | OUTPATIENT
Start: 2025-01-11 | End: 2025-01-11 | Stop reason: HOSPADM

## 2025-01-11 RX ORDER — FLUMAZENIL 0.1 MG/ML
0.2 INJECTION, SOLUTION INTRAVENOUS
Status: DISCONTINUED | OUTPATIENT
Start: 2025-01-11 | End: 2025-01-11 | Stop reason: HOSPADM

## 2025-01-11 RX ORDER — SODIUM CHLORIDE 9 MG/ML
INJECTION, SOLUTION INTRAVENOUS CONTINUOUS
Status: DISCONTINUED | OUTPATIENT
Start: 2025-01-11 | End: 2025-01-11

## 2025-01-11 RX ORDER — ACETAMINOPHEN 500 MG
500-1000 TABLET ORAL EVERY 6 HOURS PRN
Status: DISCONTINUED | OUTPATIENT
Start: 2025-01-11 | End: 2025-01-11

## 2025-01-11 RX ORDER — ACETAMINOPHEN 500 MG
1000 TABLET ORAL EVERY 6 HOURS PRN
Status: DISCONTINUED | OUTPATIENT
Start: 2025-01-11 | End: 2025-01-11

## 2025-01-11 RX ADMIN — HYDROMORPHONE HYDROCHLORIDE 0.5 MG: 1 INJECTION, SOLUTION INTRAMUSCULAR; INTRAVENOUS; SUBCUTANEOUS at 01:46

## 2025-01-11 RX ADMIN — ACETAMINOPHEN 1000 MG: 500 TABLET, FILM COATED ORAL at 09:10

## 2025-01-11 RX ADMIN — OXYCODONE HYDROCHLORIDE 5 MG: 5 TABLET ORAL at 11:02

## 2025-01-11 RX ADMIN — HYDROMORPHONE HYDROCHLORIDE 0.5 MG: 1 INJECTION, SOLUTION INTRAMUSCULAR; INTRAVENOUS; SUBCUTANEOUS at 00:20

## 2025-01-11 RX ADMIN — OXYCODONE HYDROCHLORIDE 5 MG: 5 TABLET ORAL at 02:37

## 2025-01-11 RX ADMIN — IOHEXOL 10 ML: 350 INJECTION, SOLUTION INTRAVENOUS at 12:07

## 2025-01-11 RX ADMIN — MIDAZOLAM HYDROCHLORIDE 1 MG: 1 INJECTION, SOLUTION INTRAMUSCULAR; INTRAVENOUS at 11:48

## 2025-01-11 RX ADMIN — OXYCODONE HYDROCHLORIDE 5 MG: 5 TABLET ORAL at 03:34

## 2025-01-11 RX ADMIN — MIDAZOLAM HYDROCHLORIDE 1 MG: 1 INJECTION, SOLUTION INTRAMUSCULAR; INTRAVENOUS at 11:57

## 2025-01-11 RX ADMIN — FENTANYL CITRATE 50 MCG: 50 INJECTION, SOLUTION INTRAMUSCULAR; INTRAVENOUS at 11:51

## 2025-01-11 RX ADMIN — ONDANSETRON 4 MG: 2 INJECTION INTRAMUSCULAR; INTRAVENOUS at 00:20

## 2025-01-11 RX ADMIN — ACETAMINOPHEN 1000 MG: 500 TABLET, FILM COATED ORAL at 03:34

## 2025-01-11 RX ADMIN — HYDROMORPHONE HYDROCHLORIDE 0.5 MG: 1 INJECTION, SOLUTION INTRAMUSCULAR; INTRAVENOUS; SUBCUTANEOUS at 00:55

## 2025-01-11 RX ADMIN — OXYCODONE HYDROCHLORIDE 5 MG: 5 TABLET ORAL at 07:23

## 2025-01-11 RX ADMIN — FENTANYL CITRATE 50 MCG: 50 INJECTION, SOLUTION INTRAMUSCULAR; INTRAVENOUS at 11:59

## 2025-01-11 RX ADMIN — SODIUM CHLORIDE: 9 INJECTION, SOLUTION INTRAVENOUS at 07:16

## 2025-01-11 ASSESSMENT — ACTIVITIES OF DAILY LIVING (ADL)
ADLS_ACUITY_SCORE: 48
ADLS_ACUITY_SCORE: 49
ADLS_ACUITY_SCORE: 48

## 2025-01-11 NOTE — ED TRIAGE NOTES
Pt states right nephrostomy accidentally dislodged this evening after her son tripped over her tubing.  Pt is having 8/10 pain over right flank.

## 2025-01-11 NOTE — H&P
[facility]    History and Physical - Hospitalist Service       Date of Admission:  1/10/2025    Assessment & Plan      Jacqueline Pappas is a 39 year old female admitted on 1/10/2025. She came to the ED for evaluation of right flank pain after nephrostomy tube accidentally dislodged    #Ureteral stricture  #Acute on chronic right flank pain  -IR consult for nephrostomy tube placement    #Acute kidney injury on chronic kidney disease stage II  -No signs of dehydration but CT showed interval development of dilatation of the right lower pole collecting system  -Probable some component of obstructive nephropathy  -Avoid nephrotoxins    #Obesity  -BMI 33.9     Observation Goals: -diagnostic tests and consults completed and resulted, -vital signs normal or at patient baseline, Nurse to notify provider when observation goals have been met and patient is ready for discharge.  Diet: NPO for Medical/Clinical Reasons Except for: Ice Chips, Meds    DVT Prophylaxis: Ambulate every shift  Farr Catheter: Not present  Lines: None     Cardiac Monitoring: None  Code Status: Full Code          Disposition Plan     Medically Ready for Discharge: Anticipated Today           Daron Zayas MD  Hospitalist Service  [facility]  Securely message with [employer] (more info)  Text page via [facility] Paging/Directory     ______________________________________________________________________    Chief Complaint   Right flank pain    History is obtained from the patient, electronic health record, and emergency department physician    History of Present Illness   Jacqueline Pappas is a 39 year old female who came to the ED for evaluation of right flank pain after nephrostomy tube accidentally dislodged.  Past medical history of congenital solitary kidney, kidney stones, ureteral stricture, chronic nephrostomy tube, depression.  Patient gets nephrostomy tube exchange every 3 months, last on 12/16/2024.   Patient reported the tube came off after her son accidentally tripped on the tubing.  Before that, she was doing well without fevers, chills, nausea, vomiting or other symptoms.  However, she had increased right flank pain with intensity 8/10.  Denies dysuria or hematuria.      Past Medical History    Past Medical History:   Diagnosis Date    Acute renal failure     Anemia     Anemia     Calculus of kidney     Congenital absence of left kidney     Congenital absence of one kidney     Depression     Depression     Hydronephrosis     Kidney stone     at age 29    Pyelonephritis     Pyelonephritis     Smoker     Ureteral stricture     UTI (urinary tract infection)     Wrist fracture     Left       Past Surgical History   Past Surgical History:   Procedure Laterality Date     SECTION       SECTION      x1    COMBINED CYSTOSCOPY, RETROGRADES, URETEROSCOPY, LASER HOLMIUM LITHOTRIPSY URETER(S), INSERT STENT Right 2016    Procedure: COMBINED CYSTOSCOPY, RETROGRADES, URETEROSCOPY, LASER HOLMIUM LITHOTRIPSY URETER(S), INSERT STENT;  Surgeon: Florentino Awad MD;  Location: [facility] OR    CYSTOSCOPY, URETEROSCOPY, COMBINED Right 2016    Procedure: COMBINED CYSTOSCOPY, URETEROSCOPY;  Surgeon: Florentino Awad MD;  Location: [facility] OR    IR NEPHROLITHOTOMY  2014    IR NEPHROSTOGRAM EXISITING ACCESS  10/18/2018    IR NEPHROSTOMY TUBE CHANGE RIGHT  2018    IR NEPHROSTOMY TUBE CHANGE RIGHT  2020    IR NEPHROSTOMY TUBE CHANGE RIGHT  2018    IR NEPHROSTOMY TUBE CHANGE RIGHT  2020    IR NEPHROSTOMY TUBE CHANGE RIGHT  11/3/2023    IR NEPHROSTOMY TUBE CHANGE RIGHT  2024    IR NEPHROSTOMY TUBE CHANGE RIGHT  2024    IR NEPHROSTOMY TUBE CHANGE RIGHT  2024    IR NEPHROSTOMY TUBE CHANGE RIGHT  2024    IR NEPHROSTOMY TUBE CHANGE RIGHT  2024    IR NEPHROSTOMY TUBE PLACEMENT RIGHT  2016    IR NEPHROSTOMY TUBE PLACEMENT RIGHT  2017    KIDNEY STONE SURGERY  Right 05/2016    LASER HOLMIUM LITHOTRIPSY URETER(S), INSERT STENT, COMBINED Left 11/1/2016    Procedure: COMBINED CYSTOSCOPY, URETEROSCOPY, LASER HOLMIUM LITHOTRIPSY URETER(S), INSERT STENT;  Surgeon: Florentino Awad MD;  Location: [facility] OR    LASER HOLMIUM NEPHROLITHOTOMY VIA PERCUTANEOUS NEPHROSTOMY Right 9/14/2016    Procedure: LASER HOLMIUM NEPHROLITHOTOMY VIA PERCUTANEOUS NEPHROSTOMY;  Surgeon: Florentino Awad MD;  Location: [facility] OR    NEPHROSTOMY W/ INTRODUCTION OF CATHETER Right     OTHER SURGICAL HISTORY      Mole removednasal    OTHER SURGICAL HISTORY Right     Cystoscopy, ureteroscopy, laser11/02/2014 x2 with ureteral stent insertion    PERCUTANEOUS NEPHROSTOMY  11/1/2016    Procedure: PERCUTANEOUS NEPHROSTOMY;  Surgeon: Florentino Awad MD;  Location: [facility] OR    WISDOM TOOTH EXTRACTION      ZZC REMOVAL OF KIDNEY STONE         Prior to Admission Medications   Prior to Admission Medications   Prescriptions Last Dose Informant Patient Reported? Taking?   acetaminophen (TYLENOL) 500 MG tablet 1/10/2025 Morning Self Yes Yes   Sig: Take 500-1,000 mg by mouth every 6 hours as needed for mild pain   albuterol (ACCUNEB) 1.25 MG/3ML neb solution Unknown Self Yes Yes   Sig: Take 1.25 mg by nebulization every 6 hours as needed for shortness of breath / dyspnea or wheezing   albuterol (PROAIR HFA) 108 (90 Base) MCG/ACT inhaler Unknown  No Yes   Sig: Inhale 2 puffs into the lungs every 4 hours as needed for shortness of breath or cough   sertraline (ZOLOFT) 100 MG tablet 1/10/2025 Bedtime Self Yes Yes   Sig: Take 100 mg by mouth At Bedtime      Facility-Administered Medications: None           Physical Exam   Vital Signs: Temp: 98.4  F (36.9  C) Temp src: Temporal BP: 113/64 Pulse: 81   Resp: 16 SpO2: 95 % O2 Device: None (Room air)    Weight: 176 lbs 8 oz    Constitutional: no apparent distress  Respiratory: no increased work of breathing and clear to auscultation  Cardiovascular: regular rate and  rhythm  GI: normal bowel sounds  Musculoskeletal: no lower extremity pitting edema present    Medical Decision Making       55 MINUTES SPENT BY ME on the date of service doing chart review, history, exam, documentation & further activities per the note.  MANAGEMENT DISCUSSED with the following over the past 24 hours: ED provider and patient       Data     I have personally reviewed the following data over the past 24 hrs:    10.6  \   13.5   / 179     136 102 16.5 /  93   4.0 23 1.32 (H) \       Imaging results reviewed over the past 24 hrs:   Recent Results (from the past 24 hours)   CT Abdomen Pelvis w/o Contrast    Narrative    EXAM: CT ABDOMEN PELVIS W/O CONTRAST  LOCATION: [facility]  DATE: 1/11/2025    INDICATION: R neph tube pulled out, eval hydroneph.  COMPARISON: 12/7/2024.  TECHNIQUE: CT scan of the abdomen and pelvis was performed without IV contrast. Multiplanar reformats were obtained. Dose reduction techniques were used.  CONTRAST: None.    FINDINGS:   LOWER CHEST: Normal.    HEPATOBILIARY: No significant mass or bile duct dilatation. No calcified gallstones.     PANCREAS: No significant mass, duct dilatation, or inflammatory change.    SPLEEN: Normal size.    ADRENAL GLANDS: No significant nodules.    KIDNEYS/BLADDER: The left kidney is markedly atrophic similar prior exam. The previously seen percutaneous nephrostomy tube within the lower pole of the right kidney has been removed. Multiple renal cysts are again seen scattered throughout the right   kidney as well as nonobstructive collecting system calculi, all which is unchanged from prior exam. Dilatation of the right lower pole collecting system moiety has developed in the interval. The ureter is normal in size and caliber. The bladder is   unremarkable.    BOWEL: No obstruction or inflammatory change.    LYMPH NODES: No lymphadenopathy.    VASCULATURE: No abdominal aortic aneurysm.    PELVIC ORGANS: No pelvic  masses.    MUSCULOSKELETAL: The nephrostomy tube tract is seen in the right lower back overlying the right kidney. No subcutaneous organized fluid collections are identified. No worrisome osseous lesions.      Impression    IMPRESSION:   1.  Interval removal of the previously seen right renal percutaneous nephrostomy tube with interval development of dilatation of the right lower pole collecting system moiety in the interval.  2.  Redemonstration of multiple renal cysts and nonobstructive collecting system calculi scattered throughout the right kidney.

## 2025-01-11 NOTE — MEDICATION SCRIBE - ADMISSION MEDICATION HISTORY
Medication Scribe Admission Medication History    Admission medication history is complete. The information provided in this note is only as accurate as the sources available at the time of the update.    Information Source(s): Patient and CareEverywhere/SureScripts via in-person    Pertinent Information: Patient manages her own medications.  Fluconazole and Cephalexing 500mg completed in past month.  Changes made to PTA medication list:  Added: None  Deleted:   Duplicate Albuterol inhaler  Azelastine nasal spray  Benzonatate 100 mg  Symbicort 160-45  Duoneb  Changed: None    Allergies reviewed with patient and updates made in EHR: yes    Medication History Completed By: Phil Weinberg 1/11/2025 12:45 AM    PTA Med List   Medication Sig Last Dose/Taking    acetaminophen (TYLENOL) 500 MG tablet Take 500-1,000 mg by mouth every 6 hours as needed for mild pain 1/10/2025 Morning    albuterol (ACCUNEB) 1.25 MG/3ML neb solution Take 1.25 mg by nebulization every 6 hours as needed for shortness of breath / dyspnea or wheezing Unknown    albuterol (PROAIR HFA) 108 (90 Base) MCG/ACT inhaler Inhale 2 puffs into the lungs every 4 hours as needed for shortness of breath or cough Unknown    sertraline (ZOLOFT) 100 MG tablet Take 100 mg by mouth At Bedtime 1/10/2025 Bedtime

## 2025-01-11 NOTE — PLAN OF CARE
Goal Outcome Evaluation:      Plan of Care Reviewed With: patient          Outcome Evaluation: Nephrostomy tube replaced ,  site clean dry and intact. Discharge instructions given and reviewed with patient.

## 2025-01-11 NOTE — SEDATION DOCUMENTATION
Patient Name: Jacqueline Pappas  Medical Record Number: [id number]  Today's Date: 1/11/2025    Procedure: Right nephrostomy tube reinsert  Proceduralist: Dr. Alan    Procedure Start: 1157  Procedure end: 1202  Sedation medications administered: 2 mg midazolam and 100 mcg fentanyl   Sedation time: 5 minutes    Report given to: Primary nurse at bedside upon return to ED room 31      Other Notes: Pt arrived to IR room 1 from  Er room 31 . Consent reviewed. Pt denies any questions or concerns regarding procedure. Pt positioned Prone and monitored per protocol. Pt tolerated procedure without any noted complications. VSS on Transfer. Pt transferred back to  Ed room 31 .

## 2025-01-11 NOTE — PROGRESS NOTES
Please refer to H&P from earlier this morning for details.    Patient here with right flank pain after nephrostomy tube accidentally dislodged.    IR already consulted and nursing staff reported they are planning to replace nephrostomy tube late this morning or early afternoon.    Patient seen and examined.    Complaining of right flank pain but denied dysuria, fever or chills and no other complaints.  Discussed with nursing staffs.    Anticipate discharge after nephrostomy tube placement.    Please refer to H&P from this morning for details.

## 2025-01-11 NOTE — DISCHARGE SUMMARY
[facility] MEDICINE  DISCHARGE SUMMARY     Primary Care Physician: Pao Montague  Admission Date: 1/10/2025   Discharge Provider: Narinder PLEITEZ MD Discharge Date: 1/11/2025   Diet:   Active Diet and Nourishment Order   Procedures    Diet    Regular Diet Adult       Code Status: Full Code   Activity: DCACTIVITY: Activity as tolerated        Condition at Discharge: Stable     REASON FOR PRESENTATION(See Admission Note for Details)   Right flank pain after nephrostomy tube accidentally dislodged.  Please refer to H&P for details    PRINCIPAL & ACTIVE DISCHARGE DIAGNOSES     Principal Problem:    Nephrostomy tube displaced  Active Problems:    Right flank pain, chronic    GT (acute kidney injury)    Right flank pain      PENDING LABS     Unresulted Labs Ordered in the Past 30 Days of this Admission       No orders found for last 31 day(s).              PROCEDURES ( this hospitalization only)          RECOMMENDATIONS TO OUTPATIENT PROVIDER FOR F/U VISIT     Follow-up Appointments       Hospital Follow-up with Existing Primary Care Provider (PCP)      Please see details below         Schedule Primary Care visit within: 14 Days   Recommended labs and Imaging (to be ordered by Primary Care Provider): BMP, CBC                   DISPOSITION     Home    SUMMARY OF HOSPITAL COURSE:    Please refer to H&P from earlier this morning for details.    Jacqueline Pappas is a 39 year old female admitted on 1/10/2025. She came to the ED for evaluation of right flank pain after nephrostomy tube accidentally dislodged and patient admitted for observation for IR consult.   IR replaced nephrostomy tube and patient discharging home.      Discharge Medications with Med changes:     Current Discharge Medication List        CONTINUE these medications which have NOT CHANGED    Details   acetaminophen (TYLENOL) 500 MG tablet Take 500-1,000 mg by mouth every 6 hours as needed for mild pain      albuterol  "(ACCUNEB) 1.25 MG/3ML neb solution Take 1.25 mg by nebulization every 6 hours as needed for shortness of breath / dyspnea or wheezing      albuterol (PROAIR HFA) 108 (90 Base) MCG/ACT inhaler Inhale 2 puffs into the lungs every 4 hours as needed for shortness of breath or cough  Qty: 8 g, Refills: 0      sertraline (ZOLOFT) 100 MG tablet Take 100 mg by mouth At Bedtime                   Rationale for medication changes:      No change on home medication regimen        Consults       INTERVENTIONAL RADIOLOGY ADULT/PEDS IP CONSULT    Immunizations given this encounter     Most Recent Immunizations   Administered Date(s) Administered    HIB(PRP-OMP)(PedvaxHIB) 04/12/1991    Hepatitis B, Peds 09/21/1999    Influenza (IIV3) PF 11/21/2013    MMR/V 03/26/1999    Pneumococcal 20 valent Conjugate (Prevnar 20) 10/27/2022    Poliovirus, inactivated (IPV) 09/01/1988    TDAP (Adacel,Boostrix) 11/27/2012    Td (Adult), Adsorbed 07/10/2007           Anticoagulation Information      Recent INR results: No results for input(s): \"INR\" in the last 168 hours.        SIGNIFICANT IMAGING FINDINGS     Results for orders placed or performed during the hospital encounter of 01/10/25   CT Abdomen Pelvis w/o Contrast    Impression    IMPRESSION:   1.  Interval removal of the previously seen right renal percutaneous nephrostomy tube with interval development of dilatation of the right lower pole collecting system moiety in the interval.  2.  Redemonstration of multiple renal cysts and nonobstructive collecting system calculi scattered throughout the right kidney.     IR Nephrostomy Tube Placement Right    Impression    IMPRESSION:      Successful fluoroscopically guided replacement of a right nephrostomy tube draining an obstructed lower pole calyx, as detailed above.    Note that today's procedure was per patient request performed with no skin suture.    Plan is for follow-up routine exchange in 3 months. Nephrostomy to be maintained to " gravity drainage at all times.         SIGNIFICANT LABORATORY FINDINGS     Most Recent 3 CBC's:  Recent Labs   Lab Test 01/11/25  0013 12/07/24  0120 09/18/24  0434   WBC 10.6 7.6 5.9   HGB 13.5 12.8 13.6   MCV 90 89 91    168 166     Most Recent 3 BMP's:  Recent Labs   Lab Test 01/11/25  0013 12/07/24  0120 09/18/24  0434    136 139   POTASSIUM 4.0 3.7 3.7   CHLORIDE 102 101 108*   CO2 23 23 22   BUN 16.5 12.9 8.3   CR 1.32* 1.04* 0.92   ANIONGAP 11 12 9   LUKAS 10.9* 10.2 9.6   GLC 93 94 97       Discharge Orders        Reason for your hospital stay    Patient admitted for accidental dislodgment of right nephrostomy tube, IR replaced     Activity    Your activity upon discharge: activity as tolerated     Discharge Instructions    Nephrostomy tube care as recommended by interventional radiologist.     Diet    Follow this diet upon discharge: Regular     Hospital Follow-up with Existing Primary Care Provider (PCP)    Please see details below            Examination   Physical Exam   Temp:  [97.4  F (36.3  C)-98.4  F (36.9  C)] 97.7  F (36.5  C)  Pulse:  [56-88] 56  Resp:  [6-22] 16  BP: ()/(53-80) 100/56  SpO2:  [85 %-96 %] 93 %  Wt Readings from Last 1 Encounters:   01/10/25 80.1 kg (176 lb 8 oz)       Please see EMR for more detailed significant labs, imaging, consultant notes etc.    INarinder MD, personally saw the patient today and spent less than or equal to 30 minutes discharging this patient.    Narinder PLEITEZ MD  [facility]    CC:Pao Montague

## 2025-01-11 NOTE — PROGRESS NOTES
"PRIMARY DIAGNOSIS: ACUTE PAIN  OUTPATIENT/OBSERVATION GOALS TO BE MET BEFORE DISCHARGE:  1. Pain Status: improved with prn tylenol, oxycodone, and ice pack.    2. Return to near baseline physical activity: Yes    3. Cleared for discharge by consultants (if involved): No    Discharge Planner Nurse   Safe discharge environment identified: Yes  Barriers to discharge: Yes needs nephrostomy tube replaced       Entered by: Hector Hare RN 01/11/2025 4:06 AM     Please review provider order for any additional goals.   Nurse to notify provider when observation goals have been met and patient is ready for discharge.    BP 91/53 (BP Location: Left arm, Patient Position: Right side)   Pulse 66   Temp 97.7  F (36.5  C) (Oral)   Resp 16   Ht 1.537 m (5' 0.5\")   Wt 80.1 kg (176 lb 8 oz)   LMP 01/05/2025   SpO2 (!) 85%   BMI 33.90 kg/m      Resting in bed, prn pain meds and ice pack. NPO for tube neph replacement. Continue with current plan of care.    Hector Hare RN    Problem: Adult Inpatient Plan of Care  Goal: Absence of Hospital-Acquired Illness or Injury  Outcome: Met  Intervention: Identify and Manage Fall Risk  Recent Flowsheet Documentation  Taken 1/11/2025 0330 by Hector Hare RN  Safety Promotion/Fall Prevention:   clutter free environment maintained   patient and family education  Intervention: Prevent Infection  Recent Flowsheet Documentation  Taken 1/11/2025 0330 by Hector Hare RN  Infection Prevention:   single patient room provided   rest/sleep promoted  Goal: Optimal Comfort and Wellbeing  Outcome: Met     Problem: Skin Injury Risk Increased  Goal: Skin Health and Integrity  Outcome: Met  Intervention: Optimize Skin Protection  Recent Flowsheet Documentation  Taken 1/11/2025 0330 by Hector Hare RN  Activity Management: activity adjusted per tolerance       "

## 2025-01-11 NOTE — ED PROVIDER NOTES
NAME: Jacqueline Pappas  AGE: 39 year old female  YOB: 1985  MRN: [id number]  EVALUATION DATE & TIME: 1/10/2025 11:51 PM    PCP: Pao Montague    ED PROVIDER: Adi Ko M.D.      Chief Complaint   Patient presents with    Nephrostomy tube dislodged     FINAL IMPRESSION:  1. Nephrostomy tube displaced    2. Right flank pain      MEDICAL DECISION MAKIN:57 PM I met with the patient, obtained history, performed an initial exam, and discussed options and plan for diagnostics and treatment here in the ED.   12:41 AM I updated the patient on their results.  2:36 AM I Spoke with Dr. Zayas, Hospitalist. We further discussed the patient's case, reviewed ED work-up so far, and they accepted the patient for admit.    Patient was clinically assessed and consented to treatment. After assessment, medical decision making and workup were discussed with the patient. The patient was agreeable to plan for testing, workup, and treatment.  Pertinent Labs & Imaging studies reviewed. (See chart for details)       Medical Decision Making  Obtained supplemental history:Supplemental history obtained?: Documented in chart  Reviewed external records: External records reviewed?: Documented in chart  Care impacted by chronic illness:Other: Congenital solitary right kidney  Care significantly affected by social determinants of health:Access to Medical Care  Did you consider but not order tests?: In addition to work-up documented, I considered the following work up:   Did you interpret images independently?: Independent interpretation of ECG and images noted in documentation, when applicable.  Consultation discussion with other provider:Did you involve another provider (consultant, , pharmacy, etc.)?: I discussed the care with another health care provider, see documentation for details.  Admit.  Not Applicable    Jacqueline Pappas is a 39 year old female who presents with nephrostomy tube displacement.    Differential diagnosis includes but not limited to hydronephrosis, nephrostomy tube displaced, pyelonephritis, renal pelvis leak.  Patient is 39-year-old female with history of past kidney problems polycystic disease along with frequent urinary tract infections secondary to nephrostomy tubes.  Patient has had the nephrostomy for some time however this 1 was just placed 3 weeks ago and now pulled out.  Patient having pain with some of the hydronephrosis and I did agree to a dose of IV pain medication.  Patient does have a pain contract however given the current situation I do feel having the tube ripped out from her placement in the back could warrant significant pain along with the hydronephrosis.  After discussion with patient labs were obtained and showed slight increase in creatinine but no findings of infection on urine.  CT scan was obtained that did show some hydronephrosis on that right along with prior cysts.  Patient will need IR placement of nephrostomy tube and will be plan for observation admission for continued monitoring until IR can place new nephrostomy tube.    0 minutes of critical care time    MEDICATIONS GIVEN IN THE EMERGENCY:  Medications   acetaminophen (TYLENOL) tablet 500-1,000 mg (1,000 mg Oral $Given 1/11/25 0334)   albuterol (ACCUNEB) nebulizer solution 1.25 mg (has no administration in time range)   albuterol (PROVENTIL HFA/VENTOLIN HFA) inhaler (has no administration in time range)   sertraline (ZOLOFT) tablet 100 mg (has no administration in time range)   senna-docusate (SENOKOT-S/PERICOLACE) 8.6-50 MG per tablet 1 tablet (has no administration in time range)     Or   senna-docusate (SENOKOT-S/PERICOLACE) 8.6-50 MG per tablet 2 tablet (has no administration in time range)   ondansetron (ZOFRAN ODT) ODT tab 4 mg (has no administration in time range)     Or   ondansetron (ZOFRAN) injection 4 mg (has no administration in time range)   prochlorperazine (COMPAZINE) injection 10 mg (has no  administration in time range)     Or   prochlorperazine (COMPAZINE) tablet 10 mg (has no administration in time range)   melatonin tablet 5 mg (has no administration in time range)   naloxone (NARCAN) injection 0.2 mg (has no administration in time range)     Or   naloxone (NARCAN) injection 0.4 mg (has no administration in time range)     Or   naloxone (NARCAN) injection 0.2 mg (has no administration in time range)     Or   naloxone (NARCAN) injection 0.4 mg (has no administration in time range)   oxyCODONE (ROXICODONE) tablet 5 mg (5 mg Oral $Given 1/11/25 0330)   HYDROmorphone (PF) (DILAUDID) injection 0.5 mg (0.5 mg Intravenous $Given 1/11/25 0145)       NEW PRESCRIPTIONS STARTED AT TODAY'S ER VISIT:  New Prescriptions    No medications on file          =================================================================    HPI    Patient information was obtained from: The patient    Use of : N/A         Jacqueline Pappas is a 39 year old female with a past medical history of congenital solitary right kidney, SIRS, tobacco abuse, who presents with nephrostomy tube dislodgement.    The patient is complaining of right flank pain since her nephrostomy tube dislodged approximately 30 minutes prior to arrival. She reports her son tripped over the tube. Her nephrostomy tube was replaced around 2-3 weeks ago. No complaints of any other associated symptoms.      REVIEW OF SYSTEMS   Review of Systems   Genitourinary:  Positive for flank pain (right).        PAST MEDICAL HISTORY:  Past Medical History:   Diagnosis Date    Acute renal failure     Anemia     Anemia     Calculus of kidney     Congenital absence of left kidney     Congenital absence of one kidney     Depression     Depression     Hydronephrosis     Kidney stone     at age 29    Pyelonephritis     Pyelonephritis     Smoker     Ureteral stricture     UTI (urinary tract infection)     Wrist fracture     Left       PAST SURGICAL HISTORY:  Past Surgical  History:   Procedure Laterality Date     SECTION       SECTION      x1    COMBINED CYSTOSCOPY, RETROGRADES, URETEROSCOPY, LASER HOLMIUM LITHOTRIPSY URETER(S), INSERT STENT Right 2016    Procedure: COMBINED CYSTOSCOPY, RETROGRADES, URETEROSCOPY, LASER HOLMIUM LITHOTRIPSY URETER(S), INSERT STENT;  Surgeon: Florentino Awad MD;  Location: [facility]    CYSTOSCOPY, URETEROSCOPY, COMBINED Right 2016    Procedure: COMBINED CYSTOSCOPY, URETEROSCOPY;  Surgeon: Florentino Awad MD;  Location: [facility] OR    IR NEPHROLITHOTOMY  2014    IR NEPHROSTOGRAM EXISITING ACCESS  10/18/2018    IR NEPHROSTOMY TUBE CHANGE RIGHT  2018    IR NEPHROSTOMY TUBE CHANGE RIGHT  2020    IR NEPHROSTOMY TUBE CHANGE RIGHT  2018    IR NEPHROSTOMY TUBE CHANGE RIGHT  2020    IR NEPHROSTOMY TUBE CHANGE RIGHT  11/3/2023    IR NEPHROSTOMY TUBE CHANGE RIGHT  2024    IR NEPHROSTOMY TUBE CHANGE RIGHT  2024    IR NEPHROSTOMY TUBE CHANGE RIGHT  2024    IR NEPHROSTOMY TUBE CHANGE RIGHT  2024    IR NEPHROSTOMY TUBE CHANGE RIGHT  2024    IR NEPHROSTOMY TUBE PLACEMENT RIGHT  2016    IR NEPHROSTOMY TUBE PLACEMENT RIGHT  2017    KIDNEY STONE SURGERY Right 2016    LASER HOLMIUM LITHOTRIPSY URETER(S), INSERT STENT, COMBINED Left 2016    Procedure: COMBINED CYSTOSCOPY, URETEROSCOPY, LASER HOLMIUM LITHOTRIPSY URETER(S), INSERT STENT;  Surgeon: Florentino Awad MD;  Location: [facility] OR    LASER HOLMIUM NEPHROLITHOTOMY VIA PERCUTANEOUS NEPHROSTOMY Right 2016    Procedure: LASER HOLMIUM NEPHROLITHOTOMY VIA PERCUTANEOUS NEPHROSTOMY;  Surgeon: Florentino Awad MD;  Location: [facility] OR    NEPHROSTOMY W/ INTRODUCTION OF CATHETER Right     OTHER SURGICAL HISTORY      Mole removednasal    OTHER SURGICAL HISTORY Right     Cystoscopy, ureteroscopy, laser11/02/2014 x2 with ureteral stent insertion    PERCUTANEOUS NEPHROSTOMY  2016    Procedure: PERCUTANEOUS NEPHROSTOMY;   Surgeon: Florentino Awad MD;  Location: [facility] OR    WISDOM TOOTH EXTRACTION      ZZC REMOVAL OF KIDNEY STONE         CURRENT MEDICATIONS:      Current Facility-Administered Medications:     acetaminophen (TYLENOL) tablet 500-1,000 mg, 500-1,000 mg, Oral, Q6H PRN, Daron Zayas MD, 1,000 mg at 01/11/25 0334    albuterol (ACCUNEB) nebulizer solution 1.25 mg, 1.25 mg, Nebulization, Q6H PRN, Daron Zayas MD    albuterol (PROVENTIL HFA/VENTOLIN HFA) inhaler, 2 puff, Inhalation, Q4H PRN, Daron Zayas MD    melatonin tablet 5 mg, 5 mg, Oral, At Bedtime PRN, Daron Zayas MD    naloxone (NARCAN) injection 0.2 mg, 0.2 mg, Intravenous, Q2 Min PRN **OR** naloxone (NARCAN) injection 0.4 mg, 0.4 mg, Intravenous, Q2 Min PRN **OR** naloxone (NARCAN) injection 0.2 mg, 0.2 mg, Intramuscular, Q2 Min PRN **OR** naloxone (NARCAN) injection 0.4 mg, 0.4 mg, Intramuscular, Q2 Min PRN, Daron Zayas MD    ondansetron (ZOFRAN ODT) ODT tab 4 mg, 4 mg, Oral, Q6H PRN **OR** ondansetron (ZOFRAN) injection 4 mg, 4 mg, Intravenous, Q6H PRN, Daron Zayas MD    oxyCODONE (ROXICODONE) tablet 5 mg, 5 mg, Oral, Q4H PRN, Daron Zayas MD, 5 mg at 01/11/25 0334    prochlorperazine (COMPAZINE) injection 10 mg, 10 mg, Intravenous, Q6H PRN **OR** prochlorperazine (COMPAZINE) tablet 10 mg, 10 mg, Oral, Q6H PRN, Daron Zayas MD    senna-docusate (SENOKOT-S/PERICOLACE) 8.6-50 MG per tablet 1 tablet, 1 tablet, Oral, BID PRN **OR** senna-docusate (SENOKOT-S/PERICOLACE) 8.6-50 MG per tablet 2 tablet, 2 tablet, Oral, BID PRN, Daron Zayas MD    sertraline (ZOLOFT) tablet 100 mg, 100 mg, Oral, At Bedtime, Daron Zayas MD    Current Outpatient Medications:     acetaminophen (TYLENOL) 500 MG tablet, Take 500-1,000 mg by mouth every 6 hours as needed for mild pain, Disp: , Rfl:     albuterol (ACCUNEB) 1.25 MG/3ML neb solution, Take 1.25 mg by nebulization every 6 hours as needed for shortness of  breath / dyspnea or wheezing, Disp: , Rfl:     albuterol (PROAIR HFA) 108 (90 Base) MCG/ACT inhaler, Inhale 2 puffs into the lungs every 4 hours as needed for shortness of breath or cough, Disp: 8 g, Rfl: 0    sertraline (ZOLOFT) 100 MG tablet, Take 100 mg by mouth At Bedtime, Disp: , Rfl:     ALLERGIES:  Allergies   Allergen Reactions    Desogestrel-Ethinyl Estradiol Angioedema and Swelling     Swelling of hands and feet per pt.      Penicillins Rash     As a child per mother; mother unsure if has tolerated another PCN since. Tolerated ampicillin 16    Sulfa Antibiotics Rash    Vancomycin Rash       FAMILY HISTORY:  Family History   Problem Relation Age of Onset    Anesthesia Reaction No family hx of     Malignant Hyperthermia No family hx of     Heart Disease Maternal Uncle         heart failure    Coronary Artery Disease Other     Heart Disease Maternal Grandmother         pacemaker    Gout Paternal Grandfather     Prostate Cancer Maternal Aunt         breast    Heart Disease Maternal Aunt         pacemaker    Heart Disease Maternal Aunt         pacemaker    Heart Disease Maternal Aunt         pacemaker       SOCIAL HISTORY:   Social History     Socioeconomic History    Marital status: Single   Tobacco Use    Smoking status: Every Day     Current packs/day: 0.00     Average packs/day: 0.5 packs/day for 10.0 years (5.0 ttl pk-yrs)     Types: Cigarettes     Start date: 2006     Last attempt to quit: 2016     Years since quittin.3    Smokeless tobacco: Former     Quit date: 2016    Tobacco comments:     Hasn't smoked in a week due to breathing issues    Vaping Use    Vaping status: Never Used   Substance and Sexual Activity    Alcohol use: Not Currently     Comment: Alcoholic Drinks/day: occ    Drug use: No    Sexual activity: Not Currently     Social Drivers of Health      [facility]    Financial  "Resource Strain    Received from [facility], [facility]    Social Connections   Interpersonal Safety: Low Risk  (12/16/2024)    Interpersonal Safety     Do you feel physically and emotionally safe where you currently live?: Yes     Within the past 12 months, have you been hit, slapped, kicked or otherwise physically hurt by someone?: No     Within the past 12 months, have you been humiliated or emotionally abused in other ways by your partner or ex-partner?: No     PHYSICAL EXAM:    Vitals: BP 91/53 (BP Location: Left arm, Patient Position: Right side)   Pulse 66   Temp 97.7  F (36.5  C) (Oral)   Resp 16   Ht 1.537 m (5' 0.5\")   Wt 80.1 kg (176 lb 8 oz)   LMP 01/05/2025   SpO2 (!) 85%   BMI 33.90 kg/m     Physical Exam  Vitals and nursing note reviewed.   Constitutional:       General: She is not in acute distress.     Appearance: Normal appearance. She is normal weight. She is not ill-appearing or toxic-appearing.   HENT:      Head: Normocephalic.   Cardiovascular:      Rate and Rhythm: Normal rate and regular rhythm.      Heart sounds: Normal heart sounds.   Pulmonary:      Effort: Pulmonary effort is normal. No respiratory distress.      Breath sounds: Normal breath sounds.       Abdominal:      General: Abdomen is flat. There is no distension.      Palpations: Abdomen is soft.      Tenderness: There is no abdominal tenderness. There is right CVA tenderness. There is no left CVA tenderness or guarding.   Skin:     General: Skin is warm and dry.      Findings: No bruising or erythema.   Neurological:      Mental Status: She is alert and oriented to person, place, and time.   Psychiatric:         Behavior: Behavior normal.        LAB:  All pertinent labs reviewed and interpreted.  Labs Ordered and Resulted from Time of ED Arrival to Time of ED Departure   BASIC METABOLIC PANEL - Abnormal       Result Value    Sodium 136      Potassium 4.0   "    Chloride 102      Carbon Dioxide (CO2) 23      Anion Gap 11      Urea Nitrogen 16.5      Creatinine 1.32 (*)     GFR Estimate 53 (*)     Calcium 10.9 (*)     Glucose 93     ROUTINE UA WITH MICROSCOPIC REFLEX TO CULTURE - Normal    Color Urine Light Yellow      Appearance Urine Clear      Glucose Urine Negative      Bilirubin Urine Negative      Ketones Urine Negative      Specific Gravity Urine 1.011      Blood Urine Negative      pH Urine 6.0      Protein Albumin Urine Negative      Urobilinogen Urine <2.0      Nitrite Urine Negative      Leukocyte Esterase Urine Negative      RBC Urine 0      WBC Urine 1      Squamous Epithelials Urine <1     HCG QUALITATIVE PREGNANCY - Normal    hCG Serum Qualitative Negative     CBC WITH PLATELETS AND DIFFERENTIAL    WBC Count 10.6      RBC Count 4.37      Hemoglobin 13.5      Hematocrit 39.5      MCV 90      MCH 30.9      MCHC 34.2      RDW 13.2      Platelet Count 179      % Neutrophils 68      % Lymphocytes 22      % Monocytes 6      % Eosinophils 3      % Basophils 1      % Immature Granulocytes 1      NRBCs per 100 WBC 0      Absolute Neutrophils 7.2      Absolute Lymphocytes 2.3      Absolute Monocytes 0.7      Absolute Eosinophils 0.3      Absolute Basophils 0.1      Absolute Immature Granulocytes 0.1      Absolute NRBCs 0.0       RADIOLOGY:  CT Abdomen Pelvis w/o Contrast   Final Result   IMPRESSION:    1.  Interval removal of the previously seen right renal percutaneous nephrostomy tube with interval development of dilatation of the right lower pole collecting system moiety in the interval.   2.  Redemonstration of multiple renal cysts and nonobstructive collecting system calculi scattered throughout the right kidney.           PROCEDURES:   Procedures       I, Beka Wilson, am serving as a scribe to document services personally performed by Dr. Adi Ko  based on my observation and the provider's statements to me. I, Adi Ko MD attest that Beka Wilson  is acting in a scribe capacity, has observed my performance of the services and has documented them in accordance with my direction.      Adi Ko M.D.  Emergency Medicine  [facility]       Adi Ko MD  01/11/25 0614

## 2025-01-14 ENCOUNTER — PATIENT OUTREACH (OUTPATIENT)
Dept: CARE COORDINATION | Facility: CLINIC | Age: 40
End: 2025-01-14
Payer: COMMERCIAL

## 2025-01-14 NOTE — PROGRESS NOTES
[facility]:   [facility] Contact  [facility]/Voicemail     Clinical Data: Post-Discharge Outreach     Outreach attempted x 2.  Left message on patient's voicemail, providing [facility]'s central phone number of [phone] ([phone]) for questions/concerns and/or to schedule an appt with an [facility] provider, if they do not have a PCP.      Plan:  [facility] will do no further outreaches at this time.       Radha Vora  Community Health Worker  [facility], [facility]  [phone]      *Connected Care Resource Team does NOT follow patient ongoing. Referrals are identified based on internal discharge reports and the outreach is to ensure patient has an understanding of their discharge instructions.

## 2025-01-18 ENCOUNTER — APPOINTMENT (OUTPATIENT)
Dept: CT IMAGING | Facility: CLINIC | Age: 40
End: 2025-01-18
Attending: EMERGENCY MEDICINE
Payer: COMMERCIAL

## 2025-01-18 ENCOUNTER — HOSPITAL ENCOUNTER (EMERGENCY)
Facility: CLINIC | Age: 40
Discharge: HOME | End: 2025-01-18
Attending: EMERGENCY MEDICINE | Admitting: EMERGENCY MEDICINE
Payer: COMMERCIAL

## 2025-01-18 VITALS
HEART RATE: 70 BPM | DIASTOLIC BLOOD PRESSURE: 61 MMHG | RESPIRATION RATE: 16 BRPM | HEIGHT: 61 IN | BODY MASS INDEX: 33.04 KG/M2 | SYSTOLIC BLOOD PRESSURE: 113 MMHG | WEIGHT: 175 LBS | OXYGEN SATURATION: 94 % | TEMPERATURE: 97.8 F

## 2025-01-18 DIAGNOSIS — Z93.6 NEPHROSTOMY PRESENT (H): ICD-10-CM

## 2025-01-18 DIAGNOSIS — R10.9 FLANK PAIN: ICD-10-CM

## 2025-01-18 DIAGNOSIS — R31.9 HEMATURIA, UNSPECIFIED TYPE: ICD-10-CM

## 2025-01-18 LAB
ALBUMIN UR-MCNC: 300 MG/DL
ALBUMIN UR-MCNC: NEGATIVE MG/DL
ANION GAP SERPL CALCULATED.3IONS-SCNC: 11 MMOL/L (ref 7–15)
APPEARANCE UR: ABNORMAL
APPEARANCE UR: CLEAR
BACTERIA #/AREA URNS HPF: ABNORMAL /HPF
BACTERIA #/AREA URNS HPF: ABNORMAL /HPF
BASOPHILS # BLD AUTO: 0 10E3/UL (ref 0–0.2)
BASOPHILS NFR BLD AUTO: 0 %
BILIRUB UR QL STRIP: NEGATIVE
BILIRUB UR QL STRIP: NEGATIVE
BUN SERPL-MCNC: 17.7 MG/DL (ref 6–20)
CALCIUM SERPL-MCNC: 9.4 MG/DL (ref 8.8–10.4)
CHLORIDE SERPL-SCNC: 102 MMOL/L (ref 98–107)
COLOR UR AUTO: ABNORMAL
COLOR UR AUTO: COLORLESS
CREAT SERPL-MCNC: 1.06 MG/DL (ref 0.51–0.95)
EGFRCR SERPLBLD CKD-EPI 2021: 69 ML/MIN/1.73M2
EOSINOPHIL # BLD AUTO: 0.3 10E3/UL (ref 0–0.7)
EOSINOPHIL NFR BLD AUTO: 3 %
ERYTHROCYTE [DISTWIDTH] IN BLOOD BY AUTOMATED COUNT: 13.2 % (ref 10–15)
GLUCOSE SERPL-MCNC: 89 MG/DL (ref 70–99)
GLUCOSE UR STRIP-MCNC: NEGATIVE MG/DL
GLUCOSE UR STRIP-MCNC: NEGATIVE MG/DL
HCO3 SERPL-SCNC: 22 MMOL/L (ref 22–29)
HCT VFR BLD AUTO: 37.1 % (ref 35–47)
HGB BLD-MCNC: 12.6 G/DL (ref 11.7–15.7)
HGB UR QL STRIP: ABNORMAL
HGB UR QL STRIP: NEGATIVE
HYALINE CASTS: 1 /LPF
IMM GRANULOCYTES # BLD: 0 10E3/UL
IMM GRANULOCYTES NFR BLD: 0 %
KETONES UR STRIP-MCNC: NEGATIVE MG/DL
KETONES UR STRIP-MCNC: NEGATIVE MG/DL
LEUKOCYTE ESTERASE UR QL STRIP: ABNORMAL
LEUKOCYTE ESTERASE UR QL STRIP: NEGATIVE
LYMPHOCYTES # BLD AUTO: 2 10E3/UL (ref 0.8–5.3)
LYMPHOCYTES NFR BLD AUTO: 20 %
MCH RBC QN AUTO: 30.2 PG (ref 26.5–33)
MCHC RBC AUTO-ENTMCNC: 34 G/DL (ref 31.5–36.5)
MCV RBC AUTO: 89 FL (ref 78–100)
MONOCYTES # BLD AUTO: 0.6 10E3/UL (ref 0–1.3)
MONOCYTES NFR BLD AUTO: 7 %
MUCOUS THREADS #/AREA URNS LPF: PRESENT /LPF
NEUTROPHILS # BLD AUTO: 6.7 10E3/UL (ref 1.6–8.3)
NEUTROPHILS NFR BLD AUTO: 69 %
NITRATE UR QL: NEGATIVE
NITRATE UR QL: NEGATIVE
NRBC # BLD AUTO: 0 10E3/UL
NRBC BLD AUTO-RTO: 0 /100
PH UR STRIP: 6 [PH] (ref 5–7)
PH UR STRIP: 7 [PH] (ref 5–7)
PLATELET # BLD AUTO: 183 10E3/UL (ref 150–450)
POTASSIUM SERPL-SCNC: 3.4 MMOL/L (ref 3.4–5.3)
RBC # BLD AUTO: 4.17 10E6/UL (ref 3.8–5.2)
RBC URINE: 81 /HPF
RBC URINE: <1 /HPF
SODIUM SERPL-SCNC: 135 MMOL/L (ref 135–145)
SP GR UR STRIP: 1.01 (ref 1–1.03)
SP GR UR STRIP: 1.02 (ref 1–1.03)
SQUAMOUS EPITHELIAL: <1 /HPF
SQUAMOUS EPITHELIAL: <1 /HPF
UROBILINOGEN UR STRIP-MCNC: <2 MG/DL
UROBILINOGEN UR STRIP-MCNC: <2 MG/DL
WBC # BLD AUTO: 9.7 10E3/UL (ref 4–11)
WBC URINE: 1 /HPF
WBC URINE: 23 /HPF

## 2025-01-18 PROCEDURE — 96374 THER/PROPH/DIAG INJ IV PUSH: CPT | Mod: 59 | Performed by: EMERGENCY MEDICINE

## 2025-01-18 PROCEDURE — 250N000013 HC RX MED GY IP 250 OP 250 PS 637: Performed by: EMERGENCY MEDICINE

## 2025-01-18 PROCEDURE — 85049 AUTOMATED PLATELET COUNT: CPT | Performed by: EMERGENCY MEDICINE

## 2025-01-18 PROCEDURE — 81001 URINALYSIS AUTO W/SCOPE: CPT | Performed by: EMERGENCY MEDICINE

## 2025-01-18 PROCEDURE — 96375 TX/PRO/DX INJ NEW DRUG ADDON: CPT | Mod: 59 | Performed by: EMERGENCY MEDICINE

## 2025-01-18 PROCEDURE — 250N000011 HC RX IP 250 OP 636: Performed by: EMERGENCY MEDICINE

## 2025-01-18 PROCEDURE — 82374 ASSAY BLOOD CARBON DIOXIDE: CPT | Performed by: EMERGENCY MEDICINE

## 2025-01-18 PROCEDURE — 85004 AUTOMATED DIFF WBC COUNT: CPT | Performed by: EMERGENCY MEDICINE

## 2025-01-18 PROCEDURE — 87086 URINE CULTURE/COLONY COUNT: CPT | Performed by: EMERGENCY MEDICINE

## 2025-01-18 PROCEDURE — 80048 BASIC METABOLIC PNL TOTAL CA: CPT | Performed by: EMERGENCY MEDICINE

## 2025-01-18 PROCEDURE — 74177 CT ABD & PELVIS W/CONTRAST: CPT

## 2025-01-18 PROCEDURE — 99285 EMERGENCY DEPT VISIT HI MDM: CPT | Mod: 25 | Performed by: EMERGENCY MEDICINE

## 2025-01-18 PROCEDURE — 36415 COLL VENOUS BLD VENIPUNCTURE: CPT | Performed by: EMERGENCY MEDICINE

## 2025-01-18 PROCEDURE — 81003 URINALYSIS AUTO W/O SCOPE: CPT | Performed by: EMERGENCY MEDICINE

## 2025-01-18 RX ORDER — IOPAMIDOL 755 MG/ML
90 INJECTION, SOLUTION INTRAVASCULAR ONCE
Status: COMPLETED | OUTPATIENT
Start: 2025-01-18 | End: 2025-01-18

## 2025-01-18 RX ORDER — ONDANSETRON 2 MG/ML
4 INJECTION INTRAMUSCULAR; INTRAVENOUS ONCE
Status: COMPLETED | OUTPATIENT
Start: 2025-01-18 | End: 2025-01-18

## 2025-01-18 RX ORDER — ACETAMINOPHEN 325 MG/1
975 TABLET ORAL ONCE
Status: COMPLETED | OUTPATIENT
Start: 2025-01-18 | End: 2025-01-18

## 2025-01-18 RX ORDER — HYDROMORPHONE HYDROCHLORIDE 1 MG/ML
0.5 INJECTION, SOLUTION INTRAMUSCULAR; INTRAVENOUS; SUBCUTANEOUS ONCE
Status: COMPLETED | OUTPATIENT
Start: 2025-01-18 | End: 2025-01-18

## 2025-01-18 RX ADMIN — HYDROMORPHONE HYDROCHLORIDE 0.5 MG: 1 INJECTION, SOLUTION INTRAMUSCULAR; INTRAVENOUS; SUBCUTANEOUS at 02:47

## 2025-01-18 RX ADMIN — ACETAMINOPHEN 975 MG: 325 TABLET ORAL at 04:07

## 2025-01-18 RX ADMIN — ONDANSETRON 4 MG: 2 INJECTION, SOLUTION INTRAMUSCULAR; INTRAVENOUS at 02:48

## 2025-01-18 RX ADMIN — IOPAMIDOL 90 ML: 755 INJECTION, SOLUTION INTRAVENOUS at 03:41

## 2025-01-18 ASSESSMENT — ENCOUNTER SYMPTOMS
NAUSEA: 0
FREQUENCY: 0
FLANK PAIN: 1
FEVER: 0
HEMATURIA: 1
CHILLS: 0
DYSURIA: 0

## 2025-01-18 ASSESSMENT — ACTIVITIES OF DAILY LIVING (ADL)
ADLS_ACUITY_SCORE: 48

## 2025-01-18 NOTE — ED TRIAGE NOTES
Patient presents to the ED complaining of her right nephrostomy tube being pulled out a week ago.  She had it replaced but has been having pain at the sight.  She also reports blood in the bag since replacement.  No medication prior to arrival.     Triage Assessment (Adult)       Row Name 01/18/25 0046          Triage Assessment    Airway WDL WDL        Respiratory WDL    Respiratory WDL WDL        Skin Circulation/Temperature WDL    Skin Circulation/Temperature WDL WDL        Cardiac WDL    Cardiac WDL WDL        Peripheral/Neurovascular WDL    Peripheral Neurovascular WDL WDL        Cognitive/Neuro/Behavioral WDL    Cognitive/Neuro/Behavioral WDL WDL

## 2025-01-18 NOTE — DISCHARGE INSTRUCTIONS
Your CAT scan here looks normal with your nephrostomy tube in appropriate placement.  We are going to wait for your urinalysis to culture.  Your urine looks similar with your previous urinalysis without evidence for edward infection.  Is important that you follow-up with your primary care doctor.  Return to the emergency department for any new or worsening symptoms.

## 2025-01-18 NOTE — ED PROVIDER NOTES
EMERGENCY DEPARTMENT ENCOUNTER      NAME: Jacqueline Pappas  AGE: 39 year old female  YOB: 1985  MRN: [id number]  EVALUATION DATE & TIME: 1/18/2025  1:17 AM    PCP: Pao Montague    ED PROVIDER: Otilia Fernando M.D.      Chief Complaint   Patient presents with    Flank Pain     right         FINAL IMPRESSION:  1. Hematuria, unspecified type    2. Flank pain    3. Nephrostomy present (H)        MEDICAL DECISION MAKING:    Pertinent Labs & Imaging studies reviewed. (See chart for details)  ED Course as of 01/19/25 0358   Sat Jan 18, 2025   0126 Afebrile.  Vital signs are unremarkable.  Patient is coming in with right-sided flank pain.  Longstanding history of nephrostomy tubes.  Right nephrostomy got pulled out about a week ago.  Had it replaced by IR.  She says for a few days she was not having any pain but now pain is gotten fairly severe.  She has noted that she has had hematuria and blood in the drainage.  She is continuing to have drainage, still urinates normally as well.  Is not having any dysuria.  No increased urinary frequency.   0127 No fevers chills.  No nausea.   0128 Physical exam for patient here with nephrostomy tube appears to be in place.  Draining slightly pink-tinged urine.  She has tenderness to palpation over her right CVA area.  No anterior abdominal pain.    Given the hematuria, increasing pain we will get a CT scan, check labs, creatinine.  Urinalysis sent from both regular voided and nephrostomy tube specimen.   0206 Urinalysis here does show possible evidence for urinary tract infection.  Her pain plan and her plan they recommend waiting for urine culture.   0206 Still awaiting CT scan.  Did get 1 dose of IV narcotics in the emergency department given that she had recent procedure, reporting significant pain.   0349 Patient's labs are at her baseline.  Awaiting CT scan.  If negative anticipate discharge home with follow-up with nephrologist.   0358 Dual urinalysis sent, the  one that is coming from her broth nephrostomy tube does have blood, leukocyte Estrace positive, she does have white cells.  Has a history of having negative cultures.  Here not febrile tachycardic.  She does not have any leukocytosis.  Again we will hold off antibiotics at this time.   0420 CT scan here shows nephrostomy tube in appropriate placement.  No other focal findings or acute findings on CT.         Medical Decision Making  Obtained supplemental history:Supplemental history obtained?: Documented in chart  Reviewed external records: External records reviewed?: Documented in chart  Care impacted by chronic illness:Documented in Chart and Other: Congenital solitary kidney  Did you consider but not order tests?: Work up considered but not performed and documented in chart, if applicable  Did you interpret images independently?: Independent interpretation of ECG and images noted in documentation, when applicable.  Consultation discussion with other provider:Did you involve another provider (consultant, MH, pharmacy, etc.)?: No  Discharge. No recommendations on prescription strength medication(s). See documentation for any additional details.    MIPS: Not Applicable        Critical care: 0 minutes excluding separately billable procedures.  Includes bedside management, time reviewing test results, review of records, discussing the case with staff, documenting the medical record and time spent with family members (or surrogate decision makers) discussing specific treatment issues.          ED COURSE:  1:24 AM I met with the patient, obtained history, performed an initial exam, and discussed options and plan for diagnostics and treatment here in the ED.  4:21 AM We discussed the plan for discharge and the patient is agreeable. Reviewed supportive cares, symptomatic treatment, outpatient follow up, and reasons to return to the Emergency Department. Patient to be discharged by ED RN.      The importance of close  follow up was discussed. We reviewed warning signs and symptoms, and I instructed Ms. Pappas to return to the emergency department immediately if she develops any new or worsening symptoms. I provided additional verbal discharge instructions. Ms. Pappas expressed understanding and agreement with this plan of care, her questions were answered, and she was discharged in stable condition.     MEDICATIONS GIVEN IN THE EMERGENCY:  Medications   HYDROmorphone (PF) (DILAUDID) injection 0.5 mg (0.5 mg Intravenous $Given 1/18/25 0247)   ondansetron (ZOFRAN) injection 4 mg (4 mg Intravenous $Given 1/18/25 0248)   iopamidol (ISOVUE-370) solution 90 mL (90 mLs Intravenous $Given 1/18/25 7691)   acetaminophen (TYLENOL) tablet 975 mg (975 mg Oral $Given 1/18/25 0027)       NEW PRESCRIPTIONS STARTED AT TODAY'S ER VISIT:  Discharge Medication List as of 1/18/2025  5:04 AM             =================================================================    HPI    Patient information was obtained from: Patient    Use of : N/A         Jacqueline Pappas is a 39 year old female with a history of congenital absence of one kidney, pyelonephritis, SIRS, tobacco abuse, who presents to the ED via walk-in for the evaluation of flank pain.     Patient reports longstanding history of nephrostomy tubes. She got right nephrostomy pulled out about a week ago and had it replaced by IR. She states that for a few days, she was not having any pain but now pain in right flank has gotten fairly severe. She reports associating hematuria and blood in the drainage. She is still continuing to have drainage. Patient still urinates normally as well. Otherwise, she denies any dysuria, increased urinary frequency, fevers, chills, and nausea. No other complaints at this time.    REVIEW OF SYSTEMS   Review of Systems   Constitutional:  Negative for chills and fever.   Gastrointestinal:  Negative for nausea.   Genitourinary:  Positive for flank pain and  hematuria. Negative for dysuria and frequency.   All other systems reviewed and are negative.    PAST MEDICAL HISTORY:  Past Medical History:   Diagnosis Date    Acute renal failure     Anemia     Anemia     Calculus of kidney     Congenital absence of left kidney     Congenital absence of one kidney     Depression     Depression     Hydronephrosis     Kidney stone     at age 29    Pyelonephritis     Pyelonephritis     Smoker     Ureteral stricture     UTI (urinary tract infection)     Wrist fracture     Left       PAST SURGICAL HISTORY:  Past Surgical History:   Procedure Laterality Date     SECTION       SECTION      x1    COMBINED CYSTOSCOPY, RETROGRADES, URETEROSCOPY, LASER HOLMIUM LITHOTRIPSY URETER(S), INSERT STENT Right 2016    Procedure: COMBINED CYSTOSCOPY, RETROGRADES, URETEROSCOPY, LASER HOLMIUM LITHOTRIPSY URETER(S), INSERT STENT;  Surgeon: Florentino Awad MD;  Location: [facility] OR    CYSTOSCOPY, URETEROSCOPY, COMBINED Right 2016    Procedure: COMBINED CYSTOSCOPY, URETEROSCOPY;  Surgeon: Florentino Awad MD;  Location: [facility] OR    IR NEPHROLITHOTOMY  2014    IR NEPHROSTOGRAM EXISITING ACCESS  10/18/2018    IR NEPHROSTOMY TUBE CHANGE RIGHT  2018    IR NEPHROSTOMY TUBE CHANGE RIGHT  2020    IR NEPHROSTOMY TUBE CHANGE RIGHT  2018    IR NEPHROSTOMY TUBE CHANGE RIGHT  2020    IR NEPHROSTOMY TUBE CHANGE RIGHT  11/3/2023    IR NEPHROSTOMY TUBE CHANGE RIGHT  2024    IR NEPHROSTOMY TUBE CHANGE RIGHT  2024    IR NEPHROSTOMY TUBE CHANGE RIGHT  2024    IR NEPHROSTOMY TUBE CHANGE RIGHT  2024    IR NEPHROSTOMY TUBE CHANGE RIGHT  2024    IR NEPHROSTOMY TUBE PLACEMENT RIGHT  2016    IR NEPHROSTOMY TUBE PLACEMENT RIGHT  2017    IR NEPHROSTOMY TUBE PLACEMENT RIGHT  2025    KIDNEY STONE SURGERY Right 2016    LASER HOLMIUM LITHOTRIPSY URETER(S), INSERT STENT, COMBINED Left 2016    Procedure: COMBINED CYSTOSCOPY,  URETEROSCOPY, LASER HOLMIUM LITHOTRIPSY URETER(S), INSERT STENT;  Surgeon: Florentino Awad MD;  Location: [facility] OR    LASER HOLMIUM NEPHROLITHOTOMY VIA PERCUTANEOUS NEPHROSTOMY Right 9/14/2016    Procedure: LASER HOLMIUM NEPHROLITHOTOMY VIA PERCUTANEOUS NEPHROSTOMY;  Surgeon: Florentino Awad MD;  Location: [facility] OR    NEPHROSTOMY W/ INTRODUCTION OF CATHETER Right     OTHER SURGICAL HISTORY      Mole removednasal    OTHER SURGICAL HISTORY Right     Cystoscopy, ureteroscopy, laser11/02/2014 x2 with ureteral stent insertion    PERCUTANEOUS NEPHROSTOMY  11/1/2016    Procedure: PERCUTANEOUS NEPHROSTOMY;  Surgeon: Florentino Awad MD;  Location: [facility] OR    WISDOM TOOTH EXTRACTION      ZZC REMOVAL OF KIDNEY STONE         CURRENT MEDICATIONS:    No current facility-administered medications for this encounter.    Current Outpatient Medications:     acetaminophen (TYLENOL) 500 MG tablet, Take 500-1,000 mg by mouth every 6 hours as needed for mild pain, Disp: , Rfl:     albuterol (ACCUNEB) 1.25 MG/3ML neb solution, Take 1.25 mg by nebulization every 6 hours as needed for shortness of breath / dyspnea or wheezing, Disp: , Rfl:     albuterol (PROAIR HFA) 108 (90 Base) MCG/ACT inhaler, Inhale 2 puffs into the lungs every 4 hours as needed for shortness of breath or cough, Disp: 8 g, Rfl: 0    sertraline (ZOLOFT) 100 MG tablet, Take 100 mg by mouth At Bedtime, Disp: , Rfl:     ALLERGIES:  Allergies   Allergen Reactions    Desogestrel-Ethinyl Estradiol Angioedema and Swelling     Swelling of hands and feet per pt.      Penicillins Rash     As a child per mother; mother unsure if has tolerated another PCN since. Tolerated ampicillin 9/20/16    Sulfa Antibiotics Rash    Vancomycin Rash       FAMILY HISTORY:  Family History   Problem Relation Age of Onset    Anesthesia Reaction No family hx of     Malignant Hyperthermia No family hx of     Heart Disease Maternal Uncle         heart failure    Coronary Artery Disease Other      "Heart Disease Maternal Grandmother         pacemaker    Gout Paternal Grandfather     Prostate Cancer Maternal Aunt         breast    Heart Disease Maternal Aunt         pacemaker    Heart Disease Maternal Aunt         pacemaker    Heart Disease Maternal Aunt         pacemaker       SOCIAL HISTORY:   Social History     Socioeconomic History    Marital status: Single   Tobacco Use    Smoking status: Every Day     Current packs/day: 0.00     Average packs/day: 0.5 packs/day for 10.0 years (5.0 ttl pk-yrs)     Types: Cigarettes     Start date: 2006     Last attempt to quit: 2016     Years since quittin.3    Smokeless tobacco: Former     Quit date: 2016    Tobacco comments:     Hasn't smoked in a week due to breathing issues    Vaping Use    Vaping status: Never Used   Substance and Sexual Activity    Alcohol use: Not Currently     Comment: Alcoholic Drinks/day: occ    Drug use: No    Sexual activity: Not Currently     Social Drivers of Health      Received from [employer]    Financial Resource Strain    Received from [employer]    Social Connections   Interpersonal Safety: Low Risk  (2024)    Interpersonal Safety     Do you feel physically and emotionally safe where you currently live?: Yes     Within the past 12 months, have you been hit, slapped, kicked or otherwise physically hurt by someone?: No     Within the past 12 months, have you been humiliated or emotionally abused in other ways by your partner or ex-partner?: No       PHYSICAL EXAM:    Vitals: /61   Pulse 70   Temp 97.8  F (36.6  C) (Oral)   Resp 16   Ht 1.549 m (5' 1\")   Wt 79.4 kg (175 lb)   LMP 2025   SpO2 94%   BMI 33.07 kg/m     General:. Alert and interactive, comfortable appearing.  HENT: Oropharynx without erythema or exudates. MMM.  TMs clear " bilaterally.  Eyes: Pupils mid-sized and equally reactive.   Neck: Full AROM.  No midline tenderness to palpation.  Cardiovascular: Regular rate and rhythm. Peripheral pulses 2+ bilaterally.  Chest/Pulmonary: Normal work of breathing. Lung sounds clear and equal throughout, no wheezes or crackles. No chest wall tenderness or deformities.  Abdomen: Soft, nondistended. Nontender without guarding or rebound.   : Nephrostomy tube appears to be in place. Draining slightly pink-tinged urine.   Back/Spine: No midline tenderness. Tenderness to palpation over her right CVA area.  Extremities: Normal ROM of all major joints. No lower extremity edema.   Skin: Warm and dry. Normal skin color.   Neuro: Speech clear. CNs grossly intact. Moves all extremities appropriately. Strength and sensation grossly intact to all extremities.   Psych: Normal affect/mood, cooperative, memory appropriate.     LAB:  All pertinent labs reviewed and interpreted.  Labs Ordered and Resulted from Time of ED Arrival to Time of ED Departure   ROUTINE UA WITH MICROSCOPIC REFLEX TO CULTURE - Abnormal       Result Value    Color Urine Light Yellow      Appearance Urine Clear      Glucose Urine Negative      Bilirubin Urine Negative      Ketones Urine Negative      Specific Gravity Urine 1.016      Blood Urine Negative      pH Urine 6.0      Protein Albumin Urine Negative      Urobilinogen Urine <2.0      Nitrite Urine Negative      Leukocyte Esterase Urine Negative      Bacteria Urine Few (*)     RBC Urine <1      WBC Urine 1      Squamous Epithelials Urine <1     ROUTINE UA WITH MICROSCOPIC REFLEX TO CULTURE - Abnormal    Color Urine Colorless      Appearance Urine Turbid (*)     Glucose Urine Negative      Bilirubin Urine Negative      Ketones Urine Negative      Specific Gravity Urine 1.007      Blood Urine >1.0 mg/dL (*)     pH Urine 7.0      Protein Albumin Urine 300 (*)     Urobilinogen Urine <2.0      Nitrite Urine Negative      Leukocyte  Esterase Urine 500 Duglas/uL (*)     Bacteria Urine Few (*)     Mucus Urine Present (*)     RBC Urine 81 (*)     WBC Urine 23 (*)     Squamous Epithelials Urine <1      Hyaline Casts Urine 1     BASIC METABOLIC PANEL - Abnormal    Sodium 135      Potassium 3.4      Chloride 102      Carbon Dioxide (CO2) 22      Anion Gap 11      Urea Nitrogen 17.7      Creatinine 1.06 (*)     GFR Estimate 69      Calcium 9.4      Glucose 89     CBC WITH PLATELETS AND DIFFERENTIAL    WBC Count 9.7      RBC Count 4.17      Hemoglobin 12.6      Hematocrit 37.1      MCV 89      MCH 30.2      MCHC 34.0      RDW 13.2      Platelet Count 183      % Neutrophils 69      % Lymphocytes 20      % Monocytes 7      % Eosinophils 3      % Basophils 0      % Immature Granulocytes 0      NRBCs per 100 WBC 0      Absolute Neutrophils 6.7      Absolute Lymphocytes 2.0      Absolute Monocytes 0.6      Absolute Eosinophils 0.3      Absolute Basophils 0.0      Absolute Immature Granulocytes 0.0      Absolute NRBCs 0.0     URINE CULTURE       RADIOLOGY:  CT Abdomen Pelvis w Contrast   Final Result   IMPRESSION:    1.  Since prior CT, right posterior approach percutaneous nephrostomy tube has been placed, tip in the lower pole calyces, resolved caliectasis demonstrated previously. A few tiny calyceal tip stones in the collecting system of the right kidney,    unchanged. Cortical cysts in the right kidney, stable. No discrete perinephric fluid collection or ongoing extravasation.      2.  Atrophic left kidney without stones or obstruction, unchanged.      3.  Distal colonic mild diverticulosis without acute inflammation or secondary complications. Mild splenomegaly without discrete lesion, stable.                I, Margarette Wheeler, am serving as a scribe to document services personally performed by Dr. Otilia Fernando  based on my observation and the provider's statements to me. I, Otilia Fernando MD attest that Margarette Wheeler is acting in a scribe capacity, has observed  my performance of the services and has documented them in accordance with my direction.      Otilia Fernando M.D.  Emergency Medicine  [facility] EMERGENCY ROOM  [address] [facility] [address]  [phone]  Dept: [phone]     Otilia Fernando MD  01/19/25 0358

## 2025-01-19 LAB — BACTERIA UR CULT: NO GROWTH

## 2025-02-02 ENCOUNTER — HEALTH MAINTENANCE LETTER (OUTPATIENT)
Age: 40
End: 2025-02-02

## 2025-02-05 NOTE — ED TRIAGE NOTES
Patient arrives from home.   C/o right side flank pain since yesterday morning. Reports n/v since around 2000 yesterday evening.     Hx: Kidney stones     [Negative] : Heme/Lymph [Dysuria] : dysuria

## 2025-02-25 ENCOUNTER — HOSPITAL ENCOUNTER (EMERGENCY)
Facility: HOSPITAL | Age: 40
End: 2025-02-25
Payer: COMMERCIAL

## 2025-02-26 ENCOUNTER — HOSPITAL ENCOUNTER (EMERGENCY)
Facility: CLINIC | Age: 40
Discharge: HOME | End: 2025-02-27
Attending: EMERGENCY MEDICINE | Admitting: EMERGENCY MEDICINE
Payer: COMMERCIAL

## 2025-02-26 VITALS
BODY MASS INDEX: 34.36 KG/M2 | TEMPERATURE: 97.7 F | OXYGEN SATURATION: 99 % | RESPIRATION RATE: 17 BRPM | WEIGHT: 175 LBS | HEART RATE: 86 BPM | HEIGHT: 60 IN | DIASTOLIC BLOOD PRESSURE: 70 MMHG | SYSTOLIC BLOOD PRESSURE: 124 MMHG

## 2025-02-26 DIAGNOSIS — M79.675 PAIN OF TOE OF LEFT FOOT: ICD-10-CM

## 2025-02-26 PROCEDURE — 99283 EMERGENCY DEPT VISIT LOW MDM: CPT

## 2025-02-26 RX ORDER — HYDROCODONE BITARTRATE AND ACETAMINOPHEN 5; 325 MG/1; MG/1
2 TABLET ORAL ONCE
Status: COMPLETED | OUTPATIENT
Start: 2025-02-27 | End: 2025-02-27

## 2025-02-26 RX ORDER — TIZANIDINE HYDROCHLORIDE 4 MG/1
4 CAPSULE, GELATIN COATED ORAL
COMMUNITY
Start: 2025-02-25 | End: 2025-02-26

## 2025-02-26 RX ORDER — NAPROXEN 500 MG/1
500 TABLET ORAL
COMMUNITY
Start: 2025-02-25 | End: 2025-02-26

## 2025-02-26 RX ORDER — CEPHALEXIN 500 MG/1
500 CAPSULE ORAL
COMMUNITY
Start: 2025-02-25

## 2025-02-26 NOTE — Clinical Note
Jacqueline Pappas was seen and treated in our emergency department on 2/26/2025.  She may return to work on 02/27/2025.  Sitting job only for the next 2 days and must elevate her left foot as much as possible over the next couple days     If you have any questions or concerns, please don't hesitate to call.      Jolene Pastrana MD

## 2025-02-27 PROCEDURE — 250N000013 HC RX MED GY IP 250 OP 250 PS 637: Performed by: EMERGENCY MEDICINE

## 2025-02-27 RX ADMIN — HYDROCODONE BITARTRATE AND ACETAMINOPHEN 2 TABLET: 5; 325 TABLET ORAL at 00:01

## 2025-02-27 NOTE — ED PROVIDER NOTES
EMERGENCY DEPARTMENT ENCOUnter      NAME: Jacqueline Pappas  AGE: 39 year old female  YOB: 1985  MRN: [id number]  EVALUATION DATE & TIME: No admission date for patient encounter.    PCP: Pao Montague    ED PROVIDER: Jolene Pastrana MD      Chief Complaint   Patient presents with    Toe Pain         FINAL IMPRESSION:  1. Pain of toe of left foot          ED COURSE & MEDICAL DECISION MAKING:      In summary, the patient is a 39-year-old female who presents to the emergency department for evaluation of left toe pain thought secondary to an improving distal toe cellulitis and ingrown toenail.  She will continue her oral antibiotics and see her podiatrist on Friday as previously arranged.    2345-evaluated patient.  Vicodin 2 tablets p.o. was administered for pain.  Reviewed previous medical records including the patient's care plan.    Medical Decision Making  Obtained supplemental history:Supplemental history obtained?: No  Reviewed external records: External records reviewed?: Outpatient Record: clinic and ED records  Care impacted by chronic illness:Documented in Chart  Did you consider but not order tests?: Work up considered but not performed and documented in chart, if applicable  Did you interpret images independently?: Independent interpretation of ECG and images noted in documentation, when applicable.  Consultation discussion with other provider:Did you involve another provider (consultant, , pharmacy, etc.)?: No  Discharge. I recommended the patient continue their current prescription strength medication(s): keflex. See documentation for any additional details.    MIPS (CTPE, Dental pain, Farr, Sinusitis, Asthma/COPD, Head Trauma): Not Applicable        At the conclusion of the encounter I discussed the results of all of the tests and the disposition. The questions were answered. The patient or family acknowledged understanding and was agreeable with the care plan.          MEDICATIONS GIVEN IN THE EMERGENCY:  Medications   HYDROcodone-acetaminophen (NORCO) 5-325 MG per tablet 2 tablet (has no administration in time range)       NEW PRESCRIPTIONS STARTED AT TODAY'S ER VISIT  New Prescriptions    No medications on file          =================================================================    HPI        Jacqueline Pappas is a 39 year old female presents to the emergency department for evaluation of left toe pain after standing at work today.  She describes her pain as sharp, constant, 8 out of 10 in intensity, worsened with movement and minimally relieved with rest.  She denies any fevers, chills, nausea or vomiting.  She has a known infection of her left toe which she is currently on antibiotics, Keflex.  She has an appointment with the podiatrist in 2 days.  It has been several hours since she took anything for pain.  The patient has a care plan in her medical record stating that she is not to receive any opioid prescriptions.        PAST MEDICAL HISTORY:  Past Medical History:   Diagnosis Date    Acute renal failure     Anemia     Anemia     Calculus of kidney     Congenital absence of left kidney     Congenital absence of one kidney     Depression     Depression     Hydronephrosis     Kidney stone     at age 29    Pyelonephritis     Pyelonephritis     Smoker     Ureteral stricture     UTI (urinary tract infection)     Wrist fracture     Left       PAST SURGICAL HISTORY:  Past Surgical History:   Procedure Laterality Date     SECTION       SECTION      x1    COMBINED CYSTOSCOPY, RETROGRADES, URETEROSCOPY, LASER HOLMIUM LITHOTRIPSY URETER(S), INSERT STENT Right 2016    Procedure: COMBINED CYSTOSCOPY, RETROGRADES, URETEROSCOPY, LASER HOLMIUM LITHOTRIPSY URETER(S), INSERT STENT;  Surgeon: Florentino Awad MD;  Location:  OR    CYSTOSCOPY, URETEROSCOPY, COMBINED Right 2016    Procedure: COMBINED CYSTOSCOPY, URETEROSCOPY;  Surgeon: Florentino Awad  MD Solitario;  Location: [facility] OR    IR NEPHROLITHOTOMY  12/11/2014    IR NEPHROSTOGRAM EXISITING ACCESS  10/18/2018    IR NEPHROSTOMY TUBE CHANGE RIGHT  12/12/2018    IR NEPHROSTOMY TUBE CHANGE RIGHT  6/11/2020    IR NEPHROSTOMY TUBE CHANGE RIGHT  12/12/2018    IR NEPHROSTOMY TUBE CHANGE RIGHT  6/11/2020    IR NEPHROSTOMY TUBE CHANGE RIGHT  11/3/2023    IR NEPHROSTOMY TUBE CHANGE RIGHT  2/5/2024    IR NEPHROSTOMY TUBE CHANGE RIGHT  5/20/2024    IR NEPHROSTOMY TUBE CHANGE RIGHT  8/20/2024    IR NEPHROSTOMY TUBE CHANGE RIGHT  11/19/2024    IR NEPHROSTOMY TUBE CHANGE RIGHT  12/16/2024    IR NEPHROSTOMY TUBE PLACEMENT RIGHT  7/24/2016    IR NEPHROSTOMY TUBE PLACEMENT RIGHT  9/14/2017    IR NEPHROSTOMY TUBE PLACEMENT RIGHT  1/11/2025    KIDNEY STONE SURGERY Right 05/2016    LASER HOLMIUM LITHOTRIPSY URETER(S), INSERT STENT, COMBINED Left 11/1/2016    Procedure: COMBINED CYSTOSCOPY, URETEROSCOPY, LASER HOLMIUM LITHOTRIPSY URETER(S), INSERT STENT;  Surgeon: Florentino Awad MD;  Location: [facility] OR    LASER HOLMIUM NEPHROLITHOTOMY VIA PERCUTANEOUS NEPHROSTOMY Right 9/14/2016    Procedure: LASER HOLMIUM NEPHROLITHOTOMY VIA PERCUTANEOUS NEPHROSTOMY;  Surgeon: Florentino Awad MD;  Location: [facility] OR    NEPHROSTOMY W/ INTRODUCTION OF CATHETER Right     OTHER SURGICAL HISTORY      Mole removednasal    OTHER SURGICAL HISTORY Right     Cystoscopy, ureteroscopy, laser11/02/2014 x2 with ureteral stent insertion    PERCUTANEOUS NEPHROSTOMY  11/1/2016    Procedure: PERCUTANEOUS NEPHROSTOMY;  Surgeon: Florentino Awad MD;  Location: [facility] OR    WISDOM TOOTH EXTRACTION      ZZC REMOVAL OF KIDNEY STONE             CURRENT MEDICATIONS:    cephALEXin (KEFLEX) 500 MG capsule  acetaminophen (TYLENOL) 500 MG tablet  albuterol (ACCUNEB) 1.25 MG/3ML neb solution  albuterol (PROAIR HFA) 108 (90 Base) MCG/ACT inhaler  sertraline (ZOLOFT) 100 MG tablet        ALLERGIES:  Allergies   Allergen Reactions    Nsaids Other (See Comments)     Needs to  avoid due to having only 1 kidney.      Desogestrel-Ethinyl Estradiol Angioedema and Swelling     Swelling of hands and feet per pt.      Penicillins Rash     As a child per mother; mother unsure if has tolerated another PCN since. Tolerated ampicillin 18    Sulfa Antibiotics Rash    Vancomycin Rash       FAMILY HISTORY:  Family History   Problem Relation Age of Onset    Anesthesia Reaction No family hx of     Malignant Hyperthermia No family hx of     Heart Disease Maternal Uncle         heart failure    Coronary Artery Disease Other     Heart Disease Maternal Grandmother         pacemaker    Gout Paternal Grandfather     Prostate Cancer Maternal Aunt         breast    Heart Disease Maternal Aunt         pacemaker    Heart Disease Maternal Aunt         pacemaker    Heart Disease Maternal Aunt         pacemaker       SOCIAL HISTORY:   Social History     Socioeconomic History    Marital status: Single   Tobacco Use    Smoking status: Every Day     Current packs/day: 0.00     Average packs/day: 0.5 packs/day for 10.0 years (5.0 ttl pk-yrs)     Types: Cigarettes     Start date: 2006     Last attempt to quit: 2016     Years since quittin.4    Smokeless tobacco: Former     Quit date: 2016    Tobacco comments:     Hasn't smoked in a week due to breathing issues    Vaping Use    Vaping status: Never Used   Substance and Sexual Activity    Alcohol use: Not Currently     Comment: Alcoholic Drinks/day: occ    Drug use: No    Sexual activity: Not Currently     Social Drivers of Health      Received from [employer] [employer]    Financial Resource Strain    [facility] [employer]    Social Connections   Interpersonal Safety: Low Risk  (2024)    Interpersonal Safety     Do you feel physically and emotionally safe where you currently live?: Yes      Within the past 12 months, have you been hit, slapped, kicked or otherwise physically hurt by someone?: No     Within the past 12 months, have you been humiliated or emotionally abused in other ways by your partner or ex-partner?: No       VITALS:  Patient Vitals for the past 24 hrs:   BP Temp Temp src Pulse Resp SpO2 Height Weight   02/26/25 2334 124/70 97.7  F (36.5  C) Oral 86 17 99 % 1.524 m (5') 79.4 kg (175 lb)       PHYSICAL EXAM    Constitutional:  Well developed, Well nourished,  HENT:  Normocephalic, Atraumatic, Bilateral external ears normal, Oropharynx moist, Nose normal.   Neck:  Normal range of motion, No meningismus, No stridor.   Eyes:  EOMI, Conjunctiva normal, No discharge.   Respiratory:  Normal breath sounds, No respiratory distress, No wheezing, No chest tenderness.   Cardiovascular:  Normal heart rate, Normal rhythm, No murmurs  GI:  Soft, No tenderness, No guarding,    Musculoskeletal:  Neurovascularly intact distally, No edema, No tenderness, No cyanosis, Good range of motion in all major joints.   Integument:  Warm, Dry, No erythema, No rash.  Left great toe is mildly edematous with no erythema or warmth.  It appears to have a resolving left cellulitis and ingrown toenail.  She also likely has onychomycosis of her left toenail.  There is no drainage.  Lymphatic:  No lymphadenopathy noted.   Neurologic:  Alert & oriented, Normal motor function, No focal deficits noted.   Psychiatric:  Affect normal, Judgment normal, Mood normal.          Jolene Pastrana MD  Emergency Medicine  [facility] EMERGENCY ROOM  [address] [facility] [id number] [phone]  Dept: [phone]       Jolene Pastrana MD  02/27/25 0113

## 2025-02-27 NOTE — ED TRIAGE NOTES
Arrived Ambulatory with Left Big Toe Pain.  Pain started on Sunday.  Reports she broke it about a year ago and has intermittent pain since then.  Pain is starting to radiate down her foot.  Was put on antibiotics on Monday and has a podiatry appointment on Friday.       Triage Assessment (Adult)       Row Name 02/26/25 5362          Triage Assessment    Airway WDL WDL        Respiratory WDL    Respiratory WDL WDL        Skin Circulation/Temperature WDL    Skin Circulation/Temperature WDL WDL        Cardiac WDL    Cardiac WDL WDL        Peripheral/Neurovascular WDL    Peripheral Neurovascular WDL X  numbness and tingling in left big toe only        Cognitive/Neuro/Behavioral WDL    Cognitive/Neuro/Behavioral WDL WDL

## 2025-06-16 NOTE — ED TRIAGE NOTES
Pt c/o severe right flank pain with N/V/D. Pt also c/o cough and SOB all for last 3 days.     Triage Assessment       Row Name 10/06/23 0220       Triage Assessment (Adult)    Airway WDL WDL       Respiratory WDL    Respiratory WDL X;cough    Cough Frequency frequent    Cough Type congested;nonproductive  yellow       Skin Circulation/Temperature WDL    Skin Circulation/Temperature WDL WDL       Cardiac WDL    Cardiac WDL X  slightly tachycardic       Peripheral/Neurovascular WDL    Peripheral Neurovascular WDL WDL       Cognitive/Neuro/Behavioral WDL    Cognitive/Neuro/Behavioral WDL WDL                     Spoke with [facility] Pharmacist.Per Abraham pt. Still has 5 refills left.Message sent to pt. to contact [facility].

## (undated) RX ORDER — FENTANYL CITRATE 50 UG/ML
INJECTION, SOLUTION INTRAMUSCULAR; INTRAVENOUS
Status: DISPENSED
Start: 2024-02-05

## (undated) RX ORDER — LIDOCAINE HYDROCHLORIDE 10 MG/ML
INJECTION, SOLUTION INFILTRATION; PERINEURAL
Status: DISPENSED
Start: 2024-02-05

## (undated) RX ORDER — LIDOCAINE HYDROCHLORIDE 10 MG/ML
INJECTION, SOLUTION INFILTRATION; PERINEURAL
Status: DISPENSED
Start: 2025-01-11

## (undated) RX ORDER — LIDOCAINE HYDROCHLORIDE 10 MG/ML
INJECTION, SOLUTION INFILTRATION; PERINEURAL
Status: DISPENSED
Start: 2024-11-19

## (undated) RX ORDER — FENTANYL CITRATE 50 UG/ML
INJECTION, SOLUTION INTRAMUSCULAR; INTRAVENOUS
Status: DISPENSED
Start: 2024-12-16

## (undated) RX ORDER — FENTANYL CITRATE 50 UG/ML
INJECTION, SOLUTION INTRAMUSCULAR; INTRAVENOUS
Status: DISPENSED
Start: 2024-11-19

## (undated) RX ORDER — FENTANYL CITRATE 50 UG/ML
INJECTION, SOLUTION INTRAMUSCULAR; INTRAVENOUS
Status: DISPENSED
Start: 2025-01-11

## (undated) RX ORDER — FENTANYL CITRATE 50 UG/ML
INJECTION, SOLUTION INTRAMUSCULAR; INTRAVENOUS
Status: DISPENSED
Start: 2024-05-20

## (undated) RX ORDER — LIDOCAINE HYDROCHLORIDE 10 MG/ML
INJECTION, SOLUTION INFILTRATION; PERINEURAL
Status: DISPENSED
Start: 2024-12-16

## (undated) RX ORDER — FENTANYL CITRATE 50 UG/ML
INJECTION, SOLUTION INTRAMUSCULAR; INTRAVENOUS
Status: DISPENSED
Start: 2023-11-03

## (undated) RX ORDER — FENTANYL CITRATE 50 UG/ML
INJECTION, SOLUTION INTRAMUSCULAR; INTRAVENOUS
Status: DISPENSED
Start: 2024-08-20

## (undated) RX ORDER — LIDOCAINE HYDROCHLORIDE 10 MG/ML
INJECTION, SOLUTION INFILTRATION; PERINEURAL
Status: DISPENSED
Start: 2023-11-03